# Patient Record
Sex: MALE | Race: WHITE | NOT HISPANIC OR LATINO | Employment: OTHER | URBAN - METROPOLITAN AREA
[De-identification: names, ages, dates, MRNs, and addresses within clinical notes are randomized per-mention and may not be internally consistent; named-entity substitution may affect disease eponyms.]

---

## 2018-06-25 ENCOUNTER — OFFICE VISIT (OUTPATIENT)
Dept: FAMILY MEDICINE CLINIC | Facility: CLINIC | Age: 73
End: 2018-06-25
Payer: MEDICARE

## 2018-06-25 ENCOUNTER — APPOINTMENT (OUTPATIENT)
Dept: LAB | Facility: HOSPITAL | Age: 73
End: 2018-06-25
Payer: MEDICARE

## 2018-06-25 VITALS
WEIGHT: 158 LBS | OXYGEN SATURATION: 95 % | SYSTOLIC BLOOD PRESSURE: 134 MMHG | HEIGHT: 62 IN | BODY MASS INDEX: 29.08 KG/M2 | DIASTOLIC BLOOD PRESSURE: 78 MMHG | TEMPERATURE: 96.8 F | HEART RATE: 76 BPM | RESPIRATION RATE: 14 BRPM

## 2018-06-25 DIAGNOSIS — E11.9 DIABETES MELLITUS WITHOUT COMPLICATION (HCC): ICD-10-CM

## 2018-06-25 DIAGNOSIS — M54.50 MIDLINE LOW BACK PAIN WITHOUT SCIATICA, UNSPECIFIED CHRONICITY: Primary | ICD-10-CM

## 2018-06-25 PROBLEM — E78.49 OTHER HYPERLIPIDEMIA: Status: ACTIVE | Noted: 2018-06-25

## 2018-06-25 PROBLEM — Z79.4 TYPE 2 DIABETES MELLITUS, WITH LONG-TERM CURRENT USE OF INSULIN (HCC): Status: ACTIVE | Noted: 2018-06-25

## 2018-06-25 PROBLEM — I10 ESSENTIAL HYPERTENSION: Status: ACTIVE | Noted: 2018-06-25

## 2018-06-25 PROBLEM — I73.9 PERIPHERAL VASCULAR DISEASE WITH CLAUDICATION (HCC): Status: ACTIVE | Noted: 2018-06-25

## 2018-06-25 LAB
ALBUMIN SERPL BCP-MCNC: 4.2 G/DL (ref 3.5–5)
ALP SERPL-CCNC: 70 U/L (ref 46–116)
ALT SERPL W P-5'-P-CCNC: 45 U/L (ref 12–78)
ANION GAP SERPL CALCULATED.3IONS-SCNC: 13 MMOL/L (ref 4–13)
AST SERPL W P-5'-P-CCNC: 28 U/L (ref 5–45)
BACTERIA UR QL AUTO: ABNORMAL /HPF
BASOPHILS # BLD AUTO: 0.02 THOUSANDS/ΜL (ref 0–0.1)
BASOPHILS NFR BLD AUTO: 0 % (ref 0–1)
BILIRUB DIRECT SERPL-MCNC: 0.18 MG/DL (ref 0–0.2)
BILIRUB SERPL-MCNC: 0.91 MG/DL (ref 0.2–1)
BILIRUB UR QL STRIP: NEGATIVE
BUN SERPL-MCNC: 27 MG/DL (ref 5–25)
CALCIUM SERPL-MCNC: 10.1 MG/DL (ref 8.3–10.1)
CHLORIDE SERPL-SCNC: 100 MMOL/L (ref 100–108)
CHOLEST SERPL-MCNC: 143 MG/DL (ref 50–200)
CLARITY UR: CLEAR
CO2 SERPL-SCNC: 24 MMOL/L (ref 21–32)
COLOR UR: YELLOW
CREAT SERPL-MCNC: 1.13 MG/DL (ref 0.6–1.3)
EOSINOPHIL # BLD AUTO: 0.17 THOUSAND/ΜL (ref 0–0.61)
EOSINOPHIL NFR BLD AUTO: 2 % (ref 0–6)
ERYTHROCYTE [DISTWIDTH] IN BLOOD BY AUTOMATED COUNT: 15.4 % (ref 11.6–15.1)
GFR SERPL CREATININE-BSD FRML MDRD: 63 ML/MIN/1.73SQ M
GLUCOSE SERPL-MCNC: 185 MG/DL (ref 65–140)
GLUCOSE UR STRIP-MCNC: ABNORMAL MG/DL
HCT VFR BLD AUTO: 43.8 % (ref 36.5–49.3)
HDLC SERPL-MCNC: 39 MG/DL (ref 40–60)
HGB BLD-MCNC: 14.8 G/DL (ref 12–17)
HGB UR QL STRIP.AUTO: NEGATIVE
KETONES UR STRIP-MCNC: NEGATIVE MG/DL
LDLC SERPL CALC-MCNC: 63 MG/DL (ref 0–100)
LEUKOCYTE ESTERASE UR QL STRIP: ABNORMAL
LYMPHOCYTES # BLD AUTO: 1.52 THOUSANDS/ΜL (ref 0.6–4.47)
LYMPHOCYTES NFR BLD AUTO: 20 % (ref 14–44)
MCH RBC QN AUTO: 30.9 PG (ref 26.8–34.3)
MCHC RBC AUTO-ENTMCNC: 33.8 G/DL (ref 31.4–37.4)
MCV RBC AUTO: 91 FL (ref 82–98)
MONOCYTES # BLD AUTO: 0.66 THOUSAND/ΜL (ref 0.17–1.22)
MONOCYTES NFR BLD AUTO: 9 % (ref 4–12)
NEUTROPHILS # BLD AUTO: 5.35 THOUSANDS/ΜL (ref 1.85–7.62)
NEUTS SEG NFR BLD AUTO: 69 % (ref 43–75)
NITRITE UR QL STRIP: NEGATIVE
NON-SQ EPI CELLS URNS QL MICRO: ABNORMAL /HPF
NONHDLC SERPL-MCNC: 104 MG/DL
NRBC BLD AUTO-RTO: 0 /100 WBCS
PH UR STRIP.AUTO: 5.5 [PH] (ref 4.5–8)
PLATELET # BLD AUTO: 174 THOUSANDS/UL (ref 149–390)
PMV BLD AUTO: 9.9 FL (ref 8.9–12.7)
POTASSIUM SERPL-SCNC: 4.1 MMOL/L (ref 3.5–5.3)
PROT SERPL-MCNC: 8.2 G/DL (ref 6.4–8.2)
PROT UR STRIP-MCNC: NEGATIVE MG/DL
RBC # BLD AUTO: 4.79 MILLION/UL (ref 3.88–5.62)
RBC #/AREA URNS AUTO: ABNORMAL /HPF
SODIUM SERPL-SCNC: 137 MMOL/L (ref 136–145)
SP GR UR STRIP.AUTO: 1.01 (ref 1–1.03)
TRIGL SERPL-MCNC: 207 MG/DL
UROBILINOGEN UR QL STRIP.AUTO: 0.2 E.U./DL
WBC # BLD AUTO: 7.72 THOUSAND/UL (ref 4.31–10.16)
WBC #/AREA URNS AUTO: ABNORMAL /HPF

## 2018-06-25 PROCEDURE — 85025 COMPLETE CBC W/AUTO DIFF WBC: CPT

## 2018-06-25 PROCEDURE — 36415 COLL VENOUS BLD VENIPUNCTURE: CPT

## 2018-06-25 PROCEDURE — 80061 LIPID PANEL: CPT

## 2018-06-25 PROCEDURE — 81001 URINALYSIS AUTO W/SCOPE: CPT

## 2018-06-25 PROCEDURE — 99204 OFFICE O/P NEW MOD 45 MIN: CPT | Performed by: INTERNAL MEDICINE

## 2018-06-25 PROCEDURE — 80048 BASIC METABOLIC PNL TOTAL CA: CPT

## 2018-06-25 PROCEDURE — 80076 HEPATIC FUNCTION PANEL: CPT

## 2018-06-25 RX ORDER — EMPAGLIFLOZIN 25 MG/1
25 TABLET, FILM COATED ORAL EVERY MORNING
COMMUNITY
End: 2022-04-27

## 2018-06-25 RX ORDER — ATORVASTATIN CALCIUM 40 MG/1
TABLET, FILM COATED ORAL
COMMUNITY
Start: 2018-05-23 | End: 2021-05-17

## 2018-06-25 RX ORDER — CYCLOBENZAPRINE HCL 5 MG
10 TABLET ORAL 3 TIMES DAILY PRN
Qty: 30 TABLET | Refills: 0 | Status: SHIPPED | OUTPATIENT
Start: 2018-06-25 | End: 2021-11-02

## 2018-06-25 RX ORDER — MELOXICAM 7.5 MG/1
7.5 TABLET ORAL DAILY
Qty: 30 TABLET | Refills: 0 | Status: SHIPPED | OUTPATIENT
Start: 2018-06-25 | End: 2018-08-01 | Stop reason: SURG

## 2018-06-25 RX ORDER — LISINOPRIL AND HYDROCHLOROTHIAZIDE 20; 12.5 MG/1; MG/1
TABLET ORAL
COMMUNITY
Start: 2018-05-23 | End: 2020-10-13 | Stop reason: SDUPTHER

## 2018-06-25 RX ORDER — GLIPIZIDE 10 MG/1
TABLET ORAL
COMMUNITY
Start: 2018-05-23 | End: 2020-10-13 | Stop reason: SDUPTHER

## 2018-06-25 NOTE — PROGRESS NOTES
Assessment/Plan:    No problem-specific Assessment & Plan notes found for this encounter  Diagnoses and all orders for this visit:    Midline low back pain without sciatica, unspecified chronicity  -     meloxicam (MOBIC) 7 5 mg tablet; Take 1 tablet (7 5 mg total) by mouth daily  -     cyclobenzaprine (FLEXERIL) 5 mg tablet; Take 2 tablets (10 mg total) by mouth 3 (three) times a day as needed for muscle spasms    Diabetes mellitus without complication (HCC)  -     Basic metabolic panel; Future  -     Urinalysis with reflex to microscopic; Future  -     Lipid panel; Future  -     Hepatic function panel; Future  -     CBC and differential; Future    Other orders  -     atorvastatin (LIPITOR) 40 mg tablet;   -     glipiZIDE (GLUCOTROL) 10 mg tablet;   -     lisinopril-hydrochlorothiazide (PRINZIDE,ZESTORETIC) 20-12 5 MG per tablet;   -     metFORMIN (GLUCOPHAGE) 1000 MG tablet;   -     JARDIANCE 25 MG TABS; Take 25 mg by mouth every morning        Subjective:      Patient ID: Junaid Newsome is a 74 y o  male  HPI    The following portions of the patient's history were reviewed and updated as appropriate: allergies, current medications, past family history, past medical history, past social history, past surgical history and problem list     Review of Systems   Constitutional: Negative for chills, fatigue and fever  HENT: Negative for congestion, facial swelling, sore throat, trouble swallowing and voice change  Eyes: Negative for pain, discharge and visual disturbance  Respiratory: Negative for cough, shortness of breath and wheezing  Cardiovascular: Negative for chest pain, palpitations and leg swelling  Gastrointestinal: Negative for abdominal pain, blood in stool, constipation, diarrhea and nausea  Endocrine: Negative for polydipsia, polyphagia and polyuria  Genitourinary: Negative for difficulty urinating, hematuria and urgency  Musculoskeletal: Positive for back pain   Negative for arthralgias and myalgias  74 Y O Man was doing very well till last week when he started with L B pain Mostly on Rt side    No injury,,No Trauma    No other symptoms    Med  List and Problem List reviewed in Detail    No radiation of pain   Bartholome Pinks No Weakness    Skin: Negative for rash  Neurological: Negative for dizziness, tremors, weakness and headaches  Hematological: Negative for adenopathy  Does not bruise/bleed easily  Psychiatric/Behavioral: Negative for dysphoric mood, sleep disturbance and suicidal ideas  Objective:      /78 (BP Location: Left arm, Patient Position: Sitting, Cuff Size: Adult)   Pulse 76   Temp (!) 96 8 °F (36 °C)   Resp 14   Ht 5' 2" (1 575 m)   Wt 71 7 kg (158 lb)   SpO2 95%   BMI 28 90 kg/m²          Physical Exam   Constitutional: He is oriented to person, place, and time  He appears well-nourished  No distress  HENT:   Head: Normocephalic  Mouth/Throat: Oropharynx is clear and moist  No oropharyngeal exudate  Eyes: Conjunctivae are normal  Pupils are equal, round, and reactive to light  No scleral icterus  Neck: Neck supple  No thyromegaly present  Cardiovascular: Normal rate, regular rhythm and normal heart sounds  No murmur heard  Pulmonary/Chest: Effort normal and breath sounds normal  No respiratory distress  He has no wheezes  He has no rales  Abdominal: Soft  Bowel sounds are normal  He exhibits no distension  There is no tenderness  There is no rebound and no guarding  Musculoskeletal: He exhibits tenderness  He exhibits no edema  Tenderness and Muscle spasm Lower back worse on Rt Side,  No Focal deficit  Bartholome Pinks Lymphadenopathy:     He has no cervical adenopathy  Neurological: He is alert and oriented to person, place, and time  Psychiatric: He has a normal mood and affect

## 2018-06-26 ENCOUNTER — HOSPITAL ENCOUNTER (EMERGENCY)
Facility: HOSPITAL | Age: 73
Discharge: HOME | End: 2018-06-26
Attending: EMERGENCY MEDICINE | Admitting: EMERGENCY MEDICINE
Payer: MEDICARE

## 2018-06-26 ENCOUNTER — APPOINTMENT (EMERGENCY)
Dept: RADIOLOGY | Facility: HOSPITAL | Age: 73
End: 2018-06-26
Payer: MEDICARE

## 2018-06-26 ENCOUNTER — APPOINTMENT (EMERGENCY)
Dept: CT IMAGING | Facility: HOSPITAL | Age: 73
End: 2018-06-26
Payer: MEDICARE

## 2018-06-26 VITALS
OXYGEN SATURATION: 93 % | TEMPERATURE: 97.6 F | SYSTOLIC BLOOD PRESSURE: 136 MMHG | DIASTOLIC BLOOD PRESSURE: 61 MMHG | RESPIRATION RATE: 18 BRPM | WEIGHT: 158.07 LBS | BODY MASS INDEX: 28.91 KG/M2 | HEART RATE: 67 BPM

## 2018-06-26 DIAGNOSIS — M54.30 SCIATICA: Primary | ICD-10-CM

## 2018-06-26 LAB
ALBUMIN SERPL BCP-MCNC: 3.8 G/DL (ref 3.5–5)
ALP SERPL-CCNC: 70 U/L (ref 46–116)
ALT SERPL W P-5'-P-CCNC: 40 U/L (ref 12–78)
ANION GAP SERPL CALCULATED.3IONS-SCNC: 12 MMOL/L (ref 4–13)
AST SERPL W P-5'-P-CCNC: 26 U/L (ref 5–45)
ATRIAL RATE: 71 BPM
BACTERIA UR QL AUTO: ABNORMAL /HPF
BASOPHILS # BLD AUTO: 0.03 THOUSANDS/ΜL (ref 0–0.1)
BASOPHILS NFR BLD AUTO: 1 % (ref 0–1)
BILIRUB SERPL-MCNC: 0.71 MG/DL (ref 0.2–1)
BILIRUB UR QL STRIP: NEGATIVE
BUN SERPL-MCNC: 27 MG/DL (ref 5–25)
CALCIUM SERPL-MCNC: 9.6 MG/DL (ref 8.3–10.1)
CHLORIDE SERPL-SCNC: 98 MMOL/L (ref 100–108)
CLARITY UR: CLEAR
CO2 SERPL-SCNC: 27 MMOL/L (ref 21–32)
COLOR UR: YELLOW
CREAT SERPL-MCNC: 1.21 MG/DL (ref 0.6–1.3)
EOSINOPHIL # BLD AUTO: 0.17 THOUSAND/ΜL (ref 0–0.61)
EOSINOPHIL NFR BLD AUTO: 3 % (ref 0–6)
ERYTHROCYTE [DISTWIDTH] IN BLOOD BY AUTOMATED COUNT: 15.6 % (ref 11.6–15.1)
GFR SERPL CREATININE-BSD FRML MDRD: 58 ML/MIN/1.73SQ M
GLUCOSE SERPL-MCNC: 183 MG/DL (ref 65–140)
GLUCOSE UR STRIP-MCNC: ABNORMAL MG/DL
HCT VFR BLD AUTO: 43.8 % (ref 36.5–49.3)
HGB BLD-MCNC: 14.7 G/DL (ref 12–17)
HGB UR QL STRIP.AUTO: NEGATIVE
KETONES UR STRIP-MCNC: NEGATIVE MG/DL
LEUKOCYTE ESTERASE UR QL STRIP: ABNORMAL
LIPASE SERPL-CCNC: 141 U/L (ref 73–393)
LYMPHOCYTES # BLD AUTO: 1.57 THOUSANDS/ΜL (ref 0.6–4.47)
LYMPHOCYTES NFR BLD AUTO: 26 % (ref 14–44)
MCH RBC QN AUTO: 30.8 PG (ref 26.8–34.3)
MCHC RBC AUTO-ENTMCNC: 33.6 G/DL (ref 31.4–37.4)
MCV RBC AUTO: 92 FL (ref 82–98)
MONOCYTES # BLD AUTO: 0.56 THOUSAND/ΜL (ref 0.17–1.22)
MONOCYTES NFR BLD AUTO: 9 % (ref 4–12)
NEUTROPHILS # BLD AUTO: 3.68 THOUSANDS/ΜL (ref 1.85–7.62)
NEUTS SEG NFR BLD AUTO: 61 % (ref 43–75)
NITRITE UR QL STRIP: NEGATIVE
NON-SQ EPI CELLS URNS QL MICRO: ABNORMAL /HPF
P AXIS: 67 DEGREES
PH UR STRIP.AUTO: 5 [PH] (ref 4.5–8)
PLATELET # BLD AUTO: 202 THOUSANDS/UL (ref 149–390)
PMV BLD AUTO: 9.9 FL (ref 8.9–12.7)
POTASSIUM SERPL-SCNC: 4 MMOL/L (ref 3.5–5.3)
PR INTERVAL: 280 MS
PROT SERPL-MCNC: 7.6 G/DL (ref 6.4–8.2)
PROT UR STRIP-MCNC: NEGATIVE MG/DL
QRS AXIS: 85 DEGREES
QRSD INTERVAL: 86 MS
QT INTERVAL: 384 MS
QTC INTERVAL: 417 MS
RBC # BLD AUTO: 4.78 MILLION/UL (ref 3.88–5.62)
RBC #/AREA URNS AUTO: ABNORMAL /HPF
SODIUM SERPL-SCNC: 137 MMOL/L (ref 136–145)
SP GR UR STRIP.AUTO: 1.01 (ref 1–1.03)
T WAVE AXIS: 27 DEGREES
TROPONIN I SERPL-MCNC: <0.02 NG/ML
UROBILINOGEN UR QL STRIP.AUTO: 0.2 E.U./DL
VENTRICULAR RATE: 71 BPM
WBC # BLD AUTO: 6.01 THOUSAND/UL (ref 4.31–10.16)
WBC #/AREA URNS AUTO: ABNORMAL /HPF

## 2018-06-26 PROCEDURE — 80053 COMPREHEN METABOLIC PANEL: CPT | Performed by: EMERGENCY MEDICINE

## 2018-06-26 PROCEDURE — 96361 HYDRATE IV INFUSION ADD-ON: CPT

## 2018-06-26 PROCEDURE — 84484 ASSAY OF TROPONIN QUANT: CPT | Performed by: EMERGENCY MEDICINE

## 2018-06-26 PROCEDURE — 93005 ELECTROCARDIOGRAM TRACING: CPT

## 2018-06-26 PROCEDURE — 74176 CT ABD & PELVIS W/O CONTRAST: CPT

## 2018-06-26 PROCEDURE — 96374 THER/PROPH/DIAG INJ IV PUSH: CPT

## 2018-06-26 PROCEDURE — 85025 COMPLETE CBC W/AUTO DIFF WBC: CPT | Performed by: EMERGENCY MEDICINE

## 2018-06-26 PROCEDURE — 99285 EMERGENCY DEPT VISIT HI MDM: CPT

## 2018-06-26 PROCEDURE — 96375 TX/PRO/DX INJ NEW DRUG ADDON: CPT

## 2018-06-26 PROCEDURE — 81001 URINALYSIS AUTO W/SCOPE: CPT

## 2018-06-26 PROCEDURE — 71046 X-RAY EXAM CHEST 2 VIEWS: CPT

## 2018-06-26 PROCEDURE — 83690 ASSAY OF LIPASE: CPT | Performed by: EMERGENCY MEDICINE

## 2018-06-26 PROCEDURE — 93010 ELECTROCARDIOGRAM REPORT: CPT | Performed by: INTERNAL MEDICINE

## 2018-06-26 PROCEDURE — 81002 URINALYSIS NONAUTO W/O SCOPE: CPT | Performed by: EMERGENCY MEDICINE

## 2018-06-26 PROCEDURE — 36415 COLL VENOUS BLD VENIPUNCTURE: CPT | Performed by: EMERGENCY MEDICINE

## 2018-06-26 RX ORDER — HYDROCODONE BITARTRATE AND ACETAMINOPHEN 5; 325 MG/1; MG/1
1 TABLET ORAL EVERY 6 HOURS PRN
Qty: 20 TABLET | Refills: 0 | Status: SHIPPED | OUTPATIENT
Start: 2018-06-26 | End: 2018-07-06

## 2018-06-26 RX ORDER — KETOROLAC TROMETHAMINE 30 MG/ML
15 INJECTION, SOLUTION INTRAMUSCULAR; INTRAVENOUS ONCE
Status: COMPLETED | OUTPATIENT
Start: 2018-06-26 | End: 2018-06-26

## 2018-06-26 RX ORDER — MORPHINE SULFATE 4 MG/ML
4 INJECTION, SOLUTION INTRAMUSCULAR; INTRAVENOUS ONCE
Status: COMPLETED | OUTPATIENT
Start: 2018-06-26 | End: 2018-06-26

## 2018-06-26 RX ADMIN — KETOROLAC TROMETHAMINE 15 MG: 30 INJECTION, SOLUTION INTRAMUSCULAR at 19:00

## 2018-06-26 RX ADMIN — SODIUM CHLORIDE 500 ML: 0.9 INJECTION, SOLUTION INTRAVENOUS at 19:00

## 2018-06-26 RX ADMIN — MORPHINE SULFATE 4 MG: 4 INJECTION, SOLUTION INTRAMUSCULAR; INTRAVENOUS at 19:00

## 2018-06-26 NOTE — ED PROVIDER NOTES
History  Chief Complaint   Patient presents with    Back Pain     Patient reports right lower back pain that started "gradually last week but has gotten worse and worse every day " Patient denies injury/trauma  Patient states that the pain is so bad that he cannot walk  Patient reports history of "blood clots in my legs that required multiple stents " Patient reports occasional weakness in his legs   Shortness of Breath     Patient states that when he "talks or move too much I get short of breath " Patient also reports that he gets more short of breath "when the pain comes"     C/o R lower back pain for about a week  No injury  Pt  Tried motrin/tylenol and it didn't help  Pt  Saw his family dr Marissa Mcgowan  And was diagnosed with a muscle spasm  The gave him a script for mobic and flexeril and that's not helping  Pt  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 Taking? JARDIANCE 25 MG TABS  Self Yes Yes   Sig: Take 25 mg by mouth every morning   atorvastatin (LIPITOR) 40 mg tablet   Yes Yes   cyclobenzaprine (FLEXERIL) 5 mg tablet   No Yes   Sig: Take 2 tablets (10 mg total) by mouth 3 (three) times a day as needed for muscle spasms   glipiZIDE (GLUCOTROL) 10 mg tablet   Yes Yes   lisinopril-hydrochlorothiazide (PRINZIDE,ZESTORETIC) 20-12 5 MG per tablet   Yes Yes   meloxicam (MOBIC) 7 5 mg tablet   No Yes   Sig: Take 1 tablet (7 5 mg total) by mouth daily   metFORMIN (GLUCOPHAGE) 1000 MG tablet   Yes No      Facility-Administered Medications: None       Past Medical History:   Diagnosis Date    Diabetes mellitus ([facility] [address] )     [facility] (deep venous thrombosis) ([facility] )        Past Surgical History:   Procedure Laterality Date    FEMORAL ARTERY STENT         History reviewed  No pertinent family history  I have reviewed and agree with the history as documented      Social History Substance Use Topics    Smoking status: Former Smoker     Types: Cigarettes     Quit date: 6/25/2015    Smokeless tobacco: Never Used    Alcohol use 0 6 oz/week     1 Cans of beer per week      Comment: weekly        Review of Systems   Constitutional: Negative for appetite change, fatigue and fever  HENT: Negative for rhinorrhea and sore throat  Respiratory: Negative for cough, shortness of breath and wheezing  Cardiovascular: Negative for chest pain and leg swelling  Gastrointestinal: Negative for abdominal pain, diarrhea and vomiting  Genitourinary: Negative for dysuria and flank pain  Musculoskeletal: Positive for back pain  Negative for neck pain  Skin: Negative for rash  Neurological: Negative for syncope and headaches  Psychiatric/Behavioral:        Mood normal       Physical Exam  Physical Exam   Constitutional: He is oriented to person, place, and time  He appears well-developed and well-nourished  HENT:   Head: Normocephalic and atraumatic  Neck: Normal range of motion  Neck supple  Cardiovascular: Normal rate and regular rhythm  Pulmonary/Chest: Effort normal and breath sounds normal    Abdominal: Soft  There is no tenderness  Musculoskeletal:   R lower lumbar tenderness, +st  Leg raising test    Neurological: He is alert and oriented to person, place, and time  Skin: Skin is warm and dry  Nursing note and vitals reviewed        Vital Signs  ED Triage Vitals [06/26/18 1759]   Temperature Pulse Respirations Blood Pressure SpO2   97 6 °F (36 4 °C) 77 20 148/65 98 %      Temp Source Heart Rate Source Patient Position - Orthostatic VS BP Location FiO2 (%)   Oral Monitor Sitting Right arm --      Pain Score       Worst Possible Pain           Vitals:    06/26/18 1759 06/26/18 1854 06/26/18 2042   BP: 148/65 155/65 136/61   Pulse: 77 67 67   Patient Position - Orthostatic VS: Sitting Sitting Sitting       Visual Acuity      ED Medications  Medications   sodium chloride 0 9 % bolus 500 mL (0 mL Intravenous Stopped 6/26/18 2005)   morphine (PF) 4 mg/mL injection 4 mg (4 mg Intravenous Given 6/26/18 1900)   ketorolac (TORADOL) injection 15 mg (15 mg Intravenous Given 6/26/18 1900)       Diagnostic Studies  Results Reviewed     Procedure Component Value Units Date/Time    Urine Microscopic [[id number]]  (Abnormal) Collected:  06/26/18 2048    Lab Status:  Final result Specimen:  Urine from Urine, Clean Catch Updated:  06/26/18 2129     RBC, UA None Seen /hpf      WBC, UA 0-1 (A) /hpf      Epithelial Cells Occasional /hpf      Bacteria, UA Occasional /hpf     POCT urinalysis dipstick [[id number]]  (Abnormal) Resulted:  06/26/18 2045    Lab Status:  Final result Specimen:  Urine Updated:  06/26/18 2045    ED Urine Macroscopic [[id number]]  (Abnormal) Collected:  06/26/18 2048    Lab Status:  Final result Specimen:  Urine Updated:  06/26/18 2044     Color, UA Yellow     Clarity, UA Clear     pH, UA 5 0     Leukocytes, UA Elevated glucose may cause decreased leukocyte values   See urine microscopic for [facility] result/ (A)     Nitrite, UA Negative     Protein, UA Negative mg/dl      Glucose, UA >=1000 (1%) (A) mg/dl      Ketones, UA Negative mg/dl      Urobilinogen, UA 0 2 E U /dl      Bilirubin, UA Negative     Blood, UA Negative     Specific Gravity, UA 1 010    Narrative:       CLINITEK RESULT    Troponin I [[id number]]  (Normal) Collected:  06/26/18 1904    Lab Status:  Final result Specimen:  Blood from Arm, Right Updated:  06/26/18 1930     Troponin I <0 02 ng/mL     CBC and differential [[id number]]  (Abnormal) Collected:  06/26/18 1858    Lab Status:  Final result Specimen:  Blood from Arm, Right Updated:  06/26/18 1929     WBC 6 01 Thousand/uL      RBC 4 78 Million/uL      Hemoglobin 14 7 g/dL      Hematocrit 43 8 %      MCV 92 fL      MCH 30 8 pg      MCHC 33 6 g/dL      RDW 15 6 (H) %      MPV 9 9 fL      Platelets 685 Thousands/uL      Neutrophils Relative 61 %      Lymphocytes Relative 26 % Monocytes Relative 9 %      Eosinophils Relative 3 %      Basophils Relative 1 %      Neutrophils Absolute 3 68 Thousands/µL      Lymphocytes Absolute 1 57 Thousands/µL      Monocytes Absolute 0 56 Thousand/µL      Eosinophils Absolute 0 17 Thousand/µL      Basophils Absolute 0 03 Thousands/µL     Comprehensive metabolic panel [[id number]]  (Abnormal) Collected:  06/26/18 1858    Lab Status:  Final result Specimen:  Blood from Arm, Right Updated:  06/26/18 1928     Sodium 137 mmol/L      Potassium 4 0 mmol/L      Chloride 98 (L) mmol/L      CO2 27 mmol/L      Anion Gap 12 mmol/L      BUN 27 (H) mg/dL      Creatinine 1 21 mg/dL      Glucose 183 (H) mg/dL      Calcium 9 6 mg/dL      AST 26 U/L      ALT 40 U/L      Alkaline Phosphatase 70 U/L      Total Protein 7 6 g/dL      Albumin 3 8 g/dL      Total Bilirubin 0 71 mg/dL      eGFR 58 ml/min/1 73sq m     Narrative:         National Kidney Disease Education Program recommendations are as follows:  GFR calculation is accurate only with a steady state creatinine  Chronic Kidney disease less than 60 ml/min/1 73 sq  meters  Kidney failure less than 15 ml/min/1 73 sq  meters  Lipase [[id number]]  (Normal) Collected:  06/26/18 1858    Lab Status:  Final result Specimen:  Blood from Arm, Right Updated:  06/26/18 1928     Lipase 141 u/L                  CT renal stone study abdomen pelvis without contrast   Final Result by Jayson Hathaway MD (06/26 1937)      No urinary tract calculi or obstructive uropathy  Hepatosplenomegaly and hepatic cirrhosis  No ascites  Workstation performed: [id number]         XR chest 2 views    (Results Pending)              Procedures  Procedures       Phone Contacts  ED Phone Contact    ED Course                               MDM  Number of Diagnoses or Management Options  Sciatica:   Risk of Complications, Morbidity, and/or Mortality  Presenting problems: moderate  General comments: Pt  Felt slightly better in ER        CritCare Time    Disposition  Final diagnoses:   Sciatica     Time reflects when diagnosis was documented in both MDM as applicable and the Disposition within this note     Time User Action Codes Description Comment    6/26/2018  9:20 PM Kuldeep Laurent Add [M54 30] Sciatica       ED Disposition     ED Disposition Condition Comment    Discharge  [facility] discharge to home/self care  Condition at discharge: Stable        Follow-up Information     Follow up With Specialties Details Why [address] [facility]   [address] [place] [address] Road  [phone]      [address] [place] [address] Specialists [facility]   [facility] [id number] [phone]          Discharge Medication List as of 6/26/2018  9:21 PM      START taking these medications    Details   HYDROcodone-acetaminophen (NORCO) 5-325 mg per tablet Take 1 tablet by mouth every 6 (six) hours as needed for pain for up to 10 days Max Daily Amount: 4 tablets, Starting Tue 6/26/2018, Until Fri 7/6/2018, Print         CONTINUE these medications which have NOT CHANGED    Details   atorvastatin (LIPITOR) 40 mg tablet Starting Wed 5/23/2018, Historical Med      cyclobenzaprine (FLEXERIL) 5 mg tablet Take 2 tablets (10 mg total) by mouth 3 (three) times a day as needed for muscle spasms, Starting Mon 6/25/2018, Normal      glipiZIDE (GLUCOTROL) 10 mg tablet Starting Wed 5/23/2018, Historical Med      JARDIANCE 25 MG TABS Take 25 mg by mouth every morning, Historical Med      lisinopril-hydrochlorothiazide (PRINZIDE,ZESTORETIC) 20-12 5 MG per tablet Starting Wed 5/23/2018, Historical Med      meloxicam (MOBIC) 7 5 mg tablet Take 1 tablet (7 5 mg total) by mouth daily, Starting Mon 6/25/2018, Normal      metFORMIN (GLUCOPHAGE) 1000 MG tablet Starting Wed 5/23/2018, Historical Med           No discharge procedures on file      ED Provider  Electronically Signed by           Brianna Kc Yolie Bacon MD  06/27/18 3975

## 2018-06-27 ENCOUNTER — NURSE TRIAGE (OUTPATIENT)
Dept: PHYSICAL THERAPY | Facility: OTHER | Age: 73
End: 2018-06-27

## 2018-06-27 DIAGNOSIS — M25.551 PAIN OF RIGHT HIP JOINT: ICD-10-CM

## 2018-06-27 DIAGNOSIS — M54.41 ACUTE RIGHT-SIDED LOW BACK PAIN WITH RIGHT-SIDED SCIATICA: Primary | ICD-10-CM

## 2018-06-27 NOTE — TELEPHONE ENCOUNTER
Background - Initial Assessment  Clinical complaint: low back pain radiates to right leg  Date of onset: 1 week ago  Mechanism of injury: unk     Right leg and right hip    Previous Treatment - Previous Treatment  Previous evaluation: ED visit 6/26/18  Current provider: *No Answer*  Diagnostics: *No Answer*  Previous treatment: *No Answer*    Protocols used: [facility] PROTOCOL

## 2018-06-27 NOTE — DISCHARGE INSTRUCTIONS
Sciatica   WHAT YOU NEED TO KNOW:   Sciatica is a condition that causes pain along your sciatic nerve  The sciatic nerve runs from your spine through both sides of your buttocks  It then runs down the back of your thigh, into your lower leg and foot  Your sciatic nerve may be compressed, inflamed, irritated, or stretched  DISCHARGE INSTRUCTIONS:   Medicines:   · NSAIDs:  These medicines decrease swelling and pain  NSAIDs are available without a doctor's order  Ask your healthcare provider which medicine is right for you  Ask how much to take and when to take it  Take as directed  NSAIDs can cause stomach bleeding or kidney problems if not taken correctly  · Acetaminophen: This medicine decreases pain  Acetaminophen is available without a doctor's order  Ask how much to take and when to take it  Follow directions  Acetaminophen can cause liver damage if not taken correctly  · Muscle relaxers  help decrease pain and muscle spasms  · Take your medicine as directed  Contact your healthcare provider if you think your medicine is not helping or if you have side effects  Tell him of her if you are allergic to any medicine  Keep a list of the medicines, vitamins, and herbs you take  Include the amounts, and when and why you take them  Bring the list or the pill bottles to follow-up visits  Carry your medicine list with you in case of an emergency  Follow up with your healthcare provider as directed:  Write down your questions so you remember to ask them during your visits  Manage your symptoms:   · Activity:  Decrease your activity  Do not lift heavy objects or twist your back for at least 6 weeks  Slowly return to your usual activity  · Ice:  Ice helps decrease swelling and pain  Ice may also help prevent tissue damage  Use an ice pack, or put crushed ice in a plastic bag  Cover it with a towel and place it on your low back or leg for 15 to 20 minutes every hour or as directed      · Heat:  Heat helps decrease pain and muscle spasms  Apply heat on the area for 20 to 30 minutes every 2 hours for as many days as directed  · Physical therapy:  You may need to see physical therapist to teach you exercises to help improve movement and strength, and to decrease pain  An occupational therapist teaches you skills to help with your daily activities  · Use assistive devices if directed: You may need to wear back support, such as a back brace  You may need crutches, a cane, or a walker to decrease stress on your lower back and leg muscles  Ask your healthcare provider for more information about assistive devices and how to use them correctly  Self-care:   · Avoid pressure on your back and legs:  Do not  lift heavy objects, or stand or sit for long periods of time  · Lift objects safely:  Keep your back straight and bend your knees when you  an object  Do not bend or twist your back when you lift  · Maintain a healthy weight:  Ask your healthcare provider how much you should weigh  Ask him to help you create a weight loss plan if you are overweight  · Exercise:  Ask your healthcare provider about the best stretching, warmup, and exercise plan for you  Contact your healthcare provider if:   · You have pain in your lower back at night or when resting  · You have pain in your lower back with numbness below the knee  · You have weakness in one leg only  · You have questions or concerns about your condition or care  Return to the emergency department if:   · You have trouble holding back your urine or bowel movements  · You have weakness in both legs  · You have numbness in your groin or buttocks  © 2017 [address] [employer] Information is for End User's use only and may not be sold, redistributed or otherwise used for commercial purposes  All illustrations and images included in CareNotes® are the copyrighted property of [employer]  or Reinaldo Soto    The above information is an  only  It is not intended as medical advice for individual conditions or treatments  Talk to your doctor, nurse or pharmacist before following any medical regimen to see if it is safe and effective for you

## 2018-06-27 NOTE — ED NOTES
Patient ambulatory to restroom at this time   Steady gait noted      Andrews Lofton RN  06/26/18 2039

## 2018-06-28 ENCOUNTER — EVALUATION (OUTPATIENT)
Dept: PHYSICAL THERAPY | Facility: MEDICAL CENTER | Age: 73
End: 2018-06-28
Payer: MEDICARE

## 2018-06-28 VITALS — DIASTOLIC BLOOD PRESSURE: 85 MMHG | SYSTOLIC BLOOD PRESSURE: 128 MMHG | HEART RATE: 82 BPM | TEMPERATURE: 98 F

## 2018-06-28 DIAGNOSIS — M54.41 ACUTE RIGHT-SIDED LOW BACK PAIN WITH RIGHT-SIDED SCIATICA: ICD-10-CM

## 2018-06-28 DIAGNOSIS — M25.551 PAIN OF RIGHT HIP JOINT: ICD-10-CM

## 2018-06-28 PROCEDURE — G8991 OTHER PT/OT GOAL STATUS: HCPCS | Performed by: PHYSICAL THERAPIST

## 2018-06-28 PROCEDURE — G8990 OTHER PT/OT CURRENT STATUS: HCPCS | Performed by: PHYSICAL THERAPIST

## 2018-06-28 PROCEDURE — 97163 PT EVAL HIGH COMPLEX 45 MIN: CPT | Performed by: PHYSICAL THERAPIST

## 2018-06-28 NOTE — LETTER
One of your patients, Chris Wooten, was referred to me by the [facility] Comprehensive Spine program   Please see the evaluation summary in the notes section of the chart review  Please verify that you agree with the plan of care by signing the order sent via EPIC  I sincerely appreciate the opportunity to share in the care of one of your patients and hope to have another opportunity to work with you in the near future  If you have any questions or concerns, please don't hesitate to call      Sincerely,    Royal Gomes, PT

## 2018-06-28 NOTE — PROGRESS NOTES
PT Evaluation     Today's date: 2018  Patient name: Dallas Andrade  : 1945  MRN: [id number]  Referring provider: Edi Yi MD  Dx:   Encounter Diagnosis     ICD-10-CM    1  Acute right-sided low back pain with right-sided sciatica M54 41 Ambulatory referral to PT spine     PT plan of care cert/re-cert   2  Pain of right hip joint M25 551 Ambulatory referral to PT spine     PT plan of care cert/re-cert                  Assessment  Impairments: abnormal coordination, abnormal gait, abnormal muscle firing, abnormal muscle tone, abnormal or restricted ROM, abnormal movement, activity intolerance, difficulty understanding, impaired physical strength, lacks appropriate home exercise program and pain with function    Assessment details: Dallas Andrade is a pleasant 74 y o  male who presents with right sided lower back pain and lateral leg pain secondary to arthritic flare  Patient is having issues with ambulation ADL's and ROM due to severe pain  The patient's greatest concerns are the pain he is experiencing, worry over not knowing what's wrong and fear of not being able to keep active  No further referral appears necessary at this time based upon examination results  Patient did report that he had a similar issue 5 years ago which was resolved after many months of failed treatment with stents in his legs to address his peripheral blood flow    Primary movement impairment diagnosis of lumbar lateral shift left resulting in pathoanatomical symptoms of Acute right-sided low back pain with right-sided sciatica  Pain of right hip joint and limiting his ability to care for self, carry, perform household chores and perform yard work    Impairments include:  1) repeated flexion with rotation/SB - addressing with mobs and repeated side gliding followed by extension mobility exercises  2) poor lifting mechanics - addressing with functional retraining  3) poor lumbopelvic movement coordination - addressing with neuromotor retraining  Etiologic factors include none recalled by the patient  Understanding of Dx/Px/POC: good   Prognosis: good  Prognosis details: Positive prognostic indicators include positive attitude toward recovery  Negative prognostic indicators include chronicity of symptoms, and diabetes    Goals  Patient will be independent with home exercise program    Patient will be able to manage symptoms independently  Patient will be able to sit without limitation due to pain  Patient will be able to walk without limitation due to pain  Patient will be able to squat to  objects from the floor without limitation due to pain  Patient will be able to lift without limitation due to pain  Plan  Patient would benefit from: skilled PT  Referral necessary: No  Planned modality interventions: thermotherapy: hydrocollator packs  Planned therapy interventions: abdominal trunk stabilization, activity modification, joint mobilization, manual therapy, motor coordination training, neuromuscular re-education, patient education, self care, therapeutic activities, therapeutic exercise, home exercise program, behavior modification, postural training and gait training  Treatment plan discussed with: patient  Plan details: Prognosis above is given PT services 2x/week tapering to 1x/week over the next 2 months and home program adherence  Subjective Evaluation    History of Present Illness  Mechanism of injury: Patient does not know when or how his pain started  He has been doing a lot of activity with moving from [place] several weeks ago  Pain started 10 days ago with no apparent onset     Quality of life: good    Pain  Current pain ratin  At best pain ratin  At worst pain rating: 10  Quality: dull ache and sharp  Aggravating factors: standing and walking    Treatments  Previous treatment: medication  Patient Goals  Patient goals for therapy: decreased pain          Objective     Special Questions      Additional Special Questions  No red flags present  Lower quarter screen unremarkable    Static Posture     Head  Forward  Shoulders  Rounded  Lumbar Spine   Lumbar spine (Left): Shifted  Postural Observations  Seated posture: poor  Standing posture: good  Correction of posture: makes symptoms better        Palpation   Left   Hypertonic in the erector spinae and quadratus lumborum  Hypotonic in the transverse abdominus  Right   Hypertonic in the erector spinae and quadratus lumborum  Hypotonic in the transverse abdominus  Additional Palpation Details  Right gluteal pain    Tenderness     Lumbar Spine  Tenderness in the facet joint       Additional Tenderness Details  L4-5 facet right comparable sign with no movement    Neurological Testing     Sensation     Lumbar   Left   Intact: light touch    Right   Intact: light touch    Reflexes   Left   Patellar (L4): normal (2+)  Achilles (S1): normal (2+)  Babinski sign: negative  Clonus sign: negative    Right   Patellar (L4): normal (2+)  Achilles (S1): normal (2+)  Babinski sign: negative  Clonus sign: negative    Active Range of Motion     Lumbar   Flexion: with pain  Left lateral flexion: WFL  Right lateral flexion: WFL  Left rotation: WFL  Right rotation: WFL  Left Knee   Normal active range of motion    Right Knee   Normal active range of motion  Left Ankle/Foot   Normal active range of motion    Right Ankle/Foot   Normal active range of motion    Additional Active Range of Motion Details  Flexion 10%  Extension 50%  Lateral flexion   Left 30  Right 0 severe pain    Passive Range of Motion   Left Hip   Normal passive range of motion    Right Hip   Normal passive range of motion    Strength/Myotome Testing     Lumbar   Left   Heel walk: normal  Toe walk: normal    Right   Heel walk: normal  Toe walk: normal    Left Hip   Planes of Motion   Flexion: WFL  Extension: WFL  Abduction: WFL  Adduction: WFL    Right Hip   Planes of Motion Flexion: WFL  Extension: WFL  Abduction: WFL  Adduction: WFL    Left Knee   Flexion: WFL  Extension: WFL    Right Knee   Flexion: WFL  Extension: WFL    Left Ankle/Foot   Dorsiflexion: WFL  Plantar flexion: WFL  Inversion: WFL  Eversion: WFL  Great toe extension: WFL  Right Ankle/Foot   Dorsiflexion: WFL  Plantar flexion: WFL  Inversion: WFL  Eversion: WFL  Great toe extension: WFL  Muscle Activation   Patient able to activate left transverse abdominals, left multifidus, right transverse abdominals and right multifidus  Additional Muscle Activation Details  Poor activation    Tests       Thoracic   Positive slump  Lumbar   Positive repeated flexion (peripheralize), repeated extension (peripheralize) and slumped  Left   Negative crossed SLR, Zimmerman and passive SLR  Right   Negative crossed SLR, Zimmerman and passive SLR  Left Pelvic Girdle/Sacrum   Negative: sacrum compression, sacral spring and thigh thrust      Right Pelvic Girdle/Sacrum   Negative: sacrum compression, sacral spring and thigh thrust      Left Hip   Negative JULIANA, FADIR, Gaenslen's, scour, SI compression and SI distraction  SLR: Negative  Right Hip   Negative JULIANA, FADIR, Gaenslen's, scour, SI compression and SI distraction  SLR: Negative  Ambulation   Weight-Bearing Status   Weight-Bearing Status (Left): full weight bearing   Weight-Bearing Status (Right): full weight-bearing      Observational Gait   Gait: within functional limits     Functional Assessment   Squat   Pain       Single Leg Stance   Left: 10 seconds  Right: 10 seconds      Flowsheet Rows      Most Recent Value   PT/OT G-Codes   FOTO information reviewed  No   Assessment Type  Evaluation   G code set  Other PT/OT Primary   Other PT Primary Current Status ()  CL   Other PT Primary Goal Status ()  CJ          Precautions: diabetes    Daily Treatment Diary     Manual  6/28            Right long leg distraction gs Right rotational lumbar mob gs                                                       Exercise Diary                                                                                                                                                                                                                                                                                      Modalities              Heat in side lying 10min

## 2018-06-29 ENCOUNTER — OFFICE VISIT (OUTPATIENT)
Dept: PHYSICAL THERAPY | Facility: MEDICAL CENTER | Age: 73
End: 2018-06-29
Payer: MEDICARE

## 2018-06-29 DIAGNOSIS — M25.551 PAIN OF RIGHT HIP JOINT: ICD-10-CM

## 2018-06-29 DIAGNOSIS — M54.41 ACUTE RIGHT-SIDED LOW BACK PAIN WITH RIGHT-SIDED SCIATICA: Primary | ICD-10-CM

## 2018-06-29 PROCEDURE — 97140 MANUAL THERAPY 1/> REGIONS: CPT | Performed by: PHYSICAL THERAPIST

## 2018-06-29 PROCEDURE — 97110 THERAPEUTIC EXERCISES: CPT | Performed by: PHYSICAL THERAPIST

## 2018-06-29 PROCEDURE — 97010 HOT OR COLD PACKS THERAPY: CPT | Performed by: PHYSICAL THERAPIST

## 2018-06-29 NOTE — PROGRESS NOTES
Daily Note     Today's date: 2018  Patient name: Godwin Gomez  : 1945  MRN: [id number]  Referring provider: Lupis Frederick MD  Dx:   Encounter Diagnosis     ICD-10-CM    1  Acute right-sided low back pain with right-sided sciatica M54 41    2  Pain of right hip joint M25 551                   Subjective: patient states that he is feeling a bit better  Notices that he is walking better  Objective: See treatment diary below     Precautions: see history     Daily Treatment Diary     Manual              Left lateral shift correction side lying gs            Right rotational mob gs            Long leg distraction gs                                          Exercise Diary              Hip flexor str             Hamstring str             Piriformis str             LTR             SKTC             SLR             Bridging with ball between knees             Clamshell with band             Standing hip abduction             Standing hip extension             Left lateral shift correction on wall 10x5                         Bike                                                                                                            Modalities                           Moist heat 10min                                     Assessment: Tolerated treatment well  Patient demonstrated fatigue post treatment, exhibited good technique with therapeutic exercises and would benefit from continued PT  Focus on correcting left lateral shift with manual therapy  Plan: Continue per plan of care

## 2018-07-05 ENCOUNTER — OFFICE VISIT (OUTPATIENT)
Dept: PHYSICAL THERAPY | Facility: MEDICAL CENTER | Age: 73
End: 2018-07-05
Payer: MEDICARE

## 2018-07-05 DIAGNOSIS — M54.41 ACUTE RIGHT-SIDED LOW BACK PAIN WITH RIGHT-SIDED SCIATICA: Primary | ICD-10-CM

## 2018-07-05 DIAGNOSIS — M25.551 PAIN OF RIGHT HIP JOINT: ICD-10-CM

## 2018-07-05 PROCEDURE — 97140 MANUAL THERAPY 1/> REGIONS: CPT | Performed by: PHYSICAL THERAPIST

## 2018-07-05 NOTE — PROGRESS NOTES
Daily Note     Today's date: 2018  Patient name: Marilyn Caldwell  : 1945  MRN: [id number]  Referring provider: Merrick Davidson MD  Dx:   Encounter Diagnosis     ICD-10-CM    1  Acute right-sided low back pain with right-sided sciatica M54 41    2  Pain of right hip joint M25 551                   Subjective: patient states that he is feeling much better  Performing his exercises in the pool and notes that walking is much better  Patient is having 1/10 pain today only with certain motions  Objective: See treatment diary below     Precautions: see history     Daily Treatment Diary     Manual              Left lateral shift correction side lying gs            Right rotational mob gs            Long leg distraction gs                                          Exercise Diary              Hip flexor str             Hamstring str             Piriformis str             LTR 10x2            SKTC             SLR             Bridging with ball between knees             Clamshell with band             Standing hip abduction             Standing hip extension             Left lateral shift correction on wall 10x5                         Bike                                                                                                            Modalities                           Moist heat 10min                                     Assessment: Tolerated treatment well  Patient demonstrated fatigue post treatment, exhibited good technique with therapeutic exercises and would benefit from continued PT  Focus on correcting left lateral shift with manual therapy  Patient is making excellent progress  Plan: Continue per plan of care

## 2018-07-09 ENCOUNTER — OFFICE VISIT (OUTPATIENT)
Dept: PHYSICAL THERAPY | Facility: MEDICAL CENTER | Age: 73
End: 2018-07-09
Payer: MEDICARE

## 2018-07-09 DIAGNOSIS — M54.41 ACUTE RIGHT-SIDED LOW BACK PAIN WITH RIGHT-SIDED SCIATICA: Primary | ICD-10-CM

## 2018-07-09 DIAGNOSIS — M25.551 PAIN OF RIGHT HIP JOINT: ICD-10-CM

## 2018-07-09 PROCEDURE — 97010 HOT OR COLD PACKS THERAPY: CPT | Performed by: PHYSICAL THERAPIST

## 2018-07-09 PROCEDURE — 97140 MANUAL THERAPY 1/> REGIONS: CPT | Performed by: PHYSICAL THERAPIST

## 2018-07-09 PROCEDURE — G8991 OTHER PT/OT GOAL STATUS: HCPCS | Performed by: PHYSICAL THERAPIST

## 2018-07-09 PROCEDURE — 97110 THERAPEUTIC EXERCISES: CPT | Performed by: PHYSICAL THERAPIST

## 2018-07-09 PROCEDURE — G8992 OTHER PT/OT  D/C STATUS: HCPCS | Performed by: PHYSICAL THERAPIST

## 2018-07-09 NOTE — PROGRESS NOTES
Daily Note     Today's date: 2018  Patient name: Clarisse Siemens  : 1945  MRN: [id number]  Referring provider: Sara Hilliard MD  Dx:   Encounter Diagnosis     ICD-10-CM    1  Acute right-sided low back pain with right-sided sciatica M54 41    2  Pain of right hip joint M25 551                   Subjective: patient states that he is traveled to [address] [place] [address] this weekend and was having more pain than usual   He notes that he still is feeling better than he was  He also was not able to perform his TE at all in the last 5 days because of travel  Objective: See treatment diary below     Precautions: see history     Daily Treatment Diary     Manual              Left lateral shift correction side lying gs            Right rotational mob gs            Long leg distraction gs                                          Exercise Diary              Hip flexor str             Hamstring str             Piriformis str             LTR 10x2            SKTC             SLR             Bridging with ball between knees             Clamshell with band             Standing hip abduction             Standing hip extension             Left lateral shift correction on wall 10x5                         Bike                                                                                                            Modalities                           Moist heat 10min                                     Assessment: Tolerated treatment well  Patient demonstrated fatigue post treatment, exhibited good technique with therapeutic exercises and would benefit from continued PT  Focus on correcting left lateral shift with manual therapy  Patient is making excellent progress  Plan: Continue per plan of care

## 2018-07-16 ENCOUNTER — APPOINTMENT (OUTPATIENT)
Dept: PHYSICAL THERAPY | Facility: MEDICAL CENTER | Age: 73
End: 2018-07-16
Payer: MEDICARE

## 2018-07-18 ENCOUNTER — OFFICE VISIT (OUTPATIENT)
Dept: PHYSICAL THERAPY | Facility: MEDICAL CENTER | Age: 73
End: 2018-07-18
Payer: MEDICARE

## 2018-07-18 DIAGNOSIS — M25.551 PAIN OF RIGHT HIP JOINT: ICD-10-CM

## 2018-07-18 DIAGNOSIS — M54.41 ACUTE RIGHT-SIDED LOW BACK PAIN WITH RIGHT-SIDED SCIATICA: Primary | ICD-10-CM

## 2018-07-18 NOTE — PROGRESS NOTES
Patient stopped in to talk today about his condition  He is feeling a bit better but his symptoms continue and he is concerned that his issue is the same problem that he had last time this sensation was occurring and he would like to follow up with a vascular physician  He would like to be discharged at this time  Patient will be seeing a new PCP in august (dr Birdie Case) where he would like to have a referral to see vascular physician  If this does not address his issues he would then consider seeing pain and spine or physiatry  Discharge at this time

## 2018-07-19 ENCOUNTER — APPOINTMENT (OUTPATIENT)
Dept: PHYSICAL THERAPY | Facility: MEDICAL CENTER | Age: 73
End: 2018-07-19
Payer: MEDICARE

## 2018-07-23 ENCOUNTER — TELEPHONE (OUTPATIENT)
Dept: FAMILY MEDICINE CLINIC | Facility: CLINIC | Age: 73
End: 2018-07-23

## 2018-07-23 DIAGNOSIS — I70.213 ATHEROSCLEROSIS OF NATIVE ARTERY OF BOTH LOWER EXTREMITIES WITH INTERMITTENT CLAUDICATION (HCC): Primary | ICD-10-CM

## 2018-07-30 ENCOUNTER — HOSPITAL ENCOUNTER (OUTPATIENT)
Dept: NON INVASIVE DIAGNOSTICS | Facility: CLINIC | Age: 73
Discharge: HOME | End: 2018-07-30
Payer: MEDICARE

## 2018-07-30 DIAGNOSIS — I70.213 ATHEROSCLEROSIS OF NATIVE ARTERY OF BOTH LOWER EXTREMITIES WITH INTERMITTENT CLAUDICATION (HCC): ICD-10-CM

## 2018-07-30 PROCEDURE — 93923 UPR/LXTR ART STDY 3+ LVLS: CPT

## 2018-07-30 PROCEDURE — 93925 LOWER EXTREMITY STUDY: CPT

## 2018-07-31 PROCEDURE — 93922 UPR/L XTREMITY ART 2 LEVELS: CPT | Performed by: SURGERY

## 2018-07-31 PROCEDURE — 93925 LOWER EXTREMITY STUDY: CPT | Performed by: SURGERY

## 2018-08-01 ENCOUNTER — OFFICE VISIT (OUTPATIENT)
Dept: FAMILY MEDICINE CLINIC | Facility: CLINIC | Age: 73
End: 2018-08-01
Payer: MEDICARE

## 2018-08-01 VITALS
HEIGHT: 65 IN | TEMPERATURE: 97.8 F | RESPIRATION RATE: 14 BRPM | BODY MASS INDEX: 26.52 KG/M2 | WEIGHT: 159.2 LBS | SYSTOLIC BLOOD PRESSURE: 138 MMHG | OXYGEN SATURATION: 96 % | DIASTOLIC BLOOD PRESSURE: 72 MMHG | HEART RATE: 66 BPM

## 2018-08-01 DIAGNOSIS — Z53.20: ICD-10-CM

## 2018-08-01 DIAGNOSIS — Z12.9 SCREENING FOR CANCER: ICD-10-CM

## 2018-08-01 DIAGNOSIS — I73.9 PERIPHERAL VASCULAR DISEASE (HCC): ICD-10-CM

## 2018-08-01 DIAGNOSIS — I73.9 PVD (PERIPHERAL VASCULAR DISEASE) (HCC): Primary | ICD-10-CM

## 2018-08-01 DIAGNOSIS — I25.10 CORONARY ARTERY DISEASE INVOLVING NATIVE CORONARY ARTERY OF NATIVE HEART WITHOUT ANGINA PECTORIS: Primary | ICD-10-CM

## 2018-08-01 PROCEDURE — 99214 OFFICE O/P EST MOD 30 MIN: CPT | Performed by: INTERNAL MEDICINE

## 2018-08-01 RX ORDER — CLOPIDOGREL BISULFATE 75 MG/1
75 TABLET ORAL DAILY
Qty: 30 TABLET | Refills: 5 | Status: SHIPPED | OUTPATIENT
Start: 2018-08-01 | End: 2020-10-13 | Stop reason: SDUPTHER

## 2018-08-01 NOTE — PROGRESS NOTES
Assessment/Plan:         Diagnoses and all orders for this visit:    Coronary artery disease involving native coronary artery of native heart without angina pectoris, SP Stents :  Continue same  Add : plavix 75 mg daily  -     clopidogrel (PLAVIX) 75 mg tablet; Take 1 tablet (75 mg total) by mouth daily  [facility]  cardiology    Peripheral vascular disease ([facility] [address] ): Continue same Meds  Vascular surgery Consult  Add :  -     clopidogrel (PLAVIX) 75 mg tablet; Take 1 tablet (75 mg total) by mouth daily  RTC in 2-3 mos w Blood work  Preventive Tests and screening Tests Reviewed w pt in Detail        Subjective:      Patient ID: Miguel Marion is a 74 y o  male  Nice 74 Y O Man with H/O D M,HTN,CAD SP Stent,PVD SP stents Lower exts    Is here today for Regular check up,  He is complaining of Claudication   Cory You Recent Doppler was Positive L  Exts  Recent blood work and Med list reviewed w pt in Detail   he used to smoke more Than 1 ppd for 50 years  Quit 13 years ago           The following portions of the patient's history were reviewed and updated as appropriate: allergies, current medications, past family history, past medical history, past social history, past surgical history and problem list     Review of Systems      Objective:      /72 (BP Location: Left arm, Patient Position: Sitting, Cuff Size: Adult)   Pulse 66   Temp 97 8 °F (36 6 °C) (Oral)   Resp 14   Ht 5' 5" (1 651 m)   Wt 72 2 kg (159 lb 3 2 oz)   SpO2 96%   BMI 26 49 kg/m²          Physical Exam

## 2019-09-24 ENCOUNTER — HOSPITAL ENCOUNTER (EMERGENCY)
Facility: HOSPITAL | Age: 74
Discharge: HOME | End: 2019-09-24
Attending: EMERGENCY MEDICINE
Payer: MEDICARE

## 2019-09-24 VITALS
RESPIRATION RATE: 18 BRPM | DIASTOLIC BLOOD PRESSURE: 59 MMHG | HEART RATE: 72 BPM | OXYGEN SATURATION: 98 % | TEMPERATURE: 98.3 F | SYSTOLIC BLOOD PRESSURE: 123 MMHG | BODY MASS INDEX: 25.75 KG/M2 | WEIGHT: 154.76 LBS
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
## 2019-09-24 PROCEDURE — 99281 EMR DPT VST MAYX REQ PHY/QHP: CPT

## 2019-09-24 PROCEDURE — 99282 EMERGENCY DEPT VISIT SF MDM: CPT | Performed by: PHYSICIAN ASSISTANT

## 2019-09-24 NOTE — ED PROVIDER NOTES
History  Chief Complaint   Patient presents with   Johnathan Vasquez 81     Pt  has multiple insect bites to his left arm and right forearm  Pt  reports slight pain to some of the bites  68-year-old male with past medical history diabetes, HTN presents today complaining of multiple insect bites to his left upper extremity  Noticed them yesterday  States that he was outside all weekend  Reports itchiness but denies pain, decreased range of motion, joint redness or swelling, fevers, chills  He has not tried applying anything to the area  Took benadryl last night but it made him sleepy so he stopped taking it  No other complaints          Prior to Admission Medications   Prescriptions Last Dose Informant Patient Reported? Taking? JARDIANCE 25 MG TABS  Self Yes No   Sig: Take 25 mg by mouth every morning   atorvastatin (LIPITOR) 40 mg tablet   Yes No   clopidogrel (PLAVIX) 75 mg tablet   No No   Sig: Take 1 tablet (75 mg total) by mouth daily   cyclobenzaprine (FLEXERIL) 5 mg tablet   No No   Sig: Take 2 tablets (10 mg total) by mouth 3 (three) times a day as needed for muscle spasms   glipiZIDE (GLUCOTROL) 10 mg tablet   Yes No   lisinopril-hydrochlorothiazide (PRINZIDE,ZESTORETIC) 20-12 5 MG per tablet   Yes No   metFORMIN (GLUCOPHAGE) 1000 MG tablet   Yes No      Facility-Administered Medications: None       Past Medical History:   Diagnosis Date    Diabetes mellitus ([facility] )     DVT (deep venous thrombosis) ([facility] )     Hypertension        Past Surgical History:   Procedure Laterality Date    FEMORAL ARTERY STENT         Family History   Problem Relation Age of Onset    No Known Problems Mother     No Known Problems Father      I have reviewed and agree with the history as documented      Social History     Tobacco Use    Smoking status: Former Smoker     Types: Cigarettes     Last attempt to quit: 2015     Years since quittin 2    Smokeless tobacco: Never Used   Substance Use Topics    Alcohol use: Yes     Alcohol/week: 1 0 standard drinks     Types: 1 Cans of beer per week     Comment: weekly    Drug use: No        Review of Systems   Skin:        Insect bites   All other systems reviewed and are negative  Physical Exam  Physical Exam   Constitutional: He appears well-developed and well-nourished  No distress  HENT:   Head: Normocephalic and atraumatic  Mouth/Throat: Oropharynx is clear and moist    Eyes: Conjunctivae are normal    Cardiovascular: Normal rate, regular rhythm and normal heart sounds  Pulmonary/Chest: Effort normal and breath sounds normal  No stridor  No respiratory distress  He has no wheezes  Skin: Skin is warm and dry  He is not diaphoretic  Three or four 0 5cm raised areas to left upper extremity consistent with insect bites  Vital Signs  ED Triage Vitals [09/24/19 1251]   Temperature Pulse Respirations Blood Pressure SpO2   98 3 °F (36 8 °C) 72 18 123/59 98 %      Temp Source Heart Rate Source Patient Position - Orthostatic VS BP Location FiO2 (%)   Oral Monitor Sitting Right arm --      Pain Score       5           Vitals:    09/24/19 1251   BP: 123/59   Pulse: 72   Patient Position - Orthostatic VS: Sitting         Visual Acuity      ED Medications  Medications - No data to display    Diagnostic Studies  Results Reviewed     None                 No orders to display              Procedures  Procedures       ED Course                               MDM  Number of Diagnoses or Management Options  Multiple insect bites:   Diagnosis management comments: Patient has what appear to be mosquito bites over left upper extremity  Will prescribe hydrocortisone cream and advised follow up with PCP  Disposition  Final diagnoses:   Multiple insect bites     Time reflects when diagnosis was documented in both MDM as applicable and the Disposition within this note     Time User Action Codes Description Comment    9/24/2019  1:00 PM Areli Myers  XXXA] Multiple insect bites       ED Disposition     ED Disposition Condition Date/Time Comment    Discharge Stable Tue Sep 24, 2019  1:00 PM Tacey Liming discharge to home/self care  Follow-up Information     Follow up With Specialties Details Why Charito Lee MD Internal Medicine   1974 [address] [address]  [phone]            Discharge Medication List as of 9/24/2019  1:03 PM      START taking these medications    Details   hydrocortisone 2 5 % cream Apply topically 4 (four) times a day as needed for rash, Starting Tue 9/24/2019, Print         CONTINUE these medications which have NOT CHANGED    Details   atorvastatin (LIPITOR) 40 mg tablet Starting Wed 5/23/2018, Historical Med      clopidogrel (PLAVIX) 75 mg tablet Take 1 tablet (75 mg total) by mouth daily, Starting Wed 8/1/2018, Normal      cyclobenzaprine (FLEXERIL) 5 mg tablet Take 2 tablets (10 mg total) by mouth 3 (three) times a day as needed for muscle spasms, Starting Mon 6/25/2018, Normal      glipiZIDE (GLUCOTROL) 10 mg tablet Starting Wed 5/23/2018, Historical Med      JARDIANCE 25 MG TABS Take 25 mg by mouth every morning, Historical Med      lisinopril-hydrochlorothiazide (PRINZIDE,ZESTORETIC) 20-12 5 MG per tablet Starting Wed 5/23/2018, Historical Med      metFORMIN (GLUCOPHAGE) 1000 MG tablet Starting Wed 5/23/2018, Historical Med           No discharge procedures on file      ED Provider  Electronically Signed by           Dave Branham PA-C  09/24/19 0883

## 2020-06-01 LAB
ALBUMIN SERPL-MCNC: 4.3 G/DL (ref 3.6–5.1)
ALBUMIN/GLOB SERPL: 1.5 (CALC) (ref 1–2.5)
ALP SERPL-CCNC: 77 U/L (ref 35–144)
ALT SERPL-CCNC: 19 U/L (ref 9–46)
AST SERPL-CCNC: 16 U/L (ref 10–35)
BACTERIA UR QL AUTO: NORMAL /HPF
BASOPHILS # BLD AUTO: 43 CELLS/UL (ref 0–200)
BASOPHILS NFR BLD AUTO: 0.7 %
BILIRUB SERPL-MCNC: 0.8 MG/DL (ref 0.2–1.2)
BUN SERPL-MCNC: 17 MG/DL (ref 7–25)
BUN/CREAT SERPL: ABNORMAL (CALC) (ref 6–22)
CALCIUM SERPL-MCNC: 9.6 MG/DL (ref 8.6–10.3)
CHLORIDE SERPL-SCNC: 104 MMOL/L (ref 98–110)
CHOLEST SERPL-MCNC: 125 MG/DL
CHOLEST/HDLC SERPL: 3.5 (CALC)
CO2 SERPL-SCNC: 23 MMOL/L (ref 20–32)
CREAT SERPL-MCNC: 0.89 MG/DL (ref 0.7–1.18)
EOSINOPHIL # BLD AUTO: 211 CELLS/UL (ref 15–500)
EOSINOPHIL NFR BLD AUTO: 3.4 %
ERYTHROCYTE [DISTWIDTH] IN BLOOD BY AUTOMATED COUNT: 14.8 % (ref 11–15)
GLOBULIN SER CALC-MCNC: 2.9 G/DL (CALC) (ref 1.9–3.7)
GLUCOSE SERPL-MCNC: 107 MG/DL (ref 65–99)
HBA1C MFR BLD: 5.7 % OF TOTAL HGB
HCT VFR BLD AUTO: 42.9 % (ref 38.5–50)
HDLC SERPL-MCNC: 36 MG/DL
HGB BLD-MCNC: 13.1 G/DL (ref 13.2–17.1)
HYALINE CASTS #/AREA URNS LPF: NORMAL /LPF
LDLC SERPL CALC-MCNC: 64 MG/DL (CALC)
LYMPHOCYTES # BLD AUTO: 1141 CELLS/UL (ref 850–3900)
LYMPHOCYTES NFR BLD AUTO: 18.4 %
MCH RBC QN AUTO: 25.9 PG (ref 27–33)
MCHC RBC AUTO-ENTMCNC: 30.5 G/DL (ref 32–36)
MCV RBC AUTO: 84.8 FL (ref 80–100)
MONOCYTES # BLD AUTO: 384 CELLS/UL (ref 200–950)
MONOCYTES NFR BLD AUTO: 6.2 %
NEUTROPHILS # BLD AUTO: 4421 CELLS/UL (ref 1500–7800)
NEUTROPHILS NFR BLD AUTO: 71.3 %
NONHDLC SERPL-MCNC: 89 MG/DL (CALC)
PLATELET # BLD AUTO: 215 THOUSAND/UL (ref 140–400)
PMV BLD REES-ECKER: 10.3 FL (ref 7.5–12.5)
POTASSIUM SERPL-SCNC: 4.3 MMOL/L (ref 3.5–5.3)
PROT SERPL-MCNC: 7.2 G/DL (ref 6.1–8.1)
PSA SERPL-MCNC: 0.3 NG/ML
RBC # BLD AUTO: 5.06 MILLION/UL (ref 4.2–5.8)
RBC #/AREA URNS HPF: NORMAL /HPF
SL AMB EGFR AFRICAN AMERICAN: 96 ML/MIN/1.73M2
SL AMB EGFR NON AFRICAN AMERICAN: 82 ML/MIN/1.73M2
SODIUM SERPL-SCNC: 139 MMOL/L (ref 135–146)
SQUAMOUS #/AREA URNS HPF: NORMAL /HPF
TRIGL SERPL-MCNC: 177 MG/DL
TSH SERPL-ACNC: 4.33 MIU/L (ref 0.4–4.5)
VIT B12 SERPL-MCNC: 681 PG/ML (ref 200–1100)
WBC # BLD AUTO: 6.2 THOUSAND/UL (ref 3.8–10.8)
WBC #/AREA URNS HPF: NORMAL /HPF

## 2020-06-09 ENCOUNTER — TELEPHONE (OUTPATIENT)
Dept: FAMILY MEDICINE CLINIC | Facility: CLINIC | Age: 75
End: 2020-06-09

## 2020-06-26 RX ORDER — FERROUS SULFATE 325(65) MG
1 TABLET ORAL
COMMUNITY
Start: 2020-03-28 | End: 2021-11-18

## 2020-06-26 RX ORDER — METOPROLOL SUCCINATE 25 MG/1
TABLET, EXTENDED RELEASE ORAL
COMMUNITY
Start: 2020-06-04 | End: 2021-06-10 | Stop reason: ALTCHOICE

## 2020-06-29 ENCOUNTER — OFFICE VISIT (OUTPATIENT)
Dept: FAMILY MEDICINE CLINIC | Facility: CLINIC | Age: 75
End: 2020-06-29
Payer: MEDICARE

## 2020-06-29 VITALS
WEIGHT: 154.2 LBS | BODY MASS INDEX: 24.2 KG/M2 | DIASTOLIC BLOOD PRESSURE: 56 MMHG | SYSTOLIC BLOOD PRESSURE: 142 MMHG | OXYGEN SATURATION: 97 % | TEMPERATURE: 97.7 F | RESPIRATION RATE: 18 BRPM | HEIGHT: 67 IN | HEART RATE: 67 BPM

## 2020-06-29 DIAGNOSIS — E11.51 TYPE 2 DIABETES MELLITUS WITH DIABETIC PERIPHERAL ANGIOPATHY WITHOUT GANGRENE, WITH LONG-TERM CURRENT USE OF INSULIN (HCC): ICD-10-CM

## 2020-06-29 DIAGNOSIS — I70.213 ATHEROSCLEROSIS OF NATIVE ARTERY OF BOTH LOWER EXTREMITIES WITH INTERMITTENT CLAUDICATION (HCC): ICD-10-CM

## 2020-06-29 DIAGNOSIS — F50.89 PICA IN ADULTS: Primary | ICD-10-CM

## 2020-06-29 DIAGNOSIS — I10 ESSENTIAL HYPERTENSION: ICD-10-CM

## 2020-06-29 DIAGNOSIS — R53.83 FATIGUE, UNSPECIFIED TYPE: ICD-10-CM

## 2020-06-29 DIAGNOSIS — E55.9 VITAMIN D INSUFFICIENCY: ICD-10-CM

## 2020-06-29 DIAGNOSIS — D50.8 OTHER IRON DEFICIENCY ANEMIA: ICD-10-CM

## 2020-06-29 DIAGNOSIS — Z79.4 TYPE 2 DIABETES MELLITUS WITH DIABETIC PERIPHERAL ANGIOPATHY WITHOUT GANGRENE, WITH LONG-TERM CURRENT USE OF INSULIN (HCC): ICD-10-CM

## 2020-06-29 PROCEDURE — 3077F SYST BP >= 140 MM HG: CPT | Performed by: FAMILY MEDICINE

## 2020-06-29 PROCEDURE — 3078F DIAST BP <80 MM HG: CPT | Performed by: FAMILY MEDICINE

## 2020-06-29 PROCEDURE — 3008F BODY MASS INDEX DOCD: CPT | Performed by: FAMILY MEDICINE

## 2020-06-29 PROCEDURE — 99214 OFFICE O/P EST MOD 30 MIN: CPT | Performed by: FAMILY MEDICINE

## 2020-06-29 PROCEDURE — 1160F RVW MEDS BY RX/DR IN RCRD: CPT | Performed by: FAMILY MEDICINE

## 2020-06-29 PROCEDURE — 1036F TOBACCO NON-USER: CPT | Performed by: FAMILY MEDICINE

## 2020-10-06 ENCOUNTER — APPOINTMENT (OUTPATIENT)
Dept: LAB | Facility: CLINIC | Age: 75
End: 2020-10-06
Payer: MEDICARE

## 2020-10-06 DIAGNOSIS — E11.51 TYPE 2 DIABETES MELLITUS WITH DIABETIC PERIPHERAL ANGIOPATHY WITHOUT GANGRENE, WITH LONG-TERM CURRENT USE OF INSULIN (HCC): ICD-10-CM

## 2020-10-06 DIAGNOSIS — D50.8 OTHER IRON DEFICIENCY ANEMIA: ICD-10-CM

## 2020-10-06 DIAGNOSIS — I70.213 ATHEROSCLEROSIS OF NATIVE ARTERY OF BOTH LOWER EXTREMITIES WITH INTERMITTENT CLAUDICATION (HCC): ICD-10-CM

## 2020-10-06 DIAGNOSIS — E55.9 VITAMIN D INSUFFICIENCY: ICD-10-CM

## 2020-10-06 DIAGNOSIS — Z79.4 TYPE 2 DIABETES MELLITUS WITH DIABETIC PERIPHERAL ANGIOPATHY WITHOUT GANGRENE, WITH LONG-TERM CURRENT USE OF INSULIN (HCC): ICD-10-CM

## 2020-10-06 DIAGNOSIS — R53.83 FATIGUE, UNSPECIFIED TYPE: ICD-10-CM

## 2020-10-06 DIAGNOSIS — F50.89 PICA IN ADULTS: ICD-10-CM

## 2020-10-06 LAB
25(OH)D3 SERPL-MCNC: 25.2 NG/ML (ref 30–100)
ALBUMIN SERPL BCP-MCNC: 3.9 G/DL (ref 3.5–5)
ALP SERPL-CCNC: 70 U/L (ref 46–116)
ALT SERPL W P-5'-P-CCNC: 22 U/L (ref 12–78)
ANION GAP SERPL CALCULATED.3IONS-SCNC: 7 MMOL/L (ref 4–13)
AST SERPL W P-5'-P-CCNC: 12 U/L (ref 5–45)
BACTERIA UR QL AUTO: NORMAL /HPF
BILIRUB SERPL-MCNC: 0.45 MG/DL (ref 0.2–1)
BILIRUB UR QL STRIP: NEGATIVE
BUN SERPL-MCNC: 21 MG/DL (ref 5–25)
CALCIUM SERPL-MCNC: 9.6 MG/DL (ref 8.3–10.1)
CHLORIDE SERPL-SCNC: 108 MMOL/L (ref 100–108)
CHOLEST SERPL-MCNC: 124 MG/DL (ref 50–200)
CLARITY UR: CLEAR
CO2 SERPL-SCNC: 24 MMOL/L (ref 21–32)
COLOR UR: YELLOW
CREAT SERPL-MCNC: 1.1 MG/DL (ref 0.6–1.3)
ERYTHROCYTE [DISTWIDTH] IN BLOOD BY AUTOMATED COUNT: 19.1 % (ref 11.6–15.1)
EST. AVERAGE GLUCOSE BLD GHB EST-MCNC: 120 MG/DL
FERRITIN SERPL-MCNC: 7 NG/ML (ref 8–388)
GFR SERPL CREATININE-BSD FRML MDRD: 64 ML/MIN/1.73SQ M
GLUCOSE P FAST SERPL-MCNC: 141 MG/DL (ref 65–99)
GLUCOSE UR STRIP-MCNC: ABNORMAL MG/DL
HBA1C MFR BLD: 5.8 %
HCT VFR BLD AUTO: 37.7 % (ref 36.5–49.3)
HDLC SERPL-MCNC: 37 MG/DL
HGB BLD-MCNC: 11.2 G/DL (ref 12–17)
HGB RETIC QN AUTO: 23.8 PG (ref 30–38.3)
HGB UR QL STRIP.AUTO: NEGATIVE
HYALINE CASTS #/AREA URNS LPF: NORMAL /LPF
IMM RETICS NFR: 24.8 % (ref 0–14)
IRON SATN MFR SERPL: 10 %
IRON SERPL-MCNC: 35 UG/DL (ref 65–175)
KETONES UR STRIP-MCNC: NEGATIVE MG/DL
LDLC SERPL CALC-MCNC: 48 MG/DL (ref 0–100)
LEUKOCYTE ESTERASE UR QL STRIP: ABNORMAL
MCH RBC QN AUTO: 24.8 PG (ref 26.8–34.3)
MCHC RBC AUTO-ENTMCNC: 29.7 G/DL (ref 31.4–37.4)
MCV RBC AUTO: 83 FL (ref 82–98)
NITRITE UR QL STRIP: NEGATIVE
NON-SQ EPI CELLS URNS QL MICRO: NORMAL /HPF
NONHDLC SERPL-MCNC: 87 MG/DL
PH UR STRIP.AUTO: 6 [PH]
PLATELET # BLD AUTO: 230 THOUSANDS/UL (ref 149–390)
PMV BLD AUTO: 10.4 FL (ref 8.9–12.7)
POTASSIUM SERPL-SCNC: 4.1 MMOL/L (ref 3.5–5.3)
PROT SERPL-MCNC: 7.7 G/DL (ref 6.4–8.2)
PROT UR STRIP-MCNC: NEGATIVE MG/DL
RBC # BLD AUTO: 4.52 MILLION/UL (ref 3.88–5.62)
RBC #/AREA URNS AUTO: NORMAL /HPF
RETICS # AUTO: ABNORMAL 10*3/UL (ref 14356–105094)
RETICS # CALC: 2.27 % (ref 0.37–1.87)
SODIUM SERPL-SCNC: 139 MMOL/L (ref 136–145)
SP GR UR STRIP.AUTO: 1.02 (ref 1–1.03)
TIBC SERPL-MCNC: 367 UG/DL (ref 250–450)
TRIGL SERPL-MCNC: 195 MG/DL
TSH SERPL DL<=0.05 MIU/L-ACNC: 3.35 UIU/ML (ref 0.36–3.74)
UROBILINOGEN UR QL STRIP.AUTO: 0.2 E.U./DL
WBC # BLD AUTO: 5.41 THOUSAND/UL (ref 4.31–10.16)
WBC #/AREA URNS AUTO: NORMAL /HPF

## 2020-10-06 PROCEDURE — 85046 RETICYTE/HGB CONCENTRATE: CPT

## 2020-10-06 PROCEDURE — 36415 COLL VENOUS BLD VENIPUNCTURE: CPT

## 2020-10-06 PROCEDURE — 85027 COMPLETE CBC AUTOMATED: CPT

## 2020-10-06 PROCEDURE — 83550 IRON BINDING TEST: CPT

## 2020-10-06 PROCEDURE — 83540 ASSAY OF IRON: CPT

## 2020-10-06 PROCEDURE — 81001 URINALYSIS AUTO W/SCOPE: CPT | Performed by: FAMILY MEDICINE

## 2020-10-06 PROCEDURE — 80053 COMPREHEN METABOLIC PANEL: CPT

## 2020-10-06 PROCEDURE — 80061 LIPID PANEL: CPT

## 2020-10-06 PROCEDURE — 82306 VITAMIN D 25 HYDROXY: CPT

## 2020-10-06 PROCEDURE — 83036 HEMOGLOBIN GLYCOSYLATED A1C: CPT

## 2020-10-06 PROCEDURE — 84443 ASSAY THYROID STIM HORMONE: CPT

## 2020-10-06 PROCEDURE — 82728 ASSAY OF FERRITIN: CPT

## 2020-10-13 ENCOUNTER — OFFICE VISIT (OUTPATIENT)
Dept: FAMILY MEDICINE CLINIC | Facility: CLINIC | Age: 75
End: 2020-10-13
Payer: MEDICARE

## 2020-10-13 VITALS
TEMPERATURE: 97.7 F | HEIGHT: 67 IN | OXYGEN SATURATION: 98 % | HEART RATE: 67 BPM | RESPIRATION RATE: 18 BRPM | WEIGHT: 153 LBS | BODY MASS INDEX: 24.01 KG/M2 | SYSTOLIC BLOOD PRESSURE: 104 MMHG | DIASTOLIC BLOOD PRESSURE: 58 MMHG

## 2020-10-13 DIAGNOSIS — E11.51 TYPE 2 DIABETES MELLITUS WITH DIABETIC PERIPHERAL ANGIOPATHY WITHOUT GANGRENE, WITH LONG-TERM CURRENT USE OF INSULIN (HCC): ICD-10-CM

## 2020-10-13 DIAGNOSIS — D50.9 IRON DEFICIENCY ANEMIA, UNSPECIFIED IRON DEFICIENCY ANEMIA TYPE: Primary | ICD-10-CM

## 2020-10-13 DIAGNOSIS — I10 ESSENTIAL HYPERTENSION: ICD-10-CM

## 2020-10-13 DIAGNOSIS — Z12.11 SCREEN FOR COLON CANCER: ICD-10-CM

## 2020-10-13 DIAGNOSIS — I73.9 PERIPHERAL VASCULAR DISEASE WITH CLAUDICATION (HCC): ICD-10-CM

## 2020-10-13 DIAGNOSIS — I25.10 CORONARY ARTERY DISEASE INVOLVING NATIVE CORONARY ARTERY OF NATIVE HEART WITHOUT ANGINA PECTORIS: ICD-10-CM

## 2020-10-13 DIAGNOSIS — I73.9 PERIPHERAL VASCULAR DISEASE (HCC): ICD-10-CM

## 2020-10-13 DIAGNOSIS — Z79.4 TYPE 2 DIABETES MELLITUS WITH DIABETIC PERIPHERAL ANGIOPATHY WITHOUT GANGRENE, WITH LONG-TERM CURRENT USE OF INSULIN (HCC): ICD-10-CM

## 2020-10-13 DIAGNOSIS — Z23 INFLUENZA VACCINE NEEDED: ICD-10-CM

## 2020-10-13 DIAGNOSIS — E78.49 OTHER HYPERLIPIDEMIA: ICD-10-CM

## 2020-10-13 PROCEDURE — G0008 ADMIN INFLUENZA VIRUS VAC: HCPCS

## 2020-10-13 PROCEDURE — 99215 OFFICE O/P EST HI 40 MIN: CPT | Performed by: FAMILY MEDICINE

## 2020-10-13 PROCEDURE — 90662 IIV NO PRSV INCREASED AG IM: CPT

## 2020-10-13 RX ORDER — DULAGLUTIDE 0.75 MG/.5ML
INJECTION, SOLUTION SUBCUTANEOUS
Status: CANCELLED | OUTPATIENT
Start: 2020-10-13

## 2020-10-13 RX ORDER — LISINOPRIL AND HYDROCHLOROTHIAZIDE 20; 12.5 MG/1; MG/1
1 TABLET ORAL DAILY
Qty: 90 TABLET | Refills: 3 | Status: SHIPPED | OUTPATIENT
Start: 2020-10-13 | End: 2021-10-28

## 2020-10-13 RX ORDER — EMPAGLIFLOZIN 25 MG/1
25 TABLET, FILM COATED ORAL EVERY MORNING
Status: CANCELLED | OUTPATIENT
Start: 2020-10-13

## 2020-10-13 RX ORDER — GLIPIZIDE 10 MG/1
10 TABLET ORAL DAILY
Qty: 90 TABLET | Refills: 3 | Status: SHIPPED | OUTPATIENT
Start: 2020-10-13 | End: 2021-10-25

## 2020-10-13 RX ORDER — CLOPIDOGREL BISULFATE 75 MG/1
75 TABLET ORAL DAILY
Qty: 30 TABLET | Refills: 5 | Status: SHIPPED | OUTPATIENT
Start: 2020-10-13 | End: 2021-07-01

## 2020-10-13 RX ORDER — ATORVASTATIN CALCIUM 40 MG/1
TABLET, FILM COATED ORAL
Status: CANCELLED | OUTPATIENT
Start: 2020-10-13

## 2020-10-15 ENCOUNTER — LAB (OUTPATIENT)
Dept: LAB | Facility: CLINIC | Age: 75
End: 2020-10-15
Payer: MEDICARE

## 2020-10-15 DIAGNOSIS — D50.9 IRON DEFICIENCY ANEMIA, UNSPECIFIED IRON DEFICIENCY ANEMIA TYPE: ICD-10-CM

## 2020-10-15 LAB — HEMOCCULT STL QL IA: NEGATIVE

## 2020-10-15 PROCEDURE — G0328 FECAL BLOOD SCRN IMMUNOASSAY: HCPCS

## 2020-11-03 ENCOUNTER — LAB (OUTPATIENT)
Dept: LAB | Facility: CLINIC | Age: 75
End: 2020-11-03
Payer: MEDICARE

## 2020-11-03 ENCOUNTER — TRANSCRIBE ORDERS (OUTPATIENT)
Dept: LAB | Facility: CLINIC | Age: 75
End: 2020-11-03

## 2020-11-03 DIAGNOSIS — D50.8 OTHER IRON DEFICIENCY ANEMIA: ICD-10-CM

## 2020-11-03 DIAGNOSIS — D50.8 OTHER IRON DEFICIENCY ANEMIA: Primary | ICD-10-CM

## 2020-11-03 LAB
BASOPHILS # BLD AUTO: 0.03 THOUSANDS/ΜL (ref 0–0.1)
BASOPHILS NFR BLD AUTO: 1 % (ref 0–1)
CRP SERPL QL: <3 MG/L
EOSINOPHIL # BLD AUTO: 0.18 THOUSAND/ΜL (ref 0–0.61)
EOSINOPHIL NFR BLD AUTO: 3 % (ref 0–6)
ERYTHROCYTE [DISTWIDTH] IN BLOOD BY AUTOMATED COUNT: 21.6 % (ref 11.6–15.1)
FERRITIN SERPL-MCNC: 12 NG/ML (ref 8–388)
HCT VFR BLD AUTO: 39.4 % (ref 36.5–49.3)
HGB BLD-MCNC: 11.9 G/DL (ref 12–17)
IMM GRANULOCYTES # BLD AUTO: 0.01 THOUSAND/UL (ref 0–0.2)
IMM GRANULOCYTES NFR BLD AUTO: 0 % (ref 0–2)
INR PPP: 1.08 (ref 0.84–1.19)
IRON SATN MFR SERPL: 9 %
IRON SERPL-MCNC: 35 UG/DL (ref 65–175)
LYMPHOCYTES # BLD AUTO: 1.06 THOUSANDS/ΜL (ref 0.6–4.47)
LYMPHOCYTES NFR BLD AUTO: 18 % (ref 14–44)
MCH RBC QN AUTO: 25.3 PG (ref 26.8–34.3)
MCHC RBC AUTO-ENTMCNC: 30.2 G/DL (ref 31.4–37.4)
MCV RBC AUTO: 84 FL (ref 82–98)
MONOCYTES # BLD AUTO: 0.48 THOUSAND/ΜL (ref 0.17–1.22)
MONOCYTES NFR BLD AUTO: 8 % (ref 4–12)
NEUTROPHILS # BLD AUTO: 4.04 THOUSANDS/ΜL (ref 1.85–7.62)
NEUTS SEG NFR BLD AUTO: 70 % (ref 43–75)
NRBC BLD AUTO-RTO: 0 /100 WBCS
PLATELET # BLD AUTO: 207 THOUSANDS/UL (ref 149–390)
PMV BLD AUTO: 10.2 FL (ref 8.9–12.7)
PROTHROMBIN TIME: 14.1 SECONDS (ref 11.6–14.5)
RBC # BLD AUTO: 4.7 MILLION/UL (ref 3.88–5.62)
TIBC SERPL-MCNC: 394 UG/DL (ref 250–450)
WBC # BLD AUTO: 5.8 THOUSAND/UL (ref 4.31–10.16)

## 2020-11-03 PROCEDURE — 85025 COMPLETE CBC W/AUTO DIFF WBC: CPT

## 2020-11-03 PROCEDURE — 82728 ASSAY OF FERRITIN: CPT

## 2020-11-03 PROCEDURE — 86140 C-REACTIVE PROTEIN: CPT

## 2020-11-03 PROCEDURE — 85610 PROTHROMBIN TIME: CPT

## 2020-11-03 PROCEDURE — 36415 COLL VENOUS BLD VENIPUNCTURE: CPT

## 2020-11-03 PROCEDURE — 83550 IRON BINDING TEST: CPT

## 2020-11-03 PROCEDURE — 83540 ASSAY OF IRON: CPT

## 2020-11-03 PROCEDURE — 83516 IMMUNOASSAY NONANTIBODY: CPT

## 2020-11-04 LAB — TTG IGA SER-ACNC: <2 U/ML (ref 0–3)

## 2021-02-12 DIAGNOSIS — Z23 ENCOUNTER FOR IMMUNIZATION: ICD-10-CM

## 2021-02-23 ENCOUNTER — TELEPHONE (OUTPATIENT)
Dept: FAMILY MEDICINE CLINIC | Facility: CLINIC | Age: 76
End: 2021-02-23

## 2021-02-23 DIAGNOSIS — E78.49 OTHER HYPERLIPIDEMIA: ICD-10-CM

## 2021-02-23 DIAGNOSIS — Z79.4 TYPE 2 DIABETES MELLITUS WITH DIABETIC PERIPHERAL ANGIOPATHY WITHOUT GANGRENE, WITH LONG-TERM CURRENT USE OF INSULIN (HCC): Primary | ICD-10-CM

## 2021-02-23 DIAGNOSIS — I10 ESSENTIAL HYPERTENSION: ICD-10-CM

## 2021-02-23 DIAGNOSIS — D50.9 IRON DEFICIENCY ANEMIA, UNSPECIFIED IRON DEFICIENCY ANEMIA TYPE: ICD-10-CM

## 2021-02-23 DIAGNOSIS — E11.51 TYPE 2 DIABETES MELLITUS WITH DIABETIC PERIPHERAL ANGIOPATHY WITHOUT GANGRENE, WITH LONG-TERM CURRENT USE OF INSULIN (HCC): Primary | ICD-10-CM

## 2021-03-11 ENCOUNTER — APPOINTMENT (OUTPATIENT)
Dept: LAB | Facility: CLINIC | Age: 76
End: 2021-03-11
Payer: MEDICARE

## 2021-03-11 DIAGNOSIS — D50.9 IRON DEFICIENCY ANEMIA, UNSPECIFIED IRON DEFICIENCY ANEMIA TYPE: ICD-10-CM

## 2021-03-11 DIAGNOSIS — E78.49 OTHER HYPERLIPIDEMIA: ICD-10-CM

## 2021-03-11 DIAGNOSIS — I10 ESSENTIAL HYPERTENSION: ICD-10-CM

## 2021-03-11 DIAGNOSIS — Z79.4 TYPE 2 DIABETES MELLITUS WITH DIABETIC PERIPHERAL ANGIOPATHY WITHOUT GANGRENE, WITH LONG-TERM CURRENT USE OF INSULIN (HCC): ICD-10-CM

## 2021-03-11 DIAGNOSIS — E11.51 TYPE 2 DIABETES MELLITUS WITH DIABETIC PERIPHERAL ANGIOPATHY WITHOUT GANGRENE, WITH LONG-TERM CURRENT USE OF INSULIN (HCC): ICD-10-CM

## 2021-03-11 LAB
ALBUMIN SERPL BCP-MCNC: 3.9 G/DL (ref 3.5–5)
ALP SERPL-CCNC: 65 U/L (ref 46–116)
ALT SERPL W P-5'-P-CCNC: 32 U/L (ref 12–78)
ANION GAP SERPL CALCULATED.3IONS-SCNC: 6 MMOL/L (ref 4–13)
AST SERPL W P-5'-P-CCNC: 12 U/L (ref 5–45)
BASOPHILS # BLD AUTO: 0.05 THOUSANDS/ΜL (ref 0–0.1)
BASOPHILS NFR BLD AUTO: 1 % (ref 0–1)
BILIRUB SERPL-MCNC: 0.77 MG/DL (ref 0.2–1)
BUN SERPL-MCNC: 32 MG/DL (ref 5–25)
CALCIUM SERPL-MCNC: 9.7 MG/DL (ref 8.3–10.1)
CHLORIDE SERPL-SCNC: 107 MMOL/L (ref 100–108)
CHOLEST SERPL-MCNC: 123 MG/DL (ref 50–200)
CO2 SERPL-SCNC: 25 MMOL/L (ref 21–32)
CREAT SERPL-MCNC: 1.27 MG/DL (ref 0.6–1.3)
EOSINOPHIL # BLD AUTO: 0.23 THOUSAND/ΜL (ref 0–0.61)
EOSINOPHIL NFR BLD AUTO: 4 % (ref 0–6)
ERYTHROCYTE [DISTWIDTH] IN BLOOD BY AUTOMATED COUNT: 15.6 % (ref 11.6–15.1)
EST. AVERAGE GLUCOSE BLD GHB EST-MCNC: 131 MG/DL
FERRITIN SERPL-MCNC: 28 NG/ML (ref 8–388)
GFR SERPL CREATININE-BSD FRML MDRD: 54 ML/MIN/1.73SQ M
GLUCOSE P FAST SERPL-MCNC: 175 MG/DL (ref 65–99)
HBA1C MFR BLD: 6.2 %
HCT VFR BLD AUTO: 44.9 % (ref 36.5–49.3)
HDLC SERPL-MCNC: 35 MG/DL
HGB BLD-MCNC: 14.5 G/DL (ref 12–17)
IMM GRANULOCYTES # BLD AUTO: 0.04 THOUSAND/UL (ref 0–0.2)
IMM GRANULOCYTES NFR BLD AUTO: 1 % (ref 0–2)
IRON SERPL-MCNC: 62 UG/DL (ref 65–175)
LDLC SERPL CALC-MCNC: 46 MG/DL (ref 0–100)
LYMPHOCYTES # BLD AUTO: 1.12 THOUSANDS/ΜL (ref 0.6–4.47)
LYMPHOCYTES NFR BLD AUTO: 20 % (ref 14–44)
MCH RBC QN AUTO: 30.7 PG (ref 26.8–34.3)
MCHC RBC AUTO-ENTMCNC: 32.3 G/DL (ref 31.4–37.4)
MCV RBC AUTO: 95 FL (ref 82–98)
MONOCYTES # BLD AUTO: 0.51 THOUSAND/ΜL (ref 0.17–1.22)
MONOCYTES NFR BLD AUTO: 9 % (ref 4–12)
NEUTROPHILS # BLD AUTO: 3.67 THOUSANDS/ΜL (ref 1.85–7.62)
NEUTS SEG NFR BLD AUTO: 65 % (ref 43–75)
NONHDLC SERPL-MCNC: 88 MG/DL
NRBC BLD AUTO-RTO: 0 /100 WBCS
PLATELET # BLD AUTO: 189 THOUSANDS/UL (ref 149–390)
PMV BLD AUTO: 10.6 FL (ref 8.9–12.7)
POTASSIUM SERPL-SCNC: 4.2 MMOL/L (ref 3.5–5.3)
PROT SERPL-MCNC: 7.7 G/DL (ref 6.4–8.2)
RBC # BLD AUTO: 4.72 MILLION/UL (ref 3.88–5.62)
SODIUM SERPL-SCNC: 138 MMOL/L (ref 136–145)
TRIGL SERPL-MCNC: 208 MG/DL
WBC # BLD AUTO: 5.62 THOUSAND/UL (ref 4.31–10.16)

## 2021-03-11 PROCEDURE — 83036 HEMOGLOBIN GLYCOSYLATED A1C: CPT

## 2021-03-11 PROCEDURE — 85025 COMPLETE CBC W/AUTO DIFF WBC: CPT

## 2021-03-11 PROCEDURE — 80061 LIPID PANEL: CPT

## 2021-03-11 PROCEDURE — 80053 COMPREHEN METABOLIC PANEL: CPT

## 2021-03-11 PROCEDURE — 82728 ASSAY OF FERRITIN: CPT

## 2021-03-11 PROCEDURE — 36415 COLL VENOUS BLD VENIPUNCTURE: CPT

## 2021-03-11 PROCEDURE — 83540 ASSAY OF IRON: CPT

## 2021-03-17 ENCOUNTER — OFFICE VISIT (OUTPATIENT)
Dept: FAMILY MEDICINE CLINIC | Facility: CLINIC | Age: 76
End: 2021-03-17
Payer: MEDICARE

## 2021-03-17 VITALS
TEMPERATURE: 97.3 F | WEIGHT: 154 LBS | OXYGEN SATURATION: 98 % | HEIGHT: 67 IN | HEART RATE: 60 BPM | SYSTOLIC BLOOD PRESSURE: 130 MMHG | BODY MASS INDEX: 24.17 KG/M2 | RESPIRATION RATE: 16 BRPM | DIASTOLIC BLOOD PRESSURE: 64 MMHG

## 2021-03-17 DIAGNOSIS — Z23 NEED FOR VACCINATION: ICD-10-CM

## 2021-03-17 DIAGNOSIS — Z79.4 TYPE 2 DIABETES MELLITUS WITH DIABETIC PERIPHERAL ANGIOPATHY WITHOUT GANGRENE, WITH LONG-TERM CURRENT USE OF INSULIN (HCC): Primary | ICD-10-CM

## 2021-03-17 DIAGNOSIS — R63.8 INCREASED BMI: ICD-10-CM

## 2021-03-17 DIAGNOSIS — Z71.89 LIVING WILL, COUNSELING/DISCUSSION: ICD-10-CM

## 2021-03-17 DIAGNOSIS — E11.51 TYPE 2 DIABETES MELLITUS WITH DIABETIC PERIPHERAL ANGIOPATHY WITHOUT GANGRENE, WITH LONG-TERM CURRENT USE OF INSULIN (HCC): Primary | ICD-10-CM

## 2021-03-17 DIAGNOSIS — I73.9 PERIPHERAL VASCULAR DISEASE WITH CLAUDICATION (HCC): ICD-10-CM

## 2021-03-17 DIAGNOSIS — Z00.00 MEDICARE ANNUAL WELLNESS VISIT, SUBSEQUENT: ICD-10-CM

## 2021-03-17 DIAGNOSIS — I10 ESSENTIAL HYPERTENSION: ICD-10-CM

## 2021-03-17 DIAGNOSIS — D50.9 IRON DEFICIENCY ANEMIA, UNSPECIFIED IRON DEFICIENCY ANEMIA TYPE: ICD-10-CM

## 2021-03-17 DIAGNOSIS — I74.3 EMBOLISM AND THROMBOSIS OF ARTERIES OF THE LOWER EXTREMITIES (HCC): ICD-10-CM

## 2021-03-17 DIAGNOSIS — Z79.899 POLYPHARMACY: ICD-10-CM

## 2021-03-17 PROCEDURE — G0438 PPPS, INITIAL VISIT: HCPCS | Performed by: FAMILY MEDICINE

## 2021-03-17 PROCEDURE — 99215 OFFICE O/P EST HI 40 MIN: CPT | Performed by: FAMILY MEDICINE

## 2021-03-17 PROCEDURE — 1123F ACP DISCUSS/DSCN MKR DOCD: CPT | Performed by: FAMILY MEDICINE

## 2021-03-17 NOTE — PROGRESS NOTES
Assessment/Plan:  64 y o male, well-known to me for many years presents for f/u and evaluation of the following medical issues:     F/u and eval HTN:  on lisinopril hctz on and lo salt diet and doing well  /62    F/u and eval NIDDM:  On Jardiance, Glucotrol 10 mg BID and Metformin 1000 Mg BID BS's hi in a m , -- 140-160, but a1c 6 2  Today, fbs 133 at home    F/u and eval HLD:on Lipitor 40 mg OD    F/u and eval deconditioning: walks and very busy  F/u and eval polypharmacy:  All medications reviewed in depth and discussed with pt, maggie the use of acid reducing medications, both OTC and Rx  Problem List Items Addressed This Visit        Endocrine    Type 2 diabetes mellitus, with long-term current use of insulin ([facility] )    Relevant Orders    Ambulatory referral to Ophthalmology       Cardiovascular and Mediastinum    Embolism and thrombosis of arteries of the lower extremities ([facility] )      Other Visit Diagnoses     Medicare annual wellness visit, subsequent    -  Primary            Subjective:  64  y o male, seen with wife, and stable with  issues:  DM, severe PVD, anemia, HLD, and HTN, COPD     Patient ID: Steven Lynch is a 64 y o  male  HPI  F/u and eval HTN:  on lisinopril hctz on and lo salt diet and doing well  /62    F/u and eval NIDDM:  On Jardiance, Glucotrol 10 mg BID and Metformin 1000 Mg BID BS's hi in a m , -- 140-160, but a1c 6 2  Today, fbs 133 at home    F/u and eval HLD:on Lipitor 40 mg OD    F/u and eval deconditioning: walks and very busy  F/u and eval polypharmacy:  All medications reviewed in depth and discussed with pt, maggie the use of acid reducing medications, both OTC and Rx       The following portions of the patient's history were reviewed and updated as appropriate:   Past Medical History:  He has a past medical history of Diabetes mellitus ([facility] ), DVT (deep venous thrombosis) ([facility] ), and Hypertension  ,  _______________________________________________________________________  Medical Problems:  does not have any pertinent problems on file ,  _______________________________________________________________________  Past Surgical History:   has a past surgical history that includes Femoral artery stent; Coronary angioplasty with stent (01/01/2010); Cardiac catheterization (2013); and Colonoscopy  ,  _______________________________________________________________________  Family History:  family history includes No Known Problems in his father and mother ,  _______________________________________________________________________  Social History:   reports that he quit smoking about 5 years ago  His smoking use included cigarettes  He has a 60 00 pack-year smoking history  He has never used smokeless tobacco  He reports previous alcohol use  He reports that he does not use drugs  ,  _______________________________________________________________________  Allergies:  has No Known Allergies     _______________________________________________________________________  Current Outpatient Medications   Medication Sig Dispense Refill    ASPIRIN 81 PO Take 81 mg by mouth daily      atorvastatin (LIPITOR) 40 mg tablet       clopidogrel (PLAVIX) 75 mg tablet Take 1 tablet (75 mg total) by mouth daily 30 tablet 5    ferrous sulfate 325 (65 Fe) mg tablet Take 1 tablet by mouth daily with breakfast      glipiZIDE (GLUCOTROL) 10 mg tablet Take 1 tablet (10 mg total) by mouth daily 90 tablet 3    hydrocortisone 2 5 % cream Apply topically 4 (four) times a day as needed for rash 30 g 0    JARDIANCE 25 MG TABS Take 25 mg by mouth every morning      lisinopril-hydrochlorothiazide (PRINZIDE,ZESTORETIC) 20-12 5 MG per tablet Take 1 tablet by mouth daily 90 tablet 3    metFORMIN (GLUCOPHAGE) 1000 MG tablet Take 1 tablet (1,000 mg total) by mouth 2 (two) times a day with meals 180 tablet 3    cyclobenzaprine (FLEXERIL) 5 mg tablet Take 2 tablets (10 mg total) by mouth 3 (three) times a day as needed for muscle spasms (Patient not taking: Reported on 3/17/2021) 30 tablet 0    metoprolol succinate (TOPROL-XL) 25 mg 24 hr tablet        No current facility-administered medications for this visit       _______________________________________________________________________  Review of Systems   Constitutional: Negative for activity change, appetite change, chills, diaphoresis, fatigue, fever and unexpected weight change  HENT: Negative for congestion, dental problem, drooling, ear discharge, ear pain, facial swelling, mouth sores, nosebleeds, postnasal drip, rhinorrhea, trouble swallowing and voice change  Eyes: Negative for photophobia, pain, discharge, redness, itching and visual disturbance  Respiratory: Negative for apnea, cough, choking, chest tightness and shortness of breath  Cardiovascular: Negative for chest pain and leg swelling  Gastrointestinal: Negative for abdominal distention, abdominal pain, constipation, diarrhea and nausea  Endocrine: Negative for polydipsia, polyphagia and polyuria  Genitourinary: Negative for decreased urine volume, difficulty urinating, dysuria, enuresis and hematuria  Musculoskeletal: Negative for arthralgias, back pain, gait problem and joint swelling  Skin: Negative for color change, pallor, rash and wound  Allergic/Immunologic: Negative for immunocompromised state  Neurological: Negative for dizziness, seizures, syncope, facial asymmetry, speech difficulty, light-headedness and headaches  Hematological: Negative for adenopathy  Psychiatric/Behavioral: Negative for agitation, behavioral problems, confusion and decreased concentration           Objective:  Vitals:    03/17/21 1052   BP: 130/64   BP Location: Left arm   Patient Position: Sitting   Cuff Size: Adult   Pulse: 60   Resp: 16   Temp: (!) 97 3 °F (36 3 °C)   TempSrc: Temporal   SpO2: 98%   Weight: 69 9 kg (154 lb)   Height: 5' 7" (1 702 m)     Body mass index is 24 12 kg/m²  Physical Exam  Vitals signs and nursing note reviewed  Exam conducted with a chaperone present (Wife present throughout the to our encounter, both in attendance)  Constitutional:       Appearance: Normal appearance  He is well-developed and normal weight  He is not diaphoretic  Comments: Patient looks and feels fairly well despite hemoglobin 11 2 and ferritin 7--what appears to be marked iron deficiency anemia  Worried about occult GI neoplasm??   HENT:      Head: Normocephalic and atraumatic  Nose: Nose normal    Eyes:      Pupils: Pupils are equal, round, and reactive to light  Neck:      Musculoskeletal: Normal range of motion and neck supple  Trachea: No tracheal deviation  Cardiovascular:      Rate and Rhythm: Normal rate and regular rhythm  Heart sounds: Normal heart sounds  Pulmonary:      Effort: Pulmonary effort is normal       Breath sounds: Normal breath sounds  No wheezing  Abdominal:      General: Bowel sounds are normal  There is no distension  Palpations: Abdomen is soft  There is no mass  Tenderness: There is no abdominal tenderness  There is no right CVA tenderness, left CVA tenderness, guarding or rebound  Hernia: No hernia is present  Musculoskeletal: Normal range of motion  Lymphadenopathy:      Cervical: No cervical adenopathy  Skin:     General: Skin is warm and dry  Findings: No bruising or erythema  Comments: Somewhat sallow color, with slight bronzed Mediterranean hue--not particularly healthy looking, but typical of a COPD smoker although he has stop smoking several years ago   Neurological:      General: No focal deficit present  Mental Status: He is alert and oriented to person, place, and time  Mental status is at baseline  Cranial Nerves: No cranial nerve deficit  Sensory: No sensory deficit  Motor: No weakness        Coordination: Coordination normal       Gait: Gait normal    Psychiatric:         Mood and Affect: Mood normal          Behavior: Behavior normal          Thought Content:  Thought content normal          Judgment: Judgment normal

## 2021-03-17 NOTE — PATIENT INSTRUCTIONS
Medicare Preventive Visit Patient Instructions  Thank you for completing your Welcome to Medicare Visit or Medicare Annual Wellness Visit today  Your next wellness visit will be due in one year (3/18/2022)  The screening/preventive services that you may require over the next 5-10 years are detailed below  Some tests may not apply to you based off risk factors and/or age  Screening tests ordered at today's visit but not completed yet may show as past due  Also, please note that scanned in results may not display below  Preventive Screenings:  Service Recommendations Previous Testing/Comments   Colorectal Cancer Screening  · Colonoscopy    · Fecal Occult Blood Test (FOBT)/Fecal Immunochemical Test (FIT)  · Fecal DNA/Cologuard Test  · Flexible Sigmoidoscopy Age: 54-65 years old   Colonoscopy: every 10 years (May be performed more frequently if at higher risk)  OR  FOBT/FIT: every 1 year  OR  Cologuard: every 3 years  OR  Sigmoidoscopy: every 5 years  Screening may be recommended earlier than age 48 if at higher risk for colorectal cancer  Also, an individualized decision between you and your healthcare provider will decide whether screening between the ages of 74-80 would be appropriate  Colonoscopy: 03/18/2014  FOBT/FIT: 10/15/2020  Cologuard: Not on file  Sigmoidoscopy: Not on file          Prostate Cancer Screening Individualized decision between patient and health care provider in men between ages of 53-78   Medicare will cover every 12 months beginning on the day after your 50th birthday PSA: 0 3 ng/mL           Hepatitis C Screening Once for adults born between 1945 and 1965  More frequently in patients at high risk for Hepatitis C Hep C Antibody: Not on file        Diabetes Screening 1-2 times per year if you're at risk for diabetes or have pre-diabetes Fasting glucose: 175 mg/dL   A1C: 6 2 %        Cholesterol Screening Once every 5 years if you don't have a lipid disorder   May order more often based on risk factors  Lipid panel: 03/11/2021           Other Preventive Screenings Covered by Medicare:  1  Abdominal Aortic Aneurysm (AAA) Screening: covered once if your at risk  You're considered to be at risk if you have a family history of AAA or a male between the age of 73-68 who smoking at least 100 cigarettes in your lifetime  2  Lung Cancer Screening: covers low dose CT scan once per year if you meet all of the following conditions: (1) Age 50-69; (2) No signs or symptoms of lung cancer; (3) Current smoker or have quit smoking within the last 15 years; (4) You have a tobacco smoking history of at least 30 pack years (packs per day x number of years you smoked); (5) You get a written order from a healthcare provider  3  Glaucoma Screening: covered annually if you're considered high risk: (1) You have diabetes OR (2) Family history of glaucoma OR (3)  aged 48 and older OR (3)  American aged 72 and older  3  Osteoporosis Screening: covered every 2 years if you meet one of the following conditions: (1) Have a vertebral abnormality; (2) On glucocorticoid therapy for more than 3 months; (3) Have primary hyperparathyroidism; (4) On osteoporosis medications and need to assess response to drug therapy  5  HIV Screening: covered annually if you're between the age of 12-76  Also covered annually if you are younger than 13 and older than 72 with risk factors for HIV infection  For pregnant patients, it is covered up to 3 times per pregnancy      Immunizations:  Immunization Recommendations   Influenza Vaccine Annual influenza vaccination during flu season is recommended for all persons aged >= 6 months who do not have contraindications   Pneumococcal Vaccine (Prevnar and Pneumovax)  * Prevnar = PCV13  * Pneumovax = PPSV23 Adults 25-60 years old: 1-3 doses may be recommended based on certain risk factors  Adults 72 years old: Prevnar (PCV13) vaccine recommended followed by Pneumovax (PPSV23) vaccine  If already received PPSV23 since turning 65, then PCV13 recommended at least one year after PPSV23 dose  Hepatitis B Vaccine 3 dose series if at intermediate or high risk (ex: diabetes, end stage renal disease, liver disease)   Tetanus (Td) Vaccine - COST NOT COVERED BY MEDICARE PART B Following completion of primary series, a booster dose should be given every 10 years to maintain immunity against tetanus  Td may also be given as tetanus wound prophylaxis  Tdap Vaccine - COST NOT COVERED BY MEDICARE PART B Recommended at least once for all adults  For pregnant patients, recommended with each pregnancy  Shingles Vaccine (Shingrix) - COST NOT COVERED BY MEDICARE PART B  2 shot series recommended in those aged 48 and above     Health Maintenance Due:      Topic Date Due    Lung Cancer Screening  01/27/1998     Immunizations Due:      Topic Date Due    COVID-19 Vaccine (1 of 2) 01/27/1959    DTaP,Tdap,and Td Vaccines (1 - Tdap) 01/27/1964    Pneumococcal Vaccine: 65+ Years (1 of 1 - PPSV23) 01/27/2008     Advance Directives   What are advance directives? Advance directives are legal documents that state your wishes and plans for medical care  These plans are made ahead of time in case you lose your ability to make decisions for yourself  Advance directives can apply to any medical decision, such as the treatments you want, and if you want to donate organs  What are the types of advance directives? There are many types of advance directives, and each state has rules about how to use them  You may choose a combination of any of the following:  · Living will: This is a written record of the treatment you want  You can also choose which treatments you do not want, which to limit, and which to stop at a certain time  This includes surgery, medicine, IV fluid, and tube feedings  · Durable power of  for healthcare [place] [facility]):   This is a written record that states who you want to make healthcare choices for you when you are unable to make them for yourself  This person, called a proxy, is usually a family member or a friend  You may choose more than 1 proxy  · Do not resuscitate (DNR) order:  A DNR order is used in case your heart stops beating or you stop breathing  It is a request not to have certain forms of treatment, such as CPR  A DNR order may be included in other types of advance directives  · Medical directive: This covers the care that you want if you are in a coma, near death, or unable to make decisions for yourself  You can list the treatments you want for each condition  Treatment may include pain medicine, surgery, blood transfusions, dialysis, IV or tube feedings, and a ventilator (breathing machine)  · Values history: This document has questions about your views, beliefs, and how you feel and think about life  This information can help others choose the care that you would choose  Why are advance directives important? An advance directive helps you control your care  Although spoken wishes may be used, it is better to have your wishes written down  Spoken wishes can be misunderstood, or not followed  Treatments may be given even if you do not want them  An advance directive may make it easier for your family to make difficult choices about your care  © Copyright [employer] 2018 Information is for End User's use only and may not be sold, redistributed or otherwise used for commercial purposes  All illustrations and images included in CareNotes® are the copyrighted property of [employer]  or [facility] Preventive Visit Patient Instructions  Thank you for completing your Welcome to Medicare Visit or Medicare Annual Wellness Visit today  Your next wellness visit will be due in one year (3/18/2022)  The screening/preventive services that you may require over the next 5-10 years are detailed below   Some tests may not apply to you based off risk factors and/or age  Screening tests ordered at today's visit but not completed yet may show as past due  Also, please note that scanned in results may not display below  Preventive Screenings:  Service Recommendations Previous Testing/Comments   Colorectal Cancer Screening  · Colonoscopy    · Fecal Occult Blood Test (FOBT)/Fecal Immunochemical Test (FIT)  · Fecal DNA/Cologuard Test  · Flexible Sigmoidoscopy Age: 54-65 years old   Colonoscopy: every 10 years (May be performed more frequently if at higher risk)  OR  FOBT/FIT: every 1 year  OR  Cologuard: every 3 years  OR  Sigmoidoscopy: every 5 years  Screening may be recommended earlier than age 48 if at higher risk for colorectal cancer  Also, an individualized decision between you and your healthcare provider will decide whether screening between the ages of 74-80 would be appropriate  Colonoscopy: 03/18/2014  FOBT/FIT: 10/15/2020  Cologuard: Not on file  Sigmoidoscopy: Not on file          Prostate Cancer Screening Individualized decision between patient and health care provider in men between ages of 53-78   Medicare will cover every 12 months beginning on the day after your 50th birthday PSA: 0 3 ng/mL     Screening Not Indicated     Hepatitis C Screening Once for adults born between 1945 and 1965  More frequently in patients at high risk for Hepatitis C Hep C Antibody: Not on file        Diabetes Screening 1-2 times per year if you're at risk for diabetes or have pre-diabetes Fasting glucose: 175 mg/dL   A1C: 6 2 %    Screening Not Indicated  History Diabetes   Cholesterol Screening Once every 5 years if you don't have a lipid disorder  May order more often based on risk factors  Lipid panel: 03/11/2021    Screening Not Indicated  History Lipid Disorder      Other Preventive Screenings Covered by Medicare:  6  Abdominal Aortic Aneurysm (AAA) Screening: covered once if your at risk   You're considered to be at risk if you have a family history of AAA or a male between the age of 65-75 who smoking at least 100 cigarettes in your lifetime  7  Lung Cancer Screening: covers low dose CT scan once per year if you meet all of the following conditions: (1) Age 50-69; (2) No signs or symptoms of lung cancer; (3) Current smoker or have quit smoking within the last 15 years; (4) You have a tobacco smoking history of at least 30 pack years (packs per day x number of years you smoked); (5) You get a written order from a healthcare provider  8  Glaucoma Screening: covered annually if you're considered high risk: (1) You have diabetes OR (2) Family history of glaucoma OR (3)  aged 48 and older OR (3)  American aged 72 and older  5  Osteoporosis Screening: covered every 2 years if you meet one of the following conditions: (1) Have a vertebral abnormality; (2) On glucocorticoid therapy for more than 3 months; (3) Have primary hyperparathyroidism; (4) On osteoporosis medications and need to assess response to drug therapy  10  HIV Screening: covered annually if you're between the age of 12-76  Also covered annually if you are younger than 13 and older than 72 with risk factors for HIV infection  For pregnant patients, it is covered up to 3 times per pregnancy  Immunizations:  Immunization Recommendations   Influenza Vaccine Annual influenza vaccination during flu season is recommended for all persons aged >= 6 months who do not have contraindications   Pneumococcal Vaccine (Prevnar and Pneumovax)  * Prevnar = PCV13  * Pneumovax = PPSV23 Adults 25-60 years old: 1-3 doses may be recommended based on certain risk factors  Adults 72 years old: Prevnar (PCV13) vaccine recommended followed by Pneumovax (PPSV23) vaccine  If already received PPSV23 since turning 65, then PCV13 recommended at least one year after PPSV23 dose     Hepatitis B Vaccine 3 dose series if at intermediate or high risk (ex: diabetes, end stage renal disease, liver disease)   Tetanus (Td) Vaccine - COST NOT COVERED BY MEDICARE PART B Following completion of primary series, a booster dose should be given every 10 years to maintain immunity against tetanus  Td may also be given as tetanus wound prophylaxis  Tdap Vaccine - COST NOT COVERED BY MEDICARE PART B Recommended at least once for all adults  For pregnant patients, recommended with each pregnancy  Shingles Vaccine (Shingrix) - COST NOT COVERED BY MEDICARE PART B  2 shot series recommended in those aged 48 and above     Health Maintenance Due:      Topic Date Due    Lung Cancer Screening  Never done     Immunizations Due:      Topic Date Due    COVID-19 Vaccine (1 of 2) Never done    DTaP,Tdap,and Td Vaccines (1 - Tdap) Never done    Pneumococcal Vaccine: 65+ Years (1 of 1 - PPSV23) Never done     Advance Directives   What are advance directives? Advance directives are legal documents that state your wishes and plans for medical care  These plans are made ahead of time in case you lose your ability to make decisions for yourself  Advance directives can apply to any medical decision, such as the treatments you want, and if you want to donate organs  What are the types of advance directives? There are many types of advance directives, and each state has rules about how to use them  You may choose a combination of any of the following:  · Living will: This is a written record of the treatment you want  You can also choose which treatments you do not want, which to limit, and which to stop at a certain time  This includes surgery, medicine, IV fluid, and tube feedings  · Durable power of  for healthcare [place] [facility]): This is a written record that states who you want to make healthcare choices for you when you are unable to make them for yourself  This person, called a proxy, is usually a family member or a friend  You may choose more than 1 proxy    · Do not resuscitate (DNR) order:  A DNR order is used in case your heart stops beating or you stop breathing  It is a request not to have certain forms of treatment, such as CPR  A DNR order may be included in other types of advance directives  · Medical directive: This covers the care that you want if you are in a coma, near death, or unable to make decisions for yourself  You can list the treatments you want for each condition  Treatment may include pain medicine, surgery, blood transfusions, dialysis, IV or tube feedings, and a ventilator (breathing machine)  · Values history: This document has questions about your views, beliefs, and how you feel and think about life  This information can help others choose the care that you would choose  Why are advance directives important? An advance directive helps you control your care  Although spoken wishes may be used, it is better to have your wishes written down  Spoken wishes can be misunderstood, or not followed  Treatments may be given even if you do not want them  An advance directive may make it easier for your family to make difficult choices about your care  © Copyright [facility] 2018 Information is for End User's use only and may not be sold, redistributed or otherwise used for commercial purposes   All illustrations and images included in CareNotes® are the copyrighted property of [employer]  or [address] [place] [address] [facility]

## 2021-03-17 NOTE — PROGRESS NOTES
Diabetic Foot Exam    Patient's shoes and socks removed  Right Foot/Ankle   Right Foot Inspection  Skin Exam: skin normal and skin intact no dry skin, no warmth, no callus, no erythema, no maceration, no abnormal color, no pre-ulcer, no ulcer and no callus                          Toe Exam: ROM and strength within normal limits    Vascular  Capillary refills: < 3 seconds  The right DP pulse is 2+  The right PT pulse is 2+  Left Foot/Ankle  Left Foot Inspection  Skin Exam: skin normal and skin intactno dry skin, no warmth, no erythema, no maceration, normal color, no pre-ulcer, no ulcer and no callus                         Toe Exam: ROM and strength within normal limits                     Vascular  Capillary refills: < 3 seconds  The left DP pulse is 2+  The left PT pulse is 2+  Assign Risk Category:  No deformity present; No loss of protective sensation;        Risk: 0       Assessment and Plan:     Problem List Items Addressed This Visit     None      Visit Diagnoses     Medicare annual wellness visit, subsequent    -  Primary           Preventive health issues were discussed with patient, and age appropriate screening tests were ordered as noted in patient's After Visit Summary  Personalized health advice and appropriate referrals for health education or preventive services given if needed, as noted in patient's After Visit Summary       History of Present Illness:     Patient presents for Medicare Annual Wellness visit    Patient Care Team:  Nate Skelton DO as PCP - General (Family Medicine)     Problem List:     Patient Active Problem List   Diagnosis    Type 2 diabetes mellitus, with long-term current use of insulin ([facility] )    Other hyperlipidemia    Essential hypertension    Peripheral vascular disease with claudication ([facility] )    Iron deficiency anemia      Past Medical and Surgical History:     Past Medical History:   Diagnosis Date    Diabetes mellitus ([address] )     DVT (deep venous thrombosis) ([facility] [facility] )  Hypertension      Past Surgical History:   Procedure Laterality Date    CARDIAC CATHETERIZATION  2013    calif    COLONOSCOPY      CORONARY ANGIOPLASTY WITH STENT PLACEMENT  2010    FEMORAL ARTERY STENT        Family History:     Family History   Problem Relation Age of Onset    No Known Problems Mother     No Known Problems Father       Social History:     E-Cigarette/Vaping    E-Cigarette Use Never User      E-Cigarette/Vaping Substances    Nicotine No     THC No     CBD No     Flavoring No     Other No     Unknown No      Social History     Socioeconomic History    Marital status: /Civil Union     Spouse name: None    Number of children: None    Years of education: None    Highest education level: None   Occupational History    None   Social Needs    Financial resource strain: None    Food insecurity     Worry: None     Inability: None    Transportation needs     Medical: None     Non-medical: None   Tobacco Use    Smoking status: Former Smoker     Packs/day: 1 50     Years: 40 00     Pack years: 60 00     Types: Cigarettes     Quit date: 2015     Years since quittin 7    Smokeless tobacco: Never Used    Tobacco comment: quit --    Substance and Sexual Activity    Alcohol use: Not Currently     Alcohol/week: 0 0 standard drinks     Comment: weekly    Drug use: No    Sexual activity: None   Lifestyle    Physical activity     Days per week: None     Minutes per session: None    Stress: None   Relationships    Social connections     Talks on phone: None     Gets together: None     Attends Mandaen service: None     Active member of club or organization: None     Attends meetings of clubs or organizations: None     Relationship status: None    Intimate partner violence     Fear of current or ex partner: None     Emotionally abused: None     Physically abused: None     Forced sexual activity: None   Other Topics Concern    None   Social History Narrative · Most recent tobacco use screenin2020       Medications and Allergies:     Current Outpatient Medications   Medication Sig Dispense Refill    ASPIRIN 81 PO Take 81 mg by mouth daily      atorvastatin (LIPITOR) 40 mg tablet       clopidogrel (PLAVIX) 75 mg tablet Take 1 tablet (75 mg total) by mouth daily 30 tablet 5    ferrous sulfate 325 (65 Fe) mg tablet Take 1 tablet by mouth daily with breakfast      glipiZIDE (GLUCOTROL) 10 mg tablet Take 1 tablet (10 mg total) by mouth daily 90 tablet 3    hydrocortisone 2 5 % cream Apply topically 4 (four) times a day as needed for rash 30 g 0    JARDIANCE 25 MG TABS Take 25 mg by mouth every morning      lisinopril-hydrochlorothiazide (PRINZIDE,ZESTORETIC) 20-12 5 MG per tablet Take 1 tablet by mouth daily 90 tablet 3    metFORMIN (GLUCOPHAGE) 1000 MG tablet Take 1 tablet (1,000 mg total) by mouth 2 (two) times a day with meals 180 tablet 3    cyclobenzaprine (FLEXERIL) 5 mg tablet Take 2 tablets (10 mg total) by mouth 3 (three) times a day as needed for muscle spasms (Patient not taking: Reported on 3/17/2021) 30 tablet 0    metoprolol succinate (TOPROL-XL) 25 mg 24 hr tablet        No current facility-administered medications for this visit        No Known Allergies   Immunizations:     Immunization History   Administered Date(s) Administered    INFLUENZA 10/02/2018    Influenza, high dose seasonal 0 7 mL 10/13/2020      Health Maintenance:         Topic Date Due    Lung Cancer Screening  1998         Topic Date Due    COVID-19 Vaccine (1 of 2) 1959    DTaP,Tdap,and Td Vaccines (1 - Tdap) 1964    Pneumococcal Vaccine: 65+ Years (1 of 1 - PPSV23) 2008      Medicare Health Risk Assessment:     /64 (BP Location: Left arm, Patient Position: Sitting, Cuff Size: Adult)   Pulse 60   Temp (!) 97 3 °F (36 3 °C) (Temporal)   Resp 16   Ht 5' 7" (1 702 m)   Wt 69 9 kg (154 lb)   SpO2 98%   BMI 24 12 kg/m²      Annual Wellness Visit    Christa Weaver DO

## 2021-03-17 NOTE — PROGRESS NOTES
Assessment and Plan:     Problem List Items Addressed This Visit        Endocrine    Type 2 diabetes mellitus, with long-term current use of insulin ([address] ) - Primary    Relevant Orders    Ambulatory referral to Ophthalmology       Cardiovascular and Mediastinum    Essential hypertension    Peripheral vascular disease with claudication ([address] )    Embolism and thrombosis of arteries of the lower extremities (HCC)       Other    Iron deficiency anemia    Need for vaccination      Other Visit Diagnoses     Medicare annual wellness visit, subsequent        Polypharmacy        Living will, counseling/discussion        Increased BMI               Preventive health issues were discussed with patient, and age appropriate screening tests were ordered as noted in patient's After Visit Summary  Personalized health advice and appropriate referrals for health education or preventive services given if needed, as noted in patient's After Visit Summary       History of Present Illness:     Patient presents for Medicare Annual Wellness visit    Patient Care Team:  Dino Soriano DO as PCP - General (Family Medicine)     Problem List:     Patient Active Problem List   Diagnosis    Type 2 diabetes mellitus, with long-term current use of insulin ([address] )    Other hyperlipidemia    Essential hypertension    Peripheral vascular disease with claudication ([address] )    Iron deficiency anemia    Embolism and thrombosis of arteries of the lower extremities ([address] )    Need for vaccination      Past Medical and Surgical History:     Past Medical History:   Diagnosis Date    Diabetes mellitus ([address] )     DVT (deep venous thrombosis) ([address] )     Hypertension      Past Surgical History:   Procedure Laterality Date    CARDIAC CATHETERIZATION  2013    calif    COLONOSCOPY      CORONARY ANGIOPLASTY WITH STENT PLACEMENT  01/01/2010    FEMORAL ARTERY STENT        Family History:     Family History   Problem Relation Age of Onset    No Known Problems Mother     No Known Problems Father       Social History:     E-Cigarette/Vaping    E-Cigarette Use Never User      E-Cigarette/Vaping Substances    Nicotine No     THC No     CBD No     Flavoring No     Other No     Unknown No      Social History     Socioeconomic History    Marital status: /Civil Union     Spouse name: None    Number of children: None    Years of education: None    Highest education level: None   Occupational History    None   Social Needs    Financial resource strain: None    Food insecurity     Worry: None     Inability: None    Transportation needs     Medical: None     Non-medical: None   Tobacco Use    Smoking status: Former Smoker     Packs/day: 1 50     Years: 40 00     Pack years: 60 00     Types: Cigarettes     Quit date: 2015     Years since quittin 7    Smokeless tobacco: Never Used    Tobacco comment: quit --    Substance and Sexual Activity    Alcohol use: Not Currently     Alcohol/week: 0 0 standard drinks     Comment: weekly    Drug use: No    Sexual activity: None   Lifestyle    Physical activity     Days per week: None     Minutes per session: None    Stress: None   Relationships    Social connections     Talks on phone: None     Gets together: None     Attends Religion service: None     Active member of club or organization: None     Attends meetings of clubs or organizations: None     Relationship status: None    Intimate partner violence     Fear of current or ex partner: None     Emotionally abused: None     Physically abused: None     Forced sexual activity: None   Other Topics Concern    None   Social History Narrative    · Most recent tobacco use screenin2020       Medications and Allergies:     Current Outpatient Medications   Medication Sig Dispense Refill    ASPIRIN 81 PO Take 81 mg by mouth daily      atorvastatin (LIPITOR) 40 mg tablet       clopidogrel (PLAVIX) 75 mg tablet Take 1 tablet (75 mg total) by mouth daily 30 tablet 5    ferrous sulfate 325 (65 Fe) mg tablet Take 1 tablet by mouth daily with breakfast      glipiZIDE (GLUCOTROL) 10 mg tablet Take 1 tablet (10 mg total) by mouth daily 90 tablet 3    hydrocortisone 2 5 % cream Apply topically 4 (four) times a day as needed for rash 30 g 0    JARDIANCE 25 MG TABS Take 25 mg by mouth every morning      lisinopril-hydrochlorothiazide (PRINZIDE,ZESTORETIC) 20-12 5 MG per tablet Take 1 tablet by mouth daily 90 tablet 3    metFORMIN (GLUCOPHAGE) 1000 MG tablet Take 1 tablet (1,000 mg total) by mouth 2 (two) times a day with meals 180 tablet 3    cyclobenzaprine (FLEXERIL) 5 mg tablet Take 2 tablets (10 mg total) by mouth 3 (three) times a day as needed for muscle spasms (Patient not taking: Reported on 3/17/2021) 30 tablet 0    metoprolol succinate (TOPROL-XL) 25 mg 24 hr tablet        No current facility-administered medications for this visit  No Known Allergies   Immunizations:     Immunization History   Administered Date(s) Administered    INFLUENZA 10/02/2018    Influenza, high dose seasonal 0 7 mL 10/13/2020      Health Maintenance:         Topic Date Due    Lung Cancer Screening  Never done         Topic Date Due    COVID-19 Vaccine (1) Never done    DTaP,Tdap,and Td Vaccines (1 - Tdap) Never done    Pneumococcal Vaccine: 65+ Years (1 of 1 - PPSV23) Never done      Medicare Health Risk Assessment:     /64 (BP Location: Left arm, Patient Position: Sitting, Cuff Size: Adult)   Pulse 60   Temp (!) 97 3 °F (36 3 °C) (Temporal)   Resp 16   Ht 5' 7" (1 702 m)   Wt 69 9 kg (154 lb)   SpO2 98%   BMI 24 12 kg/m²      Thuan Ortiz is here for his Subsequent Wellness visit  Health Risk Assessment:   Patient rates overall health as good  Patient feels that their physical health rating is much better  Patient is satisfied with their life  Eyesight was rated as same  Hearing was rated as same   Patient feels that their emotional and mental health rating is same  Patients states they are sometimes angry  Patient states they are sometimes unusually tired/fatigued  Pain experienced in the last 7 days has been none  Patient states that he has experienced no weight loss or gain in last 6 months  Depression Screening:   PHQ-2 Score: 0      Fall Risk Screening: In the past year, patient has experienced: no history of falling in past year      Home Safety:  Patient does not have trouble with stairs inside or outside of their home  Patient has working smoke alarms and has working carbon monoxide detector  Home safety hazards include: none  Nutrition:   Current diet is Regular  Medications:   Patient is currently taking over-the-counter supplements  OTC medications include: see medication list  Patient is able to manage medications  Activities of Daily Living (ADLs)/Instrumental Activities of Daily Living (IADLs):   Walk and transfer into and out of bed and chair?: Yes  Dress and groom yourself?: Yes    Bathe or shower yourself?: Yes    Feed yourself? Yes  Do your laundry/housekeeping?: Yes  Manage your money, pay your bills and track your expenses?: Yes  Make your own meals?: Yes    Do your own shopping?: Yes    Previous Hospitalizations:   Any hospitalizations or ED visits within the last 12 months?: No      Advance Care Planning:   Living will: Yes    Durable POA for healthcare:  Yes    Advanced directive: Yes    Advanced directive counseling given: Yes    Five wishes given: Yes    Patient declined ACP directive: No    End of Life Decisions reviewed with patient: Yes    Provider agrees with end of life decisions: Yes      Cognitive Screening:   Provider or family/friend/caregiver concerned regarding cognition?: No    PREVENTIVE SCREENINGS      Cardiovascular Screening:    General: Screening Not Indicated, History Lipid Disorder and Risks and Benefits Discussed      Diabetes Screening:     General: Screening Not Indicated, History Diabetes and Risks and Benefits Discussed      Colorectal Cancer Screening:     General: Risks and Benefits Discussed      Prostate Cancer Screening:    General: Screening Not Indicated and Risks and Benefits Discussed      Osteoporosis Screening:    General: Risks and Benefits Discussed      Abdominal Aortic Aneurysm (AAA) Screening:    Risk factors include: tobacco use        Lung Cancer Screening:     General: Screening Not Indicated and Risks and Benefits Discussed      Hepatitis C Screening:    General: Risks and Benefits Discussed    Hep C Screening Accepted: No     Screening, Brief Intervention, and Referral to Treatment (SBIRT)    Screening  Typical number of drinks in a day: 0  Typical number of drinks in a week: 0  Interpretation: Low risk drinking behavior  Single Item Drug Screening:  How often have you used an illegal drug (including marijuana) or a prescription medication for non-medical reasons in the past year? never    Single Item Drug Screen Score: 0  Interpretation: Negative screen for possible drug use disorder    Brief Intervention  Alcohol & drug use screenings were reviewed  No concerns regarding substance use disorder identified  Other Counseling Topics:   Car/seat belt/driving safety, skin self-exam, sunscreen and calcium and vitamin D intake and regular weightbearing exercise         Nakul Lino, DO

## 2021-04-02 LAB
LEFT EYE DIABETIC RETINOPATHY: NORMAL
RIGHT EYE DIABETIC RETINOPATHY: NORMAL

## 2021-05-12 ENCOUNTER — TELEPHONE (OUTPATIENT)
Dept: FAMILY MEDICINE CLINIC | Facility: CLINIC | Age: 76
End: 2021-05-12

## 2021-05-12 NOTE — TELEPHONE ENCOUNTER
Pt called asking if you would like him to get lab work done before his appt in June - he did just have some done in March    Please call pt to let him know  OK to leave message on voicemail

## 2021-05-13 DIAGNOSIS — E78.49 OTHER HYPERLIPIDEMIA: ICD-10-CM

## 2021-05-13 DIAGNOSIS — E55.9 VITAMIN D INSUFFICIENCY: ICD-10-CM

## 2021-05-13 DIAGNOSIS — I10 ESSENTIAL HYPERTENSION: ICD-10-CM

## 2021-05-13 DIAGNOSIS — E11.51 TYPE 2 DIABETES MELLITUS WITH DIABETIC PERIPHERAL ANGIOPATHY WITHOUT GANGRENE, WITH LONG-TERM CURRENT USE OF INSULIN (HCC): Primary | ICD-10-CM

## 2021-05-13 DIAGNOSIS — Z79.4 TYPE 2 DIABETES MELLITUS WITH DIABETIC PERIPHERAL ANGIOPATHY WITHOUT GANGRENE, WITH LONG-TERM CURRENT USE OF INSULIN (HCC): Primary | ICD-10-CM

## 2021-05-17 DIAGNOSIS — E78.49 OTHER HYPERLIPIDEMIA: Primary | ICD-10-CM

## 2021-05-17 RX ORDER — ATORVASTATIN CALCIUM 40 MG/1
TABLET, FILM COATED ORAL
Qty: 90 TABLET | Refills: 3 | Status: SHIPPED | OUTPATIENT
Start: 2021-05-17

## 2021-05-26 ENCOUNTER — APPOINTMENT (OUTPATIENT)
Dept: LAB | Facility: CLINIC | Age: 76
End: 2021-05-26
Payer: MEDICARE

## 2021-05-26 DIAGNOSIS — I10 ESSENTIAL HYPERTENSION: ICD-10-CM

## 2021-05-26 DIAGNOSIS — E78.49 OTHER HYPERLIPIDEMIA: ICD-10-CM

## 2021-05-26 DIAGNOSIS — E55.9 VITAMIN D INSUFFICIENCY: ICD-10-CM

## 2021-05-26 DIAGNOSIS — Z79.4 TYPE 2 DIABETES MELLITUS WITH DIABETIC PERIPHERAL ANGIOPATHY WITHOUT GANGRENE, WITH LONG-TERM CURRENT USE OF INSULIN (HCC): ICD-10-CM

## 2021-05-26 DIAGNOSIS — E11.51 TYPE 2 DIABETES MELLITUS WITH DIABETIC PERIPHERAL ANGIOPATHY WITHOUT GANGRENE, WITH LONG-TERM CURRENT USE OF INSULIN (HCC): ICD-10-CM

## 2021-05-26 LAB
25(OH)D3 SERPL-MCNC: 60.9 NG/ML (ref 30–100)
ALBUMIN SERPL BCP-MCNC: 3.9 G/DL (ref 3.5–5)
ALP SERPL-CCNC: 60 U/L (ref 46–116)
ALT SERPL W P-5'-P-CCNC: 25 U/L (ref 12–78)
ANION GAP SERPL CALCULATED.3IONS-SCNC: 9 MMOL/L (ref 4–13)
AST SERPL W P-5'-P-CCNC: 10 U/L (ref 5–45)
BASOPHILS # BLD AUTO: 0.03 THOUSANDS/ΜL (ref 0–0.1)
BASOPHILS NFR BLD AUTO: 1 % (ref 0–1)
BILIRUB SERPL-MCNC: 0.57 MG/DL (ref 0.2–1)
BUN SERPL-MCNC: 19 MG/DL (ref 5–25)
CALCIUM SERPL-MCNC: 9.6 MG/DL (ref 8.3–10.1)
CHLORIDE SERPL-SCNC: 104 MMOL/L (ref 100–108)
CHOLEST SERPL-MCNC: 132 MG/DL (ref 50–200)
CO2 SERPL-SCNC: 25 MMOL/L (ref 21–32)
CREAT SERPL-MCNC: 0.94 MG/DL (ref 0.6–1.3)
EOSINOPHIL # BLD AUTO: 0.21 THOUSAND/ΜL (ref 0–0.61)
EOSINOPHIL NFR BLD AUTO: 3 % (ref 0–6)
ERYTHROCYTE [DISTWIDTH] IN BLOOD BY AUTOMATED COUNT: 13.6 % (ref 11.6–15.1)
EST. AVERAGE GLUCOSE BLD GHB EST-MCNC: 126 MG/DL
GFR SERPL CREATININE-BSD FRML MDRD: 77 ML/MIN/1.73SQ M
GLUCOSE P FAST SERPL-MCNC: 145 MG/DL (ref 65–99)
HBA1C MFR BLD: 6 %
HCT VFR BLD AUTO: 41 % (ref 36.5–49.3)
HDLC SERPL-MCNC: 38 MG/DL
HGB BLD-MCNC: 12.8 G/DL (ref 12–17)
IMM GRANULOCYTES # BLD AUTO: 0.02 THOUSAND/UL (ref 0–0.2)
IMM GRANULOCYTES NFR BLD AUTO: 0 % (ref 0–2)
LDLC SERPL CALC-MCNC: 56 MG/DL (ref 0–100)
LYMPHOCYTES # BLD AUTO: 1.11 THOUSANDS/ΜL (ref 0.6–4.47)
LYMPHOCYTES NFR BLD AUTO: 17 % (ref 14–44)
MCH RBC QN AUTO: 29.1 PG (ref 26.8–34.3)
MCHC RBC AUTO-ENTMCNC: 31.2 G/DL (ref 31.4–37.4)
MCV RBC AUTO: 93 FL (ref 82–98)
MONOCYTES # BLD AUTO: 0.52 THOUSAND/ΜL (ref 0.17–1.22)
MONOCYTES NFR BLD AUTO: 8 % (ref 4–12)
NEUTROPHILS # BLD AUTO: 4.71 THOUSANDS/ΜL (ref 1.85–7.62)
NEUTS SEG NFR BLD AUTO: 71 % (ref 43–75)
NONHDLC SERPL-MCNC: 94 MG/DL
NRBC BLD AUTO-RTO: 0 /100 WBCS
PLATELET # BLD AUTO: 236 THOUSANDS/UL (ref 149–390)
PMV BLD AUTO: 10.4 FL (ref 8.9–12.7)
POTASSIUM SERPL-SCNC: 3.7 MMOL/L (ref 3.5–5.3)
PROT SERPL-MCNC: 7.6 G/DL (ref 6.4–8.2)
RBC # BLD AUTO: 4.4 MILLION/UL (ref 3.88–5.62)
SODIUM SERPL-SCNC: 138 MMOL/L (ref 136–145)
TRIGL SERPL-MCNC: 189 MG/DL
WBC # BLD AUTO: 6.6 THOUSAND/UL (ref 4.31–10.16)

## 2021-05-26 PROCEDURE — 80053 COMPREHEN METABOLIC PANEL: CPT

## 2021-05-26 PROCEDURE — 80061 LIPID PANEL: CPT

## 2021-05-26 PROCEDURE — 83036 HEMOGLOBIN GLYCOSYLATED A1C: CPT

## 2021-05-26 PROCEDURE — 82306 VITAMIN D 25 HYDROXY: CPT

## 2021-05-26 PROCEDURE — 85025 COMPLETE CBC W/AUTO DIFF WBC: CPT

## 2021-05-26 PROCEDURE — 36415 COLL VENOUS BLD VENIPUNCTURE: CPT

## 2021-06-10 ENCOUNTER — OFFICE VISIT (OUTPATIENT)
Dept: FAMILY MEDICINE CLINIC | Facility: CLINIC | Age: 76
End: 2021-06-10
Payer: MEDICARE

## 2021-06-10 VITALS
TEMPERATURE: 97.7 F | HEIGHT: 67 IN | HEART RATE: 81 BPM | OXYGEN SATURATION: 97 % | WEIGHT: 157 LBS | DIASTOLIC BLOOD PRESSURE: 58 MMHG | BODY MASS INDEX: 24.64 KG/M2 | RESPIRATION RATE: 16 BRPM | SYSTOLIC BLOOD PRESSURE: 106 MMHG

## 2021-06-10 DIAGNOSIS — Z23 NEED FOR VACCINATION: ICD-10-CM

## 2021-06-10 DIAGNOSIS — I10 ESSENTIAL HYPERTENSION: ICD-10-CM

## 2021-06-10 DIAGNOSIS — E78.49 OTHER HYPERLIPIDEMIA: ICD-10-CM

## 2021-06-10 DIAGNOSIS — Z12.5 SCREENING FOR PROSTATE CANCER: ICD-10-CM

## 2021-06-10 DIAGNOSIS — R53.83 FATIGUE, UNSPECIFIED TYPE: ICD-10-CM

## 2021-06-10 DIAGNOSIS — Z79.4 TYPE 2 DIABETES MELLITUS WITH DIABETIC PERIPHERAL ANGIOPATHY WITHOUT GANGRENE, WITH LONG-TERM CURRENT USE OF INSULIN (HCC): ICD-10-CM

## 2021-06-10 DIAGNOSIS — E55.9 VITAMIN D INSUFFICIENCY: ICD-10-CM

## 2021-06-10 DIAGNOSIS — E11.51 TYPE 2 DIABETES MELLITUS WITH DIABETIC PERIPHERAL ANGIOPATHY WITHOUT GANGRENE, WITH LONG-TERM CURRENT USE OF INSULIN (HCC): ICD-10-CM

## 2021-06-10 DIAGNOSIS — K21.9 GASTROESOPHAGEAL REFLUX DISEASE, UNSPECIFIED WHETHER ESOPHAGITIS PRESENT: ICD-10-CM

## 2021-06-10 DIAGNOSIS — I73.9 PERIPHERAL VASCULAR DISEASE (HCC): Primary | ICD-10-CM

## 2021-06-10 PROCEDURE — 99215 OFFICE O/P EST HI 40 MIN: CPT | Performed by: FAMILY MEDICINE

## 2021-06-10 RX ORDER — DULAGLUTIDE 1.5 MG/.5ML
1.5 INJECTION, SOLUTION SUBCUTANEOUS WEEKLY
Qty: 3 ML | Refills: 6
Start: 2021-06-10

## 2021-06-10 RX ORDER — EMPAGLIFLOZIN, LINAGLIPTIN, METFORMIN HYDROCHLORIDE 12.5; 2.5; 1 MG/1; MG/1; MG/1
1 TABLET, EXTENDED RELEASE ORAL 2 TIMES DAILY
Qty: 60 TABLET | Refills: 6 | Status: SHIPPED | OUTPATIENT
Start: 2021-06-10 | End: 2021-10-28

## 2021-06-10 RX ORDER — CILOSTAZOL 50 MG/1
50 TABLET ORAL 2 TIMES DAILY
COMMUNITY
Start: 2021-04-29 | End: 2021-10-28

## 2021-06-10 NOTE — PATIENT INSTRUCTIONS
Peripheral Vascular Disease   WHAT YOU NEED TO KNOW:   Peripheral vascular disease (PVD) is a condition that causes decreased blood flow to your limbs because of blocked blood vessels  The blockage is usually caused by material such as cholesterol or a blood clot that sticks to the blood vessels and makes them narrow  DISCHARGE INSTRUCTIONS:   Call your local emergency number (911 in the [address] [facility]) for any of the following:   · You have any of the following signs of a heart attack:      ? Squeezing, pressure, or pain in your chest    ? You may  also have any of the following:     ? Discomfort or pain in your back, neck, jaw, stomach, or arm    ? Shortness of breath    ? Nausea or vomiting    ? Lightheadedness or a sudden cold sweat    · You have any of the following signs of a stroke:      ? Numbness or drooping on one side of your face     ? Weakness in an arm or leg    ? Confusion or difficulty speaking    ? Dizziness, a severe headache, or vision loss    Return to the emergency department if:   · Your arm or leg feels warm, tender, and painful  It may look swollen and red  · You have leg pain that does not go away with rest     · You have dark areas on the skin of your legs  · You cannot see out of one or both eyes  Call your doctor if:   · Your signs and symptoms get worse or do not get better, even after treatment  · You have a sore or ulcer that is not healing or gets worse  · You have questions or concerns about your condition or care  Medicines: You may need any of the following:  · Cholesterol medicine  helps decrease the amount of cholesterol in your blood  · Antiplatelets  help prevent blood clots  This medicine makes it more likely for you to bleed or bruise  · Vasodilators  help blood vessels dilate (open wider) and increase your blood flow  · Take your medicine as directed  Contact your healthcare provider if you think your medicine is not helping or if you have side effects  Tell him or her if you are allergic to any medicine  Keep a list of the medicines, vitamins, and herbs you take  Include the amounts, and when and why you take them  Bring the list or the pill bottles to follow-up visits  Carry your medicine list with you in case of an emergency  Manage PVD:   · Do not smoke  Nicotine and other chemicals in cigarettes and cigars can damage your blood vessels  Ask your healthcare provider for information if you currently smoke and need help to quit  E-cigarettes or smokeless tobacco still contain nicotine  Talk to your healthcare provider before you use these products  · Exercise as directed  Your healthcare provider can help you create a safe exercise plan  He or she may recommend walking  Walking is a low-impact way to exercise and increase your blood flow  Stop and rest if you have pain in your legs  · Care for your feet  Look closely at your feet every day  Check for cracks or sores  Wash your feet daily with mild soap and dry them well  Do not walk barefoot in case you step on a hard or sharp object  · Change your sleep position  You may have pain in your legs or feet when you sleep  Raise the head of your bed 4 inches, or use pillows to prop your upper body higher than your legs  This may help more blood go to your feet, decreasing pain  · Protect and cushion your feet and hands  If you have ulcers on your feet, you may need to wear bandages with heel pads  You may also wear foam rubber booties  Hand or foot warmers may decrease pain in your hands or feet  Prevent PVD:   · Eat a variety of healthy foods  Healthy foods include fruits, vegetables, whole-grain breads, low-fat dairy products, beans, lean meats, and fish  Ask if you need to be on a special diet  · Maintain a healthy weight  Ask your healthcare provider what a healthy weight is for you  Ask him or her to help you create a weight loss plan if you are overweight      · Manage diabetes  Keep your blood sugar level in the range recommended by your healthcare provider  Check your blood sugar level as often as directed  Ask your provider if you should make nutrition, exercise, or medicine changes  Follow up with your doctor as directed:  Write down your questions so you remember to ask them during your visits  © Copyright [address] Information is for End User's use only and may not be sold, redistributed or otherwise used for commercial purposes  All illustrations and images included in CareNotes® are the copyrighted property of [employer]  or [facility] Christopher Garcia   The above information is an  only  It is not intended as medical advice for individual conditions or treatments  Talk to your doctor, nurse or pharmacist before following any medical regimen to see if it is safe and effective for you  Type 2 Diabetes in the Older Adult   WHAT YOU NEED TO KNOW:   The risk for type 2 diabetes increases as a person gets older  Type 2 diabetes means your pancreas does not make enough insulin, or your body does not use insulin well  Insulin helps move sugar out of the blood so it can be used for energy  Diabetes cannot be cured, but it can be managed  DISCHARGE INSTRUCTIONS:   Call or have someone close to you call your local emergency number (911 in the [address] [place] [address],[address]) for any of the following:   · You have any of the following signs of a stroke:      ? Numbness or drooping on one side of your face     ? Weakness in an arm or leg    ? Confusion or difficulty speaking    ? Dizziness, a severe headache, or vision loss    · You have any of the following signs of a heart attack:      ?  Squeezing, pressure, or pain in your chest    ? You may  also have any of the following:     § Discomfort or pain in your back, neck, jaw, stomach, or arm    § Shortness of breath    § Nausea or vomiting    § Lightheadedness or a sudden cold sweat    Return to the emergency department if:   · Your blood sugar level is higher than your goal and does not come down with treatment  · You have signs of a high blood sugar level, such as blurred or double vision  · You have signs of a high ketone level, such as fruity, sweet smelling breath, or shallow breathing  · You have symptoms of a low blood sugar level, such as trouble thinking, sweating, or a pounding heartbeat  · Your blood sugar level is lower than normal and does not improve with treatment  Call your doctor or diabetes care team if:   · You are vomiting or have diarrhea  · You have an upset stomach and cannot eat the foods on your meal plan  · You feel weak or more tired than usual     · You feel dizzy, have headaches, or are easily irritated  · Your skin is red, warm, dry, or swollen  · You have a wound that does not heal     · You have numbness in your arms or legs  · You have trouble coping with diabetes, or you feel anxious or depressed  · You have problems with your memory  · You have changes in your vision  · You have questions or concerns about your condition or care  Manage diabetes and prevent problems:  Sometimes type 2 diabetes can be managed with changes in nutrition and physical activity  · Work with your diabetes care team to create plans to meet your needs  Your diabetes care team may include a physician, nurse practitioner, and physician assistant  It may also include a diabetes nurse educator, dietitian, and an exercise specialist  Family members, or others who are close to you, may also be part of the team  You and your team will make goals and plans to manage diabetes and other health problems  For example, the plan will include how to manage medicines you may take for diabetes and for other health conditions  The plans and goals will be specific to your needs and abilities  Your plan will change as your needs and abilities change  · Manage other health issues as directed    Health issues may include high blood pressure, high cholesterol levels, and heart problems  Health issues may also include depression  Together you and your care team can create a plan to manage any other health issues  · Try to be physically active for 30 to 60 minutes most days of the week  Physical activity, such as exercise, helps keep your blood sugar level steady and lowers your risk for heart disease  Physical activity can help improve your balance and strength and lower your risk for falls  Start slowly  Activity can be done in 10-minute intervals  ? Set a goal for 30 minutes of aerobic activity at least 5 times a week  Aerobic activity helps your heart stay strong  Aerobic activity includes walking, bicycling, dancing, swimming, and raking leaves  ? Set a goal for strength training 2 times a week  Strength training helps you keep the muscles you have and build new muscles  Strength training includes lifting weights, climbing stairs, and doing yoga or lenin chi     ? Stay steady on your on your feet with balancing activities  These include walking backwards, standing on one foot, and walking heel to toe in a straight line  · Maintain a healthy weight  Ask your provider what a healthy weight is for you  A healthy weight can help you control diabetes and prevent heart disease  Ask your provider to help you create a weight loss plan if you are overweight  Weight loss of 10 to 15 pounds can help make a difference in managing diabetes  Together you and your care team can set manageable weight loss goals  · Know the risks if you choose to drink alcohol  Alcohol can cause your blood sugar levels to be low if you use insulin  Alcohol can cause high blood sugar levels and weight gain if you drink too much  Women 21 years or older and men 72 years or older should limit alcohol to 1 drink a day  Men 21 to 64 years should limit alcohol to 2 drinks a day   A drink of alcohol is 12 ounces of beer, 5 ounces of wine, or 1½ ounces of liquor  · Do not smoke  Nicotine and other chemicals in cigarettes can cause lung disease and other health problems  It can also cause blood vessel damage that makes diabetes more difficult to manage  Ask your healthcare provider for information if you currently smoke and need help to quit  Do not use e-cigarettes or smokeless tobacco in place of cigarettes or to help you quit  They still contain nicotine  Diabetes education:  Diabetes education will start right away  Members of your care team teach you, your family, and caregivers the following:  · How to check your blood sugar level: You will learn when to check your blood sugar level and what the level should be  You will learn what to do if your level is too high or too low  Write down the times of your checks and your levels  Take them to all follow-up appointments  · About diabetes medicine: You and your family members will be taught how to draw up and give insulin, if needed  You will learn how much insulin you need and what time to inject insulin  You will be taught when not to give insulin  Your team will also teach you how to dispose of needles and syringes  If you need oral diabetes medicine, you will be taught about side effects  You will also be taught when to take or not take the medicine  · About nutrition:  A dietitian will help you make a meal plan to keep your blood sugar level steady  You will learn how food affects your blood sugar levels  You will also learn to keep track of sugar and starchy foods (carbohydrates)  Do not skip meals  Your blood sugar level may drop too low if you have taken insulin and do not eat  · How to prevent complications:  Diabetes that is not well controlled can lead to health problems  Examples include foot sores, retinopathy (vision loss), and peripheral neuropathy (loss of feeling in your hands and feet)   Your team will help you know when to get regular checkups, such as vision checks  They will teach you how to watch for problems and when to get a problem checked  Other ways to manage diabetes:   · Check your feet every day for sores  Look at your whole foot, including the bottom, and between and under your toes  Check for wounds, corns, and calluses  Use a mirror to see the bottom of your feet  The skin on your feet may be shiny, tight, dry, or darker than normal  Your feet may also be cold and pale  Feel your feet by running your hands along the tops, bottoms, sides, and between your toes  Redness, swelling, and warmth are signs of blood flow problems that can lead to a foot ulcer  Do not try to remove corns or calluses yourself  · Wear medical alert identification  Wear medical alert jewelry or carry a card that says you have type 2 diabetes  Ask your healthcare provider where to get these items  · Ask about vaccines  You have a higher risk for serious illness if you get the flu, pneumonia, or hepatitis  Ask your healthcare provider if you should get a flu, pneumonia, shingles, or hepatitis B vaccine, and when to get the vaccine  · Get help from family and friends  You may need help checking your blood sugar level, giving insulin injections, or preparing your meals  You may also need help to check your feet for sores  Ask your family and friends to help you with these tasks  Talk to your care team if you need someone at home to help you  Follow up with your doctor or diabetes care team as directed: You will need to return to meet with different care team members  You may need tests to monitor for problems  Write down your questions so you remember to ask them during your visits  © Copyright [address] Information is for End User's use only and may not be sold, redistributed or otherwise used for commercial purposes   All illustrations and images included in CareNotes® are the copyrighted property of [employer]  or Armand [employer] above information is an  only  It is not intended as medical advice for individual conditions or treatments  Talk to your doctor, nurse or pharmacist before following any medical regimen to see if it is safe and effective for you

## 2021-06-10 NOTE — PROGRESS NOTES
Assessment/Plan:75 YO male, seen with wife Selene Walker, for lengthy OV with many questions and review of meds, etc for the following medical issues:     F/u and eval HTN:  /58, P 81, wt 157     F/u and eval NIDDM: BS's holding in 120-140 range  No BBG sheets tho  Taking Jardiance 25 mg OD, glipizide 10 mg OD, metformin 1000 mg b i d  and Trulicity 1 5 every 10 days==> to that end, will replace all of these with Trijary 12 5/2 5/1000 BID --what a wonderful medication in that that 1 tablet twice a day could replace all of the other medications for diabetes as listed above  Note last A1c on May 26 was 6 0--very good    F/u and eval HLD:  Doing well on Lipitor 40 mg OD considering vasculopathy presents and severe peripheral vascular disease in lower extremity--see Doppler--only 25%    Follow-up and evaluate severe peripheral vascular disease--seeing and following up with Dr Eugenia Gallardo in [facility]  Patient states something needs to be done as he is suffering from significant intermittent claudication    F/u and eval deconditioning:  Patient limited by his peripheral vascular disease    F/u and eval polypharmacy:  All medications reviewed and that is somewhat complicated particularly in light of the high cost of these medications in the fact that he cannot even afford Trulicity  Samples given best I can he stretches them out  Will try try yeni a and hopefully that can replace all other medication  Follow-up discussion regarding lengthy trip to--[place]  Patient wife plan to travel to [facility] June 24 through Labor Day    Safe travel, hydration, blood sugar monitoring, and limits to what is severe peripheral vascular disease places upon him discussed and reviewed       Problem List Items Addressed This Visit        Endocrine    Type 2 diabetes mellitus, with long-term current use of insulin ([address] )       Cardiovascular and Mediastinum    Essential hypertension       Other    Other hyperlipidemia    Need for vaccination      Other Visit Diagnoses     Peripheral vascular disease ([facility] [address] )    -  Primary            Subjective:  75-year-old [facility] male seen with wife in no acute distress but with significant but controlled non-insulin-dependent diabetes complicated by severe peripheral vascular disease with significant intermittent claudication     Patient ID: Sophie Goldman is a 64 y o  male  HPI--:77 YO male, seen with wife Al Velez, for lengthy OV with many questions and review of meds, etc for the following medical issues:     F/u and eval HTN:  /58, P 81, wt 157     F/u and eval NIDDM: BS's holding in 120-140 range  No BBG sheets tho  Taking Jardiance 25 mg OD, glipizide 10 mg OD, metformin 1000 mg b i d  and Trulicity 1 5 every 10 days==> to that end, will replace all of these with Trijary 12 5/2 5/1000 BID --what a wonderful medication in that that 1 tablet twice a day could replace all of the other medications for diabetes as listed above  Note last A1c on May 26 was 6 0--very good    F/u and eval HLD:  Doing well on Lipitor 40 mg OD considering vasculopathy presents and severe peripheral vascular disease in lower extremity--see Doppler--only 25%    Follow-up and evaluate severe peripheral vascular disease--seeing and following up with Dr Chaves Degree in [facility]  Patient states something needs to be done as he is suffering from significant intermittent claudication    F/u and eval deconditioning:  Patient limited by his peripheral vascular disease    F/u and eval polypharmacy:  All medications reviewed and that is somewhat complicated particularly in light of the high cost of these medications in the fact that he cannot even afford Trulicity  Samples given best I can he stretches them out  Will try try yeni a and hopefully that can replace all other medication  Follow-up discussion regarding lengthy trip to--[place]  Patient wife plan to travel to [facility] June 24 through Labor Day    Safe travel, hydration, blood sugar monitoring, and limits to what is severe peripheral vascular disease places upon him discussed and reviewed      The following portions of the patient's history were reviewed and updated as appropriate:   Past Medical History:  He has a past medical history of Diabetes mellitus ([facility] ), DVT (deep venous thrombosis) ([facility] ), and Hypertension  ,  _______________________________________________________________________  Medical Problems:  does not have any pertinent problems on file ,  _______________________________________________________________________  Past Surgical History:   has a past surgical history that includes Femoral artery stent; Coronary angioplasty with stent (01/01/2010); Cardiac catheterization (2013); and Colonoscopy  ,  _______________________________________________________________________  Family History:  family history includes No Known Problems in his father and mother ,  _______________________________________________________________________  Social History:   reports that he quit smoking about 5 years ago  His smoking use included cigarettes  He has a 60 00 pack-year smoking history  He has never used smokeless tobacco  He reports previous alcohol use  He reports that he does not use drugs  ,  _______________________________________________________________________  Allergies:  has No Known Allergies     _______________________________________________________________________  Current Outpatient Medications   Medication Sig Dispense Refill    ASPIRIN 81 PO Take 81 mg by mouth daily      atorvastatin (LIPITOR) 40 mg tablet TAKE 1 TABLET DAILY 90 tablet 3    clopidogrel (PLAVIX) 75 mg tablet Take 1 tablet (75 mg total) by mouth daily 30 tablet 5    glipiZIDE (GLUCOTROL) 10 mg tablet Take 1 tablet (10 mg total) by mouth daily 90 tablet 3    hydrocortisone 2 5 % cream Apply topically 4 (four) times a day as needed for rash 30 g 0    JARDIANCE 25 MG TABS Take 25 mg by mouth every morning  lisinopril-hydrochlorothiazide (PRINZIDE,ZESTORETIC) 20-12 5 MG per tablet Take 1 tablet by mouth daily 90 tablet 3    metFORMIN (GLUCOPHAGE) 1000 MG tablet Take 1 tablet (1,000 mg total) by mouth 2 (two) times a day with meals 180 tablet 3    cilostazol (PLETAL) 50 mg tablet Take 50 mg by mouth 2 (two) times a day      cyclobenzaprine (FLEXERIL) 5 mg tablet Take 2 tablets (10 mg total) by mouth 3 (three) times a day as needed for muscle spasms (Patient not taking: Reported on 3/17/2021) 30 tablet 0    ferrous sulfate 325 (65 Fe) mg tablet Take 1 tablet by mouth daily with breakfast       No current facility-administered medications for this visit       _______________________________________________________________________  Review of Systems   Constitutional: Negative for activity change, appetite change, chills, diaphoresis, fatigue, fever and unexpected weight change  HENT: Negative for congestion, dental problem, drooling, ear discharge, ear pain, facial swelling, mouth sores, nosebleeds, postnasal drip, rhinorrhea, trouble swallowing and voice change  Eyes: Negative for photophobia, pain, discharge, redness, itching and visual disturbance  Respiratory: Negative for apnea, cough, choking, chest tightness and shortness of breath  Denies any and all respiratory issues - SOB, orthopnea, etc    Cardiovascular: Negative for chest pain and leg swelling  I have carefully question patient and his wife regarding any kind of anginal symptoms chest pain tightness heaviness pressure etc  given his severe degree of peripheral vascular disease a, certainly cardiovascular disease may well exist     He does have significant intermittent claudication both lower extremities stating his circulation is only 25% of what it should  He will follow-up with vascular surgeon  Gastrointestinal: Negative for abdominal distention, abdominal pain, constipation, diarrhea and nausea     Endocrine: Negative for polydipsia, polyphagia and polyuria  Genitourinary: Negative for decreased urine volume, difficulty urinating, dysuria, enuresis and hematuria  Musculoskeletal: Negative for arthralgias, back pain, gait problem and joint swelling  Skin: Negative for color change, pallor, rash and wound  Allergic/Immunologic: Negative for immunocompromised state  Neurological: Negative for dizziness, seizures, syncope, facial asymmetry, speech difficulty, light-headedness and headaches  Hematological: Negative for adenopathy  Psychiatric/Behavioral: Negative for agitation, behavioral problems, confusion and decreased concentration  Objective:  Vitals:    06/10/21 1500   BP: 106/58   BP Location: Right arm   Patient Position: Sitting   Cuff Size: Adult   Pulse: 81   Resp: 16   Temp: 97 7 °F (36 5 °C)   TempSrc: Temporal   SpO2: 97%   Weight: 71 2 kg (157 lb)   Height: 5' 7" (1 702 m)     Body mass index is 24 59 kg/m²  Physical Exam  Vitals signs and nursing note reviewed  Exam conducted with a chaperone present (Wife present throughout the to our encounter, both in attendance)  Constitutional:       Appearance: Normal appearance  He is well-developed and normal weight  He is not diaphoretic  Comments: Patient looks and feels fairly well despite hemoglobin 11 2 and ferritin 7--what appears to be marked iron deficiency anemia  Worried about occult GI neoplasm??   HENT:      Head: Normocephalic and atraumatic  Nose: Nose normal    Eyes:      Pupils: Pupils are equal, round, and reactive to light  Neck:      Musculoskeletal: Normal range of motion and neck supple  Trachea: No tracheal deviation  Cardiovascular:      Rate and Rhythm: Normal rate and regular rhythm  Heart sounds: Murmur present  Pulmonary:      Effort: Pulmonary effort is normal       Breath sounds: Normal breath sounds  No wheezing, rhonchi or rales  Chest:      Chest wall: No tenderness     Abdominal: Palpations: Abdomen is soft  Musculoskeletal: Normal range of motion  Right lower leg: No edema  Left lower leg: No edema  Lymphadenopathy:      Cervical: No cervical adenopathy  Skin:     General: Skin is warm and dry  Findings: No bruising or erythema  Comments: Somewhat sallow color, with slight bronzed Mediterranean hue--not particularly healthy looking, but typical of a COPD smoker although he has stop smoking several years ago   Neurological:      General: No focal deficit present  Mental Status: He is alert and oriented to person, place, and time  Mental status is at baseline  Cranial Nerves: No cranial nerve deficit  Sensory: No sensory deficit  Motor: No weakness  Coordination: Coordination normal       Gait: Gait normal    Psychiatric:         Mood and Affect: Mood normal          Behavior: Behavior normal          Thought Content: Thought content normal          Judgment: Judgment normal          I have spent 40 minutes with Patient and wife, Laurie Dean,  today in which greater than 50% of this time was spent in counseling/coordination of care regarding Diagnostic results, Prognosis, Risks and benefits of tx options, Intructions for management, Patient and family education, Importance of tx compliance and Impressions  NOTE: all of the above issues discussed include chronic illness(es)  with severe exacerbations OR pose threats to life or body function if not managed/monitored effectively  Specifically, the long-term affects of diabetes coupled with COPD from many years of smoking which he stopped several years ago and peripheral vascular disease  Despite the fact the patient feels rather well, and A1c is 6 0, the prognosis here is still quite guarded as he has significant vascular disease  He is hopeful something can be done about his intermittent claudication--will leave that with Dr Matias Cadet    I am as concerned about the long term effects of these disease states, as much as the short term effects  I spent considerable time assuring that my patient  understands  I spent from 430 until 515 with patient going over all the issues listed in the visit diagnosis list as well as all of his medications and working out a plan where he could condensed all his medications that is diabetic medications in to just 1 tablet b i d ==> that is the Trijardy  I reviewed prior internal and external notes,  consults,  hospital discharges,  and any other appropriate documents  Note that serum iron on March 11 was 62 just below the normal range with ferritin markedly improved from 07/08 months ago to 283 months ago with normal range 8-388   I will put in fresh lab studies to be done in early fall when I see him--in 4 months I  have discussed the management and interpretation of them as well  Again, patient expresses understanding

## 2021-06-11 RX ORDER — OMEPRAZOLE 20 MG/1
20 CAPSULE, DELAYED RELEASE ORAL
Qty: 30 CAPSULE | Refills: 5 | Status: SHIPPED | OUTPATIENT
Start: 2021-06-11

## 2021-07-01 DIAGNOSIS — I73.9 PERIPHERAL VASCULAR DISEASE (HCC): ICD-10-CM

## 2021-07-01 DIAGNOSIS — I25.10 CORONARY ARTERY DISEASE INVOLVING NATIVE CORONARY ARTERY OF NATIVE HEART WITHOUT ANGINA PECTORIS: ICD-10-CM

## 2021-07-01 RX ORDER — CLOPIDOGREL BISULFATE 75 MG/1
TABLET ORAL
Qty: 30 TABLET | Refills: 5 | Status: SHIPPED | OUTPATIENT
Start: 2021-07-01 | End: 2021-10-28

## 2021-09-15 DIAGNOSIS — R05.9 COUGH: ICD-10-CM

## 2021-09-15 DIAGNOSIS — R09.81 SINUS CONGESTION: Primary | ICD-10-CM

## 2021-09-15 RX ORDER — AZITHROMYCIN 250 MG/1
500 TABLET, FILM COATED ORAL EVERY 24 HOURS
COMMUNITY
End: 2021-09-15 | Stop reason: SDUPTHER

## 2021-09-15 RX ORDER — GUAIFENESIN 600 MG
1200 TABLET, EXTENDED RELEASE 12 HR ORAL 2 TIMES DAILY
COMMUNITY
End: 2021-09-15 | Stop reason: SDUPTHER

## 2021-09-15 RX ORDER — GUAIFENESIN 600 MG
1200 TABLET, EXTENDED RELEASE 12 HR ORAL 2 TIMES DAILY
Qty: 30 TABLET | Refills: 0 | Status: SHIPPED | OUTPATIENT
Start: 2021-09-15 | End: 2021-10-28

## 2021-09-15 RX ORDER — AZITHROMYCIN 250 MG/1
TABLET, FILM COATED ORAL
Qty: 6 TABLET | Refills: 0 | Status: SHIPPED | OUTPATIENT
Start: 2021-09-15 | End: 2021-09-20

## 2021-10-24 DIAGNOSIS — Z79.4 TYPE 2 DIABETES MELLITUS WITH DIABETIC PERIPHERAL ANGIOPATHY WITHOUT GANGRENE, WITH LONG-TERM CURRENT USE OF INSULIN (HCC): ICD-10-CM

## 2021-10-24 DIAGNOSIS — E11.51 TYPE 2 DIABETES MELLITUS WITH DIABETIC PERIPHERAL ANGIOPATHY WITHOUT GANGRENE, WITH LONG-TERM CURRENT USE OF INSULIN (HCC): ICD-10-CM

## 2021-10-25 RX ORDER — GLIPIZIDE 10 MG/1
TABLET ORAL
Qty: 90 TABLET | Refills: 3 | Status: SHIPPED | OUTPATIENT
Start: 2021-10-25

## 2021-10-28 ENCOUNTER — OFFICE VISIT (OUTPATIENT)
Dept: FAMILY MEDICINE CLINIC | Facility: CLINIC | Age: 76
End: 2021-10-28
Payer: MEDICARE

## 2021-10-28 VITALS
BODY MASS INDEX: 23.86 KG/M2 | SYSTOLIC BLOOD PRESSURE: 130 MMHG | DIASTOLIC BLOOD PRESSURE: 60 MMHG | HEART RATE: 68 BPM | HEIGHT: 67 IN | WEIGHT: 152 LBS | RESPIRATION RATE: 16 BRPM | OXYGEN SATURATION: 97 %

## 2021-10-28 DIAGNOSIS — Z79.899 ENCOUNTER FOR LONG-TERM CURRENT USE OF HIGH RISK MEDICATION: ICD-10-CM

## 2021-10-28 DIAGNOSIS — D50.9 IRON DEFICIENCY ANEMIA, UNSPECIFIED IRON DEFICIENCY ANEMIA TYPE: ICD-10-CM

## 2021-10-28 DIAGNOSIS — E11.69 TYPE 2 DIABETES MELLITUS WITH OTHER SPECIFIED COMPLICATION, WITHOUT LONG-TERM CURRENT USE OF INSULIN (HCC): Primary | ICD-10-CM

## 2021-10-28 DIAGNOSIS — I73.9 PERIPHERAL VASCULAR DISEASE WITH CLAUDICATION (HCC): ICD-10-CM

## 2021-10-28 DIAGNOSIS — Z13.9 ENCOUNTER FOR SCREENING INVOLVING SOCIAL DETERMINANTS OF HEALTH (SDOH): ICD-10-CM

## 2021-10-28 DIAGNOSIS — I10 ESSENTIAL HYPERTENSION: ICD-10-CM

## 2021-10-28 DIAGNOSIS — Z79.899 POLYPHARMACY: ICD-10-CM

## 2021-10-28 DIAGNOSIS — Z79.899 ENCOUNTER FOR LONG-TERM (CURRENT) USE OF MEDICATIONS: ICD-10-CM

## 2021-10-28 DIAGNOSIS — I73.9 PERIPHERAL VASCULAR DISEASE (HCC): ICD-10-CM

## 2021-10-28 DIAGNOSIS — I25.10 CORONARY ARTERY DISEASE INVOLVING NATIVE CORONARY ARTERY OF NATIVE HEART WITHOUT ANGINA PECTORIS: ICD-10-CM

## 2021-10-28 PROCEDURE — 99215 OFFICE O/P EST HI 40 MIN: CPT | Performed by: FAMILY MEDICINE

## 2021-10-28 RX ORDER — OXYCODONE HYDROCHLORIDE 5 MG/1
5 TABLET ORAL EVERY 6 HOURS PRN
COMMUNITY
Start: 2021-10-26 | End: 2021-10-28

## 2021-10-28 RX ORDER — ACETAMINOPHEN 500 MG
1000 TABLET ORAL EVERY 8 HOURS PRN
COMMUNITY
Start: 2021-10-27

## 2021-11-01 ENCOUNTER — TELEPHONE (OUTPATIENT)
Dept: FAMILY MEDICINE CLINIC | Facility: CLINIC | Age: 76
End: 2021-11-01

## 2021-11-02 ENCOUNTER — HOSPITAL ENCOUNTER (OUTPATIENT)
Dept: VASCULAR ULTRASOUND | Facility: HOSPITAL | Age: 76
Discharge: HOME | End: 2021-11-02
Payer: MEDICARE

## 2021-11-02 ENCOUNTER — OFFICE VISIT (OUTPATIENT)
Dept: FAMILY MEDICINE CLINIC | Facility: CLINIC | Age: 76
End: 2021-11-02
Payer: MEDICARE

## 2021-11-02 VITALS
TEMPERATURE: 97.2 F | WEIGHT: 152 LBS | SYSTOLIC BLOOD PRESSURE: 130 MMHG | HEIGHT: 67 IN | BODY MASS INDEX: 23.86 KG/M2 | OXYGEN SATURATION: 98 % | DIASTOLIC BLOOD PRESSURE: 54 MMHG | RESPIRATION RATE: 17 BRPM | HEART RATE: 75 BPM

## 2021-11-02 DIAGNOSIS — H93.A1 OBJECTIVE PULSATILE TINNITUS OF RIGHT EAR: ICD-10-CM

## 2021-11-02 DIAGNOSIS — E11.51 TYPE 2 DIABETES MELLITUS WITH DIABETIC PERIPHERAL ANGIOPATHY WITHOUT GANGRENE, WITH LONG-TERM CURRENT USE OF INSULIN (HCC): ICD-10-CM

## 2021-11-02 DIAGNOSIS — Z79.4 TYPE 2 DIABETES MELLITUS WITH DIABETIC PERIPHERAL ANGIOPATHY WITHOUT GANGRENE, WITH LONG-TERM CURRENT USE OF INSULIN (HCC): ICD-10-CM

## 2021-11-02 DIAGNOSIS — I77.89 OTHER SPECIFIED DISORDERS OF ARTERIES AND ARTERIOLES (HCC): ICD-10-CM

## 2021-11-02 DIAGNOSIS — I73.9 PERIPHERAL VASCULAR DISEASE (HCC): ICD-10-CM

## 2021-11-02 DIAGNOSIS — H93.A1 OBJECTIVE PULSATILE TINNITUS OF RIGHT EAR: Primary | ICD-10-CM

## 2021-11-02 DIAGNOSIS — I70.90 ATHEROSCLEROSIS: ICD-10-CM

## 2021-11-02 DIAGNOSIS — E78.49 OTHER HYPERLIPIDEMIA: ICD-10-CM

## 2021-11-02 PROBLEM — I25.10 CORONARY ARTERY DISEASE WITHOUT ANGINA PECTORIS: Status: ACTIVE | Noted: 2020-02-25

## 2021-11-02 PROBLEM — I65.23 CAROTID STENOSIS, ASYMPTOMATIC, BILATERAL: Status: ACTIVE | Noted: 2018-08-16

## 2021-11-02 PROBLEM — I70.213 ATHEROSCLEROSIS OF NATIVE ARTERY OF BOTH LOWER EXTREMITIES WITH INTERMITTENT CLAUDICATION (HCC): Status: ACTIVE | Noted: 2018-08-16

## 2021-11-02 PROCEDURE — 93880 EXTRACRANIAL BILAT STUDY: CPT

## 2021-11-02 PROCEDURE — 93880 EXTRACRANIAL BILAT STUDY: CPT | Performed by: SURGERY

## 2021-11-02 PROCEDURE — 99214 OFFICE O/P EST MOD 30 MIN: CPT | Performed by: NURSE PRACTITIONER

## 2021-11-02 RX ORDER — EMPAGLIFLOZIN AND METFORMIN HYDROCHLORIDE 12.5; 1 MG/1; MG/1
1 TABLET ORAL DAILY
COMMUNITY
Start: 2021-10-30 | End: 2022-05-02

## 2021-11-07 DIAGNOSIS — Z79.4 TYPE 2 DIABETES MELLITUS WITH DIABETIC PERIPHERAL ANGIOPATHY WITHOUT GANGRENE, WITH LONG-TERM CURRENT USE OF INSULIN (HCC): ICD-10-CM

## 2021-11-07 DIAGNOSIS — E11.51 TYPE 2 DIABETES MELLITUS WITH DIABETIC PERIPHERAL ANGIOPATHY WITHOUT GANGRENE, WITH LONG-TERM CURRENT USE OF INSULIN (HCC): ICD-10-CM

## 2021-11-08 ENCOUNTER — TELEPHONE (OUTPATIENT)
Dept: FAMILY MEDICINE CLINIC | Facility: CLINIC | Age: 76
End: 2021-11-08

## 2021-11-11 ENCOUNTER — OFFICE VISIT (OUTPATIENT)
Dept: FAMILY MEDICINE CLINIC | Facility: CLINIC | Age: 76
End: 2021-11-11
Payer: MEDICARE

## 2021-11-11 VITALS
WEIGHT: 155 LBS | HEART RATE: 68 BPM | SYSTOLIC BLOOD PRESSURE: 132 MMHG | DIASTOLIC BLOOD PRESSURE: 74 MMHG | HEIGHT: 67 IN | TEMPERATURE: 97.3 F | BODY MASS INDEX: 24.33 KG/M2 | OXYGEN SATURATION: 99 % | RESPIRATION RATE: 18 BRPM

## 2021-11-11 DIAGNOSIS — E78.49 OTHER HYPERLIPIDEMIA: ICD-10-CM

## 2021-11-11 DIAGNOSIS — Z79.899 ENCOUNTER FOR LONG-TERM CURRENT USE OF HIGH RISK MEDICATION: ICD-10-CM

## 2021-11-11 DIAGNOSIS — Z79.4 TYPE 2 DIABETES MELLITUS WITH DIABETIC PERIPHERAL ANGIOPATHY WITHOUT GANGRENE, WITH LONG-TERM CURRENT USE OF INSULIN (HCC): Primary | ICD-10-CM

## 2021-11-11 DIAGNOSIS — I73.9 PERIPHERAL VASCULAR DISEASE (HCC): ICD-10-CM

## 2021-11-11 DIAGNOSIS — E11.51 TYPE 2 DIABETES MELLITUS WITH DIABETIC PERIPHERAL ANGIOPATHY WITHOUT GANGRENE, WITH LONG-TERM CURRENT USE OF INSULIN (HCC): Primary | ICD-10-CM

## 2021-11-11 DIAGNOSIS — I10 ESSENTIAL HYPERTENSION: ICD-10-CM

## 2021-11-11 DIAGNOSIS — Z79.899 POLYPHARMACY: ICD-10-CM

## 2021-11-11 DIAGNOSIS — R53.83 FATIGUE, UNSPECIFIED TYPE: ICD-10-CM

## 2021-11-11 DIAGNOSIS — Z79.899 ENCOUNTER FOR LONG-TERM (CURRENT) USE OF MEDICATIONS: ICD-10-CM

## 2021-11-11 DIAGNOSIS — D50.9 IRON DEFICIENCY ANEMIA, UNSPECIFIED IRON DEFICIENCY ANEMIA TYPE: ICD-10-CM

## 2021-11-11 DIAGNOSIS — E55.9 VITAMIN D INSUFFICIENCY: ICD-10-CM

## 2021-11-11 PROCEDURE — 99214 OFFICE O/P EST MOD 30 MIN: CPT | Performed by: FAMILY MEDICINE

## 2021-11-15 ENCOUNTER — APPOINTMENT (OUTPATIENT)
Dept: LAB | Facility: CLINIC | Age: 76
End: 2021-11-15
Payer: MEDICARE

## 2021-11-15 DIAGNOSIS — Z79.4 TYPE 2 DIABETES MELLITUS WITH DIABETIC PERIPHERAL ANGIOPATHY WITHOUT GANGRENE, WITH LONG-TERM CURRENT USE OF INSULIN (HCC): ICD-10-CM

## 2021-11-15 DIAGNOSIS — Z79.899 ENCOUNTER FOR LONG-TERM (CURRENT) USE OF MEDICATIONS: ICD-10-CM

## 2021-11-15 DIAGNOSIS — Z79.899 ENCOUNTER FOR LONG-TERM CURRENT USE OF HIGH RISK MEDICATION: ICD-10-CM

## 2021-11-15 DIAGNOSIS — E11.51 TYPE 2 DIABETES MELLITUS WITH DIABETIC PERIPHERAL ANGIOPATHY WITHOUT GANGRENE, WITH LONG-TERM CURRENT USE OF INSULIN (HCC): ICD-10-CM

## 2021-11-15 DIAGNOSIS — R53.83 FATIGUE, UNSPECIFIED TYPE: ICD-10-CM

## 2021-11-15 DIAGNOSIS — I73.9 PERIPHERAL VASCULAR DISEASE (HCC): ICD-10-CM

## 2021-11-15 DIAGNOSIS — I10 ESSENTIAL HYPERTENSION: ICD-10-CM

## 2021-11-15 DIAGNOSIS — E55.9 VITAMIN D INSUFFICIENCY: ICD-10-CM

## 2021-11-15 DIAGNOSIS — D50.9 IRON DEFICIENCY ANEMIA, UNSPECIFIED IRON DEFICIENCY ANEMIA TYPE: ICD-10-CM

## 2021-11-15 DIAGNOSIS — Z12.5 SCREENING FOR PROSTATE CANCER: ICD-10-CM

## 2021-11-15 LAB
25(OH)D3 SERPL-MCNC: 57.4 NG/ML (ref 30–100)
ALBUMIN SERPL BCP-MCNC: 3.3 G/DL (ref 3.5–5)
ALP SERPL-CCNC: 67 U/L (ref 46–116)
ALT SERPL W P-5'-P-CCNC: 13 U/L (ref 12–78)
ANION GAP SERPL CALCULATED.3IONS-SCNC: 9 MMOL/L (ref 4–13)
AST SERPL W P-5'-P-CCNC: 10 U/L (ref 5–45)
BACTERIA UR QL AUTO: ABNORMAL /HPF
BILIRUB SERPL-MCNC: 0.71 MG/DL (ref 0.2–1)
BILIRUB UR QL STRIP: NEGATIVE
BUN SERPL-MCNC: 20 MG/DL (ref 5–25)
CALCIUM ALBUM COR SERPL-MCNC: 10.2 MG/DL (ref 8.3–10.1)
CALCIUM SERPL-MCNC: 9.6 MG/DL (ref 8.3–10.1)
CHLORIDE SERPL-SCNC: 106 MMOL/L (ref 100–108)
CHOLEST SERPL-MCNC: 93 MG/DL (ref 50–200)
CLARITY UR: CLEAR
CO2 SERPL-SCNC: 22 MMOL/L (ref 21–32)
COLOR UR: YELLOW
CREAT SERPL-MCNC: 0.93 MG/DL (ref 0.6–1.3)
ERYTHROCYTE [DISTWIDTH] IN BLOOD BY AUTOMATED COUNT: 18.3 % (ref 11.6–15.1)
EST. AVERAGE GLUCOSE BLD GHB EST-MCNC: 143 MG/DL
FERRITIN SERPL-MCNC: 10 NG/ML (ref 8–388)
FOLATE SERPL-MCNC: 14.4 NG/ML (ref 3.1–17.5)
GFR SERPL CREATININE-BSD FRML MDRD: 78 ML/MIN/1.73SQ M
GLUCOSE P FAST SERPL-MCNC: 85 MG/DL (ref 65–99)
GLUCOSE UR STRIP-MCNC: ABNORMAL MG/DL
HBA1C MFR BLD: 6.6 %
HCT VFR BLD AUTO: 26.7 % (ref 36.5–49.3)
HDLC SERPL-MCNC: 35 MG/DL
HGB BLD-MCNC: 7.5 G/DL (ref 12–17)
HGB RETIC QN AUTO: 19.2 PG (ref 30–38.3)
HGB UR QL STRIP.AUTO: NEGATIVE
HYALINE CASTS #/AREA URNS LPF: ABNORMAL /LPF
IMM RETICS NFR: 28 % (ref 0–14)
IRON SATN MFR SERPL: 5 % (ref 20–50)
IRON SERPL-MCNC: 18 UG/DL (ref 65–175)
KETONES UR STRIP-MCNC: NEGATIVE MG/DL
LDLC SERPL CALC-MCNC: 36 MG/DL (ref 0–100)
LEUKOCYTE ESTERASE UR QL STRIP: ABNORMAL
MCH RBC QN AUTO: 23.6 PG (ref 26.8–34.3)
MCHC RBC AUTO-ENTMCNC: 28.1 G/DL (ref 31.4–37.4)
MCV RBC AUTO: 84 FL (ref 82–98)
NITRITE UR QL STRIP: NEGATIVE
NON-SQ EPI CELLS URNS QL MICRO: ABNORMAL /HPF
NONHDLC SERPL-MCNC: 58 MG/DL
PH UR STRIP.AUTO: 6 [PH]
PLATELET # BLD AUTO: 234 THOUSANDS/UL (ref 149–390)
PMV BLD AUTO: 10.7 FL (ref 8.9–12.7)
POTASSIUM SERPL-SCNC: 4.2 MMOL/L (ref 3.5–5.3)
PROT SERPL-MCNC: 7 G/DL (ref 6.4–8.2)
PROT UR STRIP-MCNC: NEGATIVE MG/DL
PSA SERPL-MCNC: 0.3 NG/ML (ref 0–4)
RBC # BLD AUTO: 3.18 MILLION/UL (ref 3.88–5.62)
RBC #/AREA URNS AUTO: ABNORMAL /HPF
RETICS # AUTO: ABNORMAL 10*3/UL (ref 14356–105094)
RETICS # CALC: 3.68 % (ref 0.37–1.87)
SODIUM SERPL-SCNC: 137 MMOL/L (ref 136–145)
SP GR UR STRIP.AUTO: 1.01 (ref 1–1.03)
TIBC SERPL-MCNC: 351 UG/DL (ref 250–450)
TRIGL SERPL-MCNC: 112 MG/DL
TSH SERPL DL<=0.05 MIU/L-ACNC: 3.02 UIU/ML (ref 0.36–3.74)
UROBILINOGEN UR QL STRIP.AUTO: 0.2 E.U./DL
VIT B12 SERPL-MCNC: 389 PG/ML (ref 100–900)
WBC # BLD AUTO: 6.38 THOUSAND/UL (ref 4.31–10.16)
WBC #/AREA URNS AUTO: ABNORMAL /HPF

## 2021-11-15 PROCEDURE — 85046 RETICYTE/HGB CONCENTRATE: CPT

## 2021-11-15 PROCEDURE — 83550 IRON BINDING TEST: CPT

## 2021-11-15 PROCEDURE — 85027 COMPLETE CBC AUTOMATED: CPT

## 2021-11-15 PROCEDURE — 84443 ASSAY THYROID STIM HORMONE: CPT

## 2021-11-15 PROCEDURE — 82728 ASSAY OF FERRITIN: CPT

## 2021-11-15 PROCEDURE — 82306 VITAMIN D 25 HYDROXY: CPT

## 2021-11-15 PROCEDURE — 36415 COLL VENOUS BLD VENIPUNCTURE: CPT

## 2021-11-15 PROCEDURE — 81001 URINALYSIS AUTO W/SCOPE: CPT | Performed by: FAMILY MEDICINE

## 2021-11-15 PROCEDURE — 82607 VITAMIN B-12: CPT

## 2021-11-15 PROCEDURE — 82746 ASSAY OF FOLIC ACID SERUM: CPT

## 2021-11-15 PROCEDURE — 83036 HEMOGLOBIN GLYCOSYLATED A1C: CPT

## 2021-11-15 PROCEDURE — 80061 LIPID PANEL: CPT

## 2021-11-15 PROCEDURE — 83540 ASSAY OF IRON: CPT

## 2021-11-15 PROCEDURE — 80053 COMPREHEN METABOLIC PANEL: CPT

## 2021-11-15 PROCEDURE — G0103 PSA SCREENING: HCPCS

## 2021-11-16 ENCOUNTER — TELEPHONE (OUTPATIENT)
Dept: FAMILY MEDICINE CLINIC | Facility: CLINIC | Age: 76
End: 2021-11-16

## 2021-11-18 ENCOUNTER — TELEPHONE (OUTPATIENT)
Dept: FAMILY MEDICINE CLINIC | Facility: CLINIC | Age: 76
End: 2021-11-18

## 2021-11-18 DIAGNOSIS — D50.9 IRON DEFICIENCY ANEMIA, UNSPECIFIED IRON DEFICIENCY ANEMIA TYPE: Primary | ICD-10-CM

## 2021-11-18 RX ORDER — FERROUS SULFATE 325(65) MG
325 TABLET ORAL 2 TIMES DAILY WITH MEALS
Qty: 60 TABLET | Refills: 6 | Status: SHIPPED | OUTPATIENT
Start: 2021-11-18

## 2021-12-06 ENCOUNTER — OFFICE VISIT (OUTPATIENT)
Dept: FAMILY MEDICINE CLINIC | Facility: CLINIC | Age: 76
End: 2021-12-06
Payer: MEDICARE

## 2021-12-06 VITALS
RESPIRATION RATE: 18 BRPM | WEIGHT: 154 LBS | OXYGEN SATURATION: 98 % | DIASTOLIC BLOOD PRESSURE: 66 MMHG | BODY MASS INDEX: 24.17 KG/M2 | TEMPERATURE: 96.8 F | HEART RATE: 71 BPM | HEIGHT: 67 IN | SYSTOLIC BLOOD PRESSURE: 130 MMHG

## 2021-12-06 DIAGNOSIS — Z79.899 ENCOUNTER FOR LONG-TERM (CURRENT) USE OF MEDICATIONS: ICD-10-CM

## 2021-12-06 DIAGNOSIS — E11.51 TYPE 2 DIABETES MELLITUS WITH DIABETIC PERIPHERAL ANGIOPATHY WITHOUT GANGRENE, WITH LONG-TERM CURRENT USE OF INSULIN (HCC): ICD-10-CM

## 2021-12-06 DIAGNOSIS — Z79.4 TYPE 2 DIABETES MELLITUS WITH DIABETIC PERIPHERAL ANGIOPATHY WITHOUT GANGRENE, WITH LONG-TERM CURRENT USE OF INSULIN (HCC): ICD-10-CM

## 2021-12-06 DIAGNOSIS — Z23 NEED FOR INFLUENZA VACCINATION: Primary | ICD-10-CM

## 2021-12-06 DIAGNOSIS — I10 ESSENTIAL HYPERTENSION: ICD-10-CM

## 2021-12-06 DIAGNOSIS — D50.9 IRON DEFICIENCY ANEMIA, UNSPECIFIED IRON DEFICIENCY ANEMIA TYPE: ICD-10-CM

## 2021-12-06 PROCEDURE — 99215 OFFICE O/P EST HI 40 MIN: CPT | Performed by: FAMILY MEDICINE

## 2021-12-06 PROCEDURE — 90662 IIV NO PRSV INCREASED AG IM: CPT

## 2021-12-06 PROCEDURE — G0008 ADMIN INFLUENZA VIRUS VAC: HCPCS

## 2022-01-05 ENCOUNTER — APPOINTMENT (OUTPATIENT)
Dept: LAB | Facility: CLINIC | Age: 77
End: 2022-01-05
Payer: MEDICARE

## 2022-01-05 DIAGNOSIS — E11.51 TYPE 2 DIABETES MELLITUS WITH DIABETIC PERIPHERAL ANGIOPATHY WITHOUT GANGRENE, WITH LONG-TERM CURRENT USE OF INSULIN (HCC): ICD-10-CM

## 2022-01-05 DIAGNOSIS — Z79.899 ENCOUNTER FOR LONG-TERM (CURRENT) USE OF MEDICATIONS: ICD-10-CM

## 2022-01-05 DIAGNOSIS — D50.9 IRON DEFICIENCY ANEMIA, UNSPECIFIED IRON DEFICIENCY ANEMIA TYPE: ICD-10-CM

## 2022-01-05 DIAGNOSIS — I10 ESSENTIAL HYPERTENSION: ICD-10-CM

## 2022-01-05 DIAGNOSIS — Z79.4 TYPE 2 DIABETES MELLITUS WITH DIABETIC PERIPHERAL ANGIOPATHY WITHOUT GANGRENE, WITH LONG-TERM CURRENT USE OF INSULIN (HCC): ICD-10-CM

## 2022-01-05 LAB
ALBUMIN SERPL BCP-MCNC: 3.8 G/DL (ref 3.5–5)
ALP SERPL-CCNC: 83 U/L (ref 46–116)
ALT SERPL W P-5'-P-CCNC: 27 U/L (ref 12–78)
ANION GAP SERPL CALCULATED.3IONS-SCNC: 8 MMOL/L (ref 4–13)
AST SERPL W P-5'-P-CCNC: 17 U/L (ref 5–45)
BASOPHILS # BLD AUTO: 0.03 THOUSANDS/ΜL (ref 0–0.1)
BASOPHILS NFR BLD AUTO: 0 % (ref 0–1)
BILIRUB SERPL-MCNC: 1.14 MG/DL (ref 0.2–1)
BILIRUB UR QL STRIP: NEGATIVE
BUN SERPL-MCNC: 17 MG/DL (ref 5–25)
CALCIUM SERPL-MCNC: 10.2 MG/DL (ref 8.3–10.1)
CHLORIDE SERPL-SCNC: 105 MMOL/L (ref 100–108)
CLARITY UR: CLEAR
CO2 SERPL-SCNC: 25 MMOL/L (ref 21–32)
COLOR UR: YELLOW
CREAT SERPL-MCNC: 0.92 MG/DL (ref 0.6–1.3)
EOSINOPHIL # BLD AUTO: 0.2 THOUSAND/ΜL (ref 0–0.61)
EOSINOPHIL NFR BLD AUTO: 3 % (ref 0–6)
ERYTHROCYTE [DISTWIDTH] IN BLOOD BY AUTOMATED COUNT: 18.6 % (ref 11.6–15.1)
FERRITIN SERPL-MCNC: 13 NG/ML (ref 8–388)
GFR SERPL CREATININE-BSD FRML MDRD: 79 ML/MIN/1.73SQ M
GLUCOSE P FAST SERPL-MCNC: 74 MG/DL (ref 65–99)
GLUCOSE UR STRIP-MCNC: ABNORMAL MG/DL
HCT VFR BLD AUTO: 41.7 % (ref 36.5–49.3)
HGB BLD-MCNC: 12.9 G/DL (ref 12–17)
HGB UR QL STRIP.AUTO: NEGATIVE
IMM GRANULOCYTES # BLD AUTO: 0.02 THOUSAND/UL (ref 0–0.2)
IMM GRANULOCYTES NFR BLD AUTO: 0 % (ref 0–2)
IRON SATN MFR SERPL: 11 % (ref 20–50)
IRON SERPL-MCNC: 37 UG/DL (ref 65–175)
KETONES UR STRIP-MCNC: NEGATIVE MG/DL
LEUKOCYTE ESTERASE UR QL STRIP: NEGATIVE
LYMPHOCYTES # BLD AUTO: 1.16 THOUSANDS/ΜL (ref 0.6–4.47)
LYMPHOCYTES NFR BLD AUTO: 16 % (ref 14–44)
MCH RBC QN AUTO: 28.5 PG (ref 26.8–34.3)
MCHC RBC AUTO-ENTMCNC: 30.9 G/DL (ref 31.4–37.4)
MCV RBC AUTO: 92 FL (ref 82–98)
MONOCYTES # BLD AUTO: 0.53 THOUSAND/ΜL (ref 0.17–1.22)
MONOCYTES NFR BLD AUTO: 8 % (ref 4–12)
NEUTROPHILS # BLD AUTO: 5.12 THOUSANDS/ΜL (ref 1.85–7.62)
NEUTS SEG NFR BLD AUTO: 73 % (ref 43–75)
NITRITE UR QL STRIP: NEGATIVE
NRBC BLD AUTO-RTO: 0 /100 WBCS
PH UR STRIP.AUTO: 6 [PH]
PLATELET # BLD AUTO: 206 THOUSANDS/UL (ref 149–390)
PMV BLD AUTO: 10.2 FL (ref 8.9–12.7)
POTASSIUM SERPL-SCNC: 4 MMOL/L (ref 3.5–5.3)
PROT SERPL-MCNC: 7.4 G/DL (ref 6.4–8.2)
PROT UR STRIP-MCNC: NEGATIVE MG/DL
RBC # BLD AUTO: 4.52 MILLION/UL (ref 3.88–5.62)
SODIUM SERPL-SCNC: 138 MMOL/L (ref 136–145)
SP GR UR STRIP.AUTO: 1.02 (ref 1–1.03)
TIBC SERPL-MCNC: 352 UG/DL (ref 250–450)
UROBILINOGEN UR QL STRIP.AUTO: 0.2 E.U./DL
WBC # BLD AUTO: 7.06 THOUSAND/UL (ref 4.31–10.16)

## 2022-01-05 PROCEDURE — 83550 IRON BINDING TEST: CPT

## 2022-01-05 PROCEDURE — 83036 HEMOGLOBIN GLYCOSYLATED A1C: CPT | Performed by: FAMILY MEDICINE

## 2022-01-05 PROCEDURE — 36415 COLL VENOUS BLD VENIPUNCTURE: CPT | Performed by: FAMILY MEDICINE

## 2022-01-05 PROCEDURE — 85025 COMPLETE CBC W/AUTO DIFF WBC: CPT

## 2022-01-05 PROCEDURE — 82728 ASSAY OF FERRITIN: CPT

## 2022-01-05 PROCEDURE — 83540 ASSAY OF IRON: CPT

## 2022-01-05 PROCEDURE — 81003 URINALYSIS AUTO W/O SCOPE: CPT | Performed by: FAMILY MEDICINE

## 2022-01-05 PROCEDURE — 80053 COMPREHEN METABOLIC PANEL: CPT

## 2022-01-06 LAB
EST. AVERAGE GLUCOSE BLD GHB EST-MCNC: 100 MG/DL
HBA1C MFR BLD: 5.1 %

## 2022-01-13 ENCOUNTER — OFFICE VISIT (OUTPATIENT)
Dept: FAMILY MEDICINE CLINIC | Facility: CLINIC | Age: 77
End: 2022-01-13
Payer: MEDICARE

## 2022-01-13 VITALS
OXYGEN SATURATION: 98 % | TEMPERATURE: 97.5 F | BODY MASS INDEX: 24.52 KG/M2 | RESPIRATION RATE: 17 BRPM | HEIGHT: 67 IN | WEIGHT: 156.2 LBS | HEART RATE: 63 BPM | SYSTOLIC BLOOD PRESSURE: 154 MMHG | DIASTOLIC BLOOD PRESSURE: 48 MMHG

## 2022-01-13 DIAGNOSIS — Z79.899 ENCOUNTER FOR LONG-TERM CURRENT USE OF HIGH RISK MEDICATION: ICD-10-CM

## 2022-01-13 DIAGNOSIS — I70.90 ATHEROSCLEROSIS: ICD-10-CM

## 2022-01-13 DIAGNOSIS — E11.51 TYPE 2 DIABETES MELLITUS WITH DIABETIC PERIPHERAL ANGIOPATHY WITHOUT GANGRENE, WITH LONG-TERM CURRENT USE OF INSULIN (HCC): Primary | ICD-10-CM

## 2022-01-13 DIAGNOSIS — I73.9 PERIPHERAL VASCULAR DISEASE (HCC): ICD-10-CM

## 2022-01-13 DIAGNOSIS — Z79.4 TYPE 2 DIABETES MELLITUS WITH DIABETIC PERIPHERAL ANGIOPATHY WITHOUT GANGRENE, WITH LONG-TERM CURRENT USE OF INSULIN (HCC): Primary | ICD-10-CM

## 2022-01-13 DIAGNOSIS — Z79.899 POLYPHARMACY: ICD-10-CM

## 2022-01-13 DIAGNOSIS — I10 ESSENTIAL HYPERTENSION: ICD-10-CM

## 2022-01-13 DIAGNOSIS — D50.9 IRON DEFICIENCY ANEMIA, UNSPECIFIED IRON DEFICIENCY ANEMIA TYPE: ICD-10-CM

## 2022-01-13 DIAGNOSIS — I74.3 EMBOLISM AND THROMBOSIS OF ARTERIES OF THE LOWER EXTREMITIES (HCC): ICD-10-CM

## 2022-01-13 PROCEDURE — 99215 OFFICE O/P EST HI 40 MIN: CPT | Performed by: FAMILY MEDICINE

## 2022-01-13 RX ORDER — LISINOPRIL AND HYDROCHLOROTHIAZIDE 20; 12.5 MG/1; MG/1
0.5 TABLET ORAL DAILY
Qty: 90 TABLET | Refills: 3
Start: 2022-01-13

## 2022-01-13 NOTE — PROGRESS NOTES
Assessment/Plan:75 yo male, [facility], in NAD, is well-known to me for many years presents for f/u and evaluation of the following medical issues:     F/u and eval HTN: *controlled on no meds** and not taking the lis/hctz 20/12 5, so RESUME that but take just 1/2 tab OD    F/u and eval NIDDM:  Excellent control with A1c 5 1 taking glipizide 10 b i d  And metformin 1000 b i d  And Trulicity about every other week  Relevant labs:  Hemoglobin A1C/EST AVG Glucose   Lab Results   Component Value Date    HGBA1C 5 1 01/05/2022     01/05/2022     Fasting glucose   Lab Results   Component Value Date    GLUF 74 01/05/2022       F/u and eval HLD:  Absolutely fantastic lipid profile as follows on Lipitor 40 once daily  Relevant labs:  Lipid Profile:   Lab Results   Component Value Date    CHOLESTEROL 93 11/15/2021    HDL 35 (L) 11/15/2021    TRIG 112 11/15/2021    LDLCALC 36 11/15/2021    [place] 58 11/15/2021       F/u and eval deconditioning:  Is walking and trying to exercise more     F/u weights: stable   Previous weights: Wt Readings from Last 3 Encounters:   01/13/22 70 9 kg (156 lb 3 2 oz)   12/06/21 69 9 kg (154 lb)   11/11/21 70 3 kg (155 lb)       F/u and eval polypharmacy: all meds reviewed and discussed  1  Type 2 diabetes mellitus with diabetic peripheral angiopathy without gangrene, with long-term current use of insulin ([facility] [address] )  Comments:  A1c on January 5, 2022 equal 5 1 this is best ever in past 2 years  Taking glipizide 10 mg b i d  And metformin 1000 mg b i d  And Trulicity every 2-3 weeks  Orders:  -     lisinopril-hydrochlorothiazide (PRINZIDE,ZESTORETIC) 20-12 5 MG per tablet; Take 0 5 tablets by mouth daily  -     Iron; Future  -     Ferritin; Future  -     TIBC; Future  -     Iron Saturation %; Future  -     CBC and differential; Future  -     Comprehensive metabolic panel; Future; Expected date: 01/20/2022    2   Iron deficiency anemia, unspecified iron deficiency anemia type  - lisinopril-hydrochlorothiazide (PRINZIDE,ZESTORETIC) 20-12 5 MG per tablet; Take 0 5 tablets by mouth daily  -     Iron; Future  -     Ferritin; Future  -     TIBC; Future  -     Iron Saturation %; Future  -     CBC and differential; Future  -     Comprehensive metabolic panel; Future; Expected date: 01/20/2022    3  Essential hypertension  Comments:  Not taking lisinopril/HCTZ 20/12 5 currently but will resume 1/2 tablet once daily now  Orders:  -     lisinopril-hydrochlorothiazide (PRINZIDE,ZESTORETIC) 20-12 5 MG per tablet; Take 0 5 tablets by mouth daily  -     Iron; Future  -     Ferritin; Future  -     TIBC; Future  -     Iron Saturation %; Future  -     CBC and differential; Future  -     Comprehensive metabolic panel; Future; Expected date: 01/20/2022    4  Peripheral vascular disease [facility])  Comments:  Oct 20, 2021 Dr Ricardo Frye operated bypass for better flow to the LLE    5  Encounter for long-term current use of high risk medication  Comments: Many medications reviewed including Lipitor Trulicity Feosol b i d  Glipizide metformin omeprazole p r n  And Xarelto  Orders:  -     lisinopril-hydrochlorothiazide (PRINZIDE,ZESTORETIC) 20-12 5 MG per tablet; Take 0 5 tablets by mouth daily  -     Iron; Future  -     Ferritin; Future  -     TIBC; Future  -     Iron Saturation %; Future  -     CBC and differential; Future  -     Comprehensive metabolic panel; Future; Expected date: 01/20/2022    6  Polypharmacy    7  Atherosclerosis    8  Embolism and thrombosis of arteries of the lower extremities (HCC)          Subjective:      Patient ID: Jj Dorado is a 64 y o  male  HPI    The following portions of the patient's history were reviewed and updated as appropriate: He  has a past medical history of Coronary artery disease without angina pectoris (2/25/2020), Diabetes mellitus ([address] ), DVT (deep venous thrombosis) ([facility] [address] ), and Hypertension    He   Patient Active Problem List    Diagnosis Date Noted    Embolism and thrombosis of arteries of the lower extremities ([place] [address] ) 03/17/2021    Need for vaccination 03/17/2021    Iron deficiency anemia 10/13/2020    Coronary artery disease without angina pectoris 02/25/2020    Atherosclerosis of native artery of both lower extremities with intermittent claudication ([place] [address] ) 08/16/2018    Carotid stenosis, asymptomatic, bilateral 08/16/2018    Type 2 diabetes mellitus, with long-term current use of insulin ([place] [address] ) 06/25/2018    Other hyperlipidemia 06/25/2018    Essential hypertension 06/25/2018    Peripheral vascular disease with claudication ([place] [address] ) 06/25/2018     He  has a past surgical history that includes Femoral artery stent; Coronary angioplasty with stent (01/01/2010); Cardiac catheterization (2013); Colonoscopy; Angioplasty (10/19/2021); and Femoral bypass (10/20/2021)  His family history includes No Known Problems in his father and mother  He  reports that he quit smoking about 6 years ago  His smoking use included cigarettes  He has a 60 00 pack-year smoking history  He has never used smokeless tobacco  He reports previous alcohol use  He reports that he does not use drugs    Current Outpatient Medications   Medication Sig Dispense Refill    acetaminophen (TYLENOL) 500 mg tablet Take 1,000 mg by mouth every 8 (eight) hours as needed      ASPIRIN 81 PO Take 81 mg by mouth daily      atorvastatin (LIPITOR) 40 mg tablet TAKE 1 TABLET DAILY 90 tablet 3    Dulaglutide (Trulicity) 1 5 SM/0 6RD SOPN Inject 0 5 mL (1 5 mg total) under the skin once a week 3 mL 6    Empagliflozin-metFORMIN HCl (Synjardy) 12 5-1000 MG TABS Take 1 tablet by mouth daily      ferrous sulfate 325 (65 Fe) mg tablet Take 1 tablet (325 mg total) by mouth 2 (two) times a day with meals 60 tablet 6    glipiZIDE (GLUCOTROL) 10 mg tablet TAKE 1 TABLET DAILY 90 tablet 3    hydrocortisone 2 5 % cream Apply topically 4 (four) times a day as needed for rash 30 g 0    JARDIANCE 25 MG TABS Take 25 mg by mouth every morning (Patient not taking: Reported on 12/6/2021 )      lisinopril-hydrochlorothiazide (PRINZIDE,ZESTORETIC) 20-12 5 MG per tablet Take 0 5 tablets by mouth daily 90 tablet 3    metFORMIN (GLUCOPHAGE) 1000 MG tablet TAKE 1 TABLET TWICE DAILY  WITH MEALS (Patient taking differently: 1,000 mg  ) 180 tablet 3    omeprazole (PriLOSEC) 20 mg delayed release capsule Take 1 capsule (20 mg total) by mouth daily before breakfast 30 capsule 5    rivaroxaban (XARELTO) 20 mg tablet Take 20 mg by mouth       No current facility-administered medications for this visit  Current Outpatient Medications on File Prior to Visit   Medication Sig    acetaminophen (TYLENOL) 500 mg tablet Take 1,000 mg by mouth every 8 (eight) hours as needed    ASPIRIN 81 PO Take 81 mg by mouth daily    atorvastatin (LIPITOR) 40 mg tablet TAKE 1 TABLET DAILY    Dulaglutide (Trulicity) 1 5 IO/3 0JQ SOPN Inject 0 5 mL (1 5 mg total) under the skin once a week    Empagliflozin-metFORMIN HCl (Synjardy) 12 5-1000 MG TABS Take 1 tablet by mouth daily    ferrous sulfate 325 (65 Fe) mg tablet Take 1 tablet (325 mg total) by mouth 2 (two) times a day with meals    glipiZIDE (GLUCOTROL) 10 mg tablet TAKE 1 TABLET DAILY    hydrocortisone 2 5 % cream Apply topically 4 (four) times a day as needed for rash    JARDIANCE 25 MG TABS Take 25 mg by mouth every morning (Patient not taking: Reported on 12/6/2021 )    metFORMIN (GLUCOPHAGE) 1000 MG tablet TAKE 1 TABLET TWICE DAILY  WITH MEALS (Patient taking differently: 1,000 mg  )    omeprazole (PriLOSEC) 20 mg delayed release capsule Take 1 capsule (20 mg total) by mouth daily before breakfast    rivaroxaban (XARELTO) 20 mg tablet Take 20 mg by mouth     No current facility-administered medications on file prior to visit  He has No Known Allergies       Review of Systems   Constitutional: Negative for activity change, appetite change, chills, diaphoresis, fatigue, fever and unexpected weight change  HENT: Negative for congestion, dental problem, drooling, ear discharge, ear pain, facial swelling, mouth sores, nosebleeds, postnasal drip, rhinorrhea, trouble swallowing and voice change  Eyes: Negative for photophobia, pain, discharge, redness, itching and visual disturbance  Respiratory: Negative for apnea, cough, choking, chest tightness and shortness of breath  Denies any and all respiratory issues - SOB, orthopnea, etc    Cardiovascular: Negative for chest pain and leg swelling  I have carefully question patient and his wife regarding any kind of anginal symptoms chest pain tightness heaviness pressure etc  given his severe degree of peripheral vascular disease, certainly cardiovascular disease may well exist  He denies any angina    He does have significant intermittent claudication both lower extremities stating his circulation was only 25% of what it should  That has changed now, s/p surgery  He will continue to  follow-up with vascular surgeon  Gastrointestinal: Negative for abdominal distention, abdominal pain, constipation, diarrhea and nausea  Endocrine: Negative for polydipsia, polyphagia and polyuria  Genitourinary: Negative for decreased urine volume, difficulty urinating, dysuria, enuresis and hematuria  Musculoskeletal: Positive for arthralgias  Negative for back pain, gait problem and joint swelling  Skin: Negative for color change (color much improved over last OV), pallor, rash and wound  Allergic/Immunologic: Negative for immunocompromised state  Neurological: Negative for dizziness, seizures, syncope, facial asymmetry, speech difficulty, light-headedness and headaches  Hematological: Negative for adenopathy  Psychiatric/Behavioral: Negative for agitation, behavioral problems, confusion and decreased concentration  Dysphoric mood: normal concern/depression of everything he's gone thru           Objective:      BP (!) 154/48 (BP Location: Left arm, Patient Position: Sitting) Comment: 153/54 Right Arm/Sitting  Pulse 63   Temp 97 5 °F (36 4 °C)   Resp 17   Ht 5' 7" (1 702 m)   Wt 70 9 kg (156 lb 3 2 oz)   SpO2 98%   BMI 24 46 kg/m²          Physical Exam  Vitals and nursing note reviewed  Exam conducted with a chaperone present (Wife present throughout the encounter)  Constitutional:       Appearance: Normal appearance  He is well-developed and normal weight  He is not diaphoretic  Comments: Patient looks and feels fairly well despite hemoglobin 11 2 and ferritin 7--what appears to be marked iron deficiency anemia  Worried about occult GI neoplasm??   HENT:      Head: Normocephalic and atraumatic  Right Ear: Ear canal and external ear normal  There is no impacted cerumen  Left Ear: Ear canal and external ear normal  There is no impacted cerumen  Ears:      Comments: Sl wax in the canal on the R ear - the one in question  TM somewhat retracted  Suspect: middle ear syndrome/pressure  Plan: gargling and Flonase- fully discussed  Nose: Nose normal    Eyes:      General:         Right eye: No discharge  Left eye: No discharge  Extraocular Movements: Extraocular movements intact  Conjunctiva/sclera: Conjunctivae normal       Pupils: Pupils are equal, round, and reactive to light  Neck:      Trachea: No tracheal deviation  Cardiovascular:      Rate and Rhythm: Normal rate and regular rhythm  Heart sounds: Murmur heard  Pulmonary:      Effort: Pulmonary effort is normal       Breath sounds: Normal breath sounds  No wheezing, rhonchi or rales (slight bibasilar rales and retained pul secretions)  Chest:      Chest wall: No tenderness  Abdominal:      General: Abdomen is flat  Bowel sounds are normal       Palpations: Abdomen is soft  Tenderness: There is no abdominal tenderness  There is no guarding or rebound  Musculoskeletal:         General: Normal range of motion        Cervical back: Normal range of motion and neck supple  Right lower leg: No edema  Left lower leg: No edema  Lymphadenopathy:      Cervical: No cervical adenopathy  Skin:     General: Skin is warm and dry  Findings: No bruising or erythema  Comments: Somewhat sallow color, with slight bronzed Mediterranean hue--not particularly healthy looking, but typical of a COPD smoker although he has stop smoking several years ago   Neurological:      General: No focal deficit present  Mental Status: He is alert and oriented to person, place, and time  Mental status is at baseline  Cranial Nerves: No cranial nerve deficit  Sensory: No sensory deficit  Motor: No weakness  Coordination: Coordination normal       Gait: Gait normal    Psychiatric:         Mood and Affect: Mood normal          Behavior: Behavior normal          Thought Content: Thought content normal          Judgment: Judgment normal          45 min with pt and his wife today, in uninterruped time  Pt improving nicely, sp vasc surg on oct 20  Gave up smoking 17 yrs ago     Comorbidities addressed herein increase the amount and complexity of the data evaluated by me and pose a risk of complications and /or morbidity re pt management  It is my role to address these issues with the pt, and family if present, such that their understanding of the key problems and issues listed and discussed  are understood  This I do through thorough explaination, both verbally, and with illustrations, if  available  In addition, considerable time was spent by me in reviewing all tests, consults from speciality physicians, ordering medications, and determining the best tests to be ordered for this patient's medical condition

## 2022-01-13 NOTE — PATIENT INSTRUCTIONS
Hypoglycemia in a Person with Diabetes   WHAT YOU NEED TO KNOW:   Hypoglycemia is a serious condition that happens when your blood glucose (sugar) level drops too low  The blood sugar level is usually too high in a person with diabetes, but the level can also drop too low  It is important to follow your diabetes management plan to keep your blood sugar level steady  DISCHARGE INSTRUCTIONS:   You or someone close to you needs to call the local emergency number (911 in the [facility]) if:   · You have a seizure or pass out  · Your blood sugar is less than 50 mg/dL and does not respond to treatment  · You feel you are going to pass out  · You have trouble thinking clearly  Call your diabetes care team if:   · You have had symptoms of low blood sugar several times  · You have questions about the amount of insulin or diabetes medicine you are taking  · You have questions or concerns about your condition or care  Medicines:   · Insulin or diabetes medicine  help to keep your blood sugar under control  · Glucagon  may be needed if you have severe hypoglycemia  · Take your medicine as directed  Contact your healthcare provider if you think your medicine is not helping or if you have side effects  Tell him or her if you are allergic to any medicine  Keep a list of the medicines, vitamins, and herbs you take  Include the amounts, and when and why you take them  Bring the list or the pill bottles to follow-up visits  Carry your medicine list with you in case of an emergency  Manage hypoglycemia:   · Check your blood sugar level right away if you have symptoms of hypoglycemia  Hypoglycemia usually happens when your blood sugar level is 70 mg/dL or below  Ask your diabetes care team what blood sugar level is too low for you  · If your blood sugar level is too low, eat or drink 15 grams of fast-acting carbohydrate    Examples of this amount of fast-acting carbohydrate are 4 ounces (½ cup) of fruit juice or 4 ounces of regular soda  Other examples are 2 tablespoons of raisins or 1 tube of glucose gel  Check your blood sugar level 15 minutes later  Sit still as you wait  If the level is still low (less than 100 mg/dL), have another 15 grams of carbohydrate  When the level returns to 100 mg/dL, eat a meal if it is time  If your meal time is more than 1 hour away, eat a snack  The snack should contain carbohydrates, such as the following:     ? 3/4 cup of cereal    ? 1 cup of skim or low fat milk    ? 6 soda crackers    ? 1/2 of a turkey sandwich    ? 15 fat-free chips  This will help prevent another drop in blood sugar  Always carefully follow your diabetes care team's instructions on how to treat low blood sugar levels  · Always carry a source of fast-acting carbohydrate  If you have symptoms of hypoglycemia and you do not have a blood glucose meter, have a source of fast-acting carbohydrate anyway  Avoid carbohydrate foods that are high in fat  The fat content may make the carbohydrate take longer to increase your blood sugar level  Ask your diabetes care team if you should carry a glucagon kit  Glucagon is a medicine that is injected when you develop severe hypoglycemia and become unconscious  Check the expiration date every month and replace it before it expires  · Teach others how to help you if you have symptoms of hypoglycemia  Tell them about the symptoms of hypoglycemia  Ask them to give you a source of fast-acting carbohydrate if you cannot get it yourself  Ask them to give you a glucagon injection if you have signs of hypoglycemia and you become unconscious or have a seizure  Ask them to call the local emergency number (911 in the [address] [place] [address],[address])   This is an emergency  Tell them never to try to make you swallow anything if you faint or have a seizure  · Wear medical alert jewelry  or carry a card that says you have diabetes  Ask where to get these items         Prevent hypoglycemia:   · Take diabetes medicine as directed  Take your medicine at the right time and in the right amount  Do not  double the amount of medicine you take unless instructed by your diabetes care team      · Eat regular meals and snacks  Talk to your dietitian or diabetes care team about a meal plan that is right for you  Do not skip meals  · Check your blood sugar level as directed  Ask your diabetes care team what your blood sugar levels should be before and after you eat  Ask when and how often to check your blood sugar level  You may need to check at least 3 times each day  Record your blood sugar level results and take the record with you when you see your care team  Changes may need to be made to your medicine, food, or exercise schedules using the record  · Check your blood sugar level before you exercise  Physical activity, such as exercise, can decrease your blood sugar level  If your blood sugar level is less than 100 mg/dL, have a carbohydrate snack  Examples are 4 to 6 crackers, ½ banana, 8 ounces (1 cup) of nonfat or 1% milk, or 4 ounces (½ cup) of juice  If you will be active for more than 1 hour, you may need to check your blood sugar level every 30 minutes  Your diabetes care team may also recommend that you check your blood sugar level after your activity  · Know the risks if you choose to drink alcohol  Alcohol can cause your blood sugar levels to be low if you use insulin  Alcohol can cause high blood sugar levels and weight gain if you drink too much  Women 21 years or older and men 72 years or older should limit alcohol to 1 drink a day  Men aged 24 to 59 years should limit alcohol to 2 drinks a day  A drink of alcohol is 12 ounces of beer, 5 ounces of wine, or 1½ ounces of liquor  Follow up with your diabetes care team or specialist as directed: You may need your insulin or diabetes medicine changed if you continue to have hypoglycemia episodes   Write down your questions so you remember to ask them during your visits  © Copyright [employer] 2021 Information is for End User's use only and may not be sold, redistributed or otherwise used for commercial purposes  All illustrations and images included in CareNotes® are the copyrighted property of [employer]  or [employer]  The above information is an  only  It is not intended as medical advice for individual conditions or treatments  Talk to your doctor, nurse or pharmacist before following any medical regimen to see if it is safe and effective for you

## 2022-02-06 DIAGNOSIS — E11.51 TYPE 2 DIABETES MELLITUS WITH DIABETIC PERIPHERAL ANGIOPATHY WITHOUT GANGRENE, WITH LONG-TERM CURRENT USE OF INSULIN (HCC): Primary | ICD-10-CM

## 2022-02-06 DIAGNOSIS — D50.9 IRON DEFICIENCY ANEMIA, UNSPECIFIED IRON DEFICIENCY ANEMIA TYPE: ICD-10-CM

## 2022-02-06 DIAGNOSIS — Z79.4 TYPE 2 DIABETES MELLITUS WITH DIABETIC PERIPHERAL ANGIOPATHY WITHOUT GANGRENE, WITH LONG-TERM CURRENT USE OF INSULIN (HCC): Primary | ICD-10-CM

## 2022-02-07 ENCOUNTER — APPOINTMENT (OUTPATIENT)
Dept: LAB | Facility: CLINIC | Age: 77
End: 2022-02-07
Payer: MEDICARE

## 2022-02-07 DIAGNOSIS — D50.9 IRON DEFICIENCY ANEMIA, UNSPECIFIED IRON DEFICIENCY ANEMIA TYPE: ICD-10-CM

## 2022-02-07 DIAGNOSIS — Z79.4 TYPE 2 DIABETES MELLITUS WITH DIABETIC PERIPHERAL ANGIOPATHY WITHOUT GANGRENE, WITH LONG-TERM CURRENT USE OF INSULIN (HCC): ICD-10-CM

## 2022-02-07 DIAGNOSIS — Z79.899 ENCOUNTER FOR LONG-TERM (CURRENT) USE OF MEDICATIONS: ICD-10-CM

## 2022-02-07 DIAGNOSIS — I10 ESSENTIAL HYPERTENSION: ICD-10-CM

## 2022-02-07 DIAGNOSIS — Z79.899 ENCOUNTER FOR LONG-TERM CURRENT USE OF HIGH RISK MEDICATION: ICD-10-CM

## 2022-02-07 DIAGNOSIS — E11.51 TYPE 2 DIABETES MELLITUS WITH DIABETIC PERIPHERAL ANGIOPATHY WITHOUT GANGRENE, WITH LONG-TERM CURRENT USE OF INSULIN (HCC): ICD-10-CM

## 2022-02-07 LAB
ALBUMIN SERPL BCP-MCNC: 3.8 G/DL (ref 3.5–5)
ALP SERPL-CCNC: 68 U/L (ref 46–116)
ALT SERPL W P-5'-P-CCNC: 20 U/L (ref 12–78)
ANION GAP SERPL CALCULATED.3IONS-SCNC: 9 MMOL/L (ref 4–13)
AST SERPL W P-5'-P-CCNC: 13 U/L (ref 5–45)
BASOPHILS # BLD AUTO: 0.03 THOUSANDS/ΜL (ref 0–0.1)
BASOPHILS NFR BLD AUTO: 1 % (ref 0–1)
BILIRUB SERPL-MCNC: 0.62 MG/DL (ref 0.2–1)
BUN SERPL-MCNC: 23 MG/DL (ref 5–25)
CALCIUM SERPL-MCNC: 9.8 MG/DL (ref 8.3–10.1)
CHLORIDE SERPL-SCNC: 105 MMOL/L (ref 100–108)
CO2 SERPL-SCNC: 23 MMOL/L (ref 21–32)
CREAT SERPL-MCNC: 1 MG/DL (ref 0.6–1.3)
CREAT UR-MCNC: 71.6 MG/DL
EOSINOPHIL # BLD AUTO: 0.53 THOUSAND/ΜL (ref 0–0.61)
EOSINOPHIL NFR BLD AUTO: 8 % (ref 0–6)
ERYTHROCYTE [DISTWIDTH] IN BLOOD BY AUTOMATED COUNT: 16.1 % (ref 11.6–15.1)
FERRITIN SERPL-MCNC: 17 NG/ML (ref 8–388)
GFR SERPL CREATININE-BSD FRML MDRD: 71 ML/MIN/1.73SQ M
GLUCOSE P FAST SERPL-MCNC: 97 MG/DL (ref 65–99)
HCT VFR BLD AUTO: 39.5 % (ref 36.5–49.3)
HEMOCCULT STL QL IA: POSITIVE
HGB BLD-MCNC: 12.5 G/DL (ref 12–17)
IMM GRANULOCYTES # BLD AUTO: 0.03 THOUSAND/UL (ref 0–0.2)
IMM GRANULOCYTES NFR BLD AUTO: 1 % (ref 0–2)
IRON SATN MFR SERPL: 11 % (ref 20–50)
IRON SERPL-MCNC: 39 UG/DL (ref 65–175)
LYMPHOCYTES # BLD AUTO: 1.26 THOUSANDS/ΜL (ref 0.6–4.47)
LYMPHOCYTES NFR BLD AUTO: 20 % (ref 14–44)
MCH RBC QN AUTO: 28.7 PG (ref 26.8–34.3)
MCHC RBC AUTO-ENTMCNC: 31.6 G/DL (ref 31.4–37.4)
MCV RBC AUTO: 91 FL (ref 82–98)
MICROALBUMIN UR-MCNC: 11.7 MG/L (ref 0–20)
MICROALBUMIN/CREAT 24H UR: 16 MG/G CREATININE (ref 0–30)
MONOCYTES # BLD AUTO: 0.51 THOUSAND/ΜL (ref 0.17–1.22)
MONOCYTES NFR BLD AUTO: 8 % (ref 4–12)
NEUTROPHILS # BLD AUTO: 4.09 THOUSANDS/ΜL (ref 1.85–7.62)
NEUTS SEG NFR BLD AUTO: 62 % (ref 43–75)
NRBC BLD AUTO-RTO: 0 /100 WBCS
PLATELET # BLD AUTO: 209 THOUSANDS/UL (ref 149–390)
PMV BLD AUTO: 10.8 FL (ref 8.9–12.7)
POTASSIUM SERPL-SCNC: 4.2 MMOL/L (ref 3.5–5.3)
PROT SERPL-MCNC: 7.6 G/DL (ref 6.4–8.2)
RBC # BLD AUTO: 4.35 MILLION/UL (ref 3.88–5.62)
SODIUM SERPL-SCNC: 137 MMOL/L (ref 136–145)
TIBC SERPL-MCNC: 347 UG/DL (ref 250–450)
WBC # BLD AUTO: 6.45 THOUSAND/UL (ref 4.31–10.16)

## 2022-02-07 PROCEDURE — 82728 ASSAY OF FERRITIN: CPT

## 2022-02-07 PROCEDURE — G0328 FECAL BLOOD SCRN IMMUNOASSAY: HCPCS

## 2022-02-07 PROCEDURE — 83540 ASSAY OF IRON: CPT

## 2022-02-07 PROCEDURE — 36415 COLL VENOUS BLD VENIPUNCTURE: CPT

## 2022-02-07 PROCEDURE — 80053 COMPREHEN METABOLIC PANEL: CPT

## 2022-02-07 PROCEDURE — 82570 ASSAY OF URINE CREATININE: CPT | Performed by: FAMILY MEDICINE

## 2022-02-07 PROCEDURE — 85025 COMPLETE CBC W/AUTO DIFF WBC: CPT

## 2022-02-07 PROCEDURE — 83550 IRON BINDING TEST: CPT

## 2022-02-07 PROCEDURE — 83036 HEMOGLOBIN GLYCOSYLATED A1C: CPT

## 2022-02-07 PROCEDURE — 82043 UR ALBUMIN QUANTITATIVE: CPT | Performed by: FAMILY MEDICINE

## 2022-02-08 DIAGNOSIS — K92.1 MELENA: Primary | ICD-10-CM

## 2022-02-08 DIAGNOSIS — I73.9 PERIPHERAL VASCULAR DISEASE (HCC): ICD-10-CM

## 2022-02-08 LAB
EST. AVERAGE GLUCOSE BLD GHB EST-MCNC: 114 MG/DL
HBA1C MFR BLD: 5.6 %

## 2022-02-08 RX ORDER — SUCRALFATE 1 G/1
1 TABLET ORAL 4 TIMES DAILY
Qty: 120 TABLET | Refills: 3 | Status: SHIPPED | OUTPATIENT
Start: 2022-02-08 | End: 2022-05-02

## 2022-02-08 RX ORDER — CLOPIDOGREL BISULFATE 75 MG/1
75 TABLET ORAL DAILY
Qty: 30 TABLET | Refills: 1 | Status: SHIPPED | OUTPATIENT
Start: 2022-02-08 | End: 2022-03-02

## 2022-03-02 DIAGNOSIS — I73.9 PERIPHERAL VASCULAR DISEASE (HCC): ICD-10-CM

## 2022-03-02 RX ORDER — CLOPIDOGREL BISULFATE 75 MG/1
TABLET ORAL
Qty: 30 TABLET | Refills: 1 | Status: SHIPPED | OUTPATIENT
Start: 2022-03-02 | End: 2022-03-30

## 2022-03-20 DIAGNOSIS — I73.9 PERIPHERAL VASCULAR DISEASE (HCC): Primary | ICD-10-CM

## 2022-03-21 ENCOUNTER — APPOINTMENT (OUTPATIENT)
Dept: LAB | Facility: CLINIC | Age: 77
End: 2022-03-21
Payer: MEDICARE

## 2022-03-21 DIAGNOSIS — R19.5 ABNORMAL FECES: ICD-10-CM

## 2022-03-21 DIAGNOSIS — I70.202 ATHEROSCLEROSIS OF NATIVE ARTERY OF LEFT LOWER EXTREMITY, WITH UNSPECIFIED PRESENCE OF CLINICAL MANIFESTATION (HCC): ICD-10-CM

## 2022-03-21 LAB
ANION GAP SERPL CALCULATED.3IONS-SCNC: 8 MMOL/L (ref 4–13)
BUN SERPL-MCNC: 20 MG/DL (ref 5–25)
CALCIUM SERPL-MCNC: 9.2 MG/DL (ref 8.3–10.1)
CHLORIDE SERPL-SCNC: 106 MMOL/L (ref 100–108)
CO2 SERPL-SCNC: 23 MMOL/L (ref 21–32)
CREAT SERPL-MCNC: 1.04 MG/DL (ref 0.6–1.3)
ERYTHROCYTE [DISTWIDTH] IN BLOOD BY AUTOMATED COUNT: 16.8 % (ref 11.6–15.1)
GFR SERPL CREATININE-BSD FRML MDRD: 67 ML/MIN/1.73SQ M
GLUCOSE SERPL-MCNC: 163 MG/DL (ref 65–140)
HCT VFR BLD AUTO: 36.5 % (ref 36.5–49.3)
HGB BLD-MCNC: 11.2 G/DL (ref 12–17)
MCH RBC QN AUTO: 28.6 PG (ref 26.8–34.3)
MCHC RBC AUTO-ENTMCNC: 30.7 G/DL (ref 31.4–37.4)
MCV RBC AUTO: 93 FL (ref 82–98)
PLATELET # BLD AUTO: 248 THOUSANDS/UL (ref 149–390)
PMV BLD AUTO: 10.5 FL (ref 8.9–12.7)
POTASSIUM SERPL-SCNC: 4 MMOL/L (ref 3.5–5.3)
RBC # BLD AUTO: 3.92 MILLION/UL (ref 3.88–5.62)
SODIUM SERPL-SCNC: 137 MMOL/L (ref 136–145)
WBC # BLD AUTO: 6.16 THOUSAND/UL (ref 4.31–10.16)

## 2022-03-21 PROCEDURE — 36415 COLL VENOUS BLD VENIPUNCTURE: CPT

## 2022-03-21 PROCEDURE — 85027 COMPLETE CBC AUTOMATED: CPT

## 2022-03-21 PROCEDURE — 80048 BASIC METABOLIC PNL TOTAL CA: CPT

## 2022-03-30 DIAGNOSIS — I73.9 PERIPHERAL VASCULAR DISEASE (HCC): ICD-10-CM

## 2022-03-30 RX ORDER — CLOPIDOGREL BISULFATE 75 MG/1
TABLET ORAL
Qty: 30 TABLET | Refills: 1 | Status: SHIPPED | OUTPATIENT
Start: 2022-03-30 | End: 2022-04-27 | Stop reason: SDUPTHER

## 2022-04-08 RX ORDER — POLYETHYLENE GLYCOL 3350, SODIUM CHLORIDE, SODIUM BICARBONATE, POTASSIUM CHLORIDE 420; 11.2; 5.72; 1.48 G/4L; G/4L; G/4L; G/4L
POWDER, FOR SOLUTION ORAL
Status: ON HOLD | COMMUNITY
Start: 2022-03-10 | End: 2022-05-06

## 2022-04-11 ENCOUNTER — TELEPHONE (OUTPATIENT)
Dept: VASCULAR SURGERY | Facility: CLINIC | Age: 77
End: 2022-04-11

## 2022-04-11 ENCOUNTER — CONSULT (OUTPATIENT)
Dept: VASCULAR SURGERY | Facility: CLINIC | Age: 77
End: 2022-04-11
Payer: MEDICARE

## 2022-04-11 VITALS
DIASTOLIC BLOOD PRESSURE: 82 MMHG | RESPIRATION RATE: 18 BRPM | HEIGHT: 67 IN | HEART RATE: 77 BPM | WEIGHT: 150 LBS | BODY MASS INDEX: 23.54 KG/M2 | SYSTOLIC BLOOD PRESSURE: 140 MMHG

## 2022-04-11 DIAGNOSIS — I65.23 CAROTID STENOSIS, ASYMPTOMATIC, BILATERAL: ICD-10-CM

## 2022-04-11 DIAGNOSIS — I74.09 AORTOILIAC OCCLUSIVE DISEASE (HCC): Chronic | ICD-10-CM

## 2022-04-11 DIAGNOSIS — I70.213 ATHEROSCLEROSIS OF NATIVE ARTERY OF BOTH LOWER EXTREMITIES WITH INTERMITTENT CLAUDICATION (HCC): Primary | ICD-10-CM

## 2022-04-11 DIAGNOSIS — I73.9 PERIPHERAL VASCULAR DISEASE (HCC): ICD-10-CM

## 2022-04-11 PROCEDURE — 99205 OFFICE O/P NEW HI 60 MIN: CPT | Performed by: SURGERY

## 2022-04-11 NOTE — TELEPHONE ENCOUNTER
REMINDER: Under Reason For Call, comments MUST be formatted as:   (Surgeon's Initials) / (Procedure)      Special Instructions / FYI:   Note this procedure must be done in a hybrid OR secondary to possible brachial access       Procedure: Aortogram with bilateral runoff possible endovascular intervention right lower extremity via retrograde right femoral access verses left brachial access    Level: 3 - Route clearance(s) to [facility] Clearance Pool     Allergies: Patient has no known allergies  Instructions Given: Angiogram Instructions    Dialysis: Patient is not on dialysis  Return Visit Required Prior to Procedure: No     Consent: I certify that patient has signed, printed, timed, and dated their surgery consent  I certify that the patient's LEGAL NAME and DATE OF BIRTH are written in the upper left corner on BOTH sides of the consent  I certify that BOTH sides of the completed surgery consent have been scanned into the patient's Epic chart by myself on 4/11/2022  Yes, I have LABELED the consent in Epic as Consent for Vascular Procedure  For Surgical Clearances     Levels   1-3   ROUTE this encounter to [facility] Clearance Pool (AND)   [facility] Surgery Coordinator Pool     Level   4   ROUTE this encounter to [facility] Surgery Coordinator Pool       HYDRATION CLEARANCES   ONLY ROUTE TO  [facility] Clearance Pool     Patient does not require any pre operative clearance  Yes, I have ROUTED this encounter to [facility] Surgery Coordinator and/or [facility] Clearance Pool

## 2022-04-11 NOTE — ASSESSMENT & PLAN NOTE
Aortoiliac and infrainguinal arterial occlusive disease with complex history of multiple endovascular and open revascularization procedures performed at [facility]  We had a long discussion regarding the findings on CT angiogram along with the pathophysiology of aortoiliac and infrainguinal arterial occlusive disease, the indications for treatment and the treatment options available in light of his recent femoral-femoral bypass  We also discussed goals of treatment and the benefits of treating in a stepwise fashion secondary to his arterial anatomy  At this point I would not advise proceeding with a axillofemoral bypass  In this setting his main issue is a focal stenosis of the right external iliac artery which decreases perfusion to bilateral lower extremities because of his femoral-femoral bypass  This lesion may have previously been treated with balloon angioplasty but at the least this should be initially treated with angioplasty and stenting to optimize inflow  We discussed the possibility of access via a retrograde right femoral puncture and the associated risks secondary to his rib recent femoral-femoral bypass verses a left brachial access  My plan would be to proceed in a stepwise fashion with initial ultrasound evaluation and ultrasound-guided access of the right femoral artery  After discussing these options and the associated risks and benefits he has asked that we proceed with scheduling through our office  This will be set up in the near future  I will ask that he continue his current dual antiplatelet therapy

## 2022-04-11 NOTE — PROGRESS NOTES
Assessment/Plan:    Atherosclerosis of native artery of both lower extremities with intermittent claudication (HCC)  Aortoiliac and infrainguinal arterial occlusive disease with complex history of multiple endovascular and open revascularization procedures performed at [facility]  We had a long discussion regarding the findings on CT angiogram along with the pathophysiology of aortoiliac and infrainguinal arterial occlusive disease, the indications for treatment and the treatment options available in light of his recent femoral-femoral bypass  We also discussed goals of treatment and the benefits of treating in a stepwise fashion secondary to his arterial anatomy  At this point I would not advise proceeding with a axillofemoral bypass  In this setting his main issue is a focal stenosis of the right external iliac artery which decreases perfusion to bilateral lower extremities because of his femoral-femoral bypass  This lesion may have previously been treated with balloon angioplasty but at the least this should be initially treated with angioplasty and stenting to optimize inflow  We discussed the possibility of access via a retrograde right femoral puncture and the associated risks secondary to his rib recent femoral-femoral bypass verses a left brachial access  My plan would be to proceed in a stepwise fashion with initial ultrasound evaluation and ultrasound-guided access of the right femoral artery  After discussing these options and the associated risks and benefits he has asked that we proceed with scheduling through our office  This will be set up in the near future  I will ask that he continue his current dual antiplatelet therapy  Carotid stenosis, asymptomatic, bilateral  Asymptomatic mild bilateral internal carotid artery stenosis  He will continue his current medical management with planned carotid duplex follow-up in 1 year      Of note, there is antegrade flow in bilateral vertebral arteries and the left subclavian artery is widely patent without evidence of significant stenosis  Diagnoses and all orders for this visit:    Atherosclerosis of native artery of both lower extremities with intermittent claudication ([address] )  -     IR aortagram with run-off; Future    Peripheral vascular disease ([address] )  -     Ambulatory referral to Vascular Surgery    Aortoiliac occlusive disease ([facility] [address] )  -     IR aortagram with run-off; Future    Carotid stenosis, asymptomatic, bilateral  -     VAS carotid complete study; Future    Other orders  -     polyethylene glycol-electrolytes (NULYTELY) 4000 mL solution; TAKE 4000 MILLILITER AS DIRECTED, FOLLOW INSTRUCTIONS PROVIDED BY OFFICE          Subjective:      Patient ID: Tyesha Murcia is a 76 y o  male  Patient is new to our practice and is here for a second option  Pt was referred by Dr Tray Babcock  Pt had a CTA w/ runoff on 4/7/22 and JAMEL on 3/4/22  Pt c/o BLE occasional pain and numbness  Pt states that his LLE coldness and numbess after the Agram in 2021  Pt had a bypass about 2-3 years ago and stent in his legs about 20 years ago  Pt had an Agram and Rt to Lt Fem-fem bypass in October 2021  Pt is on ASA 81 mg, Plavix, and Atorvastatin  25-year-old with long history of aortoiliac and infrainguinal arterial occlusive disease initially had bilateral iliac stenting when he lived in [place]  He subsequently had follow-up and treatment at [facility] initially treated with Dr Tisha Schwartz but on follow-up had evidence of recurrent disease prompting intervention via a left brachial artery cutdown by Dr Nicki Fox  This was complicated by acute occlusion of the left external iliac artery treated with urgent right to left femoral-femoral bypass  On follow-up he unfortunately continued to have severe claudication symptoms and bilateral foot numbness    On re-evaluation at [facility] he was advised on treatment with a left axillofemoral bypass  He now presents for a 2nd opinion  On evaluation today he complains of severe bilateral calf claudication at less than half a block  He also notes a discoloration of the forefoot bilaterally and numbness which occurs at night and with ambulation  He notes there is improvement in the numbness in a dependent position  He notes no areas of tissue loss  He denies any difficulty with healing any of his wounds  He does complain of some numbness in the medial aspect of bilateral thighs following his femoral-femoral bypass  Imaging studies from [facility] include a arterial duplex from 03/04/2022  This indicates patency of the femoral-femoral bypass with bilateral femoral-popliteal occlusive disease with ankle-brachial index of 0 41 on the right and 0 44 on the left with toe pressure of 34 on the right 27 on the left  CT angiogram 04/06/2022  The report indicates a 60% right external iliac artery stenosis but on my evaluation this degree of stenosis is greater than 75% due to a focal lesion of the proximal external iliac artery  Both common iliac artery stents are patent  The femoral-femoral bypass is widely patent with patulous bilateral common femoral arteries consistent with previous femoral endarterectomy  On the right there is near occlusion of the proximal superficial femoral artery with multiple areas of moderate to severe stenosis throughout the femoral-popliteal segment  The distal popliteal artery is then patent with three-vessel runoff  The anatomy on the left is very similar with a near occlusion of the proximal superficial femoral artery with multiple areas of stenosis throughout the femoral-popliteal segment with 3 vessel runoff        The following portions of the patient's history were reviewed and updated as appropriate: allergies, current medications, past family history, past medical history, past social history, past surgical history and problem list     Past Medical History:  Past Medical History:   Diagnosis Date    Coronary artery disease without angina pectoris 2020    Diabetes mellitus ([facility] [address] )     [facility] thrombosis[facility]     Hypertension        Past Surgical History:  Past Surgical History:   Procedure Laterality Date    ANGIOPLASTY  10/19/2021    bilateral    CARDIAC CATHETERIZATION  2013    calif    COLONOSCOPY      CORONARY ANGIOPLASTY WITH STENT PLACEMENT  2010    FEMORAL ARTERY STENT      FEMORAL BYPASS  10/20/2021       Social History:  Social History     Substance and Sexual Activity   Alcohol Use Not Currently    Alcohol/week: 0 0 standard drinks    Comment: weekly     Social History     Substance and Sexual Activity   Drug Use No     Social History     Tobacco Use   Smoking Status Former Smoker    Packs/day: 1 50    Years: 40 00    Pack years: 60 00    Types: Cigarettes    Quit date: 2015    Years since quittin 8   Smokeless Tobacco Never Used   Tobacco Comment    quit --        Family History:  Family History   Problem Relation Age of Onset    No Known Problems Mother     No Known Problems Father        Allergies:  No Known Allergies    Medications:    Current Outpatient Medications:     acetaminophen (TYLENOL) 500 mg tablet, Take 1,000 mg by mouth every 8 (eight) hours as needed, Disp: , Rfl:     ASPIRIN 81 PO, Take 81 mg by mouth daily, Disp: , Rfl:     atorvastatin (LIPITOR) 40 mg tablet, TAKE 1 TABLET DAILY, Disp: 90 tablet, Rfl: 3    clopidogrel (PLAVIX) 75 mg tablet, TAKE 1 TABLET BY MOUTH EVERY DAY, Disp: 30 tablet, Rfl: 1    Dulaglutide (Trulicity) 1 5 UC/7 3MH SOPN, Inject 0 5 mL (1 5 mg total) under the skin once a week, Disp: 3 mL, Rfl: 6    Empagliflozin-metFORMIN HCl (Synjardy) 12 5-1000 MG TABS, Take 1 tablet by mouth daily, Disp: , Rfl:     ferrous sulfate 325 (65 Fe) mg tablet, Take 1 tablet (325 mg total) by mouth 2 (two) times a day with meals, Disp: 60 tablet, Rfl: 6    glipiZIDE (GLUCOTROL) 10 mg tablet, TAKE 1 TABLET DAILY, Disp: 90 tablet, Rfl: 3    hydrocortisone 2 5 % cream, Apply topically 4 (four) times a day as needed for rash, Disp: 30 g, Rfl: 0    JARDIANCE 25 MG TABS, Take 25 mg by mouth every morning  , Disp: , Rfl:     lisinopril-hydrochlorothiazide (PRINZIDE,ZESTORETIC) 20-12 5 MG per tablet, Take 0 5 tablets by mouth daily, Disp: 90 tablet, Rfl: 3    metFORMIN (GLUCOPHAGE) 1000 MG tablet, TAKE 1 TABLET TWICE DAILY  WITH MEALS (Patient taking differently: 1,000 mg  ), Disp: 180 tablet, Rfl: 3    omeprazole (PriLOSEC) 20 mg delayed release capsule, Take 1 capsule (20 mg total) by mouth daily before breakfast, Disp: 30 capsule, Rfl: 5    polyethylene glycol-electrolytes (NULYTELY) 4000 mL solution, TAKE 4000 MILLILITER AS DIRECTED, FOLLOW INSTRUCTIONS PROVIDED BY OFFICE, Disp: , Rfl:     sucralfate (CARAFATE) 1 g tablet, Take 1 tablet (1 g total) by mouth 4 (four) times a day, Disp: 120 tablet, Rfl: 3    Vitals:  BP      Temp      Pulse     Resp      SpO2        Lab Results and Cultures:   CBC with diff:   Lab Results   Component Value Date    WBC 6 16 03/21/2022    HGB 11 2 (L) 03/21/2022    HCT 36 5 03/21/2022    MCV 93 03/21/2022     03/21/2022    MCH 28 6 03/21/2022    MCHC 30 7 (L) 03/21/2022    RDW 16 8 (H) 03/21/2022    MPV 10 5 03/21/2022    NRBC 0 02/07/2022   ,   BMP/CMP:  Lab Results   Component Value Date    K 4 0 03/21/2022    K 4 3 05/29/2020     03/21/2022     05/29/2020    CO2 23 03/21/2022    CO2 23 05/29/2020    BUN 20 03/21/2022    BUN 17 05/29/2020    CREATININE 1 04 03/21/2022    CALCIUM 9 2 03/21/2022    CALCIUM 9 6 05/29/2020    AST 13 02/07/2022    AST 16 05/29/2020    ALT 20 02/07/2022    ALT 19 05/29/2020    ALKPHOS 68 02/07/2022    ALKPHOS 77 05/29/2020    EGFR 67 03/21/2022   ,   Lipid Panel: No results found for: CHOL,   Coags:   Lab Results   Component Value Date    INR 1 08 11/03/2020   ,      Review of Systems   Constitutional: Negative  HENT: Negative  Eyes: Negative  Respiratory: Negative  Cardiovascular: Negative for palpitations and leg swelling  Painful veins   Gastrointestinal: Negative  Endocrine: Negative  Genitourinary: Negative  Musculoskeletal: Positive for back pain, gait problem and myalgias  Leg pain  Leg cramping when walking   Skin: Positive for color change  Allergic/Immunologic: Negative  Neurological: Negative for dizziness, weakness and numbness  Hematological: Negative  Psychiatric/Behavioral: Negative  Objective:      /82 (BP Location: Left arm, Patient Position: Sitting)   Pulse 77   Resp 18   Ht 5' 7" (1 702 m)   Wt 68 kg (150 lb)   BMI 23 49 kg/m²          Physical Exam  Constitutional:       Appearance: Normal appearance  He is well-developed  HENT:      Head: Normocephalic and atraumatic  Eyes:      Conjunctiva/sclera: Conjunctivae normal    Neck:      Vascular: No carotid bruit or JVD  Cardiovascular:      Rate and Rhythm: Normal rate and regular rhythm  Pulses:           Carotid pulses are 2+ on the right side with bruit and 2+ on the left side  Radial pulses are 2+ on the right side and 2+ on the left side  Femoral pulses are 2+ on the right side and 2+ on the left side  Popliteal pulses are 0 on the right side and 0 on the left side  Dorsalis pedis pulses are 0 on the right side and 0 on the left side  Posterior tibial pulses are 0 on the right side and 0 on the left side  Heart sounds: Normal heart sounds, S1 normal and S2 normal  No murmur heard  Comments: Left mid brachial incision is well healed  There is an easily palpable brachial pulse at the elbow  Bilateral femoral incisions are well healed  There is a median sternotomy incision which is well-healed  Of note it appears the vein harvests was from the left leg    Pulmonary: Effort: Pulmonary effort is normal       Breath sounds: Normal breath sounds  Abdominal:      General: There is no abdominal bruit  Palpations: Abdomen is soft  There is no pulsatile mass  Tenderness: There is no abdominal tenderness  Hernia: No hernia is present  Musculoskeletal:         General: No swelling, tenderness or deformity  Normal range of motion  Cervical back: Normal range of motion and neck supple  Skin:     General: Skin is warm and dry  Coloration: Skin is not pale  Comments: There is rubor of both forefeet on dependency which become pallored with elevation  Neurological:      General: No focal deficit present  Mental Status: He is alert and oriented to person, place, and time  Sensory: No sensory deficit  Motor: No weakness  Gait: Gait normal    Psychiatric:         Mood and Affect: Mood normal          Speech: Speech normal          Behavior: Behavior normal          Operative Scheduling Information:    Hospital:  [facility] or [facility]  Note this procedure must be done in a hybrid OR secondary to possible brachial access    Physician:  Armand [address]    Surgery:   Aortogram with bilateral runoff possible endovascular intervention right lower extremity via retrograde right femoral access verses left brachial access    Urgency:  Standard:  Goal in approximately 3 weeks    Level:  Level 3: Outpatients to be scheduled for elective surgery than can be delayed up to 4 weeks without reasonable expectation of detriment to patient    Case Length:  Normal    Post-op Bed:  Outpatient    OR Table:  Discovery    Equipment Needs:  Rep: BS    Medication Instructions:  Aspirin:   Continue (do not hold)  Plavix:  Continue (do not hold)    Hydration:  No

## 2022-04-11 NOTE — ASSESSMENT & PLAN NOTE
Asymptomatic mild bilateral internal carotid artery stenosis  He will continue his current medical management with planned carotid duplex follow-up in 1 year  Of note, there is antegrade flow in bilateral vertebral arteries and the left subclavian artery is widely patent without evidence of significant stenosis

## 2022-04-11 NOTE — PATIENT INSTRUCTIONS
Atherosclerosis of native artery of both lower extremities with intermittent claudication (HCC)  Aortoiliac and infrainguinal arterial occlusive disease with complex history of multiple endovascular and open revascularization procedures performed at [facility]  We had a long discussion regarding the findings on CT angiogram along with the pathophysiology of aortoiliac and infrainguinal arterial occlusive disease, the indications for treatment and the treatment options available in light of his recent femoral-femoral bypass  We also discussed goals of treatment and the benefits of treating in a stepwise fashion secondary to his arterial anatomy  At this point I would not advise proceeding with a axillofemoral bypass  In this setting his main issue is a focal stenosis of the right external iliac artery which decreases perfusion to bilateral lower extremities because of his femoral-femoral bypass  This lesion may have previously been treated with balloon angioplasty but at the least this should be initially treated with angioplasty and stenting to optimize inflow  We discussed the possibility of access via a retrograde right femoral puncture and the associated risks secondary to his rib recent femoral-femoral bypass verses a left brachial access  My plan would be to proceed in a stepwise fashion with initial ultrasound evaluation and ultrasound-guided access of the right femoral artery  After discussing these options and the associated risks and benefits he has asked that we proceed with scheduling through our office  This will be set up in the near future  I will ask that he continue his current dual antiplatelet therapy  Carotid stenosis, asymptomatic, bilateral  Asymptomatic mild bilateral internal carotid artery stenosis  He will continue his current medical management with planned carotid duplex follow-up in 1 year      Of note, there is antegrade flow in bilateral vertebral arteries and the left subclavian artery is widely patent without evidence of significant stenosis

## 2022-04-11 NOTE — LETTER
April 11, 2022     Sahara Khoury, DO  [address]  [address] [facility]  [address]    Patient: Simeon Pineda   YOB: 1945   Date of Visit: 4/11/2022       Dear Dr Ramírez Flight: Thank you for referring Simeon Pineda to me for evaluation  Below are the relevant portions of my assessment and plan of care  Atherosclerosis of native artery of both lower extremities with intermittent claudication (HCC)  Aortoiliac and infrainguinal arterial occlusive disease with complex history of multiple endovascular and open revascularization procedures performed at [facility]  We had a long discussion regarding the findings on CT angiogram along with the pathophysiology of aortoiliac and infrainguinal arterial occlusive disease, the indications for treatment and the treatment options available in light of his recent femoral-femoral bypass  We also discussed goals of treatment and the benefits of treating in a stepwise fashion secondary to his arterial anatomy  At this point I would not advise proceeding with a axillofemoral bypass  In this setting his main issue is a focal stenosis of the right external iliac artery which decreases perfusion to bilateral lower extremities because of his femoral-femoral bypass  This lesion may have previously been treated with balloon angioplasty but at the least this should be initially treated with angioplasty and stenting to optimize inflow  We discussed the possibility of access via a retrograde right femoral puncture and the associated risks secondary to his rib recent femoral-femoral bypass verses a left brachial access  My plan would be to proceed in a stepwise fashion with initial ultrasound evaluation and ultrasound-guided access of the right femoral artery  After discussing these options and the associated risks and benefits he has asked that we proceed with scheduling through our office  This will be set up in the near future    I will ask that he continue his current dual antiplatelet therapy  Carotid stenosis, asymptomatic, bilateral  Asymptomatic mild bilateral internal carotid artery stenosis  He will continue his current medical management with planned carotid duplex follow-up in 1 year  Of note, there is antegrade flow in bilateral vertebral arteries and the left subclavian artery is widely patent without evidence of significant stenosis  If you have questions, please do not hesitate to call me  I look forward to following Alexander Khan along with you           Sincerely,        Brian Guzman MD        CC: No Recipients

## 2022-04-12 ENCOUNTER — PREP FOR PROCEDURE (OUTPATIENT)
Dept: VASCULAR SURGERY | Facility: CLINIC | Age: 77
End: 2022-04-12

## 2022-04-12 NOTE — TELEPHONE ENCOUNTER
Verified patient's insurance   CONFIRMED - Patient's insurance is Medicare  Is patient requesting a call when authorization has been obtained? Patient did not request a call  Surgery Date: 5/6/22  Primary Surgeon: Priscilla Wells // Nadine Nieves (NPI: [id number])  Assisting Surgeon: Not Applicable (N/A)  Facility: [facility] (Tax: [id number] / NPI: [id number])  Inpatient / Outpatient: Outpatient  Level: 3    Clearance Received: No clearance ordered  Consent Received: Yes, scanned into Epic on 4/11/22  Medication Hold / Last Dose: Not Applicable (N/A)  VQI Spreadsheet: Not Applicable (N/A)  IR Notified: Not Applicable (N/A)  Rep   Notified: BS notified 4/12/22  Equipment Needs: Not Applicable (N/A)  Vas Lab Requested: Not Applicable (N/A)  Patient Contacted: 4/12/22    Diagnosis: I70 [id number]  Procedure/ CPT Code(s): Angiogram // CPT: 23351, 29209 and 16776 // Procedure to take place in OR [Auth/ Cert Based]    For varicose vein related procedures:   Last LEVDR: Not Applicable (N/A)  CEAP Classification: Not Applicable (N/A)  VCSS: Not Applicable (N/A)    Post Operative Date/ Time: 5/26/22 , 9:00am [place] with Nadine Nieves (NPI: [id number])     Pt will have blood work done at [address] [place] [address] on 4/27

## 2022-04-12 NOTE — TELEPHONE ENCOUNTER
Spoke w/Max this afternoon and offered him SLB 4/27 or SLA 5/6  He requested I CB around 3-3:15pm this afternoon when his wife is home  They will need to discuss the dates

## 2022-04-12 NOTE — TELEPHONE ENCOUNTER
Authorization requirements reviewed  Please refer to Estrella Link / Ray Parlor number [id number] for case updates

## 2022-04-13 DIAGNOSIS — D50.0 IRON DEFICIENCY ANEMIA DUE TO CHRONIC BLOOD LOSS: Primary | ICD-10-CM

## 2022-04-20 ENCOUNTER — APPOINTMENT (OUTPATIENT)
Dept: LAB | Facility: CLINIC | Age: 77
End: 2022-04-20
Payer: MEDICARE

## 2022-04-20 DIAGNOSIS — D50.0 IRON DEFICIENCY ANEMIA DUE TO CHRONIC BLOOD LOSS: ICD-10-CM

## 2022-04-20 DIAGNOSIS — I70.213 ATHEROSCLEROSIS OF NATIVE ARTERY OF BOTH LOWER EXTREMITIES WITH INTERMITTENT CLAUDICATION (HCC): ICD-10-CM

## 2022-04-20 LAB
ANION GAP SERPL CALCULATED.3IONS-SCNC: 8 MMOL/L (ref 4–13)
BUN SERPL-MCNC: 21 MG/DL (ref 5–25)
CALCIUM SERPL-MCNC: 9.6 MG/DL (ref 8.3–10.1)
CHLORIDE SERPL-SCNC: 105 MMOL/L (ref 100–108)
CO2 SERPL-SCNC: 23 MMOL/L (ref 21–32)
CREAT SERPL-MCNC: 1.09 MG/DL (ref 0.6–1.3)
ERYTHROCYTE [DISTWIDTH] IN BLOOD BY AUTOMATED COUNT: 17.5 % (ref 11.6–15.1)
FERRITIN SERPL-MCNC: 37 NG/ML (ref 8–388)
GFR SERPL CREATININE-BSD FRML MDRD: 64 ML/MIN/1.73SQ M
GLUCOSE SERPL-MCNC: 207 MG/DL (ref 65–140)
HCT VFR BLD AUTO: 39.5 % (ref 36.5–49.3)
HGB BLD-MCNC: 11.8 G/DL (ref 12–17)
INR PPP: 1.12 (ref 0.84–1.19)
IRON SATN MFR SERPL: 49 % (ref 20–50)
IRON SERPL-MCNC: 178 UG/DL (ref 65–175)
MCH RBC QN AUTO: 28.4 PG (ref 26.8–34.3)
MCHC RBC AUTO-ENTMCNC: 29.9 G/DL (ref 31.4–37.4)
MCV RBC AUTO: 95 FL (ref 82–98)
PLATELET # BLD AUTO: 230 THOUSANDS/UL (ref 149–390)
PMV BLD AUTO: 10.7 FL (ref 8.9–12.7)
POTASSIUM SERPL-SCNC: 4.3 MMOL/L (ref 3.5–5.3)
PROTHROMBIN TIME: 14 SECONDS (ref 11.6–14.5)
RBC # BLD AUTO: 4.15 MILLION/UL (ref 3.88–5.62)
SODIUM SERPL-SCNC: 136 MMOL/L (ref 136–145)
TIBC SERPL-MCNC: 365 UG/DL (ref 250–450)
WBC # BLD AUTO: 6.02 THOUSAND/UL (ref 4.31–10.16)

## 2022-04-20 PROCEDURE — 36415 COLL VENOUS BLD VENIPUNCTURE: CPT

## 2022-04-20 PROCEDURE — 80048 BASIC METABOLIC PNL TOTAL CA: CPT

## 2022-04-20 PROCEDURE — 85610 PROTHROMBIN TIME: CPT

## 2022-04-20 PROCEDURE — 83550 IRON BINDING TEST: CPT

## 2022-04-20 PROCEDURE — 83540 ASSAY OF IRON: CPT

## 2022-04-20 PROCEDURE — 82728 ASSAY OF FERRITIN: CPT

## 2022-04-20 PROCEDURE — 85027 COMPLETE CBC AUTOMATED: CPT

## 2022-04-27 ENCOUNTER — OFFICE VISIT (OUTPATIENT)
Dept: FAMILY MEDICINE CLINIC | Facility: CLINIC | Age: 77
End: 2022-04-27
Payer: MEDICARE

## 2022-04-27 VITALS
HEIGHT: 67 IN | WEIGHT: 153 LBS | HEART RATE: 59 BPM | RESPIRATION RATE: 18 BRPM | TEMPERATURE: 97.6 F | OXYGEN SATURATION: 98 % | SYSTOLIC BLOOD PRESSURE: 130 MMHG | BODY MASS INDEX: 24.01 KG/M2 | DIASTOLIC BLOOD PRESSURE: 60 MMHG

## 2022-04-27 DIAGNOSIS — D50.0 IRON DEFICIENCY ANEMIA DUE TO CHRONIC BLOOD LOSS: Primary | ICD-10-CM

## 2022-04-27 DIAGNOSIS — Z79.899 ENCOUNTER FOR LONG-TERM (CURRENT) USE OF MEDICATIONS: ICD-10-CM

## 2022-04-27 DIAGNOSIS — Z79.899 ENCOUNTER FOR LONG-TERM CURRENT USE OF HIGH RISK MEDICATION: ICD-10-CM

## 2022-04-27 DIAGNOSIS — K92.1 MELENA: ICD-10-CM

## 2022-04-27 DIAGNOSIS — Z01.818 PRE-OP EVALUATION: ICD-10-CM

## 2022-04-27 DIAGNOSIS — I10 ESSENTIAL HYPERTENSION: ICD-10-CM

## 2022-04-27 DIAGNOSIS — I73.9 PERIPHERAL VASCULAR DISEASE (HCC): ICD-10-CM

## 2022-04-27 DIAGNOSIS — Z00.00 ENCOUNTER FOR MEDICARE ANNUAL WELLNESS EXAM: ICD-10-CM

## 2022-04-27 PROCEDURE — 99215 OFFICE O/P EST HI 40 MIN: CPT | Performed by: FAMILY MEDICINE

## 2022-04-27 PROCEDURE — 1123F ACP DISCUSS/DSCN MKR DOCD: CPT | Performed by: FAMILY MEDICINE

## 2022-04-27 PROCEDURE — G0439 PPPS, SUBSEQ VISIT: HCPCS | Performed by: FAMILY MEDICINE

## 2022-04-27 RX ORDER — CLOPIDOGREL BISULFATE 75 MG/1
75 TABLET ORAL DAILY
Qty: 90 TABLET | Refills: 1 | Status: SHIPPED | OUTPATIENT
Start: 2022-04-27

## 2022-04-27 NOTE — PROGRESS NOTES
Assessment and Plan:     Problem List Items Addressed This Visit        Cardiovascular and Mediastinum    Essential hypertension       Other    Iron deficiency anemia - Primary      Other Visit Diagnoses     Peripheral vascular disease ([facility] )        Relevant Medications    clopidogrel (PLAVIX) 75 mg tablet    Melena        Encounter for long-term current use of high risk medication        Encounter for long-term (current) use of medications        Pre-op evaluation        For vascular interventions by Dr Cici Servin on May 6 at [address] and Follow-up Plan: Patient was screened for depression during today's encounter  They screened negative with a PHQ-2 score of 0  Preventive health issues were discussed with patient, and age appropriate screening tests were ordered as noted in patient's After Visit Summary  Personalized health advice and appropriate referrals for health education or preventive services given if needed, as noted in patient's After Visit Summary       History of Present Illness:     Patient presents for Medicare Annual Wellness visit    Patient Care Team:  Tabitha Turk DO as PCP - General (Family Medicine)     Problem List:     Patient Active Problem List   Diagnosis    Type 2 diabetes mellitus, with long-term current use of insulin ([facility] [address] )    Other hyperlipidemia    Essential hypertension    Peripheral vascular disease with claudication (HCC)    Iron deficiency anemia    Embolism and thrombosis of arteries of the lower extremities ([address] )    Need for vaccination    Atherosclerosis of native artery of both lower extremities with intermittent claudication ([address] )    Carotid stenosis, asymptomatic, bilateral    Coronary artery disease without angina pectoris    Aortoiliac occlusive disease ([address] )      Past Medical and Surgical History:     Past Medical History:   Diagnosis Date    Coronary artery disease without angina pectoris 2/25/2020    Diabetes mellitus ([address] )  DVT (deep venous thrombosis) ([facility] [address] )     Hypertension      Past Surgical History:   Procedure Laterality Date    ANGIOPLASTY  10/19/2021    bilateral    CARDIAC CATHETERIZATION  2013    calif    COLONOSCOPY      CORONARY ANGIOPLASTY WITH STENT PLACEMENT  2010    FEMORAL ARTERY STENT      FEMORAL BYPASS  10/20/2021      Family History:     Family History   Problem Relation Age of Onset    No Known Problems Mother     No Known Problems Father       Social History:     Social History     Socioeconomic History    Marital status: /Civil Union     Spouse name: None    Number of children: None    Years of education: None    Highest education level: None   Occupational History    None   Tobacco Use    Smoking status: Former Smoker     Packs/day: 1 50     Years: 40 00     Pack years: 60 00     Types: Cigarettes     Quit date: 2015     Years since quittin 8    Smokeless tobacco: Never Used    Tobacco comment: quit --    Vaping Use    Vaping Use: Never used   Substance and Sexual Activity    Alcohol use: Not Currently     Alcohol/week: 0 0 standard drinks     Comment: weekly    Drug use: No    Sexual activity: None   Other Topics Concern    None   Social History Narrative    · Most recent tobacco use screenin2020      Social Determinants of Health     Financial Resource Strain: Not on file   Food Insecurity: Not on file   Transportation Needs: Not on file   Physical Activity: Not on file   Stress: Not on file   Social Connections: Not on file   Intimate Partner Violence: Not on file   Housing Stability: Not on file      Medications and Allergies:     Current Outpatient Medications   Medication Sig Dispense Refill    acetaminophen (TYLENOL) 500 mg tablet Take 1,000 mg by mouth every 8 (eight) hours as needed      ASPIRIN 81 PO Take 81 mg by mouth daily      atorvastatin (LIPITOR) 40 mg tablet TAKE 1 TABLET DAILY 90 tablet 3    clopidogrel (PLAVIX) 75 mg tablet Take 1 tablet (75 mg total) by mouth daily 90 tablet 1    Dulaglutide (Trulicity) 1 5 GQ/1 7ZG SOPN Inject 0 5 mL (1 5 mg total) under the skin once a week 3 mL 6    Empagliflozin-metFORMIN HCl (Synjardy) 12 5-1000 MG TABS Take 1 tablet by mouth daily      ferrous sulfate 325 (65 Fe) mg tablet Take 1 tablet (325 mg total) by mouth 2 (two) times a day with meals 60 tablet 6    glipiZIDE (GLUCOTROL) 10 mg tablet TAKE 1 TABLET DAILY 90 tablet 3    hydrocortisone 2 5 % cream Apply topically 4 (four) times a day as needed for rash 30 g 0    lisinopril-hydrochlorothiazide (PRINZIDE,ZESTORETIC) 20-12 5 MG per tablet Take 0 5 tablets by mouth daily 90 tablet 3    metFORMIN (GLUCOPHAGE) 1000 MG tablet TAKE 1 TABLET TWICE DAILY  WITH MEALS (Patient taking differently: 1,000 mg  ) 180 tablet 3    omeprazole (PriLOSEC) 20 mg delayed release capsule Take 1 capsule (20 mg total) by mouth daily before breakfast 30 capsule 5    polyethylene glycol-electrolytes (NULYTELY) 4000 mL solution TAKE 4000 MILLILITER AS DIRECTED, FOLLOW INSTRUCTIONS PROVIDED BY OFFICE      sucralfate (CARAFATE) 1 g tablet Take 1 tablet (1 g total) by mouth 4 (four) times a day 120 tablet 3     No current facility-administered medications for this visit       No Known Allergies   Immunizations:     Immunization History   Administered Date(s) Administered    COVID-19 MODERNA VACC 0 5 ML IM 03/27/2021, 04/24/2021    INFLUENZA 10/02/2018    Influenza, high dose seasonal 0 7 mL 10/13/2020, 12/06/2021      Health Maintenance:         Topic Date Due    Hepatitis C Screening  Never done    Lung Cancer Screening  11/15/2020         Topic Date Due    Pneumococcal Vaccine: 65+ Years (1 of 2 - PPSV23) Never done    DTaP,Tdap,and Td Vaccines (1 - Tdap) Never done    COVID-19 Vaccine (3 - Booster for Moderna series) 09/24/2021      Medicare Health Risk Assessment:     /60   Pulse 59   Temp 97 6 °F (36 4 °C)   Resp 18   Ht 5' 7" (1 702 m)   Wt 69 4 kg (153 lb)   SpO2 98%   BMI 23 96 kg/m²          Health Risk Assessment:   Patient rates overall health as good  Patient feels that their physical health rating is same  Patient is satisfied with their life  Eyesight was rated as same  Hearing was rated as same  Patient feels that their emotional and mental health rating is same  Patients states they are never, rarely angry  Patient states they are never, rarely unusually tired/fatigued  Pain experienced in the last 7 days has been none  Patient states that he has experienced no weight loss or gain in last 6 months  Depression Screening:   PHQ-2 Score: 0      Fall Risk Screening: In the past year, patient has experienced: no history of falling in past year      Home Safety:  Patient does not have trouble with stairs inside or outside of their home  Patient has working smoke alarms and has working carbon monoxide detector  Home safety hazards include: none  Nutrition:   Current diet is Regular  Medications:   Patient is currently taking over-the-counter supplements  OTC medications include: see medication list  Patient is able to manage medications  Activities of Daily Living (ADLs)/Instrumental Activities of Daily Living (IADLs):   Walk and transfer into and out of bed and chair?: Yes  Dress and groom yourself?: Yes    Bathe or shower yourself?: Yes    Feed yourself? Yes  Do your laundry/housekeeping?: Yes  Manage your money, pay your bills and track your expenses?: Yes  Make your own meals?: Yes    Do your own shopping?: Yes    Previous Hospitalizations:   Any hospitalizations or ED visits within the last 12 months?: Yes    How many hospitalizations have you had in the last year?: 1-2    Hospitalization Comments: 1 time for a procedure surgery    Advance Care Planning:   Living will: Yes    Durable POA for healthcare:  Yes    Advanced directive: Yes    Advanced directive counseling given: Yes    Five wishes given: Yes    Patient declined ACP directive: No    End of Life Decisions reviewed with patient: Yes    Provider agrees with end of life decisions: Yes      PREVENTIVE SCREENINGS      Cardiovascular Screening:    General: Screening Not Indicated, History Lipid Disorder and Risks and Benefits Discussed      Diabetes Screening:     General: Screening Not Indicated, History Diabetes and Risks and Benefits Discussed      Colorectal Cancer Screening:     General: Risks and Benefits Discussed      Prostate Cancer Screening:    General: Screening Not Indicated and Risks and Benefits Discussed      Osteoporosis Screening:    General: Risks and Benefits Discussed      Abdominal Aortic Aneurysm (AAA) Screening:    Risk factors include: tobacco use        General: Risks and Benefits Discussed      Lung Cancer Screening:     General: Screening Not Indicated and Risks and Benefits Discussed      Hepatitis C Screening:    General: Risks and Benefits Discussed    Hep C Screening Accepted: Yes      Screening, Brief Intervention, and Referral to Treatment (SBIRT)    Screening  Typical number of drinks in a day: 0  Typical number of drinks in a week: 0  Interpretation: Low risk drinking behavior  Single Item Drug Screening:  How often have you used an illegal drug (including marijuana) or a prescription medication for non-medical reasons in the past year? never    Single Item Drug Screen Score: 0  Interpretation: Negative screen for possible drug use disorder    Brief Intervention  Alcohol & drug use screenings were reviewed  No concerns regarding substance use disorder identified  Healthy alcohol use/limits discussed  Does not drink alcohol    Other Counseling Topics:   Car/seat belt/driving safety, skin self-exam, sunscreen and calcium and vitamin D intake and regular weightbearing exercise   For vascular surg with Dr Kolby Wade on May 6      Luis Castro,

## 2022-04-27 NOTE — PATIENT INSTRUCTIONS
Medicare Preventive Visit Patient Instructions  Thank you for completing your Welcome to Medicare Visit or Medicare Annual Wellness Visit today  Your next wellness visit will be due in one year (4/28/2023)  The screening/preventive services that you may require over the next 5-10 years are detailed below  Some tests may not apply to you based off risk factors and/or age  Screening tests ordered at today's visit but not completed yet may show as past due  Also, please note that scanned in results may not display below  Preventive Screenings:  Service Recommendations Previous Testing/Comments   Colorectal Cancer Screening  · Colonoscopy    · Fecal Occult Blood Test (FOBT)/Fecal Immunochemical Test (FIT)  · Fecal DNA/Cologuard Test  · Flexible Sigmoidoscopy Age: 54-65 years old   Colonoscopy: every 10 years (May be performed more frequently if at higher risk)  OR  FOBT/FIT: every 1 year  OR  Cologuard: every 3 years  OR  Sigmoidoscopy: every 5 years  Screening may be recommended earlier than age 48 if at higher risk for colorectal cancer  Also, an individualized decision between you and your healthcare provider will decide whether screening between the ages of 74-80 would be appropriate   Colonoscopy: 11/09/2020  FOBT/FIT: 02/07/2022  Cologuard: Not on file  Sigmoidoscopy: Not on file          Prostate Cancer Screening Individualized decision between patient and health care provider in men between ages of 53-78   Medicare will cover every 12 months beginning on the day after your 50th birthday PSA: 0 3 ng/mL     Screening Not Indicated     Hepatitis C Screening Once for adults born between 1945 and 1965  More frequently in patients at high risk for Hepatitis C Hep C Antibody: Not on file        Diabetes Screening 1-2 times per year if you're at risk for diabetes or have pre-diabetes Fasting glucose: 97 mg/dL   A1C: 5 6 %    Screening Not Indicated  History Diabetes   Cholesterol Screening Once every 5 years if you don't have a lipid disorder  May order more often based on risk factors  Lipid panel: 11/15/2021    Screening Not Indicated  History Lipid Disorder      Other Preventive Screenings Covered by Medicare:  1  Abdominal Aortic Aneurysm (AAA) Screening: covered once if your at risk  You're considered to be at risk if you have a family history of AAA or a male between the age of 73-68 who smoking at least 100 cigarettes in your lifetime  2  Lung Cancer Screening: covers low dose CT scan once per year if you meet all of the following conditions: (1) Age 50-69; (2) No signs or symptoms of lung cancer; (3) Current smoker or have quit smoking within the last 15 years; (4) You have a tobacco smoking history of at least 30 pack years (packs per day x number of years you smoked); (5) You get a written order from a healthcare provider  3  Glaucoma Screening: covered annually if you're considered high risk: (1) You have diabetes OR (2) Family history of glaucoma OR (3)  aged 48 and older OR (3)  American aged 72 and older  3  Osteoporosis Screening: covered every 2 years if you meet one of the following conditions: (1) Have a vertebral abnormality; (2) On glucocorticoid therapy for more than 3 months; (3) Have primary hyperparathyroidism; (4) On osteoporosis medications and need to assess response to drug therapy  5  HIV Screening: covered annually if you're between the age of 12-76  Also covered annually if you are younger than 13 and older than 72 with risk factors for HIV infection  For pregnant patients, it is covered up to 3 times per pregnancy      Immunizations:  Immunization Recommendations   Influenza Vaccine Annual influenza vaccination during flu season is recommended for all persons aged >= 6 months who do not have contraindications   Pneumococcal Vaccine (Prevnar and Pneumovax)  * Prevnar = PCV13  * Pneumovax = PPSV23 Adults 25-60 years old: 1-3 doses may be recommended based on certain risk factors  Adults 72 years old: Prevnar (PCV13) vaccine recommended followed by Pneumovax (PPSV23) vaccine  If already received PPSV23 since turning 65, then PCV13 recommended at least one year after PPSV23 dose  Hepatitis B Vaccine 3 dose series if at intermediate or high risk (ex: diabetes, end stage renal disease, liver disease)   Tetanus (Td) Vaccine - COST NOT COVERED BY MEDICARE PART B Following completion of primary series, a booster dose should be given every 10 years to maintain immunity against tetanus  Td may also be given as tetanus wound prophylaxis  Tdap Vaccine - COST NOT COVERED BY MEDICARE PART B Recommended at least once for all adults  For pregnant patients, recommended with each pregnancy  Shingles Vaccine (Shingrix) - COST NOT COVERED BY MEDICARE PART B  2 shot series recommended in those aged 48 and above     Health Maintenance Due:      Topic Date Due    Hepatitis C Screening  Never done    Lung Cancer Screening  11/15/2020     Immunizations Due:      Topic Date Due    Pneumococcal Vaccine: 65+ Years (1 of 2 - PPSV23) Never done    DTaP,Tdap,and Td Vaccines (1 - Tdap) Never done    COVID-19 Vaccine (3 - Booster for Lasha Grout series) 09/24/2021     Advance Directives   What are advance directives? Advance directives are legal documents that state your wishes and plans for medical care  These plans are made ahead of time in case you lose your ability to make decisions for yourself  Advance directives can apply to any medical decision, such as the treatments you want, and if you want to donate organs  What are the types of advance directives? There are many types of advance directives, and each state has rules about how to use them  You may choose a combination of any of the following:  · Living will: This is a written record of the treatment you want  You can also choose which treatments you do not want, which to limit, and which to stop at a certain time   This includes surgery, medicine, IV fluid, and tube feedings  · Durable power of  for healthcare [facility]): This is a written record that states who you want to make healthcare choices for you when you are unable to make them for yourself  This person, called a proxy, is usually a family member or a friend  You may choose more than 1 proxy  · Do not resuscitate (DNR) order:  A DNR order is used in case your heart stops beating or you stop breathing  It is a request not to have certain forms of treatment, such as CPR  A DNR order may be included in other types of advance directives  · Medical directive: This covers the care that you want if you are in a coma, near death, or unable to make decisions for yourself  You can list the treatments you want for each condition  Treatment may include pain medicine, surgery, blood transfusions, dialysis, IV or tube feedings, and a ventilator (breathing machine)  · Values history: This document has questions about your views, beliefs, and how you feel and think about life  This information can help others choose the care that you would choose  Why are advance directives important? An advance directive helps you control your care  Although spoken wishes may be used, it is better to have your wishes written down  Spoken wishes can be misunderstood, or not followed  Treatments may be given even if you do not want them  An advance directive may make it easier for your family to make difficult choices about your care  © Copyright [employer] 2018 Information is for End User's use only and may not be sold, redistributed or otherwise used for commercial purposes  All illustrations and images included in CareNotes® are the copyrighted property of [employer]  or [employer] HealthIron Deficiency Anemia   WHAT YOU NEED TO KNOW:   Iron deficiency anemia (ANG) is low levels of red blood cells and hemoglobin caused by a lack of iron in the blood  Iron helps make hemoglobin   Hemoglobin is part of your red blood cell and helps carry oxygen to your body  The most common causes are blood loss and not enough iron in the foods you eat  DISCHARGE INSTRUCTIONS:   Call 911 for any of the following:   · You have shortness of breath, even when you rest       Return to the emergency department if:   · You have dark or bloody bowel movements  · You are too dizzy to stand up  · You have trouble swallowing because of the pain in your mouth and throat  Contact your healthcare provider if:   · You have heartburn, constipation, or diarrhea  · You have nausea or are vomiting  · You are dizzy or very tired  · You have questions or concerns about your condition or care  Medicines: You may need any of the following:  · Iron or folic acid supplements  will help increase your red blood cell and hemoglobin levels  · Bowel movement softeners  may be needed if the iron supplements cause constipation  · Take your medicine as directed  Contact your healthcare provider if you think your medicine is not helping or if you have side effects  Tell him of her if you are allergic to any medicine  Keep a list of the medicines, vitamins, and herbs you take  Include the amounts, and when and why you take them  Bring the list or the pill bottles to follow-up visits  Carry your medicine list with you in case of an emergency  Eat foods rich in iron and protein:  Nuts, meat, dark leafy green vegetables, and beans are high in iron and protein  Do not drink coffee, tea, or other liquids with caffeine  Limit milk to 2 cups a day  You may need to meet with a dietitian to create the right food plan for you  Drink liquids as directed:  Liquids help prevent constipation  Ask how much liquid to drink each day and which liquids are best for you  Follow up with your healthcare provider as directed:  Write down your questions so you remember to ask them during your visits     © [address] Information is for End User's use only and may not be sold, redistributed or otherwise used for commercial purposes  All illustrations and images included in CareNotes® are the copyrighted property of [employer]  or [employer]  The above information is an  only  It is not intended as medical advice for individual conditions or treatments  Talk to your doctor, nurse or pharmacist before following any medical regimen to see if it is safe and effective for you

## 2022-04-27 NOTE — PROGRESS NOTES
Assessment/Plan:  76 y o male, in NAD, but with signif claudication of the LE's, and has had multiple vascular interventins over past few yrs, now set for vasc surg with Dr Curt Holly on Fri May 6  Depression Screening and Follow-up Plan: Patient was screened for depression during today's encounter  They screened negative with a PHQ-2 score of 0          1  Iron deficiency anemia due to chronic blood loss  Comments: Fe profile is great, improved greatly  Ready for surg on May 6    2  Peripheral vascular disease (HCC)  -     clopidogrel (PLAVIX) 75 mg tablet; Take 1 tablet (75 mg total) by mouth daily    3  Melena    4  Essential hypertension  Comments:  controlled on lisinopril/hctz 20  12 5    5  Encounter for long-term current use of high risk medication    6  Encounter for long-term (current) use of medications    7  Pre-op evaluation  Comments: For vascular interventions by Dr Curt Holly on May 6 at [facility] ON MAY 6   Obviously, given his  Assessment & Plan:  PT IS ABOUT AS STABLE AS HE WILL EVER BE FOR THE PLANNED PROCEDURE ON MAY 6   Obviously, given his long list of co-morbidities, maggie DM altho under excellent control, he is stable, but guarded  (I think he will do fine )      8  Encounter for Medicare annual wellness exam        Subjective:      Patient ID: Rojas Rivera is a 76 y o  male  HPI    The following portions of the patient's history were reviewed and updated as appropriate: He  has a past medical history of Coronary artery disease without angina pectoris (2/25/2020), Diabetes mellitus ([address] ), DVT (deep venous thrombosis) ([address] ), and Hypertension    He   Patient Active Problem List    Diagnosis Date Noted    Pre-op evaluation 05/01/2022    Aortoiliac occlusive disease ([address] ) 04/11/2022    Embolism and thrombosis of arteries of the lower extremities ([address] ) 03/17/2021    Need for vaccination 03/17/2021    Iron deficiency anemia 10/13/2020    Coronary artery disease without angina pectoris 02/25/2020    Atherosclerosis of native artery of both lower extremities with intermittent claudication ([facility] ) 08/16/2018    Carotid stenosis, asymptomatic, bilateral 08/16/2018    Type 2 diabetes mellitus, with long-term current use of insulin ([place] [address] ) 06/25/2018    Other hyperlipidemia 06/25/2018    Essential hypertension 06/25/2018    Peripheral vascular disease with claudication ([place] [address] ) 06/25/2018     He  has a past surgical history that includes Femoral artery stent; Coronary angioplasty with stent (01/01/2010); Cardiac catheterization (2013); Colonoscopy; Angioplasty (10/19/2021); and Femoral bypass (10/20/2021)  His family history includes No Known Problems in his father and mother  He  reports that he quit smoking about 6 years ago  His smoking use included cigarettes  He has a 60 00 pack-year smoking history  He has never used smokeless tobacco  He reports previous alcohol use  He reports that he does not use drugs    Current Outpatient Medications   Medication Sig Dispense Refill    acetaminophen (TYLENOL) 500 mg tablet Take 1,000 mg by mouth every 8 (eight) hours as needed      ASPIRIN 81 PO Take 81 mg by mouth daily      atorvastatin (LIPITOR) 40 mg tablet TAKE 1 TABLET DAILY 90 tablet 3    clopidogrel (PLAVIX) 75 mg tablet Take 1 tablet (75 mg total) by mouth daily 90 tablet 1    Dulaglutide (Trulicity) 1 5 ZO/6 4RR SOPN Inject 0 5 mL (1 5 mg total) under the skin once a week 3 mL 6    Empagliflozin-metFORMIN HCl (Synjardy) 12 5-1000 MG TABS Take 1 tablet by mouth daily      ferrous sulfate 325 (65 Fe) mg tablet Take 1 tablet (325 mg total) by mouth 2 (two) times a day with meals 60 tablet 6    glipiZIDE (GLUCOTROL) 10 mg tablet TAKE 1 TABLET DAILY 90 tablet 3    hydrocortisone 2 5 % cream Apply topically 4 (four) times a day as needed for rash 30 g 0    lisinopril-hydrochlorothiazide (PRINZIDE,ZESTORETIC) 20-12 5 MG per tablet Take 0 5 tablets by mouth daily 90 tablet 3    metFORMIN (GLUCOPHAGE) 1000 MG tablet TAKE 1 TABLET TWICE DAILY  WITH MEALS (Patient taking differently: 1,000 mg  ) 180 tablet 3    omeprazole (PriLOSEC) 20 mg delayed release capsule Take 1 capsule (20 mg total) by mouth daily before breakfast 30 capsule 5    polyethylene glycol-electrolytes (NULYTELY) 4000 mL solution TAKE 4000 MILLILITER AS DIRECTED, FOLLOW INSTRUCTIONS PROVIDED BY OFFICE      sucralfate (CARAFATE) 1 g tablet Take 1 tablet (1 g total) by mouth 4 (four) times a day 120 tablet 3     No current facility-administered medications for this visit       Current Outpatient Medications on File Prior to Visit   Medication Sig    acetaminophen (TYLENOL) 500 mg tablet Take 1,000 mg by mouth every 8 (eight) hours as needed    ASPIRIN 81 PO Take 81 mg by mouth daily    atorvastatin (LIPITOR) 40 mg tablet TAKE 1 TABLET DAILY    Dulaglutide (Trulicity) 1 5 RS/5 1NL SOPN Inject 0 5 mL (1 5 mg total) under the skin once a week    Empagliflozin-metFORMIN HCl (Synjardy) 12 5-1000 MG TABS Take 1 tablet by mouth daily    ferrous sulfate 325 (65 Fe) mg tablet Take 1 tablet (325 mg total) by mouth 2 (two) times a day with meals    glipiZIDE (GLUCOTROL) 10 mg tablet TAKE 1 TABLET DAILY    hydrocortisone 2 5 % cream Apply topically 4 (four) times a day as needed for rash    lisinopril-hydrochlorothiazide (PRINZIDE,ZESTORETIC) 20-12 5 MG per tablet Take 0 5 tablets by mouth daily    metFORMIN (GLUCOPHAGE) 1000 MG tablet TAKE 1 TABLET TWICE DAILY  WITH MEALS (Patient taking differently: 1,000 mg  )    omeprazole (PriLOSEC) 20 mg delayed release capsule Take 1 capsule (20 mg total) by mouth daily before breakfast    polyethylene glycol-electrolytes (NULYTELY) 4000 mL solution TAKE 4000 MILLILITER AS DIRECTED, FOLLOW INSTRUCTIONS PROVIDED BY OFFICE    sucralfate (CARAFATE) 1 g tablet Take 1 tablet (1 g total) by mouth 4 (four) times a day     No current facility-administered medications on file prior to visit  He has No Known Allergies       Review of Systems   Constitutional: Negative for activity change, appetite change, chills, diaphoresis, fatigue, fever and unexpected weight change  HENT: Negative for congestion, dental problem, drooling, ear discharge, ear pain, facial swelling, mouth sores, nosebleeds, postnasal drip, rhinorrhea, trouble swallowing and voice change  Eyes: Negative for photophobia, pain, discharge, redness, itching and visual disturbance  Respiratory: Negative for apnea, cough, choking, chest tightness and shortness of breath  Denies any and all respiratory issues - SOB, orthopnea, etc    Cardiovascular: Negative for chest pain and leg swelling  I have carefully question patient and his wife regarding any kind of anginal symptoms chest pain tightness heaviness pressure etc  given his severe degree of peripheral vascular disease, certainly cardiovascular disease may well exist  He denies any angina    He does have significant intermittent claudication both lower extremities stating his circulation was only 25% of what it should  That has changed now, s/p surgery  He will continue to  follow-up with vascular surgeon  Gastrointestinal: Negative for abdominal distention, abdominal pain, constipation, diarrhea and nausea  Endocrine: Negative for polydipsia, polyphagia and polyuria  Genitourinary: Negative for decreased urine volume, difficulty urinating, dysuria, enuresis and hematuria  Musculoskeletal: Positive for arthralgias  Negative for back pain, gait problem and joint swelling  Skin: Negative for color change (color much improved over last OV), pallor, rash and wound  Allergic/Immunologic: Negative for immunocompromised state  Neurological: Negative for dizziness, seizures, syncope, facial asymmetry, speech difficulty, light-headedness and headaches  Hematological: Negative for adenopathy  Psychiatric/Behavioral: Negative for agitation, behavioral problems, confusion and decreased concentration  Dysphoric mood: normal concern/depression of everything he's gone thru  Objective:      /60   Pulse 59   Temp 97 6 °F (36 4 °C)   Resp 18   Ht 5' 7" (1 702 m)   Wt 69 4 kg (153 lb)   SpO2 98%   BMI 23 96 kg/m²          Physical Exam  Vitals and nursing note reviewed  Exam conducted with a chaperone present (Wife present throughout the encounter)  Constitutional:       Appearance: Normal appearance  He is well-developed and normal weight  He is not diaphoretic  Comments: Patient looks and feels fairly well despite hemoglobin 11 2 and ferritin 7--what appears to be marked iron deficiency anemia  Worried about occult GI neoplasm??   HENT:      Head: Normocephalic and atraumatic  Right Ear: Ear canal and external ear normal  There is no impacted cerumen  Left Ear: Ear canal and external ear normal  There is no impacted cerumen  Ears:      Comments: Sl wax in the canal on the R ear - the one in question  TM somewhat retracted  Suspect: middle ear syndrome/pressure  Plan: gargling and Flonase- fully discussed  Nose: Nose normal    Eyes:      General:         Right eye: No discharge  Left eye: No discharge  Extraocular Movements: Extraocular movements intact  Conjunctiva/sclera: Conjunctivae normal       Pupils: Pupils are equal, round, and reactive to light  Neck:      Trachea: No tracheal deviation  Cardiovascular:      Rate and Rhythm: Normal rate and regular rhythm  Heart sounds: Murmur heard  Pulmonary:      Effort: Pulmonary effort is normal       Breath sounds: Normal breath sounds  No wheezing, rhonchi or rales (slight bibasilar rales and retained pul secretions)  Chest:      Chest wall: No tenderness  Abdominal:      General: Abdomen is flat  Bowel sounds are normal       Palpations: Abdomen is soft  Tenderness:  There is no abdominal tenderness  There is no guarding or rebound  Musculoskeletal:         General: Normal range of motion  Cervical back: Normal range of motion and neck supple  Right lower leg: No edema  Left lower leg: No edema  Lymphadenopathy:      Cervical: No cervical adenopathy  Skin:     General: Skin is warm and dry  Findings: No bruising or erythema  Comments: Somewhat sallow color, with slight bronzed Mediterranean hue--not particularly healthy looking, but typical of a COPD smoker although he has stop smoking several years ago   Neurological:      General: No focal deficit present  Mental Status: He is alert and oriented to person, place, and time  Mental status is at baseline  Cranial Nerves: No cranial nerve deficit  Sensory: No sensory deficit  Motor: No weakness  Coordination: Coordination normal       Gait: Gait normal    Psychiatric:         Mood and Affect: Mood normal          Behavior: Behavior normal          Thought Content: Thought content normal          Judgment: Judgment normal          Pt seen from noon to 12:45 with wife and all athe aove addressed,etc      NOTE: all of the above issues discussed include chronic illness(es)  with severe exacerbations OR pose threats to life or body function if not managed/monitored effectively  I am as concerned about the long term effects of these disease states, as much as the short term effects  I spent considerable time assuring that my patient  understands  I reviewed prior internal and external notes,  consults,  hospital discharges,  and any other appropriate documents  I  have discussed the management and interpretation of them as well  Again, patient expresses understanding

## 2022-04-28 ENCOUNTER — ANESTHESIA EVENT (OUTPATIENT)
Dept: PERIOP | Facility: HOSPITAL | Age: 77
End: 2022-04-28
Payer: MEDICARE

## 2022-05-01 PROBLEM — Z01.818 PRE-OP EVALUATION: Status: ACTIVE | Noted: 2022-05-01

## 2022-05-01 NOTE — ASSESSMENT & PLAN NOTE
PT IS ABOUT AS STABLE AS HE WILL EVER BE FOR THE PLANNED PROCEDURE ON MAY 6   Obviously, given his long list of co-morbidities, maggie DM altho under excellent control, he is stable, but guarded   (I think he will do fine )

## 2022-05-02 NOTE — PRE-PROCEDURE INSTRUCTIONS
Pre-Surgery Instructions:   Medication Instructions    acetaminophen (TYLENOL) 500 mg tablet Continue to take as prescribed including DOS with a small sip of water PRN    ASPIRIN 81 PO Follow instructions from surgeon, continue as prescribed    atorvastatin (LIPITOR) 40 mg tablet Continue to take as prescribed including DOS with a small sip of water, unless usually taken at night    clopidogrel (PLAVIX) 75 mg tablet Follow instructions from surgeon, continue as prescribed    Dulaglutide (Trulicity) 1 5 NA/1 4GL SOPN Continue weekly dose as prescribed    ferrous sulfate 325 (65 Fe) mg tablet continue as prescribed excluding DOS    glipiZIDE (GLUCOTROL) 10 mg tablet continue as prescribed excluding DOS    hydrocortisone 2 5 % cream continue as prescribed excluding DOS    lisinopril-hydrochlorothiazide (PRINZIDE,ZESTORETIC) 20-12 5 MG per tablet continue as prescribed excluding DOS    metFORMIN (GLUCOPHAGE) 1000 MG tablet continue as prescribed excluding DOS    omeprazole (PriLOSEC) 20 mg delayed release capsule Takes PRN, Continue to take as prescribed including DOS with a small sip of water      Patient instructed to avoid OTC vitamins and NSAIDS prior to surgery  Tylenol okay PRN  Patient instructed to continue scheduled medications excluding DOS  Patient given NPO instructions  Patient given CHG bathing instruction per protocol  Patient instructed to avoid using lotions, powders, oils, etc  DOS  Patient given up to date visitor guidelines  Patient instructed to have a ride home after surgery  Patient instructed to remove jewelry and not to bring valuables DOS  Patient informed that dentures and contact lenses will have to be removed for surgery  Patient understands he or she will receive a call the afternoon before surgery regarding an arrival time  Patient verbalized understanding of all instructions 
normal balance

## 2022-05-06 ENCOUNTER — APPOINTMENT (OUTPATIENT)
Dept: RADIOLOGY | Facility: HOSPITAL | Age: 77
End: 2022-05-06
Payer: MEDICARE

## 2022-05-06 ENCOUNTER — HOSPITAL ENCOUNTER (OUTPATIENT)
Facility: HOSPITAL | Age: 77
Setting detail: OUTPATIENT SURGERY
Discharge: HOME | End: 2022-05-06
Attending: SURGERY | Admitting: SURGERY
Payer: MEDICARE

## 2022-05-06 ENCOUNTER — ANESTHESIA (OUTPATIENT)
Dept: PERIOP | Facility: HOSPITAL | Age: 77
End: 2022-05-06
Payer: MEDICARE

## 2022-05-06 VITALS
SYSTOLIC BLOOD PRESSURE: 138 MMHG | TEMPERATURE: 97.6 F | DIASTOLIC BLOOD PRESSURE: 61 MMHG | HEIGHT: 67 IN | WEIGHT: 153.44 LBS | OXYGEN SATURATION: 95 % | HEART RATE: 57 BPM | RESPIRATION RATE: 16 BRPM | BODY MASS INDEX: 24.08 KG/M2

## 2022-05-06 DIAGNOSIS — I70.213 ATHEROSCLEROSIS OF NATIVE ARTERY OF BOTH LOWER EXTREMITIES WITH INTERMITTENT CLAUDICATION (HCC): ICD-10-CM

## 2022-05-06 DIAGNOSIS — I74.09 AORTOILIAC OCCLUSIVE DISEASE (HCC): Chronic | ICD-10-CM

## 2022-05-06 LAB
GLUCOSE SERPL-MCNC: 101 MG/DL (ref 65–140)
GLUCOSE SERPL-MCNC: 159 MG/DL (ref 65–140)
KCT BLD-ACNC: 188 SEC (ref 89–137)
SPECIMEN SOURCE: ABNORMAL

## 2022-05-06 PROCEDURE — C1893 INTRO/SHEATH, FIXED,NON-PEEL: HCPCS | Performed by: SURGERY

## 2022-05-06 PROCEDURE — 37222 PR REVASCULARIZE ILIAC ARTERY,ANGIOPLASTY, EA ADD VESSEL: CPT | Performed by: SURGERY

## 2022-05-06 PROCEDURE — 76937 US GUIDE VASCULAR ACCESS: CPT | Performed by: SURGERY

## 2022-05-06 PROCEDURE — C1725 CATH, TRANSLUMIN NON-LASER: HCPCS | Performed by: SURGERY

## 2022-05-06 PROCEDURE — C2623 CATH, TRANSLUMIN, DRUG-COAT: HCPCS | Performed by: SURGERY

## 2022-05-06 PROCEDURE — NC001 PR NO CHARGE: Performed by: SURGERY

## 2022-05-06 PROCEDURE — 85347 COAGULATION TIME ACTIVATED: CPT

## 2022-05-06 PROCEDURE — 37221 PR REVASCULARIZE ILIAC ARTERY,ANGIOPLASTY/STENT, INITIAL VESSEL: CPT | Performed by: SURGERY

## 2022-05-06 PROCEDURE — C1874 STENT, COATED/COV W/DEL SYS: HCPCS | Performed by: SURGERY

## 2022-05-06 PROCEDURE — C1887 CATHETER, GUIDING: HCPCS | Performed by: SURGERY

## 2022-05-06 PROCEDURE — C1769 GUIDE WIRE: HCPCS | Performed by: SURGERY

## 2022-05-06 PROCEDURE — C1894 INTRO/SHEATH, NON-LASER: HCPCS | Performed by: SURGERY

## 2022-05-06 PROCEDURE — 82948 REAGENT STRIP/BLOOD GLUCOSE: CPT

## 2022-05-06 PROCEDURE — NC001 PR NO CHARGE: Performed by: PHYSICIAN ASSISTANT

## 2022-05-06 DEVICE — DRUG-ELUTING VASCULAR STENT SYSTEM
Type: IMPLANTABLE DEVICE | Site: ARTERIAL | Status: FUNCTIONAL
Brand: ELUVIA™

## 2022-05-06 RX ORDER — PROPOFOL 10 MG/ML
INJECTION, EMULSION INTRAVENOUS CONTINUOUS PRN
Status: DISCONTINUED | OUTPATIENT
Start: 2022-05-06 | End: 2022-05-06

## 2022-05-06 RX ORDER — HYDROCODONE BITARTRATE AND ACETAMINOPHEN 5; 325 MG/1; MG/1
1 TABLET ORAL EVERY 6 HOURS PRN
Status: DISCONTINUED | OUTPATIENT
Start: 2022-05-06 | End: 2022-05-06 | Stop reason: HOSPADM

## 2022-05-06 RX ORDER — FENTANYL CITRATE/PF 50 MCG/ML
25 SYRINGE (ML) INJECTION
Status: DISCONTINUED | OUTPATIENT
Start: 2022-05-06 | End: 2022-05-06 | Stop reason: HOSPADM

## 2022-05-06 RX ORDER — SODIUM CHLORIDE 9 MG/ML
75 INJECTION, SOLUTION INTRAVENOUS CONTINUOUS
Status: DISPENSED | OUTPATIENT
Start: 2022-05-06 | End: 2022-05-06

## 2022-05-06 RX ORDER — PROTAMINE SULFATE 10 MG/ML
INJECTION, SOLUTION INTRAVENOUS AS NEEDED
Status: DISCONTINUED | OUTPATIENT
Start: 2022-05-06 | End: 2022-05-06

## 2022-05-06 RX ORDER — FENTANYL CITRATE 50 UG/ML
INJECTION, SOLUTION INTRAMUSCULAR; INTRAVENOUS AS NEEDED
Status: DISCONTINUED | OUTPATIENT
Start: 2022-05-06 | End: 2022-05-06

## 2022-05-06 RX ORDER — ONDANSETRON 2 MG/ML
4 INJECTION INTRAMUSCULAR; INTRAVENOUS ONCE AS NEEDED
Status: DISCONTINUED | OUTPATIENT
Start: 2022-05-06 | End: 2022-05-06 | Stop reason: HOSPADM

## 2022-05-06 RX ORDER — HYDROMORPHONE HCL/PF 1 MG/ML
0.5 SYRINGE (ML) INJECTION
Status: DISCONTINUED | OUTPATIENT
Start: 2022-05-06 | End: 2022-05-06 | Stop reason: HOSPADM

## 2022-05-06 RX ORDER — HEPARIN SODIUM 1000 [USP'U]/ML
INJECTION, SOLUTION INTRAVENOUS; SUBCUTANEOUS AS NEEDED
Status: DISCONTINUED | OUTPATIENT
Start: 2022-05-06 | End: 2022-05-06

## 2022-05-06 RX ORDER — HEPARIN SODIUM 200 [USP'U]/100ML
INJECTION, SOLUTION INTRAVENOUS
Status: COMPLETED | OUTPATIENT
Start: 2022-05-06 | End: 2022-05-06

## 2022-05-06 RX ORDER — SODIUM CHLORIDE, SODIUM LACTATE, POTASSIUM CHLORIDE, CALCIUM CHLORIDE 600; 310; 30; 20 MG/100ML; MG/100ML; MG/100ML; MG/100ML
125 INJECTION, SOLUTION INTRAVENOUS CONTINUOUS
Status: DISCONTINUED | OUTPATIENT
Start: 2022-05-06 | End: 2022-05-06 | Stop reason: HOSPADM

## 2022-05-06 RX ADMIN — FENTANYL CITRATE 25 MCG: 50 INJECTION INTRAMUSCULAR; INTRAVENOUS at 11:08

## 2022-05-06 RX ADMIN — FENTANYL CITRATE 25 MCG: 50 INJECTION INTRAMUSCULAR; INTRAVENOUS at 10:13

## 2022-05-06 RX ADMIN — HEPARIN SODIUM 1000 UNITS: 1000 INJECTION INTRAVENOUS; SUBCUTANEOUS at 10:58

## 2022-05-06 RX ADMIN — PROTAMINE SULFATE 10 MG: 10 INJECTION, SOLUTION INTRAVENOUS at 11:57

## 2022-05-06 RX ADMIN — HEPARIN SODIUM 5000 UNITS: 1000 INJECTION INTRAVENOUS; SUBCUTANEOUS at 10:29

## 2022-05-06 RX ADMIN — SODIUM CHLORIDE, SODIUM LACTATE, POTASSIUM CHLORIDE, AND CALCIUM CHLORIDE: .6; .31; .03; .02 INJECTION, SOLUTION INTRAVENOUS at 11:50

## 2022-05-06 RX ADMIN — PROPOFOL 10 MCG/KG/MIN: 10 INJECTION, EMULSION INTRAVENOUS at 09:50

## 2022-05-06 RX ADMIN — FENTANYL CITRATE 25 MCG: 50 INJECTION INTRAMUSCULAR; INTRAVENOUS at 11:49

## 2022-05-06 RX ADMIN — PROTAMINE SULFATE 10 MG: 10 INJECTION, SOLUTION INTRAVENOUS at 11:58

## 2022-05-06 RX ADMIN — FENTANYL CITRATE 25 MCG: 50 INJECTION INTRAMUSCULAR; INTRAVENOUS at 11:35

## 2022-05-06 RX ADMIN — PROTAMINE SULFATE 10 MG: 10 INJECTION, SOLUTION INTRAVENOUS at 11:56

## 2022-05-06 RX ADMIN — SODIUM CHLORIDE 0.4 MCG/KG/HR: 9 INJECTION, SOLUTION INTRAVENOUS at 09:50

## 2022-05-06 NOTE — OP NOTE
OPERATIVE REPORT  PATIENT NAME: Franco Chacon    :  1945  MRN: [id number]  Pt Location: [place] [facility]    SURGERY DATE: 2022    Surgeon(s) and Role:     * Jeannene Gosselin, MD - Primary     * Sabine Gramajo PA-C - Assisting     * Nki Bhatt DPM - Observing    Atherosclerosis of native artery of both lower extremities with intermittent claudication ([address] ) [I70 213]    Post-Op Diagnosis Codes:     * Atherosclerosis of native artery of both lower extremities with intermittent claudication ([address] ) [I[address] 213]      Procedure(s):  ULTRASOUND-GUIDED LEFT BRACHIAL ARTERY PUNCTURE, AORTOGRAM, RIGHT COMMON ILIAC ARTERY ANGIOPLASTY WITH 8 X 40 MM BALLOON, ANGIOPLASTY OF RIGHT EXTERNAL AND COMMON ILIAC ARTERY STENOSIS WITH 6 X 150 MM DRUG-ELUTING BALLOON, STENTING OF RIGHT DISTAL EXTERNAL ILIAC ARTERY STENOSIS WITH 7 X 40 MM SELF EXPANDING STENT POST DILATED WITH A 6 MM BALLOON  Flouro Time:  21 min    Contrast:  48 cc of Visipaque 320    Specimen(s):  * No specimens in log *    Estimated Blood Loss:   Minimal    Drains:  none    Anesthesia Type:   Choice    Operative Indications: Atherosclerosis of native artery of both lower extremities with intermittent claudication ([address] ) [G07430]  76year-old with history of multiple open and endovascular procedures performed at [facility] to include a right to left fem-fem bypass after failed attempts at treating a left external iliac artery stenosis/occlusion  He now presents with recurrent bilateral lower extremity claudication with findings on CT scan of severe right iliac artery stenosis  Patient had been recommended for axillofemoral bypass at [facility]  Upon 2nd opinion it was felt a endovascular option should be pursued initially  Operative Findings:  Ultrasound imaging was used to puncture the brachial artery to assure appropriate puncture in the midportion of the vessel    The vessel was relatively healthy with some mild intimal thickening  Flush aortography showed patency of the infrarenal aortic segment without stenosis  On the right there is a 50% stenosis in the previously placed proximal common iliac artery stent  There is a high-grade stenosis at the bifurcation of the common iliac artery into the proximal external iliac artery and there is a focal high-grade stenosis in the distal external iliac artery both greater than 75%  Bilateral femoral endarterectomy site and femoral-femoral bypass graft are patent with outflow via relatively small deep femoral arteries bilaterally  There is diffuse near occlusive disease of bilateral proximal superficial femoral arteries  Following a combination of angioplasty and stenting of right iliac segment there is no significant residual stenosis  8 x 40 mm angioplasty was performed of the right common iliac artery stent  6 x 150 mm angioplasty was then performed of the proximal external iliac and common iliac artery stenoses to include the iliac stent  A 7 x 40 mm self expanding drug-eluting stent was then placed in the distal external iliac artery post dilated with a 6 mm balloon without significant residual stenosis  Following removal of the 6 [place] sheath in the left brachial artery an image was obtained which showed patency of the brachial artery and bifurcation  After manual compression for 15 minutes there was an easily palpable radial and ulnar pulse  Motor and sensory function was intact  Complications:   None      Procedure and Technique:  A qualified surgical resident was  available to assist in this case  An assistant was present throughout the case to aid with manipulation and treatment using multiple endovascular devices  IV sedation was administered  The left arm was prepped and draped in the normal sterile fashion and [place] drape was placed  Ultrasound guidance was utilized to puncture the left brachial artery at the elbow    A micropuncture system was utilized  The brachial artery was directly imaged under B-mode imaging  An image was obtained  A Bentson wire was then placed and a sheath was inserted  A flush catheter was positioned at the L1 vertebral body and flush aortography was performed  The catheter was then positioned at the aortic bifurcation and oblique images were obtained of the iliac arteries  The right iliac stent and iliac segment was then cannulated with an angled Glidewire and a vertebral catheter  This was exchanged for a standard exchange length wire which was positioned in the common femoral artery  Initial angioplasty of the entire right iliac segment was carried out with a 6 x 150 mm balloon  Completion imaging was then obtained  8 x 40 mm balloon angioplasty was performed of the right common iliac artery stent  A 6 by 120 mm drug-eluting balloon angioplasty was then performed of the entire right iliac segment  Completion images were obtained to include magnified oblique images of the distal external iliac artery  A 7 x 40 mm self expanding stent was then placed and post dilated with a 6 mm balloon  Completion imaging was then obtained  All guidewires and catheters were withdrawn into the ipsilateral brachial artery  Contrast injection was then performed with the 6 [place] sheath which was then exchanged for a 4 [place] sheath  A final image was obtained  Manual compression was held for hemostasis for 15 minutes  At the conclusion of the procedure the patient feet were pink and warm with normal motor and sensory function  The left hand was also pink and warm with good motor and sensory function with easily palpable radial pulse           Vascular Quality Initiative - Peripheral Vascular Intervention     Urgency: Elective    Functional Status:  Restricted in physically strenuous activity but ambulatory and able to carry out work of a light or sedentary nature     Ambulation: Amb = independently ambulatory    Leg Symptoms    Right: Severe Claudication:  ischemic limb muscle pain that limits walking < 1 block (<300 feet or 1 football field); Ischemic Rest Pain: pain in the distal foot at rest felt to be due to limited arterial perfusion  Left: Severe Claudication:  ischemic limb muscle pain that limits walking < 1 block (<300 feet or 1 football field); Ischemic Rest Pain: pain in the distal foot at rest felt to be due to limited arterial perfusion    COVID Information  COVID Symptoms Pre-Procedure:    Treatment Delayed by Pandemic: None    Access   Number of Sites: 1     Access Site 1:     Side 1: Left    Site 1: Brachial    Access Guidance 1:U/S    Largest Sheath Size 1: 6 Fr      Closure Device 1: None   None    Procedure  Fluoro Time:  21 minutes  Contrast Volume: Visipaque 48 ml  DAP:   CO2: no  Anticoagulant: Heparin  Protamine: Yes  If Creatinine is > 1 2 or missing, JOSE ENRIQUE Prophylaxis none     Treatment Details  Indication: Occlusive Disease,    Completion Assessment  Artery 1 treated: Com+Ext Iliac  Right               Outflow: SFA, PROF, POP: 1                  Was this Site previously treated?: Yes, Stent          TASC Grade: B          Total Treated Length: 20 cm          Total Occluded Length: 0 cm          Calcification: Moderate (calcification on both sides of artery < half length of lesion)          Number of Treatment types (Devices):   3           Device 1          Treatment Type: Plain Balloon         Device 2          Treatment Type: Special Balloon,  Drug Coated Balloon                Diameter: 6 mm          Length: 150 mm              Device 3          Treatment Type: Stent,  Drug Eluting Stent                Diameter: 7 mm          Length: 40 mm            Concomitant: None          Technical result: Successful (stenosis <=30%)          Post Procedure  Procedure Complications: No      SIGNATURE: Bushra Wills MD  DATE: May 6, 2022  TIME: 12:41 PM

## 2022-05-06 NOTE — DISCHARGE INSTRUCTIONS
DISCHARGE INSTRUCTIONS  ARTERIOGRAM/ANGIOPLASTY/STENT    ACTIVITY: On the evening following the procedure, you should be mostly resting  Someone should remain with you during the evening and overnight following the procedure  On the day after your procedure, limit your activity to walking  Avoid heavy lifting (no more than 15 lbs) for the first three days  Walking up steps and normal activities may be resumed as you feel ready  You should not drive a car for at least two days following discharge from the hospital  You may ride in a car  If you have any questions regarding a particular activity, please discuss with your doctor or nurse before you are discharged  DIET:  Resume your normal diet  Drink more water than usual for the next 24 hours  PROCEDURE SITE: You may have a procedure site in your groin, arm, or foot  You may have surgical glue at your procedure site  The glue is used to cover the procedure site, assist in closure, and prevent contamination  This adhesive will darken and peel away on its own within one to two weeks  Do not pick at it  You should shower daily  Wash incision daily with soap and water, but do not rub or scrub the incision; rinse thoroughly and pat dry  Do not bathe in a tub or swim for the first 2 week following your procedure or if you have any open wounds  It is normal to have some bruising, swelling or discoloration around the procedure site  IF increasing redness, pain, or a bulge develops, call our office immediately  If present, you may remove the band-aid or steri-strips over your procedure site after two days  If you notice any active bleeding at the site, apply pressure to the site and call our office ([phone]) or [phone]  FOLLOW UP STUDIES:  Your doctor will discuss whether further treatments or follow-up studies are necessary at your first post procedure visit      FOLLOW UP APPOINTMENTS:  Making and keeping follow up appointments and ultrasound tests are important to your recovery  If you have difficulty making it to or keeping your follow up appointments, call the office  If you have increased pain, fever >101 5, increased drainage, redness or a bad smell at your surgery site, new coldness/numbness of your arm or leg, please call us immediately and GO directly to the ER  PLEASE CALL THE OFFICE IF YOU HAVE ANY QUESTIONS  [phone] Azmars Bob [phone]  [address] [facility] , [address], [place], [address]  [address], [address] [facility], 210 [facility]  [address], [facility], 98 [facility]  [address] [facility], One [facility], [facility], [address]    [address], [address]st [address], Emma Macias [address]  [facility], [address] [facility], [address] [facility], 817 [facility], 8[address] [facility]  1307 [facility], 8614 [facility], [address]  [address] [facility], 532 [facility], One [facility],[address], Mina Collazo 6  201 [facility], [place], operated by [facility], [address]  [address] [place] [address] [address]

## 2022-05-06 NOTE — ANESTHESIA PREPROCEDURE EVALUATION
Procedure:  ARTERIOGRAM; Aortogram with bilateral runoff possible endovascular intervention right lower extremity via retrograde right femoral access verses left brachial access (Right Abdomen)    Relevant Problems   CARDIO   (+) Aortoiliac occlusive disease (HCC)   (+) Carotid stenosis, asymptomatic, bilateral   (+) Coronary artery disease without angina pectoris   (+) Embolism and thrombosis of arteries of the lower extremities (HCC)   (+) Essential hypertension   (+) Other hyperlipidemia   (+) Peripheral vascular disease with claudication (HCC)      ENDO   (+) Type 2 diabetes mellitus, with long-term current use of insulin (HCC)      HEMATOLOGY   (+) Iron deficiency anemia      NEURO/PSYCH   (+) Atherosclerosis of native artery of both lower extremities with intermittent claudication [facility])        Physical Exam    Airway    Mallampati score: I  TM Distance: <3 FB  Neck ROM: full     Dental   Comment: Dentures removed and left home ,     Cardiovascular  Rhythm: regular, Rate: normal, Cardiovascular exam normal    Pulmonary  Pulmonary exam normal     Other Findings        Anesthesia Plan  ASA Score- 3     Anesthesia Type- general with ASA Monitors  Additional Monitors:   Airway Plan: ETT  Plan Factors-Exercise tolerance (METS): >4 METS  Chart reviewed  Existing labs reviewed  Patient summary reviewed  Patient is not a current smoker  Patient did not smoke on day of surgery  Obstructive sleep apnea risk education given perioperatively  Induction- intravenous  Postoperative Plan-     Informed Consent- Anesthetic plan and risks discussed with patient

## 2022-05-26 ENCOUNTER — OFFICE VISIT (OUTPATIENT)
Dept: VASCULAR SURGERY | Facility: CLINIC | Age: 77
End: 2022-05-26
Payer: MEDICARE

## 2022-05-26 VITALS
DIASTOLIC BLOOD PRESSURE: 66 MMHG | HEIGHT: 67 IN | HEART RATE: 66 BPM | BODY MASS INDEX: 24.27 KG/M2 | WEIGHT: 154.6 LBS | SYSTOLIC BLOOD PRESSURE: 182 MMHG | OXYGEN SATURATION: 99 %

## 2022-05-26 DIAGNOSIS — I74.09 AORTOILIAC OCCLUSIVE DISEASE (HCC): Chronic | ICD-10-CM

## 2022-05-26 DIAGNOSIS — I70.213 ATHEROSCLEROSIS OF NATIVE ARTERY OF BOTH LOWER EXTREMITIES WITH INTERMITTENT CLAUDICATION (HCC): Primary | ICD-10-CM

## 2022-05-26 PROCEDURE — 99214 OFFICE O/P EST MOD 30 MIN: CPT | Performed by: SURGERY

## 2022-05-26 NOTE — PATIENT INSTRUCTIONS
Atherosclerosis of native artery of both lower extremities with intermittent claudication (HCC)  Aortoiliac and infrainguinal arterial occlusive disease with severe bilateral lower extremity claudication  We discussed the findings on recent arteriogram and the subsequent treatment  Unfortunately he does not appear to have any significant improvement in his symptoms  We also discussed the potential etiologies and possibility of a multifactorial etiology to include diabetic neuropathy and musculoskeletal origins  To better elucidate the contribution of residual underlying femoral-popliteal occlusive disease, we will obtain a follow-up aortoiliac duplex and segmental pressures  Following this he will return to the office for further treatment recommendations  In the interval he will continue his current medical management for which he is already on antiplatelet and statin therapy    We also discussed the importance of an exercise/walking program

## 2022-05-26 NOTE — LETTER
May 26, 2022     Bridger Mancilla DO  [address] [facility]  194 [facility]  Professor Susan Elliott [address]    Patient: Tyesha Murcia   YOB: 1945   Date of Visit: 5/26/2022       Dear Dr Feliz Levels: Thank you for referring Tyesha Murcia to me for evaluation  Below are the relevant portions of my assessment and plan of care  Atherosclerosis of native artery of both lower extremities with intermittent claudication (HCC)  Aortoiliac and infrainguinal arterial occlusive disease with severe bilateral lower extremity claudication  We discussed the findings on recent arteriogram and the subsequent treatment  Unfortunately he does not appear to have any significant improvement in his symptoms  We also discussed the potential etiologies and possibility of a multifactorial etiology to include diabetic neuropathy and musculoskeletal origins  To better elucidate the contribution of residual underlying femoral-popliteal occlusive disease, we will obtain a follow-up aortoiliac duplex and segmental pressures  Following this he will return to the office for further treatment recommendations  In the interval he will continue his current medical management for which he is already on antiplatelet and statin therapy  We also discussed the importance of an exercise/walking program     If you have questions, please do not hesitate to call me  I look forward to following Nigel Lucia along with you           Sincerely,        Thelma Yao MD        CC: No Recipients

## 2022-05-26 NOTE — PROGRESS NOTES
Assessment/Plan:    Atherosclerosis of native artery of both lower extremities with intermittent claudication (HCC)  Aortoiliac and infrainguinal arterial occlusive disease with severe bilateral lower extremity claudication  We discussed the findings on recent arteriogram and the subsequent treatment  Unfortunately he does not appear to have any significant improvement in his symptoms  We also discussed the potential etiologies and possibility of a multifactorial etiology to include diabetic neuropathy and musculoskeletal origins  To better elucidate the contribution of residual underlying femoral-popliteal occlusive disease, we will obtain a follow-up aortoiliac duplex and segmental pressures  Following this he will return to the office for further treatment recommendations  In the interval he will continue his current medical management for which he is already on antiplatelet and statin therapy  We also discussed the importance of an exercise/walking program        Diagnoses and all orders for this visit:    Atherosclerosis of native artery of both lower extremities with intermittent claudication (HCC)  -     VAS abdominal aorta/iliacs; complete study; Future  -     VAS LIONEL & waveform analysis, multiple levels; Future    Aortoiliac occlusive disease ([facility] [address] )  -     VAS abdominal aorta/iliacs; complete study; Future  -     VAS LIONEL & waveform analysis, multiple levels; Future          Subjective:      Patient ID: Reyna Benton is a 76 y o  male  Patient presents to review the results of the aortogram he had on 5/6/2022  Pt reports bilateral calf claudication after walking less than a block, as well as tightness at his ankles  Pt also reports numbness in both legs and feet  He reports no change in symptoms since prior to the aortogram  He is taking ASA81, Plavix and Atorvastatin  He is a former smoker      57-year-old with history of multiple revascularization procedures performed at [facility] to include a right to left femoral-femoral bypass had been advised there to undergo a right axillofemoral bypass  He now presents following treatment of his inflow right iliac occlusive disease on the right  The hope was that improved inflow to the right as well as the femoral-femoral bypass with improved symptoms in both lower extremities  Unfortunately on presentation today he notes no significant improvement  He continues to complain of severe calf claudication at short distances  He also notes some discomfort in his thighs  This evidently limits his ability to walk and perform daily activities  He denies typical symptoms of rest pain  Arteriogram 05/06/2022 performed via left brachial approach showed area of moderate stenosis in the right common iliac artery and a severe stenosis in the external iliac artery both treated with balloon angioplasty and stenting of the external iliac artery stenosis  There is then wide patency of the femoral-femoral bypass with outflow via the deep femoral artery bilaterally  Bilateral superficial femoral arteries are atretic proximally and on review of previous CT angiogram have areas of segmental occlusion more distally        The following portions of the patient's history were reviewed and updated as appropriate: allergies, current medications, past family history, past medical history, past social history, past surgical history and problem list     Past Medical History:  Past Medical History:   Diagnosis Date    Coronary artery disease without angina pectoris 2/25/2020    Diabetes mellitus ([address] )     DVT (deep venous thrombosis) ([facility] [address] )     Hyperlipidemia     Hypertension        Past Surgical History:  Past Surgical History:   Procedure Laterality Date    ANGIOPLASTY  10/19/2021    bilateral    CARDIAC CATHETERIZATION  2013    calif    CABG  x4    COLONOSCOPY      COLONOSCOPY      CORONARY ANGIOPLASTY WITH STENT PLACEMENT  01/01/2010    CORONARY ARTERY BYPASS GRAFT      FEMORAL ARTERY STENT      FEMORAL BYPASS  10/20/2021    IR PELVIC ANGIOGRAM  2022    OK SLCTV CATHJ 3RD+ ORD SLCTV ABDL PEL/LXTR [facility]  2022    Procedure: ULTRASOUND-GUIDED LEFT BRACHIAL ARTERY PUNCTURE, AORTOGRAM, RIGHT COMMON ILIAC ARTERY ANGIOPLASTY WITH 8 X 40 MM BALLOON, ANGIOPLASTY OF RIGHT EXTERNAL AND COMMON ILIAC ARTERY STENOSIS WITH 6 X 150 MM DRUG-ELUTING BALLOON, STENTING OF RIGHT DISTAL EXTERNAL ILIAC ARTERY STENOSIS WITH 7 X 40 MM SELF EXPANDING STENT POST DILATED WITH A 6 MM BALLOON ;  Surgeon: Víctor Rust MD;  [place]       Social History:  Social History     Substance and Sexual Activity   Alcohol Use Not Currently    Alcohol/week: 0 0 standard drinks     Social History     Substance and Sexual Activity   Drug Use No     Social History     Tobacco Use   Smoking Status Former Smoker    Packs/day: 1 50    Years: 40 00    Pack years: 60 00    Types: Cigarettes    Quit date: 2015    Years since quittin 9   Smokeless Tobacco Never Used   Tobacco Comment    quit --        Family History:  Family History   Problem Relation Age of Onset    No Known Problems Mother     No Known Problems Father        Allergies:  No Known Allergies    Medications:    Current Outpatient Medications:     acetaminophen (TYLENOL) 500 mg tablet, Take 1,000 mg by mouth every 8 (eight) hours as needed, Disp: , Rfl:     ASPIRIN 81 PO, Take 81 mg by mouth daily, Disp: , Rfl:     atorvastatin (LIPITOR) 40 mg tablet, TAKE 1 TABLET DAILY, Disp: 90 tablet, Rfl: 3    clopidogrel (PLAVIX) 75 mg tablet, Take 1 tablet (75 mg total) by mouth daily, Disp: 90 tablet, Rfl: 1    Dulaglutide (Trulicity) 1 5 HO/4 8ZT SOPN, Inject 0 5 mL (1 5 mg total) under the skin once a week, Disp: 3 mL, Rfl: 6    ferrous sulfate 325 (65 Fe) mg tablet, Take 1 tablet (325 mg total) by mouth 2 (two) times a day with meals, Disp: 60 tablet, Rfl: 6    glipiZIDE (GLUCOTROL) 10 mg tablet, TAKE 1 TABLET DAILY, Disp: 90 tablet, Rfl: 3    hydrocortisone 2 5 % cream, Apply topically 4 (four) times a day as needed for rash, Disp: 30 g, Rfl: 0    lisinopril-hydrochlorothiazide (PRINZIDE,ZESTORETIC) 20-12 5 MG per tablet, Take 0 5 tablets by mouth daily, Disp: 90 tablet, Rfl: 3    metFORMIN (GLUCOPHAGE) 1000 MG tablet, TAKE 1 TABLET TWICE DAILY  WITH MEALS (Patient taking differently: 1,000 mg), Disp: 180 tablet, Rfl: 3    omeprazole (PriLOSEC) 20 mg delayed release capsule, Take 1 capsule (20 mg total) by mouth daily before breakfast (Patient taking differently: Take 20 mg by mouth if needed), Disp: 30 capsule, Rfl: 5    Vitals:  BP (!) 182/66 (Pt states he did not take his BP medicine this AM) (05/26/22 0851)    Temp      Pulse 66 (05/26/22 0851)   Resp      SpO2 99 % (05/26/22 0851)      Lab Results and Cultures:   CBC with diff:   Lab Results   Component Value Date    WBC 6 02 04/20/2022    HGB 11 8 (L) 04/20/2022    HCT 39 5 04/20/2022    MCV 95 04/20/2022     04/20/2022    MCH 28 4 04/20/2022    MCHC 29 9 (L) 04/20/2022    RDW 17 5 (H) 04/20/2022    MPV 10 7 04/20/2022    NRBC 0 02/07/2022   ,   BMP/CMP:  Lab Results   Component Value Date    K 4 3 04/20/2022    K 4 3 05/29/2020     04/20/2022     05/29/2020    CO2 23 04/20/2022    CO2 23 05/29/2020    BUN 21 04/20/2022    BUN 17 05/29/2020    CREATININE 1 09 04/20/2022    CALCIUM 9 6 04/20/2022    CALCIUM 9 6 05/29/2020    AST 13 02/07/2022    AST 16 05/29/2020    ALT 20 02/07/2022    ALT 19 05/29/2020    ALKPHOS 68 02/07/2022    ALKPHOS 77 05/29/2020    EGFR 64 04/20/2022   ,   Lipid Panel: No results found for: CHOL,   Coags:   Lab Results   Component Value Date    INR 1 12 04/20/2022   ,       Review of Systems   Constitutional: Negative  HENT: Negative  Eyes: Negative  Respiratory: Negative  Cardiovascular: Negative  Gastrointestinal: Negative  Endocrine: Negative  Genitourinary: Negative      Musculoskeletal: Bilateral calf claudication   Skin: Negative  Allergic/Immunologic: Negative  Neurological: Negative  Hematological: Bruises/bleeds easily  Objective:      BP (!) 182/66 (BP Location: Right arm, Patient Position: Sitting, Cuff Size: Standard) Comment: Pt states he did not take his BP medicine this AM  Pulse 66   Ht 5' 7" (1 702 m)   Wt 70 1 kg (154 lb 9 6 oz)   SpO2 99%   BMI 24 21 kg/m²          Physical Exam  Constitutional:       Appearance: Normal appearance  Cardiovascular:      Rate and Rhythm: Normal rate  Pulses:           Femoral pulses are 2+ on the right side and 2+ on the left side  Popliteal pulses are 0 on the right side and 0 on the left side  Musculoskeletal:         General: No swelling, tenderness or deformity  Normal range of motion  Skin:     General: Skin is warm  Comments: Both feet are warm and pink with a ruborous appearance consistent with dependent rubor   Neurological:      General: No focal deficit present  Mental Status: He is alert and oriented to person, place, and time  Sensory: Sensory deficit present  Motor: No weakness        Gait: Gait normal    Psychiatric:         Mood and Affect: Mood normal          Behavior: Behavior normal

## 2022-05-26 NOTE — ASSESSMENT & PLAN NOTE
Aortoiliac and infrainguinal arterial occlusive disease with severe bilateral lower extremity claudication  We discussed the findings on recent arteriogram and the subsequent treatment  Unfortunately he does not appear to have any significant improvement in his symptoms  We also discussed the potential etiologies and possibility of a multifactorial etiology to include diabetic neuropathy and musculoskeletal origins  To better elucidate the contribution of residual underlying femoral-popliteal occlusive disease, we will obtain a follow-up aortoiliac duplex and segmental pressures  Following this he will return to the office for further treatment recommendations  In the interval he will continue his current medical management for which he is already on antiplatelet and statin therapy    We also discussed the importance of an exercise/walking program

## 2022-06-20 DIAGNOSIS — H93.A1 OBJECTIVE PULSATILE TINNITUS OF RIGHT EAR: Primary | ICD-10-CM

## 2022-06-23 ENCOUNTER — HOSPITAL ENCOUNTER (OUTPATIENT)
Dept: NON INVASIVE DIAGNOSTICS | Facility: CLINIC | Age: 77
Discharge: HOME | End: 2022-06-23
Payer: MEDICARE

## 2022-06-23 ENCOUNTER — HOSPITAL ENCOUNTER (OUTPATIENT)
Dept: NON INVASIVE DIAGNOSTICS | Facility: CLINIC | Age: 77
End: 2022-06-23
Payer: MEDICARE

## 2022-06-23 DIAGNOSIS — I74.09 AORTOILIAC OCCLUSIVE DISEASE (HCC): Chronic | ICD-10-CM

## 2022-06-23 DIAGNOSIS — I70.213 ATHEROSCLEROSIS OF NATIVE ARTERY OF BOTH LOWER EXTREMITIES WITH INTERMITTENT CLAUDICATION (HCC): ICD-10-CM

## 2022-06-23 PROCEDURE — 93978 VASCULAR STUDY: CPT

## 2022-06-23 PROCEDURE — 93923 UPR/LXTR ART STDY 3+ LVLS: CPT

## 2022-06-24 PROCEDURE — 93978 VASCULAR STUDY: CPT | Performed by: SURGERY

## 2022-06-24 PROCEDURE — 93922 UPR/L XTREMITY ART 2 LEVELS: CPT | Performed by: SURGERY

## 2022-07-07 ENCOUNTER — OFFICE VISIT (OUTPATIENT)
Dept: VASCULAR SURGERY | Facility: CLINIC | Age: 77
End: 2022-07-07
Payer: MEDICARE

## 2022-07-07 VITALS
HEIGHT: 67 IN | OXYGEN SATURATION: 98 % | BODY MASS INDEX: 23.95 KG/M2 | DIASTOLIC BLOOD PRESSURE: 52 MMHG | WEIGHT: 152.6 LBS | SYSTOLIC BLOOD PRESSURE: 136 MMHG | HEART RATE: 67 BPM

## 2022-07-07 DIAGNOSIS — I70.213 ATHEROSCLEROSIS OF NATIVE ARTERY OF BOTH LOWER EXTREMITIES WITH INTERMITTENT CLAUDICATION (HCC): Primary | ICD-10-CM

## 2022-07-07 DIAGNOSIS — I74.09 AORTOILIAC OCCLUSIVE DISEASE (HCC): Chronic | ICD-10-CM

## 2022-07-07 PROCEDURE — 99214 OFFICE O/P EST MOD 30 MIN: CPT | Performed by: SURGERY

## 2022-07-07 RX ORDER — CILOSTAZOL 50 MG/1
50 TABLET ORAL 2 TIMES DAILY
Qty: 30 TABLET | Refills: 4 | Status: SHIPPED | OUTPATIENT
Start: 2022-07-07 | End: 2022-07-18

## 2022-07-07 NOTE — PATIENT INSTRUCTIONS
Atherosclerosis of native artery of both lower extremities with intermittent claudication (HCC)  Aortoiliac and infrainguinal arterial occlusive disease with history of right to left femoral-femoral bypass  Patient is now status post treatment of inflow right common and external iliac artery stenoses  On recent duplex there is no evidence of focal stenosis but unfortunately he continues to experience bilateral calf claudication which is consistent with his bilateral chronic superficial femoral artery occlusion  We discussed at length the treatment options available along with the associated risks and benefits especially in light of his previous surgery/extra anatomical bypass which does make endovascular intervention more challenging  We discussed the option of conservative management to include a walking program and Pletal therapy verses attempted endovascular intervention which would best be served with a hybrid approach with access of the femoral-femoral bypass and attempted recanalization of the superficial femoral artery  At this point we will initially try a conservative approach with follow-up imaging and office visit in 3-4 months

## 2022-07-07 NOTE — LETTER
July 7, 2022     DO Oliverio [address]  [address] [facility]  Professor Susan Elliott [address]    Patient: Roxanne Duffy   YOB: 1945   Date of Visit: 7/7/2022       Dear Dr Pipe Ramirez: Thank you for referring Roxanne Duffy to me for evaluation  Below are the relevant portions of my assessment and plan of care  Atherosclerosis of native artery of both lower extremities with intermittent claudication (HCC)  Aortoiliac and infrainguinal arterial occlusive disease with history of right to left femoral-femoral bypass  Patient is now status post treatment of inflow right common and external iliac artery stenoses  On recent duplex there is no evidence of focal stenosis but unfortunately he continues to experience bilateral calf claudication which is consistent with his bilateral chronic superficial femoral artery occlusion  We discussed at length the treatment options available along with the associated risks and benefits especially in light of his previous surgery/extra anatomical bypass which does make endovascular intervention more challenging  We discussed the option of conservative management to include a walking program and Pletal therapy verses attempted endovascular intervention which would best be served with a hybrid approach with access of the femoral-femoral bypass and attempted recanalization of the superficial femoral artery  At this point we will initially try a conservative approach with follow-up imaging and office visit in 3-4 months  If you have questions, please do not hesitate to call me  I look forward to following Malick Guerrero along with you           Sincerely,        Giselle Alex MD        CC: No Recipients

## 2022-07-07 NOTE — ASSESSMENT & PLAN NOTE
Aortoiliac and infrainguinal arterial occlusive disease with history of right to left femoral-femoral bypass  Patient is now status post treatment of inflow right common and external iliac artery stenoses  On recent duplex there is no evidence of focal stenosis but unfortunately he continues to experience bilateral calf claudication which is consistent with his bilateral chronic superficial femoral artery occlusion  We discussed at length the treatment options available along with the associated risks and benefits especially in light of his previous surgery/extra anatomical bypass which does make endovascular intervention more challenging  We discussed the option of conservative management to include a walking program and Pletal therapy verses attempted endovascular intervention which would best be served with a hybrid approach with access of the femoral-femoral bypass and attempted recanalization of the superficial femoral artery  At this point we will initially try a conservative approach with follow-up imaging and office visit in 3-4 months

## 2022-07-07 NOTE — PROGRESS NOTES
Assessment/Plan:    Atherosclerosis of native artery of both lower extremities with intermittent claudication (HCC)  Aortoiliac and infrainguinal arterial occlusive disease with history of right to left femoral-femoral bypass  Patient is now status post treatment of inflow right common and external iliac artery stenoses  On recent duplex there is no evidence of focal stenosis but unfortunately he continues to experience bilateral calf claudication which is consistent with his bilateral chronic superficial femoral artery occlusion  We discussed at length the treatment options available along with the associated risks and benefits especially in light of his previous surgery/extra anatomical bypass which does make endovascular intervention more challenging  We discussed the option of conservative management to include a walking program and Pletal therapy verses attempted endovascular intervention which would best be served with a hybrid approach with access of the femoral-femoral bypass and attempted recanalization of the superficial femoral artery  At this point we will initially try a conservative approach with follow-up imaging and office visit in 3-4 months  Diagnoses and all orders for this visit:    Atherosclerosis of native artery of both lower extremities with intermittent claudication (HCC)  -     VAS lower limb arterial duplex, complete bilateral; Future  -     cilostazol (PLETAL) 50 mg tablet; Take 1 tablet (50 mg total) by mouth 2 (two) times a day    Aortoiliac occlusive disease (HCC)  -     VAS lower limb arterial duplex, complete bilateral; Future  -     cilostazol (PLETAL) 50 mg tablet; Take 1 tablet (50 mg total) by mouth 2 (two) times a day          Subjective:      Patient ID: Larry Oneill is a 76 y o  male  Patient is here today to review results of AOIL done 6/23/2022  Pt is s/p a right common Iliac artery angioplasty and stent placement done 5/6/2022   He is also s/p a right to left fem-fem bypass graft done [facility] 10/20/2021  Patient c/o a "tighening" pain in his both legs  He says that it there all of the time but walking makes it worse  Pt denies any open wounds  He is taking Plavix, ASA 81 mg and Atorvastatin  He is a former smoker  77-year-old former smoker with known severe aortoiliac and infrainguinal arterial occlusive disease had a right to left femoral-femoral bypass performed at [facility]  He underwent an arteriogram on 06/23/2022 for bilateral lower extremity claudication at which time a brachial approach was used to treat right common and external iliac artery stenoses with combination of angioplasty and stenting  Unfortunately following that procedure he had no significant improvement and continues to have bilateral calf claudication at approximately 1 block  This is stable  He does feel it does interfere with his daily activities  He denies rest pain  Of note he also has a history of coronary disease status post cardiac bypass with harvesting of the left greater saphenous vein  Aortic duplex 06/23/2022 shows patency of the right iliac segment  Velocities are uniformly elevated throughout the entire iliac segment which may be due to contralateral occlusion and the presence of the femoral-femoral bypass  Ankle-brachial indices are 0 54 on the right and 0 60 on the left  I have spent 30 minutes with the patient, his daughter and granddaughter today in which greater than 50% of this time was spent in counseling/coordination of care regarding Diagnostic results, Prognosis, Risks and benefits of tx options, Intructions for management, Patient and family education, Risk factor reductions and Impressions            The following portions of the patient's history were reviewed and updated as appropriate: allergies, current medications, past family history, past medical history, past social history, past surgical history and problem list     Past Medical History:  Past Medical History:   Diagnosis Date    Coronary artery disease without angina pectoris 2020    Diabetes mellitus ([address] )     [facility]     Hyperlipidemia     Hypertension        Past Surgical History:  Past Surgical History:   Procedure Laterality Date    ANGIOPLASTY  10/19/2021    bilateral    CARDIAC CATHETERIZATION  2013    calif    CABG  x4    COLONOSCOPY      COLONOSCOPY      CORONARY ANGIOPLASTY WITH STENT PLACEMENT  2010    CORONARY ARTERY BYPASS GRAFT      FEMORAL ARTERY STENT      FEMORAL BYPASS  10/20/2021    IR PELVIC ANGIOGRAM  2022    OR SLCTV CATHJ 3RD+ ORD SLCTV ABDL PEL/LXTR [facility]  2022    Procedure: ULTRASOUND-GUIDED LEFT BRACHIAL ARTERY PUNCTURE, AORTOGRAM, RIGHT COMMON ILIAC ARTERY ANGIOPLASTY WITH 8 X 40 MM BALLOON, ANGIOPLASTY OF RIGHT EXTERNAL AND COMMON ILIAC ARTERY STENOSIS WITH 6 X 150 MM DRUG-ELUTING BALLOON, STENTING OF RIGHT DISTAL EXTERNAL ILIAC ARTERY STENOSIS WITH 7 X 40 MM SELF EXPANDING STENT POST DILATED WITH A 6 MM BALLOON ;  Surgeon: Denise Rojas MD;  [place]       Social History:  Social History     Substance and Sexual Activity   Alcohol Use Not Currently    Alcohol/week: 0 0 standard drinks     Social History     Substance and Sexual Activity   Drug Use No     Social History     Tobacco Use   Smoking Status Former Smoker    Packs/day: 1 50    Years: 40 00    Pack years: 60 00    Types: Cigarettes    Quit date: 2015    Years since quittin 0   Smokeless Tobacco Never Used   Tobacco Comment    quit --        Family History:  Family History   Problem Relation Age of Onset    No Known Problems Mother     No Known Problems Father        Allergies:  No Known Allergies    Medications:    Current Outpatient Medications:     acetaminophen (TYLENOL) 500 mg tablet, Take 1,000 mg by mouth every 8 (eight) hours as needed, Disp: , Rfl:     ASPIRIN 81 PO, Take 81 mg by mouth daily, Disp: , Rfl:     atorvastatin (LIPITOR) 40 mg tablet, TAKE 1 TABLET DAILY, Disp: 90 tablet, Rfl: 3    cilostazol (PLETAL) 50 mg tablet, Take 1 tablet (50 mg total) by mouth 2 (two) times a day, Disp: 30 tablet, Rfl: 4    clopidogrel (PLAVIX) 75 mg tablet, Take 1 tablet (75 mg total) by mouth daily, Disp: 90 tablet, Rfl: 1    Dulaglutide (Trulicity) 1 5 VF/1 2JL SOPN, Inject 0 5 mL (1 5 mg total) under the skin once a week, Disp: 3 mL, Rfl: 6    ferrous sulfate 325 (65 Fe) mg tablet, Take 1 tablet (325 mg total) by mouth 2 (two) times a day with meals, Disp: 60 tablet, Rfl: 6    glipiZIDE (GLUCOTROL) 10 mg tablet, TAKE 1 TABLET DAILY, Disp: 90 tablet, Rfl: 3    lisinopril-hydrochlorothiazide (PRINZIDE,ZESTORETIC) 20-12 5 MG per tablet, Take 0 5 tablets by mouth daily, Disp: 90 tablet, Rfl: 3    metFORMIN (GLUCOPHAGE) 1000 MG tablet, TAKE 1 TABLET TWICE DAILY  WITH MEALS (Patient taking differently: 1,000 mg), Disp: 180 tablet, Rfl: 3    omeprazole (PriLOSEC) 20 mg delayed release capsule, Take 1 capsule (20 mg total) by mouth daily before breakfast (Patient taking differently: Take 20 mg by mouth if needed), Disp: 30 capsule, Rfl: 5    hydrocortisone 2 5 % cream, Apply topically 4 (four) times a day as needed for rash (Patient not taking: Reported on 7/7/2022), Disp: 30 g, Rfl: 0    Vitals:  /52 (07/07/22 1011)    Temp      Pulse 67 (07/07/22 1011)   Resp      SpO2 98 % (07/07/22 1011)      Lab Results and Cultures:   CBC with diff:   Lab Results   Component Value Date    WBC 6 02 04/20/2022    HGB 11 8 (L) 04/20/2022    HCT 39 5 04/20/2022    MCV 95 04/20/2022     04/20/2022    MCH 28 4 04/20/2022    MCHC 29 9 (L) 04/20/2022    RDW 17 5 (H) 04/20/2022    MPV 10 7 04/20/2022    NRBC 0 02/07/2022   ,   BMP/CMP:  Lab Results   Component Value Date    K 4 3 04/20/2022    K 4 3 05/29/2020     04/20/2022     05/29/2020    CO2 23 04/20/2022    CO2 23 05/29/2020    BUN 21 04/20/2022    BUN 17 05/29/2020    CREATININE 1 09 04/20/2022    CALCIUM 9 6 04/20/2022    CALCIUM 9 6 05/29/2020    AST 13 02/07/2022    AST 16 05/29/2020    ALT 20 02/07/2022    ALT 19 05/29/2020    ALKPHOS 68 02/07/2022    ALKPHOS 77 05/29/2020    EGFR 64 04/20/2022   ,   Lipid Panel: No results found for: CHOL,   Coags:   Lab Results   Component Value Date    INR 1 12 04/20/2022   ,       Review of Systems   Constitutional: Negative  HENT: Negative  Eyes: Negative  Respiratory: Negative  Cardiovascular: Negative  Gastrointestinal: Negative  Endocrine: Negative  Genitourinary: Negative  Musculoskeletal:        Leg pain   Skin: Negative  Allergic/Immunologic: Negative  Neurological: Negative  Hematological: Bruises/bleeds easily  Psychiatric/Behavioral: Negative            Objective:      /52 (BP Location: Left arm, Patient Position: Sitting, Cuff Size: Standard)   Pulse 67   Ht 5' 7" (1 702 m)   Wt 69 2 kg (152 lb 9 6 oz)   SpO2 98%   BMI 23 90 kg/m²          Physical Exam

## 2022-07-14 DIAGNOSIS — I70.213 ATHEROSCLEROSIS OF NATIVE ARTERY OF BOTH LOWER EXTREMITIES WITH INTERMITTENT CLAUDICATION (HCC): ICD-10-CM

## 2022-07-14 DIAGNOSIS — I74.09 AORTOILIAC OCCLUSIVE DISEASE (HCC): Chronic | ICD-10-CM

## 2022-07-18 RX ORDER — CILOSTAZOL 50 MG/1
TABLET ORAL
Qty: 180 TABLET | Refills: 1 | Status: SHIPPED | OUTPATIENT
Start: 2022-07-18 | End: 2022-10-20 | Stop reason: ALTCHOICE

## 2022-08-22 ENCOUNTER — TELEPHONE (OUTPATIENT)
Dept: FAMILY MEDICINE CLINIC | Facility: CLINIC | Age: 77
End: 2022-08-22

## 2022-08-22 DIAGNOSIS — E11.51 TYPE 2 DIABETES MELLITUS WITH DIABETIC PERIPHERAL ANGIOPATHY WITHOUT GANGRENE, WITH LONG-TERM CURRENT USE OF INSULIN (HCC): ICD-10-CM

## 2022-08-22 DIAGNOSIS — Z79.4 TYPE 2 DIABETES MELLITUS WITH DIABETIC PERIPHERAL ANGIOPATHY WITHOUT GANGRENE, WITH LONG-TERM CURRENT USE OF INSULIN (HCC): ICD-10-CM

## 2022-08-22 RX ORDER — GLIPIZIDE 10 MG/1
10 TABLET ORAL
Qty: 180 TABLET | Refills: 3 | Status: SHIPPED | OUTPATIENT
Start: 2022-08-22 | End: 2022-08-24 | Stop reason: SDUPTHER

## 2022-08-22 NOTE — TELEPHONE ENCOUNTER
Patients last refill was for 30 days, but it was increased from 1 x a day to 2 x a day  He would like a 90 day supply sent to [facility]      Then please order from [facility] (Mail)    He only has 2 days left

## 2022-08-24 DIAGNOSIS — I10 ESSENTIAL HYPERTENSION: ICD-10-CM

## 2022-08-24 DIAGNOSIS — Z79.4 TYPE 2 DIABETES MELLITUS WITH DIABETIC PERIPHERAL ANGIOPATHY WITHOUT GANGRENE, WITH LONG-TERM CURRENT USE OF INSULIN (HCC): ICD-10-CM

## 2022-08-24 DIAGNOSIS — E11.51 TYPE 2 DIABETES MELLITUS WITH DIABETIC PERIPHERAL ANGIOPATHY WITHOUT GANGRENE, WITH LONG-TERM CURRENT USE OF INSULIN (HCC): ICD-10-CM

## 2022-08-24 DIAGNOSIS — Z79.899 ENCOUNTER FOR LONG-TERM CURRENT USE OF HIGH RISK MEDICATION: ICD-10-CM

## 2022-08-24 DIAGNOSIS — D50.9 IRON DEFICIENCY ANEMIA, UNSPECIFIED IRON DEFICIENCY ANEMIA TYPE: ICD-10-CM

## 2022-08-24 RX ORDER — GLIPIZIDE 10 MG/1
10 TABLET ORAL
Qty: 180 TABLET | Refills: 3 | Status: SHIPPED | OUTPATIENT
Start: 2022-08-24

## 2022-08-24 RX ORDER — LISINOPRIL AND HYDROCHLOROTHIAZIDE 20; 12.5 MG/1; MG/1
0.5 TABLET ORAL DAILY
Qty: 90 TABLET | Refills: 3 | Status: SHIPPED | OUTPATIENT
Start: 2022-08-24

## 2022-08-24 NOTE — TELEPHONE ENCOUNTER
Fax received from pharmacy requesting refill on patient Lisinopril HCTZ 20-12 5 mg TAB  Medication sent to the pharmacy at this time

## 2022-08-27 DIAGNOSIS — I10 ESSENTIAL HYPERTENSION: ICD-10-CM

## 2022-08-27 DIAGNOSIS — D50.9 IRON DEFICIENCY ANEMIA, UNSPECIFIED IRON DEFICIENCY ANEMIA TYPE: ICD-10-CM

## 2022-08-27 DIAGNOSIS — Z79.4 TYPE 2 DIABETES MELLITUS WITH DIABETIC PERIPHERAL ANGIOPATHY WITHOUT GANGRENE, WITH LONG-TERM CURRENT USE OF INSULIN (HCC): ICD-10-CM

## 2022-08-27 DIAGNOSIS — E11.51 TYPE 2 DIABETES MELLITUS WITH DIABETIC PERIPHERAL ANGIOPATHY WITHOUT GANGRENE, WITH LONG-TERM CURRENT USE OF INSULIN (HCC): ICD-10-CM

## 2022-08-27 DIAGNOSIS — E55.9 VITAMIN D INSUFFICIENCY: Primary | ICD-10-CM

## 2022-08-27 NOTE — PROGRESS NOTES
Assessment/Plan: 75 yo male, in NAD, alert and known to me about 2 yrs now, is seen with his wife for the following medical issues:        1  Type 2 diabetes mellitus with diabetic peripheral angiopathy without gangrene, with long-term current use of insulin ([address] )    2  Vitamin D insufficiency    3  Elevated blood sugar  Comments: All blood sugars reviewed  August 29 A1c 6 4     4  Iron deficiency anemia, unspecified iron deficiency anemia type  Comments:  Hemoglobin 9 5, ferritin 7, has not had colonoscopy and EGD which will order today  Orders:  -     Ambulatory referral to Hematology / Oncology; Future  -     Occult Blood, Fecal Immunochemical; Future; Expected date: 09/30/2022  -     Ambulatory referral for colonoscopy; Future; Expected date: 09/06/2022    5  Essential hypertension  Comments:  Blood pressure good taking lisinopril/HCT 20/12 5 and is 138/58 at this office visit    6  Encounter for long-term current use of high risk medication  Comments: All medications reviewed    7  Encounter for long-term (current) use of medications    8  Peripheral vascular disease ([facility] [address] )  Comments:  Being followed by Daniel Yeh --still complains of numbness of both lower extremities and coolness    9  Stage 3a chronic kidney disease ([address] )  Comments:  GFR 54--down from 64 and 71 prior        Subjective:      Patient ID: Marcio Guerin is a 76 y o  male  HPI    The following portions of the patient's history were reviewed and updated as appropriate: He  has a past medical history of Coronary artery disease without angina pectoris (2/25/2020), Diabetes mellitus ([address] ), DVT (deep venous thrombosis) ([address] ), Hyperlipidemia, and Hypertension    He   Patient Active Problem List    Diagnosis Date Noted    Stage 3a chronic kidney disease ([address] ) 08/30/2022    Pre-op evaluation 05/01/2022    Aortoiliac occlusive disease ([address] ) 04/11/2022    Embolism and thrombosis of arteries of the lower extremities ([address] ) 03/17/2021    Need for vaccination 03/17/2021    Iron deficiency anemia 10/13/2020    Coronary artery disease without angina pectoris 02/25/2020    Atherosclerosis of native artery of both lower extremities with intermittent claudication ([facility] ) 08/16/2018    Carotid stenosis, asymptomatic, bilateral 08/16/2018    Type 2 diabetes mellitus, with long-term current use of insulin ([place] [address] ) 06/25/2018    Other hyperlipidemia 06/25/2018    Essential hypertension 06/25/2018    Peripheral vascular disease with claudication ([place] [address] ) 06/25/2018     He  has a past surgical history that includes Femoral artery stent; Coronary angioplasty with stent (01/01/2010); Colonoscopy; Angioplasty (10/19/2021); Femoral bypass (10/20/2021); Cardiac catheterization (2013); Coronary artery bypass graft; Colonoscopy; pr slctv cathj 3rd+ ord slctv abdl pel/lxtr brnch (5/6/2022); and IR pelvic angiogram (5/6/2022)  His family history includes No Known Problems in his father and mother  He  reports that he quit smoking about 7 years ago  His smoking use included cigarettes  He has a 60 00 pack-year smoking history  He has never used smokeless tobacco  He reports previous alcohol use  He reports that he does not use drugs    Current Outpatient Medications   Medication Sig Dispense Refill    acetaminophen (TYLENOL) 500 mg tablet Take 1,000 mg by mouth every 8 (eight) hours as needed      ASPIRIN 81 PO Take 81 mg by mouth daily      atorvastatin (LIPITOR) 40 mg tablet TAKE 1 TABLET DAILY 90 tablet 3    cilostazol (PLETAL) 50 mg tablet TAKE 1 TABLET BY MOUTH TWICE A  tablet 1    clopidogrel (PLAVIX) 75 mg tablet Take 1 tablet (75 mg total) by mouth daily 90 tablet 1    Dulaglutide (Trulicity) 1 5 BL/2 4JC SOPN Inject 0 5 mL (1 5 mg total) under the skin once a week 3 mL 6    ferrous sulfate 325 (65 Fe) mg tablet Take 1 tablet (325 mg total) by mouth 2 (two) times a day with meals 60 tablet 6    glipiZIDE (GLUCOTROL) 10 mg tablet Take 1 tablet (10 mg total) by mouth 2 (two) times a day before meals 180 tablet 3    lisinopril-hydrochlorothiazide (PRINZIDE,ZESTORETIC) 20-12 5 MG per tablet Take 0 5 tablets by mouth daily 90 tablet 3    metFORMIN (GLUCOPHAGE) 1000 MG tablet TAKE 1 TABLET TWICE DAILY  WITH MEALS (Patient taking differently: 1,000 mg) 180 tablet 3    omeprazole (PriLOSEC) 20 mg delayed release capsule Take 1 capsule (20 mg total) by mouth daily before breakfast (Patient taking differently: Take 20 mg by mouth if needed) 30 capsule 5    hydrocortisone 2 5 % cream Apply topically 4 (four) times a day as needed for rash (Patient not taking: No sig reported) 30 g 0     No current facility-administered medications for this visit       Current Outpatient Medications on File Prior to Visit   Medication Sig    acetaminophen (TYLENOL) 500 mg tablet Take 1,000 mg by mouth every 8 (eight) hours as needed    ASPIRIN 81 PO Take 81 mg by mouth daily    atorvastatin (LIPITOR) 40 mg tablet TAKE 1 TABLET DAILY    cilostazol (PLETAL) 50 mg tablet TAKE 1 TABLET BY MOUTH TWICE A DAY    clopidogrel (PLAVIX) 75 mg tablet Take 1 tablet (75 mg total) by mouth daily    Dulaglutide (Trulicity) 1 5 MP/4 7BO SOPN Inject 0 5 mL (1 5 mg total) under the skin once a week    ferrous sulfate 325 (65 Fe) mg tablet Take 1 tablet (325 mg total) by mouth 2 (two) times a day with meals    glipiZIDE (GLUCOTROL) 10 mg tablet Take 1 tablet (10 mg total) by mouth 2 (two) times a day before meals    lisinopril-hydrochlorothiazide (PRINZIDE,ZESTORETIC) 20-12 5 MG per tablet Take 0 5 tablets by mouth daily    metFORMIN (GLUCOPHAGE) 1000 MG tablet TAKE 1 TABLET TWICE DAILY  WITH MEALS (Patient taking differently: 1,000 mg)    omeprazole (PriLOSEC) 20 mg delayed release capsule Take 1 capsule (20 mg total) by mouth daily before breakfast (Patient taking differently: Take 20 mg by mouth if needed)    hydrocortisone 2 5 % cream Apply topically 4 (four) times a day as needed for rash (Patient not taking: No sig reported)     No current facility-administered medications on file prior to visit  He has No Known Allergies       Review of Systems   Constitutional: Negative for activity change, appetite change, chills, diaphoresis, fatigue, fever and unexpected weight change  HENT: Negative for congestion, dental problem, drooling, ear discharge, ear pain, facial swelling, mouth sores, nosebleeds, postnasal drip, rhinorrhea, trouble swallowing and voice change  Eyes: Negative for photophobia, pain, discharge, redness, itching and visual disturbance  Respiratory: Negative for apnea, cough, choking, chest tightness and shortness of breath  Denies any and all respiratory issues - SOB, orthopnea, etc    Cardiovascular: Negative for chest pain and leg swelling  I have carefully question patient and his wife regarding any kind of anginal symptoms chest pain tightness heaviness pressure etc  given his severe degree of peripheral vascular disease, certainly cardiovascular disease may well exist  He denies any angina    He does have significant intermittent claudication both lower extremities stating his circulation was only 25% of what it should  That has changed now, s/p surgery  He will continue to  follow-up with vascular surgeon  Gastrointestinal: Negative for abdominal distention, abdominal pain, constipation, diarrhea and nausea  Endocrine: Negative for polydipsia, polyphagia and polyuria  Genitourinary: Negative for decreased urine volume, difficulty urinating, dysuria, enuresis and hematuria  Musculoskeletal: Positive for arthralgias  Negative for back pain, gait problem and joint swelling  Skin: Negative for color change (color much improved over last OV), pallor, rash and wound  Allergic/Immunologic: Negative for immunocompromised state     Neurological: Negative for dizziness, seizures, syncope, facial asymmetry, speech difficulty, light-headedness and headaches  Hematological: Negative for adenopathy  Psychiatric/Behavioral: Negative for agitation, behavioral problems, confusion and decreased concentration  Dysphoric mood: normal concern/depression of everything he's gone thru  Objective:      /58 (BP Location: Right arm, Patient Position: Sitting, Cuff Size: Standard)   Pulse 83   Temp (!) 97 3 °F (36 3 °C) (Temporal)   Resp 18   Ht 5' 7" (1 702 m)   Wt 70 2 kg (154 lb 12 8 oz)   SpO2 96%   BMI 24 25 kg/m²          Physical Exam  Vitals and nursing note reviewed  Exam conducted with a chaperone present (Wife present throughout the encounter)  Constitutional:       Appearance: Normal appearance  He is well-developed and normal weight  He is not diaphoretic  Comments: Patient looks and feels fairly well despite hemoglobin 11 2 and ferritin 7--what appears to be marked iron deficiency anemia  Worried about occult GI neoplasm??   HENT:      Head: Normocephalic and atraumatic  Right Ear: Ear canal and external ear normal  There is no impacted cerumen  Left Ear: Ear canal and external ear normal  There is no impacted cerumen  Ears:      Comments: Sl wax in the canal on the R ear - the one in question  TM somewhat retracted  Suspect: middle ear syndrome/pressure  Plan: gargling and Flonase- fully discussed  Nose: Nose normal    Eyes:      General:         Right eye: No discharge  Left eye: No discharge  Extraocular Movements: Extraocular movements intact  Conjunctiva/sclera: Conjunctivae normal       Pupils: Pupils are equal, round, and reactive to light  Neck:      Trachea: No tracheal deviation  Cardiovascular:      Rate and Rhythm: Normal rate and regular rhythm  Heart sounds: Murmur heard  Pulmonary:      Effort: Pulmonary effort is normal       Breath sounds: Normal breath sounds  No wheezing, rhonchi or rales (slight bibasilar rales and retained pul secretions)     Chest:      Chest wall: No tenderness  Abdominal:      General: Abdomen is flat  Bowel sounds are normal       Palpations: Abdomen is soft  Tenderness: There is no abdominal tenderness  There is no guarding or rebound  Musculoskeletal:         General: Normal range of motion  Cervical back: Normal range of motion and neck supple  Right lower leg: No edema  Left lower leg: No edema  Lymphadenopathy:      Cervical: No cervical adenopathy  Skin:     General: Skin is warm and dry  Findings: No bruising or erythema  Comments: Somewhat sallow color, with slight bronzed Mediterranean hue--not particularly healthy looking, but typical of a COPD smoker although he has stop smoking several years ago   Neurological:      General: No focal deficit present  Mental Status: He is alert and oriented to person, place, and time  Mental status is at baseline  Cranial Nerves: No cranial nerve deficit  Sensory: No sensory deficit  Motor: No weakness  Coordination: Coordination normal       Gait: Gait normal    Psychiatric:         Mood and Affect: Mood normal          Behavior: Behavior normal          Thought Content: Thought content normal          Judgment: Judgment normal        45 min with pt and wife and all the issues reviewed and discussed above  All labs discusses and the many abnormalities  Needs colona nd EGD soon      This time was spent reviewing previous records, reviewing previous laboratory and other tests, taking history from patient, examination of patient, discussion of prognosis and treatment, ordering laboratory tests, ordering medications, and completion of the medical record

## 2022-08-29 ENCOUNTER — APPOINTMENT (OUTPATIENT)
Dept: LAB | Facility: CLINIC | Age: 77
End: 2022-08-29
Payer: MEDICARE

## 2022-08-29 DIAGNOSIS — E11.51 TYPE 2 DIABETES MELLITUS WITH DIABETIC PERIPHERAL ANGIOPATHY WITHOUT GANGRENE, WITH LONG-TERM CURRENT USE OF INSULIN (HCC): ICD-10-CM

## 2022-08-29 DIAGNOSIS — Z79.4 TYPE 2 DIABETES MELLITUS WITH DIABETIC PERIPHERAL ANGIOPATHY WITHOUT GANGRENE, WITH LONG-TERM CURRENT USE OF INSULIN (HCC): ICD-10-CM

## 2022-08-29 DIAGNOSIS — E55.9 VITAMIN D INSUFFICIENCY: ICD-10-CM

## 2022-08-29 DIAGNOSIS — D50.9 IRON DEFICIENCY ANEMIA, UNSPECIFIED IRON DEFICIENCY ANEMIA TYPE: ICD-10-CM

## 2022-08-29 DIAGNOSIS — I10 ESSENTIAL HYPERTENSION: ICD-10-CM

## 2022-08-29 LAB
25(OH)D3 SERPL-MCNC: 39.8 NG/ML (ref 30–100)
ALBUMIN SERPL BCP-MCNC: 3.7 G/DL (ref 3.5–5)
ALP SERPL-CCNC: 57 U/L (ref 46–116)
ALT SERPL W P-5'-P-CCNC: 21 U/L (ref 12–78)
ANION GAP SERPL CALCULATED.3IONS-SCNC: 9 MMOL/L (ref 4–13)
AST SERPL W P-5'-P-CCNC: 8 U/L (ref 5–45)
BILIRUB SERPL-MCNC: 0.46 MG/DL (ref 0.2–1)
BILIRUB UR QL STRIP: NEGATIVE
BUN SERPL-MCNC: 26 MG/DL (ref 5–25)
CALCIUM SERPL-MCNC: 9.4 MG/DL (ref 8.3–10.1)
CHLORIDE SERPL-SCNC: 107 MMOL/L (ref 96–108)
CHOLEST SERPL-MCNC: 92 MG/DL
CLARITY UR: CLEAR
CO2 SERPL-SCNC: 21 MMOL/L (ref 21–32)
COLOR UR: COLORLESS
CREAT SERPL-MCNC: 1.24 MG/DL (ref 0.6–1.3)
ERYTHROCYTE [DISTWIDTH] IN BLOOD BY AUTOMATED COUNT: 16 % (ref 11.6–15.1)
EST. AVERAGE GLUCOSE BLD GHB EST-MCNC: 137 MG/DL
FERRITIN SERPL-MCNC: 7 NG/ML (ref 8–388)
GFR SERPL CREATININE-BSD FRML MDRD: 54 ML/MIN/1.73SQ M
GLUCOSE P FAST SERPL-MCNC: 132 MG/DL (ref 65–99)
GLUCOSE UR STRIP-MCNC: NEGATIVE MG/DL
HBA1C MFR BLD: 6.4 %
HCT VFR BLD AUTO: 31.8 % (ref 36.5–49.3)
HDLC SERPL-MCNC: 34 MG/DL
HGB BLD-MCNC: 9.5 G/DL (ref 12–17)
HGB RETIC QN AUTO: 27 PG (ref 30–38.3)
HGB UR QL STRIP.AUTO: NEGATIVE
IMM RETICS NFR: 32.2 % (ref 0–14)
IRON SATN MFR SERPL: 9 % (ref 20–50)
IRON SERPL-MCNC: 30 UG/DL (ref 65–175)
KETONES UR STRIP-MCNC: NEGATIVE MG/DL
LDLC SERPL CALC-MCNC: 28 MG/DL (ref 0–100)
LEUKOCYTE ESTERASE UR QL STRIP: NEGATIVE
MCH RBC QN AUTO: 27.3 PG (ref 26.8–34.3)
MCHC RBC AUTO-ENTMCNC: 29.9 G/DL (ref 31.4–37.4)
MCV RBC AUTO: 91 FL (ref 82–98)
NITRITE UR QL STRIP: NEGATIVE
NONHDLC SERPL-MCNC: 58 MG/DL
PH UR STRIP.AUTO: 6 [PH]
PLATELET # BLD AUTO: 235 THOUSANDS/UL (ref 149–390)
PMV BLD AUTO: 10.5 FL (ref 8.9–12.7)
POTASSIUM SERPL-SCNC: 4.1 MMOL/L (ref 3.5–5.3)
PROT SERPL-MCNC: 7.4 G/DL (ref 6.4–8.4)
PROT UR STRIP-MCNC: NEGATIVE MG/DL
RBC # BLD AUTO: 3.48 MILLION/UL (ref 3.88–5.62)
RETICS # AUTO: ABNORMAL 10*3/UL (ref 14356–105094)
RETICS # CALC: 3.65 % (ref 0.37–1.87)
SODIUM SERPL-SCNC: 137 MMOL/L (ref 135–147)
SP GR UR STRIP.AUTO: 1.01 (ref 1–1.03)
TIBC SERPL-MCNC: 346 UG/DL (ref 250–450)
TRIGL SERPL-MCNC: 152 MG/DL
TSH SERPL DL<=0.05 MIU/L-ACNC: 3.61 UIU/ML (ref 0.45–4.5)
UROBILINOGEN UR STRIP-ACNC: <2 MG/DL
WBC # BLD AUTO: 6.35 THOUSAND/UL (ref 4.31–10.16)

## 2022-08-29 PROCEDURE — 80053 COMPREHEN METABOLIC PANEL: CPT

## 2022-08-29 PROCEDURE — 85027 COMPLETE CBC AUTOMATED: CPT

## 2022-08-29 PROCEDURE — 36415 COLL VENOUS BLD VENIPUNCTURE: CPT

## 2022-08-29 PROCEDURE — 81003 URINALYSIS AUTO W/O SCOPE: CPT | Performed by: FAMILY MEDICINE

## 2022-08-29 PROCEDURE — 83036 HEMOGLOBIN GLYCOSYLATED A1C: CPT

## 2022-08-29 PROCEDURE — 82306 VITAMIN D 25 HYDROXY: CPT

## 2022-08-29 PROCEDURE — 83540 ASSAY OF IRON: CPT

## 2022-08-29 PROCEDURE — 85046 RETICYTE/HGB CONCENTRATE: CPT

## 2022-08-29 PROCEDURE — 83550 IRON BINDING TEST: CPT

## 2022-08-29 PROCEDURE — 84443 ASSAY THYROID STIM HORMONE: CPT

## 2022-08-29 PROCEDURE — 82728 ASSAY OF FERRITIN: CPT

## 2022-08-29 PROCEDURE — 80061 LIPID PANEL: CPT

## 2022-08-30 ENCOUNTER — OFFICE VISIT (OUTPATIENT)
Dept: FAMILY MEDICINE CLINIC | Facility: CLINIC | Age: 77
End: 2022-08-30
Payer: MEDICARE

## 2022-08-30 VITALS
OXYGEN SATURATION: 96 % | TEMPERATURE: 97.3 F | RESPIRATION RATE: 18 BRPM | HEIGHT: 67 IN | HEART RATE: 83 BPM | DIASTOLIC BLOOD PRESSURE: 58 MMHG | SYSTOLIC BLOOD PRESSURE: 138 MMHG | WEIGHT: 154.8 LBS | BODY MASS INDEX: 24.3 KG/M2

## 2022-08-30 DIAGNOSIS — Z79.899 ENCOUNTER FOR LONG-TERM (CURRENT) USE OF MEDICATIONS: ICD-10-CM

## 2022-08-30 DIAGNOSIS — D50.9 IRON DEFICIENCY ANEMIA, UNSPECIFIED IRON DEFICIENCY ANEMIA TYPE: ICD-10-CM

## 2022-08-30 DIAGNOSIS — E55.9 VITAMIN D INSUFFICIENCY: ICD-10-CM

## 2022-08-30 DIAGNOSIS — I10 ESSENTIAL HYPERTENSION: ICD-10-CM

## 2022-08-30 DIAGNOSIS — I73.9 PERIPHERAL VASCULAR DISEASE (HCC): ICD-10-CM

## 2022-08-30 DIAGNOSIS — Z79.4 TYPE 2 DIABETES MELLITUS WITH DIABETIC PERIPHERAL ANGIOPATHY WITHOUT GANGRENE, WITH LONG-TERM CURRENT USE OF INSULIN (HCC): Primary | ICD-10-CM

## 2022-08-30 DIAGNOSIS — Z79.899 ENCOUNTER FOR LONG-TERM CURRENT USE OF HIGH RISK MEDICATION: ICD-10-CM

## 2022-08-30 DIAGNOSIS — E11.51 TYPE 2 DIABETES MELLITUS WITH DIABETIC PERIPHERAL ANGIOPATHY WITHOUT GANGRENE, WITH LONG-TERM CURRENT USE OF INSULIN (HCC): Primary | ICD-10-CM

## 2022-08-30 DIAGNOSIS — N18.31 STAGE 3A CHRONIC KIDNEY DISEASE (HCC): ICD-10-CM

## 2022-08-30 DIAGNOSIS — R73.9 ELEVATED BLOOD SUGAR: ICD-10-CM

## 2022-08-30 PROCEDURE — 99215 OFFICE O/P EST HI 40 MIN: CPT | Performed by: FAMILY MEDICINE

## 2022-08-31 ENCOUNTER — APPOINTMENT (OUTPATIENT)
Dept: LAB | Facility: CLINIC | Age: 77
End: 2022-08-31
Payer: MEDICARE

## 2022-08-31 ENCOUNTER — PREP FOR PROCEDURE (OUTPATIENT)
Dept: GASTROENTEROLOGY | Facility: CLINIC | Age: 77
End: 2022-08-31

## 2022-08-31 DIAGNOSIS — D50.9 IRON DEFICIENCY ANEMIA, UNSPECIFIED IRON DEFICIENCY ANEMIA TYPE: ICD-10-CM

## 2022-08-31 DIAGNOSIS — D50.9 IRON DEFICIENCY ANEMIA, UNSPECIFIED IRON DEFICIENCY ANEMIA TYPE: Primary | ICD-10-CM

## 2022-08-31 LAB — HEMOCCULT STL QL IA: POSITIVE

## 2022-08-31 PROCEDURE — G0328 FECAL BLOOD SCRN IMMUNOASSAY: HCPCS

## 2022-08-31 RX ORDER — POLYETHYLENE GLYCOL 3350, SODIUM SULFATE ANHYDROUS, SODIUM BICARBONATE, SODIUM CHLORIDE, POTASSIUM CHLORIDE 236; 22.74; 6.74; 5.86; 2.97 G/4L; G/4L; G/4L; G/4L; G/4L
4000 POWDER, FOR SOLUTION ORAL ONCE
Qty: 4000 ML | Refills: 0 | Status: SHIPPED | OUTPATIENT
Start: 2022-08-31 | End: 2022-08-31

## 2022-09-01 ENCOUNTER — TELEPHONE (OUTPATIENT)
Dept: GASTROENTEROLOGY | Facility: CLINIC | Age: 77
End: 2022-09-01

## 2022-09-01 ENCOUNTER — TELEPHONE (OUTPATIENT)
Dept: GASTROENTEROLOGY | Facility: AMBULARY SURGERY CENTER | Age: 77
End: 2022-09-01

## 2022-09-01 NOTE — TELEPHONE ENCOUNTER
Our mutual patient is scheduled for procedure: colon/egd    On: 09 07 22      With: Dr Escamilla________    He/She is taking the following blood thinner:   Plavix       Can this be stopped ___5___ days prior to the procedure?       Physician Approving clearance: ________________________

## 2022-09-01 NOTE — TELEPHONE ENCOUNTER
Spoke to pt at length and explained he was referred to our office by [facility] med to have EGD/colonoscopy  Pt verbalized understanding  I explained kris is the bowel prep for the colonoscopy  Pt had some complaints about the size but ultimately stated he paid for it already and will use it  I explained our office will reach out to schedule colonoscopy/EGD and will review instructions etc  He is agreeable and will await call

## 2022-09-01 NOTE — TELEPHONE ENCOUNTER
Patients GI provider:  Dr Kush Peterson     Number to return call: [phone]    Reason for call: Pt called stated Dr Kush Peterson prescribed Polyethylene  Pt would like to know what this is for and why it was prescribed       Scheduled procedure/appointment date if applicable: Apt/procedure n/a

## 2022-09-01 NOTE — TELEPHONE ENCOUNTER
Dr Shashi Razo, I did some oa [address] and got him in on 09/07/22 with you  He's all set!

## 2022-09-01 NOTE — TELEPHONE ENCOUNTER
Our mutual patient is scheduled for procedure: colon/egd  On: 09 07 22      With:   __Yohannes_______    He/She is taking the following blood thinner:   pletal       Can this be stopped __2____ days prior to the procedure?       Physician Approving clearance: ________________________

## 2022-09-01 NOTE — TELEPHONE ENCOUNTER
Dr Andres Florian,    I can not find any availability with you until 10/31 @  Pt states that is too far as he has blood in stool  Is another Dr ok or does procedure need to be with you? Pt is insisting on you since pcp has reached out to you  I also looked and any available days you would be able to come in early at [facility] but there was nothing I saw

## 2022-09-01 NOTE — TELEPHONE ENCOUNTER
Scheduled date of EGD/colonoscopy (as of today):09 07 22  Physician performing EGD/colonoscopy:DR MARQUIS  Location of EGD[facility]colonoscopy:[facility]  Desired bowel prep reviewed with patient:DANIELA  Instructions reviewed with patient Polo VERBALLY/EMAILED TO [email or web address] com:  Clearances:  PLAVIX, PLETAL

## 2022-09-01 NOTE — TELEPHONE ENCOUNTER
Dr Chandrika Delgado,    Pt is taking 2 different blood thinners  Has never been seen by GI  Does he need an OV or should he just be scheduled for the procedure? Please advise  Audra Acosta

## 2022-09-06 ENCOUNTER — ANESTHESIA (OUTPATIENT)
Dept: ANESTHESIOLOGY | Facility: HOSPITAL | Age: 77
End: 2022-09-06

## 2022-09-06 ENCOUNTER — ANESTHESIA EVENT (OUTPATIENT)
Dept: ANESTHESIOLOGY | Facility: HOSPITAL | Age: 77
End: 2022-09-06

## 2022-09-06 NOTE — ANESTHESIA PREPROCEDURE EVALUATION
Procedure:  PRE-OP ONLY    Relevant Problems   CARDIO  sp CABG   (+) Aortoiliac occlusive disease (HCC)   (+) Coronary artery disease without angina pectoris   (+) Essential hypertension   (+) Other hyperlipidemia      ENDO   (+) Type 2 diabetes mellitus, with long-term current use of insulin (HCC)      /RENAL   (+) Stage 3a chronic kidney disease (HCC)      HEMATOLOGY   (+) Iron deficiency anemia      NEURO/PSYCH   (+) Atherosclerosis of native artery of both lower extremities with intermittent claudication ([facility] [address] [address] )      Recent labs personally reviewed:  Lab Results   Component Value Date    WBC 6 35 08/29/2022    HGB 9 5 (L) 08/29/2022     08/29/2022     Lab Results   Component Value Date    K 4 1 08/29/2022    BUN 26 (H) 08/29/2022    CREATININE 1 24 08/29/2022     No results found for: PTT   Lab Results   Component Value Date    INR 1 12 04/20/2022       Lab Results   Component Value Date    HGBA1C 6 4 (H) 08/29/2022

## 2022-09-07 ENCOUNTER — PREP FOR PROCEDURE (OUTPATIENT)
Dept: GASTROENTEROLOGY | Facility: CLINIC | Age: 77
End: 2022-09-07

## 2022-09-07 ENCOUNTER — ANESTHESIA EVENT (OUTPATIENT)
Dept: GASTROENTEROLOGY | Facility: HOSPITAL | Age: 77
End: 2022-09-07

## 2022-09-07 ENCOUNTER — ANESTHESIA (OUTPATIENT)
Dept: GASTROENTEROLOGY | Facility: HOSPITAL | Age: 77
End: 2022-09-07

## 2022-09-07 ENCOUNTER — HOSPITAL ENCOUNTER (OUTPATIENT)
Dept: GASTROENTEROLOGY | Facility: HOSPITAL | Age: 77
Setting detail: OUTPATIENT SURGERY
Discharge: HOME | End: 2022-09-07
Attending: INTERNAL MEDICINE
Payer: MEDICARE

## 2022-09-07 VITALS
OXYGEN SATURATION: 98 % | DIASTOLIC BLOOD PRESSURE: 60 MMHG | HEART RATE: 67 BPM | TEMPERATURE: 96.2 F | SYSTOLIC BLOOD PRESSURE: 121 MMHG | RESPIRATION RATE: 18 BRPM

## 2022-09-07 DIAGNOSIS — D50.9 IRON DEFICIENCY ANEMIA, UNSPECIFIED IRON DEFICIENCY ANEMIA TYPE: ICD-10-CM

## 2022-09-07 DIAGNOSIS — D50.9 IRON DEFICIENCY ANEMIA, UNSPECIFIED IRON DEFICIENCY ANEMIA TYPE: Primary | ICD-10-CM

## 2022-09-07 PROCEDURE — 45378 DIAGNOSTIC COLONOSCOPY: CPT | Performed by: INTERNAL MEDICINE

## 2022-09-07 PROCEDURE — 88305 TISSUE EXAM BY PATHOLOGIST: CPT | Performed by: PATHOLOGY

## 2022-09-07 PROCEDURE — 43239 EGD BIOPSY SINGLE/MULTIPLE: CPT | Performed by: INTERNAL MEDICINE

## 2022-09-07 RX ORDER — PROPOFOL 10 MG/ML
INJECTION, EMULSION INTRAVENOUS AS NEEDED
Status: DISCONTINUED | OUTPATIENT
Start: 2022-09-07 | End: 2022-09-07

## 2022-09-07 RX ORDER — PROPOFOL 10 MG/ML
INJECTION, EMULSION INTRAVENOUS CONTINUOUS PRN
Status: DISCONTINUED | OUTPATIENT
Start: 2022-09-07 | End: 2022-09-07

## 2022-09-07 RX ORDER — LIDOCAINE HYDROCHLORIDE 10 MG/ML
INJECTION, SOLUTION EPIDURAL; INFILTRATION; INTRACAUDAL; PERINEURAL AS NEEDED
Status: DISCONTINUED | OUTPATIENT
Start: 2022-09-07 | End: 2022-09-07

## 2022-09-07 RX ORDER — SODIUM CHLORIDE 9 MG/ML
INJECTION, SOLUTION INTRAVENOUS CONTINUOUS PRN
Status: DISCONTINUED | OUTPATIENT
Start: 2022-09-07 | End: 2022-09-07

## 2022-09-07 RX ADMIN — PROPOFOL 10 MG: 10 INJECTION, EMULSION INTRAVENOUS at 09:20

## 2022-09-07 RX ADMIN — PROPOFOL 10 MG: 10 INJECTION, EMULSION INTRAVENOUS at 09:23

## 2022-09-07 RX ADMIN — PROPOFOL 50 MCG/KG/MIN: 10 INJECTION, EMULSION INTRAVENOUS at 08:56

## 2022-09-07 RX ADMIN — LIDOCAINE HYDROCHLORIDE 50 MG: 10 INJECTION, SOLUTION EPIDURAL; INFILTRATION; INTRACAUDAL; PERINEURAL at 08:56

## 2022-09-07 RX ADMIN — PROPOFOL 150 MG: 10 INJECTION, EMULSION INTRAVENOUS at 08:56

## 2022-09-07 RX ADMIN — SODIUM CHLORIDE: 0.9 INJECTION, SOLUTION INTRAVENOUS at 08:48

## 2022-09-07 NOTE — ANESTHESIA POSTPROCEDURE EVALUATION
Post-Op Assessment Note    CV Status:  Stable  Pain Score: 0    Pain management: adequate     Mental Status:  Alert and awake   Hydration Status:  Euvolemic   PONV Controlled:  Controlled   Airway Patency:  Patent      Post Op Vitals Reviewed: Yes      Staff: Anesthesiologist, CRNA         No complications documented      /56 (09/07/22 0936)    Temp (!) 96 2 °F (35 7 °C) (09/07/22 0936)    Pulse 65 (09/07/22 0936)   Resp 20 (09/07/22 0936)    SpO2 96 % (09/07/22 0936)

## 2022-09-07 NOTE — H&P
History and Physical -  [facility]  Gena Ventura 76 y o  male MRN: [id number]                  HPI: Gena Ventura is a 76y o  year old male who presents for iron deficiency anemia  REVIEW OF SYSTEMS: Per the HPI, and otherwise unremarkable      Historical Information   Past Medical History:   Diagnosis Date    Coronary artery disease without angina pectoris 2020    Diabetes mellitus ([address] )     [facility]     Hyperlipidemia     Hypertension      Past Surgical History:   Procedure Laterality Date    ANGIOPLASTY  10/19/2021    bilateral    CARDIAC CATHETERIZATION  2013    calif    CABG  x4    COLONOSCOPY      COLONOSCOPY      CORONARY ANGIOPLASTY WITH STENT PLACEMENT  2010    CORONARY ARTERY BYPASS GRAFT      FEMORAL ARTERY STENT      FEMORAL BYPASS  10/20/2021    IR PELVIC ANGIOGRAM  2022    RI SLCTV CATHJ 3RD+ ORD SLCTV ABDL PEL/LXTR [facility]  2022    Procedure: ULTRASOUND-GUIDED LEFT BRACHIAL ARTERY PUNCTURE, AORTOGRAM, RIGHT COMMON ILIAC ARTERY ANGIOPLASTY WITH 8 X 40 MM BALLOON, ANGIOPLASTY OF RIGHT EXTERNAL AND COMMON ILIAC ARTERY STENOSIS WITH 6 X 150 MM DRUG-ELUTING BALLOON, STENTING OF RIGHT DISTAL EXTERNAL ILIAC ARTERY STENOSIS WITH 7 X 40 MM SELF EXPANDING STENT POST DILATED WITH A 6 MM BALLOON ;  Surgeon: Barron Valdivia MD;  [place]     Social History   Social History     Substance and Sexual Activity   Alcohol Use Not Currently    Alcohol/week: 0 0 standard drinks     Social History     Substance and Sexual Activity   Drug Use No     Social History     Tobacco Use   Smoking Status Former Smoker    Packs/day: 1 50    Years: 40 00    Pack years: 60 00    Types: Cigarettes    Quit date: 2015    Years since quittin 2   Smokeless Tobacco Never Used   Tobacco Comment    quit --      Family History   Problem Relation Age of Onset    No Known Problems Mother     No Known Problems Father        Meds/Allergies Current Outpatient Medications:     ASPIRIN 81 PO    atorvastatin (LIPITOR) 40 mg tablet    cilostazol (PLETAL) 50 mg tablet    clopidogrel (PLAVIX) 75 mg tablet    Dulaglutide (Trulicity) 1 5 YZ/0 3JP SOPN    ferrous sulfate 325 (65 Fe) mg tablet    glipiZIDE (GLUCOTROL) 10 mg tablet    lisinopril-hydrochlorothiazide (PRINZIDE,ZESTORETIC) 20-12 5 MG per tablet    metFORMIN (GLUCOPHAGE) 1000 MG tablet    acetaminophen (TYLENOL) 500 mg tablet    hydrocortisone 2 5 % cream    omeprazole (PriLOSEC) 20 mg delayed release capsule    polyethylene glycol (Golytely) 4000 mL solution    No Known Allergies    Objective     /63   Pulse 66   Temp (!) 96 9 °F (36 1 °C) (Tympanic)   Resp 18   SpO2 97%       PHYSICAL EXAM    Gen: NAD  Head: NCAT  CV: RRR  CHEST: Clear  ABD: soft, NT/ND  EXT: no edema      ASSESSMENT/PLAN:  This is a 76y o  year old male here for upper endoscopy and colonoscopy, and he is stable and optimized for his procedure

## 2022-09-07 NOTE — ANESTHESIA PREPROCEDURE EVALUATION
Procedure:  COLONOSCOPY  EGD    Relevant Problems   CARDIO  sp CABG   (+) Aortoiliac occlusive disease (HCC)   (+) Coronary artery disease without angina pectoris   (+) Essential hypertension   (+) Other hyperlipidemia      ENDO   (+) Type 2 diabetes mellitus, with long-term current use of insulin (HCC)      /RENAL   (+) Stage 3a chronic kidney disease (HCC)      HEMATOLOGY   (+) Iron deficiency anemia      NEURO/PSYCH   (+) Atherosclerosis of native artery of both lower extremities with intermittent claudication ([address] )      Recent labs personally reviewed:  Lab Results   Component Value Date    WBC 6 35 08/29/2022    HGB 9 5 (L) 08/29/2022     08/29/2022     Lab Results   Component Value Date    K 4 1 08/29/2022    BUN 26 (H) 08/29/2022    CREATININE 1 24 08/29/2022     No results found for: PTT   Lab Results   Component Value Date    INR 1 12 04/20/2022       Lab Results   Component Value Date    HGBA1C 6 4 (H) 08/29/2022         Physical Exam    Airway    Mallampati score: III  TM Distance: >3 FB  Neck ROM: full     Dental   upper dentures and lower dentures,     Cardiovascular  Cardiovascular exam normal    Pulmonary  Pulmonary exam normal     Other Findings        Anesthesia Plan  ASA Score- 3     Anesthesia Type- IV sedation with anesthesia with ASA Monitors  Additional Monitors:   Airway Plan:     Comment: Supplemental O2, etco2 monitoring    Plan Factors-Exercise tolerance (METS): >4 METS  Chart reviewed  Patient summary reviewed  Patient is not a current smoker  Patient not instructed to abstain from smoking on day of procedure  Patient did not smoke on day of surgery  Obstructive sleep apnea risk education given perioperatively  Induction- intravenous  Postoperative Plan-     Informed Consent- Anesthetic plan and risks discussed with patient  I personally reviewed this patient with the CRNA  Discussed and agreed on the Anesthesia Plan with the CRNA  Joceline Morillo

## 2022-09-12 PROCEDURE — 88305 TISSUE EXAM BY PATHOLOGIST: CPT | Performed by: PATHOLOGY

## 2022-09-16 NOTE — PROGRESS NOTES
Hematology Outpatient Office Note    Date of Service: 9/26/2022    [facility] HEMATOLOGY SPECIALISTS [facility]  [phone]    Reason for Consultation:   Chief Complaint   Patient presents with    Consult       Referral Physician: Erasmo Iraheta DO    Primary Care Physician:  Junior Myers DO       ASSESSMENT & PLAN      Diagnosis ICD-10-CM Associated Orders   1  Iron deficiency anemia due to chronic blood loss  D50 0 Iron Panel (Includes Ferritin, Iron Sat%, Iron, and TIBC)     CBC and differential   2  Iron deficiency anemia, unspecified iron deficiency anemia type  D50 9 Ambulatory referral to Hematology / Oncology    Hemoglobin 9 5, ferritin 7, has not had colonoscopy and EGD which will order today         This is a 76 y o  c PMHx notable for cecal AVM, DM, DVT, HTN, HLP, CAD, being seen in consultation for chronic ANG      Iron Deficiency anemia  Low iron levels can cause anemia (low red blood cells)  Low iron levels can also make people feel poorly, even before anemia starts  Iron is used to make red blood cells  If blood is lost from the body, if iron can't be absorbed from food, or if something removes iron (pregnancy) the iron can be low and then anemia happens  Hx Cecal AVM  GI thought maybe GAVE  8/29/2022: Hgb 9 5, WBC 6 35k, MCV 91, plt 235k, retic 3 65%, FOBT + (8/31), ferritin 7, iron 30, Fe Sat 9%, TIBC 346  9/7 EGD/C-scope: internal hemorrhoids    Capsule endoscopy: no active bleeding      Iron deficiency is very common  Low iron can cause fatigue or tiredness, the desire to eat ice or non-food items like corn starch or dry pasta noodles, restless legs, weak fingernails, cracks at the corner of the mouth       Common causes of iron deficiency include inadequate diet (uncommon, usually vegetarians), gastric bypass surgery (very common), pregnancy (very common), periods (most common cause in younger women), blood loss (usually from the stomach or intestines), and decreased iron absorption from the intestines from gastritis, H pylori, acid blocking medicines or celiac disease  Even a normal menstrual period can cause iron deficiency  In 10% of patients a clear cause of low iron is not found  14% of women will have iron deficiency just from their menstrual period  Iron deficiency is treated with pills as a first step  For some people the iron pills do not work, cause severe side effects, or take too long to work: for these people IV iron can be given  You may only require IV iron very rarely, for example only when pregnant, or from 1-2 times a year based on your rate of need  It takes 4 weeks for IV iron to improve the anemia to maximum potential  If your fatigue is not from the low iron, getting iron treatment would not help the fatigue  -IV iron: side effects can include nausea, joint pains, diarrhea, vomiting, pain of abdomen or chest, low blood pressure, shortness of breath  The chance of serious reactions is <1/1000, but around 5-10% will have some degree of the problems above  If a side effect happens, the nurses stop the infusion, wait 20 minutes, and then restart the infusion at a reduced rate  If a severe reaction happens, we might need to change the type of iron that we use  There are several types of IV iron  The best type for you is determined by your insurance coverage, your time availability, and cost concerns  Every medicine has risks, every medicine has benefits  Benefits: IV iron can replace iron in the body quickly  This enables people to have more energy, decrease fatigue, improve restless legs, reduce non-food cravings, and can avoid the need for blood transfusion in the future  Blood transfusion can be needed for severe iron deficiency, or if other blood loss occurs (as happens with childbirth or surgery)  Risks: People can have reactions to IV iron   There is no good way to predict who will have a reaction or how severe the reaction will be  The vast majority of reactions are mild, but life-threatening or fatal reactions can occur (such as drug hypersensitivity or anaphylaxis)  Just like how any pill medicine, injection medicine, or an insect bite/sting can cause a severe allergy, IV iron can do the same  This is why IV iron is given in a special infusion center with emergency medicines in case they are needed  Comparing the risks of iron types:  Feraheme versus Injectafer: from a randomized trial of 1,997 patients there was no difference between the iron types and moderate reactions, severe reactions, and anaphylaxis (Ayla 2018 Am J Hematology)  -Moderate reactions were seen in 0 3% of Feraheme and 0 6% of Injectafer patients, severe reactions were seen in 0 1% of Feraheme and 0% of Injectafer patients  The most frequent side effects were headache (3 1-3 4%), nausea (1 8-3 4%), dizziness (1 5-1 6%), fatigue (1 2-1 5%)  -Moderate hypotension was seen in 0 2% of Feraheme and 0 1% of Injectafer patients  There were no deaths or cases of anaphylaxis for either medicine   -Low phosphorus was found in 0 4% of Feraheme and 39% of Injectafer patients  Feraheme versus Venofer: from a randomized trial of 605 patients, there was no difference between the iron types in terms of overall reactions from the medicine  (Anais W  Nataliia Schaffer, Am J Hematology 2014)  -Side effects from the medicine were found in 14% of those given Feraheme and 16% of those given Venofer  These included abnormal taste, abnormal smell, pain, flushing, headache, diarrhea, constipation   -Serious side effects were found in 0 5% given Feraheme and 0% given Venofer  These included anaphylaxis, hypertension, hives  There were no deaths from IV iron  Low Molecular Weight Iron Dextran: This was evaluated in pregnancy Francisco Porter Hematology 2016,) after delivery ([facility] 2011) and in general patients Zari Sher, Am J Hematology 2020)     -In pregnancy a 1hr infusion was used, no serious adverse events occurred  Around 2% experienced transient infusion reactions  -After delivery, no adverse events were noted  -For 906 general patients, 1000mg iron dextran was given over 1hr or 1020mg Feraheme over 15 minutes  Test doses for iron dextran were given in 25%  Side effects were seen in 2 3% with iron dextran, and 2 8% with Feraheme  Grade 4 reactions (the most serious) did not occur  Grade 3 reactions occurred in 0 7% for iron dextran, and 0 4% for Feraheme  Specific numbers on risks:  Because serious reactions can occur with IV iron and are so rare, it is difficult to determine their absolute number with IV iron  Based on reports of adverse reactions, Injectafer appears to have the lowest risk of serious hypersensitivity reactions or anaphylaxis  Next lowest would be Venofer, followed by Feraheme and Low Molecular Weight Iron Dextran (Trumbo, Drug Safety 2021)  Anaphylaxis can occur with antibiotics such as penicillin (10-50/100,000), and can be fatal (in 1-2/100,000)  Risk of anaphylaxis with modern iron types ranges from 11/100,000 with iron sucrose to 25-70/100,000 with Feraheme or low molecular weight iron dextran, risk of death with IV iron ranges from 0 81/100,000 with low molecular weight iron dextran, 3 5/100,000 for Feraheme, to 6/100,000 with Venofer; however these values overlap statistically (Marcosery Am J Hematology, 2015)  Another study found life-threatening reactions in 0 6/million with Venofer, and 3 3/million with iron dextran ([facility] Neph Dial trans, 2006)  Blood transfusion also has risks: fever occurs in 1%, serious transfusion reactions occur in 8 1/100,000, life-threatening transfusion reactions occur in 1/140,000 units of blood given, Hepatitis B in 1/282,000, and Hepatitis C  in 1/1,150,000  Nelda Barker Inter Med 2012)        From this IV iron is safer than driving a car or getting a blood transfusion, and we take precautions to make the risk as low as possible, but no medicine has zero risk  IV iron options:  Venofer: 30 minute infusions, given [id number]-[id number]  Usually need 5 doses  Easy insurance coverage  INFeD, low molecular weight iron dextran: a 6hr infusion given once a week  Most people only need 1  Easy insurance coverage  Feraheme: 15 minute infusion, given once a week  Most people need 2 doses  Only some insurances will cover this, higher cost   Injectafer: 30 minute infusion, given once a week  Most people need 2 doses  Needs blood work to check phosphorus 1 and 2 weeks after the first dose  Only some insurances cover this, higher cost         -IV iron: side effects can include nausea, joint pains, diarrhea, vomiting, pain of abdomen or chest, low blood pressure during infusion  Rarely some patients can have shortness of breath  The chance of serious reactions is <1%, but around 2 5% will have some degree of the problems above  Patient instructions and Plan:  1)Your estimated iron deficit stores is 919 mg and you will require 5, 200mg IV iron infusions  2 ) Infusions are given MWF or once a week  · Discussion of decision making    I personally reviewed the following lab results, the image studies, pathology, other specialty/physicians consult notes and recommendations, and [facility]  I had a lengthy discussion with the patient and shared the work-up findings  We discussed the diagnosis and management plan as below  I spent 46 minutes reviewing the records (labs, clinician notes, outside records, medical history, ordering medicine/tests/procedures, interpreting the imaging/labs previously done) and coordination of care as well as direct time with the patient today, of which greater than 50% of the time was spent in counseling and coordination of care with the patient/family      · Plan/Labs  · As per above  · Cont to f/u GI, potentially will do video capsule study        Follow Up: 3 months with preceding CBC, iron labs    All questions were answered to the patient's satisfaction during this encounter  The patient knows the contact information for our office and knows to reach out for any relevant concerns related to this encounter  They are to call for any temperature 100 4 or higher, new symptoms including but not restricted to shaking chills, decreased appetite, nausea, vomiting, diarrhea, increased fatigue, shortness of breath or chest pain, confusion, and not feeling the strength to come to the clinic  For all other listed problems and medical diagnosis in their chart - they are managed by PCP and/or other specialists, which the patient acknowledges  Thank you very much for your consultation and making us a part of this patient's care  We are continuing to follow closely with you  Please do not hesitate to reach out to me with any additional questions or concerns  Mal Kong MD  Hematology & Medical Oncology Staff Physician             Disclaimer: This document was prepared using [employer] technology  If a word or phrase is confusing, or does not make sense, this is likely due to recognition error which was not discovered during this clinician's review  If you believe an error has occurred, please contact me through [address] [place] line for dequan? 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[facility] [employer]  (INTERVAL HISTORY)        I have reviewed the relevant past medical, surgical, social and family history  I have also reviewed allergies and medications for this patient      Review of Systems    Baseline weight: 152-154 lbs    Denies weight loss, F/C, N/V, SOB, CP, LH, HA  He has had fatigue and low energy since 6/2022  He has been chewing on ice all day since 3/2022  A 10-point review of system was performed, pertinent positive and negative were detailed as above  Otherwise, the 10-point review of system was negative        Past Medical History:   Diagnosis Date    Coronary artery disease without angina pectoris 2/25/2020    Diabetes mellitus ([address] )     D[facility] thrombosis) ([facility])     Hyperlipidemia     Hypertension        Past Surgical History:   Procedure Laterality Date    ANGIOPLASTY  10/19/2021    bilateral    CARDIAC CATHETERIZATION  2013    calif    CABG  x4    COLONOSCOPY      COLONOSCOPY      CORONARY ANGIOPLASTY WITH STENT PLACEMENT  01/01/2010    CORONARY ARTERY BYPASS GRAFT      FEMORAL ARTERY STENT      FEMORAL BYPASS  10/20/2021    IR PELVIC ANGIOGRAM  5/6/2022    NE SLCTV CATHJ 3RD+ ORD SLCTV ABDL PEL/LXTR [facility]  5/6/2022    Procedure: ULTRASOUND-GUIDED LEFT BRACHIAL ARTERY PUNCTURE, AORTOGRAM, RIGHT COMMON ILIAC ARTERY ANGIOPLASTY WITH 8 X 40 MM BALLOON, ANGIOPLASTY OF RIGHT EXTERNAL AND COMMON ILIAC ARTERY STENOSIS WITH 6 X 150 MM DRUG-ELUTING BALLOON, STENTING OF RIGHT DISTAL EXTERNAL ILIAC ARTERY STENOSIS WITH 7 X 40 MM SELF EXPANDING STENT POST DILATED WITH A 6 MM BALLOON ;  Surgeon: Rubén Barajas MD;  [place]       Family History   Problem Relation Age of Onset    No Known Problems Mother     No Known Problems Father        Social History     Socioeconomic History    Marital status: /Civil Union     Spouse name: Not on file    Number of children: Not on file    Years of education: Not on file    Highest education level: Not on file   Occupational History    Not on file   Tobacco Use    Smoking status: Former Smoker     Packs/day: 1 50     Years: 40 00     Pack years: 60 00     Types: Cigarettes     Quit date: 6/25/2015     Years since quittin 2    Smokeless tobacco: Never Used    Tobacco comment: quit --    Vaping Use    Vaping Use: Never used   Substance and Sexual Activity    Alcohol use: Not Currently     Alcohol/week: 0 0 standard drinks    Drug use: No    Sexual activity: Not on file   Other Topics Concern    Not on file   Social History Narrative    · Most recent tobacco use screenin2020      Social Determinants of Health     Financial Resource Strain: Not on file   Food Insecurity: Not on file   Transportation Needs: Not on file   Physical Activity: Not on file   Stress: Not on file   Social Connections: Not on file   Intimate Partner Violence: Not on file   Housing Stability: Not on file       No Known Allergies    Current Outpatient Medications   Medication Sig Dispense Refill    acetaminophen (TYLENOL) 500 mg tablet Take 1,000 mg by mouth every 8 (eight) hours as needed      ASPIRIN 81 PO Take 81 mg by mouth daily      atorvastatin (LIPITOR) 40 mg tablet TAKE 1 TABLET DAILY 90 tablet 3    cilostazol (PLETAL) 50 mg tablet TAKE 1 TABLET BY MOUTH TWICE A  tablet 1    clopidogrel (PLAVIX) 75 mg tablet Take 1 tablet (75 mg total) by mouth daily 90 tablet 1    Dulaglutide (Trulicity) 1 5 YB/2 5XP SOPN Inject 0 5 mL (1 5 mg total) under the skin once a week 3 mL 6    ferrous sulfate 325 (65 Fe) mg tablet Take 1 tablet (325 mg total) by mouth 2 (two) times a day with meals 60 tablet 6    glipiZIDE (GLUCOTROL) 10 mg tablet Take 1 tablet (10 mg total) by mouth 2 (two) times a day before meals 180 tablet 3    lisinopril-hydrochlorothiazide (PRINZIDE,ZESTORETIC) 20-12 5 MG per tablet Take 0 5 tablets by mouth daily 90 tablet 3    metFORMIN (GLUCOPHAGE) 1000 MG tablet TAKE 1 TABLET TWICE DAILY  WITH MEALS (Patient taking differently: 1,000 mg) 180 tablet 3    omeprazole (PriLOSEC) 20 mg delayed release capsule Take 1 capsule (20 mg total) by mouth daily before breakfast (Patient taking differently: Take 20 mg by mouth if needed) 30 capsule 5    hydrocortisone 2 5 % cream Apply topically 4 (four) times a day as needed for rash (Patient not taking: No sig reported) 30 g 0    polyethylene glycol (Golytely) 4000 mL solution Take 4,000 mL by mouth once for 1 dose Take 4000 mL by mouth once for 1 dose  Use as directed 4000 mL 0     No current facility-administered medications for this visit  (Not in a hospital admission)        Objective:     24 Hour Vitals Assessment:     Vitals:    09/26/22 1123   BP: 136/68   Pulse: 81   Resp: 18   Temp: (!) 97 4 °F (36 3 °C)   SpO2: 97%       PHYSICIAN EXAM:    General: Appearance: alert, cooperative, no distress  HEENT: Normocephalic, atraumatic  No scleral icterus  conjunctivae clear  EOMI  Chest: No tenderness to palpation  No open wound noted  Lungs: Clear to auscultation bilaterally, Respirations unlabored  Cardiac: Regular rate and rhythm, +S1and S2  Abdomen: Soft, non-tender, non-distended  Bowel sounds are normal   Extremities:  No edema, cyanosis, clubbing  Skin: Skin color, turgor are normal  No rashes  Neurologic: Awake, Alert, and oriented, no gross focal deficits noted b/l  DATA REVIEW:    Pathology Result:    Final Diagnosis   Date Value Ref Range Status   09/07/2022   Final    A  Duodenum,  biopsy:  - Duodenal mucosa with focal acute inflammation and reactive changes  - No intraepithelial lymphocytosis and no villous blunting   - No epithelial dysplasia and no evidence of malignancy  B   Stomach, biopsy:  - Gastric antral and oxyntic mucosa with no significant pathologic alteration   - Negative for intestinal metaplasia, dysplasia or carcinoma  - No Helicobacter pylori is identified on H&E stained slide  Image Results:   Image result are reviewed and documented in Hematology/Oncology history  I personally reviewed these images      Colonoscopy  Narrative: [address] Endoscopy  [address] [facility] [address]  [phone]    DATE OF SERVICE:  9/07/22    PHYSICIAN(S):  Attending:   Bev Hunt MD     Fellow:   No Staff Documented     INDICATION:  Iron deficiency anemia, unspecified iron deficiency anemia type    POST-OP DIAGNOSIS:  See the impression below  HISTORY:  Prior colonoscopy: Less than 3 years ago  It is being repeated at an   interval of less than 3 years because: This colonoscopy is being performed   for a diagnostic indication    BOWEL PREPARATION:  Golytely/Colyte/Trilyte    PREPROCEDURE:  Informed consent was obtained for the procedure, including sedation  Risks   including but not limited to bleeding, infection, perforation, adverse   drug reaction and aspiration were explained in detail  Also explained   about less than 100% sensitivity with the exam and other alternatives  The   patient was placed in the left lateral decubitus position  DETAILS OF PROCEDURE:  Patient was taken to the procedure room where a time out was performed to   confirm correct patient and correct procedure  The patient underwent   monitored anesthesia care, which was administered by an anesthesia   professional  The patient's blood pressure, heart rate, level of   consciousness, oxygen and respirations were monitored throughout the   procedure  A digital rectal exam was performed  The scope was introduced   through the anus and advanced to the cecum  Retroflexion was performed in   the rectum  The quality of bowel preparation was evaluated using the   [facility] Bowel Preparation Scale with scores of: right colon = 2, transverse   colon = 2, left colon = 2  The total BBPS score was 6  Bowel prep was   adequate  The patient experienced no blood loss  The procedure was not   difficult  The patient tolerated the procedure well  There were no   apparent complications       ANESTHESIA INFORMATION:  ASA: III  Anesthesia Type: IV Sedation with Anesthesia    MEDICATIONS:  No administrations occurring from 0856 to 0932 on 09/07/22 FINDINGS:  Mild diverticula in the sigmoid colon  Internal hemorrhoids    EVENTS:  Procedure Events   Event Event Time   ENDO SCOPE OUT TIME 9/7/2022  9:02 AM   ENDO CECUM REACHED 9/7/2022  9:18 AM   ENDO SCOPE OUT TIME 9/7/2022  9:31 AM     SPECIMENS:  ID Type Source Tests Collected by Time Destination   1 : cold bx- r/o celiac Tissue Duodenum TISSUE EXAM Evangelist Pavon MD   9/7/2022  8:58 AM    2 : cold bx- r/o h  pylori, r/o gave Tissue Stomach TISSUE EXAM Guru Powell MD 9/7/2022  8:58 AM      EQUIPMENT:  Colonoscope -[id number]  Impression: Mild sigmoid diverticulosis  Internal hemorrhoids  Given his history of cecal AVM in the past, he may have small bowel AVMs   contributing to his iron deficiency anemia  Alternatively, he may have   mild gastric antral vascular ectasia    RECOMMENDATION:  No further screening colonoscopies necessary due to age (age = 77 or   greater)  Suggest video capsule endoscopy  If gastric biopsies are suggestive of gastric antral vascular ectasia,   will repeat upper endoscopy with APC treatment         Evangelist Pavon MD   EGD  Narrative: [address] [place] [address] Endoscopy  [address] [place] [address] Ave:  9/07/22    PHYSICIAN(S):  Attending:   Evangelist Pavon MD     Fellow:   No Staff Documented     INDICATION:  Iron deficiency anemia, unspecified iron deficiency anemia type    POST-OP DIAGNOSIS:  See the impression below  PREPROCEDURE:  Informed consent was obtained for the procedure, including sedation  Risks of perforation, hemorrhage, adverse drug reaction and aspiration   were discussed  The patient was placed in the left lateral decubitus   position  Patient was explained about the risks and benefits of the procedure  Risks   including but not limited to bleeding, infection, and perforation were   explained in detail  Also explained about less than 100% sensitivity with   the exam and other alternatives      DETAILS OF PROCEDURE:  Patient was taken to the procedure room where a time out was performed to   confirm correct patient and correct procedure  The patient underwent   monitored anesthesia care, which was administered by an anesthesia   professional  The patient's blood pressure, heart rate, level of   consciousness, respirations and oxygen were monitored throughout the   procedure  The scope was advanced to the second part of the duodenum  Retroflexion was performed in the fundus  The patient experienced no blood   loss  The procedure was not difficult  The patient tolerated the procedure   well  There were no apparent complications  ANESTHESIA INFORMATION:  ASA: III  Anesthesia Type: IV Sedation with Anesthesia    MEDICATIONS:  No administrations occurring from 0856 to 0905 on 09/07/22     FINDINGS:  Mild erythematous mucosa in the stomach; performed 6 cold forceps biopsies  The esophagus and duodenum appeared normal  Z-line is 43 cm from the   incisors  Performed multiple forceps biopsies in the duodenum    SPECIMENS:  ID Type Source Tests Collected by Time Destination   1 : cold bx- r/o celiac Tissue Duodenum TISSUE EXAM Milton Gong MD   9/7/2022  8:58 AM    2 : cold bx- r/o h  pylori, r/o gave Tissue Stomach TISSUE EXAM Dar Abernathy MD 9/7/2022  8:58 AM    Impression: Mild gastritis versus mild gastric antral vascular ectasia  Normal esophagus and duodenum    RECOMMENDATION:  Await pathology results  Colonoscopy to follow          Milton Gong MD       LABS:  Lab data are reviewed and documented in HemOn history         Lab Results   Component Value Date    HGB 9 5 (L) 08/29/2022    HCT 31 8 (L) 08/29/2022    MCV 91 08/29/2022     08/29/2022    WBC 6 35 08/29/2022    NRBC 0 02/07/2022     Lab Results   Component Value Date    K 4 1 08/29/2022     08/29/2022    CO2 21 08/29/2022    BUN 26 (H) 08/29/2022    CREATININE 1 24 08/29/2022    GLUF 132 (H) 08/29/2022    CALCIUM 9 4 08/29/2022 CORRECTEDCA 10 2 (H) 11/15/2021    AST 8 08/29/2022    ALT 21 08/29/2022    ALKPHOS 57 08/29/2022    EGFR 54 08/29/2022       Lab Results   Component Value Date    IRON 30 (L) 08/29/2022    TIBC 346 08/29/2022    FERRITIN 7 (L) 08/29/2022    FERRITIN 37 04/20/2022    FERRITIN 17 02/07/2022    FERRITIN 13 01/05/2022    FERRITIN 10 11/15/2021    FERRITIN 28 03/11/2021    FERRITIN 12 11/03/2020    FERRITIN 7 (L) 10/06/2020       Lab Results   Component Value Date    HXPIDFLA12 612 11/15/2021    IJNETGQW15 681 05/29/2020       No results for input(s): WBC, CREAT in the last 72 hours      Invalid input(s):  PLT     By:  Geo Perez MD, 9/26/2022, 11:46 AM

## 2022-09-20 ENCOUNTER — HOSPITAL ENCOUNTER (OUTPATIENT)
Dept: GASTROENTEROLOGY | Facility: HOSPITAL | Age: 77
Discharge: HOME | End: 2022-09-20
Attending: INTERNAL MEDICINE
Payer: MEDICARE

## 2022-09-20 DIAGNOSIS — D50.9 IRON DEFICIENCY ANEMIA, UNSPECIFIED IRON DEFICIENCY ANEMIA TYPE: ICD-10-CM

## 2022-09-20 PROCEDURE — 91110 GI TRC IMG INTRAL ESOPH-ILE: CPT

## 2022-09-26 ENCOUNTER — TELEPHONE (OUTPATIENT)
Dept: GASTROENTEROLOGY | Facility: CLINIC | Age: 77
End: 2022-09-26

## 2022-09-26 ENCOUNTER — PREP FOR PROCEDURE (OUTPATIENT)
Dept: GASTROENTEROLOGY | Facility: CLINIC | Age: 77
End: 2022-09-26

## 2022-09-26 ENCOUNTER — TELEPHONE (OUTPATIENT)
Dept: HEMATOLOGY ONCOLOGY | Facility: CLINIC | Age: 77
End: 2022-09-26

## 2022-09-26 ENCOUNTER — CONSULT (OUTPATIENT)
Dept: HEMATOLOGY ONCOLOGY | Facility: CLINIC | Age: 77
End: 2022-09-26
Payer: MEDICARE

## 2022-09-26 VITALS
HEIGHT: 67 IN | BODY MASS INDEX: 24.48 KG/M2 | TEMPERATURE: 97.4 F | OXYGEN SATURATION: 97 % | RESPIRATION RATE: 18 BRPM | SYSTOLIC BLOOD PRESSURE: 136 MMHG | WEIGHT: 156 LBS | HEART RATE: 81 BPM | DIASTOLIC BLOOD PRESSURE: 68 MMHG

## 2022-09-26 DIAGNOSIS — K31.819 GAVE (GASTRIC ANTRAL VASCULAR ECTASIA): ICD-10-CM

## 2022-09-26 DIAGNOSIS — D50.0 IRON DEFICIENCY ANEMIA DUE TO CHRONIC BLOOD LOSS: Primary | ICD-10-CM

## 2022-09-26 DIAGNOSIS — D50.9 IRON DEFICIENCY ANEMIA, UNSPECIFIED IRON DEFICIENCY ANEMIA TYPE: Primary | ICD-10-CM

## 2022-09-26 DIAGNOSIS — D50.9 IRON DEFICIENCY ANEMIA, UNSPECIFIED IRON DEFICIENCY ANEMIA TYPE: ICD-10-CM

## 2022-09-26 DIAGNOSIS — K55.20 AVM (ARTERIOVENOUS MALFORMATION) OF SMALL BOWEL, ACQUIRED: ICD-10-CM

## 2022-09-26 PROCEDURE — 99204 OFFICE O/P NEW MOD 45 MIN: CPT | Performed by: INTERNAL MEDICINE

## 2022-09-26 RX ORDER — SODIUM CHLORIDE 9 MG/ML
20 INJECTION, SOLUTION INTRAVENOUS ONCE
Status: CANCELLED | OUTPATIENT
Start: 2022-10-03

## 2022-09-26 NOTE — TELEPHONE ENCOUNTER
Called patient to inform him of capsule results  Study was suboptimal due to debris in stomach and duodenum  Possible old blood  He stated is stools are dark but not black, and this is chronic  No new symptoms  Explained we could repeat capsule with prep or do repeat EGD for second look depending on what his primary GI specialist (Dr Ehsan Cox) would prefer  He understood plan  All questions answered

## 2022-09-26 NOTE — TELEPHONE ENCOUNTER
While we try to accommodate patient requests, our priority is to schedule treatment according to Doctor's orders and site availability  1  Does the Provider use the intake sheet or checkout note? NO  2  What would be a preferred day of the week that would work best for your infusion appointment? MONDAY  3  Do you prefer mornings or afternoons for your appointments? AFTERNOONS  4  Are there any days or dates that do not work for your schedule, including any upcoming vacations? LEAVE 12/15-2/2/23  5  We are going to try our best to schedule you at the infusion center closest to your home  In the event that we are unable to what would be your next preferred infusion site or sites? 1  [facility]  2  [facility]   3      6  Do you have transportation to take you to all of your appointments? YES  7   Would you like the infusion center to draw labs from your port? (disregard if patient doesn't have a port or need labs for infusion appointment) N/A

## 2022-09-27 NOTE — TELEPHONE ENCOUNTER
Good morning, I just got off the phone with patient, is it possible to reschedule patient 10/3 @12 to either 1 or 130? Patient has a scan that same day and times are conflicting  Thanks!

## 2022-09-30 ENCOUNTER — TELEPHONE (OUTPATIENT)
Dept: GASTROENTEROLOGY | Facility: CLINIC | Age: 77
End: 2022-09-30

## 2022-09-30 NOTE — TELEPHONE ENCOUNTER
Patient confirmed procedure  Patient is aware to hold Plavix 7 days prior to procedure      Scheduled date of EGD(as of today): 10/17/2022  Physician performing EGD:Dr Sheffield  Location of EGD: [facility]  Instructions reviewed with patient by: Taylor/myesha  Clearances: N/A

## 2022-09-30 NOTE — TELEPHONE ENCOUNTER
----- Message from Kristen Barajas MD sent at 9/28/2022  8:27 PM EDT -----  Regarding: RE: Capsule  Thanks Tee! Dear Chitra Agee, I let Mr Jin Macias know he can hold his Plavix for 7 days before his EGD with push enteroscopy  Please call him to schedule the procedures at one of our hospital GI labs with me  Thanks, Jonny Bustillos  ----- Message -----  From: Sherren Rasp, DO  Sent: 9/27/2022  10:34 AM EDT  To: Kristen Barajas MD  Subject: FW: Capsule                                      Hi Brian:  I have checked with [facility] surgeon, Dr Neva Cuenca, and he agrees OK to hold the Plavix x 7 days prior to your doing the push enteroscopy on C[facility] Sosa (and EGD, of course)  Hope you having a good day  Thx  ----- Message -----  From: Raul Hill MD  Sent: 9/27/2022  10:19 AM EDT  To: Sherren Rasp, DO  Subject: RE: Capsule                                      OK to hold from my standpoint  ThanksReece  ----- Message -----  From: Sherren Rasp, DO  Sent: 9/26/2022   6:54 PM EDT  To: Raul Hill MD  Subject: Justice Baumgarten: Capsule                                      Hi Bren Keenan is having GI blood loss (hgb's going to 7, etc) and GI wants to do EGD with push enteroscopy  Ok to stop Plavix for a wk? 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  Please check with his Cardiologist if ok to hold Plavix  If felt to be high risk, then I can do the procedure on Plavix  ThanksJonny    ----- Message -----  From: Donnie Soto DO  Sent: 9/26/2022   7:35 AM EDT  To: Kristen Barajas MD, Michael Rivas MD  Subject: Justice Baumgarten: Capsule                                      Hi Dr Carlo Gentile was suboptimal for this patient   Lots of debris in the stomach with some potential hematin or blood  Small bowel without definitive source  I called the patient he says he has "dark stools" but nothing new in terms of symptoms  Potentially could repeat capsule with prep or repeat EGD since he may have GAVE  Thanks! Sincerely,  Beverley Burgess   ----- Message -----  From: Anita Govea MD  Sent: 9/24/2022  10:52 PM EDT  To: Yue Power DO  Subject: RE: Capsule                                      Hi,    I agree  Some possible blood and hematin in the stomach  Would repeat capsule and also consider repeat EGD  Can you let the patient and referring physician know? Thank you!  ----- Message -----  From: Yue Power DO  Sent: 9/21/2022   9:25 AM EDT  To: Anita Govea MD  Subject: Arabella Han,    Capsule report is read  Sub optimal study  Questionable Avms  Let me know what you think!     Sincerely,  Beverley Burgess

## 2022-09-30 NOTE — TELEPHONE ENCOUNTER
Patients GI provider:  Dr Jluis White    Number to return call: [phone]    Reason for call: Pt calling to schedule procedure      Scheduled procedure/appointment date if applicable: N/A

## 2022-10-03 ENCOUNTER — HOSPITAL ENCOUNTER (OUTPATIENT)
Dept: NON INVASIVE DIAGNOSTICS | Facility: CLINIC | Age: 77
Discharge: HOME | End: 2022-10-03
Payer: MEDICARE

## 2022-10-03 ENCOUNTER — HOSPITAL ENCOUNTER (OUTPATIENT)
Dept: INFUSION CENTER | Facility: CLINIC | Age: 77
Discharge: HOME | End: 2022-10-03
Payer: MEDICARE

## 2022-10-03 VITALS
DIASTOLIC BLOOD PRESSURE: 63 MMHG | SYSTOLIC BLOOD PRESSURE: 145 MMHG | HEART RATE: 72 BPM | TEMPERATURE: 96.8 F | RESPIRATION RATE: 18 BRPM

## 2022-10-03 DIAGNOSIS — I70.213 ATHEROSCLEROSIS OF NATIVE ARTERY OF BOTH LOWER EXTREMITIES WITH INTERMITTENT CLAUDICATION (HCC): ICD-10-CM

## 2022-10-03 DIAGNOSIS — D50.0 IRON DEFICIENCY ANEMIA DUE TO CHRONIC BLOOD LOSS: Primary | ICD-10-CM

## 2022-10-03 DIAGNOSIS — I74.09 AORTOILIAC OCCLUSIVE DISEASE (HCC): Chronic | ICD-10-CM

## 2022-10-03 PROCEDURE — 93923 UPR/LXTR ART STDY 3+ LVLS: CPT

## 2022-10-03 PROCEDURE — 93925 LOWER EXTREMITY STUDY: CPT

## 2022-10-03 PROCEDURE — 96365 THER/PROPH/DIAG IV INF INIT: CPT

## 2022-10-03 RX ORDER — SODIUM CHLORIDE 9 MG/ML
20 INJECTION, SOLUTION INTRAVENOUS ONCE
Status: CANCELLED | OUTPATIENT
Start: 2022-10-05

## 2022-10-03 RX ORDER — SODIUM CHLORIDE 9 MG/ML
20 INJECTION, SOLUTION INTRAVENOUS ONCE
Status: COMPLETED | OUTPATIENT
Start: 2022-10-03 | End: 2022-10-03

## 2022-10-03 RX ADMIN — SODIUM CHLORIDE 200 MG: 9 INJECTION, SOLUTION INTRAVENOUS at 13:11

## 2022-10-03 RX ADMIN — SODIUM CHLORIDE 20 ML/HR: 0.9 INJECTION, SOLUTION INTRAVENOUS at 13:11

## 2022-10-04 PROCEDURE — 93925 LOWER EXTREMITY STUDY: CPT | Performed by: SURGERY

## 2022-10-04 PROCEDURE — 93922 UPR/L XTREMITY ART 2 LEVELS: CPT | Performed by: SURGERY

## 2022-10-05 ENCOUNTER — TELEPHONE (OUTPATIENT)
Dept: FAMILY MEDICINE CLINIC | Facility: CLINIC | Age: 77
End: 2022-10-05

## 2022-10-05 ENCOUNTER — HOSPITAL ENCOUNTER (OUTPATIENT)
Dept: INFUSION CENTER | Facility: CLINIC | Age: 77
Discharge: HOME | End: 2022-10-05
Payer: MEDICARE

## 2022-10-05 VITALS
TEMPERATURE: 98.2 F | SYSTOLIC BLOOD PRESSURE: 129 MMHG | RESPIRATION RATE: 18 BRPM | DIASTOLIC BLOOD PRESSURE: 60 MMHG | HEART RATE: 82 BPM

## 2022-10-05 DIAGNOSIS — I65.23 CAROTID STENOSIS, ASYMPTOMATIC, BILATERAL: ICD-10-CM

## 2022-10-05 DIAGNOSIS — D50.0 IRON DEFICIENCY ANEMIA DUE TO CHRONIC BLOOD LOSS: Primary | ICD-10-CM

## 2022-10-05 DIAGNOSIS — I10 ESSENTIAL HYPERTENSION: Primary | ICD-10-CM

## 2022-10-05 PROCEDURE — 96365 THER/PROPH/DIAG IV INF INIT: CPT

## 2022-10-05 RX ORDER — SODIUM CHLORIDE 9 MG/ML
20 INJECTION, SOLUTION INTRAVENOUS ONCE
Status: COMPLETED | OUTPATIENT
Start: 2022-10-05 | End: 2022-10-05

## 2022-10-05 RX ORDER — SODIUM CHLORIDE 9 MG/ML
20 INJECTION, SOLUTION INTRAVENOUS ONCE
Status: CANCELLED | OUTPATIENT
Start: 2022-10-07

## 2022-10-05 RX ADMIN — SODIUM CHLORIDE 200 MG: 9 INJECTION, SOLUTION INTRAVENOUS at 13:28

## 2022-10-05 RX ADMIN — SODIUM CHLORIDE 20 ML/HR: 0.9 INJECTION, SOLUTION INTRAVENOUS at 13:17

## 2022-10-05 NOTE — PROGRESS NOTES
Pt arrived to unit without complaint  Pt tolerated Venofer without incident  AVS declined, but aware of future appts  Pt left unit in stable condition

## 2022-10-07 ENCOUNTER — HOSPITAL ENCOUNTER (OUTPATIENT)
Dept: INFUSION CENTER | Facility: CLINIC | Age: 77
End: 2022-10-07
Payer: MEDICARE

## 2022-10-07 VITALS
RESPIRATION RATE: 18 BRPM | SYSTOLIC BLOOD PRESSURE: 150 MMHG | TEMPERATURE: 98 F | DIASTOLIC BLOOD PRESSURE: 56 MMHG | HEART RATE: 73 BPM

## 2022-10-07 DIAGNOSIS — D50.0 IRON DEFICIENCY ANEMIA DUE TO CHRONIC BLOOD LOSS: Primary | ICD-10-CM

## 2022-10-07 PROCEDURE — 96365 THER/PROPH/DIAG IV INF INIT: CPT

## 2022-10-07 RX ORDER — SODIUM CHLORIDE 9 MG/ML
20 INJECTION, SOLUTION INTRAVENOUS ONCE
Status: CANCELLED | OUTPATIENT
Start: 2022-10-09

## 2022-10-07 RX ORDER — SODIUM CHLORIDE 9 MG/ML
20 INJECTION, SOLUTION INTRAVENOUS ONCE
Status: COMPLETED | OUTPATIENT
Start: 2022-10-07 | End: 2022-10-07

## 2022-10-07 RX ADMIN — SODIUM CHLORIDE 20 ML/HR: 0.9 INJECTION, SOLUTION INTRAVENOUS at 13:11

## 2022-10-07 RX ADMIN — IRON SUCROSE 200 MG: 20 INJECTION, SOLUTION INTRAVENOUS at 13:13

## 2022-10-09 ENCOUNTER — TELEPHONE (OUTPATIENT)
Dept: OTHER | Facility: OTHER | Age: 77
End: 2022-10-09

## 2022-10-09 NOTE — TELEPHONE ENCOUNTER
Patient is calling regarding cancelling an appointment  Date/Time:   10/10/22 @ 1pm and 10/12/22 @ 12:30pm  Patient was rescheduled: YES [] NO [x]    Patient requesting call back to reschedule: YES [x] NO []    Patient tested positive for Covid today 10/09/22 and would like to reschedule

## 2022-10-10 ENCOUNTER — HOSPITAL ENCOUNTER (OUTPATIENT)
Dept: INFUSION CENTER | Facility: CLINIC | Age: 77
End: 2022-10-10

## 2022-10-10 PROCEDURE — 91110 GI TRC IMG INTRAL ESOPH-ILE: CPT | Performed by: INTERNAL MEDICINE

## 2022-10-12 ENCOUNTER — HOSPITAL ENCOUNTER (OUTPATIENT)
Dept: INFUSION CENTER | Facility: CLINIC | Age: 77
End: 2022-10-12

## 2022-10-12 ENCOUNTER — TELEPHONE (OUTPATIENT)
Dept: GASTROENTEROLOGY | Facility: CLINIC | Age: 77
End: 2022-10-12

## 2022-10-12 NOTE — TELEPHONE ENCOUNTER
Patients GI provider:  Dr Ananda Ortez    Number to return call: [phone]    Reason for call: Pt calling to r/s procedure due to being positive for COVID      Scheduled procedure/appointment date if applicable: procedure 78/90

## 2022-10-12 NOTE — TELEPHONE ENCOUNTER
Spoke with patient and explained that currently there is no more time left in [facility] with Dr Sav Guaman or any other provider  He does not wish to reschedule at another hospital, but isnt sure if January is too far away   He asked that we see what Dr Sav Guaman would recommend and will do whatever he feels is best

## 2022-10-17 ENCOUNTER — HOSPITAL ENCOUNTER (OUTPATIENT)
Dept: INFUSION CENTER | Facility: CLINIC | Age: 77
Discharge: HOME | End: 2022-10-17
Payer: MEDICARE

## 2022-10-17 VITALS
HEART RATE: 67 BPM | SYSTOLIC BLOOD PRESSURE: 129 MMHG | DIASTOLIC BLOOD PRESSURE: 70 MMHG | TEMPERATURE: 97.2 F | RESPIRATION RATE: 18 BRPM

## 2022-10-17 DIAGNOSIS — D50.0 IRON DEFICIENCY ANEMIA DUE TO CHRONIC BLOOD LOSS: Primary | ICD-10-CM

## 2022-10-17 PROCEDURE — 96365 THER/PROPH/DIAG IV INF INIT: CPT

## 2022-10-17 RX ORDER — SODIUM CHLORIDE 9 MG/ML
20 INJECTION, SOLUTION INTRAVENOUS ONCE
Status: COMPLETED | OUTPATIENT
Start: 2022-10-17 | End: 2022-10-17

## 2022-10-17 RX ORDER — SODIUM CHLORIDE 9 MG/ML
20 INJECTION, SOLUTION INTRAVENOUS ONCE
Status: CANCELLED | OUTPATIENT
Start: 2022-10-19

## 2022-10-17 RX ADMIN — IRON SUCROSE 200 MG: 20 INJECTION, SOLUTION INTRAVENOUS at 13:41

## 2022-10-17 RX ADMIN — SODIUM CHLORIDE 20 ML/HR: 0.9 INJECTION, SOLUTION INTRAVENOUS at 13:37

## 2022-10-19 ENCOUNTER — HOSPITAL ENCOUNTER (OUTPATIENT)
Dept: INFUSION CENTER | Facility: CLINIC | Age: 77
Discharge: HOME | End: 2022-10-19
Payer: MEDICARE

## 2022-10-19 VITALS
DIASTOLIC BLOOD PRESSURE: 48 MMHG | RESPIRATION RATE: 18 BRPM | TEMPERATURE: 97.3 F | SYSTOLIC BLOOD PRESSURE: 143 MMHG | HEART RATE: 65 BPM

## 2022-10-19 DIAGNOSIS — D50.0 IRON DEFICIENCY ANEMIA DUE TO CHRONIC BLOOD LOSS: Primary | ICD-10-CM

## 2022-10-19 PROCEDURE — 96365 THER/PROPH/DIAG IV INF INIT: CPT

## 2022-10-19 RX ORDER — SODIUM CHLORIDE 9 MG/ML
20 INJECTION, SOLUTION INTRAVENOUS ONCE
Status: COMPLETED | OUTPATIENT
Start: 2022-10-19 | End: 2022-10-19

## 2022-10-19 RX ORDER — SODIUM CHLORIDE 9 MG/ML
20 INJECTION, SOLUTION INTRAVENOUS ONCE
Status: CANCELLED | OUTPATIENT
Start: 2022-10-19

## 2022-10-19 RX ADMIN — IRON SUCROSE 200 MG: 20 INJECTION, SOLUTION INTRAVENOUS at 13:34

## 2022-10-19 RX ADMIN — SODIUM CHLORIDE 20 ML/HR: 0.9 INJECTION, SOLUTION INTRAVENOUS at 13:27

## 2022-10-20 ENCOUNTER — OFFICE VISIT (OUTPATIENT)
Dept: VASCULAR SURGERY | Facility: CLINIC | Age: 77
End: 2022-10-20
Payer: MEDICARE

## 2022-10-20 VITALS
HEIGHT: 66 IN | BODY MASS INDEX: 25.1 KG/M2 | OXYGEN SATURATION: 100 % | WEIGHT: 156.2 LBS | HEART RATE: 59 BPM | DIASTOLIC BLOOD PRESSURE: 54 MMHG | SYSTOLIC BLOOD PRESSURE: 162 MMHG

## 2022-10-20 DIAGNOSIS — I74.09 AORTOILIAC OCCLUSIVE DISEASE (HCC): Chronic | ICD-10-CM

## 2022-10-20 DIAGNOSIS — I70.213 ATHEROSCLEROSIS OF NATIVE ARTERY OF BOTH LOWER EXTREMITIES WITH INTERMITTENT CLAUDICATION (HCC): Primary | ICD-10-CM

## 2022-10-20 DIAGNOSIS — I73.9 PERIPHERAL VASCULAR DISEASE (HCC): ICD-10-CM

## 2022-10-20 PROCEDURE — 99214 OFFICE O/P EST MOD 30 MIN: CPT | Performed by: SURGERY

## 2022-10-20 RX ORDER — CLOPIDOGREL BISULFATE 75 MG/1
TABLET ORAL
Qty: 90 TABLET | Refills: 1 | Status: SHIPPED | OUTPATIENT
Start: 2022-10-20

## 2022-10-20 NOTE — ASSESSMENT & PLAN NOTE
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  We discussed at length the anatomy of his occlusive disease along with the potential treatment options available in the associated risks and benefits  He feel something needs to be done since his current symptoms are causing him severe limitation and impacting his quality of life  To better understand his anatomy before Ng making any final recommendations, we will obtain a CT angiogram with runoff  Following this he will return to the office for further recommendations  In the interval he will continue his antiplatelet and statin therapy  Evidently the Pletal therapy was not effective despite use for the last 3 months  I have suggested he stop his Pletal at this point

## 2022-10-20 NOTE — PATIENT INSTRUCTIONS
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  We discussed at length the anatomy of his occlusive disease along with the potential treatment options available in the associated risks and benefits  He feel something needs to be done since his current symptoms are causing him severe limitation and impacting his quality of life  To better understand his anatomy before Ng making any final recommendations, we will obtain a CT angiogram with runoff  Following this he will return to the office for further recommendations  In the interval he will continue his antiplatelet and statin therapy  Evidently the Pletal therapy was not effective despite use for the last 3 months  I have suggested he stop his Pletal at this point

## 2022-10-20 NOTE — PROGRESS NOTES
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  We discussed at length the anatomy of his occlusive disease along with the potential treatment options available in the associated risks and benefits  He feel something needs to be done since his current symptoms are causing him severe limitation and impacting his quality of life  To better understand his anatomy before Ng making any final recommendations, we will obtain a CT angiogram with runoff  Following this he will return to the office for further recommendations  In the interval he will continue his antiplatelet and statin therapy  Evidently the Pletal therapy was not effective despite use for the last 3 months  I have suggested he stop his Pletal at this point  Diagnoses and all orders for this visit:    Atherosclerosis of native artery of both lower extremities with intermittent claudication (HCC)    Aortoiliac occlusive disease ([address] )          Subjective:      Patient ID: Chhaya Pierre is a 76 y o  male  Patient is here to review results of JAMEL done 10/3/2022  He is s/p a right common iliac artery angioplasty and stent done 5/6/2022  Patient c/o tightening, cramping pain in both of his calves after walking about 1 block  When he stops to rest the pain subsides  He denies any open wounds  He admits that the pain is the same in both legs  He is taking ASA 81 mg, Pletal, Plavix, and Atorvastatin  He is a former smoker  76-year-old with long history of aortoiliac and infrainguinal arterial occlusive disease with previous right to left femoral-femoral artery bypass presents in follow-up    He underwent an arteriogram via left brachial approach on 05/06/2022 at which time a right common and external iliac artery stenosis were treated with a combination of drug-eluting balloon angioplasty and stent placement  Unfortunately he had no significant relief of symptoms following this intervention  He continues to complain of bilateral calf claudication at less than 1 block and some numbness and tingling in his calves and feet  The discomfort will resolve with periods of rest   This is interfering with his ability to perform his daily activities  He feels it is without significant change  Arterial duplex 10/03/2022 bilateral superficial femoral artery occlusive disease with ankle-brachial index is 0 48 on the right and 0 50 on the left  Toe pressure of 29 on the right 37 on the left  I have spent 30 minutes with Patient  today in which greater than 50% of this time was spent in counseling/coordination of care regarding Diagnostic results, Prognosis, Risks and benefits of tx options, Patient and family education and Impressions            The following portions of the patient's history were reviewed and updated as appropriate: allergies, current medications, past family history, past medical history, past social history, past surgical history and problem list     Past Medical History:  Past Medical History:   Diagnosis Date   • Coronary artery disease without angina pectoris 2/25/2020   • Diabetes mellitus ([facility] [address] )    • DVT (deep venous thrombosis) ([facility] [address] )    • Hyperlipidemia    • Hypertension        Past Surgical History:  Past Surgical History:   Procedure Laterality Date   • ANGIOPLASTY  10/19/2021    bilateral   • CARDIAC CATHETERIZATION  2013    calif    CABG  x4   • COLONOSCOPY     • COLONOSCOPY     • CORONARY ANGIOPLASTY WITH STENT PLACEMENT  01/01/2010   • CORONARY ARTERY BYPASS GRAFT     • FEMORAL ARTERY STENT     • FEMORAL BYPASS  10/20/2021   • IR PELVIC ANGIOGRAM  5/6/2022   • TX SLCTV CATHJ 3RD+ ORD SLCTV ABDL PEL/LXTR [facility]  5/6/2022    Procedure: ULTRASOUND-GUIDED LEFT BRACHIAL ARTERY PUNCTURE, AORTOGRAM, RIGHT COMMON ILIAC ARTERY ANGIOPLASTY WITH 8 X 40 MM BALLOON, ANGIOPLASTY OF RIGHT EXTERNAL AND COMMON ILIAC ARTERY STENOSIS WITH 6 X 150 MM DRUG-ELUTING BALLOON, STENTING OF RIGHT DISTAL EXTERNAL ILIAC ARTERY STENOSIS WITH 7 X 40 MM SELF EXPANDING STENT POST DILATED WITH A 6 MM BALLOON ;  Surgeon: Branden Le MD;  Loca       Social History:  Social History     Substance and Sexual Activity   Alcohol Use Not Currently   • Alcohol/week: 0 0 standard drinks     Social History     Substance and Sexual Activity   Drug Use No     Social History     Tobacco Use   Smoking Status Former Smoker   • Packs/day: 1 50   • Years: 40 00   • Pack years: 60 00   • Types: Cigarettes   • Quit date: 2015   • Years since quittin 3   Smokeless Tobacco Never Used   Tobacco Comment    quit --        Family History:  Family History   Problem Relation Age of Onset   • No Known Problems Mother    • No Known Problems Father        Allergies:  No Known Allergies    Medications:    Current Outpatient Medications:   •  acetaminophen (TYLENOL) 500 mg tablet, Take 1,000 mg by mouth every 8 (eight) hours as needed, Disp: , Rfl:   •  ASPIRIN 81 PO, Take 81 mg by mouth daily, Disp: , Rfl:   •  atorvastatin (LIPITOR) 40 mg tablet, TAKE 1 TABLET DAILY, Disp: 90 tablet, Rfl: 3  •  clopidogrel (PLAVIX) 75 mg tablet, Take 1 tablet (75 mg total) by mouth daily, Disp: 90 tablet, Rfl: 1  •  Dulaglutide (Trulicity) 1 5 WX/3 6MM SOPN, Inject 0 5 mL (1 5 mg total) under the skin once a week, Disp: 3 mL, Rfl: 6  •  ferrous sulfate 325 (65 Fe) mg tablet, Take 1 tablet (325 mg total) by mouth 2 (two) times a day with meals, Disp: 60 tablet, Rfl: 6  •  glipiZIDE (GLUCOTROL) 10 mg tablet, Take 1 tablet (10 mg total) by mouth 2 (two) times a day before meals, Disp: 180 tablet, Rfl: 3  •  lisinopril-hydrochlorothiazide (PRINZIDE,ZESTORETIC) 20-12 5 MG per tablet, Take 0 5 tablets by mouth daily, Disp: 90 tablet, Rfl: 3  •  metFORMIN (GLUCOPHAGE) 1000 MG tablet, TAKE 1 TABLET TWICE DAILY  WITH MEALS (Patient taking differently: 1,000 mg), Disp: 180 tablet, Rfl: 3  •  omeprazole (PriLOSEC) 20 mg delayed release capsule, Take 1 capsule (20 mg total) by mouth daily before breakfast (Patient taking differently: Take 20 mg by mouth if needed), Disp: 30 capsule, Rfl: 5  •  hydrocortisone 2 5 % cream, Apply topically 4 (four) times a day as needed for rash (Patient not taking: No sig reported), Disp: 30 g, Rfl: 0  •  polyethylene glycol (Golytely) 4000 mL solution, Take 4,000 mL by mouth once for 1 dose Take 4000 mL by mouth once for 1 dose  Use as directed, Disp: 4000 mL, Rfl: 0  No current facility-administered medications for this visit  Vitals:  /54 (10/20/22 0949)    Temp      Pulse 59 (10/20/22 0949)   Resp      SpO2 100 % (10/20/22 0949)      Lab Results and Cultures:   CBC with diff:   Lab Results   Component Value Date    WBC 6 35 08/29/2022    HGB 9 5 (L) 08/29/2022    HCT 31 8 (L) 08/29/2022    MCV 91 08/29/2022     08/29/2022    MCH 27 3 08/29/2022    MCHC 29 9 (L) 08/29/2022    RDW 16 0 (H) 08/29/2022    MPV 10 5 08/29/2022    NRBC 0 02/07/2022   ,   BMP/CMP:  Lab Results   Component Value Date    K 4 1 08/29/2022    K 4 3 05/29/2020     08/29/2022     05/29/2020    CO2 21 08/29/2022    CO2 23 05/29/2020    BUN 26 (H) 08/29/2022    BUN 17 05/29/2020    CREATININE 1 24 08/29/2022    CALCIUM 9 4 08/29/2022    CALCIUM 9 6 05/29/2020    AST 8 08/29/2022    AST 16 05/29/2020    ALT 21 08/29/2022    ALT 19 05/29/2020    ALKPHOS 57 08/29/2022    ALKPHOS 77 05/29/2020    EGFR 54 08/29/2022   ,   Lipid Panel: No results found for: CHOL,   Coags:   Lab Results   Component Value Date    INR 1 12 04/20/2022   ,       Review of Systems   Constitutional: Negative  HENT: Negative  Eyes: Negative  Respiratory: Negative  Cardiovascular: Negative  Gastrointestinal: Positive for abdominal pain  Endocrine: Negative  Genitourinary: Negative      Musculoskeletal:        Pain in legs when walking   Skin: Negative  Allergic/Immunologic: Negative  Neurological: Negative  Hematological: Negative  Psychiatric/Behavioral: Negative            Objective:      /54 (BP Location: Left arm, Patient Position: Sitting, Cuff Size: Standard)   Pulse 59   Ht 5' 6" (1 676 m)   Wt 70 9 kg (156 lb 3 2 oz)   SpO2 100%   BMI 25 21 kg/m²          Physical Exam

## 2022-10-20 NOTE — LETTER
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  We discussed at length the anatomy of his occlusive disease along with the potential treatment options available in the associated risks and benefits  He feel something needs to be done since his current symptoms are causing him severe limitation and impacting his quality of life  To better understand his anatomy before Ng making any final recommendations, we will obtain a CT angiogram with runoff  Following this he will return to the office for further recommendations  In the interval he will continue his antiplatelet and statin therapy  Evidently the Pletal therapy was not effective despite use for the last 3 months  I have suggested he stop his Pletal at this point  If you have questions, please do not hesitate to call me  I look forward to following Cristino Goldstein along with you           Sincerely,        Angélica Burton MD        CC: No Recipients

## 2022-10-26 DIAGNOSIS — Z79.4 TYPE 2 DIABETES MELLITUS WITH DIABETIC PERIPHERAL ANGIOPATHY WITHOUT GANGRENE, WITH LONG-TERM CURRENT USE OF INSULIN (HCC): ICD-10-CM

## 2022-10-26 DIAGNOSIS — E11.51 TYPE 2 DIABETES MELLITUS WITH DIABETIC PERIPHERAL ANGIOPATHY WITHOUT GANGRENE, WITH LONG-TERM CURRENT USE OF INSULIN (HCC): ICD-10-CM

## 2022-11-02 ENCOUNTER — HOSPITAL ENCOUNTER (OUTPATIENT)
Dept: CT IMAGING | Facility: HOSPITAL | Age: 77
Discharge: HOME | End: 2022-11-02
Attending: SURGERY

## 2022-11-02 DIAGNOSIS — I70.213 ATHEROSCLEROSIS OF NATIVE ARTERY OF BOTH LOWER EXTREMITIES WITH INTERMITTENT CLAUDICATION (HCC): ICD-10-CM

## 2022-11-02 DIAGNOSIS — I74.09 AORTOILIAC OCCLUSIVE DISEASE (HCC): Chronic | ICD-10-CM

## 2022-11-02 RX ADMIN — IOHEXOL 120 ML: 350 INJECTION, SOLUTION INTRAVENOUS at 13:43

## 2022-11-07 ENCOUNTER — HOSPITAL ENCOUNTER (OUTPATIENT)
Dept: NON INVASIVE DIAGNOSTICS | Facility: HOSPITAL | Age: 77
Discharge: HOME | End: 2022-11-07
Attending: SURGERY

## 2022-11-07 ENCOUNTER — CONSULT (OUTPATIENT)
Dept: CARDIOLOGY CLINIC | Facility: CLINIC | Age: 77
End: 2022-11-07

## 2022-11-07 VITALS
HEART RATE: 58 BPM | DIASTOLIC BLOOD PRESSURE: 50 MMHG | HEIGHT: 66 IN | SYSTOLIC BLOOD PRESSURE: 160 MMHG | WEIGHT: 153 LBS | BODY MASS INDEX: 24.59 KG/M2

## 2022-11-07 DIAGNOSIS — I10 ESSENTIAL HYPERTENSION: ICD-10-CM

## 2022-11-07 DIAGNOSIS — E78.49 OTHER HYPERLIPIDEMIA: ICD-10-CM

## 2022-11-07 DIAGNOSIS — I65.23 CAROTID STENOSIS, ASYMPTOMATIC, BILATERAL: ICD-10-CM

## 2022-11-07 DIAGNOSIS — E11.51 TYPE 2 DIABETES MELLITUS WITH DIABETIC PERIPHERAL ANGIOPATHY WITHOUT GANGRENE, WITH LONG-TERM CURRENT USE OF INSULIN (HCC): ICD-10-CM

## 2022-11-07 DIAGNOSIS — I73.9 PERIPHERAL VASCULAR DISEASE WITH CLAUDICATION (HCC): ICD-10-CM

## 2022-11-07 DIAGNOSIS — I25.10 CORONARY ARTERY DISEASE INVOLVING NATIVE CORONARY ARTERY OF NATIVE HEART WITHOUT ANGINA PECTORIS: Primary | ICD-10-CM

## 2022-11-07 DIAGNOSIS — Z79.4 TYPE 2 DIABETES MELLITUS WITH DIABETIC PERIPHERAL ANGIOPATHY WITHOUT GANGRENE, WITH LONG-TERM CURRENT USE OF INSULIN (HCC): ICD-10-CM

## 2022-11-07 RX ORDER — AMLODIPINE BESYLATE 5 MG/1
5 TABLET ORAL DAILY
Qty: 90 TABLET | Refills: 1 | Status: SHIPPED | OUTPATIENT
Start: 2022-11-07 | End: 2023-02-05

## 2022-11-07 NOTE — PROGRESS NOTES
Cardiology Office Note  MD Berenice Lockett MD Belia Deter, DO, MD Karen Raza DO, Ashanti Bowens DO, [facility] - [place]  ----------------------------------------------------------------  [address] [facility]  [address], [address]    Reyna Benton 76 y o  male MRN: [id number]  Unit/Bed#:  Encounter: [id number]      History of Present Illness: It was a pleasure to see Reyna Benton in the office today for initial CV evaluation  He has a past medical history CAD s/p CABG in March 2020 at [address], PVD, hypertension, dyslipidemia, type 2 diabetes mellitus, CKD, anemia, left subclavian stenosis and carotid artery stenosis  He established care with us in November 2022  Patient has a known history vascular disease and coronary artery disease  He had stent in 2010 and subsequently CABG in March 2020  CABG was 4 vessel bypass  He had previously been following with [facility] for his cardiovascular management  Since his bypass, he had been feeling well overall  He underwent left lower extremity fem-fem bypass in 2021  Of note, he has been more recently having exertional claudication  He has plan for potential bypassable lower extremity in the coming weeks  He is here today for preoperative CV risk assessment prior to his upcoming procedure  He has been exertional and climbs greater than a flight of stairs without any symptoms of chest discomfort or shortness of breath  He denies any chest pain, pressure, tightness or squeezing  Denies lightheadedness, dizziness or palpitations  Denies lower extremity swelling, orthopnea or paroxysmal nocturnal dyspnea      Review of Systems:  Review of Systems   Constitutional: Negative for decreased appetite, fever, weight gain and weight loss  HENT: Negative for congestion and sore throat  Eyes: Negative for visual disturbance     Cardiovascular: Negative for chest pain, dyspnea on exertion, leg swelling, near-syncope and palpitations  Respiratory: Negative for cough and shortness of breath  Hematologic/Lymphatic: Negative for bleeding problem  Skin: Negative for rash  Musculoskeletal: Negative for myalgias and neck pain  Gastrointestinal: Negative for abdominal pain and nausea  Neurological: Negative for light-headedness and weakness  Psychiatric/Behavioral: Negative for depression         Past Medical History:   Diagnosis Date   • Coronary artery disease without angina pectoris 2020   • Diabetes mellitus ([address] )    • DVT (deep venous thrombosis) ([facility])    • Hyperlipidemia    • Hypertension        Past Surgical History:   Procedure Laterality Date   • ANGIOPLASTY  10/19/2021    bilateral   • CARDIAC CATHETERIZATION  2013    calif    CABG  x4   • COLONOSCOPY     • COLONOSCOPY     • CORONARY ANGIOPLASTY WITH STENT PLACEMENT  2010   • CORONARY ARTERY BYPASS GRAFT     • FEMORAL ARTERY STENT     • FEMORAL BYPASS  10/20/2021   • IR PELVIC ANGIOGRAM  2022   • [place] SLCTV CATHJ 3RD+ ORD SLCTV ABDL PEL/LXTR [facility]  2022    Procedure: ULTRASOUND-GUIDED LEFT BRACHIAL ARTERY PUNCTURE, AORTOGRAM, RIGHT COMMON ILIAC ARTERY ANGIOPLASTY WITH 8 X 40 MM BALLOON, ANGIOPLASTY OF RIGHT EXTERNAL AND COMMON ILIAC ARTERY STENOSIS WITH 6 X 150 MM DRUG-ELUTING BALLOON, STENTING OF RIGHT DISTAL EXTERNAL ILIAC ARTERY STENOSIS WITH 7 X 40 MM SELF EXPANDING STENT POST DILATED WITH A 6 MM BALLOON ;  Surgeon: Paco Newman MD;  [place]       Social History     Socioeconomic History   • Marital status: /Civil Union     Spouse name: Not on file   • Number of children: Not on file   • Years of education: Not on file   • Highest education level: Not on file   Occupational History   • Not on file   Tobacco Use   • Smoking status: Former Smoker     Packs/day: 1 50     Years: 40 00     Pack years: 60 00     Types: Cigarettes     Quit date: 2015     Years since quittin 3   • Smokeless tobacco: Never Used   • Tobacco comment: quit --    Vaping Use   • Vaping Use: Never used   Substance and Sexual Activity   • Alcohol use: Not Currently     Alcohol/week: 0 0 standard drinks   • Drug use: No   • Sexual activity: Not on file   Other Topics Concern   • Not on file   Social History Narrative    · Most recent tobacco use screenin2020      Social Determinants of Health     Financial Resource Strain: Not on file   Food Insecurity: Not on file   Transportation Needs: Not on file   Physical Activity: Not on file   Stress: Not on file   Social Connections: Not on file   Intimate Partner Violence: Not on file   Housing Stability: Not on file       Family History   Problem Relation Age of Onset   • No Known Problems Mother    • No Known Problems Father        No Known Allergies      Current Outpatient Medications:   •  acetaminophen (TYLENOL) 500 mg tablet, Take 1,000 mg by mouth every 8 (eight) hours as needed, Disp: , Rfl:   •  amLODIPine (NORVASC) 5 mg tablet, Take 1 tablet (5 mg total) by mouth daily, Disp: 90 tablet, Rfl: 1  •  ASPIRIN 81 PO, Take 81 mg by mouth daily, Disp: , Rfl:   •  atorvastatin (LIPITOR) 40 mg tablet, TAKE 1 TABLET DAILY, Disp: 90 tablet, Rfl: 3  •  clopidogrel (PLAVIX) 75 mg tablet, TAKE 1 TABLET BY MOUTH EVERY DAY, Disp: 90 tablet, Rfl: 1  •  Dulaglutide (Trulicity) 1 5 AX/1 0VA SOPN, Inject 0 5 mL (1 5 mg total) under the skin once a week, Disp: 3 mL, Rfl: 6  •  ferrous sulfate 325 (65 Fe) mg tablet, Take 1 tablet (325 mg total) by mouth 2 (two) times a day with meals, Disp: 60 tablet, Rfl: 6  •  glipiZIDE (GLUCOTROL) 10 mg tablet, Take 1 tablet (10 mg total) by mouth 2 (two) times a day before meals, Disp: 180 tablet, Rfl: 3  •  lisinopril-hydrochlorothiazide (PRINZIDE,ZESTORETIC) 20-12 5 MG per tablet, Take 0 5 tablets by mouth daily, Disp: 90 tablet, Rfl: 3  •  metFORMIN (GLUCOPHAGE) 1000 MG tablet, Take 1 tablet (1,000 mg total) by mouth 2 (two) times a day with meals, Disp: 180 tablet, Rfl: 3  •  omeprazole (PriLOSEC) 20 mg delayed release capsule, Take 1 capsule (20 mg total) by mouth daily before breakfast (Patient taking differently: Take 20 mg by mouth if needed), Disp: 30 capsule, Rfl: 5  •  hydrocortisone 2 5 % cream, Apply topically 4 (four) times a day as needed for rash (Patient not taking: No sig reported), Disp: 30 g, Rfl: 0  •  polyethylene glycol (Golytely) 4000 mL solution, Take 4,000 mL by mouth once for 1 dose Take 4000 mL by mouth once for 1 dose  Use as directed, Disp: 4000 mL, Rfl: 0    Vitals:    11/07/22 1054   BP: 160/50   BP Location: Left arm   Patient Position: Sitting   Cuff Size: Adult   Pulse: 58   Weight: 69 4 kg (153 lb)   Height: 5' 6" (1 676 m)     Body mass index is 24 69 kg/m²  PHYSICAL EXAMINATION:  Gen: Awake, Alert, NAD   Head/eyes: AT/NC, pupils equal and round, Anicteric  ENT: mmm  Neck: Supple, No elevated JVP, trachea midline  Resp: CTA bilaterally no w/r/r  CV: RRR +S1, S2, No m/r/g  Abd: Soft, NT/ND + BS  Ext: no LE edema bilaterally  Neuro: Follows commands, moves all extermities  Psych: Appropriate affect, normal mood, pleasant attitude, non-combative  Skin: warm; no rash, erythema or venous stasis changes on exposed skin    --------------------------------------------------------------------------------  TREADMILL STRESS  No results found for this or any previous visit      --------------------------------------------------------------------------------  NUCLEAR STRESS TEST: No results found for this or any previous visit  No results found for this or any previous visit       --------------------------------------------------------------------------------  CATH:    · Typical CP s/p cath w/ 99% p-mLAD, 50% LM, 60% OM1, 99% dRCA, 99% rPAV, March 2020  --------------------------------------------------------------------------------  ECHO:   No results found for this or any previous visit      No results found for this or any previous visit     --------------------------------------------------------------------------------  HOLTER  No results found for this or any previous visit  No results found for this or any previous visit     --------------------------------------------------------------------------------  CAROTIDS  Results for orders placed during the hospital encounter of 11/02/21    VAS carotid complete study    Narrative  [facility] REPORT  CLINICAL:  Indications:  Patient c/o hearing in his pulse in his right ear following his  revascularization of his left leg 2 weeks ago  Patient reports a failed left axillary-femoral bypass graft and is s/p a cross  over femoral bypass graft  Operative History:  femoral stent  Risk Factors: HTN, Hyperlipidemia and previous smoking (quit >10yrs ago)  Clinical  Right Pressure:  116/50 mm Hg, Left Pressure:  130/50 mm Hg  FINDINGS:    Right        Impression  PSV  EDV (cm/s)  Direction of Flow  Ratio  Dist  ICA                 61          16                      0 54  Mid  ICA                 100          24                      0 88  Prox  ICA    1 - 49%     157          28                      1 38  Dist CCA                  85           7  Mid CCA                  114          16                      1 49  Prox CCA                  77          11                      1 06  Ext Carotid              104           0                      0 91  Prox Vert                 68          19  Antegrade  Subclavian               242           0  Innominate                72           0    Left         Impression  PSV  EDV (cm/s)  Direction of Flow  Ratio  Dist  ICA                 94          19                      0 87  Mid  ICA                 117          25                      1 07  Prox   ICA    1 - 49%     123          36                      1 12  Dist CCA                  93          21  Mid CCA                  109          23                      1 75  Prox CCA 62          15  Ext Carotid              238           0                      2 18  Prox Vert                 62           0  Antegrade  Subclavian               269           0        CONCLUSION:    Impression  RIGHT:  There is <50% stenosis noted in the internal carotid artery  Plaque is  heterogenous and irregular  Vertebral artery flow is antegrade  There is a minimal stenosis visualized in  the proximal subclavian artery  LEFT:  There is <50% stenosis noted in the internal carotid artery  Plaque is  heterogenous and irregular  Vertebral artery flow is antegrade  There is no significant subclavian artery  disease  Previous study performed at outside facility  Internal carotid artery stenosis determination by consensus criteria from:  Addie Rodas , et al  Carotid Artery Stenosis: Gray-Scale and Doppler US Diagnosis  - Society of Radiologists in [facility] [address], Radiology 2003;  [phone]      SIGNATURE:  Electronically Signed by: Chandrika Kimble on 2021-11-02 01:09:20 PM     --------------------------------------------------------------------------------  ECGs:  Results for orders placed or performed in visit on 11/07/22   POCT ECG    Impression    Sinus bradycardia first-degree AV block 58 bpm, rightward axis, nonspecific ST T-wave abnormalities        Lab Results   Component Value Date    WBC 6 35 08/29/2022    HGB 9 5 (L) 08/29/2022    HCT 31 8 (L) 08/29/2022    MCV 91 08/29/2022     08/29/2022      Lab Results   Component Value Date    SODIUM 137 08/29/2022    K 4 1 08/29/2022     08/29/2022    CO2 21 08/29/2022    BUN 26 (H) 08/29/2022    CREATININE 1 24 08/29/2022    GLUC 207 (H) 04/20/2022    CALCIUM 9 4 08/29/2022      Lab Results   Component Value Date    HGBA1C 6 4 (H) 08/29/2022      No results found for: CHOL  Lab Results   Component Value Date    HDL 34 (L) 08/29/2022    HDL 35 (L) 11/15/2021    HDL 38 (L) 05/26/2021     Lab Results   Component Value Date LDLCALC 28 08/29/2022    LDLCALC 36 11/15/2021    LDLCALC 56 05/26/2021     Lab Results   Component Value Date    TRIG 152 (H) 08/29/2022    TRIG 112 11/15/2021    TRIG 189 (H) 05/26/2021     No results found for: CHOLHDL   Lab Results   Component Value Date    INR 1 12 04/20/2022    INR 1 08 11/03/2020    PROTIME 14 0 04/20/2022    PROTIME 14 1 11/03/2020        1  Coronary artery disease involving native coronary artery of native heart without angina pectoris  -     POCT ECG    2  Essential hypertension  -     Ambulatory Referral to Cardiology  -     POCT ECG  -     amLODIPine (NORVASC) 5 mg tablet; Take 1 tablet (5 mg total) by mouth daily    3  Other hyperlipidemia    4  Type 2 diabetes mellitus with diabetic peripheral angiopathy without gangrene, with long-term current use of insulin ([facility] [address] )    5  Peripheral vascular disease with claudication ([facility] [address] )    6  Carotid stenosis, asymptomatic, bilateral  -     Ambulatory Referral to Cardiology        IMPRESSION:  • CAD  o s/p CABG x4 w/ LIMA-LAD, sequential SVG-PDA and PL and SVG-OM1, March 2020  o remote stent to unknown vessel, 2010  • Hypertension  o LVEF 52%, grade 2 diastolic dysfunction, AV sclerosis, mild MAC, mild MR, trace TR/NC, March 2022  • Dyslipidemia w/ LDL 28 mg/dL, August 2022  • Type 2 diabetes mellitus  • PVD  o s/p left fem-fem bypass, 2021  o High-grade stenosis right SFA by CT with runoff, April 2022  • Carotid stenosis < 50% bilaterally, March 2020  • Left-sided proximal subclavian stenosis, November 2021  • CKD stage 3  • Iron deficiency anemia  • History of tobacco use    PLAN:  It was a pleasure to see Thuan Ortiz in the office today for initial CV evaluation  He is here today for preoperative cardiovascular risk assessment prior to his upcoming lower extremity bypass surgery  He has no chest pain concerning for angina he has no signs or symptoms of heart failure  He examines to be euvolemic in the office today    ECG shows nonspecific changes that are unchanged from his ECG in 2018  He can perform greater than 4 Mets on a daily basis without significant exertional symptoms  He has been tolerating his current medications without any reported adverse effects  Based on his clinical presentation, the following recommendations:    1  Given the patient's lack of symptoms, good overall functional capacity, unchanged ECG, but multiple prior risk factors, he is at a low to intermediate CV risk for his upcoming bypass surgery  No further CV testing prior to the OR as it would not change our management  2  Would encourage 30 minutes a day, 5 days a week of moderate intensity activity to build cardiovascular endurance  3  Recommend a heart healthy diet low in sodium and carbohydrate  We have discussed the Mediterranean diet  4  Continue lisinopril and hydrochlorothiazide for antihypertensive control  Blood pressure is elevated  Would start amlodipine 5 mg daily  Risks and benefits of medication discussed  5  Continue high-intensity statin  Goal LDL is less than 70 mg/dL  May ultimately up titrate to maximum dose statin despite LDL being to goal   6  Continue lifelong aspirin and continue Plavix  7  Blood glucose management as per primary care  8  No additional CV testing at this time  9  We will follow up with him in 6 months to reassess his progress  As always, please do not hesitate to call with any questions  Portions of the record may have been created with voice recognition software  Occasional wrong word or "sound a like" substitutions may have occurred due to the inherent limitations of voice recognition software  Read the chart carefully and recognize, using context, where substitutions have occurred        Signed: Ubaldo Allen DO, [facility] [place], [place], [place]

## 2022-11-11 NOTE — PROGRESS NOTES
Patient arrived to unit without complaint  Patient tolerated Venofer infusion without incident  AVS declined and patient aware of next appointment  Patient left in stable condition  Yes

## 2022-11-14 ENCOUNTER — IMMUNIZATIONS (OUTPATIENT)
Dept: FAMILY MEDICINE CLINIC | Facility: CLINIC | Age: 77
End: 2022-11-14

## 2022-11-14 DIAGNOSIS — Z23 ENCOUNTER FOR IMMUNIZATION: Primary | ICD-10-CM

## 2022-11-22 ENCOUNTER — TELEPHONE (OUTPATIENT)
Dept: VASCULAR SURGERY | Facility: CLINIC | Age: 77
End: 2022-11-22

## 2022-11-22 ENCOUNTER — OFFICE VISIT (OUTPATIENT)
Dept: VASCULAR SURGERY | Facility: CLINIC | Age: 77
End: 2022-11-22

## 2022-11-22 VITALS
SYSTOLIC BLOOD PRESSURE: 136 MMHG | BODY MASS INDEX: 25.04 KG/M2 | HEIGHT: 66 IN | OXYGEN SATURATION: 99 % | DIASTOLIC BLOOD PRESSURE: 80 MMHG | WEIGHT: 155.8 LBS | HEART RATE: 63 BPM

## 2022-11-22 DIAGNOSIS — I70.213 ATHEROSCLEROSIS OF NATIVE ARTERY OF BOTH LOWER EXTREMITIES WITH INTERMITTENT CLAUDICATION (HCC): Primary | ICD-10-CM

## 2022-11-22 DIAGNOSIS — I74.09 AORTOILIAC OCCLUSIVE DISEASE (HCC): Chronic | ICD-10-CM

## 2022-11-22 RX ORDER — CEFAZOLIN SODIUM 1 G/50ML
1000 SOLUTION INTRAVENOUS ONCE
OUTPATIENT
Start: 2022-11-22 | End: 2022-11-22

## 2022-11-22 RX ORDER — CHLORHEXIDINE GLUCONATE 0.12 MG/ML
15 RINSE ORAL ONCE
OUTPATIENT
Start: 2022-11-22 | End: 2022-11-22

## 2022-11-22 NOTE — LETTER
November 22, 2022     Jose Chavez   [address]  [address] [facility]  [address]    Patient: Jane Palmer   YOB: 1945   Date of Visit: 11/22/2022       Dear Dr Emmy Vallejo: Thank you for referring Jane Palmer to me for evaluation  Below are the relevant portions of my assessment and plan of care  Atherosclerosis of native artery of both lower extremities with intermittent claudication (HCC)  Aortoiliac and bilateral infrainguinal arterial occlusive disease with severe claudication  This is evidently causing severe limitations in his ability to perform his daily activities any feel something needs to be done  We discussed the findings on recent CT scan which do show patency of bilateral proximal superficial femoral arteries but critical stenosis with segmental occlusion of the left superficial femoral artery  We discussed the option of attempted endovascular intervention which would have to be performed via a small cutdown over his femoral-femoral graft  He and his family understand the risks and benefits and would like to proceed  If you have questions, please do not hesitate to call me  I look forward to following Ehsan Blanton along with you           Sincerely,        Bushra Wills MD        CC: No Recipients

## 2022-11-22 NOTE — ASSESSMENT & PLAN NOTE
Aortoiliac and bilateral infrainguinal arterial occlusive disease with severe claudication  This is evidently causing severe limitations in his ability to perform his daily activities any feel something needs to be done  We discussed the findings on recent CT scan which do show patency of bilateral proximal superficial femoral arteries but critical stenosis with segmental occlusion of the left superficial femoral artery  We discussed the option of attempted endovascular intervention which would have to be performed via a small cutdown over his femoral-femoral graft  He and his family understand the risks and benefits and would like to proceed

## 2022-11-22 NOTE — PATIENT INSTRUCTIONS
Atherosclerosis of native artery of both lower extremities with intermittent claudication (HCC)  Aortoiliac and bilateral infrainguinal arterial occlusive disease with severe claudication  This is evidently causing severe limitations in his ability to perform his daily activities any feel something needs to be done  We discussed the findings on recent CT scan which do show patency of bilateral proximal superficial femoral arteries but critical stenosis with segmental occlusion of the left superficial femoral artery  We discussed the option of attempted endovascular intervention which would have to be performed via a small cutdown over his femoral-femoral graft  He and his family understand the risks and benefits and would like to proceed

## 2022-11-22 NOTE — PROGRESS NOTES
Assessment/Plan:    Atherosclerosis of native artery of both lower extremities with intermittent claudication (HCC)  Aortoiliac and bilateral infrainguinal arterial occlusive disease with severe claudication  This is evidently causing severe limitations in his ability to perform his daily activities any feel something needs to be done  We discussed the findings on recent CT scan which do show patency of bilateral proximal superficial femoral arteries but critical stenosis with segmental occlusion of the left superficial femoral artery  We discussed the option of attempted endovascular intervention which would have to be performed via a small cutdown over his femoral-femoral graft  He and his family understand the risks and benefits and would like to proceed  Diagnoses and all orders for this visit:    Atherosclerosis of native artery of both lower extremities with intermittent claudication (HCC)    Aortoiliac occlusive disease ([facility] [address] )          Subjective:      Patient ID: Indra Berman is a 76 y o  male  Patient presents today for results review of CTA abd w/ runoff wwo contrast done on 11/02/2022, JAMEL done on 10/03/2022 and CV done on11/07/2022  Patient is s/p right pelvic angiogram w/ ROSA ELENA and EIA stents and right iliac stent done on 05/06/2022  He is also s/p right to left fem fem bypass graft done 10/20/2021  He c/o bilateral calf pain when walking approx  50 ft  He c/o numbness in both legs when he is laying in bed at night  He is a former smoker  He is on ASA 81mg, Atorvastatin and Plavix  26-year-old with long history of arterial occlusive disease has a history of a right to left femoral-femoral bypass performed at outside facility  On his initial evaluation he was complaining of severe calf claudication causing significant limitation  There was a inflow right iliac stenosis which was treated via a brachial approach  Unfortunately symptoms did not improve    On re-evaluation today he notes no change  He is only able to walk short distances before he has to stop secondary to bilateral calf pain  This then will resolve with periods of rest     CT angiogram 11/02/2022  On the right there is a near occlusion of the proximal superficial femoral artery with multiple areas of severe stenosis throughout the femoral-popliteal segment  There is then patency of the below-knee popliteal artery with three-vessel runoff  On the left there is a similar distribution disease with near occlusion the proximal superficial femoral artery but there is a segmental occlusion the distal superficial femoral artery  Carotid duplex 11/07/2022 with less than 50% bilateral carotid artery stenosis  I have spent 30 minutes with the patient and his son today in which greater than 50% of this time was spent in counseling/coordination of care regarding Diagnostic results, Prognosis, Risks and benefits of tx options, Patient and family education and Impressions            The following portions of the patient's history were reviewed and updated as appropriate: allergies, current medications, past family history, past medical history, past social history, past surgical history and problem list     Past Medical History:  Past Medical History:   Diagnosis Date   • Coronary artery disease without angina pectoris 2/25/2020   • Diabetes mellitus ([facility] [address] )    • DVT (deep venous thrombosis) ([facility] [address] )    • Hyperlipidemia    • Hypertension        Past Surgical History:  Past Surgical History:   Procedure Laterality Date   • ANGIOPLASTY  10/19/2021    bilateral   • CARDIAC CATHETERIZATION  2013    calif    CABG  x4   • COLONOSCOPY     • COLONOSCOPY     • CORONARY ANGIOPLASTY WITH STENT PLACEMENT  01/01/2010   • CORONARY ARTERY BYPASS GRAFT     • FEMORAL ARTERY STENT     • FEMORAL BYPASS  10/20/2021   • IR PELVIC ANGIOGRAM  5/6/2022   • [place] SLCTV CATHJ 3RD+ ORD SLCTV ABDL PEL/LXTR [facility]  5/6/2022    Procedure: ULTRASOUND-GUIDED LEFT BRACHIAL ARTERY PUNCTURE, AORTOGRAM, RIGHT COMMON ILIAC ARTERY ANGIOPLASTY WITH 8 X 40 MM BALLOON, ANGIOPLASTY OF RIGHT EXTERNAL AND COMMON ILIAC ARTERY STENOSIS WITH 6 X 150 MM DRUG-ELUTING BALLOON, STENTING OF RIGHT DISTAL EXTERNAL ILIAC ARTERY STENOSIS WITH 7 X 40 MM SELF EXPANDING STENT POST DILATED WITH A 6 MM BALLOON ;  Surgeon: Elma Guaman MD;  [place]       Social History:  Social History     Substance and Sexual Activity   Alcohol Use Not Currently   • Alcohol/week: 0 0 standard drinks     Social History     Substance and Sexual Activity   Drug Use No     Social History     Tobacco Use   Smoking Status Former   • Packs/day: 1 50   • Years: 40 00   • Pack years: 60 00   • Types: Cigarettes   • Quit date: 2015   • Years since quittin 4   Smokeless Tobacco Never   Tobacco Comments    quit --        Family History:  Family History   Problem Relation Age of Onset   • No Known Problems Mother    • No Known Problems Father        Allergies:  No Known Allergies    Medications:    Current Outpatient Medications:   •  acetaminophen (TYLENOL) 500 mg tablet, Take 1,000 mg by mouth every 8 (eight) hours as needed, Disp: , Rfl:   •  amLODIPine (NORVASC) 5 mg tablet, Take 1 tablet (5 mg total) by mouth daily, Disp: 90 tablet, Rfl: 1  •  ASPIRIN 81 PO, Take 81 mg by mouth daily, Disp: , Rfl:   •  atorvastatin (LIPITOR) 40 mg tablet, TAKE 1 TABLET DAILY, Disp: 90 tablet, Rfl: 3  •  clopidogrel (PLAVIX) 75 mg tablet, TAKE 1 TABLET BY MOUTH EVERY DAY, Disp: 90 tablet, Rfl: 1  •  Dulaglutide (Trulicity) 1 5 XQ/2 5TX SOPN, Inject 0 5 mL (1 5 mg total) under the skin once a week, Disp: 3 mL, Rfl: 6  •  ferrous sulfate 325 (65 Fe) mg tablet, Take 1 tablet (325 mg total) by mouth 2 (two) times a day with meals, Disp: 60 tablet, Rfl: 6  •  glipiZIDE (GLUCOTROL) 10 mg tablet, Take 1 tablet (10 mg total) by mouth 2 (two) times a day before meals, Disp: 180 tablet, Rfl: 3  •  lisinopril-hydrochlorothiazide (PRINZIDE,ZESTORETIC) 20-12 5 MG per tablet, Take 0 5 tablets by mouth daily, Disp: 90 tablet, Rfl: 3  •  metFORMIN (GLUCOPHAGE) 1000 MG tablet, Take 1 tablet (1,000 mg total) by mouth 2 (two) times a day with meals, Disp: 180 tablet, Rfl: 3  •  omeprazole (PriLOSEC) 20 mg delayed release capsule, Take 1 capsule (20 mg total) by mouth daily before breakfast (Patient taking differently: Take 20 mg by mouth if needed), Disp: 30 capsule, Rfl: 5  •  hydrocortisone 2 5 % cream, Apply topically 4 (four) times a day as needed for rash (Patient not taking: Reported on 7/7/2022), Disp: 30 g, Rfl: 0  •  polyethylene glycol (Golytely) 4000 mL solution, Take 4,000 mL by mouth once for 1 dose Take 4000 mL by mouth once for 1 dose  Use as directed, Disp: 4000 mL, Rfl: 0    Vitals:  /80 (11/22/22 1347)    Temp      Pulse 63 (11/22/22 1347)   Resp      SpO2 99 % (11/22/22 1347)      Lab Results and Cultures:   CBC with diff:   Lab Results   Component Value Date    WBC 6 35 08/29/2022    HGB 9 5 (L) 08/29/2022    HCT 31 8 (L) 08/29/2022    MCV 91 08/29/2022     08/29/2022    MCH 27 3 08/29/2022    MCHC 29 9 (L) 08/29/2022    RDW 16 0 (H) 08/29/2022    MPV 10 5 08/29/2022    NRBC 0 02/07/2022   ,   BMP/CMP:  Lab Results   Component Value Date    K 4 1 08/29/2022    K 4 3 05/29/2020     08/29/2022     05/29/2020    CO2 21 08/29/2022    CO2 23 05/29/2020    BUN 26 (H) 08/29/2022    BUN 17 05/29/2020    CREATININE 1 24 08/29/2022    CALCIUM 9 4 08/29/2022    CALCIUM 9 6 05/29/2020    AST 8 08/29/2022    AST 16 05/29/2020    ALT 21 08/29/2022    ALT 19 05/29/2020    ALKPHOS 57 08/29/2022    ALKPHOS 77 05/29/2020    EGFR 54 08/29/2022   ,   Lipid Panel: No results found for: CHOL,   Coags:   Lab Results   Component Value Date    INR 1 12 04/20/2022   ,       Review of Systems   Constitutional: Negative  HENT: Negative  Eyes: Negative  Respiratory: Negative  Cardiovascular: Negative  Gastrointestinal: Negative  Endocrine: Negative  Genitourinary: Negative  Musculoskeletal: Positive for arthralgias  Leg pain when walking   Skin: Negative  Allergic/Immunologic: Negative  Neurological: Positive for numbness  Hematological: Bruises/bleeds easily  Psychiatric/Behavioral: Negative            Objective:      /80 (BP Location: Left arm, Patient Position: Sitting, Cuff Size: Adult)   Pulse 63   Ht 5' 6" (1 676 m)   Wt 70 7 kg (155 lb 12 8 oz)   SpO2 99%   BMI 25 15 kg/m²          Physical Exam      Operative Scheduling Information:    Hospital:  [facility]    Physician:  Jorge A Baird    Surgery:  Bilateral lower extremity arteriogram with possible endovascular intervention via cutdown of the femoral-femoral bypass graft    Urgency:  Standard    Level:  Level 3: Outpatients to be scheduled for elective surgery than can be delayed up to 4 weeks without reasonable expectation of detriment to patient    Case Length:  Normal    Post-op Bed:  Stepdown    OR Table:  Discovery    Equipment Needs:  None    Medication Instructions:  Aspirin:   Continue (do not hold)  Plavix:  Continue (do not hold)    Hydration:  No    Contrast Allergy:  no

## 2022-11-22 NOTE — TELEPHONE ENCOUNTER
REMINDER: Under Reason For Call, comments MUST be formatted as:   (Surgeon's Initials) / (Procedure)      Special Instructions / FYI:     Procedure:  Bilateral lower extremity arteriogram with possible endovascular intervention via cutdown of the femoral-femoral bypass graft    Level: 3 - Route clearance(s) to [facility] Clearance Pool     Allergies: Patient has no known allergies  Instructions Given: Angiogram Instructions    Dialysis: Patient is not on dialysis  Return Visit Required Prior to Procedure: No     Consent: I certify that patient has signed, printed, timed, and dated their surgery consent  I certify that the patient's LEGAL NAME and DATE OF BIRTH are written in the upper left corner on BOTH sides of the consent  I certify that BOTH sides of the completed surgery consent have been scanned into the patient's Epic chart by myself on 11/22/2022  Yes, I have LABELED the consent in Epic as Consent for Vascular Procedure  For Surgical Clearances     Levels   1-3   ROUTE this encounter to [facility] Clearance Pool (AND)   [facility] Surgery Coordinator Pool     Level   4   ROUTE this encounter to [facility] Surgery Coordinator Pool       HYDRATION CLEARANCES   ONLY ROUTE TO  [facility] Clearance Pool     Patient does not require any pre operative clearance  Yes, I have ROUTED this encounter to [facility] Surgery Coordinator and/or [facility] Clearance Pool

## 2022-11-23 NOTE — TELEPHONE ENCOUNTER
Spoke to patient to schedule his surgery with Dr Lily Smith on 12-28-22 patient said that he is leaving for [place] on 12-15-22 and will not be back until 2-2-23 I told patient there was nothing earlier and he said then we wait   We will call him back to schedule LLF

## 2022-11-26 NOTE — PROGRESS NOTES
Hematology Outpatient Office Note    Date of Service: 12/5/2022    [facility] HEMATOLOGY SPECIALISTS VALERIE López  [facility]  [phone]    Reason for Consultation:   Chief Complaint   Patient presents with   • Follow-up       Referral Physician: No ref  provider found    Primary Care Physician:  Tiesha Owens DO       ASSESSMENT & PLAN      Diagnosis ICD-10-CM Associated Orders   1  Iron deficiency anemia due to chronic blood loss  D50 0 Iron Panel (Includes Ferritin, Iron Sat%, Iron, and TIBC)     CBC and differential            This is a 76 y o  c PMHx notable for cecal AVM, DM, DVT, HTN, HLP, CAD, being seen in consultation for chronic ANG      Iron Deficiency anemia: improved s/p IV iron  Low iron levels can cause anemia (low red blood cells)  Low iron levels can also make people feel poorly, even before anemia starts  Iron is used to make red blood cells  If blood is lost from the body, if iron can't be absorbed from food, or if something removes iron (pregnancy) the iron can be low and then anemia happens  Hx Cecal AVM  GI thought maybe GAVE  8/29/2022: Hgb 9 5, WBC 6 35k, MCV 91, plt 235k, retic 3 65%, FOBT + (8/31), ferritin 7, iron 30, Fe Sat 9%, TIBC 346  9/7 EGD/C-scope: internal hemorrhoids    Capsule endoscopy: no active bleeding      Iron deficiency is very common  Low iron can cause fatigue or tiredness, the desire to eat ice or non-food items like corn starch or dry pasta noodles, restless legs, weak fingernails, cracks at the corner of the mouth  Common causes of iron deficiency include inadequate diet (uncommon, usually vegetarians), gastric bypass surgery (very common), pregnancy (very common), periods (most common cause in younger women), blood loss (usually from the stomach or intestines), and decreased iron absorption from the intestines from gastritis, H pylori, acid blocking medicines or celiac disease   Even a normal menstrual period can cause iron deficiency  In 10% of patients a clear cause of low iron is not found  14% of women will have iron deficiency just from their menstrual period  Iron deficiency is treated with pills as a first step  For some people the iron pills do not work, cause severe side effects, or take too long to work: for these people IV iron can be given  You may only require IV iron very rarely, for example only when pregnant, or from 1-2 times a year based on your rate of need  It takes 4 weeks for IV iron to improve the anemia to maximum potential  If your fatigue is not from the low iron, getting iron treatment would not help the fatigue  Specific numbers on risks:  Because serious reactions can occur with IV iron and are so rare, it is difficult to determine their absolute number with IV iron  Based on reports of adverse reactions, Injectafer appears to have the lowest risk of serious hypersensitivity reactions or anaphylaxis  Next lowest would be Venofer, followed by Feraheme and Low Molecular Weight Iron Dextran ([place], Drug Safety 2021)  Anaphylaxis can occur with antibiotics such as penicillin (10-50/100,000), and can be fatal (in 1-2/100,000)  Risk of anaphylaxis with modern iron types ranges from 11/100,000 with iron sucrose to 25-70/100,000 with Feraheme or low molecular weight iron dextran, risk of death with IV iron ranges from 0 81/100,000 with low molecular weight iron dextran, 3 5/100,000 for Feraheme, to 6/100,000 with Venofer; however these values overlap statistically ([employer] Am J Hematology, 2015)  Another study found life-threatening reactions in 0 6/million with Venofer, and 3 3/million with iron dextran ([facility], 2006)      Blood transfusion also has risks: fever occurs in 1%, serious transfusion reactions occur in 8 1/100,000, life-threatening transfusion reactions occur in 1/140,000 units of blood given, Hepatitis B in 1/282,000, and Hepatitis C  in 1/1,150,000  [place] [facility] 2012)  From this IV iron is safer than driving a car or getting a blood transfusion, and we take precautions to make the risk as low as possible, but no medicine has zero risk  Patient instructions and Plan:  1)Your estimated iron deficit stores was 919 mg and pt received 5, 200mg IV iron infusions  · Discussion of decision making    I personally reviewed the following lab results, the image studies, pathology, other specialty/physicians consult notes and recommendations, and outside medical records from [facility]  I had a lengthy discussion with the patient and shared the work-up findings  We discussed the diagnosis and management plan as below  I spent 33 minutes reviewing the records (labs, clinician notes, outside records, medical history, ordering medicine/tests/procedures, interpreting the imaging/labs previously done) and coordination of care as well as direct time with the patient today, of which greater than 50% of the time was spent in counseling and coordination of care with the patient/family  · Plan/Labs  · CBC, iron pane in 6 months ordered  · Cont to f/u GI, potentially will do video capsule study        Follow Up: 6 months with preceding CBC, iron labs    All questions were answered to the patient's satisfaction during this encounter  The patient knows the contact information for our office and knows to reach out for any relevant concerns related to this encounter  They are to call for any temperature 100 4 or higher, new symptoms including but not restricted to shaking chills, decreased appetite, nausea, vomiting, diarrhea, increased fatigue, shortness of breath or chest pain, confusion, and not feeling the strength to come to the clinic  For all other listed problems and medical diagnosis in their chart - they are managed by PCP and/or other specialists, which the patient acknowledges   Thank you very much for your consultation and making us a part of this patient's care  We are continuing to follow closely with you  Please do not hesitate to reach out to me with any additional questions or concerns  Mal Dodge MD  Hematology & Medical Oncology Staff Physician             Disclaimer: This document was prepared using [employer] Fluency Direct technology  If a word or phrase is confusing, or does not make sense, this is likely due to recognition error which was not discovered during this clinician's review  If you believe an error has occurred, please contact me through [address] [place] line for dequan? cation  HEMATOLOGICAL HISTORY OF PRESENT ILLNESS      Clotting History None   Bleeding History GAVE related bleeding   Cancer History None   Family Cancer History Father (cancer)   H/O Blood/Plt Transfusion PRBC years ago   Tobacco/etoh/drug abuse 2 PPDx 44 years, quit 2004, no etoh abuse or rec drug use       Cancer Screening history n/a   Occupation [employer]      8/29/2022: Hgb 9 5, WBC 6 35k, MCV 91, plt 235k, retic 3 65%, FOBT + (8/31), ferritin 7, iron 30, Fe Sat 9%, TIBC 346  9/7 EGD/C-scope: internal hemorrhoids  10/3/2022-10/9/2022: 5 iV Venofer 200mg administered   11/28/2022: ferritin 26, iron 67, Fe Sat 21%, TIBC 322, Hgb 12 7 (now normal)      SUBJECTIVE  (INTERVAL HISTORY)      Interval events: feeling much better, higher energy, no more pica  I have reviewed the relevant past medical, surgical, social and family history  I have also reviewed allergies and medications for this patient  Review of Systems    Baseline weight: 152-154 lbs    Denies weight loss, F/C, N/V, SOB, CP, LH, HA  He has had fatigue and low energy since 6/2022  He has been chewing on ice all day since 3/2022  A 10-point review of system was performed, pertinent positive and negative were detailed as above  Otherwise, the 10-point review of system was negative        Past Medical History:   Diagnosis Date • Coronary artery disease without angina pectoris 2020   • Diabetes mellitus ([facility] [address] )    • DVT (deep venous thrombosis) ([facility] [address] )    • Hyperlipidemia    • Hypertension        Past Surgical History:   Procedure Laterality Date   • ANGIOPLASTY  10/19/2021    bilateral   • CARDIAC CATHETERIZATION  2013    calif    CABG  x4   • COLONOSCOPY     • COLONOSCOPY     • CORONARY ANGIOPLASTY WITH STENT PLACEMENT  2010   • CORONARY ARTERY BYPASS GRAFT     • FEMORAL ARTERY STENT     • FEMORAL BYPASS  10/20/2021   • IR PELVIC ANGIOGRAM  2022   • MI SLCTV CATHJ 3RD+ ORD SLCTV ABDL PEL/LXTR [facility]  2022    Procedure: ULTRASOUND-GUIDED LEFT BRACHIAL ARTERY PUNCTURE, AORTOGRAM, RIGHT COMMON ILIAC ARTERY ANGIOPLASTY WITH 8 X 40 MM BALLOON, ANGIOPLASTY OF RIGHT EXTERNAL AND COMMON ILIAC ARTERY STENOSIS WITH 6 X 150 MM DRUG-ELUTING BALLOON, STENTING OF RIGHT DISTAL EXTERNAL ILIAC ARTERY STENOSIS WITH 7 X 40 MM SELF EXPANDING STENT POST DILATED WITH A 6 MM BALLOON ;  Surgeon: Isabel Rehman MD;  Loca       Family History   Problem Relation Age of Onset   • No Known Problems Mother    • No Known Problems Father        Social History     Socioeconomic History   • Marital status: /Civil Union     Spouse name: Not on file   • Number of children: Not on file   • Years of education: Not on file   • Highest education level: Not on file   Occupational History   • Not on file   Tobacco Use   • Smoking status: Former     Packs/day: 1 50     Years: 40 00     Pack years: 60 00     Types: Cigarettes     Quit date: 2015     Years since quittin 4   • Smokeless tobacco: Never   • Tobacco comments:     quit --    Vaping Use   • Vaping Use: Never used   Substance and Sexual Activity   • Alcohol use: Not Currently     Alcohol/week: 0 0 standard drinks   • Drug use: No   • Sexual activity: Not on file   Other Topics Concern   • Not on file   Social History Narrative    · Most recent tobacco use screenin2020 Social Determinants of Health     Financial Resource Strain: Not on file   Food Insecurity: Not on file   Transportation Needs: Not on file   Physical Activity: Not on file   Stress: Not on file   Social Connections: Not on file   Intimate Partner Violence: Not on file   Housing Stability: Not on file       No Known Allergies    Current Outpatient Medications   Medication Sig Dispense Refill   • acetaminophen (TYLENOL) 500 mg tablet Take 1,000 mg by mouth every 8 (eight) hours as needed     • amLODIPine (NORVASC) 5 mg tablet Take 1 tablet (5 mg total) by mouth daily 90 tablet 1   • ASPIRIN 81 PO Take 81 mg by mouth daily     • atorvastatin (LIPITOR) 40 mg tablet TAKE 1 TABLET DAILY 90 tablet 3   • clopidogrel (PLAVIX) 75 mg tablet TAKE 1 TABLET BY MOUTH EVERY DAY 90 tablet 1   • Dulaglutide (Trulicity) 1 5 WN/1 8RW SOPN Inject 0 5 mL (1 5 mg total) under the skin once a week 3 mL 6   • ferrous sulfate 325 (65 Fe) mg tablet Take 1 tablet (325 mg total) by mouth 2 (two) times a day with meals 60 tablet 6   • glipiZIDE (GLUCOTROL) 10 mg tablet Take 1 tablet (10 mg total) by mouth 2 (two) times a day before meals 180 tablet 3   • hydrocortisone 2 5 % cream Apply topically 4 (four) times a day as needed for rash 30 g 0   • lisinopril-hydrochlorothiazide (PRINZIDE,ZESTORETIC) 20-12 5 MG per tablet Take 0 5 tablets by mouth daily 90 tablet 3   • metFORMIN (GLUCOPHAGE) 1000 MG tablet Take 1 tablet (1,000 mg total) by mouth 2 (two) times a day with meals 180 tablet 3   • omeprazole (PriLOSEC) 20 mg delayed release capsule Take 1 capsule (20 mg total) by mouth daily before breakfast (Patient taking differently: Take 20 mg by mouth if needed) 30 capsule 5   • polyethylene glycol (Golytely) 4000 mL solution Take 4,000 mL by mouth once for 1 dose Take 4000 mL by mouth once for 1 dose  Use as directed 4000 mL 0     No current facility-administered medications for this visit         (Not in a hospital admission)        Objective:     24 Hour Vitals Assessment:     Vitals:    12/05/22 1034   BP: 125/60   Pulse: 66   Resp: 18   Temp: 98 1 °F (36 7 °C)   SpO2: 98%       PHYSICIAN EXAM:    General: Appearance: alert, cooperative, no distress  HEENT: Normocephalic, atraumatic  No scleral icterus  conjunctivae clear  EOMI  Chest: No tenderness to palpation  No open wound noted  Lungs: Clear to auscultation bilaterally, Respirations unlabored  Cardiac: Regular rate and rhythm, +S1and S2  Abdomen: Soft, non-tender, non-distended  Bowel sounds are normal   Extremities:  No edema, cyanosis, clubbing  Skin: Skin color, turgor are normal  No rashes  Neurologic: Awake, Alert, and oriented, no gross focal deficits noted b/l  DATA REVIEW:    Pathology Result:    Final Diagnosis   Date Value Ref Range Status   09/07/2022   Final    A  Duodenum,  biopsy:  - Duodenal mucosa with focal acute inflammation and reactive changes  - No intraepithelial lymphocytosis and no villous blunting   - No epithelial dysplasia and no evidence of malignancy  B   Stomach, biopsy:  - Gastric antral and oxyntic mucosa with no significant pathologic alteration   - Negative for intestinal metaplasia, dysplasia or carcinoma  - No Helicobacter pylori is identified on H&E stained slide  Image Results:   Image result are reviewed and documented in Hematology/Oncology history  I personally reviewed these images  VAS carotid complete study     [facility] REPORT  CLINICAL:  Indications:  Carotid disease w/o CVA [I65 29]  The patient has history of  carotid artery disease  He currently has no symptom nor complaint    Operative History:  2022-05-06 Right Ir pelvic angiogram with right ROSA ELENA and EIA stent placement  2022-05-06 Right Transluminal placement of iliac stent, percutaneous  2021-10-20 RT to LT fem fem bypass graft  2021-10-19 Angioplasty  2013-01-01 CABG x4  2010-01-01 Coronary angioplasty with stent placement  CABG  femoral stent  Risk Factors: HTN, Diabetes (IDDM), Hyperlipidemia, CAD and previous smoking  (quit 5-10yrs)  Clinical  Right Pressure:  138/66 mm Hg, Left Pressure:  146/56 mm Hg  FINDINGS:     Right        PSV  EDV (cm/s)  Ratio    Dist  ICA     56          12   0 64    Mid  ICA      95          19   1 09    Prox  ICA    113          20   1 29    Dist CCA      73          12           Mid CCA       87           9   1 64    Prox CCA      53           0           Ext Carotid  142           0   1 62    Prox Vert     70          15           Subclavian   256           0              Left         PSV  EDV (cm/s)  Ratio    Dist  ICA     82          20   1 16    Mid  ICA      86          19   1 21    Prox  ICA     77          19   1 10    Dist CCA      70          11           Mid CCA       71          12   1 09    Prox CCA      65          10           Ext Carotid  112           6   1 59    Prox Vert     37           6           Subclavian   233          36                    CONCLUSION:     Impression  RIGHT:  There is <50% stenosis noted in the internal carotid artery  Plaque is  heterogenous and irregular  Vertebral artery flow is antegrade  There is no significant subclavian artery  disease     LEFT:  There is <50% stenosis noted in the internal carotid artery  Plaque is  heterogenous and irregular  Vertebral artery flow is antegrade  There is no significant subclavian artery  disease  Compared to previous study on 11/2/2021, there is no significant change  SIGNATURE:  Electronically Signed by: Hilary Carranza on 2022-11-07 02:28:52 PM      LABS:  Lab data are reviewed and documented in HemOnc history         Lab Results   Component Value Date    HGB 12 7 11/28/2022    HCT 40 7 11/28/2022    MCV 93 11/28/2022     11/28/2022    WBC 5 69 11/28/2022    NRBC 0 11/28/2022     Lab Results   Component Value Date    K 4 1 08/29/2022     08/29/2022    CO2 21 08/29/2022    BUN 26 (H) 08/29/2022    CREATININE 1 24 08/29/2022    GLUF 132 (H) 08/29/2022    CALCIUM 9 4 08/29/2022    CORRECTEDCA 10 2 (H) 11/15/2021    AST 8 08/29/2022    ALT 21 08/29/2022    ALKPHOS 57 08/29/2022    EGFR 54 08/29/2022       Lab Results   Component Value Date    IRON 67 11/28/2022    TIBC 322 11/28/2022    FERRITIN 26 11/28/2022    FERRITIN 7 (L) 08/29/2022    FERRITIN 37 04/20/2022    FERRITIN 17 02/07/2022    FERRITIN 13 01/05/2022    FERRITIN 10 11/15/2021    FERRITIN 28 03/11/2021    FERRITIN 12 11/03/2020    FERRITIN 7 (L) 10/06/2020       Lab Results   Component Value Date    [id number] 208 11/15/2021    [id number] 681 05/29/2020       No results for input(s): WBC, CREAT in the last 72 hours      Invalid input(s):  PLT     By:  Zulema Dawn, 12/5/2022, 10:46 AM

## 2022-11-28 ENCOUNTER — APPOINTMENT (OUTPATIENT)
Dept: LAB | Facility: CLINIC | Age: 77
End: 2022-11-28

## 2022-11-28 DIAGNOSIS — D50.0 IRON DEFICIENCY ANEMIA DUE TO CHRONIC BLOOD LOSS: ICD-10-CM

## 2022-11-28 LAB
BASOPHILS # BLD AUTO: 0.03 THOUSANDS/ÂΜL (ref 0–0.1)
BASOPHILS NFR BLD AUTO: 1 % (ref 0–1)
EOSINOPHIL # BLD AUTO: 0.24 THOUSAND/ÂΜL (ref 0–0.61)
EOSINOPHIL NFR BLD AUTO: 4 % (ref 0–6)
ERYTHROCYTE [DISTWIDTH] IN BLOOD BY AUTOMATED COUNT: 19.7 % (ref 11.6–15.1)
FERRITIN SERPL-MCNC: 26 NG/ML (ref 8–388)
HCT VFR BLD AUTO: 40.7 % (ref 36.5–49.3)
HGB BLD-MCNC: 12.7 G/DL (ref 12–17)
IMM GRANULOCYTES # BLD AUTO: 0.03 THOUSAND/UL (ref 0–0.2)
IMM GRANULOCYTES NFR BLD AUTO: 1 % (ref 0–2)
IRON SATN MFR SERPL: 21 % (ref 20–50)
IRON SERPL-MCNC: 67 UG/DL (ref 65–175)
LYMPHOCYTES # BLD AUTO: 1.17 THOUSANDS/ÂΜL (ref 0.6–4.47)
LYMPHOCYTES NFR BLD AUTO: 21 % (ref 14–44)
MCH RBC QN AUTO: 28.9 PG (ref 26.8–34.3)
MCHC RBC AUTO-ENTMCNC: 31.2 G/DL (ref 31.4–37.4)
MCV RBC AUTO: 93 FL (ref 82–98)
MONOCYTES # BLD AUTO: 0.43 THOUSAND/ÂΜL (ref 0.17–1.22)
MONOCYTES NFR BLD AUTO: 8 % (ref 4–12)
NEUTROPHILS # BLD AUTO: 3.79 THOUSANDS/ÂΜL (ref 1.85–7.62)
NEUTS SEG NFR BLD AUTO: 65 % (ref 43–75)
NRBC BLD AUTO-RTO: 0 /100 WBCS
PLATELET # BLD AUTO: 185 THOUSANDS/UL (ref 149–390)
PMV BLD AUTO: 10.7 FL (ref 8.9–12.7)
RBC # BLD AUTO: 4.4 MILLION/UL (ref 3.88–5.62)
TIBC SERPL-MCNC: 322 UG/DL (ref 250–450)
WBC # BLD AUTO: 5.69 THOUSAND/UL (ref 4.31–10.16)

## 2022-12-05 ENCOUNTER — OFFICE VISIT (OUTPATIENT)
Dept: HEMATOLOGY ONCOLOGY | Facility: CLINIC | Age: 77
End: 2022-12-05

## 2022-12-05 VITALS
HEIGHT: 66 IN | TEMPERATURE: 98.1 F | BODY MASS INDEX: 23.7 KG/M2 | OXYGEN SATURATION: 98 % | HEART RATE: 66 BPM | WEIGHT: 147.5 LBS | DIASTOLIC BLOOD PRESSURE: 60 MMHG | RESPIRATION RATE: 18 BRPM | SYSTOLIC BLOOD PRESSURE: 125 MMHG

## 2022-12-05 DIAGNOSIS — D50.0 IRON DEFICIENCY ANEMIA DUE TO CHRONIC BLOOD LOSS: Primary | ICD-10-CM

## 2022-12-06 ENCOUNTER — OFFICE VISIT (OUTPATIENT)
Dept: FAMILY MEDICINE CLINIC | Facility: CLINIC | Age: 77
End: 2022-12-06

## 2022-12-06 VITALS
SYSTOLIC BLOOD PRESSURE: 130 MMHG | WEIGHT: 155.2 LBS | OXYGEN SATURATION: 97 % | RESPIRATION RATE: 16 BRPM | TEMPERATURE: 96.6 F | BODY MASS INDEX: 24.94 KG/M2 | DIASTOLIC BLOOD PRESSURE: 50 MMHG | HEIGHT: 66 IN | HEART RATE: 67 BPM

## 2022-12-06 DIAGNOSIS — Z23 PNEUMOCOCCAL VACCINE ADMINISTERED: ICD-10-CM

## 2022-12-06 DIAGNOSIS — I73.9 PERIPHERAL VASCULAR DISEASE (HCC): ICD-10-CM

## 2022-12-06 DIAGNOSIS — Z79.899 ENCOUNTER FOR LONG-TERM (CURRENT) USE OF MEDICATIONS: ICD-10-CM

## 2022-12-06 DIAGNOSIS — Z79.4 TYPE 2 DIABETES MELLITUS WITH DIABETIC PERIPHERAL ANGIOPATHY WITHOUT GANGRENE, WITH LONG-TERM CURRENT USE OF INSULIN (HCC): Primary | ICD-10-CM

## 2022-12-06 DIAGNOSIS — Z79.899 ENCOUNTER FOR LONG-TERM CURRENT USE OF HIGH RISK MEDICATION: ICD-10-CM

## 2022-12-06 DIAGNOSIS — Z23 NEED FOR PNEUMOCOCCAL VACCINE: ICD-10-CM

## 2022-12-06 DIAGNOSIS — E11.51 TYPE 2 DIABETES MELLITUS WITH DIABETIC PERIPHERAL ANGIOPATHY WITHOUT GANGRENE, WITH LONG-TERM CURRENT USE OF INSULIN (HCC): Primary | ICD-10-CM

## 2022-12-06 DIAGNOSIS — D50.9 IRON DEFICIENCY ANEMIA, UNSPECIFIED IRON DEFICIENCY ANEMIA TYPE: ICD-10-CM

## 2022-12-06 DIAGNOSIS — I65.23 CAROTID STENOSIS, ASYMPTOMATIC, BILATERAL: ICD-10-CM

## 2022-12-06 DIAGNOSIS — I10 ESSENTIAL HYPERTENSION: ICD-10-CM

## 2022-12-06 NOTE — PROGRESS NOTES
Assessment/Plan:  76 y o male, in NAD, seen with wife Liam Watkins, and doing well  Sees Dr Ja Crain re PVD of LE's and he will operate on the fem-fem (?) vessels  This in Feb 2023  Pt going to [facility] to visit daughter x 6-7 wks soon  He is relatively new pt to me, and is here to f/u on the following medical issues, some serious:    BMI Counseling: Body mass index is 25 05 kg/m²  The BMI is above normal  Nutrition recommendations include decreasing portion sizes, encouraging healthy choices of fruits and vegetables, decreasing fast food intake, consuming healthier snacks, limiting drinks that contain sugar, moderation in carbohydrate intake, increasing intake of lean protein and reducing intake of saturated and trans fat  Exercise recommendations include moderate physical activity 150 minutes/week and exercising 3-5 times per week  No pharmacotherapy was ordered  Rationale for BMI follow-up plan is due to patient being overweight or obese  1  Type 2 diabetes mellitus with diabetic peripheral angiopathy without gangrene, with long-term current use of insulin ([facility] )  Comments:  Last A1c August 29, 2022 was 6 4 up from low fives to prior times 2 prior times but about the same as 1 year ago    2  Peripheral vascular disease ([facility] )  Comments: Following with Dr Ja Crain and will have revascularization surgery in February 2023    3  Essential hypertension  Comments:  Nicely controlled    4  Carotid stenosis, asymptomatic, bilateral  Comments:  Less than 50% obstruction bilateral carotids November 7, 2022    5  Iron deficiency anemia, unspecified iron deficiency anemia type  Comments:  Marked improvement in hemoglobin  12 7 is up from 9 5 several months ago and iron 67, up from 30    6  Encounter for long-term (current) use of medications  Comments:  all meds reviewed with pt and wife       7  Encounter for long-term current use of high risk medication  Comments:  meds include; asa, ,metformin, ppi and diabetic meds  He keeps his BS's quite low     8  Need for pneumococcal vaccine  -     Pneumococcal Conjugate Vaccine 20-valent (Pcv20)    9  Pneumococcal vaccine administered        Subjective:      Patient ID: Danica Car is a 76 y o  male  HPI    The following portions of the patient's history were reviewed and updated as appropriate: He  has a past medical history of Coronary artery disease without angina pectoris (2/25/2020), Diabetes mellitus ([facility] ), DVT (deep venous thrombosis) ([facility] ), Hyperlipidemia, and Hypertension  He   Patient Active Problem List    Diagnosis Date Noted   • Stage 3a chronic kidney disease ([facility] ) 08/30/2022   • Pre-op evaluation 05/01/2022   • Aortoiliac occlusive disease ([facility] ) 04/11/2022   • Embolism and thrombosis of arteries of the lower extremities [facility] ) 03/17/2021   • Need for vaccination 03/17/2021   • Iron deficiency anemia 10/13/2020   • Coronary artery disease without angina pectoris 02/25/2020   • Atherosclerosis of native artery of both lower extremities with intermittent claudication ([facility] ) 08/16/2018   • Carotid stenosis, asymptomatic, bilateral 08/16/2018   • Type 2 diabetes mellitus, with long-term current use of insulin ([facility] ) 06/25/2018   • Other hyperlipidemia 06/25/2018   • Essential hypertension 06/25/2018   • Peripheral vascular disease with claudication ([facility] ) 06/25/2018     He  has a past surgical history that includes Femoral artery stent; Coronary angioplasty with stent (01/01/2010); Colonoscopy; Angioplasty (10/19/2021); Femoral bypass (10/20/2021); Cardiac catheterization (2013); Coronary artery bypass graft; Colonoscopy; pr slctv cathj 3rd+ ord slctv abdl pel/lxtr brnch (5/6/2022); and IR pelvic angiogram (5/6/2022)  His family history includes No Known Problems in his father and mother  He  reports that he quit smoking about 7 years ago  His smoking use included cigarettes  He has a 60 00 pack-year smoking history   He has never used smokeless tobacco  He reports that he does not currently use alcohol  He reports that he does not use drugs  Current Outpatient Medications   Medication Sig Dispense Refill   • acetaminophen (TYLENOL) 500 mg tablet Take 1,000 mg by mouth every 8 (eight) hours as needed     • amLODIPine (NORVASC) 5 mg tablet Take 1 tablet (5 mg total) by mouth daily 90 tablet 1   • ASPIRIN 81 PO Take 81 mg by mouth daily     • atorvastatin (LIPITOR) 40 mg tablet TAKE 1 TABLET DAILY 90 tablet 3   • clopidogrel (PLAVIX) 75 mg tablet TAKE 1 TABLET BY MOUTH EVERY DAY 90 tablet 1   • Dulaglutide (Trulicity) 1 5 VK/3 1JS SOPN Inject 0 5 mL (1 5 mg total) under the skin once a week 3 mL 6   • ferrous sulfate 325 (65 Fe) mg tablet Take 1 tablet (325 mg total) by mouth 2 (two) times a day with meals 60 tablet 6   • glipiZIDE (GLUCOTROL) 10 mg tablet Take 1 tablet (10 mg total) by mouth 2 (two) times a day before meals 180 tablet 3   • hydrocortisone 2 5 % cream Apply topically 4 (four) times a day as needed for rash 30 g 0   • lisinopril-hydrochlorothiazide (PRINZIDE,ZESTORETIC) 20-12 5 MG per tablet Take 0 5 tablets by mouth daily 90 tablet 3   • metFORMIN (GLUCOPHAGE) 1000 MG tablet Take 1 tablet (1,000 mg total) by mouth 2 (two) times a day with meals 180 tablet 3   • omeprazole (PriLOSEC) 20 mg delayed release capsule Take 1 capsule (20 mg total) by mouth daily before breakfast (Patient taking differently: Take 20 mg by mouth if needed) 30 capsule 5   • polyethylene glycol (Golytely) 4000 mL solution Take 4,000 mL by mouth once for 1 dose Take 4000 mL by mouth once for 1 dose  Use as directed 4000 mL 0     No current facility-administered medications for this visit       Current Outpatient Medications on File Prior to Visit   Medication Sig   • acetaminophen (TYLENOL) 500 mg tablet Take 1,000 mg by mouth every 8 (eight) hours as needed   • amLODIPine (NORVASC) 5 mg tablet Take 1 tablet (5 mg total) by mouth daily   • ASPIRIN 81 PO Take 81 mg by mouth daily   • atorvastatin (LIPITOR) 40 mg tablet TAKE 1 TABLET DAILY   • clopidogrel (PLAVIX) 75 mg tablet TAKE 1 TABLET BY MOUTH EVERY DAY   • Dulaglutide (Trulicity) 1 5 CN/6 4WD SOPN Inject 0 5 mL (1 5 mg total) under the skin once a week   • ferrous sulfate 325 (65 Fe) mg tablet Take 1 tablet (325 mg total) by mouth 2 (two) times a day with meals   • glipiZIDE (GLUCOTROL) 10 mg tablet Take 1 tablet (10 mg total) by mouth 2 (two) times a day before meals   • hydrocortisone 2 5 % cream Apply topically 4 (four) times a day as needed for rash   • lisinopril-hydrochlorothiazide (PRINZIDE,ZESTORETIC) 20-12 5 MG per tablet Take 0 5 tablets by mouth daily   • metFORMIN (GLUCOPHAGE) 1000 MG tablet Take 1 tablet (1,000 mg total) by mouth 2 (two) times a day with meals   • omeprazole (PriLOSEC) 20 mg delayed release capsule Take 1 capsule (20 mg total) by mouth daily before breakfast (Patient taking differently: Take 20 mg by mouth if needed)   • polyethylene glycol (Golytely) 4000 mL solution Take 4,000 mL by mouth once for 1 dose Take 4000 mL by mouth once for 1 dose  Use as directed     No current facility-administered medications on file prior to visit  He has No Known Allergies       Review of Systems   Constitutional: Negative for activity change, appetite change, chills, diaphoresis, fatigue, fever and unexpected weight change  HENT: Negative for congestion, dental problem, drooling, ear discharge, ear pain, facial swelling, mouth sores, nosebleeds, postnasal drip, rhinorrhea, trouble swallowing and voice change  Eyes: Negative for photophobia, pain, discharge, redness, itching and visual disturbance  Respiratory: Negative for apnea, cough, choking, chest tightness and shortness of breath  Denies any and all respiratory issues - SOB, orthopnea, etc    Cardiovascular: Negative for chest pain and leg swelling          I have carefully question patient and his wife regarding any kind of anginal symptoms chest pain tightness heaviness pressure etc  given his severe degree of peripheral vascular disease, certainly cardiovascular disease may well exist  He denies any angina    He does have significant intermittent claudication both lower extremities stating his circulation was only 25% of what it should  That has changed now, s/p surgery  He will continue to  follow-up with vascular surgeon  Gastrointestinal: Negative for abdominal distention, abdominal pain, constipation, diarrhea and nausea  Endocrine: Negative for polydipsia, polyphagia and polyuria  Genitourinary: Negative for decreased urine volume, difficulty urinating, dysuria, enuresis and hematuria  Musculoskeletal: Positive for arthralgias  Negative for back pain, gait problem and joint swelling  Skin: Negative for color change (color much improved over last OV), pallor, rash and wound  Allergic/Immunologic: Negative for immunocompromised state  Neurological: Negative for dizziness, seizures, syncope, facial asymmetry, speech difficulty, light-headedness and headaches  Hematological: Negative for adenopathy  Psychiatric/Behavioral: Negative for agitation, behavioral problems, confusion and decreased concentration  Dysphoric mood: normal concern/depression of everything he's gone thru  Objective:      /50 (BP Location: Left arm, Patient Position: Sitting, Cuff Size: Standard)   Pulse 67   Temp (!) 96 6 °F (35 9 °C) (Temporal)   Resp 16   Ht 5' 6" (1 676 m)   Wt 70 4 kg (155 lb 3 2 oz)   SpO2 97%   BMI 25 05 kg/m²          Physical Exam  Vitals and nursing note reviewed  Exam conducted with a chaperone present (Wife present throughout the encounter)  Constitutional:       Appearance: Normal appearance  He is well-developed and normal weight  He is ill-appearing (sallow, ashen skin tone)  He is not diaphoretic  Comments: Patient looks and feels fairly well despite hemoglobin 11 2 and ferritin 7--what appears to be marked iron deficiency anemia  Worried about occult GI neoplasm??   HENT:      Head: Normocephalic and atraumatic  Right Ear: Ear canal and external ear normal  There is no impacted cerumen  Left Ear: Ear canal and external ear normal  There is no impacted cerumen  Ears:      Comments: Sl wax in the canal on the R ear - the one in question  TM somewhat retracted  Suspect: middle ear syndrome/pressure  Plan: gargling and Flonase- fully discussed  Nose: Nose normal    Eyes:      General:         Right eye: No discharge  Left eye: No discharge  Extraocular Movements: Extraocular movements intact  Conjunctiva/sclera: Conjunctivae normal       Pupils: Pupils are equal, round, and reactive to light  Neck:      Trachea: No tracheal deviation  Cardiovascular:      Rate and Rhythm: Normal rate and regular rhythm  Heart sounds: Murmur heard  Pulmonary:      Effort: Pulmonary effort is normal       Breath sounds: Normal breath sounds  No wheezing, rhonchi or rales (slight bibasilar rales and retained pul secretions)  Chest:      Chest wall: No tenderness  Abdominal:      General: Abdomen is flat  Bowel sounds are normal       Palpations: Abdomen is soft  Tenderness: There is no abdominal tenderness  There is no guarding or rebound  Musculoskeletal:         General: Normal range of motion  Cervical back: Normal range of motion and neck supple  Right lower leg: No edema  Left lower leg: No edema  Lymphadenopathy:      Cervical: No cervical adenopathy  Skin:     General: Skin is warm and dry  Findings: No bruising or erythema  Comments: Somewhat sallow color, with slight bronzed Mediterranean hue--not particularly healthy looking, but typical of a COPD smoker although he has stop smoking several years ago   Neurological:      General: No focal deficit present  Mental Status: He is alert and oriented to person, place, and time  Mental status is at baseline  Cranial Nerves: No cranial nerve deficit  Sensory: No sensory deficit  Motor: No weakness  Coordination: Coordination normal       Gait: Gait normal    Psychiatric:         Mood and Affect: Mood normal          Behavior: Behavior normal          Thought Content: Thought content normal          Judgment: Judgment normal          35 min with pt and wife, and all the above issues discussed and vetted  Another 10 mins with Epic documentation also ==>total time in excess of 45 min  Pt is a vasculopath, old smoker, with COPD, ASHD, severre PVD, and much more-- see above  This time was spent reviewing previous records, reviewing previous laboratory and other tests, taking history from patient, examination of patient, discussion of prognosis and treatment, ordering laboratory tests, ordering medications, and completion of the medical record

## 2023-02-03 ENCOUNTER — PREP FOR PROCEDURE (OUTPATIENT)
Dept: VASCULAR SURGERY | Facility: CLINIC | Age: 78
End: 2023-02-03

## 2023-02-03 ENCOUNTER — ANESTHESIA EVENT (OUTPATIENT)
Dept: PERIOP | Facility: HOSPITAL | Age: 78
End: 2023-02-03

## 2023-02-06 ENCOUNTER — APPOINTMENT (OUTPATIENT)
Dept: LAB | Facility: CLINIC | Age: 78
End: 2023-02-06

## 2023-02-06 DIAGNOSIS — I70.213 ATHEROSCLEROSIS OF NATIVE ARTERY OF BOTH LOWER EXTREMITIES WITH INTERMITTENT CLAUDICATION (HCC): ICD-10-CM

## 2023-02-06 LAB
ANION GAP SERPL CALCULATED.3IONS-SCNC: 10 MMOL/L (ref 4–13)
BUN SERPL-MCNC: 20 MG/DL (ref 5–25)
CALCIUM SERPL-MCNC: 10.1 MG/DL (ref 8.3–10.1)
CHLORIDE SERPL-SCNC: 108 MMOL/L (ref 96–108)
CO2 SERPL-SCNC: 22 MMOL/L (ref 21–32)
CREAT SERPL-MCNC: 1.1 MG/DL (ref 0.6–1.3)
ERYTHROCYTE [DISTWIDTH] IN BLOOD BY AUTOMATED COUNT: 15.1 % (ref 11.6–15.1)
GFR SERPL CREATININE-BSD FRML MDRD: 63 ML/MIN/1.73SQ M
GLUCOSE P FAST SERPL-MCNC: 83 MG/DL (ref 65–99)
HCT VFR BLD AUTO: 38.7 % (ref 36.5–49.3)
HGB BLD-MCNC: 12.3 G/DL (ref 12–17)
INR PPP: 1.02 (ref 0.84–1.19)
MCH RBC QN AUTO: 29.7 PG (ref 26.8–34.3)
MCHC RBC AUTO-ENTMCNC: 31.8 G/DL (ref 31.4–37.4)
MCV RBC AUTO: 94 FL (ref 82–98)
PLATELET # BLD AUTO: 175 THOUSANDS/UL (ref 149–390)
PMV BLD AUTO: 10.3 FL (ref 8.9–12.7)
POTASSIUM SERPL-SCNC: 3.7 MMOL/L (ref 3.5–5.3)
PROTHROMBIN TIME: 13.6 SECONDS (ref 11.6–14.5)
RBC # BLD AUTO: 4.14 MILLION/UL (ref 3.88–5.62)
SODIUM SERPL-SCNC: 140 MMOL/L (ref 135–147)
WBC # BLD AUTO: 5.79 THOUSAND/UL (ref 4.31–10.16)

## 2023-02-08 DIAGNOSIS — E11.51 TYPE 2 DIABETES MELLITUS WITH DIABETIC PERIPHERAL ANGIOPATHY WITHOUT GANGRENE, WITH LONG-TERM CURRENT USE OF INSULIN (HCC): ICD-10-CM

## 2023-02-08 DIAGNOSIS — Z79.4 TYPE 2 DIABETES MELLITUS WITH DIABETIC PERIPHERAL ANGIOPATHY WITHOUT GANGRENE, WITH LONG-TERM CURRENT USE OF INSULIN (HCC): ICD-10-CM

## 2023-02-08 RX ORDER — GLIPIZIDE 10 MG/1
10 TABLET ORAL
Qty: 180 TABLET | Refills: 3 | Status: SHIPPED | OUTPATIENT
Start: 2023-02-08

## 2023-02-08 NOTE — TELEPHONE ENCOUNTER
Received an Rx fax sheet from [facility] stating patient requested medication refill on Glipizide 10 mg  Medication was sent to the pharmacy at this time

## 2023-02-09 NOTE — PRE-PROCEDURE INSTRUCTIONS
Pre-Surgery Instructions:   Medication Instructions   • acetaminophen (TYLENOL) 500 mg tablet Uses PRN- OK to take day of surgery   • ASPIRIN 81 PO Instructions provided by MD  No Hold-take DOS  • atorvastatin (LIPITOR) 40 mg tablet Take night before surgery   • clopidogrel (PLAVIX) 75 mg tablet Instructions provided by MD No Hold-take DOS   • Dulaglutide (Trulicity) 1 5 VB/8 2TA SOPN Takes weekly on Monday-Ok to take  • ferrous sulfate 325 (65 Fe) mg tablet Hold day of surgery  • glipiZIDE (GLUCOTROL) 10 mg tablet Hold day of surgery  • hydrocortisone 2 5 % cream Stop taking 1 day prior to surgery  • lisinopril-hydrochlorothiazide (PRINZIDE,ZESTORETIC) 20-12 5 MG per tablet Hold day of surgery  • metFORMIN (GLUCOPHAGE) 1000 MG tablet Hold day of surgery  • omeprazole (PriLOSEC) 20 mg delayed release capsule Uses PRN- OK to take day of surgery     Covid screening negative as per patient  Reviewed with patient via phone all medication instructions  Advised not to take any NSAID's, Vitamins or Herbal products prior to the DOS  Acetaminophen products are ok to take  Reviewed showering instructions as given by surgical office  Instructed to call office with any questions or concerns  Instructed about NPO after midnight the night before DOS, except sips of water with allowed medications in AM on DOS  Informed about call from [facility] with the time to arrive for the scheduled surgery  Patient verbalized understanding  See Geriatric Assessment below       • Cognitive Assessment:   • CAM:   • TUG <15 sec:  • Falls (last 6 months): 0  • Hand  score:  -Attempt 1:  -Attempt 2:  -Attempt 3:  • Nikolay Total Score: 23  • PHQ- 9 Depression Scale:0  • Nutrition Assessment Score:14  • METS: 7 04  • Health goals:  -What are your overall health goals? (quit smoking, wt  loss, rest, decrease stress)  To not have pain in my legs   -What brings you strength? (family, friends, Anglican, health)  Wife and Son   -What activities are important to you? (exercise, reading, travel, work)              Travel,swimming, visiting family in [place]

## 2023-02-14 ENCOUNTER — APPOINTMENT (OUTPATIENT)
Dept: RADIOLOGY | Facility: HOSPITAL | Age: 78
End: 2023-02-14
Attending: SURGERY

## 2023-02-14 ENCOUNTER — HOSPITAL ENCOUNTER (OUTPATIENT)
Facility: HOSPITAL | Age: 78
Setting detail: OUTPATIENT SURGERY
Discharge: HOME | End: 2023-02-14
Attending: SURGERY | Admitting: SURGERY

## 2023-02-14 ENCOUNTER — TELEPHONE (OUTPATIENT)
Dept: VASCULAR SURGERY | Facility: CLINIC | Age: 78
End: 2023-02-14

## 2023-02-14 ENCOUNTER — ANESTHESIA (OUTPATIENT)
Dept: PERIOP | Facility: HOSPITAL | Age: 78
End: 2023-02-14

## 2023-02-14 VITALS
TEMPERATURE: 97.5 F | DIASTOLIC BLOOD PRESSURE: 65 MMHG | BODY MASS INDEX: 25.55 KG/M2 | SYSTOLIC BLOOD PRESSURE: 151 MMHG | RESPIRATION RATE: 16 BRPM | HEIGHT: 66 IN | OXYGEN SATURATION: 97 % | WEIGHT: 158.95 LBS | HEART RATE: 64 BPM

## 2023-02-14 DIAGNOSIS — I73.9 CLAUDICATION IN PERIPHERAL VASCULAR DISEASE (HCC): ICD-10-CM

## 2023-02-14 DIAGNOSIS — I70.213 ATHEROSCLEROSIS OF NATIVE ARTERY OF BOTH LOWER EXTREMITIES WITH INTERMITTENT CLAUDICATION (HCC): Primary | ICD-10-CM

## 2023-02-14 LAB
GLUCOSE SERPL-MCNC: 124 MG/DL (ref 65–140)
GLUCOSE SERPL-MCNC: 192 MG/DL (ref 65–140)

## 2023-02-14 DEVICE — DRUG-ELUTING VASCULAR STENT SYSTEM
Type: IMPLANTABLE DEVICE | Site: LEG | Status: FUNCTIONAL
Brand: ELUVIA™

## 2023-02-14 RX ORDER — HYDROCODONE BITARTRATE AND ACETAMINOPHEN 5; 325 MG/1; MG/1
1 TABLET ORAL EVERY 6 HOURS PRN
Qty: 10 TABLET | Refills: 0 | Status: SHIPPED | OUTPATIENT
Start: 2023-02-14 | End: 2023-02-24

## 2023-02-14 RX ORDER — ONDANSETRON 2 MG/ML
4 INJECTION INTRAMUSCULAR; INTRAVENOUS ONCE AS NEEDED
Status: DISCONTINUED | OUTPATIENT
Start: 2023-02-14 | End: 2023-02-14 | Stop reason: HOSPADM

## 2023-02-14 RX ORDER — FENTANYL CITRATE/PF 50 MCG/ML
25 SYRINGE (ML) INJECTION
Status: DISCONTINUED | OUTPATIENT
Start: 2023-02-14 | End: 2023-02-14 | Stop reason: HOSPADM

## 2023-02-14 RX ORDER — CHLORHEXIDINE GLUCONATE 0.12 MG/ML
15 RINSE ORAL ONCE
Status: COMPLETED | OUTPATIENT
Start: 2023-02-14 | End: 2023-02-14

## 2023-02-14 RX ORDER — IODIXANOL 320 MG/ML
INJECTION, SOLUTION INTRAVASCULAR AS NEEDED
Status: DISCONTINUED | OUTPATIENT
Start: 2023-02-14 | End: 2023-02-14 | Stop reason: HOSPADM

## 2023-02-14 RX ORDER — CEFAZOLIN SODIUM 1 G/50ML
1000 SOLUTION INTRAVENOUS ONCE
Status: COMPLETED | OUTPATIENT
Start: 2023-02-14 | End: 2023-02-14

## 2023-02-14 RX ORDER — BUPIVACAINE HYDROCHLORIDE 5 MG/ML
INJECTION, SOLUTION EPIDURAL; INTRACAUDAL AS NEEDED
Status: DISCONTINUED | OUTPATIENT
Start: 2023-02-14 | End: 2023-02-14 | Stop reason: HOSPADM

## 2023-02-14 RX ORDER — FENTANYL CITRATE 50 UG/ML
INJECTION, SOLUTION INTRAMUSCULAR; INTRAVENOUS AS NEEDED
Status: DISCONTINUED | OUTPATIENT
Start: 2023-02-14 | End: 2023-02-14

## 2023-02-14 RX ORDER — ONDANSETRON 2 MG/ML
INJECTION INTRAMUSCULAR; INTRAVENOUS AS NEEDED
Status: DISCONTINUED | OUTPATIENT
Start: 2023-02-14 | End: 2023-02-14

## 2023-02-14 RX ORDER — ACETAMINOPHEN 325 MG/1
650 TABLET ORAL ONCE
Status: COMPLETED | OUTPATIENT
Start: 2023-02-14 | End: 2023-02-14

## 2023-02-14 RX ORDER — SODIUM CHLORIDE 9 MG/ML
125 INJECTION, SOLUTION INTRAVENOUS CONTINUOUS
Status: DISCONTINUED | OUTPATIENT
Start: 2023-02-14 | End: 2023-02-14 | Stop reason: HOSPADM

## 2023-02-14 RX ORDER — LIDOCAINE HYDROCHLORIDE 20 MG/ML
INJECTION, SOLUTION EPIDURAL; INFILTRATION; INTRACAUDAL; PERINEURAL AS NEEDED
Status: DISCONTINUED | OUTPATIENT
Start: 2023-02-14 | End: 2023-02-14

## 2023-02-14 RX ORDER — PROPOFOL 10 MG/ML
INJECTION, EMULSION INTRAVENOUS AS NEEDED
Status: DISCONTINUED | OUTPATIENT
Start: 2023-02-14 | End: 2023-02-14

## 2023-02-14 RX ORDER — HEPARIN SODIUM 1000 [USP'U]/ML
INJECTION, SOLUTION INTRAVENOUS; SUBCUTANEOUS AS NEEDED
Status: DISCONTINUED | OUTPATIENT
Start: 2023-02-14 | End: 2023-02-14

## 2023-02-14 RX ORDER — HEPARIN SODIUM 200 [USP'U]/100ML
INJECTION, SOLUTION INTRAVENOUS
Status: COMPLETED | OUTPATIENT
Start: 2023-02-14 | End: 2023-02-14

## 2023-02-14 RX ADMIN — ACETAMINOPHEN 325MG 650 MG: 325 TABLET ORAL at 13:59

## 2023-02-14 RX ADMIN — HEPARIN SODIUM 1000 UNITS: 1000 INJECTION INTRAVENOUS; SUBCUTANEOUS at 11:00

## 2023-02-14 RX ADMIN — SODIUM CHLORIDE 125 ML/HR: 0.9 INJECTION, SOLUTION INTRAVENOUS at 06:57

## 2023-02-14 RX ADMIN — FENTANYL CITRATE 25 MCG: 50 INJECTION INTRAMUSCULAR; INTRAVENOUS at 08:39

## 2023-02-14 RX ADMIN — HEPARIN SODIUM 8000 UNITS: 1000 INJECTION INTRAVENOUS; SUBCUTANEOUS at 08:56

## 2023-02-14 RX ADMIN — CEFAZOLIN SODIUM 1000 MG: 1 SOLUTION INTRAVENOUS at 08:17

## 2023-02-14 RX ADMIN — FENTANYL CITRATE 25 MCG: 50 INJECTION, SOLUTION INTRAMUSCULAR; INTRAVENOUS at 12:31

## 2023-02-14 RX ADMIN — HEPARIN SODIUM 1000 UNITS: 1000 INJECTION INTRAVENOUS; SUBCUTANEOUS at 09:24

## 2023-02-14 RX ADMIN — FENTANYL CITRATE 25 MCG: 50 INJECTION INTRAMUSCULAR; INTRAVENOUS at 10:21

## 2023-02-14 RX ADMIN — CHLORHEXIDINE GLUCONATE 0.12% ORAL RINSE 15 ML: 1.2 LIQUID ORAL at 06:57

## 2023-02-14 RX ADMIN — FENTANYL CITRATE 25 MCG: 50 INJECTION, SOLUTION INTRAMUSCULAR; INTRAVENOUS at 12:09

## 2023-02-14 RX ADMIN — FENTANYL CITRATE 25 MCG: 50 INJECTION INTRAMUSCULAR; INTRAVENOUS at 10:00

## 2023-02-14 RX ADMIN — ONDANSETRON 4 MG: 2 INJECTION INTRAMUSCULAR; INTRAVENOUS at 11:22

## 2023-02-14 RX ADMIN — SODIUM CHLORIDE: 0.9 INJECTION, SOLUTION INTRAVENOUS at 10:42

## 2023-02-14 RX ADMIN — LIDOCAINE HYDROCHLORIDE 80 MG: 20 INJECTION, SOLUTION EPIDURAL; INFILTRATION; INTRACAUDAL; PERINEURAL at 08:33

## 2023-02-14 RX ADMIN — FENTANYL CITRATE 25 MCG: 50 INJECTION INTRAMUSCULAR; INTRAVENOUS at 08:50

## 2023-02-14 RX ADMIN — FENTANYL CITRATE 25 MCG: 50 INJECTION, SOLUTION INTRAMUSCULAR; INTRAVENOUS at 12:19

## 2023-02-14 RX ADMIN — HEPARIN SODIUM 1000 UNITS: 1000 INJECTION INTRAVENOUS; SUBCUTANEOUS at 09:55

## 2023-02-14 RX ADMIN — PROPOFOL 140 MG: 10 INJECTION, EMULSION INTRAVENOUS at 08:33

## 2023-02-14 NOTE — ANESTHESIA POSTPROCEDURE EVALUATION
Post-Op Assessment Note    CV Status:  Stable    Pain management: adequate     Mental Status:  Alert and awake   Hydration Status:  Euvolemic   PONV Controlled:  Controlled   Airway Patency:  Patent      Post Op Vitals Reviewed: Yes      Staff: Anesthesiologist, CRNA         No notable events documented      /63 (02/14/23 1335)    Temp (!) 97 3 °F (36 3 °C) (02/14/23 1335)    Pulse 62 (02/14/23 1335)   Resp 16 (02/14/23 1335)    SpO2 96 % (02/14/23 1335)

## 2023-02-14 NOTE — TELEPHONE ENCOUNTER
Pt called, he was prescribed Xarelto 2 5mg post op today  He cannot afford  Confirmed w/ pharmacy pt has a $500 deductible he must meet and then his copay is approx $300  Also Alvino Yarbrough is on back order  Pt states he will not take coumadin  TT S Lisa BOWEN and notified of same  Per Mateusz Du "OK, just do aspirin and Plavix Then  4:13 PM"    S/w pt and notified of same, pt verbalized understanding  ? If he was aware Anitaken Birch was backordered, he is, pharmacist notified him  Offered to ask provider to send another pain med to pharmacy  Pt declined, he states as of now he is not having any pain and he was told there is Tylenol in the [place]   He will take Tylenol as needed if he has pain  Advised to call us back should he need something stronger, he will do so

## 2023-02-14 NOTE — H&P
Assessment/Plan:     Atherosclerosis of native artery of both lower extremities with intermittent claudication (HCC)  Aortoiliac and bilateral infrainguinal arterial occlusive disease with severe claudication  This is evidently causing severe limitations in his ability to perform his daily activities any feel something needs to be done  We discussed the findings on recent CT scan which do show patency of bilateral proximal superficial femoral arteries but critical stenosis with segmental occlusion of the left superficial femoral artery  We discussed the option of attempted endovascular intervention which would have to be performed via a small cutdown over his femoral-femoral graft  He and his family understand the risks and benefits and would like to proceed            Subjective:       Patient ID: 75-year-old with long history of arterial occlusive disease has a history of a right to left femoral-femoral bypass performed at outside facility  On his initial evaluation he was complaining of severe calf claudication causing significant limitation  There was a inflow right iliac stenosis which was treated via a brachial approach  Unfortunately symptoms did not improve  On re-evaluation today he notes no change  He is only able to walk short distances before he has to stop secondary to bilateral calf pain  This then will resolve with periods of rest      CT angiogram 11/02/2022  On the right there is a near occlusion of the proximal superficial femoral artery with multiple areas of severe stenosis throughout the femoral-popliteal segment  There is then patency of the below-knee popliteal artery with three-vessel runoff  On the left there is a similar distribution disease with near occlusion the proximal superficial femoral artery but there is a segmental occlusion the distal superficial femoral artery        Carotid duplex 11/07/2022 with less than 50% bilateral carotid artery stenosis      Past Medical History:  Past Medical History:   Diagnosis Date   • Coronary artery disease without angina pectoris 2020   • D[facility] (deep venous thrombosis) [facility]    • GERD (gastroesophageal reflux disease)        Past Surgical History:  Past Surgical History:   Procedure Laterality Date   • ANGIOPLASTY  10/19/2021    bilateral   • CARDIAC CATHETERIZATION  2013    calif    CABG  x4   • COLONOSCOPY     • COLONOSCOPY     • CORONARY ANGIOPLASTY WITH STENT PLACEMENT  2010   • CORONARY ARTERY BYPASS GRAFT     • FEMORAL ARTERY STENT     • FEMORAL BYPASS  10/20/2021   • IR PELVIC ANGIOGRAM  2022   • ME SLCTV CATHJ 3RD+ ORD SLCTV ABDL PEL/LXTR [facility]  2022    Procedure: ULTRASOUND-GUIDED LEFT BRACHIAL ARTERY PUNCTURE, AORTOGRAM, RIGHT COMMON ILIAC ARTERY ANGIOPLASTY WITH 8 X 40 MM BALLOON, ANGIOPLASTY OF RIGHT EXTERNAL AND COMMON ILIAC ARTERY STENOSIS WITH 6 X 150 MM DRUG-ELUTING BALLOON, STENTING OF RIGHT DISTAL EXTERNAL ILIAC ARTERY STENOSIS WITH 7 X 40 MM SELF EXPANDING STENT POST DILATED WITH A 6 MM BALLOON ;  Surgeon: Fern Franz MD;  [place]       Social History:  Social History     Substance and Sexual Activity   Alcohol Use Not Currently   • Alcohol/week: 0 0 standard drinks     Social History     Substance and Sexual Activity   Drug Use No     Social History     Tobacco Use   Smoking Status Former   • Packs/day: 1 50   • Years: 40 00   • Pack years: 60 00   • Types: Cigarettes   • Quit date: 2015   • Years since quittin 6   Smokeless Tobacco Never   Tobacco Comments    quit --        Family History:  Family History   Problem Relation Age of Onset   • No Known Problems Mother    • No Known Problems Father        Allergies:  No Known Allergies    Medications:    Current Facility-Administered Medications:   •  ceFAZolin (ANCEF) IVPB (premix in dextrose) 1,000 mg 50 mL, 1,000 mg, Intravenous, Once, Fern Franz MD  •  sodium chloride 0 9 % infusion, 125 mL/hr, Intravenous, Continuous, Heidy Moran MD [place], Last Rate: 125 mL/hr at 02/14/23 0721, Continue from Pre-op at 02/14/23 0721    Vitals:  BP     Temp      Pulse     Resp      SpO2        Lab Results and Cultures:   CBC with diff:   Lab Results   Component Value Date    WBC 5 79 02/06/2023    HGB 12 3 02/06/2023    HCT 38 7 02/06/2023    MCV 94 02/06/2023     02/06/2023    MCH 29 7 02/06/2023    MCHC 31 8 02/06/2023    RDW 15 1 02/06/2023    MPV 10 3 02/06/2023    NRBC 0 11/28/2022   ,   BMP/CMP:  Lab Results   Component Value Date    K 3 7 02/06/2023    K 4 3 05/29/2020     02/06/2023     05/29/2020    CO2 22 02/06/2023    CO2 23 05/29/2020    BUN 20 02/06/2023    BUN 17 05/29/2020    CREATININE 1 10 02/06/2023    CALCIUM 10 1 02/06/2023    CALCIUM 9 6 05/29/2020    AST 8 08/29/2022    AST 16 05/29/2020    ALT 21 08/29/2022    ALT 19 05/29/2020    ALKPHOS 57 08/29/2022    ALKPHOS 77 05/29/2020    EGFR 63 02/06/2023   ,   Lipid Panel: No results found for: CHOL,   Coags:   Lab Results   Component Value Date    INR 1 02 02/06/2023   ,     PHYSICAL EXAMINATION:  Gen: Awake, Alert, NAD   Head/eyes: AT/NC, pupils equal and round, Anicteric  ENT: mmm  Neck: Supple, No elevated JVP, trachea midline  Resp: CTA bilaterally no w/r/r  CV: RRR +S1, S2, No m/r/g  2+ fem no palpable pedals  Abd: Soft, NT/ND + BS  Ext: no LE edema bilaterally  Neuro:  Follows commands, moves all extermities  Psych: Appropriate affect, normal mood, pleasant attitude, non-combative  Skin: warm; no rash, erythema or venous stasis changes on exposed skin

## 2023-02-14 NOTE — ANESTHESIA PREPROCEDURE EVALUATION
Procedure:  LOWER EXTREMITY ARTERIOGRAM W/ PSB  ENDOVASCULAR INTERVENTION VIA OPEN CUTDOWN OF FEMORAL-FEMORAL BYPASS (Bilateral: Abdomen)    Relevant Problems   CARDIO  CABG 03/2020   (+) Aortoiliac occlusive disease (HCC)   (+) Coronary artery disease without angina pectoris   (+) DVT (deep venous thrombosis) (HCC)   (+) Embolism and thrombosis of arteries of the lower extremities (HCC)   (+) Essential hypertension   (+) Other hyperlipidemia   (+) Peripheral vascular disease with claudication (HCC)      ENDO   (+) Type 2 diabetes mellitus, with long-term current use of insulin (HCC)      GI/HEPATIC   (+) GERD (gastroesophageal reflux disease)      /RENAL   (+) Stage 3a chronic kidney disease (HCC)      HEMATOLOGY   (+) Iron deficiency anemia      NEURO/PSYCH   (+) Atherosclerosis of native artery of both lower extremities with intermittent claudication (HCC)        Physical Exam    Airway    Mallampati score: III  TM Distance: >3 FB  Neck ROM: full     Dental   lower dentures,     Cardiovascular  Rhythm: regular, Rate: normal, Cardiovascular exam normal    Pulmonary  Pulmonary exam normal Breath sounds clear to auscultation,     Other Findings        Anesthesia Plan  ASA Score- 3     Anesthesia Type- IV sedation with anesthesia with ASA Monitors  Additional Monitors:   Airway Plan:     Comment: GA prn  Plan Factors-    Chart reviewed  Existing labs reviewed  Patient summary reviewed  Patient is not a current smoker  Patient not instructed to abstain from smoking on day of procedure  Patient did not smoke on day of surgery  There is medical exclusion for perioperative obstructive sleep apnea risk education  Induction- intravenous  Postoperative Plan-     Informed Consent- Anesthetic plan and risks discussed with patient and spouse

## 2023-02-14 NOTE — OP NOTE
OPERATIVE REPORT  PATIENT NAME: Juan Young    :  1945  MRN: [id number]  Pt Location: [facility]    SURGERY DATE: 2023    Surgeon(s) and Role:     * Fern Franz MD - Primary     * Sun Hodge PA-C - Assisting    Pre-op Dx: Atherosclerosis of native artery of both lower extremities with intermittent claudication ([address] ) [I70 213]    Post-Op Diagnosis Codes:     * Atherosclerosis of native artery of both lower extremities with intermittent claudication ([address] ) [I70 213]      Procedure(s):  CUTDOWN FEM-FEM BYPASS WITH PRIMARY CLOSURE ACCESS SITE, BILATERAL RUN-OFF, LEFT FEM-POP 5X220 QUYEN AND 6X150 RODRI, ATTEMPT TO CROSS RIGHT SFA OCCLUSION    Flouro Time:  min    Contrast:     Specimen(s):  * No specimens in log *    Estimated Blood Loss:   10 mL    Drains:  none    Anesthesia Type:   IV Sedation with Anesthesia    Operative Indications: Atherosclerosis of native artery of both lower extremities with intermittent claudication ([address] ) [[id number]]  [de-identified]year-old with long history of aortoiliac and infrainguinal arterial occlusive disease has previous right femoral-femoral bypass  He now presents with worsening claudication both lower extremities  Operative Findings:  Near occlusive stenoses of bilateral proximal superficial femoral arteries with segmental occlusion of the mid segment and reconstitution at the adductor hiatus  The above-knee popliteal artery is then patent with tibial runoff bilaterally  On the left the area of occlusion was crossed with a 0 018 angled Glidewire  Attempts to reenter the true lumen at the level of popliteal artery failed  An Outback catheter was then utilized with a 0 014 wire with a reentry into the popliteal artery  This was confirmed with contrast injection  Initial angioplasty of the femoral-popliteal segment was performed with a 4 mm balloon with marked improvement    There was residual stenosis at the reentry site and the distal superficial femoral artery which was treated with a 6 x 150 mm drug-eluting stent postdilated with a 5 mm balloon  A 5 mm drug-eluting balloon angioplasty was performed of the proximal and mid superficial femoral artery  Completion imaging showed rapid flow through the femoral-popliteal segment but residual stenosis and stent compression due to a focal highly calcified plaque in the distal superficial femoral artery  This was angioplastied with a 5 mm high-pressure balloon to 20-24 jinny with only mild improvement  Completion imaging showed two-vessel runoff to the foot  On the right the femoral anastomosis was widely patent as was the deep femoral artery  The area of stenosis within the proximal and mid superficial femoral artery was cannulated with a 0 018 angled Glidewire  There is then densely calcified plaque in the distal superficial femoral artery just above the adductor hiatus  Under roadmap imaging multiple attempts were made to cross this area but a subintimal plane was entered and catheters would not track beyond the calcified plaque  The true lumen could not be reentered and with inability to pass a low-profile catheter beyond the areas of calcified plaque, further attempts were abandoned  All guidewires and catheters were withdrawn  The area of graft cut down was clamped and the puncture site was closed with 4-0 Prolene suture  After this was backbled and flushed, flow was restored  Small amount of Surgicel was placed  The tissues were then closed with 3-0 Monocryl  Local anesthetic was administered and a 4-0 subcuticular skin closure was performed  A BioGlue dressing was placed  Complications:   None      Conclusion:  1  On the left there is wide patency of the femoral-femoral anastomosis and deep femoral artery    There was severe stenosis of the proximal superficial femoral artery with occlusion in the mid and distal segment treated with a combination of drug-eluting balloon angioplasty and drug-eluting stent following reentry with a Outback catheter  There is then intact two-vessel runoff  There was an area of residual stenosis within the stent that did not yield to high-pressure inflation secondary to highly calcified plaque  This does leave the stent at high risk of failure  Patient will be given a prescription for Xarelto 2 5 mg daily and continue his Plavix therapy  2   On the right there is a similar appearance to the femoral-popliteal segment with critical stenosis throughout the proximal segment, occlusion of the mid and distal segment  Unfortunately the segmental occlusion is due to extensive dense calcific plaque which could not be crossed  Low-profile catheters would not track across this lesion  No intervention was performed  Option for revascularization would be femoral-popliteal bypass  This will be discussed as an outpatient  Vascular Quality Initiative - Peripheral Vascular Intervention     Urgency: Elective    Functional Status:  Restricted in physically strenuous activity but ambulatory and able to carry out work of a light or sedentary nature  Ambulation: Amb = independently ambulatory    Leg Symptoms    Right: Severe Claudication:  ischemic limb muscle pain that limits walking < 1 block (<300 feet or 1 football field); Ischemic Rest Pain: pain in the distal foot at rest felt to be due to limited arterial perfusion  Left: Severe Claudication:  ischemic limb muscle pain that limits walking < 1 block (<300 feet or 1 football field); Ischemic Rest Pain: pain in the distal foot at rest felt to be due to limited arterial perfusion    COVID Information  COVID Symptoms Pre-Procedure:    Treatment Delayed by Pandemic: None    Access   Number of Sites: 1     Access Site 1:   Open cutdown exposure of the fem-femoral graft primarily repaired with Prolene suture      Procedure  Fluoro Time:  minutes  Contrast Volume:   ml  DAP:  Gy cm2  CO2: no  Anticoagulant: Heparin  Protamine: No  If Creatinine is > 1 2 or missing, JOSE ENRIQUE Prophylaxis none     Treatment Details  Indication: Occlusive Disease,    Completion Assessment  Artery 1 treated: SFA+Pop   Left               Outflow: AT,PT,Peroneal: 2                       Segments treated: P1                      Was this Site previously treated?: No          TASC Grade: D          Total Treated Length: 30 cm          Total Occluded Length: 20 cm          Calcification: Severe (calcification on both sides of artery > half length of lesion)          Number of Treatment types (Devices):   3           Device 1          Treatment Type: Plain Balloon         Device 2          Treatment Type: Special Balloon,  Drug Coated Balloon                Diameter: 5 mm          Length: 220 mm              Device 3          Treatment Type: Stent,  Drug Eluting Stent                Diameter: 6 mm          Length: 150 mm            Concomitant: None          Technical result: Stenosis >30% or 10 mm Gradient  Medical    None     Post Procedure  Procedure Complications: No      SIGNATURE: Alem Jacobs MD  DATE: February 14, 2023  TIME: 11:58 AM

## 2023-02-16 ENCOUNTER — HOSPITAL ENCOUNTER (OUTPATIENT)
Dept: NON INVASIVE DIAGNOSTICS | Facility: HOSPITAL | Age: 78
Discharge: HOME | End: 2023-02-16

## 2023-02-16 ENCOUNTER — TELEPHONE (OUTPATIENT)
Dept: VASCULAR SURGERY | Facility: CLINIC | Age: 78
End: 2023-02-16

## 2023-02-16 DIAGNOSIS — I70.213 ATHEROSCLEROSIS OF NATIVE ARTERY OF BOTH LOWER EXTREMITIES WITH INTERMITTENT CLAUDICATION (HCC): ICD-10-CM

## 2023-02-16 DIAGNOSIS — I70.213 ATHEROSCLEROSIS OF NATIVE ARTERY OF BOTH LOWER EXTREMITIES WITH INTERMITTENT CLAUDICATION (HCC): Primary | ICD-10-CM

## 2023-02-16 RX ORDER — OXYCODONE HYDROCHLORIDE AND ACETAMINOPHEN 5; 325 MG/1; MG/1
1 TABLET ORAL EVERY 6 HOURS PRN
Qty: 10 TABLET | Refills: 0 | Status: SHIPPED | OUTPATIENT
Start: 2023-02-16

## 2023-02-16 NOTE — TELEPHONE ENCOUNTER
Spoke with pt  Advised that he elevate his foot  Explained reperfusion syndrome  He is willing to take Percocet for pain  Contacted [facility] in [place]  They do have percocet in stock  Will route back to provider to send script for percocet  Pt is requesting a return call from clinical staff when the script is sent  Transferred to the Call Center to schedule OV for next week

## 2023-02-16 NOTE — TELEPHONE ENCOUNTER
Received a call from Ratna Hartley at the Vascular Lab [phone]  Results of JAMEL show that pt is getting flow  No occlusion  There is increased velocity in the proximal SFA  ABIs unchanged since October  Was 0 5 in October and today 0 52  >75 % stenosis in the proximal SFA  Nothing is flatlined  Per Ratna Hartley, pt's left foot is swollen, red, and warm to touch  Called triage provider, Karl Dalal will discuss with VS and call nursing back  Ratna Hartley is holding pt at the lab until she is contacted by clinical staff

## 2023-02-16 NOTE — TELEPHONE ENCOUNTER
Pt s/p CUTDOWN FEM-FEM BYPASS WITH PRIMARY CLOSURE ACCESS SITE, BILATERAL RUN-OFF, LEFT FEM-POP 5X220 QUYEN AND 6X150 RODRI, ATTEMPT TO CROSS RIGHT SFA OCCLUSION (Bilateral: Abdomen) by Dr Wendie Stephens 2/14/23  Pt called to report that his entire left foot is red and swollen  Asked pt to send a photo through [address], and he stated that he is unable to do that  Has pain from his left knee down to his toes  Rates the pain a "5-7" on a scale of 1-10  Is taking Tylenol, but this is not providing relief  Don Barley was ordered, but pt unable to get it from the pharmacy because it is back ordered (see note from 2/14/23)  States that his incision looks fine  He is concerned about the edema, red color of the foot, and his pain  Routed to triage to advise

## 2023-02-16 NOTE — TELEPHONE ENCOUNTER
Pt stated that he has tingling in his foot that comes and goes  Is able to ambulate and move his foot without difficulty  States that when he elevates the foot, the pain is worse  It feels better when his foot is in a dependent position and "dangling"

## 2023-02-16 NOTE — TELEPHONE ENCOUNTER
Chart and OP note reviewed, Pain could be related to reperfusion pain  However, as there was an area of residual stenosis with highly calcified plaque within the stent placed on 2/14/23, we will obtain LEAD of left lower extremity, today if possible, to evaluate for any possible occlusion or limb threatening ischemia  Further recommendations to follow once results of LEAD are available

## 2023-02-16 NOTE — TELEPHONE ENCOUNTER
Pres[facility] for percocet sent [facility] [place]  Please inform patient that each Percocet tablet contains 325mg of Tylenol, therefore he can also take Tylenol but make sure that his daily dose of Tylenol does not exceed 4,000mg  He should try to utilize percocet only if pain is severe and elevate leg as much as possible to decrease swelling that may be contributing to pain

## 2023-02-16 NOTE — TELEPHONE ENCOUNTER
Spoke with patient and was going to offer him an appointment for Friday but patient stated he refuses to wait 8 days to be seen he wanted an appointment on Monday or Tuesday    Ov scheduled for 2/21 with Elen per patient's request

## 2023-02-16 NOTE — TELEPHONE ENCOUNTER
Please ask patient if he is having any numbness or tingling to foot  Also ask if he is able to ambulate and move his foot, and if he has been elevating his foot since his procedure?

## 2023-02-17 NOTE — TELEPHONE ENCOUNTER
Spoke with pt and made him aware  Pt verbalized understanding  He did not  the Percocet yet, but plans on getting it today

## 2023-02-21 ENCOUNTER — OFFICE VISIT (OUTPATIENT)
Dept: VASCULAR SURGERY | Facility: CLINIC | Age: 78
End: 2023-02-21

## 2023-02-21 VITALS
SYSTOLIC BLOOD PRESSURE: 152 MMHG | BODY MASS INDEX: 24.91 KG/M2 | HEART RATE: 63 BPM | OXYGEN SATURATION: 99 % | DIASTOLIC BLOOD PRESSURE: 60 MMHG | HEIGHT: 66 IN | WEIGHT: 155 LBS

## 2023-02-21 DIAGNOSIS — I70.213 ATHEROSCLEROSIS OF NATIVE ARTERY OF BOTH LOWER EXTREMITIES WITH INTERMITTENT CLAUDICATION (HCC): Primary | ICD-10-CM

## 2023-02-21 NOTE — PATIENT INSTRUCTIONS
The stent that was placed in left leg is open  Recommend elevation and GENTLE compression to help with swelling of the foot and lower leg    Continue Xarelto and Plavix  Return for follow-up as previously scheduled  Keep incision site clean and dry  Cleanse with soap and water, pat dry    Let glue fall off naturally

## 2023-02-21 NOTE — ASSESSMENT & PLAN NOTE
78-year-old male, former smoker with DM type II, HTN, HLD, asymptomatic carotid artery stenosis, CKD, H/O DVT, aortoiliac occlusive disease w/ claudication h/o fem-fem bypass now s/p R groin cutdown (fem-fem bypass), L SFA-pop QUYEN/RODRI 2/14/23 ([facility]) presents for early post op visit w/ c/o LLE edema and pain and to review LEAD     - c/o LLE edema and tightness "since surgery"  LLE warm, perfused, motor sensation intact with Doppler DP/PT signal  1-2+ edema, worse in foot, most likely r/t reperfusion   -Right groin incision CDI with surgical glue  No dehiscence, drainage, bleeding, hematoma, erythema or signs of infection    LEAD 2/16/2023 demonstrates >75% stenosis in the proximal of the femoral artery, patent mid and distal femoral artery stent  LIONEL  L 0 52/63/37  R 0 45/28/33    Plan:  - Patent L SFA/pop stent  Symptoms most likely related to reperfusion   - Recommend elevation and GENTLE compression to help with foot and leg edema  Tubigrip E donned in office  -Continue Xarelto (2 5mg PAD) and Plavix  -Continue statin  -Return for follow-up as previously scheduled  -Keep incision site clean and dry  Cleanse with soap and water, pat dry  Let glue fall off naturally  -Call or return to office sooner with changes to LE, increased pain, change in color or temperature

## 2023-02-21 NOTE — PROGRESS NOTES
Assessment/Plan:    Atherosclerosis of native artery of both lower extremities with intermittent claudication [facility])  68-year-old male, former smoker with DM type II, HTN, HLD, asymptomatic carotid artery stenosis, CKD, H/O DVT, aortoiliac occlusive disease w/ claudication h/o fem-fem bypass now s/p R groin cutdown (fem-fem bypass), L SFA-pop QUYEN/RODRI 2/14/23 ([facility]) presents for early post op visit w/ c/o LLE edema and pain and to review LEAD     - c/o LLE edema and tightness "since surgery"  LLE warm, perfused, motor sensation intact with Doppler DP/PT signal  1-2+ edema, worse in foot, most likely r/t reperfusion   -Right groin incision CDI with surgical glue  No dehiscence, drainage, bleeding, hematoma, erythema or signs of infection    LEAD 2/16/2023 demonstrates >75% stenosis in the proximal of the femoral artery, patent mid and distal femoral artery stent  LIONEL  L 0 52/63/37  R 0 45/28/33    Plan:  - Patent L SFA/pop stent  Symptoms most likely related to reperfusion   - Recommend elevation and GENTLE compression to help with foot and leg edema  Tubigrip E donned in office  -Continue Xarelto (2 5mg PAD) and Plavix  -Continue statin  -Return for follow-up as previously scheduled  -Keep incision site clean and dry  Cleanse with soap and water, pat dry  Let glue fall off naturally  -Call or return to office sooner with changes to LE, increased pain, change in color or temperature  Diagnoses and all orders for this visit:    Atherosclerosis of native artery of both lower extremities with intermittent claudication (HCC)          Subjective:      Patient ID: Kathia Hooks is a [de-identified] y o  male  Patient presents today to review JAMEL done 2/16/23  Pt states his LLE has swelling and tightness  PT states he continues to have claudication in left leg   Pt denies open wounds  Pt is taking Atorvastatin and xarelto and Plavix  Pt is former smoker      HPI  See assessment and plan      The following portions of the patient's history were reviewed and updated as appropriate: allergies, current medications, past family history, past medical history, past social history, past surgical history and problem list     Review of Systems   Constitutional: Negative  HENT: Negative  Eyes: Negative  Respiratory: Negative  Cardiovascular: Negative  Gastrointestinal: Negative  Endocrine: Negative  Genitourinary: Negative  Musculoskeletal: Positive for gait problem  Skin: Negative  Allergic/Immunologic: Negative  Hematological: Negative  Psychiatric/Behavioral: Negative  I have reviewed and made appropriate changes to the review of systems input by the medical assistant  Objective:      /60 (BP Location: Left arm, Patient Position: Sitting, Cuff Size: Standard)   Pulse 63   Ht 5' 6" (1 676 m)   Wt 70 3 kg (155 lb)   SpO2 99%   BMI 25 02 kg/m²          Physical Exam  Vitals and nursing note reviewed  Constitutional:       General: He is not in acute distress  Cardiovascular:      Pulses:           Femoral pulses are 2+ on the right side and 2+ on the left side  Dorsalis pedis pulses are detected w/ Doppler on the right side and detected w/ Doppler on the left side  Posterior tibial pulses are detected w/ Doppler on the right side and detected w/ Doppler on the left side  Comments: +1-2 L foot and below knee edema  Musculoskeletal:         General: Normal range of motion  Left lower le+ Edema present  Comments: LLE warm perfused, M/S intact   Skin:     General: Skin is warm  Capillary Refill: Capillary refill takes less than 2 seconds  Comments: R groin incision CDI surgical glue, no dehiscence, drainage, erythema, hematoma or signs of infection   Neurological:      Mental Status: He is alert and oriented to person, place, and time  Sensory: No sensory deficit  Motor: No weakness     Psychiatric:         Behavior: Behavior normal  I have spent a total time of 20 minutes on 02/21/23 in caring for this patient including Diagnostic results, Instructions for management, Patient and family education, Importance of tx compliance, Risk factor reductions, Impressions, Counseling / Coordination of care, Documenting in the medical record and Obtaining or reviewing history          Vitals:    02/21/23 1136   BP: 152/60   BP Location: Left arm   Patient Position: Sitting   Cuff Size: Standard   Pulse: 63   SpO2: 99%   Weight: 70 3 kg (155 lb)   Height: 5' 6" (1 676 m)       Patient Active Problem List   Diagnosis   • Type 2 diabetes mellitus, with long-term current use of insulin (HCC)   • Other hyperlipidemia   • Essential hypertension   • Claudication in peripheral vascular disease (HCC)   • Iron deficiency anemia   • Embolism and thrombosis of arteries of the lower extremities (HCC)   • Need for vaccination   • Atherosclerosis of native artery of both lower extremities with intermittent claudication (HCC)   • Carotid stenosis, asymptomatic, bilateral   • Coronary artery disease without angina pectoris   • Aortoiliac occlusive disease ([facility])   • Pre-op evaluation   • Stage 3a chronic kidney disease (HCC)   • DVT (deep venous thrombosis) ([facility])   • GERD (gastroesophageal reflux disease)       Past Surgical History:   Procedure Laterality Date   • ANGIOPLASTY  10/19/2021    bilateral   • CARDIAC CATHETERIZATION  2013    calif    CABG  x4   • COLONOSCOPY     • COLONOSCOPY     • CORONARY ANGIOPLASTY WITH STENT PLACEMENT  01/01/2010   • CORONARY ARTERY BYPASS GRAFT     • FEMORAL ARTERY STENT     • FEMORAL BYPASS  10/20/2021   • IR LOWER EXTREMITY ANGIOGRAM  2/14/2023   • IR PELVIC ANGIOGRAM  5/6/2022   • [place] SLCTV CATHJ 3RD+ ORD SLCTV ABDL PEL/LXTR [facility]  5/6/2022    Procedure: ULTRASOUND-GUIDED LEFT BRACHIAL ARTERY PUNCTURE, AORTOGRAM, RIGHT COMMON ILIAC ARTERY ANGIOPLASTY WITH 8 X 40 MM BALLOON, ANGIOPLASTY OF RIGHT EXTERNAL AND COMMON ILIAC ARTERY STENOSIS WITH 6 X 150 MM DRUG-ELUTING BALLOON, STENTING OF RIGHT DISTAL EXTERNAL ILIAC ARTERY STENOSIS WITH 7 X 40 MM SELF EXPANDING STENT POST DILATED WITH A 6 MM BALLOON ;  Surgeon: Evy Orellana MD;  [place]   • [place] SLCTV CATHJ 3RD+ ORD SLCTV ABDL PEL/LXTR [facility] Bilateral 2023    Procedure: CUTDOWN FEM-FEM BYPASS WITH PRIMARY CLOSURE ACCESS SITE, BILATERAL RUN-OFF, LEFT FEM-POP 5X220 QUYEN AND 6X150 RODRI, ATTEMPT TO CROSS RIGHT SFA OCCLUSION;  Surgeon: Evy Orellana MD;  Location: [facility];  Service: Vascular       Family History   Problem Relation Age of Onset   • No Known Problems Mother    • No Known Problems Father        Social History     Socioeconomic History   • Marital status: /Civil Union     Spouse name: Not on file   • Number of children: Not on file   • Years of education: Not on file   • Highest education level: Not on file   Occupational History   • Not on file   Tobacco Use   • Smoking status: Former     Packs/day: 1 50     Years: 40 00     Pack years: 60 00     Types: Cigarettes     Quit date: 2015     Years since quittin 6   • Smokeless tobacco: Never   • Tobacco comments:     quit --    Vaping Use   • Vaping Use: Never used   Substance and Sexual Activity   • Alcohol use: Not Currently     Alcohol/week: 0 0 standard drinks   • Drug use: No   • Sexual activity: Not on file   Other Topics Concern   • Not on file   Social History Narrative    · Most recent tobacco use screenin2020      Social Determinants of Health     Financial Resource Strain: Not on file   Food Insecurity: Not on file   Transportation Needs: Not on file   Physical Activity: Not on file   Stress: Not on file   Social Connections: Not on file   Intimate Partner Violence: Not on file   Housing Stability: Not on file       No Known Allergies      Current Outpatient Medications:   •  acetaminophen (TYLENOL) 500 mg tablet, Take 1,000 mg by mouth every 8 (eight) hours as needed, Disp: , Rfl:   •  atorvastatin (LIPITOR) 40 mg tablet, TAKE 1 TABLET DAILY, Disp: 90 tablet, Rfl: 3  •  clopidogrel (PLAVIX) 75 mg tablet, TAKE 1 TABLET BY MOUTH EVERY DAY, Disp: 90 tablet, Rfl: 1  •  Dulaglutide (Trulicity) 1 5 DZ/5 3DD SOPN, Inject 0 5 mL (1 5 mg total) under the skin once a week, Disp: 3 mL, Rfl: 6  •  ferrous sulfate 325 (65 Fe) mg tablet, Take 1 tablet (325 mg total) by mouth 2 (two) times a day with meals, Disp: 60 tablet, Rfl: 6  •  glipiZIDE (GLUCOTROL) 10 mg tablet, Take 1 tablet (10 mg total) by mouth 2 (two) times a day before meals, Disp: 180 tablet, Rfl: 3  •  hydrocortisone 2 5 % cream, Apply topically 4 (four) times a day as needed for rash, Disp: 30 g, Rfl: 0  •  lisinopril-hydrochlorothiazide (PRINZIDE,ZESTORETIC) 20-12 5 MG per tablet, Take 0 5 tablets by mouth daily, Disp: 90 tablet, Rfl: 3  •  metFORMIN (GLUCOPHAGE) 1000 MG tablet, Take 1 tablet (1,000 mg total) by mouth 2 (two) times a day with meals, Disp: 180 tablet, Rfl: 3  •  omeprazole (PriLOSEC) 20 mg delayed release capsule, Take 1 capsule (20 mg total) by mouth daily before breakfast (Patient taking differently: Take 20 mg by mouth if needed), Disp: 30 capsule, Rfl: 5  •  rivaroxaban (Xarelto) 2 5 mg tablet, Take 1 tablet (2 5 mg total) by mouth 2 (two) times a day, Disp: 180 tablet, Rfl: 0  •  amLODIPine (NORVASC) 5 mg tablet, Take 1 tablet (5 mg total) by mouth daily (Patient not taking: Reported on 2/9/2023), Disp: 90 tablet, Rfl: 1  •  HYDROcodone-acetaminophen (Norco) 5-325 mg per tablet, Take 1 tablet by mouth every 6 (six) hours as needed for pain for up to 10 days Max Daily Amount: 4 tablets, Disp: 10 tablet, Rfl: 0  •  oxyCODONE-acetaminophen (Percocet) 5-325 mg per tablet, Take 1 tablet by mouth every 6 (six) hours as needed for moderate pain Max Daily Amount: 4 tablets (Patient not taking: Reported on 2/21/2023), Disp: 10 tablet, Rfl: 0  •  polyethylene glycol (Golytely) 4000 mL solution, Take 4,000 mL by mouth once for 1 dose Take 4000 mL by mouth once for 1 dose   Use as directed, Disp: 4000 mL, Rfl: 0

## 2023-03-09 ENCOUNTER — OFFICE VISIT (OUTPATIENT)
Dept: VASCULAR SURGERY | Facility: CLINIC | Age: 78
End: 2023-03-09

## 2023-03-09 VITALS
HEART RATE: 67 BPM | OXYGEN SATURATION: 98 % | HEIGHT: 66 IN | WEIGHT: 156.4 LBS | SYSTOLIC BLOOD PRESSURE: 154 MMHG | DIASTOLIC BLOOD PRESSURE: 50 MMHG | BODY MASS INDEX: 25.13 KG/M2

## 2023-03-09 DIAGNOSIS — I70.213 ATHEROSCLEROSIS OF NATIVE ARTERY OF BOTH LOWER EXTREMITIES WITH INTERMITTENT CLAUDICATION (HCC): ICD-10-CM

## 2023-03-09 DIAGNOSIS — I74.09 AORTOILIAC OCCLUSIVE DISEASE (HCC): Chronic | ICD-10-CM

## 2023-03-09 DIAGNOSIS — I65.23 CAROTID STENOSIS, ASYMPTOMATIC, BILATERAL: Primary | ICD-10-CM

## 2023-03-09 NOTE — PROGRESS NOTES
Assessment/Plan:    Atherosclerosis of native artery of both lower extremities with intermittent claudication ([address] )  1  Aortoiliac and infrainguinal arterial occlusive disease left lower extremity with moderate to severe calf claudication  There is mild improvement following arteriogram with angioplasty and stenting of a superficial femoral artery occlusion  We discussed the findings on arteriogram and specifically discussed the issue of the highly calcified plaque which creates compression of the stent  Unfortunately I do feel there is a high risk that the stent will fail in the short-term thus I have asked him to continue his antiplatelet and low-dose Xarelto therapy  We will plan early follow-up with duplex imaging in 2-3 months  2   Aortoiliac and infrainguinal arterial occlusive disease right lower extremity with moderate to severe claudication  We discussed the findings and arteriogram which identified dense highly calcified plaque occluding the entire superficial femoral artery  We discussed the limitations of endovascular intervention and the inability to cross this lesion at the time of his recent procedure  We discussed the alternative options which would require surgical bypass  With his current symptoms limited to claudication I would advise a more conservative approach to include a walking program and continued medical management  He has been given a pamphlet describing a walking program and will be asked to follow-up in approximately 3 months  Of note we did discuss the option of starting Pletal to help with his claudication but in light of his multiple antiplatelet and low-dose Xarelto therapy I would hold off for now  When he returns the office in 3 months we will discuss this option further         Diagnoses and all orders for this visit:    Carotid stenosis, asymptomatic, bilateral    Aortoiliac occlusive disease (HCC)  -     VAS lower limb arterial duplex, complete bilateral; Future    Atherosclerosis of native artery of both lower extremities with intermittent claudication (HCC)  -     VAS lower limb arterial duplex, complete bilateral; Future          Subjective:      Patient ID: Morris Elizabeth is a [de-identified] y o  male  Patient is here today to review results of b/l LE arteriogram with endovascular intervention via cutdown of the fem-fem bypass graft  His right groin entry site is clean and dry  Patient denies any pain in his legs  He c/o occasional pain in his left great toe  He denies any open wounds  Patient is taking Plavix, Xarelto and Atorvastatin  Patient is a former smoker  68-year-old with long history of aortoiliac and infrainguinal arterial occlusive disease presents in follow-up of recent arteriogram   Of note he has a remote history of a right to left femoral-femoral artery bypass performed in outside facility  He has been complaining of bilateral calf claudication which causes severe limitation in his ability to perform his daily activities  He thus underwent an arteriogram on 2/14/2023 via a cutdown over his femoral-femoral bypass  He was found to have occlusion of the left superficial femoral artery secondary to highly calcified plaque  This was subsequently crossed and treated with drug-eluting balloon and stent angioplasty  Unfortunately there was an area of compression of the stent in the mid/distal SFA which did not yield to high-pressure balloon inflation  On the right he also had densely calcified plaque causing a occlusion of the superficial femoral artery which could not be crossed with catheters and would be unlikely to yield to balloon or stent placement  On evaluation today he does feel there has been some improvement on the left but continues to complain of calf claudication  He denies any typical symptoms of rest pain and has no areas of tissue loss  He notes no discomfort at the incision site      A lower extremity arterial duplex was performed 2 days following his procedure, 2/16/2023, which did show an area of residual stenosis within the left SFA stent of greater than 75% as predicted by the arteriogram   There is a minimal improvement in ankle-brachial index  On the left no changes were identified        The following portions of the patient's history were reviewed and updated as appropriate: allergies, current medications, past family history, past medical history, past social history, past surgical history and problem list     Past Medical History:  Past Medical History:   Diagnosis Date   • Coronary artery disease without angina pectoris 02/25/2020   • DVT (deep venous thrombosis) ([facility])    • GERD (gastroesophageal reflux disease)        Past Surgical History:  Past Surgical History:   Procedure Laterality Date   • ANGIOPLASTY  10/19/2021    bilateral   • CARDIAC CATHETERIZATION  2013    calif    CABG  x4   • COLONOSCOPY     • COLONOSCOPY     • CORONARY ANGIOPLASTY WITH STENT PLACEMENT  01/01/2010   • CORONARY ARTERY BYPASS GRAFT     • FEMORAL ARTERY STENT     • FEMORAL BYPASS  10/20/2021   • IR LOWER EXTREMITY ANGIOGRAM  2/14/2023   • IR PELVIC ANGIOGRAM  5/6/2022   • [facility]  5/6/2022    Procedure: ULTRASOUND-GUIDED LEFT BRACHIAL ARTERY PUNCTURE, AORTOGRAM, RIGHT COMMON ILIAC ARTERY ANGIOPLASTY WITH 8 X 40 MM BALLOON, ANGIOPLASTY OF RIGHT EXTERNAL AND COMMON ILIAC ARTERY STENOSIS WITH 6 X 150 MM DRUG-ELUTING BALLOON, STENTING OF RIGHT DISTAL EXTERNAL ILIAC ARTERY STENOSIS WITH 7 X 40 MM SELF EXPANDING STENT POST DILATED WITH A 6 MM BALLOON ;  Surgeon: Abbie Kraft MD;  [place]   • CO SLCTV CATHJ 3RD+ ORD SLCTV [facility] Bilateral 2/14/2023    Procedure: CUTDOWN FEM-FEM BYPASS WITH PRIMARY CLOSURE ACCESS SITE, BILATERAL RUN-OFF, LEFT FEM-POP 5X220 QUYEN AND 6X150 RODRI, ATTEMPT TO CROSS RIGHT SFA OCCLUSION;  Surgeon: Abbie Kraft MD;  Location: [facility];  Service: Vascular       Social History:  Social History     Substance and Sexual Activity   Alcohol Use Not Currently   • Alcohol/week: 0 0 standard drinks     Social History     Substance and Sexual Activity   Drug Use No     Social History     Tobacco Use   Smoking Status Former   • Packs/day: 1 50   • Years: 40 00   • Pack years: 60 00   • Types: Cigarettes   • Quit date: 2015   • Years since quittin 7   Smokeless Tobacco Never   Tobacco Comments    quit --        Family History:  Family History   Problem Relation Age of Onset   • No Known Problems Mother    • No Known Problems Father        Allergies:  No Known Allergies    Medications:    Current Outpatient Medications:   •  acetaminophen (TYLENOL) 500 mg tablet, Take 1,000 mg by mouth every 8 (eight) hours as needed, Disp: , Rfl:   •  atorvastatin (LIPITOR) 40 mg tablet, TAKE 1 TABLET DAILY, Disp: 90 tablet, Rfl: 3  •  clopidogrel (PLAVIX) 75 mg tablet, TAKE 1 TABLET BY MOUTH EVERY DAY, Disp: 90 tablet, Rfl: 1  •  Dulaglutide (Trulicity) 1 5 LK/1 5DM SOPN, Inject 0 5 mL (1 5 mg total) under the skin once a week, Disp: 3 mL, Rfl: 6  •  ferrous sulfate 325 (65 Fe) mg tablet, Take 1 tablet (325 mg total) by mouth 2 (two) times a day with meals, Disp: 60 tablet, Rfl: 6  •  glipiZIDE (GLUCOTROL) 10 mg tablet, Take 1 tablet (10 mg total) by mouth 2 (two) times a day before meals, Disp: 180 tablet, Rfl: 3  •  hydrocortisone 2 5 % cream, Apply topically 4 (four) times a day as needed for rash, Disp: 30 g, Rfl: 0  •  lisinopril-hydrochlorothiazide (PRINZIDE,ZESTORETIC) 20-12 5 MG per tablet, Take 0 5 tablets by mouth daily, Disp: 90 tablet, Rfl: 3  •  metFORMIN (GLUCOPHAGE) 1000 MG tablet, Take 1 tablet (1,000 mg total) by mouth 2 (two) times a day with meals, Disp: 180 tablet, Rfl: 3  •  omeprazole (PriLOSEC) 20 mg delayed release capsule, Take 1 capsule (20 mg total) by mouth daily before breakfast (Patient taking differently: Take 20 mg by mouth if needed), Disp: 30 capsule, Rfl: 5  •  oxyCODONE-acetaminophen (Percocet) 5-325 mg per tablet, Take 1 tablet by mouth every 6 (six) hours as needed for moderate pain Max Daily Amount: 4 tablets, Disp: 10 tablet, Rfl: 0  •  rivaroxaban (Xarelto) 2 5 mg tablet, Take 1 tablet (2 5 mg total) by mouth 2 (two) times a day, Disp: 180 tablet, Rfl: 0  •  amLODIPine (NORVASC) 5 mg tablet, Take 1 tablet (5 mg total) by mouth daily (Patient not taking: Reported on 2/9/2023), Disp: 90 tablet, Rfl: 1  •  polyethylene glycol (Golytely) 4000 mL solution, Take 4,000 mL by mouth once for 1 dose Take 4000 mL by mouth once for 1 dose  Use as directed, Disp: 4000 mL, Rfl: 0    Vitals:  /50 (03/09/23 1524)    Temp      Pulse 67 (03/09/23 1524)   Resp      SpO2 98 % (03/09/23 1524)      Lab Results and Cultures:   CBC with diff:   Lab Results   Component Value Date    WBC 5 79 02/06/2023    HGB 12 3 02/06/2023    HCT 38 7 02/06/2023    MCV 94 02/06/2023     02/06/2023    MCH 29 7 02/06/2023    MCHC 31 8 02/06/2023    RDW 15 1 02/06/2023    MPV 10 3 02/06/2023    NRBC 0 11/28/2022   ,   BMP/CMP:  Lab Results   Component Value Date    K 3 7 02/06/2023    K 4 3 05/29/2020     02/06/2023     05/29/2020    CO2 22 02/06/2023    CO2 23 05/29/2020    BUN 20 02/06/2023    BUN 17 05/29/2020    CREATININE 1 10 02/06/2023    CALCIUM 10 1 02/06/2023    CALCIUM 9 6 05/29/2020    AST 8 08/29/2022    AST 16 05/29/2020    ALT 21 08/29/2022    ALT 19 05/29/2020    ALKPHOS 57 08/29/2022    ALKPHOS 77 05/29/2020    EGFR 63 02/06/2023   ,   Lipid Panel: No results found for: CHOL,   Coags:   Lab Results   Component Value Date    INR 1 02 02/06/2023   ,       Review of Systems   Constitutional: Negative  HENT: Negative  Eyes: Negative  Respiratory: Negative  Cardiovascular: Negative  Gastrointestinal: Negative  Endocrine: Negative  Genitourinary: Negative  Musculoskeletal: Positive for back pain  Skin: Negative      Allergic/Immunologic: Negative  Neurological: Negative  Hematological: Negative  Psychiatric/Behavioral: Negative  Objective:      /50 (BP Location: Right arm, Patient Position: Sitting, Cuff Size: Standard)   Pulse 67   Ht 5' 6" (1 676 m)   Wt 70 9 kg (156 lb 6 4 oz)   SpO2 98%   BMI 25 24 kg/m²          Physical Exam  Constitutional:       Appearance: Normal appearance  Cardiovascular:      Rate and Rhythm: Normal rate  Pulses:           Femoral pulses are 2+ on the right side and 2+ on the left side  Popliteal pulses are 0 on the right side and 1+ on the left side  Dorsalis pedis pulses are 0 on the right side and 0 on the left side  Posterior tibial pulses are 0 on the right side and 0 on the left side  Comments: Incision overlying the femoral-femoral graft is healing well  Musculoskeletal:         General: No swelling or tenderness  Normal range of motion  Skin:     General: Skin is warm and dry  Comments: Both feet are pink, warm and well-perfused  Neurological:      General: No focal deficit present  Mental Status: He is alert and oriented to person, place, and time  Motor: No weakness        Gait: Gait normal    Psychiatric:         Mood and Affect: Mood normal

## 2023-03-09 NOTE — ASSESSMENT & PLAN NOTE
1   Aortoiliac and infrainguinal arterial occlusive disease left lower extremity with moderate to severe calf claudication  There is mild improvement following arteriogram with angioplasty and stenting of a superficial femoral artery occlusion  We discussed the findings on arteriogram and specifically discussed the issue of the highly calcified plaque which creates compression of the stent  Unfortunately I do feel there is a high risk that the stent will fail in the short-term thus I have asked him to continue his antiplatelet and low-dose Xarelto therapy  We will plan early follow-up with duplex imaging in 2-3 months  2   Aortoiliac and infrainguinal arterial occlusive disease right lower extremity with moderate to severe claudication  We discussed the findings and arteriogram which identified dense highly calcified plaque occluding the entire superficial femoral artery  We discussed the limitations of endovascular intervention and the inability to cross this lesion at the time of his recent procedure  We discussed the alternative options which would require surgical bypass  With his current symptoms limited to claudication I would advise a more conservative approach to include a walking program and continued medical management  He has been given a pamphlet describing a walking program and will be asked to follow-up in approximately 3 months  Of note we did discuss the option of starting Pletal to help with his claudication but in light of his multiple antiplatelet and low-dose Xarelto therapy I would hold off for now  When he returns the office in 3 months we will discuss this option further

## 2023-03-09 NOTE — LETTER
March 9, 2023     Merrill Nino, [address] [facility] [address]  Professor [place] [address]    Patient: Simran Scott   YOB: 1945   Date of Visit: 3/9/2023       Dear Dr Lenin Bianchi: Thank you for referring Simran Scott to me for evaluation  Below are the relevant portions of my assessment and plan of care  Diagnoses and all orders for this visit:    Atherosclerosis of native artery of both lower extremities with intermittent claudication ([address] )  1  Aortoiliac and infrainguinal arterial occlusive disease left lower extremity with moderate to severe calf claudication  There is mild improvement following arteriogram with angioplasty and stenting of a superficial femoral artery occlusion  We discussed the findings on arteriogram and specifically discussed the issue of the highly calcified plaque which creates compression of the stent  Unfortunately I do feel there is a high risk that the stent will fail in the short-term thus I have asked him to continue his antiplatelet and low-dose Xarelto therapy  We will plan early follow-up with duplex imaging in 2-3 months  2   Aortoiliac and infrainguinal arterial occlusive disease right lower extremity with moderate to severe claudication  We discussed the findings and arteriogram which identified dense highly calcified plaque occluding the entire superficial femoral artery  We discussed the limitations of endovascular intervention and the inability to cross this lesion at the time of his recent procedure  We discussed the alternative options which would require surgical bypass  With his current symptoms limited to claudication I would advise a more conservative approach to include a walking program and continued medical management  He has been given a pamphlet describing a walking program and will be asked to follow-up in approximately 3 months      Of note we did discuss the option of starting Pletal to help with his claudication but in light of his multiple antiplatelet and low-dose Xarelto therapy I would hold off for now  When he returns the office in 3 months we will discuss this option further  If you have questions, please do not hesitate to call me  I look forward to following Allyn Marx along with you           Sincerely,        Yoselin Mendoza MD        CC: No Recipients

## 2023-03-09 NOTE — PATIENT INSTRUCTIONS
Atherosclerosis of native artery of both lower extremities with intermittent claudication ([facility] [address] )  1  Aortoiliac and infrainguinal arterial occlusive disease left lower extremity with moderate to severe calf claudication  There is mild improvement following arteriogram with angioplasty and stenting of a superficial femoral artery occlusion  We discussed the findings on arteriogram and specifically discussed the issue of the highly calcified plaque which creates compression of the stent  Unfortunately I do feel there is a high risk that the stent will fail in the short-term thus I have asked him to continue his antiplatelet and low-dose Xarelto therapy  We will plan early follow-up with duplex imaging in 2-3 months  2   Aortoiliac and infrainguinal arterial occlusive disease right lower extremity with moderate to severe claudication  We discussed the findings and arteriogram which identified dense highly calcified plaque occluding the entire superficial femoral artery  We discussed the limitations of endovascular intervention and the inability to cross this lesion at the time of his recent procedure  We discussed the alternative options which would require surgical bypass  With his current symptoms limited to claudication I would advise a more conservative approach to include a walking program and continued medical management  He has been given a pamphlet describing a walking program and will be asked to follow-up in approximately 3 months  Of note we did discuss the option of starting Pletal to help with his claudication but in light of his multiple antiplatelet and low-dose Xarelto therapy I would hold off for now  When he returns the office in 3 months we will discuss this option further

## 2023-03-20 ENCOUNTER — TELEPHONE (OUTPATIENT)
Dept: FAMILY MEDICINE CLINIC | Facility: CLINIC | Age: 78
End: 2023-03-20

## 2023-03-22 DIAGNOSIS — Z79.4 TYPE 2 DIABETES MELLITUS WITH DIABETIC PERIPHERAL ANGIOPATHY WITHOUT GANGRENE, WITH LONG-TERM CURRENT USE OF INSULIN (HCC): Primary | ICD-10-CM

## 2023-03-22 DIAGNOSIS — N18.31 STAGE 3A CHRONIC KIDNEY DISEASE (HCC): ICD-10-CM

## 2023-03-22 DIAGNOSIS — E11.51 TYPE 2 DIABETES MELLITUS WITH DIABETIC PERIPHERAL ANGIOPATHY WITHOUT GANGRENE, WITH LONG-TERM CURRENT USE OF INSULIN (HCC): Primary | ICD-10-CM

## 2023-03-22 DIAGNOSIS — I10 ESSENTIAL HYPERTENSION: ICD-10-CM

## 2023-03-22 NOTE — PROGRESS NOTES
Ordered lab tests as per PCP before pt has April 10 OV  Called pt and LVM notifying that labs were to be completed before OV in April -- left callback number [phone] if pt had any questions

## 2023-03-29 ENCOUNTER — TELEPHONE (OUTPATIENT)
Dept: FAMILY MEDICINE CLINIC | Facility: CLINIC | Age: 78
End: 2023-03-29

## 2023-03-29 NOTE — TELEPHONE ENCOUNTER
Pt returned call and labs were discussed in length for both his wife as well as himself  Pt and Pts wife advised to get bloodwork done prior to next OV -- pts acknowledged

## 2023-03-29 NOTE — TELEPHONE ENCOUNTER
Called patient in regards for a question in regards for his BW test and went straight to voicemail, left a detailed message for a call back

## 2023-03-30 ENCOUNTER — APPOINTMENT (OUTPATIENT)
Dept: LAB | Facility: CLINIC | Age: 78
End: 2023-03-30

## 2023-03-30 DIAGNOSIS — Z79.4 TYPE 2 DIABETES MELLITUS WITH DIABETIC PERIPHERAL ANGIOPATHY WITHOUT GANGRENE, WITH LONG-TERM CURRENT USE OF INSULIN (HCC): ICD-10-CM

## 2023-03-30 DIAGNOSIS — I10 ESSENTIAL HYPERTENSION: ICD-10-CM

## 2023-03-30 DIAGNOSIS — N18.31 STAGE 3A CHRONIC KIDNEY DISEASE (HCC): ICD-10-CM

## 2023-03-30 DIAGNOSIS — E11.51 TYPE 2 DIABETES MELLITUS WITH DIABETIC PERIPHERAL ANGIOPATHY WITHOUT GANGRENE, WITH LONG-TERM CURRENT USE OF INSULIN (HCC): ICD-10-CM

## 2023-03-30 LAB
ALBUMIN SERPL BCP-MCNC: 3.7 G/DL (ref 3.5–5)
ALP SERPL-CCNC: 60 U/L (ref 46–116)
ALT SERPL W P-5'-P-CCNC: 22 U/L (ref 12–78)
ANION GAP SERPL CALCULATED.3IONS-SCNC: 5 MMOL/L (ref 4–13)
AST SERPL W P-5'-P-CCNC: 15 U/L (ref 5–45)
BACTERIA UR QL AUTO: ABNORMAL /HPF
BASOPHILS # BLD AUTO: 0.03 THOUSANDS/ÂΜL (ref 0–0.1)
BASOPHILS NFR BLD AUTO: 1 % (ref 0–1)
BILIRUB SERPL-MCNC: 0.91 MG/DL (ref 0.2–1)
BILIRUB UR QL STRIP: NEGATIVE
BUN SERPL-MCNC: 18 MG/DL (ref 5–25)
CALCIUM SERPL-MCNC: 9.9 MG/DL (ref 8.3–10.1)
CHLORIDE SERPL-SCNC: 105 MMOL/L (ref 96–108)
CLARITY UR: CLEAR
CO2 SERPL-SCNC: 26 MMOL/L (ref 21–32)
COLOR UR: ABNORMAL
CREAT SERPL-MCNC: 1.1 MG/DL (ref 0.6–1.3)
EOSINOPHIL # BLD AUTO: 0.24 THOUSAND/ÂΜL (ref 0–0.61)
EOSINOPHIL NFR BLD AUTO: 5 % (ref 0–6)
ERYTHROCYTE [DISTWIDTH] IN BLOOD BY AUTOMATED COUNT: 14.8 % (ref 11.6–15.1)
GFR SERPL CREATININE-BSD FRML MDRD: 63 ML/MIN/1.73SQ M
GLUCOSE P FAST SERPL-MCNC: 111 MG/DL (ref 65–99)
GLUCOSE UR STRIP-MCNC: NEGATIVE MG/DL
HCT VFR BLD AUTO: 35.4 % (ref 36.5–49.3)
HGB BLD-MCNC: 10.4 G/DL (ref 12–17)
HGB UR QL STRIP.AUTO: NEGATIVE
IMM GRANULOCYTES # BLD AUTO: 0.02 THOUSAND/UL (ref 0–0.2)
IMM GRANULOCYTES NFR BLD AUTO: 0 % (ref 0–2)
KETONES UR STRIP-MCNC: NEGATIVE MG/DL
LEUKOCYTE ESTERASE UR QL STRIP: NEGATIVE
LYMPHOCYTES # BLD AUTO: 0.99 THOUSANDS/ÂΜL (ref 0.6–4.47)
LYMPHOCYTES NFR BLD AUTO: 19 % (ref 14–44)
MAGNESIUM SERPL-MCNC: 2 MG/DL (ref 1.6–2.6)
MCH RBC QN AUTO: 26.7 PG (ref 26.8–34.3)
MCHC RBC AUTO-ENTMCNC: 29.4 G/DL (ref 31.4–37.4)
MCV RBC AUTO: 91 FL (ref 82–98)
MONOCYTES # BLD AUTO: 0.47 THOUSAND/ÂΜL (ref 0.17–1.22)
MONOCYTES NFR BLD AUTO: 9 % (ref 4–12)
NEUTROPHILS # BLD AUTO: 3.6 THOUSANDS/ÂΜL (ref 1.85–7.62)
NEUTS SEG NFR BLD AUTO: 66 % (ref 43–75)
NITRITE UR QL STRIP: NEGATIVE
NON-SQ EPI CELLS URNS QL MICRO: ABNORMAL /HPF
NRBC BLD AUTO-RTO: 0 /100 WBCS
PH UR STRIP.AUTO: 6 [PH]
PLATELET # BLD AUTO: 236 THOUSANDS/UL (ref 149–390)
PMV BLD AUTO: 10.4 FL (ref 8.9–12.7)
POTASSIUM SERPL-SCNC: 4 MMOL/L (ref 3.5–5.3)
PROT SERPL-MCNC: 7.2 G/DL (ref 6.4–8.4)
PROT UR STRIP-MCNC: ABNORMAL MG/DL
RBC # BLD AUTO: 3.9 MILLION/UL (ref 3.88–5.62)
RBC #/AREA URNS AUTO: ABNORMAL /HPF
SODIUM SERPL-SCNC: 136 MMOL/L (ref 135–147)
SP GR UR STRIP.AUTO: 1.01 (ref 1–1.03)
UROBILINOGEN UR STRIP-ACNC: <2 MG/DL
WBC # BLD AUTO: 5.35 THOUSAND/UL (ref 4.31–10.16)
WBC #/AREA URNS AUTO: ABNORMAL /HPF

## 2023-04-03 ENCOUNTER — RA CDI HCC (OUTPATIENT)
Dept: OTHER | Facility: HOSPITAL | Age: 78
End: 2023-04-03

## 2023-04-03 NOTE — PROGRESS NOTES
[facility]  coding opportunities          Chart Reviewed number of suggestions sent to Provider: 1   [id number] [phone]    Patients Insurance     Medicare Insurance: Medicare

## 2023-04-24 LAB
LEFT EYE DIABETIC RETINOPATHY: NORMAL
RIGHT EYE DIABETIC RETINOPATHY: NORMAL
SEVERITY (EYE EXAM): NORMAL

## 2023-04-26 ENCOUNTER — OFFICE VISIT (OUTPATIENT)
Dept: PODIATRY | Facility: CLINIC | Age: 78
End: 2023-04-26

## 2023-04-26 VITALS
HEIGHT: 66 IN | OXYGEN SATURATION: 100 % | BODY MASS INDEX: 25.39 KG/M2 | WEIGHT: 158 LBS | HEART RATE: 64 BPM | DIASTOLIC BLOOD PRESSURE: 71 MMHG | SYSTOLIC BLOOD PRESSURE: 171 MMHG

## 2023-04-26 DIAGNOSIS — E11.51 TYPE 2 DIABETES MELLITUS WITH DIABETIC PERIPHERAL ANGIOPATHY WITHOUT GANGRENE, WITH LONG-TERM CURRENT USE OF INSULIN (HCC): ICD-10-CM

## 2023-04-26 DIAGNOSIS — Z79.4 TYPE 2 DIABETES MELLITUS WITH DIABETIC PERIPHERAL ANGIOPATHY WITHOUT GANGRENE, WITH LONG-TERM CURRENT USE OF INSULIN (HCC): ICD-10-CM

## 2023-04-26 DIAGNOSIS — B35.1 ONYCHOMYCOSIS: Primary | ICD-10-CM

## 2023-04-26 DIAGNOSIS — I73.9 PERIPHERAL VASCULAR DISEASE (HCC): ICD-10-CM

## 2023-04-26 DIAGNOSIS — G62.9 NEUROPATHY: ICD-10-CM

## 2023-04-26 NOTE — PROGRESS NOTES
Assessment/Plan:    The patient's clinical examination today significant for diffuse paresthesias to the both feet consistent with peripheral neuropathy  The pedal nail plates are thickened and dystrophic with discoloration and brittleness consistent with onychomycosis x10  There are no open lesions, no calluses, no pretrophic changes to either lower extremity  The interdigital spaces are clear without maceration  There is some mild dry scaling skin consistent with diabetic anhidrosis  The pedal nail plates are sharply debrided with a pair of sterile nail clippers without incident x 10  The nails with a mechanically reduced in thickness and girth utilizing a rotary bur  A comprehensive diabetic foot evaluation was performed and the patient is deemed to be in the moderate risk category  Recommend follow-up every 3 to 4 months for at risk diabetic foot screening and foot care  Continue daily use of a skin cream or lotion for his dry skin  Diagnoses and all orders for this visit:    Neuropathy  Comments:  Apparently new issue problem feels like skin very tight over feet numbness tingling  Will begin gabapentin and have explained pros and cons  They are somewhat  Orders:  -     Ambulatory Referral to Podiatry    Type 2 diabetes mellitus with diabetic peripheral angiopathy without gangrene, with long-term current use of insulin ([facility])  Comments:  Taking only Trulicity, glipizide, [id number] today 6 2 fasting blood sugar 111 hemoglobin 10 4 GFR 63  Orders:  -     Ambulatory Referral to Podiatry    [facility]  Comments: Following with vascular surgeon Dr Srinivasan Quinn  Operated in March with some good effect on left lower extremity but could not get through on the right  Patient advi  Orders:  -     Ambulatory Referral to Podiatry          Subjective:      Patient ID: Andrea Arreola is a [de-identified] y o  male      The patient presents today for his initial consultation with [facility] with a chief complaint of bilateral numbness and tingling sensations to his feet  He is currently taking gabapentin 300 mg 3 times a day  He does note a history of peripheral arterial disease and is following with vascular surgery  He does have a repeat arterial doppler study scheduled in the next couple of months  He does note some dry scaling skin to his feet for which she uses [employer] diabetic foot lotion  He denies any other acute pedal issues        The following portions of the patient's history were reviewed and updated as appropriate: allergies, current medications, past family history, past medical history, past social history, past surgical history and problem list       PAST MEDICAL HISTORY:  Past Medical History:   Diagnosis Date   • Coronary artery disease without angina pectoris 02/25/2020   • DVT (deep venous thrombosis) ([facility])    • GERD (gastroesophageal reflux disease)        PAST SURGICAL HISTORY:  Past Surgical History:   Procedure Laterality Date   • ANGIOPLASTY  10/19/2021    bilateral   • CARDIAC CATHETERIZATION  2013    calif    CABG  x4   • COLONOSCOPY     • COLONOSCOPY     • CORONARY ANGIOPLASTY WITH STENT PLACEMENT  01/01/2010   • CORONARY ARTERY BYPASS GRAFT     • FEMORAL ARTERY STENT     • FEMORAL BYPASS  10/20/2021   • IR LOWER EXTREMITY ANGIOGRAM  2/14/2023   • IR PELVIC ANGIOGRAM  5/6/2022   • [facility]  5[facility]    Procedure: ULTRASOUND-GUIDED LEFT BRACHIAL ARTERY PUNCTURE, AORTOGRAM, RIGHT COMMON ILIAC ARTERY ANGIOPLASTY WITH 8 X 40 MM BALLOON, ANGIOPLASTY OF RIGHT EXTERNAL AND COMMON ILIAC ARTERY STENOSIS WITH 6 X 150 MM DRUG-ELUTING BALLOON, STENTING OF RIGHT DISTAL EXTERNAL ILIAC ARTERY STENOSIS WITH 7 X 40 MM SELF EXPANDING STENT POST DILATED WITH A 6 MM BALLOON ;  Surgeon: Ama Ferro MD;  [place]   • NE [facility] B[facility] 2/14/2023    Procedure: CUTDOWN FEM-FEM BYPASS WITH PRIMARY CLOSURE ACCESS SITE, BILATERAL RUN-OFF, LEFT FEM-POP 5X220 QUYEN AND 6X150 RODRI, ATTEMPT TO CROSS RIGHT SFA OCCLUSION;  Surgeon: Betty Kuhn MD;  Location: [facility];  Service: Vascular        ALLERGIES:  Patient has no known allergies      MEDICATIONS:  Current Outpatient Medications   Medication Sig Dispense Refill   • acetaminophen (TYLENOL) 500 mg tablet Take 1,000 mg by mouth every 8 (eight) hours as needed     • atorvastatin (LIPITOR) 40 mg tablet TAKE 1 TABLET DAILY 90 tablet 3   • clopidogrel (PLAVIX) 75 mg tablet TAKE 1 TABLET BY MOUTH EVERY DAY 90 tablet 1   • Dulaglutide (Trulicity) 1 5 AV/1 7QT SOPN Inject 0 5 mL (1 5 mg total) under the skin once a week 3 mL 6   • ferrous sulfate 325 (65 Fe) mg tablet Take 1 tablet (325 mg total) by mouth 2 (two) times a day with meals 60 tablet 6   • gabapentin (Neurontin) 300 mg capsule Take 1 capsule (300 mg total) by mouth 3 (three) times a day 90 capsule 6   • glipiZIDE (GLUCOTROL) 10 mg tablet Take 1 tablet (10 mg total) by mouth 2 (two) times a day before meals 180 tablet 3   • hydrocortisone 2 5 % cream Apply topically 4 (four) times a day as needed for rash 30 g 0   • lisinopril-hydrochlorothiazide (PRINZIDE,ZESTORETIC) 20-12 5 MG per tablet Take 0 5 tablets by mouth daily 90 tablet 3   • metFORMIN (GLUCOPHAGE) 1000 MG tablet Take 1 tablet (1,000 mg total) by mouth 2 (two) times a day with meals 180 tablet 3   • omeprazole (PriLOSEC) 20 mg delayed release capsule Take 1 capsule (20 mg total) by mouth daily before breakfast (Patient taking differently: Take 20 mg by mouth if needed) 30 capsule 5   • rivaroxaban (Xarelto) 2 5 mg tablet Take 1 tablet (2 5 mg total) by mouth 2 (two) times a day 180 tablet 0   • amLODIPine (NORVASC) 5 mg tablet Take 1 tablet (5 mg total) by mouth daily (Patient not taking: Reported on 2/9/2023) 90 tablet 1   • oxyCODONE-acetaminophen (Percocet) 5-325 mg per tablet Take 1 tablet by mouth every 6 (six) hours as needed "for moderate pain Max Daily Amount: 4 tablets (Patient not taking: Reported on 4/10/2023) 10 tablet 0   • polyethylene glycol (Golytely) 4000 mL solution Take 4,000 mL by mouth once for 1 dose Take 4000 mL by mouth once for 1 dose  Use as directed (Patient not taking: Reported on 4/10/2023) 4000 mL 0     No current facility-administered medications for this visit  SOCIAL HISTORY:  Social History     Socioeconomic History   • Marital status: /Civil Union     Spouse name: None   • Number of children: None   • Years of education: None   • Highest education level: None   Occupational History   • None   Tobacco Use   • Smoking status: Former     Packs/day: 1 50     Years: 40 00     Pack years: 60 00     Types: Cigarettes     Quit date: 2015     Years since quittin 8   • Smokeless tobacco: Never   • Tobacco comments:     quit --    Vaping Use   • Vaping Use: Never used   Substance and Sexual Activity   • Alcohol use: Not Currently     Alcohol/week: 0 0 standard drinks   • Drug use: No   • Sexual activity: None   Other Topics Concern   • None   Social History Narrative    · Most recent tobacco use screenin2020      Social Determinants of Health     Financial Resource Strain: Not on file   Food Insecurity: Not on file   Transportation Needs: Not on file   Physical Activity: Not on file   Stress: Not on file   Social Connections: Not on file   Intimate Partner Violence: Not on file   Housing Stability: Not on file        Review of Systems   Constitutional: Negative  HENT: Negative  Eyes: Negative  Respiratory: Negative  Cardiovascular: Negative  Endocrine: Negative  Musculoskeletal: Negative  Skin: Negative  Neurological: Negative  Hematological: Negative  Psychiatric/Behavioral: Negative            Objective:      BP (!) 171/71 (BP Location: Right arm, Patient Position: Sitting, Cuff Size: Standard)   Pulse 64   Ht 5' 6\" (1 676 m)   Wt 71 7 kg (158 lb)   " SpO2 100%   BMI 25 50 kg/m²          Physical Exam  Vitals reviewed  Constitutional:       Appearance: Normal appearance  HENT:      Head: Normocephalic and atraumatic  Nose: Nose normal    Eyes:      Conjunctiva/sclera: Conjunctivae normal       Pupils: Pupils are equal, round, and reactive to light  Cardiovascular:      Pulses:           Dorsalis pedis pulses are 0 on the right side and 0 on the left side  Posterior tibial pulses are 0 on the right side and 0 on the left side  Pulmonary:      Effort: Pulmonary effort is normal    Feet:      Right foot:      Skin integrity: Dry skin present  No ulcer, skin breakdown, erythema, warmth or callus  Toenail Condition: Right toenails are abnormally thick and long  Fungal disease present  Left foot:      Skin integrity: Dry skin present  No ulcer, skin breakdown, erythema, warmth or callus  Toenail Condition: Left toenails are abnormally thick and long  Fungal disease present  Comments: The patient's clinical examination today significant for diffuse paresthesias to the both feet consistent with peripheral neuropathy  The pedal nail plates are thickened and dystrophic with discoloration and brittleness consistent with onychomycosis x10  There are no open lesions, no calluses, no pretrophic changes to either lower extremity  The interdigital spaces are clear without maceration  There is some mild dry scaling skin consistent with diabetic anhidrosis  Skin:     General: Skin is warm  Capillary Refill: Capillary refill takes 2 to 3 seconds  Neurological:      General: No focal deficit present  Mental Status: He is alert and oriented to person, place, and time  Psychiatric:         Mood and Affect: Mood normal          Behavior: Behavior normal          Thought Content: Thought content normal          Diabetic Foot Exam    Patient's shoes and socks removed      Right Foot/Ankle   Right Foot Inspection  Skin Exam: skin normal, skin intact and dry skin  No warmth, no callus, no erythema, no maceration, no abnormal color, no pre-ulcer, no ulcer and no callus  Toe Exam: ROM and strength within normal limits  Sensory   Vibration: diminished  Proprioception: intact  Monofilament testing: diminished    Vascular  Capillary refills: elevated  The right DP pulse is 0  The right PT pulse is 0  Right Toe  - Comprehensive Exam  Ecchymosis: none  Arch: normal  Hammertoes: second toe, third toe, fourth toe and fifth toe  Claw Toes: absent  Swelling: none   Tenderness: none         Left Foot/Ankle  Left Foot Inspection  Skin Exam: skin normal, skin intact and dry skin  No warmth, no erythema, no maceration, normal color, no pre-ulcer, no ulcer and no callus  Toe Exam: ROM and strength within normal limits  Sensory   Vibration: diminished  Proprioception: intact  Monofilament testing: diminished    Vascular  Capillary refills: elevated  The left DP pulse is 0  The left PT pulse is 0       Left Toe  - Comprehensive Exam  Ecchymosis: none  Arch: normal  Hammertoes: second toe, third toe, fourth toe and fifth toe  Claw toes: absent  Swelling: none   Tenderness: none

## 2023-05-02 DIAGNOSIS — I73.9 PERIPHERAL VASCULAR DISEASE (HCC): ICD-10-CM

## 2023-05-02 RX ORDER — CLOPIDOGREL BISULFATE 75 MG/1
TABLET ORAL
Qty: 90 TABLET | Refills: 3 | Status: SHIPPED | OUTPATIENT
Start: 2023-05-02

## 2023-05-04 ENCOUNTER — TELEPHONE (OUTPATIENT)
Dept: VASCULAR SURGERY | Facility: CLINIC | Age: 78
End: 2023-05-04

## 2023-05-04 NOTE — TELEPHONE ENCOUNTER
CALLED TO R/S APPT ON 5/25  PROVIDER IS NOT AVAILABLE  OFFERED PT MULTIPLE APPTS IN MULTIPLE LOCATIONS  PT REFUSED ALL APT  PT DOES NOT WANT TO TRAVEL OUT OF [place]  I OFFERED PT THE SOONEST APPT AND OFFERED TO PUT HIM ON A CANCELLATION LIST  PT REFUSED AND WILL CALL BACK AT A LATER DATE TO BE R/S  OFFER 6/19 IN [place] WITH SEB AND PT DECLINED   NO APPTS AVAILABLE THE REST OF MAY

## 2023-05-05 ENCOUNTER — TELEPHONE (OUTPATIENT)
Dept: GASTROENTEROLOGY | Facility: CLINIC | Age: 78
End: 2023-05-05

## 2023-05-05 ENCOUNTER — TELEPHONE (OUTPATIENT)
Dept: FAMILY MEDICINE CLINIC | Facility: CLINIC | Age: 78
End: 2023-05-05

## 2023-05-05 NOTE — TELEPHONE ENCOUNTER
Patients GI provider:  Dr Ghassan Damon    Number to return call: [phone]    Reason for call: Patient calling as he would like to reschedule EGD with Push Enteroscopy that was cancelled in 10/2022  Patient can be reached at the number listed above      Scheduled procedure/appointment date if applicable: N/A

## 2023-05-05 NOTE — TELEPHONE ENCOUNTER
Called pt -- no answer -- unable to lvm  Need to ask pt how often pt uses test strips to test [facility] as it is a MEDICARE REQUIREMENT  Also need to know what type of BS testing strips pt uses

## 2023-05-08 ENCOUNTER — TELEPHONE (OUTPATIENT)
Dept: GASTROENTEROLOGY | Facility: CLINIC | Age: 78
End: 2023-05-08

## 2023-05-08 ENCOUNTER — PREP FOR PROCEDURE (OUTPATIENT)
Dept: GASTROENTEROLOGY | Facility: CLINIC | Age: 78
End: 2023-05-08

## 2023-05-08 DIAGNOSIS — K55.20 AVM (ARTERIOVENOUS MALFORMATION) OF SMALL BOWEL, ACQUIRED: ICD-10-CM

## 2023-05-08 DIAGNOSIS — K31.819 GAVE (GASTRIC ANTRAL VASCULAR ECTASIA): ICD-10-CM

## 2023-05-08 DIAGNOSIS — D50.9 IRON DEFICIENCY ANEMIA, UNSPECIFIED IRON DEFICIENCY ANEMIA TYPE: Primary | ICD-10-CM

## 2023-05-08 NOTE — TELEPHONE ENCOUNTER
Our mutual patient is scheduled for procedure: EGD/SINGLE BALLOON    On: 05/25/23      With: Dr Danica Garner is taking the following blood thinner:   PLAVIX     Can this be stopped ___5___ days prior to the procedure?       Physician Approving clearance: ________________________

## 2023-05-08 NOTE — TELEPHONE ENCOUNTER
Our mutual patient is scheduled for procedure: EGD/SINGLE BALLOON    On: 05/25/23     With: Dr Tong    He/She is taking the following blood thinner:   Shahid Kinney     Can this be stopped ___2__ days prior to the procedure?       Physician Approving clearance: ________________________

## 2023-05-08 NOTE — TELEPHONE ENCOUNTER
Scheduled date of EGD/SINGLE BALLOON(as of today):05/25/23  Physician performing EGD/SINGLE BALLOON:DR HOBSON  Location of EGD/SINGLE BALLOON:[facility]  Instructions reviewed with patient by:SENT VIA [employer]  Clearances: Genaro Henriquez

## 2023-05-08 NOTE — TELEPHONE ENCOUNTER
Pt called in stating he would like to speak with the same person who scheduled him for his procedure  He's requesting a call back at [phone]

## 2023-05-09 NOTE — TELEPHONE ENCOUNTER
"Called pt and he stated that he uses BS testing strips OD but would use BID if he had the supplies sent to him  Will order BS testing strips used BID--Pt uses \"[employer] strips\"-- will include this document in Medicare required documents for diabetic supplies     "

## 2023-05-09 NOTE — TELEPHONE ENCOUNTER
Discuss w PCP in person -- he advised hold Xarelto 2 days prior to operation  Will call pt and notify at 3:30 as per previous call's preference

## 2023-05-10 ENCOUNTER — HOSPITAL ENCOUNTER (OUTPATIENT)
Dept: NON INVASIVE DIAGNOSTICS | Facility: CLINIC | Age: 78
Discharge: HOME | End: 2023-05-10

## 2023-05-10 DIAGNOSIS — I70.213 ATHEROSCLEROSIS OF NATIVE ARTERY OF BOTH LOWER EXTREMITIES WITH INTERMITTENT CLAUDICATION (HCC): ICD-10-CM

## 2023-05-10 DIAGNOSIS — I74.09 AORTOILIAC OCCLUSIVE DISEASE (HCC): Chronic | ICD-10-CM

## 2023-05-16 ENCOUNTER — TELEPHONE (OUTPATIENT)
Dept: GASTROENTEROLOGY | Facility: MEDICAL CENTER | Age: 78
End: 2023-05-16

## 2023-05-16 NOTE — TELEPHONE ENCOUNTER
Spoke to patient confirming upcoming procedure  Patient was instructed to call [phone] if they have any questions or concerns about the prep instructions or if they need to change or cancel the procedure  He is aware to hold Plavix 5 days and Xarelto 2 days prior as approved by his physician

## 2023-05-17 ENCOUNTER — TELEPHONE (OUTPATIENT)
Dept: HEMATOLOGY ONCOLOGY | Facility: CLINIC | Age: 78
End: 2023-05-17

## 2023-05-17 DIAGNOSIS — D50.0 IRON DEFICIENCY ANEMIA DUE TO CHRONIC BLOOD LOSS: ICD-10-CM

## 2023-05-17 DIAGNOSIS — N18.31 STAGE 3A CHRONIC KIDNEY DISEASE (HCC): Primary | ICD-10-CM

## 2023-05-17 NOTE — TELEPHONE ENCOUNTER
Appointment Change  Cancel, Reschedule, Change to Virtual      Who are you speaking with? self   If it is not the patient, are they listed on an active communication consent form? self   Which provider is the appointment scheduled with? [facility]   When is the appointment scheduled? Please list date and time 6/23   At which location is the appointment scheduled to take place? [place]   Was the appointment rescheduled or changed from an in person visit to a virtual visit? If so, please list the details of the change  6/5 4pm   What is the reason for the appointment change? BLESSING from Geo Sanchez   Was STAR transport scheduled for this visit? no   Does STAR transport need to be scheduled for the new visit (if applicable) no   Does the patient need an infusion appointment rescheduled? no   Does the patient have an infusion appointment scheduled? If so, when? no   Is the patient undergoing chemotherapy? no   Was the no-show policy reviewed for appointments being changed with less then 24 hours of notice?  yes

## 2023-05-17 NOTE — TELEPHONE ENCOUNTER
Made an attempt to reschedule patients upcoming visit with Dr Hillary Glaser as he will not be in the office    Appt 6/23/23  I left a voicemail detailing this and instructing patient to call back [facility] [phone] number to arrange a new follow up appointment

## 2023-05-18 ENCOUNTER — APPOINTMENT (OUTPATIENT)
Dept: LAB | Facility: CLINIC | Age: 78
End: 2023-05-18

## 2023-05-18 DIAGNOSIS — N18.31 STAGE 3A CHRONIC KIDNEY DISEASE (HCC): ICD-10-CM

## 2023-05-18 DIAGNOSIS — D50.0 IRON DEFICIENCY ANEMIA DUE TO CHRONIC BLOOD LOSS: ICD-10-CM

## 2023-05-18 LAB
ALBUMIN SERPL BCP-MCNC: 3.7 G/DL (ref 3.5–5)
ALP SERPL-CCNC: 61 U/L (ref 46–116)
ALT SERPL W P-5'-P-CCNC: 24 U/L (ref 12–78)
ANION GAP SERPL CALCULATED.3IONS-SCNC: 8 MMOL/L (ref 4–13)
AST SERPL W P-5'-P-CCNC: 13 U/L (ref 5–45)
BASOPHILS # BLD AUTO: 0.04 THOUSANDS/ÂΜL (ref 0–0.1)
BASOPHILS NFR BLD AUTO: 1 % (ref 0–1)
BILIRUB SERPL-MCNC: 0.66 MG/DL (ref 0.2–1)
BUN SERPL-MCNC: 20 MG/DL (ref 5–25)
CALCIUM SERPL-MCNC: 9.1 MG/DL (ref 8.3–10.1)
CHLORIDE SERPL-SCNC: 108 MMOL/L (ref 96–108)
CO2 SERPL-SCNC: 21 MMOL/L (ref 21–32)
CREAT SERPL-MCNC: 1.26 MG/DL (ref 0.6–1.3)
EOSINOPHIL # BLD AUTO: 0.3 THOUSAND/ÂΜL (ref 0–0.61)
EOSINOPHIL NFR BLD AUTO: 4 % (ref 0–6)
ERYTHROCYTE [DISTWIDTH] IN BLOOD BY AUTOMATED COUNT: 17.6 % (ref 11.6–15.1)
FERRITIN SERPL-MCNC: 7 NG/ML (ref 24–336)
GFR SERPL CREATININE-BSD FRML MDRD: 53 ML/MIN/1.73SQ M
GLUCOSE P FAST SERPL-MCNC: 210 MG/DL (ref 65–99)
HCT VFR BLD AUTO: 37.7 % (ref 36.5–49.3)
HGB BLD-MCNC: 11.6 G/DL (ref 12–17)
IMM GRANULOCYTES # BLD AUTO: 0.03 THOUSAND/UL (ref 0–0.2)
IMM GRANULOCYTES NFR BLD AUTO: 0 % (ref 0–2)
IRON SATN MFR SERPL: 10 % (ref 20–50)
IRON SERPL-MCNC: 36 UG/DL (ref 65–175)
LYMPHOCYTES # BLD AUTO: 1.06 THOUSANDS/ÂΜL (ref 0.6–4.47)
LYMPHOCYTES NFR BLD AUTO: 16 % (ref 14–44)
MCH RBC QN AUTO: 27 PG (ref 26.8–34.3)
MCHC RBC AUTO-ENTMCNC: 30.8 G/DL (ref 31.4–37.4)
MCV RBC AUTO: 88 FL (ref 82–98)
MONOCYTES # BLD AUTO: 0.44 THOUSAND/ÂΜL (ref 0.17–1.22)
MONOCYTES NFR BLD AUTO: 7 % (ref 4–12)
NEUTROPHILS # BLD AUTO: 4.88 THOUSANDS/ÂΜL (ref 1.85–7.62)
NEUTS SEG NFR BLD AUTO: 72 % (ref 43–75)
NRBC BLD AUTO-RTO: 0 /100 WBCS
PLATELET # BLD AUTO: 219 THOUSANDS/UL (ref 149–390)
PMV BLD AUTO: 10.6 FL (ref 8.9–12.7)
POTASSIUM SERPL-SCNC: 3.8 MMOL/L (ref 3.5–5.3)
PROT SERPL-MCNC: 7.4 G/DL (ref 6.4–8.4)
RBC # BLD AUTO: 4.3 MILLION/UL (ref 3.88–5.62)
SODIUM SERPL-SCNC: 137 MMOL/L (ref 135–147)
TIBC SERPL-MCNC: 364 UG/DL (ref 250–450)
WBC # BLD AUTO: 6.75 THOUSAND/UL (ref 4.31–10.16)

## 2023-05-19 ENCOUNTER — TELEPHONE (OUTPATIENT)
Dept: HEMATOLOGY ONCOLOGY | Facility: CLINIC | Age: 78
End: 2023-05-19

## 2023-05-19 DIAGNOSIS — D50.0 IRON DEFICIENCY ANEMIA DUE TO CHRONIC BLOOD LOSS: Primary | ICD-10-CM

## 2023-05-19 NOTE — TELEPHONE ENCOUNTER
Lab Inquiry   Who are you speaking with? Patient     If it is not the patient, are they listed on an active communication consent form? N/A   Name of ordering provider RADHA Ruiz   What is being requested? Lab results to be reviewed   Lab draw location [address] [place] [address]   What is the best call back number?  [phone]

## 2023-05-19 NOTE — TELEPHONE ENCOUNTER
Yellow My name is Renae Diaz, born January 27, 1945  I'm a patient at that time, but and I suppose you see him, they cancel and they did redo it  A new appointment for the lady's name is Tiki Arauz or something  Anyway, the question I called for is because I went to take the test for Ferritin and Iron and I see I got that result in my iPad from my chart  And I my ferritin is down to 7 N G-ML and the iron is 36 U G-D L So my opinion I'm very, I'm having some problems So what I call them now to see what why they want to do it for you wanting me to get infusion and things like that, we want to discuss that as soon as possible  So please give me a call at [phone]  Thank you

## 2023-05-20 RX ORDER — SODIUM CHLORIDE 9 MG/ML
20 INJECTION, SOLUTION INTRAVENOUS ONCE
Status: CANCELLED | OUTPATIENT
Start: 2023-05-26

## 2023-05-20 NOTE — TELEPHONE ENCOUNTER
Discussed with the patient the results of his iron studies which show worsening iron deficiency as well as recurrent anemia  I will order 600 mg of IV iron to be given next Friday and the Monday following that  He is in agreement    He has a GI work-up in progress    Andre Fox

## 2023-05-22 NOTE — TELEPHONE ENCOUNTER
Please schedule patient for iron infusions this Friday 5/26 preferably in the morning, provider wanted patient to receive second iron infusion on Monday but due to the holiday please schedule where there is availability  Thank you!

## 2023-05-22 NOTE — TELEPHONE ENCOUNTER
Left message on answering machine with time and date of iron infusion, patient aware schedule can be seen on mychart  Patient provided with my teams number to return my call and confirm appts

## 2023-05-25 ENCOUNTER — TELEPHONE (OUTPATIENT)
Dept: GASTROENTEROLOGY | Facility: CLINIC | Age: 78
End: 2023-05-25

## 2023-05-25 ENCOUNTER — ANESTHESIA EVENT (OUTPATIENT)
Dept: GASTROENTEROLOGY | Facility: HOSPITAL | Age: 78
End: 2023-05-25

## 2023-05-25 ENCOUNTER — ANESTHESIA (OUTPATIENT)
Dept: GASTROENTEROLOGY | Facility: HOSPITAL | Age: 78
End: 2023-05-25

## 2023-05-25 ENCOUNTER — HOSPITAL ENCOUNTER (OUTPATIENT)
Dept: GASTROENTEROLOGY | Facility: HOSPITAL | Age: 78
Setting detail: OUTPATIENT SURGERY
Discharge: HOME | End: 2023-05-25
Attending: INTERNAL MEDICINE

## 2023-05-25 VITALS
SYSTOLIC BLOOD PRESSURE: 154 MMHG | RESPIRATION RATE: 18 BRPM | OXYGEN SATURATION: 96 % | HEIGHT: 66 IN | BODY MASS INDEX: 25.39 KG/M2 | TEMPERATURE: 96.4 F | WEIGHT: 158 LBS | DIASTOLIC BLOOD PRESSURE: 67 MMHG | HEART RATE: 66 BPM

## 2023-05-25 DIAGNOSIS — K31.819 GAVE (GASTRIC ANTRAL VASCULAR ECTASIA): ICD-10-CM

## 2023-05-25 DIAGNOSIS — D50.9 IRON DEFICIENCY ANEMIA, UNSPECIFIED IRON DEFICIENCY ANEMIA TYPE: ICD-10-CM

## 2023-05-25 DIAGNOSIS — K55.20 AVM (ARTERIOVENOUS MALFORMATION) OF SMALL BOWEL, ACQUIRED: ICD-10-CM

## 2023-05-25 LAB — GLUCOSE SERPL-MCNC: 118 MG/DL (ref 65–140)

## 2023-05-25 RX ORDER — LIDOCAINE HYDROCHLORIDE 10 MG/ML
INJECTION, SOLUTION EPIDURAL; INFILTRATION; INTRACAUDAL; PERINEURAL AS NEEDED
Status: DISCONTINUED | OUTPATIENT
Start: 2023-05-25 | End: 2023-05-25

## 2023-05-25 RX ORDER — SODIUM CHLORIDE 9 MG/ML
INJECTION, SOLUTION INTRAVENOUS CONTINUOUS PRN
Status: DISCONTINUED | OUTPATIENT
Start: 2023-05-25 | End: 2023-05-25

## 2023-05-25 RX ORDER — PROPOFOL 10 MG/ML
INJECTION, EMULSION INTRAVENOUS AS NEEDED
Status: DISCONTINUED | OUTPATIENT
Start: 2023-05-25 | End: 2023-05-25

## 2023-05-25 RX ADMIN — PROPOFOL 90 MG: 10 INJECTION, EMULSION INTRAVENOUS at 07:56

## 2023-05-25 RX ADMIN — LIDOCAINE HYDROCHLORIDE 50 MG: 10 INJECTION, SOLUTION EPIDURAL; INFILTRATION; INTRACAUDAL; PERINEURAL at 07:56

## 2023-05-25 RX ADMIN — SODIUM CHLORIDE: 9 INJECTION, SOLUTION INTRAVENOUS at 07:53

## 2023-05-25 NOTE — ANESTHESIA POSTPROCEDURE EVALUATION
Post-Op Assessment Note    CV Status:  Stable  Pain Score: 0    Pain management: adequate     Mental Status:  Arousable and sleepy   Hydration Status:  Euvolemic   PONV Controlled:  Controlled   Airway Patency:  Patent      Post Op Vitals Reviewed: Yes      Staff: Anesthesiologist, CRNA   Comments: HOB elevated, non obstructing, vss        There were no known notable events for this encounter      BP   130/61   Temp   96 8   Pulse   65   Resp   12   SpO2   100% RA

## 2023-05-25 NOTE — H&P
History and Physical - [facility]  Selene Ortega [de-identified] y o  male MRN: [id number]    HPI: Seelne Ortega is a [de-identified]y o  year old male who presents with avms/ anemia         Review of Systems    Historical Information   Past Medical History:   Diagnosis Date   • Coronary artery disease without angina pectoris 02/25/2020   • DVT (deep venous thrombosis) ([facility])    • GERD (gastroesophageal reflux disease)    • Hypertension      Past Surgical History:   Procedure Laterality Date   • ANGIOPLASTY  10/19/2021    bilateral   • CARDIAC CATHETERIZATION  2013    calif    CABG  x4   • COLONOSCOPY     • COLONOSCOPY     • CORONARY ANGIOPLASTY WITH STENT PLACEMENT  01/01/2010   • CORONARY ARTERY BYPASS GRAFT     • FEMORAL ARTERY STENT     • FEMORAL BYPASS  10/20/2021   • IR LOWER EXTREMITY ANGIOGRAM  2/14/2023   • IR PELVIC ANGIOGRAM  5/6/2022   • HI SLCTV CATHJ 3RD+ ORD SLCTV [facility]  5/6/2022    Procedure: ULTRASOUND-GUIDED LEFT BRACHIAL ARTERY PUNCTURE, AORTOGRAM, RIGHT COMMON ILIAC ARTERY ANGIOPLASTY WITH 8 X 40 MM BALLOON, ANGIOPLASTY OF RIGHT EXTERNAL AND COMMON ILIAC ARTERY STENOSIS WITH 6 X 150 MM DRUG-ELUTING BALLOON, STENTING OF RIGHT DISTAL EXTERNAL ILIAC ARTERY STENOSIS WITH 7 X 40 MM SELF EXPANDING STENT POST DILATED WITH A 6 MM BALLOON ;  Surgeon: Bettina Flower MD;  [place]   • [facility]ilateral 2/14/2023    Procedure: CUTDOWN FEM-FEM BYPASS WITH PRIMARY CLOSURE ACCESS SITE, BILATERAL RUN-OFF, LEFT FEM-POP 5X220 QUYEN AND 6X150 RODRI, ATTEMPT TO CROSS RIGHT SFA OCCLUSION;  Surgeon: Bettina Flower MD;  Location: [facility] OR;  Service: Vascular     Social History   Social History     Substance and Sexual Activity   Alcohol Use Not Currently   • Alcohol/week: 0 0 standard drinks of alcohol     Social History     Substance and Sexual Activity   Drug Use No     Social History     Tobacco Use   Smoking Status Former   • Packs/day: 1 50   • Years: 40 00 "  • Total pack years: 60 00   • Types: Cigarettes   • Quit date: 2015   • Years since quittin 9   Smokeless Tobacco Never   Tobacco Comments    quit --      Family History   Problem Relation Age of Onset   • No Known Problems Mother    • No Known Problems Father        Meds/Allergies     (Not in a hospital admission)      No Known Allergies    Objective     /67   Pulse 63   Temp (!) 97 2 °F (36 2 °C) (Tympanic)   Resp 16   Ht 5' 6\" (1 676 m)   Wt 71 7 kg (158 lb)   SpO2 98%   BMI 25 50 kg/m²       PHYSICAL EXAM    Gen: NAD  CV: RRR  CHEST: Clear  ABD: soft, NT/ND  EXT: no edema  Neuro: AAO      ASSESSMENT/PLAN:  This is a [de-identified]y o  year old male here for EGD / single balloon enteroscopy for AVMs  PLAN:   Procedure: EGD/ Single Balloon Enteroscopy         "

## 2023-05-25 NOTE — TELEPHONE ENCOUNTER
Patients GI provider:  Dr Glory Fair     Number to return call: [phone]     Reason for call: Pt called and stated he has to a repeat EGD/single baloon please review and reach out thank you     Scheduled procedure/appointment date if applicable: n/a

## 2023-05-26 ENCOUNTER — HOSPITAL ENCOUNTER (OUTPATIENT)
Dept: INFUSION CENTER | Facility: CLINIC | Age: 78
End: 2023-05-26

## 2023-05-26 VITALS
DIASTOLIC BLOOD PRESSURE: 55 MMHG | RESPIRATION RATE: 18 BRPM | HEART RATE: 67 BPM | TEMPERATURE: 97.9 F | SYSTOLIC BLOOD PRESSURE: 148 MMHG

## 2023-05-26 DIAGNOSIS — D50.0 IRON DEFICIENCY ANEMIA DUE TO CHRONIC BLOOD LOSS: Primary | ICD-10-CM

## 2023-05-26 RX ORDER — SODIUM CHLORIDE 9 MG/ML
20 INJECTION, SOLUTION INTRAVENOUS ONCE
Status: CANCELLED | OUTPATIENT
Start: 2023-06-01

## 2023-05-26 RX ORDER — SODIUM CHLORIDE 9 MG/ML
20 INJECTION, SOLUTION INTRAVENOUS ONCE
Status: COMPLETED | OUTPATIENT
Start: 2023-05-26 | End: 2023-05-26

## 2023-05-26 RX ADMIN — IRON SUCROSE 300 MG: 20 INJECTION, SOLUTION INTRAVENOUS at 09:13

## 2023-05-26 RX ADMIN — SODIUM CHLORIDE 20 ML/HR: 0.9 INJECTION, SOLUTION INTRAVENOUS at 08:53

## 2023-05-30 ENCOUNTER — OFFICE VISIT (OUTPATIENT)
Dept: FAMILY MEDICINE CLINIC | Facility: CLINIC | Age: 78
End: 2023-05-30

## 2023-05-30 VITALS
HEART RATE: 61 BPM | DIASTOLIC BLOOD PRESSURE: 60 MMHG | TEMPERATURE: 98 F | SYSTOLIC BLOOD PRESSURE: 160 MMHG | HEIGHT: 66 IN | BODY MASS INDEX: 25.04 KG/M2 | WEIGHT: 155.8 LBS | OXYGEN SATURATION: 97 %

## 2023-05-30 DIAGNOSIS — J02.9 PHARYNGITIS, UNSPECIFIED ETIOLOGY: Primary | ICD-10-CM

## 2023-05-30 DIAGNOSIS — J43.1 PANLOBULAR EMPHYSEMA (HCC): ICD-10-CM

## 2023-05-30 DIAGNOSIS — G62.9 NEUROPATHY: ICD-10-CM

## 2023-05-30 DIAGNOSIS — D50.9 IRON DEFICIENCY ANEMIA, UNSPECIFIED IRON DEFICIENCY ANEMIA TYPE: ICD-10-CM

## 2023-05-30 RX ORDER — AMOXICILLIN 875 MG/1
875 TABLET, COATED ORAL 2 TIMES DAILY
Qty: 10 TABLET | Refills: 0 | Status: SHIPPED | OUTPATIENT
Start: 2023-05-30 | End: 2023-06-09

## 2023-05-30 NOTE — PROGRESS NOTES
"Name: Kenneth Rodgers      : 1945      MRN: [id number]  Encounter Provider: Nicanor Stratton DO  Encounter Date: 2023   Encounter department: [address] [place] [address]     1  Pharyngitis, unspecified etiology  Comments:  Started after EGD tube passed through throat and has gotten very severe  I wanted to see to rule out Candida--none seen  Erythema noted especially left  Orders:  -     amoxicillin (AMOXIL) 875 mg tablet; Take 1 tablet (875 mg total) by mouth 2 (two) times a day for 10 days    2  Panlobular emphysema (HCC)  -     XR chest pa & lateral; Future; Expected date: 2023    3  Iron deficiency anemia, unspecified iron deficiency anemia type  Comments:  Undergoing GI studies now for what may well be GI leak  Going for infusion of iron on  first was last week on the 25th of May  Feeling better  4  Neuropathy  Comments:  Likely due to longstanding diabetes  Starting gabapentin 100 mg once daily and taper dose upward to lowest effective dose        Subjective      HPI -[de-identified]year-old gentleman concern for bad sore throat and  left side of face \"dryness\" and in the AM mouth is VERY dry  No fever chills etc   Had EGD tube last wk-May 25 -  and this all started after that  Tried Prom-DM and helped    Review of Systems   Constitutional: Negative for activity change, appetite change, chills, diaphoresis, fatigue, fever and unexpected weight change  HENT: Negative for congestion, dental problem, drooling, ear discharge, ear pain, facial swelling, mouth sores, nosebleeds, postnasal drip, rhinorrhea, trouble swallowing and voice change  Eyes: Negative for photophobia, pain, discharge, redness, itching and visual disturbance  Respiratory: Negative for apnea, cough, choking, chest tightness and shortness of breath  Denies any and all respiratory issues - SOB, orthopnea, etc    Cardiovascular: Negative for chest pain and leg swelling          I have " carefully question patient and his wife regarding any kind of anginal symptoms chest pain tightness heaviness pressure etc  given his severe degree of peripheral vascular disease, certainly cardiovascular disease may well exist  He denies any angina    He does have significant intermittent claudication both lower extremities stating his circulation was only 25% of what it should  That has changed now, s/p surgery  He will continue to  follow-up with vascular surgeon  Gastrointestinal: Negative for abdominal distention, abdominal pain, constipation, diarrhea and nausea  Endocrine: Negative for polydipsia, polyphagia and polyuria  Genitourinary: Negative for decreased urine volume, difficulty urinating, dysuria, enuresis and hematuria  Musculoskeletal: Positive for arthralgias  Negative for back pain, gait problem and joint swelling  Skin: Negative for color change (color much improved over last OV), pallor, rash and wound  Allergic/Immunologic: Negative for immunocompromised state  Neurological: Negative for dizziness, seizures, syncope, facial asymmetry, speech difficulty, light-headedness and headaches  Hematological: Negative for adenopathy  Psychiatric/Behavioral: Negative for agitation, behavioral problems, confusion and decreased concentration  Dysphoric mood: normal concern/depression of everything he's gone thru         Current Outpatient Medications on File Prior to Visit   Medication Sig   • acetaminophen (TYLENOL) 500 mg tablet Take 1,000 mg by mouth every 8 (eight) hours as needed   • atorvastatin (LIPITOR) 40 mg tablet TAKE 1 TABLET DAILY   • clopidogrel (PLAVIX) 75 mg tablet TAKE 1 TABLET BY MOUTH EVERY DAY   • Dulaglutide (Trulicity) 1 5 ZX/9 0PX SOPN Inject 0 5 mL (1 5 mg total) under the skin once a week   • ferrous sulfate 325 (65 Fe) mg tablet Take 1 tablet (325 mg total) by mouth 2 (two) times a day with meals   • glipiZIDE (GLUCOTROL) 10 mg tablet Take 1 tablet (10 mg total) by "mouth 2 (two) times a day before meals   • hydrocortisone 2 5 % cream Apply topically 4 (four) times a day as needed for rash   • lisinopril-hydrochlorothiazide (PRINZIDE,ZESTORETIC) 20-12 5 MG per tablet Take 0 5 tablets by mouth daily   • metFORMIN (GLUCOPHAGE) 1000 MG tablet Take 1 tablet (1,000 mg total) by mouth 2 (two) times a day with meals   • omeprazole (PriLOSEC) 20 mg delayed release capsule Take 1 capsule (20 mg total) by mouth daily before breakfast (Patient taking differently: Take 20 mg by mouth if needed)   • rivaroxaban (Xarelto) 2 5 mg tablet Take 1 tablet (2 5 mg total) by mouth 2 (two) times a day (Patient taking differently: Take 2 5 mg by mouth 2 (two) times a day Last dose Tues May23)   • amLODIPine (NORVASC) 5 mg tablet Take 1 tablet (5 mg total) by mouth daily (Patient not taking: Reported on 2/9/2023)   • gabapentin (Neurontin) 300 mg capsule Take 1 capsule (300 mg total) by mouth 3 (three) times a day (Patient not taking: Reported on 5/30/2023)   • [DISCONTINUED] oxyCODONE-acetaminophen (Percocet) 5-325 mg per tablet Take 1 tablet by mouth every 6 (six) hours as needed for moderate pain Max Daily Amount: 4 tablets (Patient not taking: Reported on 4/10/2023)   • [DISCONTINUED] polyethylene glycol (Golytely) 4000 mL solution Take 4,000 mL by mouth once for 1 dose Take 4000 mL by mouth once for 1 dose  Use as directed (Patient not taking: Reported on 4/10/2023)       Objective     /60 (BP Location: Left arm, Patient Position: Sitting, Cuff Size: Standard)   Pulse 61   Temp 98 °F (36 7 °C) (Temporal)   Ht 5' 6\" (1 676 m)   Wt 70 7 kg (155 lb 12 8 oz)   SpO2 97%   BMI 25 15 kg/m²     Physical Exam  Vitals and nursing note reviewed  Exam conducted with a chaperone present (Wife present throughout the encounter)  Constitutional:       Appearance: Normal appearance  He is well-developed and normal weight  He is not ill-appearing (sallow, ashen skin tone) or diaphoretic        " Comments: Patient looks and feels fairly well despite hemoglobin 11 2 and ferritin 7--what appears to be marked iron deficiency anemia  Worried about occult GI neoplasm??   HENT:      Head: Normocephalic and atraumatic  Right Ear: Ear canal and external ear normal  There is no impacted cerumen  Left Ear: Ear canal and external ear normal  There is no impacted cerumen  Ears:      Comments: Sl wax in the canal on the R ear - the one in question  TM somewhat retracted  Suspect: middle ear syndrome/pressure  Plan: gargling and Flonase- fully discussed  Nose: Nose normal    Eyes:      General:         Right eye: No discharge  Left eye: No discharge  Extraocular Movements: Extraocular movements intact  Conjunctiva/sclera: Conjunctivae normal       Pupils: Pupils are equal, round, and reactive to light  Neck:      Trachea: No tracheal deviation  Cardiovascular:      Rate and Rhythm: Normal rate and regular rhythm  Heart sounds: Murmur heard  Pulmonary:      Effort: Pulmonary effort is normal       Breath sounds: Normal breath sounds  No wheezing, rhonchi or rales (slight bibasilar rales and retained pul secretions)  Chest:      Chest wall: No tenderness  Abdominal:      General: Abdomen is flat  Bowel sounds are normal       Palpations: Abdomen is soft  Tenderness: There is no abdominal tenderness  There is no guarding or rebound  Musculoskeletal:         General: Normal range of motion  Cervical back: Normal range of motion and neck supple  Right lower leg: No edema  Left lower leg: No edema  Lymphadenopathy:      Cervical: No cervical adenopathy  Skin:     General: Skin is warm and dry  Findings: No bruising or erythema        Comments: Somewhat sallow color, with slight bronzed Mediterranean hue--not particularly healthy looking, but typical of a COPD smoker although he has stop smoking several years ago   Neurological:      General: No focal deficit present  Mental Status: He is alert and oriented to person, place, and time  Mental status is at baseline  Cranial Nerves: No cranial nerve deficit  Sensory: No sensory deficit  Motor: No weakness  Coordination: Coordination normal       Gait: Gait normal    Psychiatric:         Mood and Affect: Mood normal          Behavior: Behavior normal          Thought Content: Thought content normal          Judgment: Judgment normal          BMI Counseling: Body mass index is 25 15 kg/m²  The BMI is above normal  Nutrition recommendations include decreasing portion sizes, encouraging healthy choices of fruits and vegetables, decreasing fast food intake, consuming healthier snacks, limiting drinks that contain sugar, moderation in carbohydrate intake, increasing intake of lean protein and reducing intake of saturated and trans fat  Exercise recommendations include moderate physical activity 150 minutes/week and exercising 3-5 times per week  No pharmacotherapy was ordered  Rationale for BMI follow-up plan is due to patient being overweight or obese  Depression Screening and Follow-up Plan: Patient was screened for depression during today's encounter  They screened negative with a PHQ-2 score of 0  I personally reviewed the recent (and prior)  lab results, the image studies, pathology, other specialty/physicians consult notes and recommendations, and outside medical records from other institutions, as appropriate  I had a lengthy discussion with the patient and shared the work-up findings  We discussed the diagnosis and management plan    I spent  30  minutes reviewing the records (labs, clinician notes, outside records, medical history, ordering medicine/tests/procedures, interpreting the imaging/labs previously done) and coordination of care as well as direct time with the patient today, of which greater than 50% of the time was spent in counseling and coordination of care with the patient/family        Jason Jain, DO

## 2023-05-31 NOTE — TELEPHONE ENCOUNTER
EGD with Single Balloon Enteroscopy with Dr Sara Pedro on 5/25/23  Procedure recommendation noted below:    RECOMMENDATION:   Planned single balloon enteroscopy for small bowel AVMs was aborted due to large amount of food in the stomach  We will need repeat procedure with Reglan and 1 day of clear liquids  We will need a gastric emptying study  Follow-up outpatient with gastroenterology, Dr Majo Jennings  Resume Plavix and Xarelto

## 2023-06-01 ENCOUNTER — APPOINTMENT (OUTPATIENT)
Dept: RADIOLOGY | Facility: MEDICAL CENTER | Age: 78
End: 2023-06-01
Payer: MEDICARE

## 2023-06-01 ENCOUNTER — HOSPITAL ENCOUNTER (OUTPATIENT)
Dept: INFUSION CENTER | Facility: CLINIC | Age: 78
Discharge: HOME | End: 2023-06-01
Payer: MEDICARE

## 2023-06-01 VITALS
HEART RATE: 67 BPM | RESPIRATION RATE: 18 BRPM | DIASTOLIC BLOOD PRESSURE: 61 MMHG | SYSTOLIC BLOOD PRESSURE: 164 MMHG | TEMPERATURE: 97 F

## 2023-06-01 DIAGNOSIS — D50.0 IRON DEFICIENCY ANEMIA DUE TO CHRONIC BLOOD LOSS: Primary | ICD-10-CM

## 2023-06-01 DIAGNOSIS — J43.1 PANLOBULAR EMPHYSEMA (HCC): ICD-10-CM

## 2023-06-01 PROCEDURE — 71046 X-RAY EXAM CHEST 2 VIEWS: CPT

## 2023-06-01 PROCEDURE — 96365 THER/PROPH/DIAG IV INF INIT: CPT

## 2023-06-01 RX ORDER — SODIUM CHLORIDE 9 MG/ML
20 INJECTION, SOLUTION INTRAVENOUS ONCE
Status: CANCELLED | OUTPATIENT
Start: 2023-06-01

## 2023-06-01 RX ORDER — SODIUM CHLORIDE 9 MG/ML
20 INJECTION, SOLUTION INTRAVENOUS ONCE
Status: COMPLETED | OUTPATIENT
Start: 2023-06-01 | End: 2023-06-01

## 2023-06-01 RX ADMIN — IRON SUCROSE 300 MG: 20 INJECTION, SOLUTION INTRAVENOUS at 09:37

## 2023-06-01 RX ADMIN — SODIUM CHLORIDE 20 ML/HR: 0.9 INJECTION, SOLUTION INTRAVENOUS at 09:36

## 2023-06-01 NOTE — PROGRESS NOTES
Patient arrived to unit without complaint  Patient tolerated Venofer infusion without incident  AVS declined and patient does not have future infusion appointments at this time  Patient left in stable condition

## 2023-06-02 ENCOUNTER — TELEPHONE (OUTPATIENT)
Dept: GASTROENTEROLOGY | Facility: CLINIC | Age: 78
End: 2023-06-02

## 2023-06-02 ENCOUNTER — PREP FOR PROCEDURE (OUTPATIENT)
Dept: GASTROENTEROLOGY | Facility: CLINIC | Age: 78
End: 2023-06-02

## 2023-06-02 DIAGNOSIS — D50.0 IRON DEFICIENCY ANEMIA DUE TO CHRONIC BLOOD LOSS: Primary | ICD-10-CM

## 2023-06-02 DIAGNOSIS — K31.819 GAVE (GASTRIC ANTRAL VASCULAR ECTASIA): Primary | ICD-10-CM

## 2023-06-02 NOTE — TELEPHONE ENCOUNTER
Our mutual patient is scheduled for procedure: Egd with single balloon (last procedure was aborted due to large amount of food in the stomach)    On: 8/2/2023     With: Dr Sourav Davis is taking the following blood thinner: Plavix & Xarelto    Can the Plavix be stopped 5 days prior to the procedure and the Xarelto 2 days prior to the procedure?       Physician Approving clearance: ________________________

## 2023-06-02 NOTE — TELEPHONE ENCOUNTER
Patient is calling stated he was scheduled  Original on 6/27 for Procedure was cancelled and [address] to 8/2  Due to transpotation he would like to get back on the [facility] for 6/27  As he has a Ride now I explained to him that may not be avaiable he stated that the office was holding the spot for him ?  Please call him @  [phone]  ASAP

## 2023-06-08 ENCOUNTER — TELEPHONE (OUTPATIENT)
Dept: GASTROENTEROLOGY | Facility: CLINIC | Age: 78
End: 2023-06-08

## 2023-06-08 DIAGNOSIS — E78.49 OTHER HYPERLIPIDEMIA: ICD-10-CM

## 2023-06-08 RX ORDER — ATORVASTATIN CALCIUM 40 MG/1
40 TABLET, FILM COATED ORAL DAILY
Qty: 90 TABLET | Refills: 3 | Status: SHIPPED | OUTPATIENT
Start: 2023-06-08

## 2023-06-08 NOTE — TELEPHONE ENCOUNTER
Send e-mail for his EGD prep call and follow up to see if he received it ,also went over direction with him

## 2023-06-08 NOTE — TELEPHONE ENCOUNTER
Patients GI provider:  Dr Kala Moser    Number to return call: [phone]    Reason for call: Pt calling questioning procedure direction  He states he was told during his last EGD he would have special instructions for this EGD as his stomach was filled with food  Instructions sent to him were the same as previous  He would like to verify procedure instructions as this is a repeat test     Also, patient is questioning blood thinner hold  Please contact patient back to further assist   Scheduled procedure/appointment date if applicable: 5/14/8866 EGD w/single balloon enteroscopy

## 2023-06-09 ENCOUNTER — TELEPHONE (OUTPATIENT)
Dept: GASTROENTEROLOGY | Facility: MEDICAL CENTER | Age: 78
End: 2023-06-09

## 2023-06-09 DIAGNOSIS — K31.84 GASTROPARESIS: Primary | ICD-10-CM

## 2023-06-09 RX ORDER — METOCLOPRAMIDE 10 MG/1
10 TABLET ORAL 3 TIMES DAILY
Qty: 3 TABLET | Refills: 0 | Status: SHIPPED | OUTPATIENT
Start: 2023-06-09 | End: 2023-06-10

## 2023-06-09 NOTE — TELEPHONE ENCOUNTER
RECOMMENDATION: 05/25/2023 EGD    We will need repeat procedure with Reglan and 1 day of clear liquids

## 2023-06-09 NOTE — TELEPHONE ENCOUNTER
Dr Vikki Wellington,  Could you please sent in his Reglan for his upcoming EGD single balloon to his pharmacy  Thank you

## 2023-06-11 NOTE — PROGRESS NOTES
Cardiology Office Note  MD Michael Blakely MD Janyce Snow, DO, MD Yarely Haji DO, Yuliana Greenberg DO, [facility] [place]  ----------------------------------------------------------------  [address] [facility]  [address]  [place], [address]    Emilia Irvin [de-identified] y o  male MRN: [id number]  Unit/Bed#:  Encounter: [id number]      History of Present Illness: It was a pleasure to see Emilia Irvin in the office today for follow-up CV evaluation  He has a past medical history CAD s/p CABG in March 2020 at [address] [place] [address], PVD, hypertension, dyslipidemia, type 2 diabetes mellitus, CKD, anemia, left subclavian stenosis and carotid artery stenosis  He established care with us in November 2022  Patient has a known history vascular disease and coronary artery disease  He had stent in 2010 and subsequently CABG in March 2020  CABG was 4 vessel bypass  He had previously been following with [facility] for his cardiovascular management  Since his bypass, he had been feeling well overall  He underwent left lower extremity fem-fem bypass in 2021  Of note, he has been more recently having exertional claudication  He has plan for potential bypassable lower extremity in the coming weeks  He is here today for preoperative CV risk assessment prior to his upcoming procedure  He was seen for preoperative CV risk assessment for potential bypass of the lower extremity and ended up going for stenting on the left lower extremity in February 2023  He was seen by primary care and was discontinued on the amlodipine medication  Overall, he has been feeling well  He denies any chest pain, pressure, tightness or squeezing  Denies lightheadedness, dizziness or palpitations    Denies lower extremity swelling, orthopnea or paroxysmal nocturnal dyspnea      Review of Systems:  Review of Systems   Constitutional: Negative for decreased appetite, fever, weight gain and weight loss  HENT: Negative for congestion and sore throat  Eyes: Negative for visual disturbance  Cardiovascular: Negative for chest pain, dyspnea on exertion, leg swelling, near-syncope and palpitations  Respiratory: Negative for cough and shortness of breath  Hematologic/Lymphatic: Negative for bleeding problem  Skin: Negative for rash  Musculoskeletal: Negative for myalgias and neck pain  Gastrointestinal: Negative for abdominal pain and nausea  Neurological: Negative for light-headedness and weakness  Psychiatric/Behavioral: Negative for depression         Past Medical History:   Diagnosis Date   • Coronary artery disease without angina pectoris 02/25/2020   • DVT (deep venous thrombosis) ([facility])    • GERD (gastroesophageal reflux disease)    • Hypertension        Past Surgical History:   Procedure Laterality Date   • ANGIOPLASTY  10/19/2021    bilateral   • CARDIAC CATHETERIZATION  2013    calif    CABG  x4   • COLONOSCOPY     • COLONOSCOPY     • CORONARY ANGIOPLASTY WITH STENT PLACEMENT  01/01/2010   • CORONARY ARTERY BYPASS GRAFT     • FEMORAL ARTERY STENT     • FEMORAL BYPASS  10/20/2021   • IR LOWER EXTREMITY ANGIOGRAM  2/14/2023   • IR PELVIC ANGIOGRAM  5/6/2022   • ID SLCTV CATHJ 3RD+ ORD SLCTV ABDL [facility]  5/6/2022    Procedure: ULTRASOUND-GUIDED LEFT BRACHIAL ARTERY PUNCTURE, AORTOGRAM, RIGHT COMMON ILIAC ARTERY ANGIOPLASTY WITH 8 X 40 MM BALLOON, ANGIOPLASTY OF RIGHT EXTERNAL AND COMMON ILIAC ARTERY STENOSIS WITH 6 X 150 MM DRUG-ELUTING BALLOON, STENTING OF RIGHT DISTAL EXTERNAL ILIAC ARTERY STENOSIS WITH 7 X 40 MM SELF EXPANDING STENT POST DILATED WITH A 6 MM BALLOON ;  Surgeon: Destiney Bhatti MD;  [place]   • ID SLCTV CATHJ 3RD+ ORD [place] [facility] Bilateral 2/14/2023    Procedure: CUTDOWN FEM-FEM BYPASS WITH PRIMARY CLOSURE ACCESS SITE, BILATERAL RUN-OFF, LEFT FEM-POP 5X220 QUYEN AND 6X150 RODRI, ATTEMPT TO CROSS RIGHT SFA OCCLUSION;  Surgeon: Catia Miller Penny Leon MD;  Location: [facility] OR;  Service: Vascular       Social History     Socioeconomic History   • Marital status: /Civil Union     Spouse name: None   • Number of children: None   • Years of education: None   • Highest education level: None   Occupational History   • None   Tobacco Use   • Smoking status: Former     Packs/day: 1 50     Years: 40 00     Total pack years: 60 00     Types: Cigarettes     Quit date: 2015     Years since quittin 9   • Smokeless tobacco: Never   • Tobacco comments:     quit --    Vaping Use   • Vaping Use: Never used   Substance and Sexual Activity   • Alcohol use: Not Currently     Alcohol/week: 0 0 standard drinks of alcohol   • Drug use: No   • Sexual activity: None   Other Topics Concern   • None   Social History Narrative    · Most recent tobacco use screenin2020      Social Determinants of Health     Financial Resource Strain: Not on file   Food Insecurity: Not on file   Transportation Needs: Not on file   Physical Activity: Not on file   Stress: Not on file   Social Connections: Not on file   Intimate Partner Violence: Not on file   Housing Stability: Not on file       Family History   Problem Relation Age of Onset   • No Known Problems Mother    • No Known Problems Father        No Known Allergies      Current Outpatient Medications:   •  acetaminophen (TYLENOL) 500 mg tablet, Take 1,000 mg by mouth every 8 (eight) hours as needed, Disp: , Rfl:   •  atorvastatin (LIPITOR) 40 mg tablet, Take 1 tablet (40 mg total) by mouth daily, Disp: 90 tablet, Rfl: 3  •  clopidogrel (PLAVIX) 75 mg tablet, TAKE 1 TABLET BY MOUTH EVERY DAY, Disp: 90 tablet, Rfl: 3  •  Dulaglutide (Trulicity) 1 5 TJ/1 0ON SOPN, Inject 0 5 mL (1 5 mg total) under the skin once a week, Disp: 3 mL, Rfl: 6  •  ferrous sulfate 325 (65 Fe) mg tablet, Take 1 tablet (325 mg total) by mouth 2 (two) times a day with meals, Disp: 60 tablet, Rfl: 6  •  gabapentin (Neurontin) 300 mg capsule, "Take 1 capsule (300 mg total) by mouth 3 (three) times a day, Disp: 90 capsule, Rfl: 6  •  glipiZIDE (GLUCOTROL) 10 mg tablet, Take 1 tablet (10 mg total) by mouth 2 (two) times a day before meals, Disp: 180 tablet, Rfl: 3  •  hydrocortisone 2 5 % cream, Apply topically 4 (four) times a day as needed for rash, Disp: 30 g, Rfl: 0  •  lisinopril-hydrochlorothiazide (PRINZIDE,ZESTORETIC) 20-12 5 MG per tablet, Take 0 5 tablets by mouth daily, Disp: 90 tablet, Rfl: 3  •  metFORMIN (GLUCOPHAGE) 1000 MG tablet, Take 1 tablet (1,000 mg total) by mouth 2 (two) times a day with meals, Disp: 180 tablet, Rfl: 3  •  omeprazole (PriLOSEC) 20 mg delayed release capsule, Take 1 capsule (20 mg total) by mouth daily before breakfast (Patient taking differently: Take 20 mg by mouth if needed), Disp: 30 capsule, Rfl: 5  •  rivaroxaban (Xarelto) 2 5 mg tablet, Take 1 tablet (2 5 mg total) by mouth 2 (two) times a day (Patient taking differently: Take 2 5 mg by mouth 2 (two) times a day Last dose Tues May23), Disp: 180 tablet, Rfl: 0  •  metoclopramide (REGLAN) 10 mg tablet, , Disp: , Rfl:     Vitals:    06/12/23 1406   BP: 144/50   Pulse: 62   SpO2: 97%   Weight: 70 3 kg (155 lb)   Height: 5' 6\" (1 676 m)     Body mass index is 25 02 kg/m²  PHYSICAL EXAMINATION:  Gen: Awake, Alert, NAD   Head/eyes: AT/NC, pupils equal and round, Anicteric  ENT: mmm  Neck: Supple, No elevated JVP, trachea midline  Resp: CTA bilaterally no w/r/r  CV: RRR +S1, S2, No m/r/g  Abd: Soft, NT/ND + BS  Ext: no LE edema bilaterally  Neuro: Follows commands, moves all extermities  Psych: Appropriate affect, happy mood, pleasant attitude, non-combative  Skin: warm; no rash, erythema or venous stasis changes on exposed skin    --------------------------------------------------------------------------------  TREADMILL STRESS  No results found for this or any previous visit     " --------------------------------------------------------------------------------  NUCLEAR STRESS TEST: No results found for this or any previous visit  No results found for this or any previous visit       --------------------------------------------------------------------------------  CATH:    · Typical CP s/p cath w/ 99% p-mLAD, 50% LM, 60% OM1, 99% dRCA, 99% rPAV, March 2020  --------------------------------------------------------------------------------  ECHO:   No results found for this or any previous visit  No results found for this or any previous visit     --------------------------------------------------------------------------------  HOLTER  No results found for this or any previous visit  No results found for this or any previous visit     --------------------------------------------------------------------------------  CAROTIDS  Results for orders placed during the hospital encounter of 11/02/21    VAS carotid complete study    Narrative  [facility] REPORT  CLINICAL:  Indications:  Patient c/o hearing in his pulse in his right ear following his  revascularization of his left leg 2 weeks ago  Patient reports a failed left axillary-femoral bypass graft and is s/p a cross  over femoral bypass graft  Operative History:  femoral stent  Risk Factors: HTN, Hyperlipidemia and previous smoking (quit >10yrs ago)  Clinical  Right Pressure:  116/50 mm Hg, Left Pressure:  130/50 mm Hg  FINDINGS:    Right        Impression  PSV  EDV (cm/s)  Direction of Flow  Ratio  Dist  ICA                 61          16                      0 54  Mid  ICA                 100          24                      0 88  Prox   ICA    1 - 49%     157          28                      1 38  Dist CCA                  85           7  Mid CCA                  114          16                      1 49  Prox CCA                  77          11                      1 06  Ext Carotid              104           0 0 91  Prox Vert                 68          19  Antegrade  Subclavian               242           0  Innominate                72           0    Left         Impression  PSV  EDV (cm/s)  Direction of Flow  Ratio  Dist  ICA                 94          19                      0 87  Mid  ICA                 117          25                      1 07  Prox  ICA    1 - 49%     123          36                      1 12  Dist CCA                  93          21  Mid CCA                  109          23                      1 75  Prox CCA                  62          15  Ext Carotid              238           0                      2 18  Prox Vert                 62           0  Antegrade  Subclavian               269           0        CONCLUSION:    Impression  RIGHT:  There is <50% stenosis noted in the internal carotid artery  Plaque is  heterogenous and irregular  Vertebral artery flow is antegrade  There is a minimal stenosis visualized in  the proximal subclavian artery  LEFT:  There is <50% stenosis noted in the internal carotid artery  Plaque is  heterogenous and irregular  Vertebral artery flow is antegrade  There is no significant subclavian artery  disease  Previous study performed at outside facility  Internal carotid artery stenosis determination by consensus criteria from:  Syd Campuzano et al  Carotid Artery Stenosis: Gray-Scale and Doppler US Diagnosis  - Society of Radiologists in [address] [place] [address], Radiology 2003;  [phone]  SIGNATURE:  Electronically Signed by: Navneet Camarillo on 2021-11-02 01:09:20 PM     --------------------------------------------------------------------------------  ECGs:  No results found for this visit on 06/12/23       Lab Results   Component Value Date    HCT 37 7 05/18/2023    HGB 11 6 (L) 05/18/2023    MCV 88 05/18/2023     05/18/2023    WBC 6 75 05/18/2023      Lab Results   Component Value Date    BUN 20 05/18/2023    CALCIUM "9 1 05/18/2023     05/18/2023    CO2 21 05/18/2023    CREATININE 1 26 05/18/2023    GLUC 207 (H) 04/20/2022    K 3 8 05/18/2023    SODIUM 137 05/18/2023      Lab Results   Component Value Date    HGBA1C 6 2 04/10/2023      No results found for: \"CHOL\"  Lab Results   Component Value Date    HDL 34 (L) 08/29/2022    HDL 35 (L) 11/15/2021    HDL 38 (L) 05/26/2021     Lab Results   Component Value Date    LDLCALC 28 08/29/2022    LDLCALC 36 11/15/2021    LDLCALC 56 05/26/2021     Lab Results   Component Value Date    TRIG 152 (H) 08/29/2022    TRIG 112 11/15/2021    TRIG 189 (H) 05/26/2021     No results found for: \"CHOLHDL\"   Lab Results   Component Value Date    INR 1 02 02/06/2023    INR 1 12 04/20/2022    INR 1 08 11/03/2020    PROTIME 13 6 02/06/2023    PROTIME 14 0 04/20/2022    PROTIME 14 1 11/03/2020        1  Coronary artery disease involving native coronary artery of native heart without angina pectoris  -     Lipid Panel with Direct LDL reflex; Future; Expected date: 12/12/2023    2  Essential hypertension    3  Other hyperlipidemia  -     Lipid Panel with Direct LDL reflex; Future; Expected date: 12/12/2023    4  Type 2 diabetes mellitus with diabetic peripheral angiopathy without gangrene, with long-term current use of insulin ([facility] [address] )    5   Peripheral vascular disease with claudication ([facility] [address] )        IMPRESSION:  • CAD  o s/p CABG x4 w/ LIMA-LAD, sequential SVG-PDA and PL and SVG-OM1, March 2020  o remote stent to unknown vessel, 2010  • Hypertension - management as per 1' care  o LVEF 37%, grade 2 diastolic dysfunction, AV sclerosis, mild MAC, mild MR, trace TR/ME, March 2022  • Dyslipidemia w/ LDL 28 mg/dL, August 2022  • Type 2 diabetes mellitus  • PVD  o s/p left fem-fem bypass, 2021  o High-grade stenosis right SFA by CT with runoff, April 2022  o s/p LLE RODRI, February 2023  • Carotid stenosis < 50% bilaterally, March 2020  • Left-sided proximal subclavian stenosis, November 2021  • CKD stage " "3  • Iron deficiency anemia  • History of tobacco use    PLAN:  It was a pleasure to see Madelyn Zuñiga in the office today for follow-up CV evaluation  He is here today for routine CV follow-up  He has no chest pain concerning for angina and no signs or symptoms of heart failure  He examines to be euvolemic in the office today  Blood pressure remains elevated  He is currently off of amlodipine and is taking his lisinopril and hydrochlorothiazide regularly  He is tolerating his medications without any reported adverse effects  He can perform greater than 4 METS without significant exertional symptoms  Based on his clinical presentation, I have the following recommendations:    1  Recommend 30 minutes a day, 5 days a week of moderate intensity activity to build cardiovascular endurance as tolerated  2   Would encourage a heart healthy diet low in sodium and carbohydrate  3  Follow-up with primary care for blood pressure management  Continue lisinopril and hydrochlorothiazide  Additional BP agents as per primary care  4   Continue high intensity statin  Goal LDL is less than 70 mg/dL  We will repeat lipid panel in 6 months prior to her follow-up appointment  5   Lifelong aspirin  Continue Plavix as per vascular  6   Follow-up with primary care for blood glucose management  7  No additional CV testing at this time  8  We will follow-up with him in 6 months to review the results of his lipid panel  As always, please do not hesitate to call with any questions  Portions of the record may have been created with voice recognition software  Occasional wrong word or \"sound a like\" substitutions may have occurred due to the inherent limitations of voice recognition software  Read the chart carefully and recognize, using context, where substitutions have occurred        Signed: Kerri Lopez DO, FACC, OSMEL, FACP  "

## 2023-06-12 ENCOUNTER — OFFICE VISIT (OUTPATIENT)
Dept: CARDIOLOGY CLINIC | Facility: CLINIC | Age: 78
End: 2023-06-12
Payer: MEDICARE

## 2023-06-12 ENCOUNTER — TELEPHONE (OUTPATIENT)
Dept: GASTROENTEROLOGY | Facility: CLINIC | Age: 78
End: 2023-06-12

## 2023-06-12 VITALS
WEIGHT: 155 LBS | HEART RATE: 62 BPM | HEIGHT: 66 IN | OXYGEN SATURATION: 97 % | BODY MASS INDEX: 24.91 KG/M2 | DIASTOLIC BLOOD PRESSURE: 50 MMHG | SYSTOLIC BLOOD PRESSURE: 144 MMHG

## 2023-06-12 DIAGNOSIS — E78.49 OTHER HYPERLIPIDEMIA: ICD-10-CM

## 2023-06-12 DIAGNOSIS — I25.10 CORONARY ARTERY DISEASE INVOLVING NATIVE CORONARY ARTERY OF NATIVE HEART WITHOUT ANGINA PECTORIS: Primary | ICD-10-CM

## 2023-06-12 DIAGNOSIS — I73.9 PERIPHERAL VASCULAR DISEASE WITH CLAUDICATION (HCC): ICD-10-CM

## 2023-06-12 DIAGNOSIS — E11.51 TYPE 2 DIABETES MELLITUS WITH DIABETIC PERIPHERAL ANGIOPATHY WITHOUT GANGRENE, WITH LONG-TERM CURRENT USE OF INSULIN (HCC): ICD-10-CM

## 2023-06-12 DIAGNOSIS — K31.84 GASTROPARESIS: ICD-10-CM

## 2023-06-12 DIAGNOSIS — Z79.4 TYPE 2 DIABETES MELLITUS WITH DIABETIC PERIPHERAL ANGIOPATHY WITHOUT GANGRENE, WITH LONG-TERM CURRENT USE OF INSULIN (HCC): ICD-10-CM

## 2023-06-12 DIAGNOSIS — I10 ESSENTIAL HYPERTENSION: ICD-10-CM

## 2023-06-12 DIAGNOSIS — K31.84 GASTROPARESIS: Primary | ICD-10-CM

## 2023-06-12 PROCEDURE — 99214 OFFICE O/P EST MOD 30 MIN: CPT | Performed by: INTERNAL MEDICINE

## 2023-06-12 RX ORDER — METOCLOPRAMIDE 10 MG/1
TABLET ORAL
COMMUNITY
Start: 2023-06-10 | End: 2023-06-12 | Stop reason: SDUPTHER

## 2023-06-12 RX ORDER — METOCLOPRAMIDE 10 MG/1
10 TABLET ORAL 2 TIMES DAILY
Qty: 2 TABLET | Refills: 0 | Status: SHIPPED | OUTPATIENT
Start: 2023-06-12 | End: 2023-06-13 | Stop reason: SDUPTHER

## 2023-06-12 NOTE — TELEPHONE ENCOUNTER
Patients GI provider:  Dr Riddhi Caceres    Number to return call: [phone]     Reason for call: Pt called and has some questions regarding medication reglan   He doesn't know when he should take medication please review and reach out thank you     Scheduled procedure/appointment date if applicable: procedure 41/98/9174

## 2023-06-13 RX ORDER — METOCLOPRAMIDE 10 MG/1
10 TABLET ORAL 2 TIMES DAILY
Qty: 2 TABLET | Refills: 0 | Status: SHIPPED | OUTPATIENT
Start: 2023-06-13 | End: 2023-06-14

## 2023-06-13 NOTE — TELEPHONE ENCOUNTER
Spoke with pt, advised of reglan 10 mg sent to [facility] yesterday by dr Jennifer Bryan  [facility]  Medication resent as per pt request  Pt verbalized understanding

## 2023-06-16 NOTE — TELEPHONE ENCOUNTER
Patients GI provider:  Dr Chris Storey    Number to return call: [phone]    Reason for call: Pt calling questing how to take Reglan  He was previously told to take 2 tablets day prior to his procedure  (He received the script and picked up from pharmacy) He now received another script from pharmacy for Reglan for 3 tablets  He is questioning if something has changed and what he should do  Please return call to patient to further discuss      Scheduled procedure/appointment date if applicable: 7/25/3776

## 2023-06-22 ENCOUNTER — OFFICE VISIT (OUTPATIENT)
Dept: VASCULAR SURGERY | Facility: CLINIC | Age: 78
End: 2023-06-22
Payer: MEDICARE

## 2023-06-22 VITALS
HEART RATE: 67 BPM | OXYGEN SATURATION: 97 % | HEIGHT: 66 IN | SYSTOLIC BLOOD PRESSURE: 150 MMHG | BODY MASS INDEX: 25.39 KG/M2 | WEIGHT: 158 LBS | DIASTOLIC BLOOD PRESSURE: 48 MMHG

## 2023-06-22 DIAGNOSIS — I70.213 ATHEROSCLEROSIS OF NATIVE ARTERY OF BOTH LOWER EXTREMITIES WITH INTERMITTENT CLAUDICATION (HCC): Primary | ICD-10-CM

## 2023-06-22 DIAGNOSIS — I74.09 AORTOILIAC OCCLUSIVE DISEASE (HCC): Chronic | ICD-10-CM

## 2023-06-22 PROCEDURE — 99214 OFFICE O/P EST MOD 30 MIN: CPT | Performed by: SURGERY

## 2023-06-22 NOTE — PROGRESS NOTES
Assessment/Plan:    Atherosclerosis of native artery of both lower extremities with intermittent claudication ([address] )  1  Aortoiliac and infrainguinal arterial occlusive disease with history of right to left femoral-femoral bypass with mild left calf claudication which has improved following angioplasty and stenting of a chronic total occlusion of the left superficial femoral artery  Of note he does have some stent compression secondary to the highly calcified nature of his disease and thus will continue on Plavix and low-dose Xarelto  This is being held for a upcoming GI intervention  2   Aortoiliac and infrainguinal arterial occlusive disease with moderate to severe right calf claudication  We discussed the findings on previous arteriogram with failed attempts to recannulate his highly calcified femoral-popliteal occlusion  At this point options would require surgical bypass  We discussed the risks and benefits of proceeding  Since his symptoms are limited to claudication, at this point we will continue a conservative mode of therapy  We discussed the importance of continued medical management and continued walking program   We will plan follow-up with noninvasive imaging and office visit in approximately 6 months  Diagnoses and all orders for this visit:    Atherosclerosis of native artery of both lower extremities with intermittent claudication (HCC)  -     VAS abdominal aorta/iliacs; with LIONEL's; Future  -     VAS lower limb arterial duplex, complete bilateral; Future    Aortoiliac occlusive disease (HCC)  -     VAS abdominal aorta/iliacs; with LIONEL's; Future  -     VAS lower limb arterial duplex, complete bilateral; Future          Subjective:      Patient ID: Tacho Ogden is a [de-identified] y o  male  Patient presents today to review JAMEL done 5/10/2023  Patient is s/p multiple vascular interventions  Patient c/o numbness and tingling from neuropathy, he has coldness in his legs   Patient c/o claudication in both legs, can walk about a half block to a block before stopping to rest  Patient also c/o intermittent swelling in left leg  Patient denies any wounds  Patient takes Atorvastatin, Plavix, and Xarelto  Patient is a former smoker  57-year-old with long history of aortoiliac and infrainguinal arterial occlusive disease has a remote history of a right to left femoral-femoral artery bypass  He is being followed for lower extremity claudication  He underwent a left femoral-popliteal angioplasty and stent placement with recanalization of flow  At that time he was noted to have severe calcific disease with some compression of the stent  He was thus treated with Plavix and low-dose Xarelto  Attempts were made at that time to recannulate his occluded right superficial femoral artery  Unfortunately this failed with ultimate extravascular wire position and thus was abandoned  The femoral-popliteal segment was noted to be highly calcified  On presentation today he continues to complain of bilateral foot and lower calf numbness consistent with his known neuropathy  He describes severe discomfort in his right calf after walking one half-1 block which is unchanged  He notes a significant improvement on the left  He denies rest pain  He evidently was started on gabapentin by his primary care physician  He feels this caused side effects of dizziness and lightheadedness and thus stopped  He will follow-up with his primary care physician in this regard  Lower extremity arterial duplex 5/10/2023  His right to left femoral-femoral bypass is patent without significant stenosis  On the right the superficial femoral artery is chronically occluded  Ankle-brachial index of 0 50 which is unchanged from previous 0 45  On the left the femoral-popliteal stents are patent without significant residual stenosis  Ankle-brachial index is 0 73 with improvement from 0 5      I have spent a total time of 30 minutes on 06/22/23 in caring for this patient including Diagnostic results, Prognosis, Risks and benefits of tx options, Instructions for management, Patient and family education, Risk factor reductions and Impressions        The following portions of the patient's history were reviewed and updated as appropriate: allergies, current medications, past family history, past medical history, past social history, past surgical history and problem list     Past Medical History:  Past Medical History:   Diagnosis Date   • Coronary artery disease without angina pectoris 02/25/2020   • DVT (deep venous thrombosis) (HCC)    • GERD (gastroesophageal reflux disease)    • Hypertension        Past Surgical History:  Past Surgical History:   Procedure Laterality Date   • ANGIOPLASTY  10/19/2021    bilateral   • CARDIAC CATHETERIZATION  2013    calif    CABG  x4   • COLONOSCOPY     • COLONOSCOPY     • CORONARY ANGIOPLASTY WITH STENT PLACEMENT  01/01/2010   • CORONARY ARTERY BYPASS GRAFT     • FEMORAL ARTERY STENT     • FEMORAL BYPASS  10/20/2021   • IR LOWER EXTREMITY ANGIOGRAM  2/14/2023   • IR PELVIC ANGIOGRAM  5/6/2022   • [facility]  5/6/2022    Procedure: ULTRASOUND-GUIDED LEFT BRACHIAL ARTERY PUNCTURE, AORTOGRAM, RIGHT COMMON ILIAC ARTERY ANGIOPLASTY WITH 8 X 40 MM BALLOON, ANGIOPLASTY OF RIGHT EXTERNAL AND COMMON ILIAC ARTERY STENOSIS WITH 6 X 150 MM DRUG-ELUTING BALLOON, STENTING OF RIGHT DISTAL EXTERNAL ILIAC ARTERY STENOSIS WITH 7 X 40 MM SELF EXPANDING STENT POST DILATED WITH A 6 MM BALLOON ;  Surgeon: Mallory Martinez MD;  Loca   • [facility] CATHJ 3RD+ ORD [facility] Bilateral 2/14/2023    Procedure: CUTDOWN FEM-FEM BYPASS WITH PRIMARY CLOSURE ACCESS SITE, BILATERAL RUN-OFF, LEFT FEM-POP 5X220 QUYEN AND 6X150 RODRI, ATTEMPT TO CROSS RIGHT SFA OCCLUSION;  Surgeon: Mallory Martinez MD;  Location: [facility];  Service: Vascular       Social History:  Social History Substance and Sexual Activity   Alcohol Use Not Currently   • Alcohol/week: 0 0 standard drinks of alcohol     Social History     Substance and Sexual Activity   Drug Use No     Social History     Tobacco Use   Smoking Status Former   • Packs/day: 1 50   • Years: 40 00   • Total pack years: 60 00   • Types: Cigarettes   • Quit date: 2015   • Years since quittin 9   Smokeless Tobacco Never   Tobacco Comments    quit --        Family History:  Family History   Problem Relation Age of Onset   • No Known Problems Mother    • No Known Problems Father        Allergies:  No Known Allergies    Medications:    Current Outpatient Medications:   •  acetaminophen (TYLENOL) 500 mg tablet, Take 1,000 mg by mouth every 8 (eight) hours as needed, Disp: , Rfl:   •  atorvastatin (LIPITOR) 40 mg tablet, Take 1 tablet (40 mg total) by mouth daily, Disp: 90 tablet, Rfl: 3  •  clopidogrel (PLAVIX) 75 mg tablet, TAKE 1 TABLET BY MOUTH EVERY DAY, Disp: 90 tablet, Rfl: 3  •  Dulaglutide (Trulicity) 1 5 MZ/8 5QD SOPN, Inject 0 5 mL (1 5 mg total) under the skin once a week, Disp: 3 mL, Rfl: 6  •  ferrous sulfate 325 (65 Fe) mg tablet, Take 1 tablet (325 mg total) by mouth 2 (two) times a day with meals, Disp: 60 tablet, Rfl: 6  •  glipiZIDE (GLUCOTROL) 10 mg tablet, Take 1 tablet (10 mg total) by mouth 2 (two) times a day before meals, Disp: 180 tablet, Rfl: 3  •  hydrocortisone 2 5 % cream, Apply topically 4 (four) times a day as needed for rash, Disp: 30 g, Rfl: 0  •  lisinopril-hydrochlorothiazide (PRINZIDE,ZESTORETIC) 20-12 5 MG per tablet, Take 0 5 tablets by mouth daily, Disp: 90 tablet, Rfl: 3  •  metFORMIN (GLUCOPHAGE) 1000 MG tablet, Take 1 tablet (1,000 mg total) by mouth 2 (two) times a day with meals, Disp: 180 tablet, Rfl: 3  •  omeprazole (PriLOSEC) 20 mg delayed release capsule, Take 1 capsule (20 mg total) by mouth daily before breakfast (Patient taking differently: Take 20 mg by mouth if needed), Disp: 30 "capsule, Rfl: 5  •  rivaroxaban (Xarelto) 2 5 mg tablet, Take 1 tablet (2 5 mg total) by mouth 2 (two) times a day (Patient taking differently: Take 2 5 mg by mouth 2 (two) times a day Last dose Tues May23), Disp: 180 tablet, Rfl: 0  •  gabapentin (Neurontin) 300 mg capsule, Take 1 capsule (300 mg total) by mouth 3 (three) times a day (Patient not taking: Reported on 6/22/2023), Disp: 90 capsule, Rfl: 6    Vitals:  BP (!) 150/48 (06/22/23 0845)    Temp      Pulse 67 (06/22/23 0845)   Resp      SpO2 97 % (06/22/23 0845)      Lab Results and Cultures:   CBC with diff:   Lab Results   Component Value Date    WBC 6 75 05/18/2023    HGB 11 6 (L) 05/18/2023    HCT 37 7 05/18/2023    MCV 88 05/18/2023     05/18/2023    RBC 4 30 05/18/2023    MCH 27 0 05/18/2023    MCHC 30 8 (L) 05/18/2023    RDW 17 6 (H) 05/18/2023    MPV 10 6 05/18/2023    NRBC 0 05/18/2023   ,   BMP/CMP:  Lab Results   Component Value Date    K 3 8 05/18/2023    K 4 3 05/29/2020     05/18/2023     05/29/2020    CO2 21 05/18/2023    CO2 23 05/29/2020    BUN 20 05/18/2023    BUN 17 05/29/2020    CREATININE 1 26 05/18/2023    CALCIUM 9 1 05/18/2023    CALCIUM 9 6 05/29/2020    AST 13 05/18/2023    AST 16 05/29/2020    ALT 24 05/18/2023    ALT 19 05/29/2020    ALKPHOS 61 05/18/2023    ALKPHOS 77 05/29/2020    EGFR 53 05/18/2023   ,   Lipid Panel: No results found for: \"CHOL\",   Coags:   Lab Results   Component Value Date    INR 1 02 02/06/2023   ,       Review of Systems   Constitutional: Negative  HENT: Negative  Eyes: Negative  Respiratory: Negative  Cardiovascular: Positive for leg swelling  Gastrointestinal: Negative  Endocrine: Negative  Genitourinary: Negative  Musculoskeletal: Negative  Skin: Negative  Allergic/Immunologic: Negative  Neurological: Positive for weakness  Hematological: Bruises/bleeds easily  Psychiatric/Behavioral: Negative            Objective:      BP (!) 150/48 (BP Location: " "Right arm, Patient Position: Sitting, Cuff Size: Adult)   Pulse 67   Ht 5' 6\" (1 676 m)   Wt 71 7 kg (158 lb)   SpO2 97%   BMI 25 50 kg/m²          Physical Exam    "

## 2023-06-22 NOTE — ASSESSMENT & PLAN NOTE
1   Aortoiliac and infrainguinal arterial occlusive disease with history of right to left femoral-femoral bypass with mild left calf claudication which has improved following angioplasty and stenting of a chronic total occlusion of the left superficial femoral artery  Of note he does have some stent compression secondary to the highly calcified nature of his disease and thus will continue on Plavix and low-dose Xarelto  This is being held for a upcoming GI intervention  2   Aortoiliac and infrainguinal arterial occlusive disease with moderate to severe right calf claudication  We discussed the findings on previous arteriogram with failed attempts to recannulate his highly calcified femoral-popliteal occlusion  At this point options would require surgical bypass  We discussed the risks and benefits of proceeding  Since his symptoms are limited to claudication, at this point we will continue a conservative mode of therapy  We discussed the importance of continued medical management and continued walking program   We will plan follow-up with noninvasive imaging and office visit in approximately 6 months

## 2023-06-22 NOTE — LETTER
June 22, 2023     Shahida Choudhury DO  [address] [place] [address]  Professor [address]    Patient: Elita Kussmaul   YOB: 1945   Date of Visit: 6/22/2023       Dear Dr Brandon Zhao: Thank you for referring Elita Kussmaul to me for evaluation  Below are the relevant portions of my assessment and plan of care  Diagnoses and all orders for this visit:    Atherosclerosis of native artery of both lower extremities with intermittent claudication ([address] )  1  Aortoiliac and infrainguinal arterial occlusive disease with history of right to left femoral-femoral bypass with mild left calf claudication which has improved following angioplasty and stenting of a chronic total occlusion of the left superficial femoral artery  Of note he does have some stent compression secondary to the highly calcified nature of his disease and thus will continue on Plavix and low-dose Xarelto  This is being held for a upcoming GI intervention  2   Aortoiliac and infrainguinal arterial occlusive disease with moderate to severe right calf claudication  We discussed the findings on previous arteriogram with failed attempts to recannulate his highly calcified femoral-popliteal occlusion  At this point options would require surgical bypass  We discussed the risks and benefits of proceeding  Since his symptoms are limited to claudication, at this point we will continue a conservative mode of therapy  We discussed the importance of continued medical management and continued walking program   We will plan follow-up with noninvasive imaging and office visit in approximately 6 months  If you have questions, please do not hesitate to call me  I look forward to following Marianna Sanderson along with you           Sincerely,        Tanisha Henry MD        CC: No Recipients

## 2023-06-22 NOTE — PATIENT INSTRUCTIONS
Atherosclerosis of native artery of both lower extremities with intermittent claudication ([facility] [address] )  1  Aortoiliac and infrainguinal arterial occlusive disease with history of right to left femoral-femoral bypass with mild left calf claudication which has improved following angioplasty and stenting of a chronic total occlusion of the left superficial femoral artery  Of note he does have some stent compression secondary to the highly calcified nature of his disease and thus will continue on Plavix and low-dose Xarelto  This is being held for a upcoming GI intervention  2   Aortoiliac and infrainguinal arterial occlusive disease with moderate to severe right calf claudication  We discussed the findings on previous arteriogram with failed attempts to recannulate his highly calcified femoral-popliteal occlusion  At this point options would require surgical bypass  We discussed the risks and benefits of proceeding  Since his symptoms are limited to claudication, at this point we will continue a conservative mode of therapy  We discussed the importance of continued medical management and continued walking program   We will plan follow-up with noninvasive imaging and office visit in approximately 6 months

## 2023-06-26 ENCOUNTER — NURSE TRIAGE (OUTPATIENT)
Age: 78
End: 2023-06-26

## 2023-06-26 NOTE — TELEPHONE ENCOUNTER
Spoke with patient  Clarified instructions on clear liquid diet  Explained to patient what Reglan is used for  Patient verbalized understanding  All questions addressed at this time

## 2023-06-26 NOTE — TELEPHONE ENCOUNTER
----- Message from Erika Walters sent at 6/26/2023 10:08 AM EDT -----  Pt called in requesting a call from a nurse regarding what korin campbell can have  Pt has questions about   Hydrochloride tablet 10mg  pt would like to know how to take medication before procedure

## 2023-06-27 ENCOUNTER — ANESTHESIA (OUTPATIENT)
Dept: GASTROENTEROLOGY | Facility: HOSPITAL | Age: 78
End: 2023-06-27

## 2023-06-27 ENCOUNTER — HOSPITAL ENCOUNTER (OUTPATIENT)
Dept: GASTROENTEROLOGY | Facility: HOSPITAL | Age: 78
Setting detail: OUTPATIENT SURGERY
Discharge: HOME | End: 2023-06-27
Attending: INTERNAL MEDICINE
Payer: MEDICARE

## 2023-06-27 ENCOUNTER — ANESTHESIA EVENT (OUTPATIENT)
Dept: GASTROENTEROLOGY | Facility: HOSPITAL | Age: 78
End: 2023-06-27

## 2023-06-27 VITALS
RESPIRATION RATE: 16 BRPM | HEIGHT: 65 IN | TEMPERATURE: 96.1 F | OXYGEN SATURATION: 97 % | DIASTOLIC BLOOD PRESSURE: 74 MMHG | HEART RATE: 60 BPM | WEIGHT: 157 LBS | BODY MASS INDEX: 26.16 KG/M2 | SYSTOLIC BLOOD PRESSURE: 166 MMHG

## 2023-06-27 DIAGNOSIS — K31.819 GAVE (GASTRIC ANTRAL VASCULAR ECTASIA): ICD-10-CM

## 2023-06-27 PROBLEM — Z95.1 S/P CABG X 4: Status: ACTIVE | Noted: 2020-03-09

## 2023-06-27 LAB
GLUCOSE SERPL-MCNC: 105 MG/DL (ref 65–140)
GLUCOSE SERPL-MCNC: 60 MG/DL (ref 65–140)

## 2023-06-27 PROCEDURE — 82948 REAGENT STRIP/BLOOD GLUCOSE: CPT

## 2023-06-27 PROCEDURE — 88305 TISSUE EXAM BY PATHOLOGIST: CPT | Performed by: PATHOLOGY

## 2023-06-27 RX ORDER — SODIUM CHLORIDE 9 MG/ML
INJECTION, SOLUTION INTRAVENOUS CONTINUOUS PRN
Status: DISCONTINUED | OUTPATIENT
Start: 2023-06-27 | End: 2023-06-27

## 2023-06-27 RX ORDER — DEXTROSE MONOHYDRATE 25 G/50ML
INJECTION, SOLUTION INTRAVENOUS AS NEEDED
Status: DISCONTINUED | OUTPATIENT
Start: 2023-06-27 | End: 2023-06-27

## 2023-06-27 RX ORDER — LIDOCAINE HYDROCHLORIDE 10 MG/ML
INJECTION, SOLUTION EPIDURAL; INFILTRATION; INTRACAUDAL; PERINEURAL AS NEEDED
Status: DISCONTINUED | OUTPATIENT
Start: 2023-06-27 | End: 2023-06-27

## 2023-06-27 RX ORDER — PROPOFOL 10 MG/ML
INJECTION, EMULSION INTRAVENOUS CONTINUOUS PRN
Status: DISCONTINUED | OUTPATIENT
Start: 2023-06-27 | End: 2023-06-27

## 2023-06-27 RX ORDER — PROPOFOL 10 MG/ML
INJECTION, EMULSION INTRAVENOUS AS NEEDED
Status: DISCONTINUED | OUTPATIENT
Start: 2023-06-27 | End: 2023-06-27

## 2023-06-27 RX ADMIN — DEXTROSE MONOHYDRATE 12.5 G: 25 INJECTION, SOLUTION INTRAVENOUS at 13:26

## 2023-06-27 RX ADMIN — SODIUM CHLORIDE: 0.9 INJECTION, SOLUTION INTRAVENOUS at 13:40

## 2023-06-27 RX ADMIN — PROPOFOL 100 MCG/KG/MIN: 10 INJECTION, EMULSION INTRAVENOUS at 13:38

## 2023-06-27 RX ADMIN — LIDOCAINE HYDROCHLORIDE 50 MG: 10 INJECTION, SOLUTION EPIDURAL; INFILTRATION; INTRACAUDAL; PERINEURAL at 13:37

## 2023-06-27 RX ADMIN — SODIUM CHLORIDE: 0.9 INJECTION, SOLUTION INTRAVENOUS at 12:39

## 2023-06-27 RX ADMIN — PROPOFOL 50 MG: 10 INJECTION, EMULSION INTRAVENOUS at 13:38

## 2023-06-27 NOTE — ANESTHESIA PREPROCEDURE EVALUATION
Procedure:  EGD WITH SINGLE BALLOON ENTEROSCOPY    Relevant Problems   CARDIO   (+) Aortoiliac occlusive disease (HCC)   (+) Carotid stenosis, asymptomatic, bilateral   (+) Coronary artery disease without angina pectoris (s/p CABG in 2020)   (+) DVT (deep venous thrombosis) ([facility])   (+) Embolism and thrombosis of arteries of the lower extremities ([facility])   (+) Essential hypertension   (+) Other hyperlipidemia      ENDO   (+) Type 2 diabetes mellitus, with long-term current use of insulin (HCC)      GI/HEPATIC   (+) GERD (gastroesophageal reflux disease)      /RENAL   (+) Stage 3a chronic kidney disease (HCC)      HEMATOLOGY   (+) Iron deficiency anemia      NEURO/PSYCH   (+) Atherosclerosis of native artery of both lower extremities with intermittent claudication ([facility])   (+) Claudication in peripheral vascular disease ([facility])        Physical Exam    Airway    Mallampati score: II  TM Distance: >3 FB  Neck ROM: full     Dental   lower dentures,     Cardiovascular  Cardiovascular exam normal    Pulmonary  Pulmonary exam normal     Other Findings        Anesthesia Plan  ASA Score- 3     Anesthesia Type- IV sedation with anesthesia with ASA Monitors  Additional Monitors:   Airway Plan:           Plan Factors-Exercise tolerance (METS): >4 METS  Chart reviewed  EKG reviewed  Imaging results reviewed  Existing labs reviewed  Patient summary reviewed  Induction- intravenous  Postoperative Plan-     Informed Consent- Anesthetic plan and risks discussed with patient and spouse  I personally reviewed this patient with the CRNA  Discussed and agreed on the Anesthesia Plan with the CRNA  Lina Cadena

## 2023-06-27 NOTE — H&P
History and Physical - [facility]  Little Tatum [de-identified] y o  male MRN: [id number]    HPI: Little Tatum is a [de-identified]y o  year old male who presents with small bowel avms         Review of Systems    Historical Information   Past Medical History:   Diagnosis Date   • Coronary artery disease without angina pectoris 02/25/2020   • DVT (deep venous thrombosis) ([facility])    • GERD (gastroesophageal reflux disease)    • Hypertension      Past Surgical History:   Procedure Laterality Date   • ANGIOPLASTY  10/19/2021    bilateral   • CARDIAC CATHETERIZATION  2013    calif    CABG  x4   • COLONOSCOPY     • COLONOSCOPY     • CORONARY ANGIOPLASTY WITH STENT PLACEMENT  01/01/2010   • CORONARY ARTERY BYPASS GRAFT     • FEMORAL ARTERY STENT     • FEMORAL BYPASS  10/20/2021   • IR LOWER EXTREMITY ANGIOGRAM  2/14/2023   • IR PELVIC ANGIOGRAM  5/6/2022   • [facility] CATH 3RD+ ORD [facility]  5/6/2022    Procedure: ULTRASOUND-GUIDED LEFT BRACHIAL ARTERY PUNCTURE, AORTOGRAM, RIGHT COMMON ILIAC ARTERY ANGIOPLASTY WITH 8 X 40 MM BALLOON, ANGIOPLASTY OF RIGHT EXTERNAL AND COMMON ILIAC ARTERY STENOSIS WITH 6 X 150 MM DRUG-ELUTING BALLOON, STENTING OF RIGHT DISTAL EXTERNAL ILIAC ARTERY STENOSIS WITH 7 X 40 MM SELF EXPANDING STENT POST DILATED WITH A 6 MM BALLOON ;  Surgeon: Yadira Downey MD;  Loca   [facility] Bilateral 2/14/2023    Procedure: CUTDOWN FEM-FEM BYPASS WITH PRIMARY CLOSURE ACCESS SITE, BILATERAL RUN-OFF, LEFT FEM-POP 5X220 QUYEN AND 6X150 RODRI, ATTEMPT TO CROSS RIGHT SFA OCCLUSION;  Surgeon: Yadira Downey MD;  Location: [facility] OR;  Service: Vascular     Social History   Social History     Substance and Sexual Activity   Alcohol Use Not Currently   • Alcohol/week: 0 0 standard drinks of alcohol     Social History     Substance and Sexual Activity   Drug Use No     Social History     Tobacco Use   Smoking Status Former   • Packs/day: 1 50   • Years: "40 00   • Total pack years: 60 00   • Types: Cigarettes   • Quit date: 2015   • Years since quittin 0   Smokeless Tobacco Never   Tobacco Comments    quit --      Family History   Problem Relation Age of Onset   • No Known Problems Mother    • No Known Problems Father        Meds/Allergies     (Not in a hospital admission)      No Known Allergies    Objective     BP (!) 179/72   Pulse 56   Temp (!) 97 3 °F (36 3 °C) (Tympanic)   Resp 20   Ht 5' 5\" (1 651 m)   Wt 71 2 kg (157 lb)   SpO2 97%   BMI 26 13 kg/m²       PHYSICAL EXAM    Gen: NAD  CV: RRR  CHEST: Clear  ABD: soft, NT/ND  EXT: no edema  Neuro: AAO      ASSESSMENT/PLAN:  This is a [de-identified]y o  year old male here for EGD with single balloon enteroscopy for small bowel AVM\"s  PLAN:   Procedure: single balloon enteroscopy         "

## 2023-06-27 NOTE — ANESTHESIA POSTPROCEDURE EVALUATION
Post-Op Assessment Note    CV Status:  Stable  Pain Score: 0    Pain management: adequate     Mental Status:  Alert and awake   Hydration Status:  Euvolemic   PONV Controlled:  Controlled   Airway Patency:  Patent      Post Op Vitals Reviewed: Yes            No notable events documented      BP (!) 171/66 (06/27/23 1428)    Temp (!) 96 1 °F (35 6 °C) (06/27/23 1428)    Pulse 57 (06/27/23 1428)   Resp 16 (06/27/23 1428)    SpO2 99 % (06/27/23 1428)

## 2023-06-29 ENCOUNTER — APPOINTMENT (OUTPATIENT)
Dept: LAB | Facility: CLINIC | Age: 78
End: 2023-06-29
Payer: MEDICARE

## 2023-06-29 DIAGNOSIS — D50.0 IRON DEFICIENCY ANEMIA DUE TO CHRONIC BLOOD LOSS: ICD-10-CM

## 2023-06-29 LAB
ALBUMIN SERPL BCP-MCNC: 3.7 G/DL (ref 3.5–5)
ALP SERPL-CCNC: 69 U/L (ref 46–116)
ALT SERPL W P-5'-P-CCNC: 20 U/L (ref 12–78)
ANION GAP SERPL CALCULATED.3IONS-SCNC: 8 MMOL/L
AST SERPL W P-5'-P-CCNC: 11 U/L (ref 5–45)
BASOPHILS # BLD AUTO: 0.02 THOUSANDS/ÂΜL (ref 0–0.1)
BASOPHILS NFR BLD AUTO: 0 % (ref 0–1)
BILIRUB SERPL-MCNC: 0.76 MG/DL (ref 0.2–1)
BUN SERPL-MCNC: 16 MG/DL (ref 5–25)
CALCIUM SERPL-MCNC: 9.6 MG/DL (ref 8.3–10.1)
CHLORIDE SERPL-SCNC: 110 MMOL/L (ref 96–108)
CO2 SERPL-SCNC: 22 MMOL/L (ref 21–32)
CREAT SERPL-MCNC: 1.23 MG/DL (ref 0.6–1.3)
EOSINOPHIL # BLD AUTO: 0.18 THOUSAND/ÂΜL (ref 0–0.61)
EOSINOPHIL NFR BLD AUTO: 3 % (ref 0–6)
ERYTHROCYTE [DISTWIDTH] IN BLOOD BY AUTOMATED COUNT: 19.6 % (ref 11.6–15.1)
FERRITIN SERPL-MCNC: 37 NG/ML (ref 24–336)
GFR SERPL CREATININE-BSD FRML MDRD: 55 ML/MIN/1.73SQ M
GLUCOSE SERPL-MCNC: 162 MG/DL (ref 65–140)
HCT VFR BLD AUTO: 40.4 % (ref 36.5–49.3)
HGB BLD-MCNC: 12.7 G/DL (ref 12–17)
IMM GRANULOCYTES # BLD AUTO: 0.02 THOUSAND/UL (ref 0–0.2)
IMM GRANULOCYTES NFR BLD AUTO: 0 % (ref 0–2)
IRON SATN MFR SERPL: 17 % (ref 20–50)
IRON SERPL-MCNC: 52 UG/DL (ref 65–175)
LYMPHOCYTES # BLD AUTO: 1.08 THOUSANDS/ÂΜL (ref 0.6–4.47)
LYMPHOCYTES NFR BLD AUTO: 17 % (ref 14–44)
MCH RBC QN AUTO: 29.1 PG (ref 26.8–34.3)
MCHC RBC AUTO-ENTMCNC: 31.4 G/DL (ref 31.4–37.4)
MCV RBC AUTO: 92 FL (ref 82–98)
MONOCYTES # BLD AUTO: 0.5 THOUSAND/ÂΜL (ref 0.17–1.22)
MONOCYTES NFR BLD AUTO: 8 % (ref 4–12)
NEUTROPHILS # BLD AUTO: 4.5 THOUSANDS/ÂΜL (ref 1.85–7.62)
NEUTS SEG NFR BLD AUTO: 72 % (ref 43–75)
NRBC BLD AUTO-RTO: 0 /100 WBCS
PLATELET # BLD AUTO: 196 THOUSANDS/UL (ref 149–390)
PMV BLD AUTO: 9.9 FL (ref 8.9–12.7)
POTASSIUM SERPL-SCNC: 3.9 MMOL/L (ref 3.5–5.3)
PROT SERPL-MCNC: 7.5 G/DL (ref 6.4–8.4)
RBC # BLD AUTO: 4.37 MILLION/UL (ref 3.88–5.62)
SODIUM SERPL-SCNC: 140 MMOL/L (ref 135–147)
TIBC SERPL-MCNC: 307 UG/DL (ref 250–450)
WBC # BLD AUTO: 6.3 THOUSAND/UL (ref 4.31–10.16)

## 2023-06-29 PROCEDURE — 36415 COLL VENOUS BLD VENIPUNCTURE: CPT

## 2023-06-29 PROCEDURE — 85025 COMPLETE CBC W/AUTO DIFF WBC: CPT

## 2023-06-29 PROCEDURE — 83540 ASSAY OF IRON: CPT

## 2023-06-29 PROCEDURE — 80053 COMPREHEN METABOLIC PANEL: CPT

## 2023-06-29 PROCEDURE — 82728 ASSAY OF FERRITIN: CPT

## 2023-06-29 PROCEDURE — 83550 IRON BINDING TEST: CPT

## 2023-07-05 ENCOUNTER — TELEPHONE (OUTPATIENT)
Dept: HEMATOLOGY ONCOLOGY | Facility: CLINIC | Age: 78
End: 2023-07-05

## 2023-07-05 ENCOUNTER — OFFICE VISIT (OUTPATIENT)
Dept: HEMATOLOGY ONCOLOGY | Facility: CLINIC | Age: 78
End: 2023-07-05
Payer: MEDICARE

## 2023-07-05 VITALS
WEIGHT: 155 LBS | SYSTOLIC BLOOD PRESSURE: 138 MMHG | TEMPERATURE: 98.4 F | RESPIRATION RATE: 16 BRPM | OXYGEN SATURATION: 97 % | HEIGHT: 65 IN | BODY MASS INDEX: 25.83 KG/M2 | DIASTOLIC BLOOD PRESSURE: 70 MMHG | HEART RATE: 77 BPM

## 2023-07-05 DIAGNOSIS — D50.0 IRON DEFICIENCY ANEMIA DUE TO CHRONIC BLOOD LOSS: Primary | ICD-10-CM

## 2023-07-05 PROCEDURE — 1124F ACP DISCUSS-NO DSCNMKR DOCD: CPT | Performed by: INTERNAL MEDICINE

## 2023-07-05 PROCEDURE — 99214 OFFICE O/P EST MOD 30 MIN: CPT | Performed by: INTERNAL MEDICINE

## 2023-07-05 RX ORDER — SODIUM CHLORIDE 9 MG/ML
20 INJECTION, SOLUTION INTRAVENOUS ONCE
Status: CANCELLED | OUTPATIENT
Start: 2023-07-12

## 2023-07-05 NOTE — PROGRESS NOTES
Hematology Outpatient Office Note    Date of Service: 7/5/2023    [facility]AT[facility]  [address]  [phone]    Reason for Consultation:   Chief Complaint   Patient presents with   • Follow-up       Referral Physician: No ref. provider found    Primary Care Physician:  Robert Jarrett DO       ASSESSMENT & PLAN      Diagnosis ICD-10-CM Associated Orders   1. Iron deficiency anemia due to chronic blood loss  D50.0 CBC and differential     Iron Panel (Includes Ferritin, Iron Sat%, Iron, and TIBC)            This is a 78 y.o. c PMHx notable for cecal AVM, DM, DVT, HTN, HLP, CAD, being seen in consultation for chronic ANG      Iron Deficiency anemia: improved s/p IV iron  Low iron levels can cause anemia (low red blood cells). Low iron levels can also make people feel poorly, even before anemia starts. Iron is used to make red blood cells. If blood is lost from the body, if iron can't be absorbed from food, or if something removes iron (pregnancy) the iron can be low and then anemia happens. Hx Cecal AVM. GI thought maybe GAVE  8/29/2022: Hgb 9.5, WBC 6.35k, MCV 91, plt 235k, retic 3.65%, FOBT + (8/31), ferritin 7, iron 30, Fe Sat 9%, TIBC 346  9/7 EGD/C-scope: internal hemorrhoids    Capsule endoscopy: no active bleeding  6/27/2023 EGD with single balloon enteroscopy revealed no bleeding      Iron deficiency is very common. Low iron can cause fatigue or tiredness, the desire to eat ice or non-food items like corn starch or dry pasta noodles, restless legs, weak fingernails, cracks at the corner of the mouth.      Common causes of iron deficiency include inadequate diet (uncommon, usually vegetarians), gastric bypass surgery (very common), pregnancy (very common), periods (most common cause in younger women), blood loss (usually from the stomach or intestines), and decreased iron absorption from the intestines from gastritis, H pylori, acid blocking medicines or celiac disease. Even a normal menstrual period can cause iron deficiency. In 10% of patients a clear cause of low iron is not found. 14% of women will have iron deficiency just from their menstrual period. Iron deficiency is treated with pills as a first step. For some people the iron pills do not work, cause severe side effects, or take too long to work: for these people IV iron can be given. You may only require IV iron very rarely, for example only when pregnant, or from 1-2 times a year based on your rate of need. It takes 4 weeks for IV iron to improve the anemia to maximum potential. If your fatigue is not from the low iron, getting iron treatment would not help the fatigue. Specific numbers on risks:  Because serious reactions can occur with IV iron and are so rare, it is difficult to determine their absolute number with IV iron. Based on reports of adverse reactions, Injectafer appears to have the lowest risk of serious hypersensitivity reactions or anaphylaxis. Next lowest would be Venofer, followed by Feraheme and Low Molecular Weight Iron Dextran ([place], Drug Safety 2021). Anaphylaxis can occur with antibiotics such as penicillin (10-50/100,000), and can be fatal (in 1-2/100,000). Risk of anaphylaxis with modern iron types ranges from 11/100,000 with iron sucrose to 25-70/100,000 with Feraheme or low molecular weight iron dextran, risk of death with IV iron ranges from 0.81/100,000 with low molecular weight iron dextran, 3.5/100,000 for Feraheme, to 6/100,000 with Venofer; however these values overlap statistically ([employer]Loughery Am J Hematology, 2015). Another study found life-threatening reactions in 0.6/million with Venofer, and 3.3/million with iron dextran ([facility] Neph Dial trans, 2006).     Blood transfusion also has risks: fever occurs in 1%, serious transfusion reactions occur in 8.1/100,000, life-threatening transfusion reactions occur in 1/140,000 units of blood given, Hepatitis B in 1/282,000, and Hepatitis C  in 1/1,150,000. [facility] 2012). · Discussion of decision making    I personally reviewed the following lab results, the image studies, pathology, other specialty/physicians consult notes and recommendations, and outside medical records from [address] [place] [address]. I had a lengthy discussion with the patient and shared the work-up findings. We discussed the diagnosis and management plan as below. I spent 35 minutes reviewing the records (labs, clinician notes, outside records, medical history, ordering medicine/tests/procedures, interpreting the imaging/labs previously done) and coordination of care as well as direct time with the patient today, of which greater than 50% of the time was spent in counseling and coordination of care with the patient/family. · Plan/Labs  · CBC, iron panel in 3 months ordered  · Cont PO iron MWF (he was taking it daily)  · Cont to f/u GI  · Recommend monthly IV Venofer 200mg x 3 months        Follow Up: 3 months with preceding CBC, iron labs    All questions were answered to the patient's satisfaction during this encounter. The patient knows the contact information for our office and knows to reach out for any relevant concerns related to this encounter. They are to call for any temperature 100.4 or higher, new symptoms including but not restricted to shaking chills, decreased appetite, nausea, vomiting, diarrhea, increased fatigue, shortness of breath or chest pain, confusion, and not feeling the strength to come to the clinic. For all other listed problems and medical diagnosis in their chart - they are managed by PCP and/or other specialists, which the patient acknowledges. Thank you very much for your consultation and making us a part of this patient's care. We are continuing to follow closely with you. Please do not hesitate to reach out to me with any additional questions or concerns.     Ariel Flood Kandy Floyd MD  Hematology & Medical Oncology Staff Physician             Disclaimer: This document was prepared using [employer] Direct technology. If a word or phrase is confusing, or does not make sense, this is likely due to recognition error which was not discovered during this clinician's review. If you believe an error has occurred, please contact me through [phone] [facility] line for dequan? cation. HEMATOLOGICAL HISTORY OF PRESENT ILLNESS      Clotting History None   Bleeding History GAVE related bleeding   Cancer History None   Family Cancer History Father (cancer)   H/O Blood/Plt Transfusion PRBC years ago   Tobacco/etoh/drug abuse 2 PPDx 44 years, quit 2004, no etoh abuse or rec drug use       Cancer Screening history n/a   Occupation Own restaurants, [employer] places      8/29/2022: Hgb 9.5, WBC 6.35k, MCV 91, plt 235k, retic 3.65%, FOBT + (8/31), ferritin 7, iron 30, Fe Sat 9%, TIBC 346  9/7 EGD/C-scope: internal hemorrhoids  10/3/2022-10/9/2022: 5 iV Venofer 200mg administered   11/28/2022: ferritin 26, iron 67, Fe Sat 21%, TIBC 322, Hgb 12.7 (now normal)  5/2/2023: Discussed with the patient the results of his iron studies which show worsening iron deficiency as well as recurrent anemia. I ordered 600 mg of IV Venofer  5/18/2023: ferritin 7, iron 36, Fe Sat 10%, TIBC 364, Hgb 11.6  6/29/2023: ferritin 37, iron 52, Fe Sat 17%, TIBC 307, Hgb 12.7, MCV 92  SUBJECTIVE  (INTERVAL HISTORY)      Interval events: feeling much better, higher energy, no more pica. I have reviewed the relevant past medical, surgical, social and family history. I have also reviewed allergies and medications for this patient. Review of Systems    Baseline weight: 152-154 lbs    Denies weight loss, F/C, N/V, SOB, CP, LH, HA. He has had fatigue and low energy since 6/2022. He has been chewing on ice all day since 3/2022.      A 10-point review of system was performed, pertinent positive and negative were detailed as above. Otherwise, the 10-point review of system was negative.       Past Medical History:   Diagnosis Date   • Coronary artery disease without angina pectoris 02/25/2020   • DVT (deep venous thrombosis) ([facility])    • GERD (gastroesophageal reflux disease)    • Hypertension        Past Surgical History:   Procedure Laterality Date   • ANGIOPLASTY  10/19/2021    bilateral   • CARDIAC CATHETERIZATION  2013    calif    CABG  x4   • COLONOSCOPY     • COLONOSCOPY     • CORONARY ANGIOPLASTY WITH STENT PLACEMENT  01/01/2010   • CORONARY ARTERY BYPASS GRAFT     • FEMORAL ARTERY STENT     • FEMORAL BYPASS  10/20/2021   • IR LOWER EXTREMITY ANGIOGRAM  2/14/2023   • IR PELVIC ANGIOGRAM  5/6/2022   • SD SLCTV CATHJ 3RD+ ORD SLCTV [facility]  5/6/2022    Procedure: ULTRASOUND-GUIDED LEFT BRACHIAL ARTERY PUNCTURE, AORTOGRAM, RIGHT COMMON ILIAC ARTERY ANGIOPLASTY WITH 8 X 40 MM BALLOON, ANGIOPLASTY OF RIGHT EXTERNAL AND COMMON ILIAC ARTERY STENOSIS WITH 6 X 150 MM DRUG-ELUTING BALLOON, STENTING OF RIGHT DISTAL EXTERNAL ILIAC ARTERY STENOSIS WITH 7 X 40 MM SELF EXPANDING STENT POST DILATED WITH A 6 MM BALLOON.;  Surgeon: Hailey Belle MD;  Loca   • SD SLCTV CATHJ 3RD+ ORD SLCTV [facility] Bilateral 2/14/2023    Procedure: CUTDOWN FEM-FEM BYPASS WITH PRIMARY CLOSURE ACCESS SITE, BILATERAL RUN-OFF, LEFT FEM-POP 5X220 QUYEN AND 6X150 RODRI, ATTEMPT TO CROSS RIGHT SFA OCCLUSION;  Surgeon: Hailey Belle MD;  Location: [facility] [place];  Service: Vascular       Family History   Problem Relation Age of Onset   • No Known Problems Mother    • No Known Problems Father        Social History     Socioeconomic History   • Marital status: /Civil Union     Spouse name: Not on file   • Number of children: Not on file   • Years of education: Not on file   • Highest education level: Not on file   Occupational History   • Not on file   Tobacco Use   • Smoking status: Former     Packs/day: 1.50     Years: 40.00     Total pack years: 60.00     Types: Cigarettes     Quit date:      Years since quittin.5     Passive exposure: Past   • Smokeless tobacco: Never   • Tobacco comments:     quit 2004--    Vaping Use   • Vaping Use: Never used   Substance and Sexual Activity   • Alcohol use: Not Currently     Alcohol/week: 0.0 standard drinks of alcohol   • Drug use: No   • Sexual activity: Not on file   Other Topics Concern   • Not on file   Social History Narrative    · Most recent tobacco use screenin2020      Social Determinants of Health     Financial Resource Strain: Not on file   Food Insecurity: Not on file   Transportation Needs: Not on file   Physical Activity: Not on file   Stress: Not on file   Social Connections: Not on file   Intimate Partner Violence: Not on file   Housing Stability: Not on file       No Known Allergies    Current Outpatient Medications   Medication Sig Dispense Refill   • acetaminophen (TYLENOL) 500 mg tablet Take 1,000 mg by mouth every 8 (eight) hours as needed     • atorvastatin (LIPITOR) 40 mg tablet Take 1 tablet (40 mg total) by mouth daily 90 tablet 3   • clopidogrel (PLAVIX) 75 mg tablet TAKE 1 TABLET BY MOUTH EVERY DAY 90 tablet 3   • Dulaglutide (Trulicity) 1.5 JJ/5.9WX SOPN Inject 0.5 mL (1.5 mg total) under the skin once a week 3 mL 6   • ferrous sulfate 325 (65 Fe) mg tablet Take 1 tablet (325 mg total) by mouth 2 (two) times a day with meals 60 tablet 6   • gabapentin (Neurontin) 300 mg capsule Take 1 capsule (300 mg total) by mouth 3 (three) times a day 90 capsule 6   • glipiZIDE (GLUCOTROL) 10 mg tablet Take 1 tablet (10 mg total) by mouth 2 (two) times a day before meals 180 tablet 3   • hydrocortisone 2.5 % cream Apply topically 4 (four) times a day as needed for rash 30 g 0   • lisinopril-hydrochlorothiazide (PRINZIDE,ZESTORETIC) 20-12.5 MG per tablet Take 0.5 tablets by mouth daily 90 tablet 3   • metFORMIN (GLUCOPHAGE) 1000 MG tablet Take 1 tablet (1,000 mg total) by mouth 2 (two) times a day with meals 180 tablet 3   • omeprazole (PriLOSEC) 20 mg delayed release capsule Take 1 capsule (20 mg total) by mouth daily before breakfast (Patient taking differently: Take 20 mg by mouth if needed) 30 capsule 5   • rivaroxaban (Xarelto) 2.5 mg tablet Take 1 tablet (2.5 mg total) by mouth 2 (two) times a day (Patient taking differently: Take 2.5 mg by mouth 2 (two) times a day Last dose Tues May23) 180 tablet 0     No current facility-administered medications for this visit. (Not in a hospital admission)        Objective:     24 Hour Vitals Assessment:     Vitals:    07/05/23 0856   BP: 138/70   Pulse: 77   Resp: 16   Temp: 98.4 °F (36.9 °C)   SpO2: 97%       PHYSICIAN EXAM:    General: Appearance: alert, cooperative, no distress. HEENT: Normocephalic, atraumatic. No scleral icterus. conjunctivae clear. EOMI. Chest: No tenderness to palpation. No open wound noted. Lungs: Clear to auscultation bilaterally, Respirations unlabored. Cardiac: Regular rate and rhythm, +S1and S2  Abdomen: Soft, non-tender, non-distended. Bowel sounds are normal.  Extremities:  No edema, cyanosis, clubbing. Skin: Skin color, turgor are normal. No rashes. Neurologic: Awake, Alert, and oriented, no gross focal deficits noted b/l. DATA REVIEW:    Pathology Result:    Final Diagnosis   Date Value Ref Range Status   06/27/2023   Final    A. Duodenum, :  - Unremarkable duodenal mucosa  - No villous atrophy or increased intraepithelial lymphocytes seen     B. Stomach, :  - Gastric oxyntic and antral type mucosa with minimal or mild chronic inflammation  - No sharad shaped bacteria seen on H&E stained tissue section  - Negative for intestinal metaplasia and dysplasia          09/07/2022   Final    A. Duodenum,  biopsy:  - Duodenal mucosa with focal acute inflammation and reactive changes  - No intraepithelial lymphocytosis and no villous blunting.  - No epithelial dysplasia and no evidence of malignancy.     B. Stomach, biopsy:  - Gastric antral and oxyntic mucosa with no significant pathologic alteration.  - Negative for intestinal metaplasia, dysplasia or carcinoma. - No Helicobacter pylori is identified on H&E stained slide. Image Results:   Image result are reviewed and documented in Hematology/Oncology history. I personally reviewed these images. EGD with Single Balloon Enteroscopy  Narrative: Table formatting from the original result was not included. [facility]  [address] [facility] [address]  [address] [place] [address],[facility] Floor:  6/27/23    PHYSICIAN(S):  Attending:   Annie White MD     Fellow:   No Staff Documented     INDICATION:  GAVE (gastric antral vascular ectasia)    POST-OP DIAGNOSIS:  See the impression below. PREPROCEDURE:  Informed consent was obtained for the procedure, including sedation. Risks of perforation, hemorrhage, adverse drug reaction and aspiration   were discussed. The patient was placed in the left lateral decubitus   position. Patient was explained about the risks and benefits of the procedure. Risks   including but not limited to bleeding, infection, and perforation were   explained in detail. Also explained about less than 100% sensitivity with   the exam and other alternatives. PROCEDURE: EGD    DETAILS OF PROCEDURE:   Patient was taken to the procedure room where a time out was performed to   confirm correct patient and correct procedure. The patient underwent   monitored anesthesia care, which was administered by an anesthesia   professional. The patient's blood pressure, ECG, heart rate, level of   consciousness, oxygen and respirations were monitored throughout the   procedure. The scope was introduced through the mouth and advanced to the   middle part of the jejunum. Tattoo was not placed to mike the distal   extent of the exam. The procedure was performed using a single balloon   overtube. Fluoroscopy was not used.  The patient experienced no blood loss. The procedure was not difficult. The patient tolerated the procedure well. There were no apparent adverse events. ANESTHESIA INFORMATION:  ASA: III  Anesthesia Type: IV Sedation with Anesthesia    MEDICATIONS:  No administrations occurring from 1321 to 1421 on 06/27/23     FINDINGS:  The esophagus appeared normal. Z-line is 43 cm from the incisors. The stomach appeared normal. Performed random biopsy using biopsy forceps. The duodenum appeared normal. Performed random biopsy using biopsy   forceps. The middle jejunum appeared normal.    SPECIMENS:  ID Type Source Tests Collected by Time Destination   1 :  Tissue Duodenum TISSUE EXAM Jerardo Miller MD 6/27/2023  2:11 PM    2 :  Tissue Stomach TISSUE EXAM Jerardo Miller MD 6/27/2023  2:11 PM    Impression: The esophagus appeared normal.  The stomach appeared normal. Performed random biopsy. The duodenum appeared normal. Performed random biopsy. The middle jejunum appeared normal.    RECOMMENDATION:   Await pathology results      Follow up with Dr. John Perez. Jerardo Miller MD       LABS:  Lab data are reviewed and documented in [facility] history.        Lab Results   Component Value Date    HGB 12.7 06/29/2023    HCT 40.4 06/29/2023    MCV 92 06/29/2023     06/29/2023    WBC 6.30 06/29/2023    NRBC 0 06/29/2023     Lab Results   Component Value Date    K 3.9 06/29/2023     (H) 06/29/2023    CO2 22 06/29/2023    BUN 16 06/29/2023    CREATININE 1.23 06/29/2023    GLUF 210 (H) 05/18/2023    CALCIUM 9.6 06/29/2023    CORRECTEDCA 10.2 (H) 11/15/2021    AST 11 06/29/2023    ALT 20 06/29/2023    ALKPHOS 69 06/29/2023    EGFR 55 06/29/2023       Lab Results   Component Value Date    IRON 52 (L) 06/29/2023    TIBC 307 06/29/2023    FERRITIN 37 06/29/2023    FERRITIN 7 (L) 05/18/2023    FERRITIN 26 11/28/2022    FERRITIN 7 (L) 08/29/2022    FERRITIN 37 04/20/2022    FERRITIN 17 02/07/2022    FERRITIN 13 01/05/2022    FERRITIN 10 11/15/2021    FERRITIN 28 03/11/2021    FERRITIN 12 11/03/2020    FERRITIN 7 (L) 10/06/2020       Lab Results   Component Value Date    HOUZKMGW60 929 11/15/2021    VDACWRZJ27 681 05/29/2020       No results for input(s): "WBC", "CREAT", "PLT" in the last 72 hours.      By:  Samanta Meehan, 7/5/2023, 9:04 AM

## 2023-07-12 ENCOUNTER — HOSPITAL ENCOUNTER (OUTPATIENT)
Dept: INFUSION CENTER | Facility: CLINIC | Age: 78
Discharge: HOME | End: 2023-07-12
Payer: MEDICARE

## 2023-07-12 VITALS
DIASTOLIC BLOOD PRESSURE: 63 MMHG | TEMPERATURE: 98.7 F | RESPIRATION RATE: 18 BRPM | SYSTOLIC BLOOD PRESSURE: 139 MMHG | HEART RATE: 64 BPM

## 2023-07-12 DIAGNOSIS — D50.0 IRON DEFICIENCY ANEMIA DUE TO CHRONIC BLOOD LOSS: Primary | ICD-10-CM

## 2023-07-12 LAB — GLUCOSE SERPL-MCNC: 140 MG/DL (ref 65–140)

## 2023-07-12 PROCEDURE — 82948 REAGENT STRIP/BLOOD GLUCOSE: CPT

## 2023-07-12 PROCEDURE — 96365 THER/PROPH/DIAG IV INF INIT: CPT

## 2023-07-12 RX ORDER — SODIUM CHLORIDE 9 MG/ML
20 INJECTION, SOLUTION INTRAVENOUS ONCE
OUTPATIENT
Start: 2023-08-09

## 2023-07-12 RX ORDER — SODIUM CHLORIDE 9 MG/ML
20 INJECTION, SOLUTION INTRAVENOUS ONCE
Status: COMPLETED | OUTPATIENT
Start: 2023-07-12 | End: 2023-07-12

## 2023-07-12 RX ADMIN — SODIUM CHLORIDE 200 MG: 9 INJECTION, SOLUTION INTRAVENOUS at 14:35

## 2023-07-12 RX ADMIN — SODIUM CHLORIDE 20 ML/HR: 0.9 INJECTION, SOLUTION INTRAVENOUS at 14:35

## 2023-07-25 ENCOUNTER — OFFICE VISIT (OUTPATIENT)
Dept: FAMILY MEDICINE CLINIC | Facility: CLINIC | Age: 78
End: 2023-07-25
Payer: MEDICARE

## 2023-07-25 VITALS
SYSTOLIC BLOOD PRESSURE: 160 MMHG | TEMPERATURE: 97.4 F | HEIGHT: 65 IN | HEART RATE: 58 BPM | OXYGEN SATURATION: 98 % | DIASTOLIC BLOOD PRESSURE: 60 MMHG | WEIGHT: 155.2 LBS | BODY MASS INDEX: 25.86 KG/M2

## 2023-07-25 DIAGNOSIS — D50.9 IRON DEFICIENCY ANEMIA, UNSPECIFIED IRON DEFICIENCY ANEMIA TYPE: ICD-10-CM

## 2023-07-25 DIAGNOSIS — Z79.4 TYPE 2 DIABETES MELLITUS WITH DIABETIC PERIPHERAL ANGIOPATHY WITHOUT GANGRENE, WITH LONG-TERM CURRENT USE OF INSULIN (HCC): Primary | ICD-10-CM

## 2023-07-25 DIAGNOSIS — Z79.899 ENCOUNTER FOR LONG-TERM (CURRENT) USE OF MEDICATIONS: ICD-10-CM

## 2023-07-25 DIAGNOSIS — Z71.2 ENCOUNTER TO DISCUSS TEST RESULTS: ICD-10-CM

## 2023-07-25 DIAGNOSIS — E78.49 OTHER HYPERLIPIDEMIA: ICD-10-CM

## 2023-07-25 DIAGNOSIS — E11.51 TYPE 2 DIABETES MELLITUS WITH DIABETIC PERIPHERAL ANGIOPATHY WITHOUT GANGRENE, WITH LONG-TERM CURRENT USE OF INSULIN (HCC): Primary | ICD-10-CM

## 2023-07-25 DIAGNOSIS — Z79.899 ENCOUNTER FOR LONG-TERM CURRENT USE OF HIGH RISK MEDICATION: ICD-10-CM

## 2023-07-25 DIAGNOSIS — I10 ESSENTIAL HYPERTENSION: ICD-10-CM

## 2023-07-25 DIAGNOSIS — Z79.4 TYPE 2 DIABETES MELLITUS WITH DIABETIC PERIPHERAL ANGIOPATHY WITHOUT GANGRENE, WITH LONG-TERM CURRENT USE OF INSULIN (HCC): ICD-10-CM

## 2023-07-25 DIAGNOSIS — E11.51 TYPE 2 DIABETES MELLITUS WITH DIABETIC PERIPHERAL ANGIOPATHY WITHOUT GANGRENE, WITH LONG-TERM CURRENT USE OF INSULIN (HCC): ICD-10-CM

## 2023-07-25 DIAGNOSIS — Z79.899 MEDICATION MANAGEMENT: ICD-10-CM

## 2023-07-25 PROCEDURE — 83036 HEMOGLOBIN GLYCOSYLATED A1C: CPT | Performed by: FAMILY MEDICINE

## 2023-07-25 PROCEDURE — 99215 OFFICE O/P EST HI 40 MIN: CPT | Performed by: FAMILY MEDICINE

## 2023-07-25 RX ORDER — LISINOPRIL AND HYDROCHLOROTHIAZIDE 20; 12.5 MG/1; MG/1
1 TABLET ORAL DAILY
Qty: 90 TABLET | Refills: 3 | Status: SHIPPED | OUTPATIENT
Start: 2023-07-25

## 2023-07-25 NOTE — PROGRESS NOTES
Name: Sherma Cushing      : 1945      MRN: [id number]  Encounter Provider: Robert Jarrett DO  Encounter Date: 2023   Encounter department: [address]     1. Type 2 diabetes mellitus with diabetic peripheral angiopathy without gangrene, with long-term current use of insulin (HCC)  Comments:  Avoiding breads. Wt 155. Pasta rarely. Pia Luis very limited. Portion control  A1c today 6.2 which is exactly what it was April 10. Taking Trulicity every other. Orders:  -     [facility] Diabetic eye exam  -     POCT hemoglobin A1c  -     lisinopril-hydrochlorothiazide (PRINZIDE,ZESTORETIC) 20-12.5 MG per tablet; Take 1 tablet by mouth daily  -     Empagliflozin (Jardiance) 25 MG TABS; Take 0.5 tablets (12.5 mg total) by mouth every morning Also: take with LOTS of water  -     Albumin / creatinine urine ratio; Future; Expected date: 10/25/2023  -     Comprehensive metabolic panel; Future; Expected date: 10/25/2023  -     Hemoglobin A1C; Future; Expected date: 10/25/2023    2. Essential hypertension  Comments:  Taking lisinopril/HCT 20/12.5  taking 1/2 tablet daily will increase to 1 whole tablet daily as systolic blood pressure 010. Must get that back  Orders:  -     lisinopril-hydrochlorothiazide (PRINZIDE,ZESTORETIC) 20-12.5 MG per tablet; Take 1 tablet by mouth daily    3. Other hyperlipidemia  Comments:  definitely cont the Lipitor 40 !    4. Iron deficiency anemia, unspecified iron deficiency anemia type  Comments:  Sees the hematologist q 3 mo and infusion once a mo:  Aug 12 and   Orders:  -     lisinopril-hydrochlorothiazide (PRINZIDE,ZESTORETIC) 20-12.5 MG per tablet; Take 1 tablet by mouth daily  -     CBC and differential; Future; Expected date: 2023    5. Encounter to discuss test results    6. Medication management  Comments: All medications discussed and eval for safety , efficacy, and tolerance. Costs also enter    7.  Encounter for long-term (current) use of medications  Comments:  Each and every med evaluated. Will change Trulicity to Jardiance 12.1 mg OD    8. Iron deficiency anemia, unspecified iron deficiency anemia type  Comments:  No cause found as yet   Seeing hem/onc  Orders:  -     lisinopril-hydrochlorothiazide (PRINZIDE,ZESTORETIC) 20-12.5 MG per tablet; Take 1 tablet by mouth daily  -     CBC and differential; Future; Expected date: 07/26/2023    9. Type 2 diabetes mellitus with diabetic peripheral angiopathy without gangrene, with long-term current use of insulin ([facility])  Comments:  A1c on January 5, 2022 equal 5.1 this is best ever in past 2 years  Taking glipizide 10 mg b.i.d. And metformin 1000 mg b.i.d. And Trulicity every 2-3 weeks  Orders:  -     [facility] Diabetic eye exam  -     POCT hemoglobin A1c  -     lisinopril-hydrochlorothiazide (PRINZIDE,ZESTORETIC) 20-12.5 MG per tablet; Take 1 tablet by mouth daily  -     Empagliflozin (Jardiance) 25 MG TABS; Take 0.5 tablets (12.5 mg total) by mouth every morning Also: take with LOTS of water  -     Albumin / creatinine urine ratio; Future; Expected date: 10/25/2023  -     Comprehensive metabolic panel; Future; Expected date: 10/25/2023  -     Hemoglobin A1C; Future; Expected date: 10/25/2023    10. Encounter for long-term current use of high risk medication  Comments: Many medications reviewed including Lipitor Trulicity Feosol b.i.d. Glipizide metformin omeprazole p.r.n. And Xarelto  Orders:  -     lisinopril-hydrochlorothiazide (PRINZIDE,ZESTORETIC) 20-12.5 MG per tablet; Take 1 tablet by mouth daily        Subjective      78-year-old Ten  from [place] moved here several years ago seen for ongoing chronic medical issues chief of which include well-controlled NIDDM, chronic iron deficiency anemia of unknown etiology,  and hypertension with blood pressure today running 160/60 by my calculation. Patient was seen from 11:45 AM to 12:35 PM with his wife present.   He tells me he checks his blood sugars every morning and they run in the high 90s low 100s. Not checking p.m. blood sugars C6V 6.2 taking Trulicity, glipizide, and metformin. Due to the cost of Trulicity, will replace it with Jardiance 25 mg half tablet once daily all of this has been explained to him including the side effects. He did take this once before but perhaps splitting the 25 mg tablet in half will be effective cost control? Eldonna Schwab is well-known to me for many years presents for f/u and evaluation of the following medical issues:       Review of Systems   Constitutional: Negative for activity change, appetite change, chills, diaphoresis, fatigue, fever and unexpected weight change. HENT: Negative for congestion, dental problem, drooling, ear discharge, ear pain, facial swelling, mouth sores, nosebleeds, postnasal drip, rhinorrhea, trouble swallowing and voice change. Eyes: Negative for photophobia, pain, discharge, redness, itching and visual disturbance. Respiratory: Negative for apnea, cough, choking, chest tightness and shortness of breath. Denies any and all respiratory issues - SOB, orthopnea, etc.   Cardiovascular: Negative for chest pain and leg swelling. I have carefully question patient and his wife regarding any kind of anginal symptoms chest pain tightness heaviness pressure etc. given his severe degree of peripheral vascular disease, certainly cardiovascular disease may well exist. He denies any angina. Given the 38% reduction in C- death rate, consider Jardiance (and dropping the Trulicity)    He does have significant intermittent claudication both lower extremities stating his circulation was only 25% of what it should. That has changed now, s/p surgery. He will continue to  follow-up with vascular surgeon. Gastrointestinal: Negative for abdominal distention, abdominal pain, constipation, diarrhea and nausea. Endocrine: Negative for polydipsia, polyphagia and polyuria. Genitourinary: Negative for decreased urine volume, difficulty urinating, dysuria, enuresis and hematuria. Musculoskeletal: Positive for arthralgias. Negative for back pain, gait problem and joint swelling. Skin: Negative for color change (color much improved over last OV), pallor, rash and wound. Allergic/Immunologic: Negative for immunocompromised state. Neurological: Negative for dizziness, seizures, syncope, facial asymmetry, speech difficulty, light-headedness and headaches. Hematological: Negative for adenopathy. Psychiatric/Behavioral: Negative for agitation, behavioral problems, confusion and decreased concentration. Dysphoric mood: normal concern/depression of everything he's gone thru.        Current Outpatient Medications on File Prior to Visit   Medication Sig   • acetaminophen (TYLENOL) 500 mg tablet Take 1,000 mg by mouth every 8 (eight) hours as needed   • atorvastatin (LIPITOR) 40 mg tablet Take 1 tablet (40 mg total) by mouth daily   • clopidogrel (PLAVIX) 75 mg tablet TAKE 1 TABLET BY MOUTH EVERY DAY   • Dulaglutide (Trulicity) 1.5 HQ/1.8SA SOPN Inject 0.5 mL (1.5 mg total) under the skin once a week   • ferrous sulfate 325 (65 Fe) mg tablet Take 1 tablet (325 mg total) by mouth 2 (two) times a day with meals   • gabapentin (Neurontin) 300 mg capsule Take 1 capsule (300 mg total) by mouth 3 (three) times a day   • glipiZIDE (GLUCOTROL) 10 mg tablet Take 1 tablet (10 mg total) by mouth 2 (two) times a day before meals   • hydrocortisone 2.5 % cream Apply topically 4 (four) times a day as needed for rash   • metFORMIN (GLUCOPHAGE) 1000 MG tablet Take 1 tablet (1,000 mg total) by mouth 2 (two) times a day with meals   • omeprazole (PriLOSEC) 20 mg delayed release capsule Take 1 capsule (20 mg total) by mouth daily before breakfast (Patient taking differently: Take 20 mg by mouth if needed)   • rivaroxaban (Xarelto) 2.5 mg tablet Take 1 tablet (2.5 mg total) by mouth 2 (two) times a day (Patient taking differently: Take 2.5 mg by mouth 2 (two) times a day Last dose Tues May23)       Objective     /60   Pulse 58   Temp (!) 97.4 °F (36.3 °C) (Temporal)   Ht 5' 5" (1.651 m)   Wt 70.4 kg (155 lb 3.2 oz)   SpO2 98%   BMI 25.83 kg/m²     Physical Exam  Vitals and nursing note reviewed. Exam conducted with a chaperone present (Wife present throughout the encounter). Constitutional:       General: He is not in acute distress. Appearance: Normal appearance. He is well-developed and normal weight. He is not ill-appearing (sallow, ashen skin tone), toxic-appearing or diaphoretic. Comments: Patient looks and feels fairly well despite hemoglobin 11.2 and ferritin 7--what appears to be marked iron deficiency anemia. Worried about occult GI neoplasm??   HENT:      Head: Normocephalic and atraumatic. Right Ear: Ear canal and external ear normal. There is no impacted cerumen. Left Ear: Ear canal and external ear normal. There is no impacted cerumen. Ears:      Comments: Sl wax in the canal on the R ear - the one in question. TM somewhat retracted. Suspect: middle ear syndrome/pressure  Plan: gargling and Flonase- fully discussed. Nose: Nose normal.   Eyes:      General:         Right eye: No discharge. Left eye: No discharge. Extraocular Movements: Extraocular movements intact. Conjunctiva/sclera: Conjunctivae normal.      Pupils: Pupils are equal, round, and reactive to light. Neck:      Trachea: No tracheal deviation. Cardiovascular:      Rate and Rhythm: Normal rate and regular rhythm. Heart sounds: Murmur heard. Pulmonary:      Effort: Pulmonary effort is normal.      Breath sounds: Normal breath sounds. No wheezing, rhonchi or rales (slight bibasilar rales and retained pul secretions). Chest:      Chest wall: No tenderness. Abdominal:      General: Abdomen is flat. Bowel sounds are normal.      Palpations: Abdomen is soft. Tenderness: There is no abdominal tenderness. There is no guarding or rebound. Musculoskeletal:         General: Normal range of motion. Cervical back: Normal range of motion and neck supple. Right lower leg: No edema. Left lower leg: No edema. Lymphadenopathy:      Cervical: No cervical adenopathy. Skin:     General: Skin is warm and dry. Findings: No bruising or erythema. Comments: Somewhat sallow color, with slight bronzed Mediterranean hue--not particularly healthy looking, but typical of a COPD smoker although he has stop smoking several years ago   Neurological:      General: No focal deficit present. Mental Status: He is alert and oriented to person, place, and time. Mental status is at baseline. Cranial Nerves: No cranial nerve deficit. Sensory: No sensory deficit. Motor: No weakness. Coordination: Coordination normal.      Gait: Gait normal.   Psychiatric:         Mood and Affect: Mood normal.         Behavior: Behavior normal.         Thought Content: Thought content normal.         Judgment: Judgment normal.              I personally reviewed the recent (and prior)  lab results, the image studies, pathology, other specialty/physicians consult notes and recommendations, and outside medical records from other institutions, as appropriate. I had a lengthy discussion with the patient and shared the work-up findings. We discussed the diagnosis and management plan. I spent  40  minutes reviewing the records (labs, clinician notes, outside records, medical history, ordering medicine/tests/procedures, interpreting the imaging/labs previously done) and coordination of care as well as direct time with the patient today, of which greater than 50% of the time was spent in counseling and coordination of care with the patient/family.   Much time spent discussing diabetes giving up Trulicity which she is taking every other week but has 3 injections to go which will last 6 weeks. We can then start Jardiance 25 mg half tablet once daily in hopes of getting the 38% reduction of cardiovascular reduction.       Eladio Marinelli, DO

## 2023-07-26 ENCOUNTER — TELEPHONE (OUTPATIENT)
Dept: ADMINISTRATIVE | Facility: OTHER | Age: 78
End: 2023-07-26

## 2023-07-26 NOTE — TELEPHONE ENCOUNTER
Upon review of the In Basket request and the patient's chart, initial outreach has been made via fax to facility. Please see Contacts section for details.      Thank you  Kartik Solo MA

## 2023-07-26 NOTE — TELEPHONE ENCOUNTER
----- Message from Tracey Campuzano sent at 7/25/2023 11:14 AM EDT -----  Regarding: care gap request  07/25/23 11:14 AM    Hello, our patient attached above has had Diabetic Eye Exam completed/performed. Please assist in updating the patient chart by making an External outreach to Dr. Patrick Riddle facility located in [facility]. The date of service is February/March 2023.     Thank you,  Tracey Campuzano   [facility] [place]

## 2023-07-26 NOTE — LETTER
Diabetic Eye Exam Form    Date Requested: 23  Patient: Lloyd Sifuentes  Patient : 1945   Referring Provider: Eladio Marinelli,       DIABETIC Eye Exam Date _______________________________      Type of Exam MUST be documented for Diabetic Eye Exams. Please CHECK ONE. Retinal Exam       Dilated Retinal Exam       OCT       Optomap-Iris Exam      Fundus Photography       Left Eye - Please check Retinopathy or No Retinopathy        Exam did show retinopathy    Exam did not show retinopathy       Right Eye - Please check Retinopathy or No Retinopathy       Exam did show retinopathy    Exam did not show retinopathy       Comments __________________________________________________________    Practice Providing Exam ______________________________________________    Exam Performed By (print name) _______________________________________      Provider Signature ___________________________________________________      These reports are needed for  compliance. Please fax this completed form and a copy of the Diabetic Eye Exam report to our office located at [address] [place] [address] as soon as possible via Fax [phone] attention Trisha: Phone [phone]  We thank you for your assistance in treating our mutual patient.

## 2023-07-27 LAB — SL AMB POCT HEMOGLOBIN AIC: 6.2 (ref ?–6.5)

## 2023-07-27 NOTE — TELEPHONE ENCOUNTER
Upon review of the In Basket request we were able to locate, review, and update the patient chart as requested for Diabetic Eye Exam.    Any additional questions or concerns should be emailed to the Practice Liaisons via the appropriate education email address, please do not reply via In Basket.     Thank you  Maddi Mckeon MA

## 2023-07-28 ENCOUNTER — TELEPHONE (OUTPATIENT)
Dept: FAMILY MEDICINE CLINIC | Facility: CLINIC | Age: 78
End: 2023-07-28

## 2023-07-28 DIAGNOSIS — E11.51 TYPE 2 DIABETES MELLITUS WITH DIABETIC PERIPHERAL ANGIOPATHY WITHOUT GANGRENE, WITH LONG-TERM CURRENT USE OF INSULIN (HCC): Primary | ICD-10-CM

## 2023-07-28 DIAGNOSIS — Z79.4 TYPE 2 DIABETES MELLITUS WITH DIABETIC PERIPHERAL ANGIOPATHY WITHOUT GANGRENE, WITH LONG-TERM CURRENT USE OF INSULIN (HCC): Primary | ICD-10-CM

## 2023-07-28 NOTE — TELEPHONE ENCOUNTER
Pt document specified that CHART NOTES include the following: Testing frequency, Diagnosis code, Indicate if pt uses insulin or does not use insulin, and notes must be within 12 mos of signature date on the attached Rx

## 2023-07-28 NOTE — TELEPHONE ENCOUNTER
Received fax from [employer] stating they need pt testing info -- not profiled. Called pt and he states he is using [employer] N test strips and [employer] machine w US Diagnostic testing strips. Pt profile updated at this time and faxed to [facility] at fax number provided.

## 2023-08-07 ENCOUNTER — TELEPHONE (OUTPATIENT)
Dept: HEMATOLOGY ONCOLOGY | Facility: CLINIC | Age: 78
End: 2023-08-07

## 2023-08-07 NOTE — TELEPHONE ENCOUNTER
Appointment Change  Cancel, Reschedule, Change to Virtual      Who are you speaking with? Patient   If it is not the patient, are they listed on an active communication consent form? Yes   Which provider is the appointment scheduled with? Dr. Rosa Elena Martinez   When is the appointment scheduled? Please list date and time 10/6/23   At which location is the appointment scheduled to take place? [facility]   Was the appointment rescheduled or changed from an in person visit to a virtual visit? If so, please list the details of the change. 9/27/23   What is the reason for the appointment change[place]  [place]

## 2023-08-09 ENCOUNTER — HOSPITAL ENCOUNTER (OUTPATIENT)
Dept: INFUSION CENTER | Facility: CLINIC | Age: 78
Discharge: HOME | End: 2023-08-09
Payer: MEDICARE

## 2023-08-09 VITALS
TEMPERATURE: 97.1 F | DIASTOLIC BLOOD PRESSURE: 60 MMHG | RESPIRATION RATE: 18 BRPM | SYSTOLIC BLOOD PRESSURE: 156 MMHG | HEART RATE: 65 BPM

## 2023-08-09 DIAGNOSIS — D50.0 IRON DEFICIENCY ANEMIA DUE TO CHRONIC BLOOD LOSS: Primary | ICD-10-CM

## 2023-08-09 RX ORDER — SODIUM CHLORIDE 9 MG/ML
20 INJECTION, SOLUTION INTRAVENOUS ONCE
Status: COMPLETED | OUTPATIENT
Start: 2023-08-09 | End: 2023-08-09

## 2023-08-09 RX ORDER — SODIUM CHLORIDE 9 MG/ML
20 INJECTION, SOLUTION INTRAVENOUS ONCE
OUTPATIENT
Start: 2023-09-06

## 2023-08-09 RX ADMIN — IRON SUCROSE 200 MG: 20 INJECTION, SOLUTION INTRAVENOUS at 10:19

## 2023-08-09 RX ADMIN — SODIUM CHLORIDE 20 ML/HR: 0.9 INJECTION, SOLUTION INTRAVENOUS at 10:18

## 2023-08-09 NOTE — PLAN OF CARE
Problem: Potential for Falls  Goal: Patient will remain free of falls  Description: INTERVENTIONS:  - Educate patient/family on patient safety including physical limitations  - Instruct patient to call for assistance with activity   - Consult OT/PT to assist with strengthening/mobility   - Keep Call bell within reach  - Keep bed low and locked with side rails adjusted as appropriate  - Keep care items and personal belongings within reach  - Initiate and maintain comfort rounds  - Make Fall Risk Sign visible to staff  - Apply yellow socks and bracelet for high fall risk patients  - Consider moving patient to room near nurses station  8/9/2023 1030 by Bradly Munoz RN  Outcome: Progressing  8/9/2023 1027 by Bradly uMnoz, RN  Outcome: Progressing

## 2023-08-09 NOTE — PROGRESS NOTES
Patient arrived to the unit and denied complications with previous infusions. Tolerated venofer infusion well without adverse affects. Aware of future appointments.

## 2023-08-16 ENCOUNTER — CONSULT (OUTPATIENT)
Dept: PODIATRY | Facility: CLINIC | Age: 78
End: 2023-08-16
Payer: MEDICARE

## 2023-08-16 VITALS
HEART RATE: 58 BPM | HEIGHT: 65 IN | DIASTOLIC BLOOD PRESSURE: 65 MMHG | WEIGHT: 155 LBS | BODY MASS INDEX: 25.83 KG/M2 | SYSTOLIC BLOOD PRESSURE: 156 MMHG

## 2023-08-16 DIAGNOSIS — G62.9 NEUROPATHY: ICD-10-CM

## 2023-08-16 DIAGNOSIS — I73.9 PERIPHERAL VASCULAR DISEASE (HCC): Primary | ICD-10-CM

## 2023-08-16 DIAGNOSIS — E11.51 TYPE 2 DIABETES MELLITUS WITH DIABETIC PERIPHERAL ANGIOPATHY WITHOUT GANGRENE, WITH LONG-TERM CURRENT USE OF INSULIN (HCC): ICD-10-CM

## 2023-08-16 DIAGNOSIS — Z79.4 TYPE 2 DIABETES MELLITUS WITH DIABETIC PERIPHERAL ANGIOPATHY WITHOUT GANGRENE, WITH LONG-TERM CURRENT USE OF INSULIN (HCC): ICD-10-CM

## 2023-08-16 PROCEDURE — 99213 OFFICE O/P EST LOW 20 MIN: CPT | Performed by: PODIATRIST

## 2023-08-16 RX ORDER — DULOXETIN HYDROCHLORIDE 30 MG/1
30 CAPSULE, DELAYED RELEASE ORAL 2 TIMES DAILY
Qty: 60 CAPSULE | Refills: 0 | Status: SHIPPED | OUTPATIENT
Start: 2023-08-16 | End: 2023-09-15

## 2023-08-16 NOTE — PROGRESS NOTES
Assessment/Plan:    Explained to patient that he is dealing with symptoms of diabetic neuropathy. Replaces Cymbalta 30 mg twice daily. He should take this medication on a daily basis for the first 2 weeks to guard against GI upset. Will be reassessed in 4 weeks. Trimmed elongated toenails. Bacitracin to left fourth toe due to minor bleeding. Patient has symptoms of Planter fasciitis of the left foot but it is very recent. We will hold off on treating until next visit as pain seems to be improving. No problem-specific Assessment & Plan notes found for this encounter. Diagnoses and all orders for this visit:    Peripheral vascular disease ([address])    Type 2 diabetes mellitus with diabetic peripheral angiopathy without gangrene, with long-term current use of insulin (HCC)  -     DULoxetine (Cymbalta) 30 mg delayed release capsule; Take 1 capsule (30 mg total) by mouth 2 (two) times a day    Neuropathy  -     DULoxetine (Cymbalta) 30 mg delayed release capsule; Take 1 capsule (30 mg total) by mouth 2 (two) times a day          Subjective:      Patient ID: Rhys Maradiaga is a 78 y.o. male. HPI     Patient, an 78-year-old type II diabetic male with known neuropathy and PVD presents for care. Patient states that his feet feel very tight and bother him. Gabapentin 300 mg 3 times daily has been prescribed for patient states that he gets spacey when he takes this medication. He has not taken this medication for quite some time. The patient states that he has had pain in the center of his left heel for the past 7 to 10 days. No trauma related. Patient also desires toenails trimmed. I personally reviewed an A1c dated 4/10/2023. It was 6.2. I personally reviewed an A1c dated 8/29/2022. It was 6.4.         The following portions of the patient's history were reviewed and updated as appropriate: allergies, current medications, past family history, past medical history, past social history, past surgical history and problem list.    Review of Systems   Cardiovascular:        Coronary artery disease   Gastrointestinal:        GERD   Genitourinary:        Stage III renal disease   Neurological:        Paresthesia             Objective:      /65   Pulse 58   Ht 5' 5" (1.651 m)   Wt 70.3 kg (155 lb)   BMI 25.79 kg/m²          Physical Exam  Constitutional:       Appearance: Normal appearance. Cardiovascular:      Comments: There are no palpable pedal pulses bilateral  Musculoskeletal:         General: Tenderness present. Comments: Pain with palpation central aspect left heel at fascia insertion into calcaneus. Skin:     Comments: All toenails are elongated   Neurological:      General: No focal deficit present. Mental Status: He is oriented to person, place, and time. Comments: Sensorium intact per [employer] monofilament.

## 2023-08-22 ENCOUNTER — OFFICE VISIT (OUTPATIENT)
Dept: FAMILY MEDICINE CLINIC | Facility: CLINIC | Age: 78
End: 2023-08-22
Payer: MEDICARE

## 2023-08-22 VITALS
HEIGHT: 65 IN | WEIGHT: 153 LBS | SYSTOLIC BLOOD PRESSURE: 139 MMHG | OXYGEN SATURATION: 99 % | BODY MASS INDEX: 25.49 KG/M2 | DIASTOLIC BLOOD PRESSURE: 60 MMHG | TEMPERATURE: 97.5 F | HEART RATE: 76 BPM

## 2023-08-22 DIAGNOSIS — E78.49 OTHER HYPERLIPIDEMIA: ICD-10-CM

## 2023-08-22 DIAGNOSIS — Z12.5 SCREENING FOR PROSTATE CANCER: ICD-10-CM

## 2023-08-22 DIAGNOSIS — R39.11 BENIGN PROSTATIC HYPERPLASIA WITH URINARY HESITANCY: ICD-10-CM

## 2023-08-22 DIAGNOSIS — E08.22 DIABETES MELLITUS DUE TO UNDERLYING CONDITION WITH STAGE 3A CHRONIC KIDNEY DISEASE, WITHOUT LONG-TERM CURRENT USE OF INSULIN (HCC): ICD-10-CM

## 2023-08-22 DIAGNOSIS — R53.83 FATIGUE, UNSPECIFIED TYPE: ICD-10-CM

## 2023-08-22 DIAGNOSIS — E55.9 VITAMIN D INSUFFICIENCY: ICD-10-CM

## 2023-08-22 DIAGNOSIS — E11.51 TYPE 2 DIABETES MELLITUS WITH DIABETIC PERIPHERAL ANGIOPATHY WITHOUT GANGRENE, WITH LONG-TERM CURRENT USE OF INSULIN (HCC): Primary | ICD-10-CM

## 2023-08-22 DIAGNOSIS — G62.9 NEUROPATHY: ICD-10-CM

## 2023-08-22 DIAGNOSIS — N18.31 DIABETES MELLITUS DUE TO UNDERLYING CONDITION WITH STAGE 3A CHRONIC KIDNEY DISEASE, WITHOUT LONG-TERM CURRENT USE OF INSULIN (HCC): ICD-10-CM

## 2023-08-22 DIAGNOSIS — I10 ESSENTIAL HYPERTENSION: ICD-10-CM

## 2023-08-22 DIAGNOSIS — I73.9 PERIPHERAL VASCULAR DISEASE (HCC): ICD-10-CM

## 2023-08-22 DIAGNOSIS — N40.1 BENIGN PROSTATIC HYPERPLASIA WITH URINARY HESITANCY: ICD-10-CM

## 2023-08-22 DIAGNOSIS — Z79.4 TYPE 2 DIABETES MELLITUS WITH DIABETIC PERIPHERAL ANGIOPATHY WITHOUT GANGRENE, WITH LONG-TERM CURRENT USE OF INSULIN (HCC): Primary | ICD-10-CM

## 2023-08-22 DIAGNOSIS — Z79.899 ENCOUNTER FOR LONG-TERM (CURRENT) USE OF MEDICATIONS: ICD-10-CM

## 2023-08-22 DIAGNOSIS — D51.1 VIT B12 DEFIC ANEMIA D/T SLCTV VIT B12 MALABSORP W PROTEIN: ICD-10-CM

## 2023-08-22 LAB
SL AMB  POCT GLUCOSE, UA: NEGATIVE
SL AMB LEUKOCYTE ESTERASE,UA: NEGATIVE
SL AMB POCT BILIRUBIN,UA: NORMAL
SL AMB POCT BLOOD,UA: 5
SL AMB POCT CLARITY,UA: CLEAR
SL AMB POCT COLOR,UA: NORMAL
SL AMB POCT KETONES,UA: NORMAL
SL AMB POCT NITRITE,UA: NEGATIVE
SL AMB POCT PH,UA: 5
SL AMB POCT SPECIFIC GRAVITY,UA: 1.01
SL AMB POCT URINE PROTEIN: NEGATIVE
SL AMB POCT UROBILINOGEN: NORMAL

## 2023-08-22 PROCEDURE — 99214 OFFICE O/P EST MOD 30 MIN: CPT | Performed by: FAMILY MEDICINE

## 2023-08-22 PROCEDURE — 81002 URINALYSIS NONAUTO W/O SCOPE: CPT | Performed by: FAMILY MEDICINE

## 2023-08-22 RX ORDER — TAMSULOSIN HYDROCHLORIDE 0.4 MG/1
0.4 CAPSULE ORAL
Qty: 30 CAPSULE | Refills: 5 | Status: SHIPPED | OUTPATIENT
Start: 2023-08-22

## 2023-08-22 NOTE — PROGRESS NOTES
Name: Delia Marin      : 1945      MRN: [id number]  Encounter Provider: Hanna Nieves DO  Encounter Date: 2023   Encounter department: [address]     1. Type 2 diabetes mellitus with diabetic peripheral angiopathy without gangrene, with long-term current use of insulin (HCC)  -     POCT urine dip    2. Essential hypertension    3. Other hyperlipidemia    4. Encounter for long-term (current) use of medications    5. Benign prostatic hyperplasia with urinary hesitancy  -     PSA, Total Screen; Future; Expected date: 2023  -     tamsulosin (FLOMAX) 0.4 mg; Take 1 capsule (0.4 mg total) by mouth daily with dinner  -     POCT urine dip    6. Vitamin D insufficiency  -     Vitamin D 25 hydroxy; Future; Expected date: 2023    7. Neuropathy  -     Vitamin B12; Future; Expected date: 2023    8. Fatigue, unspecified type  -     TSH, 3rd generation; Future; Expected date: 2023    9. Peripheral vascular disease ([address])  -     Lipid panel; Future; Expected date: 2023    10. Screening for prostate cancer  -     PSA, Total Screen; Future; Expected date: 2023    11. 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 Diabetes mellitus due to underlying condition with stage 3a chronic kidney disease, without long-term current use of insulin (HCC)  -     Lipid panel; Future; Expected date: 2023        Subjective      HPI --23-year-old Valentino Rounds who presents with 3-day history of urinary frequency and small voiding amounts, some urgency.       Review of Systems    Current Outpatient Medications on File Prior to Visit   Medication Sig   • acetaminophen (TYLENOL) 500 mg tablet Take 1,000 mg by mouth every 8 (eight) hours as needed   • atorvastatin (LIPITOR) 40 mg tablet Take 1 tablet (40 mg total) by mouth daily   • clopidogrel (PLAVIX) 75 mg tablet TAKE 1 TABLET BY MOUTH EVERY DAY   • Dulaglutide (Trulicity) 1.4 YO/4.1KR SOPN Inject 0.5 mL (1.5 mg total) under the skin once a week   • DULoxetine (Cymbalta) 30 mg delayed release capsule Take 1 capsule (30 mg total) by mouth 2 (two) times a day   • Empagliflozin (Jardiance) 25 MG TABS Take 0.5 tablets (12.5 mg total) by mouth every morning Also: take with LOTS of water   • ferrous sulfate 325 (65 Fe) mg tablet Take 1 tablet (325 mg total) by mouth 2 (two) times a day with meals   • gabapentin (Neurontin) 300 mg capsule Take 1 capsule (300 mg total) by mouth 3 (three) times a day   • glipiZIDE (GLUCOTROL) 10 mg tablet Take 1 tablet (10 mg total) by mouth 2 (two) times a day before meals   • hydrocortisone 2.5 % cream Apply topically 4 (four) times a day as needed for rash   • lisinopril-hydrochlorothiazide (PRINZIDE,ZESTORETIC) 20-12.5 MG per tablet Take 1 tablet by mouth daily   • metFORMIN (GLUCOPHAGE) 1000 MG tablet Take 1 tablet (1,000 mg total) by mouth 2 (two) times a day with meals   • omeprazole (PriLOSEC) 20 mg delayed release capsule Take 1 capsule (20 mg total) by mouth daily before breakfast (Patient taking differently: Take 20 mg by mouth if needed)   • rivaroxaban (Xarelto) 2.5 mg tablet Take 1 tablet (2.5 mg total) by mouth 2 (two) times a day (Patient taking differently: Take 2.5 mg by mouth 2 (two) times a day Last dose Tues May23)       Objective     /60 (BP Location: Left arm, Patient Position: Sitting, Cuff Size: Standard)   Pulse 76   Temp 97.5 °F (36.4 °C) (Temporal)   Ht 5' 5" (1.651 m)   Wt 69.4 kg (153 lb)   SpO2 99%   BMI 25.46 kg/m²     Physical Exam         I personally reviewed the recent (and prior)  lab results, the image studies, pathology, other specialty/physicians consult notes and recommendations, and outside medical records from other institutions, as appropriate. I had a lengthy discussion with the patient and shared the work-up findings. We discussed the diagnosis and management plan. I spent  30  minutes reviewing the records (labs, clinician notes, outside records, medical history, ordering medicine/tests/procedures, interpreting the imaging/labs previously done) and coordination of care as well as direct time with the patient today, of which greater than 50% of the time was spent in counseling and coordination of care with the patient/family.       Ramandeep Sands, DO

## 2023-08-25 ENCOUNTER — APPOINTMENT (OUTPATIENT)
Dept: LAB | Facility: CLINIC | Age: 78
End: 2023-08-25

## 2023-08-25 DIAGNOSIS — Z12.5 SCREENING FOR PROSTATE CANCER: ICD-10-CM

## 2023-08-25 DIAGNOSIS — E08.22 DIABETES MELLITUS DUE TO UNDERLYING CONDITION WITH STAGE 3A CHRONIC KIDNEY DISEASE, WITHOUT LONG-TERM CURRENT USE OF INSULIN (HCC): ICD-10-CM

## 2023-08-25 DIAGNOSIS — I73.9 PERIPHERAL VASCULAR DISEASE (HCC): ICD-10-CM

## 2023-08-25 DIAGNOSIS — R39.11 BENIGN PROSTATIC HYPERPLASIA WITH URINARY HESITANCY: ICD-10-CM

## 2023-08-25 DIAGNOSIS — D51.1 VIT B12 DEFIC ANEMIA D/T SLCTV VIT B12 MALABSORP W PROTEIN: ICD-10-CM

## 2023-08-25 DIAGNOSIS — R53.83 FATIGUE, UNSPECIFIED TYPE: ICD-10-CM

## 2023-08-25 DIAGNOSIS — N40.1 BENIGN PROSTATIC HYPERPLASIA WITH URINARY HESITANCY: ICD-10-CM

## 2023-08-25 DIAGNOSIS — N18.31 DIABETES MELLITUS DUE TO UNDERLYING CONDITION WITH STAGE 3A CHRONIC KIDNEY DISEASE, WITHOUT LONG-TERM CURRENT USE OF INSULIN (HCC): ICD-10-CM

## 2023-08-25 DIAGNOSIS — E55.9 VITAMIN D INSUFFICIENCY: ICD-10-CM

## 2023-08-25 DIAGNOSIS — G62.9 NEUROPATHY: ICD-10-CM

## 2023-08-25 DIAGNOSIS — D50.9 IRON DEFICIENCY ANEMIA, UNSPECIFIED IRON DEFICIENCY ANEMIA TYPE: ICD-10-CM

## 2023-09-06 ENCOUNTER — HOSPITAL ENCOUNTER (OUTPATIENT)
Dept: INFUSION CENTER | Facility: CLINIC | Age: 78
Discharge: HOME | End: 2023-09-06
Payer: MEDICARE

## 2023-09-06 VITALS
SYSTOLIC BLOOD PRESSURE: 136 MMHG | DIASTOLIC BLOOD PRESSURE: 73 MMHG | TEMPERATURE: 96.5 F | RESPIRATION RATE: 18 BRPM | HEART RATE: 66 BPM

## 2023-09-06 DIAGNOSIS — D50.0 IRON DEFICIENCY ANEMIA DUE TO CHRONIC BLOOD LOSS: Primary | ICD-10-CM

## 2023-09-06 PROCEDURE — 96365 THER/PROPH/DIAG IV INF INIT: CPT

## 2023-09-06 RX ORDER — SODIUM CHLORIDE 9 MG/ML
20 INJECTION, SOLUTION INTRAVENOUS ONCE
Status: CANCELLED | OUTPATIENT
Start: 2023-09-06

## 2023-09-06 RX ORDER — SODIUM CHLORIDE 9 MG/ML
20 INJECTION, SOLUTION INTRAVENOUS ONCE
Status: COMPLETED | OUTPATIENT
Start: 2023-09-06 | End: 2023-09-06

## 2023-09-06 RX ADMIN — SODIUM CHLORIDE 20 ML/HR: 0.9 INJECTION, SOLUTION INTRAVENOUS at 09:53

## 2023-09-06 RX ADMIN — IRON SUCROSE 200 MG: 20 INJECTION, SOLUTION INTRAVENOUS at 09:55

## 2023-09-13 ENCOUNTER — OFFICE VISIT (OUTPATIENT)
Dept: PODIATRY | Facility: CLINIC | Age: 78
End: 2023-09-13
Payer: MEDICARE

## 2023-09-13 VITALS
HEART RATE: 69 BPM | DIASTOLIC BLOOD PRESSURE: 56 MMHG | SYSTOLIC BLOOD PRESSURE: 116 MMHG | WEIGHT: 152 LBS | RESPIRATION RATE: 18 BRPM | HEIGHT: 65 IN | BODY MASS INDEX: 25.33 KG/M2

## 2023-09-13 DIAGNOSIS — I73.9 PERIPHERAL VASCULAR DISEASE (HCC): Primary | ICD-10-CM

## 2023-09-13 DIAGNOSIS — G62.9 NEUROPATHY: ICD-10-CM

## 2023-09-13 DIAGNOSIS — R39.11 BENIGN PROSTATIC HYPERPLASIA WITH URINARY HESITANCY: ICD-10-CM

## 2023-09-13 DIAGNOSIS — N40.1 BENIGN PROSTATIC HYPERPLASIA WITH URINARY HESITANCY: ICD-10-CM

## 2023-09-13 PROCEDURE — 99212 OFFICE O/P EST SF 10 MIN: CPT | Performed by: PODIATRIST

## 2023-09-13 RX ORDER — TAMSULOSIN HYDROCHLORIDE 0.4 MG/1
0.4 CAPSULE ORAL
Qty: 90 CAPSULE | Refills: 2 | Status: SHIPPED | OUTPATIENT
Start: 2023-09-13

## 2023-09-13 RX ORDER — DULOXETIN HYDROCHLORIDE 30 MG/1
30 CAPSULE, DELAYED RELEASE ORAL DAILY
Qty: 90 CAPSULE | Refills: 1 | Status: SHIPPED | OUTPATIENT
Start: 2023-09-13 | End: 2024-03-11

## 2023-09-13 NOTE — PROGRESS NOTES
Patient presents for assessment. Patient was prescribed Cymbalta 30 mg twice daily for tightness in his feet with symptoms consistent with peripheral neuropathy. He is taking the medication on a daily basis and notes improvement. He was told to continue in a 90-day supply was provided with 1 refill. Patient had left heel pain at last visit but this has resolved. Patient is rescheduled in 6 months.

## 2023-09-14 ENCOUNTER — TELEPHONE (OUTPATIENT)
Age: 78
End: 2023-09-14

## 2023-09-14 DIAGNOSIS — G62.9 NEUROPATHY: ICD-10-CM

## 2023-09-14 DIAGNOSIS — E11.51 TYPE 2 DIABETES MELLITUS WITH DIABETIC PERIPHERAL ANGIOPATHY WITHOUT GANGRENE, WITH LONG-TERM CURRENT USE OF INSULIN (HCC): ICD-10-CM

## 2023-09-14 DIAGNOSIS — Z79.4 TYPE 2 DIABETES MELLITUS WITH DIABETIC PERIPHERAL ANGIOPATHY WITHOUT GANGRENE, WITH LONG-TERM CURRENT USE OF INSULIN (HCC): ICD-10-CM

## 2023-09-14 RX ORDER — DULOXETIN HYDROCHLORIDE 30 MG/1
30 CAPSULE, DELAYED RELEASE ORAL 2 TIMES DAILY
Qty: 60 CAPSULE | Refills: 0 | Status: SHIPPED | OUTPATIENT
Start: 2023-09-14

## 2023-09-14 NOTE — TELEPHONE ENCOUNTER
Caller: Ever Copping    Doctor/Office: Dr. Santy Vila    #: [phone]    Escalation: Medication/Asking for Dr to send RX to [facility] in [facility] as [facility] price [facility] too high. He [facility] himself but needs Dr to send it to [facility]. Please call pt back when done/sent so he can go and get the RX.  Thanks

## 2023-09-15 DIAGNOSIS — Z79.4 TYPE 2 DIABETES MELLITUS WITH DIABETIC PERIPHERAL ANGIOPATHY WITHOUT GANGRENE, WITH LONG-TERM CURRENT USE OF INSULIN (HCC): Primary | ICD-10-CM

## 2023-09-15 DIAGNOSIS — G62.9 NEUROPATHY: ICD-10-CM

## 2023-09-15 DIAGNOSIS — E11.51 TYPE 2 DIABETES MELLITUS WITH DIABETIC PERIPHERAL ANGIOPATHY WITHOUT GANGRENE, WITH LONG-TERM CURRENT USE OF INSULIN (HCC): Primary | ICD-10-CM

## 2023-09-15 RX ORDER — DULOXETIN HYDROCHLORIDE 30 MG/1
30 CAPSULE, DELAYED RELEASE ORAL DAILY
Qty: 90 CAPSULE | Refills: 1 | Status: SHIPPED | OUTPATIENT
Start: 2023-09-15 | End: 2024-03-13

## 2023-09-17 NOTE — PROGRESS NOTES
Hematology Outpatient Office Note    Date of Service: 9/27/2023    [facility]  [phone]    Reason for Consultation:   Chief Complaint   Patient presents with   • Follow-up       Referral Physician: No ref. provider found    Primary Care Physician:  Petra Alcocer, DO       ASSESSMENT & PLAN      Diagnosis ICD-10-CM Associated Orders   1. Iron deficiency anemia due to chronic blood loss  D50.0 CBC and differential     Iron Panel (Includes Ferritin, Iron Sat%, Iron, and TIBC)      2. Embolism and thrombosis of arteries of the lower extremities (HCC)  I74.3             This is a 78 y.o. c PMHx notable for cecal AVM, DM, DVT, HTN, HLP, CAD, being seen in consultation for chronic ANG      Iron Deficiency anemia: improved s/p IV iron  Low iron levels can cause anemia (low red blood cells). Low iron levels can also make people feel poorly, even before anemia starts. Iron is used to make red blood cells. If blood is lost from the body, if iron can't be absorbed from food, or if something removes iron (pregnancy) the iron can be low and then anemia happens. Hx Cecal AVM. GI thought maybe GAVE  8/29/2022: Hgb 9.5, WBC 6.35k, MCV 91, plt 235k, retic 3.65%, FOBT + (8/31), ferritin 7, iron 30, Fe Sat 9%, TIBC 346  9/7 EGD/C-scope: internal hemorrhoids    Capsule endoscopy: no active bleeding  6/27/2023 EGD with single balloon enteroscopy revealed no bleeding      Iron deficiency is very common. Low iron can cause fatigue or tiredness, the desire to eat ice or non-food items like corn starch or dry pasta noodles, restless legs, weak fingernails, cracks at the corner of the mouth.      Common causes of iron deficiency include inadequate diet (uncommon, usually vegetarians), gastric bypass surgery (very common), pregnancy (very common), periods (most common cause in younger women), blood loss (usually from the stomach or intestines), and decreased iron absorption from the intestines from gastritis, H pylori, acid blocking medicines or celiac disease. Even a normal menstrual period can cause iron deficiency. In 10% of patients a clear cause of low iron is not found. 14% of women will have iron deficiency just from their menstrual period. Iron deficiency is treated with pills as a first step. For some people the iron pills do not work, cause severe side effects, or take too long to work: for these people IV iron can be given. You may only require IV iron very rarely, for example only when pregnant, or from 1-2 times a year based on your rate of need. It takes 4 weeks for IV iron to improve the anemia to maximum potential. If your fatigue is not from the low iron, getting iron treatment would not help the fatigue. Specific numbers on risks:  Because serious reactions can occur with IV iron and are so rare, it is difficult to determine their absolute number with IV iron. Based on reports of adverse reactions, Injectafer appears to have the lowest risk of serious hypersensitivity reactions or anaphylaxis. Next lowest would be Venofer, followed by Feraheme and Low Molecular Weight Iron Dextran ([place], Drug Safety 2021). Anaphylaxis can occur with antibiotics such as penicillin (10-50/100,000), and can be fatal (in 1-2/100,000). Risk of anaphylaxis with modern iron types ranges from 11/100,000 with iron sucrose to 25-70/100,000 with Feraheme or low molecular weight iron dextran, risk of death with IV iron ranges from 0.81/100,000 with low molecular weight iron dextran, 3.5/100,000 for Feraheme, to 6/100,000 with Venofer; however these values overlap statistically (DeLoughery Am J Hematology, 2015). Another study found life-threatening reactions in 0.6/million with Venofer, and 3.3/million with iron dextran ([facility] Neph Dial trans, 2006).     Blood transfusion also has risks: fever occurs in 1%, serious transfusion reactions occur in 8.1/100,000, life-threatening transfusion reactions occur in 1/140,000 units of blood given, Hepatitis B in 1/282,000, and Hepatitis C  in 1/1,150,000. [facility] 2012). Recent labs: ferritin WNL            · Discussion of decision making    I personally reviewed the following lab results, the image studies, pathology, other specialty/physicians consult notes and recommendations, and outside medical records from [facility]. I had a lengthy discussion with the patient and shared the work-up findings. We discussed the diagnosis and management plan as below. I spent 34 minutes reviewing the records (labs, clinician notes, outside records, medical history, ordering medicine/tests/procedures, interpreting the imaging/labs previously done) and coordination of care as well as direct time with the patient today, of which greater than 50% of the time was spent in counseling and coordination of care with the patient/family. · Plan/Labs  · CBC, iron panel in 6 months ordered  · He is holding PO iron MWF as has felt good off of it  · Cont to f/u GI          Follow Up: 6 months with preceding CBC, iron labs    All questions were answered to the patient's satisfaction during this encounter. The patient knows the contact information for our office and knows to reach out for any relevant concerns related to this encounter. They are to call for any temperature 100.4 or higher, new symptoms including but not restricted to shaking chills, decreased appetite, nausea, vomiting, diarrhea, increased fatigue, shortness of breath or chest pain, confusion, and not feeling the strength to come to the clinic. For all other listed problems and medical diagnosis in their chart - they are managed by PCP and/or other specialists, which the patient acknowledges. Thank you very much for your consultation and making us a part of this patient's care. We are continuing to follow closely with you.  Please do not hesitate to reach out to me with any additional questions or concerns. Mal Starr MD  Hematology & Medical Oncology Staff Physician             Disclaimer: This document was prepared using [employer] Fluency Direct technology. If a word or phrase is confusing, or does not make sense, this is likely due to recognition error which was not discovered during this clinician's review. If you believe an error has occurred, please contact me through [address] [facility] line for dequan? cation. HEMATOLOGICAL HISTORY OF PRESENT ILLNESS      Clotting History None   Bleeding History GAVE related bleeding   Cancer History None   Family Cancer History Father (cancer)   H/O Blood/Plt Transfusion PRBC years ago   Tobacco/etoh/drug abuse 2 PPDx 44 years, quit 2004, no etoh abuse or rec drug use       Cancer Screening history n/a   Occupation [employer]      8/29/2022: Hgb 9.5, WBC 6.35k, MCV 91, plt 235k, retic 3.65%, FOBT + (8/31), ferritin 7, iron 30, Fe Sat 9%, TIBC 346  9/7 EGD/C-scope: internal hemorrhoids  10/3/2022-10/9/2022: 5 iV Venofer 200mg administered   11/28/2022: ferritin 26, iron 67, Fe Sat 21%, TIBC 322, Hgb 12.7 (now normal)  5/2/2023: Discussed with the patient the results of his iron studies which show worsening iron deficiency as well as recurrent anemia. I ordered 600 mg of IV Venofer  5/18/2023: ferritin 7, iron 36, Fe Sat 10%, TIBC 364, Hgb 11.6  6/29/2023: ferritin 37, iron 52, Fe Sat 17%, TIBC 307, Hgb 12.7, MCV 92  9/20/2023:  ferritin 58, iron 65, Fe Sat 33%, TIBC 230, Hgb 13.5, MCV 95    SUBJECTIVE  (INTERVAL HISTORY)      Interval events: feeling much better, higher energy, no more pica. I have reviewed the relevant past medical, surgical, social and family history. I have also reviewed allergies and medications for this patient. Review of Systems    Baseline weight: 152-154 lbs    Denies weight loss, F/C, N/V, SOB, CP, LH, HA.     He has had fatigue and low energy since 6/2022. He has been chewing on ice all day since 3/2022. A 10-point review of system was performed, pertinent positive and negative were detailed as above. Otherwise, the 10-point review of system was negative.       Past Medical History:   Diagnosis Date   • Coronary artery disease without angina pectoris 02/25/2020   • Diabetes mellitus ([address])    • DVT (deep venous thrombosis) [facility])    • GERD (gastroesophageal reflux disease)    • Hypertension    • Iron deficiency anemia        Past Surgical History:   Procedure Laterality Date   • ANGIOPLASTY  10/19/2021    bilateral   • CARDIAC CATHETERIZATION  2013    calif    CABG  x4   • COLONOSCOPY     • COLONOSCOPY     • CORONARY ANGIOPLASTY WITH STENT PLACEMENT  01/01/2010   • CORONARY ARTERY BYPASS GRAFT     • FEMORAL ARTERY STENT     • FEMORAL BYPASS  10/20/2021   • IR LOWER EXTREMITY ANGIOGRAM  2/14/2023   • IR PELVIC ANGIOGRAM  5/6/2022   • MI SLCTV CATHJ 3RD+ ORD SLCTV ABDL PEL/LXTR [address]  5/6/2022    Procedure: ULTRASOUND-GUIDED LEFT BRACHIAL ARTERY PUNCTURE, AORTOGRAM, RIGHT COMMON ILIAC ARTERY ANGIOPLASTY WITH 8 X 40 MM BALLOON, ANGIOPLASTY OF RIGHT EXTERNAL AND COMMON ILIAC ARTERY STENOSIS WITH 6 X 150 MM DRUG-ELUTING BALLOON, STENTING OF RIGHT DISTAL EXTERNAL ILIAC ARTERY STENOSIS WITH 7 X 40 MM SELF EXPANDING STENT POST DILATED WITH A 6 MM BALLOON.;  Surgeon: Verna Rose MD;  Loca   • MI SLCTV CATHJ 3RD+ ORD SLCTV ABDL PEL/LXTR [address] Bilateral 2/14/2023    Procedure: CUTDOWN FEM-FEM BYPASS WITH PRIMARY CLOSURE ACCESS SITE, BILATERAL RUN-OFF, LEFT FEM-POP 5X220 QUYEN AND 6X150 RODRI, ATTEMPT TO CROSS RIGHT SFA OCCLUSION;  Surgeon: Verna Rose MD;  Location: [facility];  Service: Vascular       Family History   Problem Relation Age of Onset   • No Known Problems Mother    • No Known Problems Father        Social History     Socioeconomic History   • Marital status: /Civil Union     Spouse name: Not on file   • Number of children: Not on file   • Years of education: Not on file   • Highest education level: Not on file   Occupational History   • Not on file   Tobacco Use   • Smoking status: Former     Packs/day: 1.50     Years: 40.00     Total pack years: 60.00     Types: Cigarettes     Quit date:      Years since quittin.7     Passive exposure: Past   • Smokeless tobacco: Never   • Tobacco comments:     quit --    Vaping Use   • Vaping Use: Never used   Substance and Sexual Activity   • Alcohol use: Not Currently     Alcohol/week: 0.0 standard drinks of alcohol   • Drug use: No   • Sexual activity: Not on file   Other Topics Concern   • Not on file   Social History Narrative    · Most recent tobacco use screenin2020      Social Determinants of Health     Financial Resource Strain: Not on file   Food Insecurity: Not on file   Transportation Needs: Not on file   Physical Activity: Not on file   Stress: Not on file   Social Connections: Not on file   Intimate Partner Violence: Not on file   Housing Stability: Not on file       No Known Allergies    Current Outpatient Medications   Medication Sig Dispense Refill   • acetaminophen (TYLENOL) 500 mg tablet Take 1,000 mg by mouth every 8 (eight) hours as needed     • atorvastatin (LIPITOR) 40 mg tablet Take 1 tablet (40 mg total) by mouth daily 90 tablet 3   • clopidogrel (PLAVIX) 75 mg tablet TAKE 1 TABLET BY MOUTH EVERY DAY 90 tablet 3   • Dulaglutide (Trulicity) 1.5 IN/5.6CQ SOPN Inject 0.5 mL (1.5 mg total) under the skin once a week 3 mL 6   • DULoxetine (Cymbalta) 30 mg delayed release capsule Take 1 capsule (30 mg total) by mouth daily 90 capsule 1   • DULoxetine (CYMBALTA) 30 mg delayed release capsule TAKE ONE CAPSULE BY MOUTH TWICE DAILY 60 capsule 0   • DULoxetine (Cymbalta) 30 mg delayed release capsule Take 1 capsule (30 mg total) by mouth daily 90 capsule 1   • Empagliflozin (Jardiance) 25 MG TABS Take 0.5 tablets (12.5 mg total) by mouth every morning Also: take with LOTS of water 90 tablet 3   • ferrous sulfate 325 (65 Fe) mg tablet Take 1 tablet (325 mg total) by mouth 2 (two) times a day with meals 60 tablet 6   • gabapentin (Neurontin) 300 mg capsule Take 1 capsule (300 mg total) by mouth 3 (three) times a day 90 capsule 6   • glipiZIDE (GLUCOTROL) 10 mg tablet Take 1 tablet (10 mg total) by mouth 2 (two) times a day before meals 180 tablet 3   • hydrocortisone 2.5 % cream Apply topically 4 (four) times a day as needed for rash 30 g 0   • lisinopril-hydrochlorothiazide (PRINZIDE,ZESTORETIC) 20-12.5 MG per tablet Take 1 tablet by mouth daily 90 tablet 3   • metFORMIN (GLUCOPHAGE) 1000 MG tablet Take 1 tablet (1,000 mg total) by mouth 2 (two) times a day with meals 180 tablet 3   • omeprazole (PriLOSEC) 20 mg delayed release capsule Take 1 capsule (20 mg total) by mouth daily before breakfast (Patient taking differently: Take 20 mg by mouth if needed) 30 capsule 5   • tamsulosin (FLOMAX) 0.4 mg TAKE 1 CAPSULE BY MOUTH EVERY DAY WITH DINNER 90 capsule 2   • rivaroxaban (Xarelto) 2.5 mg tablet Take 1 tablet (2.5 mg total) by mouth 2 (two) times a day (Patient taking differently: Take 2.5 mg by mouth 2 (two) times a day Last dose Tues May23) 180 tablet 0     No current facility-administered medications for this visit. (Not in a hospital admission)        Objective:     24 Hour Vitals Assessment:     Vitals:    09/27/23 0906   BP: 132/64   Pulse: 69   Resp: 18   Temp: 98.1 °F (36.7 °C)   SpO2: 97%       PHYSICIAN EXAM:    General: Appearance: alert, cooperative, no distress. HEENT: Normocephalic, atraumatic. No scleral icterus. conjunctivae clear. EOMI. Chest: No tenderness to palpation. No open wound noted. Lungs: Clear to auscultation bilaterally, Respirations unlabored. Cardiac: Regular rate and rhythm, +S1and S2  Abdomen: Soft, non-tender, non-distended. Bowel sounds are normal.  Extremities:  No edema, cyanosis, clubbing.   Skin: Skin color, turgor are normal. No rashes. Neurologic: Awake, Alert, and oriented, no gross focal deficits noted b/l. DATA REVIEW:    Pathology Result:    Final Diagnosis   Date Value Ref Range Status   06/27/2023   Final    A. Duodenum, :  - Unremarkable duodenal mucosa  - No villous atrophy or increased intraepithelial lymphocytes seen     B. Stomach, :  - Gastric oxyntic and antral type mucosa with minimal or mild chronic inflammation  - No sharad shaped bacteria seen on H&E stained tissue section  - Negative for intestinal metaplasia and dysplasia          09/07/2022   Final    A. Duodenum,  biopsy:  - Duodenal mucosa with focal acute inflammation and reactive changes  - No intraepithelial lymphocytosis and no villous blunting.  - No epithelial dysplasia and no evidence of malignancy. B.  Stomach, biopsy:  - Gastric antral and oxyntic mucosa with no significant pathologic alteration.  - Negative for intestinal metaplasia, dysplasia or carcinoma. - No Helicobacter pylori is identified on H&E stained slide. Image Results:   Image result are reviewed and documented in Hematology/Oncology history. I personally reviewed these images. EGD with Single Balloon Enteroscopy  Narrative: Table formatting from the original result was not included. [address] [place] [address] Endoscopy  [address] [address]  [address] [place] [address],[facility] Floor:  6/27/23    PHYSICIAN(S):  Attending:   Drake Hagen MD     Fellow:   No Staff Documented     INDICATION:  GAVE (gastric antral vascular ectasia)    POST-OP DIAGNOSIS:  See the impression below. PREPROCEDURE:  Informed consent was obtained for the procedure, including sedation. Risks of perforation, hemorrhage, adverse drug reaction and aspiration   were discussed. The patient was placed in the left lateral decubitus   position. Patient was explained about the risks and benefits of the procedure.  Risks   including but not limited to bleeding, infection, and perforation were   explained in detail. Also explained about less than 100% sensitivity with   the exam and other alternatives. PROCEDURE: EGD    DETAILS OF PROCEDURE:   Patient was taken to the procedure room where a time out was performed to   confirm correct patient and correct procedure. The patient underwent   monitored anesthesia care, which was administered by an anesthesia   professional. The patient's blood pressure, ECG, heart rate, level of   consciousness, oxygen and respirations were monitored throughout the   procedure. The scope was introduced through the mouth and advanced to the   middle part of the jejunum. Tattoo was not placed to mike the distal   extent of the exam. The procedure was performed using a single balloon   overtube. Fluoroscopy was not used. The patient experienced no blood loss. The procedure was not difficult. The patient tolerated the procedure well. There were no apparent adverse events. ANESTHESIA INFORMATION:  ASA: III  Anesthesia Type: IV Sedation with Anesthesia    MEDICATIONS:  No administrations occurring from 1321 to 1421 on 06/27/23     FINDINGS:  The esophagus appeared normal. Z-line is 43 cm from the incisors. The stomach appeared normal. Performed random biopsy using biopsy forceps. The duodenum appeared normal. Performed random biopsy using biopsy   forceps. The middle jejunum appeared normal.    SPECIMENS:  ID Type Source Tests Collected by Time Destination   1 :  Tissue Duodenum TISSUE EXAM Sree Jaffe MD 6/27/2023  2:11 PM    2 :  Tissue Stomach TISSUE EXAM Sree Jaffe MD 6/27/2023  2:11 PM    Impression: The esophagus appeared normal.  The stomach appeared normal. Performed random biopsy. The duodenum appeared normal. Performed random biopsy. The middle jejunum appeared normal.    RECOMMENDATION:   Await pathology results      Follow up with Dr. Mary Church. Sree Jaffe MD       LABS:  Lab data are reviewed and documented in [facility] history.        Lab Results   Component Value Date    HGB 13.5 09/20/2023    HCT 40.7 09/20/2023    MCV 95 09/20/2023     09/20/2023    WBC 5.32 09/20/2023    NRBC 0 09/20/2023     Lab Results   Component Value Date    K 4.4 09/20/2023     09/20/2023    CO2 24 09/20/2023    BUN 20 09/20/2023    CREATININE 1.03 09/20/2023    GLUF 171 (H) 09/20/2023    CALCIUM 9.4 09/20/2023    CORRECTEDCA 10.2 (H) 11/15/2021    AST 13 09/20/2023    ALT 17 09/20/2023    ALKPHOS 59 09/20/2023    EGFR 68 09/20/2023       Lab Results   Component Value Date    IRON 65 09/20/2023    TIBC 295 09/20/2023    FERRITIN 58 09/20/2023    FERRITIN 37 06/29/2023    FERRITIN 7 (L) 05/18/2023    FERRITIN 26 11/28/2022    FERRITIN 7 (L) 08/29/2022    FERRITIN 37 04/20/2022    FERRITIN 17 02/07/2022    FERRITIN 13 01/05/2022    FERRITIN 10 11/15/2021    FERRITIN 28 03/11/2021    FERRITIN 12 11/03/2020    FERRITIN 7 (L) 10/06/2020       Lab Results   Component Value Date    VUESHPSD05 223 09/20/2023    ZHMUAZUF29 389 11/15/2021    TVWVNXGG17 681 05/29/2020       No results for input(s): "WBC", "CREAT", "PLT" in the last 72 hours.      By:  Victor Hugo Hope, 9/27/2023, 9:35 AM

## 2023-09-20 ENCOUNTER — APPOINTMENT (OUTPATIENT)
Dept: LAB | Facility: CLINIC | Age: 78
End: 2023-09-20
Payer: MEDICARE

## 2023-09-20 DIAGNOSIS — E11.51 TYPE 2 DIABETES MELLITUS WITH DIABETIC PERIPHERAL ANGIOPATHY WITHOUT GANGRENE, WITH LONG-TERM CURRENT USE OF INSULIN (HCC): ICD-10-CM

## 2023-09-20 DIAGNOSIS — Z79.4 TYPE 2 DIABETES MELLITUS WITH DIABETIC PERIPHERAL ANGIOPATHY WITHOUT GANGRENE, WITH LONG-TERM CURRENT USE OF INSULIN (HCC): ICD-10-CM

## 2023-09-20 DIAGNOSIS — D50.0 IRON DEFICIENCY ANEMIA DUE TO CHRONIC BLOOD LOSS: ICD-10-CM

## 2023-09-20 LAB
25(OH)D3 SERPL-MCNC: 29.9 NG/ML (ref 30–100)
ALBUMIN SERPL BCP-MCNC: 4.3 G/DL (ref 3.5–5)
ALP SERPL-CCNC: 59 U/L (ref 34–104)
ALT SERPL W P-5'-P-CCNC: 17 U/L (ref 7–52)
ANION GAP SERPL CALCULATED.3IONS-SCNC: 9 MMOL/L
AST SERPL W P-5'-P-CCNC: 13 U/L (ref 13–39)
BASOPHILS # BLD AUTO: 0.02 THOUSANDS/ÂΜL (ref 0–0.1)
BASOPHILS NFR BLD AUTO: 0 % (ref 0–1)
BILIRUB SERPL-MCNC: 0.7 MG/DL (ref 0.2–1)
BUN SERPL-MCNC: 20 MG/DL (ref 5–25)
CALCIUM SERPL-MCNC: 9.4 MG/DL (ref 8.4–10.2)
CHLORIDE SERPL-SCNC: 103 MMOL/L (ref 96–108)
CHOLEST SERPL-MCNC: 115 MG/DL
CO2 SERPL-SCNC: 24 MMOL/L (ref 21–32)
CREAT SERPL-MCNC: 1.03 MG/DL (ref 0.6–1.3)
CREAT UR-MCNC: 57.6 MG/DL
EOSINOPHIL # BLD AUTO: 0.15 THOUSAND/ÂΜL (ref 0–0.61)
EOSINOPHIL NFR BLD AUTO: 3 % (ref 0–6)
ERYTHROCYTE [DISTWIDTH] IN BLOOD BY AUTOMATED COUNT: 16.3 % (ref 11.6–15.1)
EST. AVERAGE GLUCOSE BLD GHB EST-MCNC: 143 MG/DL
FERRITIN SERPL-MCNC: 58 NG/ML (ref 24–336)
GFR SERPL CREATININE-BSD FRML MDRD: 68 ML/MIN/1.73SQ M
GLUCOSE P FAST SERPL-MCNC: 171 MG/DL (ref 65–99)
HBA1C MFR BLD: 6.6 %
HCT VFR BLD AUTO: 40.7 % (ref 36.5–49.3)
HDLC SERPL-MCNC: 33 MG/DL
HGB BLD-MCNC: 13.5 G/DL (ref 12–17)
IMM GRANULOCYTES # BLD AUTO: 0.02 THOUSAND/UL (ref 0–0.2)
IMM GRANULOCYTES NFR BLD AUTO: 0 % (ref 0–2)
IRON SATN MFR SERPL: 22 % (ref 15–50)
IRON SERPL-MCNC: 65 UG/DL (ref 50–212)
LDLC SERPL CALC-MCNC: 47 MG/DL (ref 0–100)
LYMPHOCYTES # BLD AUTO: 1.12 THOUSANDS/ÂΜL (ref 0.6–4.47)
LYMPHOCYTES NFR BLD AUTO: 21 % (ref 14–44)
MCH RBC QN AUTO: 31.5 PG (ref 26.8–34.3)
MCHC RBC AUTO-ENTMCNC: 33.2 G/DL (ref 31.4–37.4)
MCV RBC AUTO: 95 FL (ref 82–98)
MICROALBUMIN UR-MCNC: 16.9 MG/L
MICROALBUMIN/CREAT 24H UR: 29 MG/G CREATININE (ref 0–30)
MONOCYTES # BLD AUTO: 0.46 THOUSAND/ÂΜL (ref 0.17–1.22)
MONOCYTES NFR BLD AUTO: 9 % (ref 4–12)
NEUTROPHILS # BLD AUTO: 3.55 THOUSANDS/ÂΜL (ref 1.85–7.62)
NEUTS SEG NFR BLD AUTO: 67 % (ref 43–75)
NONHDLC SERPL-MCNC: 82 MG/DL
NRBC BLD AUTO-RTO: 0 /100 WBCS
PLATELET # BLD AUTO: 192 THOUSANDS/UL (ref 149–390)
PMV BLD AUTO: 10.4 FL (ref 8.9–12.7)
POTASSIUM SERPL-SCNC: 4.4 MMOL/L (ref 3.5–5.3)
PROT SERPL-MCNC: 7.1 G/DL (ref 6.4–8.4)
PSA SERPL-MCNC: 0.25 NG/ML (ref 0–4)
RBC # BLD AUTO: 4.29 MILLION/UL (ref 3.88–5.62)
SODIUM SERPL-SCNC: 136 MMOL/L (ref 135–147)
TIBC SERPL-MCNC: 295 UG/DL (ref 250–450)
TRIGL SERPL-MCNC: 173 MG/DL
TSH SERPL DL<=0.05 MIU/L-ACNC: 3.2 UIU/ML (ref 0.45–4.5)
UIBC SERPL-MCNC: 230 UG/DL (ref 155–355)
VIT B12 SERPL-MCNC: 223 PG/ML (ref 180–914)
WBC # BLD AUTO: 5.32 THOUSAND/UL (ref 4.31–10.16)

## 2023-09-20 PROCEDURE — 83036 HEMOGLOBIN GLYCOSYLATED A1C: CPT

## 2023-09-20 PROCEDURE — 82043 UR ALBUMIN QUANTITATIVE: CPT

## 2023-09-20 PROCEDURE — 83550 IRON BINDING TEST: CPT

## 2023-09-20 PROCEDURE — 80053 COMPREHEN METABOLIC PANEL: CPT

## 2023-09-20 PROCEDURE — 82728 ASSAY OF FERRITIN: CPT

## 2023-09-20 PROCEDURE — 83540 ASSAY OF IRON: CPT

## 2023-09-20 PROCEDURE — 82570 ASSAY OF URINE CREATININE: CPT

## 2023-09-27 ENCOUNTER — OFFICE VISIT (OUTPATIENT)
Dept: HEMATOLOGY ONCOLOGY | Facility: CLINIC | Age: 78
End: 2023-09-27
Payer: MEDICARE

## 2023-09-27 VITALS
TEMPERATURE: 98.1 F | SYSTOLIC BLOOD PRESSURE: 132 MMHG | DIASTOLIC BLOOD PRESSURE: 64 MMHG | WEIGHT: 155 LBS | HEART RATE: 69 BPM | HEIGHT: 65 IN | BODY MASS INDEX: 25.83 KG/M2 | OXYGEN SATURATION: 97 % | RESPIRATION RATE: 18 BRPM

## 2023-09-27 DIAGNOSIS — D50.0 IRON DEFICIENCY ANEMIA DUE TO CHRONIC BLOOD LOSS: Primary | ICD-10-CM

## 2023-09-27 DIAGNOSIS — I74.3 EMBOLISM AND THROMBOSIS OF ARTERIES OF THE LOWER EXTREMITIES (HCC): ICD-10-CM

## 2023-09-27 PROCEDURE — 99214 OFFICE O/P EST MOD 30 MIN: CPT | Performed by: INTERNAL MEDICINE

## 2023-11-01 ENCOUNTER — OFFICE VISIT (OUTPATIENT)
Dept: FAMILY MEDICINE CLINIC | Facility: CLINIC | Age: 78
End: 2023-11-01
Payer: MEDICARE

## 2023-11-01 VITALS
TEMPERATURE: 97.5 F | BODY MASS INDEX: 25.86 KG/M2 | DIASTOLIC BLOOD PRESSURE: 60 MMHG | WEIGHT: 155.2 LBS | SYSTOLIC BLOOD PRESSURE: 130 MMHG | OXYGEN SATURATION: 98 % | HEIGHT: 65 IN | HEART RATE: 68 BPM

## 2023-11-01 DIAGNOSIS — M54.2 CERVICAL PAIN (NECK): ICD-10-CM

## 2023-11-01 DIAGNOSIS — D51.1 VIT B12 DEFIC ANEMIA D/T SLCTV VIT B12 MALABSORP W PROTEIN: ICD-10-CM

## 2023-11-01 DIAGNOSIS — D50.9 IRON DEFICIENCY ANEMIA, UNSPECIFIED IRON DEFICIENCY ANEMIA TYPE: ICD-10-CM

## 2023-11-01 DIAGNOSIS — Z23 ENCOUNTER FOR IMMUNIZATION: ICD-10-CM

## 2023-11-01 DIAGNOSIS — Z79.899 MEDICATION MANAGEMENT: ICD-10-CM

## 2023-11-01 DIAGNOSIS — E08.22 DIABETES MELLITUS DUE TO UNDERLYING CONDITION WITH STAGE 3A CHRONIC KIDNEY DISEASE, WITHOUT LONG-TERM CURRENT USE OF INSULIN (HCC): Primary | ICD-10-CM

## 2023-11-01 DIAGNOSIS — N18.31 STAGE 3A CHRONIC KIDNEY DISEASE (HCC): ICD-10-CM

## 2023-11-01 DIAGNOSIS — Z00.00 MEDICARE ANNUAL WELLNESS VISIT, SUBSEQUENT: ICD-10-CM

## 2023-11-01 DIAGNOSIS — N18.31 DIABETES MELLITUS DUE TO UNDERLYING CONDITION WITH STAGE 3A CHRONIC KIDNEY DISEASE, WITHOUT LONG-TERM CURRENT USE OF INSULIN (HCC): Primary | ICD-10-CM

## 2023-11-01 PROCEDURE — G0439 PPPS, SUBSEQ VISIT: HCPCS | Performed by: FAMILY MEDICINE

## 2023-11-01 PROCEDURE — G0008 ADMIN INFLUENZA VIRUS VAC: HCPCS | Performed by: FAMILY MEDICINE

## 2023-11-01 PROCEDURE — 90662 IIV NO PRSV INCREASED AG IM: CPT | Performed by: FAMILY MEDICINE

## 2023-11-01 PROCEDURE — 99215 OFFICE O/P EST HI 40 MIN: CPT | Performed by: FAMILY MEDICINE

## 2023-11-01 NOTE — PROGRESS NOTES
Assessment and Plan:     Problem List Items Addressed This Visit        Genitourinary    Stage 3a chronic kidney disease ([address])       Other    Iron deficiency anemia    Relevant Orders    Iron    Ferritin    TIBC Panel (incl. Iron, TIBC, % Iron Saturation)    CBC and differential   Other Visit Diagnoses     Diabetes mellitus due to underlying condition with stage 3a chronic kidney disease, without long-term current use of insulin (HCC)    -  Primary    Only taking Jardiance 25, glipizide 10 twice daily and metformin 1000 twice daily  fbs :145;  PM no ck. Gave sample of FreeStyle    Relevant Orders    Albumin / creatinine urine ratio    Comprehensive metabolic panel    Hemoglobin A1C    Iron    Ferritin    TIBC Panel (incl. Iron, TIBC, % Iron Saturation)    CBC and differential    Vitamin B12    Medicare annual wellness visit, subsequent        Medication management        Vit B12 defic anemia d/t slctv vit B12 malabsorp w protein        B12 insufficiency--last level 223 want to keep above 400    Relevant Orders    Vitamin B12    Cervical pain (neck)        use symptomatic rx    Encounter for immunization        Relevant Orders    influenza vaccine, high-dose, PF 0.7 mL (FLUZONE HIGH-DOSE) (Completed)          Depression Screening and Follow-up Plan: Patient was screened for depression during today's encounter. They screened negative with a PHQ-2 score of 0. Preventive health issues were discussed with patient, and age appropriate screening tests were ordered as noted in patient's After Visit Summary. Personalized health advice and appropriate referrals for health education or preventive services given if needed, as noted in patient's After Visit Summary.      History of Present Illness:     Patient presents for a Medicare Wellness Visit    Neck Pain        Patient Care Team:  Vipin Quinteros DO as PCP - General (Family Medicine)     Review of Systems:     Review of Systems   Musculoskeletal:  Positive for neck pain.        Problem List:     Patient Active Problem List   Diagnosis   • Type 2 diabetes mellitus, with long-term current use of insulin ([address])   • Other hyperlipidemia   • Essential hypertension   • Claudication in peripheral vascular disease (HCC)   • Iron deficiency anemia   • Embolism and thrombosis of arteries of the lower extremities (HCC)   • Need for vaccination   • Atherosclerosis of native artery of both lower extremities with intermittent claudication ([address])   • Carotid stenosis, asymptomatic, bilateral   • Coronary artery disease without angina pectoris   • Aortoiliac occlusive disease (HCC)   • Pre-op evaluation   • Stage 3a chronic kidney disease (HCC)   • DVT (deep venous thrombosis) (HCC)   • GERD (gastroesophageal reflux disease)   • S/P CABG x 4      Past Medical and Surgical History:     Past Medical History:   Diagnosis Date   • Coronary artery disease without angina pectoris 02/25/2020   • Diabetes mellitus ([address])    • DVT (deep venous thrombosis) (HCC)    • GERD (gastroesophageal reflux disease)    • Hypertension    • Iron deficiency anemia      Past Surgical History:   Procedure Laterality Date   • ANGIOPLASTY  10/19/2021    bilateral   • CARDIAC CATHETERIZATION  2013    [facility]    CABG  x4   • COLONOSCOPY     • COLONOSCOPY     • CORONARY ANGIOPLASTY WITH STENT PLACEMENT  01/01/2010   • CORONARY ARTERY BYPASS GRAFT     • FEMORAL ARTERY STENT     • FEMORAL BYPASS  10/20/2021   • IR LOWER EXTREMITY ANGIOGRAM  2/14/2023   • IR PELVIC ANGIOGRAM  5/6/2022   • [place] SLCTV CATHJ 3RD+ ORD SLCTV ABDL PEL/LXTR [facility]  5/6/2022    Procedure: ULTRASOUND-GUIDED LEFT BRACHIAL ARTERY PUNCTURE, AORTOGRAM, RIGHT COMMON ILIAC ARTERY ANGIOPLASTY WITH 8 X 40 MM BALLOON, ANGIOPLASTY OF RIGHT EXTERNAL AND COMMON ILIAC ARTERY STENOSIS WITH 6 X 150 MM DRUG-ELUTING BALLOON, STENTING OF RIGHT DISTAL EXTERNAL ILIAC ARTERY STENOSIS WITH 7 X 40 MM SELF EXPANDING STENT POST DILATED WITH A 6 MM BALLOON.;  Surgeon: Morris Dockery Fransico Cheung MD;  [place]   • [place] SLCTV CATHJ 3RD+ ORD SLCTV ABDL PEL/LXTR [facility] Bilateral 2023    Procedure: CUTDOWN FEM-FEM BYPASS WITH PRIMARY CLOSURE ACCESS SITE, BILATERAL RUN-OFF, LEFT FEM-POP 5X220 QUYEN AND 6X150 RODRI, ATTEMPT TO CROSS RIGHT SFA OCCLUSION;  Surgeon: Iliana Sullivan MD;  Location: [facility];  Service: Vascular      Family History:     Family History   Problem Relation Age of Onset   • No Known Problems Mother    • No Known Problems Father       Social History:     Social History     Socioeconomic History   • Marital status: /Civil Union     Spouse name: None   • Number of children: None   • Years of education: None   • Highest education level: None   Occupational History   • None   Tobacco Use   • Smoking status: Former     Packs/day: 1.50     Years: 40.00     Total pack years: 60.00     Types: Cigarettes     Quit date:      Years since quittin.8     Passive exposure: Past   • Smokeless tobacco: Never   • Tobacco comments:     quit --    Vaping Use   • Vaping Use: Never used   Substance and Sexual Activity   • Alcohol use: Not Currently     Alcohol/week: 0.0 standard drinks of alcohol   • Drug use: No   • Sexual activity: None   Other Topics Concern   • None   Social History Narrative    · Most recent tobacco use screenin2020      Social Determinants of Health     Financial Resource Strain: Low Risk  (2023)    Overall Financial Resource Strain (CARDIA)    • Difficulty of Paying Living Expenses: Not hard at all   Food Insecurity: Not on file   Transportation Needs: No Transportation Needs (2023)    PRAPARE - Transportation    • Lack of Transportation (Medical): No    • Lack of Transportation (Non-Medical):  No   Physical Activity: Not on file   Stress: Not on file   Social Connections: Not on file   Intimate Partner Violence: Not on file   Housing Stability: Not on file      Medications and Allergies:     Current Outpatient Medications   Medication Sig Dispense Refill   • acetaminophen (TYLENOL) 500 mg tablet Take 1,000 mg by mouth every 8 (eight) hours as needed     • atorvastatin (LIPITOR) 40 mg tablet Take 1 tablet (40 mg total) by mouth daily 90 tablet 3   • clopidogrel (PLAVIX) 75 mg tablet TAKE 1 TABLET BY MOUTH EVERY DAY 90 tablet 3   • DULoxetine (CYMBALTA) 30 mg delayed release capsule TAKE ONE CAPSULE BY MOUTH TWICE DAILY 60 capsule 0   • Empagliflozin (Jardiance) 25 MG TABS Take 0.5 tablets (12.5 mg total) by mouth every morning Also: take with LOTS of water 90 tablet 3   • ferrous sulfate 325 (65 Fe) mg tablet Take 1 tablet (325 mg total) by mouth 2 (two) times a day with meals 60 tablet 6   • gabapentin (Neurontin) 300 mg capsule Take 1 capsule (300 mg total) by mouth 3 (three) times a day 90 capsule 6   • glipiZIDE (GLUCOTROL) 10 mg tablet Take 1 tablet (10 mg total) by mouth 2 (two) times a day before meals 180 tablet 3   • hydrocortisone 2.5 % cream Apply topically 4 (four) times a day as needed for rash 30 g 0   • lisinopril-hydrochlorothiazide (PRINZIDE,ZESTORETIC) 20-12.5 MG per tablet Take 1 tablet by mouth daily 90 tablet 3   • metFORMIN (GLUCOPHAGE) 1000 MG tablet TAKE 1 TABLET TWICE DAILY  WITH MEALS 180 tablet 1   • omeprazole (PriLOSEC) 20 mg delayed release capsule Take 1 capsule (20 mg total) by mouth daily before breakfast (Patient taking differently: Take 20 mg by mouth if needed) 30 capsule 5   • rivaroxaban (Xarelto) 2.5 mg tablet Take 1 tablet (2.5 mg total) by mouth 2 (two) times a day (Patient taking differently: Take 2.5 mg by mouth 2 (two) times a day Last dose Tues May23) 180 tablet 0   • tamsulosin (FLOMAX) 0.4 mg TAKE 1 CAPSULE BY MOUTH EVERY DAY WITH DINNER 90 capsule 2   • Dulaglutide (Trulicity) 1.5 AO/6.7YM SOPN Inject 0.5 mL (1.5 mg total) under the skin once a week (Patient not taking: Reported on 11/1/2023) 3 mL 6   • DULoxetine (Cymbalta) 30 mg delayed release capsule Take 1 capsule (30 mg total) by mouth daily (Patient not taking: Reported on 11/1/2023) 90 capsule 1     No current facility-administered medications for this visit. No Known Allergies   Immunizations:     Immunization History   Administered Date(s) Administered   • COVID-19 MODERNA VACC 0.5 ML IM 03/27/2021, 04/24/2021   • INFLUENZA 10/02/2018, 11/14/2022   • Influenza, high dose seasonal 0.7 mL 10/13/2020, 12/06/2021, 11/14/2022, 11/01/2023   • Pneumococcal Conjugate Vaccine 20-valent (Pcv20), Polysace 12/06/2022      Health Maintenance:         Topic Date Due   • Lung Cancer Screening  Discontinued         Topic Date Due   • COVID-19 Vaccine (3 - Karina Mom series) 06/19/2021      Medicare Screening Tests and Risk Assessments:     Saturnino Mendes is here for his Subsequent Wellness visit. Last Medicare Wellness visit information reviewed, patient interviewed and updates made to the record today. Health Risk Assessment:   Patient rates overall health as good. Patient feels that their physical health rating is same. Patient is very satisfied with their life. Eyesight was rated as same. Hearing was rated as same. Patient feels that their emotional and mental health rating is same. Patients states they are never, rarely angry. Patient states they are sometimes unusually tired/fatigued. Pain experienced in the last 7 days has been some. Patient's pain rating has been 6/10. Patient states that he has experienced no weight loss or gain in last 6 months. Pt has neck pain -- unsure as to cause     Depression Screening:   PHQ-2 Score: 0  PHQ-9 Score: 0      Fall Risk Screening: In the past year, patient has experienced: no history of falling in past year      Home Safety:  Patient does not have trouble with stairs inside or outside of their home. Patient has working smoke alarms and has working carbon monoxide detector. Home safety hazards include: none. Nutrition:   Current diet is Regular, Limited junk food and Other (please comment).  Pt has regular protein intake, as well as fruits and vegetables     Medications:   Patient is currently taking over-the-counter supplements. OTC medications include: see medication list. Patient is able to manage medications. Activities of Daily Living (ADLs)/Instrumental Activities of Daily Living (IADLs):   Walk and transfer into and out of bed and chair?: Yes  Dress and groom yourself?: Yes    Bathe or shower yourself?: Yes    Feed yourself?  Yes  Do your laundry/housekeeping?: Yes  Manage your money, pay your bills and track your expenses?: Yes  Make your own meals?: Yes    Do your own shopping?: Yes    Previous Hospitalizations:   Any hospitalizations or ED visits within the last 12 months?: No      Advance Care Planning:   Living will: No    Durable POA for healthcare: No    Advanced directive: No    Advanced directive counseling given: Yes    ACP document given: Yes    Patient declined ACP directive: No    End of Life Decisions reviewed with patient: Yes    Provider agrees with end of life decisions: Yes      Comments: Pt has at house -- will try to bring in at time of next OV     Cognitive Screening:   Provider or family/friend/caregiver concerned regarding cognition?: No    PREVENTIVE SCREENINGS      Cardiovascular Screening:    General: Screening Not Indicated, History Lipid Disorder and Risks and Benefits Discussed      Diabetes Screening:     General: Screening Not Indicated, History Diabetes and Risks and Benefits Discussed      Colorectal Cancer Screening:     General: Risks and Benefits Discussed      Prostate Cancer Screening:    General: Screening Not Indicated and Risks and Benefits Discussed      Osteoporosis Screening:    General: Risks and Benefits Discussed      Abdominal Aortic Aneurysm (AAA) Screening:    Risk factors include: tobacco use        General: Risks and Benefits Discussed      Lung Cancer Screening:     General: Screening Not Indicated and Risks and Benefits Discussed      Hepatitis C Screening: General: Risks and Benefits Discussed    Screening, Brief Intervention, and Referral to Treatment (SBIRT)    Screening  Typical number of drinks in a day: 0  Typical number of drinks in a week: 0  Interpretation: Low risk drinking behavior. Single Item Drug Screening:  How often have you used an illegal drug (including marijuana) or a prescription medication for non-medical reasons in the past year? never    Single Item Drug Screen Score: 0  Interpretation: Negative screen for possible drug use disorder    Brief Intervention  Alcohol & drug use screenings were reviewed. No concerns regarding substance use disorder identified. Healthy alcohol use/limits discussed. Other Counseling Topics:   Car/seat belt/driving safety, skin self-exam, sunscreen and calcium and vitamin D intake and regular weightbearing exercise. No results found.      Physical Exam:     /60 (BP Location: Left arm, Patient Position: Sitting, Cuff Size: Standard)   Pulse 68   Temp 97.5 °F (36.4 °C) (Temporal)   Ht 5' 5" (1.651 m)   Wt 70.4 kg (155 lb 3.2 oz)   SpO2 98%   BMI 25.83 kg/m²     Physical Exam pls see above    Crescencio Cantu DO

## 2023-11-01 NOTE — PATIENT INSTRUCTIONS
Medicare Preventive Visit Patient Instructions  Thank you for completing your Welcome to Medicare Visit or Medicare Annual Wellness Visit today. Your next wellness visit will be due in one year (11/1/2024). The screening/preventive services that you may require over the next 5-10 years are detailed below. Some tests may not apply to you based off risk factors and/or age. Screening tests ordered at today's visit but not completed yet may show as past due. Also, please note that scanned in results may not display below. Preventive Screenings:  Service Recommendations Previous Testing/Comments   Colorectal Cancer Screening  Colonoscopy    Fecal Occult Blood Test (FOBT)/Fecal Immunochemical Test (FIT)  Fecal DNA/Cologuard Test  Flexible Sigmoidoscopy Age: 43-73 years old   Colonoscopy: every 10 years (May be performed more frequently if at higher risk)  OR  FOBT/FIT: every 1 year  OR  Cologuard: every 3 years  OR  Sigmoidoscopy: every 5 years  Screening may be recommended earlier than age 39 if at higher risk for colorectal cancer. Also, an individualized decision between you and your healthcare provider will decide whether screening between the ages of 77-80 would be appropriate.  Colonoscopy: 09/07/2022  FOBT/FIT: 08/31/2022  Cologuard: Not on file  Sigmoidoscopy: Not on file          Prostate Cancer Screening Individualized decision between patient and health care provider in men between ages of 53-66   Medicare will cover every 12 months beginning on the day after your 50th birthday PSA: 0.25 ng/mL     Screening Not Indicated     Hepatitis C Screening Once for adults born between 1945 and 1965  More frequently in patients at high risk for Hepatitis C Hep C Antibody: Not on file        Diabetes Screening 1-2 times per year if you're at risk for diabetes or have pre-diabetes Fasting glucose: 171 mg/dL (9/20/2023)  A1C: 6.6 % (9/20/2023)  Screening Not Indicated  History Diabetes   Cholesterol Screening Once every 5 years if you don't have a lipid disorder. May order more often based on risk factors. Lipid panel: 09/20/2023  Screening Not Indicated  History Lipid Disorder      Other Preventive Screenings Covered by Medicare:  Abdominal Aortic Aneurysm (AAA) Screening: covered once if your at risk. You're considered to be at risk if you have a family history of AAA or a male between the age of 70-76 who smoking at least 100 cigarettes in your lifetime. Lung Cancer Screening: covers low dose CT scan once per year if you meet all of the following conditions: (1) Age 48-67; (2) No signs or symptoms of lung cancer; (3) Current smoker or have quit smoking within the last 15 years; (4) You have a tobacco smoking history of at least 20 pack years (packs per day x number of years you smoked); (5) You get a written order from a healthcare provider. Glaucoma Screening: covered annually if you're considered high risk: (1) You have diabetes OR (2) Family history of glaucoma OR (3)  aged 48 and older OR (3)  American aged 72 and older  Osteoporosis Screening: covered every 2 years if you meet one of the following conditions: (1) Have a vertebral abnormality; (2) On glucocorticoid therapy for more than 3 months; (3) Have primary hyperparathyroidism; (4) On osteoporosis medications and need to assess response to drug therapy. HIV Screening: covered annually if you're between the age of 14-79. Also covered annually if you are younger than 13 and older than 72 with risk factors for HIV infection. For pregnant patients, it is covered up to 3 times per pregnancy.     Immunizations:  Immunization Recommendations   Influenza Vaccine Annual influenza vaccination during flu season is recommended for all persons aged >= 6 months who do not have contraindications   Pneumococcal Vaccine   * Pneumococcal conjugate vaccine = PCV13 (Prevnar 13), PCV15 (Vaxneuvance), PCV20 (Prevnar 20)  * Pneumococcal polysaccharide vaccine = PPSV23 (Pneumovax) Adults 73-33 yo with certain risk factors or if 69+ yo  If never received any pneumonia vaccine: recommend Prevnar 20 (PCV20)  Give PCV20 if previously received 1 dose of PCV13 or PPSV23   Hepatitis B Vaccine 3 dose series if at intermediate or high risk (ex: diabetes, end stage renal disease, liver disease)   Respiratory syncytial virus (RSV) Vaccine - COVERED BY MEDICARE PART D  * RSVPreF3 (Arexvy) CDC recommends that adults 61years of age and older may receive a single dose of RSV vaccine using shared clinical decision-making (SCDM)   Tetanus (Td) Vaccine - COST NOT COVERED BY MEDICARE PART B Following completion of primary series, a booster dose should be given every 10 years to maintain immunity against tetanus. Td may also be given as tetanus wound prophylaxis. Tdap Vaccine - COST NOT COVERED BY MEDICARE PART B Recommended at least once for all adults. For pregnant patients, recommended with each pregnancy. Shingles Vaccine (Shingrix) - COST NOT COVERED BY MEDICARE PART B  2 shot series recommended in those 19 years and older who have or will have weakened immune systems or those 50 years and older     Health Maintenance Due:      Topic Date Due   • Lung Cancer Screening  Discontinued     Immunizations Due:      Topic Date Due   • COVID-19 Vaccine (3 - Moderna series) 06/19/2021   • Influenza Vaccine (1) 09/01/2023     Advance Directives   What are advance directives? Advance directives are legal documents that state your wishes and plans for medical care. These plans are made ahead of time in case you lose your ability to make decisions for yourself. Advance directives can apply to any medical decision, such as the treatments you want, and if you want to donate organs. What are the types of advance directives? There are many types of advance directives, and each state has rules about how to use them. You may choose a combination of any of the following:  Living will:   This is a written record of the treatment you want. You can also choose which treatments you do not want, which to limit, and which to stop at a certain time. This includes surgery, medicine, IV fluid, and tube feedings. Durable power of  for [facility]): This is a written record that states who you want to make healthcare choices for you when you are unable to make them for yourself. This person, called a proxy, is usually a family member or a friend. You may choose more than 1 proxy. Do not resuscitate (DNR) order:  A DNR order is used in case your heart stops beating or you stop breathing. It is a request not to have certain forms of treatment, such as CPR. A DNR order may be included in other types of advance directives. Medical directive: This covers the care that you want if you are in a coma, near death, or unable to make decisions for yourself. You can list the treatments you want for each condition. Treatment may include pain medicine, surgery, blood transfusions, dialysis, IV or tube feedings, and a ventilator (breathing machine). Values history: This document has questions about your views, beliefs, and how you feel and think about life. This information can help others choose the care that you would choose. Why are advance directives important? An advance directive helps you control your care. Although spoken wishes may be used, it is better to have your wishes written down. Spoken wishes can be misunderstood, or not followed. Treatments may be given even if you do not want them. An advance directive may make it easier for your family to make difficult choices about your care. Weight Management   Why it is important to manage your weight:  Being overweight increases your risk of health conditions such as heart disease, high blood pressure, type 2 diabetes, and certain types of cancer. It can also increase your risk for osteoarthritis, sleep apnea, and other respiratory problems.  Aim for a slow, steady weight loss. Even a small amount of weight loss can lower your risk of health problems. How to lose weight safely:  A safe and healthy way to lose weight is to eat fewer calories and get regular exercise. You can lose up about 1 pound a week by decreasing the number of calories you eat by 500 calories each day. Healthy meal plan for weight management:  A healthy meal plan includes a variety of foods, contains fewer calories, and helps you stay healthy. A healthy meal plan includes the following:  Eat whole-grain foods more often. A healthy meal plan should contain fiber. Fiber is the part of grains, fruits, and vegetables that is not broken down by your body. Whole-grain foods are healthy and provide extra fiber in your diet. Some examples of whole-grain foods are whole-wheat breads and pastas, oatmeal, brown rice, and bulgur. Eat a variety of vegetables every day. Include dark, leafy greens such as spinach, kale, simeon greens, and mustard greens. Eat yellow and orange vegetables such as carrots, sweet potatoes, and winter squash. Eat a variety of fruits every day. Choose fresh or canned fruit (canned in its own juice or light syrup) instead of juice. Fruit juice has very little or no fiber. Eat low-fat dairy foods. Drink fat-free (skim) milk or 1% milk. Eat fat-free yogurt and low-fat cottage cheese. Try low-fat cheeses such as mozzarella and other reduced-fat cheeses. Choose meat and other protein foods that are low in fat. Choose beans or other legumes such as split peas or lentils. Choose fish, skinless poultry (chicken or turkey), or lean cuts of red meat (beef or pork). Before you cook meat or poultry, cut off any visible fat. Use less fat and oil. Try baking foods instead of frying them. Add less fat, such as margarine, sour cream, regular salad dressing and mayonnaise to foods. Eat fewer high-fat foods.  Some examples of high-fat foods include french fries, doughnuts, ice cream, and cakes. Eat fewer sweets. Limit foods and drinks that are high in sugar. This includes candy, cookies, regular soda, and sweetened drinks. Exercise:  Exercise at least 30 minutes per day on most days of the week. Some examples of exercise include walking, biking, dancing, and swimming. You can also fit in more physical activity by taking the stairs instead of the elevator or parking farther away from stores. Ask your healthcare provider about the best exercise plan for you. © Copyright [employer] 2018 Information is for End User's use only and may not be sold, redistributed or otherwise used for commercial purposes.  All illustrations and images included in CareNotes® are the copyrighted property of [employer] or  [employer]

## 2023-11-01 NOTE — PROGRESS NOTES
Assessment/Plan: Patient is 78years old being seen for multiple medical issues especially fluctuating blood sugars. He has been diabetic for 20 years and was never on insulin. He currently takes Jardiance, glipizide, and metformin. Previously also Trulicity however he has, at age 78, a very irregular lifestyle with variations in his home work schedule and activities mealtimes vary activity levels very. He has fluctuating blood sugars for the most part run 140 in the morning. He does not check any other times a day because of the inconvenience of and discomfort of needle sticks. Was experiencing multiple low blood sugars when taking Trulicity with dizziness lightheadedness etc.  Patient not able to do the fingersticks so was at risk for hypoglycemia. Especially since he takes glipizide which is a sulfonylurea that can lower blood sugars too far. The metformin and Jardiance will not do that. He is trying to get better control of his blood sugars Last A1c was 6.6 on September 20, 2023. For fear of hypoglycemia especially nocturnal hypoglycemia it would be wise to try the continuous glucose monitoring and will arrange for the freestyle series 3. I have given him the initial starter and explained in detail how to apply it and reviewed it. This will be a true game changer for [facility] who will have much better blood sugar control now. 1. Diabetes mellitus due to underlying condition with stage 3a chronic kidney disease, without long-term current use of insulin ([address])  Comments: Only taking Jardiance 25, glipizide 10 twice daily and metformin 1000 twice daily  fbs :145;  PM no ck. Gave sample of FreeStyle  Orders:  -     Albumin / creatinine urine ratio; Future; Expected date: 02/01/2024  -     Comprehensive metabolic panel; Future; Expected date: 02/01/2024  -     Hemoglobin A1C; Future; Expected date: 02/01/2024  -     Iron; Future  -     Ferritin; Future  -     TIBC Panel (incl.  Iron, TIBC, % Iron Saturation); Future  -     CBC and differential; Future; Expected date: 11/02/2023  -     Vitamin B12; Future; Expected date: 11/02/2023    2. Stage 3a chronic kidney disease ([address])    3. Medicare annual wellness visit, subsequent    4. Medication management    5. Iron deficiency anemia, unspecified iron deficiency anemia type  -     Iron; Future  -     Ferritin; Future  -     TIBC Panel (incl. Iron, TIBC, % Iron Saturation); Future  -     CBC and differential; Future; Expected date: 11/02/2023    6. Vit B12 defic anemia d/t slctv vit B12 malabsorp w protein  Comments:  B12 insufficiency--last level 223 want to keep above 400  Orders:  -     Vitamin B12; Future; Expected date: 11/02/2023    7. Cervical pain (neck)  Comments:  use symptomatic rx    8. Encounter for immunization  -     influenza vaccine, high-dose, PF 0.7 mL (FLUZONE HIGH-DOSE)        Vitamin B12 insufficiency. I advised the patient to take 1000 mcg of vitamin B12 once a day. Last level 223 pg/mL, want to keep above 400 pg/mL. Vitamin D insufficiency. I advised the patient to start taking vitamin D supplements. Medical management. I advised the patient to take Flomax 0.4 mg once every night with supper or anytime he prefers. Immunization. We will administer his influenza vaccine. I encouraged the patient to receive Tdap vaccine, he can acquire his COVID-19 vaccine from the pharmacy, and influenza vaccine in the hospital.     Cervical spine pain. I advised the patient to take Tylenol for pain or apply hot or ice compress. Apply Tiger balm patch for 15 hours to 16 hours and leave it for 8 hours. I advised the patient to obtain his labs prior to follow-up. The follow-up with the patient is in 3 months. BMI Counseling: Body mass index is 25.83 kg/m².  The BMI is above normal. Nutrition recommendations include decreasing portion sizes, encouraging healthy choices of fruits and vegetables, decreasing fast food intake, consuming healthier snacks, limiting drinks that contain sugar, moderation in carbohydrate intake, increasing intake of lean protein and reducing intake of saturated and trans fat. Exercise recommendations include moderate physical activity 150 minutes/week and exercising 3-5 times per week. No pharmacotherapy was ordered. Rationale for BMI follow-up plan is due to patient being overweight or obese. Depression Screening and Follow-up Plan: Patient was screened for depression during today's encounter. They screened negative with a PHQ-2 score of 0. Subjective:      Patient ID: Amy Francisco is a 78 y.o. male. Amy Francisco is an 78-years-old male is a well-known to me for many years presents for Medicare wellness visit and evaluation of the following medical issues: The patient indicated that the Rizwan Drummer does not work the same as the [place] and that the Trulicity is more effective compared to Rizwan Drummer. He denies taking insulin. He is taking Jardiance 25 mg, glipizide 10 mg twice a day, and metformin 1000 mg twice a day. His fasting blood glucose level is around 146 mg/dL and 145 mg/dL, and he does not check blood glucose level at night. He has been seen for multiple medical issues, especially fluctuating blood sugars and has been diabetic for 20 years and was never on insulin. At the age of 78years-old, he has irregular lifestyle with variations in his home, work schedule, and activities. Mealtimes vary and activity levels vary. He has fluctuating blood glucose levels. He does not check any other time of day because of the inconvenience and discomfort of needle sticks. He denies experiencing recent low blood glucose spells. He was experiencing multiple low blood glucose levels when taking Trulicity with dizziness, lightheadedness, and so on. He is not able to do the finger sticks so was at risk for hypoglycemia.  Especially since he takes glipizide which is a sulfonylurea that can lower blood sugars. Metformin and Jardiance will not do that. He is trying to get better control of his blood glucose levels. He denies taking vitamin B12 and confirms taking iron supplements. He takes tamsulosin to aid his urination. He reported presence of neck pain due to unknown reasons. Immunizations. The patient acquired his pneumonia 20 vaccine in 2022. He received his herpes zoster vaccine on 09/23/2023. The patient's hemoglobin A1c on 09/20/2023 was 6.6 percent. CMP within normal limits. Electrolytes level within normal limits. Fasting blood glucose level was 171 mg/dL from 210 mg/dL in 05/2023. Liver function test within normal limits. GFR level is 68 mL/min from 55 mL/min. Vitamin B12 232 level pg/mL and was 381 pg/mL in 2020. TSH within normal limits. His iron saturation on 09/20/2022 is 22 percent from 10 percent and 9 percent. Ferritin level was 58 ng/mL and was 37 ng/mL. PSA level was 0.25 ng/mL. Hemoglobin level 13.5 g/dL and was 9.5 g/dL in 2022. Vitamin D level 29.9 ng/mL and was 25 ng/mL then it increased to 60 ng/mL in 2020. The following portions of the patient's history were reviewed and updated as appropriate: allergies, current medications, past family history, past medical history, past social history, past surgical history, and problem list.    Review of Systems        Objective:  /60 (BP Location: Left arm, Patient Position: Sitting, Cuff Size: Standard)   Pulse 68   Temp 97.5 °F (36.4 °C) (Temporal)   Ht 5' 5" (1.651 m)   Wt 70.4 kg (155 lb 3.2 oz)   SpO2 98%   BMI 25.83 kg/m²          Physical Exam  Vitals and nursing note reviewed. Exam conducted with a chaperone present (Wife present throughout the encounter). Constitutional:       General: He is not in acute distress. Appearance: Normal appearance. He is well-developed and normal weight. He is not ill-appearing (sallow, ashen skin tone), toxic-appearing or diaphoretic.       Comments: Patient looks and feels fairly well despite hemoglobin 11.2 and ferritin 7--what appears to be marked iron deficiency anemia. Worried about occult GI neoplasm??   HENT:      Head: Normocephalic and atraumatic. Right Ear: Ear canal and external ear normal. There is no impacted cerumen. Left Ear: Ear canal and external ear normal. There is no impacted cerumen. Ears:      Comments: Sl wax in the canal on the R ear - the one in question. TM somewhat retracted. Suspect: middle ear syndrome/pressure  Plan: gargling and Flonase- fully discussed. Nose: Nose normal.   Eyes:      General:         Right eye: No discharge. Left eye: No discharge. Extraocular Movements: Extraocular movements intact. Conjunctiva/sclera: Conjunctivae normal.      Pupils: Pupils are equal, round, and reactive to light. Neck:      Trachea: No tracheal deviation. Cardiovascular:      Rate and Rhythm: Normal rate and regular rhythm. Heart sounds: Murmur heard. Pulmonary:      Effort: Pulmonary effort is normal.      Breath sounds: Normal breath sounds. No wheezing, rhonchi or rales (slight bibasilar rales and retained pul secretions). Chest:      Chest wall: No tenderness. Abdominal:      General: Abdomen is flat. Bowel sounds are normal.      Palpations: Abdomen is soft. Tenderness: There is no abdominal tenderness. There is no guarding or rebound. Musculoskeletal:         General: Normal range of motion. Cervical back: Normal range of motion and neck supple. Right lower leg: No edema. Left lower leg: No edema. Lymphadenopathy:      Cervical: No cervical adenopathy. Skin:     General: Skin is warm and dry. Findings: No bruising or erythema. Comments: Somewhat sallow color, with slight bronzed Mediterranean hue--not particularly healthy looking, but typical of a COPD smoker although he has stop smoking several years ago   Neurological:      General: No focal deficit present.       Mental Status: He is alert and oriented to person, place, and time. Mental status is at baseline. Cranial Nerves: No cranial nerve deficit. Sensory: No sensory deficit. Motor: No weakness. Coordination: Coordination normal.      Gait: Gait normal.   Psychiatric:         Mood and Affect: Mood normal.         Behavior: Behavior normal.         Thought Content: Thought content normal.         Judgment: Judgment normal.             Transcribed for Srinivas Cool DO, by Baldemar Davis on 11/05/23 at 5:39 PM. Powered by [employer].

## 2023-11-01 NOTE — ASSESSMENT & PLAN NOTE
For fear of hypoglycemia, especially nocturnal hypoglycemia, it would be wise to try the continuous glucose monitoring and we will arrange for the Freestyle series 3. I gave him the initial starter and explained in detail how to apply it and read it.

## 2023-11-02 ENCOUNTER — TELEPHONE (OUTPATIENT)
Dept: ADMINISTRATIVE | Facility: OTHER | Age: 78
End: 2023-11-02

## 2023-11-02 NOTE — TELEPHONE ENCOUNTER
----- Message from Bailey Alexander sent at 11/1/2023  7:25 PM EDT -----  Regarding: [facility]  11/01/23 7:25 PM    Hello, our patient attached above has had Immunization(s) completed/performed. Please assist in updating the patient chart by making an External outreach to [facility] facility located in [facility]. The date of service is Approx April and sept of 2023.     Thank you,  Bailey Alexander   [facility]

## 2023-11-02 NOTE — TELEPHONE ENCOUNTER
Upon review of the In Basket request and the patient's chart, initial outreach has been made via fax to facility. Please see Contacts section for details.      Thank you  Pao Bustillo

## 2023-11-02 NOTE — LETTER
Vaccination Request Form: Zoster      Date Requested: 23  Patient: Joanie Antony  Patient : 1945   Referring Provider: Khanh Velazco DO       The above patient has informed us that they have had their   most recent Zoster administered at your facility. Please   complete this form and attach all corresponding documentation. Date of Vaccine(s) Given  ______________________________    Lot Number(s) _______________________________________    Manufacture(s) ______________________________________    Dose Amount (s) _____________________________________    Expiration Date(s) ____________________________________    Comments __________________________________________________________  ____________________________________________________________________  ____________________________________________________________________  ____________________________________________________________________    Administering Facility  ________________________________________________    Vaccine Administered By (print name) ___________________________________      Form Completed By (print name) _______________________________________      Signature ___________________________________________________________      These reports are needed for  compliance. Please fax this completed form and a copy of the Vaccine Document(s) to our office located at [address] [place] [address] as soon as possible to Fax [phone] marai de jesus Gurrola : Phone [phone]    We thank you for your assistance in treating our mutual patient.

## 2023-11-07 NOTE — TELEPHONE ENCOUNTER
Upon review of the In Basket request we were able to locate, review, and update the patient chart as requested for Immunization(s) Shingles vaccine. Any additional questions or concerns should be emailed to the Practice Liaisons via the appropriate education email address, please do not reply via In Basket.     Thank you  Jens Schwarz

## 2023-11-12 DIAGNOSIS — M54.2 PAINFUL CERVICAL RANGE OF MOTION: Primary | ICD-10-CM

## 2023-11-12 NOTE — PROGRESS NOTES
Patient complains of restricted range of motion of cervical spine with associated pain.   Never had x-rays done

## 2023-11-13 ENCOUNTER — APPOINTMENT (OUTPATIENT)
Dept: RADIOLOGY | Facility: MEDICAL CENTER | Age: 78
End: 2023-11-13
Payer: MEDICARE

## 2023-11-13 DIAGNOSIS — M54.2 PAINFUL CERVICAL RANGE OF MOTION: ICD-10-CM

## 2023-11-13 PROCEDURE — 72050 X-RAY EXAM NECK SPINE 4/5VWS: CPT

## 2023-11-17 ENCOUNTER — TELEPHONE (OUTPATIENT)
Age: 78
End: 2023-11-17

## 2023-11-17 NOTE — TELEPHONE ENCOUNTER
Florecita Au from [facility] [place] was calling to see if we received a fax from them in regards to the pt recent chart notes on 11/15. I did not see anything in the pt chart in regards to this, Florecita Au stated that she is going to refax those papers.

## 2023-11-21 NOTE — TELEPHONE ENCOUNTER
Pt called in stating the company called him stating they haven't heard anything from the office and won't be filling his order until they do. He's requesting a call back at [phone] with updates regarding this matter.

## 2023-11-21 NOTE — TELEPHONE ENCOUNTER
Received said form from [employer]-- form requesting two most recent CN -- faxed at this time. Called and spoke w pt -- informed that information was faxed but that I am not sure how long it will take diabetic supply co to process and send out supplies. Pt acknowledged.

## 2024-01-01 DIAGNOSIS — M54.2 CERVICAL PAIN (NECK): Primary | ICD-10-CM

## 2024-01-05 ENCOUNTER — EVALUATION (OUTPATIENT)
Dept: PHYSICAL THERAPY | Facility: REHABILITATION | Age: 79
End: 2024-01-05
Payer: MEDICARE

## 2024-01-05 DIAGNOSIS — M54.2 CERVICAL PAIN (NECK): ICD-10-CM

## 2024-01-05 PROCEDURE — 97161 PT EVAL LOW COMPLEX 20 MIN: CPT | Performed by: PHYSICAL THERAPIST

## 2024-01-05 PROCEDURE — 97110 THERAPEUTIC EXERCISES: CPT | Performed by: PHYSICAL THERAPIST

## 2024-01-05 NOTE — PROGRESS NOTES
PT Evaluation     Today's date: 2024  Patient name: Max Ozuna  : 1945  MRN: [id number]  Referring provider: KADE Martel DO  Dx:   Encounter Diagnosis     ICD-10-CM    1. Cervical pain (neck)  M54.2 Ambulatory referral to Physical Therapy          Start Time: 735  Stop Time: 0815  Total time in clinic (min): 40 minutes    Assessment  Assessment details: Patient is a 78 y.o. male presenting to initial examination with chief complaint of neck pain. Signs and symptoms are consistent with neck pain with mobility deficits. Primary impairments include neck rotation ROM deficits, neck SB ROM deficits, and upper trapezius/levator scapulae flexibility deficits. As a result of impairments patient experiences limitations with functional/daily activities including looking over shoulder while driving, looking up, hanging a picture, and working underneath sink. Educated patient regarding plan of care and answered all patient questions to patient satisfaction. Patient would benefit from skilled PT interventions to address above impairments, achieve goals, and to maximize function. Thank you for the referral.    Impairments: abnormal muscle firing, abnormal or restricted ROM, abnormal movement, activity intolerance, impaired physical strength and pain with function     Prognosis: good    Goals  Impairment Goals: 4-6 weeks  - Patient to decrease pain to 0/10  - Patient to improve UT and levator flexibility to WFL    Functional Goals: by discharge  - Patient to discharge to independent [facility]  - Patient to return to prior level of function  - Patient to improve cervical rotation AROM by 10 degrees bilaterally  - Patient to improve tolerance to looking over shoulder while driving   - Patient to improve tolerance to housework including overhead work and working under the sink     Plan  Patient would benefit from: skilled physical therapy  Planned modality interventions: cryotherapy, TENS and thermotherapy:  hydrocollator packs  Planned therapy interventions: flexibility, home exercise program, joint mobilization, manual therapy, neuromuscular re-education, patient education, strengthening, stretching, therapeutic activities, therapeutic exercise and functional ROM exercises  Frequency: 2x week  Duration in weeks: 6  Treatment plan discussed with: patient      Subjective Evaluation    History of Present Illness  Mechanism of injury: HISTORY OF PRESENT ILLNESS: Patient reports chronic history of L-sided neck pain. He notes history of neck cracking and grinding as well. No radicular pain, sensory changes, or B/B changes. No headaches. Pain is aggravated with general neck movement especially bilateral rotation and sidebending. Patient has not noticed any weakness in upper quarter.  PRIOR TREATMENT: none  AGGRAVATING FACTORS: B/L rotation and SB, end-range extension  EASING FACTORS: change of position  WORK: retired  IMAGING: x-rays notable for degenerative changes  FUNCTIONAL LIMITATIONS: looking over shoulder while driving, looking up, hanging a picture, and working underneath sink  SUBJECTIVE FUNCTIONAL LEVEL: 55%  PATIENT GOAL: to reduce the pain  Pain  Current pain ratin  At best pain ratin  At worst pain ratin  Location: L cervical spine        Objective     Palpation     Additional Palpation Details  TTP L suboccipitals    Neurological Testing     Sensation   Cervical/Thoracic   Left   Intact: light touch    Right   Intact: light touch    Reflexes   Left   Biceps (C5/C6): normal (2+)  Brachioradialis (C6): normal (2+)  Triceps (C7): normal (2+)    Right   Biceps (C5/C6): normal (2+)  Brachioradialis (C6): normal (2+)  Triceps (C7): normal (2+)    Active Range of Motion   Cervical/Thoracic Spine       Cervical    Flexion: 45 degrees  with pain  Extension: 40 degrees     with pain  Left lateral flexion: 25 degrees      Right lateral flexion: 15 degrees     with pain  Left rotation: 38 degrees with  "pain  Right rotation: 45 degrees    with pain    Passive Range of Motion     Additional Passive Range of Motion Details  L UT and levator scapulae flexibility deficits    Joint Play   Joints within functional limits: C2, C3, C4, C5, C6, C7, T4, T5, T6 and T7     Hypomobile: T1, T2 and T3     Pain: C3 and C7     Strength/Myotome Testing     Additional Strength Details  Cervical myotomal strength 4+/5 with no focal deficits    Tests   Cervical     Left   Negative cervical flexion-rotation test.     Right   Negative cervical flexion-rotation test.     Left Shoulder   Negative cervical rotation lateral flexion.     Right Shoulder   Negative cervical rotation lateral flexion.       Flowsheet Rows      Flowsheet Row Most Recent Value   PT/OT G-Codes    Current Score 50   Projected Score 71               Diagnosis: neck pain with mobility deficits   Precautions: HTN, DM, hx CABG   Primary impairments: neck rotation ROM deficits, neck SB ROM deficits, and upper trapezius/levator scapulae flexibility deficits   *asterisks by exercise = given for HEP    1/5       Manuals        Upper thoracic CPA mobilizations        Cervical PROM                                There Ex        UBE        Cervical rot AROM *  3\" x 10 B       Cervical SB AROM        B/L UT stretch        B/L levator stretch        Cervical rot SNAG                                Neuro Re-Ed        Scap retractions *  5\" x 10       Slouch overcorrect        Chin tucks into towel        Band rows        Band ext                                                                        Re-evaluation             Ther Act/Gait                                         Modalities                                                          "

## 2024-01-08 ENCOUNTER — OFFICE VISIT (OUTPATIENT)
Dept: PHYSICAL THERAPY | Facility: REHABILITATION | Age: 79
End: 2024-01-08
Payer: MEDICARE

## 2024-01-08 DIAGNOSIS — M54.2 CERVICAL PAIN (NECK): Primary | ICD-10-CM

## 2024-01-08 PROCEDURE — 97140 MANUAL THERAPY 1/> REGIONS: CPT | Performed by: PHYSICAL THERAPIST

## 2024-01-08 PROCEDURE — 97112 NEUROMUSCULAR REEDUCATION: CPT | Performed by: PHYSICAL THERAPIST

## 2024-01-08 PROCEDURE — 97110 THERAPEUTIC EXERCISES: CPT | Performed by: PHYSICAL THERAPIST

## 2024-01-08 NOTE — PROGRESS NOTES
"Daily Note     Today's date: 2024  Patient name: Max Ozuna  : 1945  MRN: [id number]  Referring provider: KADE Martel DO  Dx:   Encounter Diagnosis     ICD-10-CM    1. Cervical pain (neck)  M54.2           Start Time: 1200  Stop Time: 1245  Total time in clinic (min): 45 minutes    Subjective: Patient reports he is feeling about the same      Objective: See treatment diary below      Assessment: Tolerated treatment well with positive response to manual therapy. L cervical rotation more limited compared to R. Cueing to achieve appropriate technique with chin tucks into towel as patient tends to extend cervical spine to compensate. Patient demonstrated fatigue post treatment, exhibited good technique with therapeutic exercises, and would benefit from continued PT      Plan: Continue per plan of care.      Diagnosis: neck pain with mobility deficits   Precautions: HTN, DM, hx CABG   Primary impairments: neck rotation ROM deficits, neck SB ROM deficits, and upper trapezius/levator scapulae flexibility deficits   *asterisks by exercise = given for HEP          Manuals        Upper thoracic CPA mobilizations   5'      Cervical PROM   5'      Manual cervical traction   5'      UT STM                There Ex        UBE   F/B x 6'      Cervical rot AROM *  3\" x 10 B  3\" x 10      Cervical SB AROM   3\" x 10      B/L UT stretch        B/L levator stretch        Cervical rot SNAG                                Neuro Re-Ed        Scap retractions *  5\" x 10  Reviewed       Slouch overcorrect   5\" x 10      Chin tucks into towel   5\" x 10      Band rows        Band ext                                                                        Re-evaluation             Ther Act/Gait                                         Modalities                                                          "

## 2024-01-10 ENCOUNTER — OFFICE VISIT (OUTPATIENT)
Dept: PHYSICAL THERAPY | Facility: REHABILITATION | Age: 79
End: 2024-01-10
Payer: MEDICARE

## 2024-01-10 DIAGNOSIS — M54.2 CERVICAL PAIN (NECK): Primary | ICD-10-CM

## 2024-01-10 PROCEDURE — 97140 MANUAL THERAPY 1/> REGIONS: CPT | Performed by: PHYSICAL THERAPIST

## 2024-01-10 PROCEDURE — 97112 NEUROMUSCULAR REEDUCATION: CPT | Performed by: PHYSICAL THERAPIST

## 2024-01-10 PROCEDURE — 97110 THERAPEUTIC EXERCISES: CPT | Performed by: PHYSICAL THERAPIST

## 2024-01-10 NOTE — PROGRESS NOTES
"Daily Note     Today's date: 1/10/2024  Patient name: Max Ozuna  : 1945  MRN: [id number]  Referring provider: KADE Martel DO  Dx:   Encounter Diagnosis     ICD-10-CM    1. Cervical pain (neck)  M54.2           Start Time: 1200  Stop Time: 1245  Total time in clinic (min): 45 minutes    Subjective: Patient reports he is feeling the same and he has not noticed any changes with his neck yet      Objective: See treatment diary below      Assessment: Tolerated treatment well with positive response to L upper trapezius soft tissue mobilization. Improved R sidebending mobility immediately following manual therapy. Updated HEP to include L upper trapezius stretch. Patient demonstrated fatigue post treatment, exhibited good technique with therapeutic exercises, and would benefit from continued PT      Plan: Continue per plan of care.      Diagnosis: neck pain with mobility deficits   Precautions: HTN, DM, hx CABG   Primary impairments: neck rotation ROM deficits, neck SB ROM deficits, and upper trapezius/levator scapulae flexibility deficits   *asterisks by exercise = given for HEP    1/5 1/8 1/10     Manuals        Upper thoracic CPA mobilizations   5'      L UT stretching    5'     Manual cervical traction   5'  5'     L UT STM    5'             There Ex        UBE   F/B x 6'  F/B x 6'     Cervical rot AROM *  3\" x 10 B  3\" x 10      Cervical SB AROM   3\" x 10      L UT stretch *    30\" x 3     L levator stretch    30\" x 3     Cervical rot SNAG                                Neuro Re-Ed        Scap retractions *  5\" x 10  Reviewed       Slouch overcorrect   5\" x 10  5\" x 10     Chin tucks into towel   5\" x 10  5\" x 10     Band rows    Green x 20     Band ext    Green x 20                                                                     Re-evaluation             Ther Act/Gait                                         Modalities                                                            "

## 2024-01-15 ENCOUNTER — OFFICE VISIT (OUTPATIENT)
Dept: PHYSICAL THERAPY | Facility: REHABILITATION | Age: 79
End: 2024-01-15
Payer: MEDICARE

## 2024-01-15 DIAGNOSIS — M54.2 CERVICAL PAIN (NECK): Primary | ICD-10-CM

## 2024-01-15 PROCEDURE — 97112 NEUROMUSCULAR REEDUCATION: CPT | Performed by: PHYSICAL THERAPIST

## 2024-01-15 PROCEDURE — 97140 MANUAL THERAPY 1/> REGIONS: CPT | Performed by: PHYSICAL THERAPIST

## 2024-01-15 NOTE — PROGRESS NOTES
"Daily Note     Today's date: 1/15/2024  Patient name: Max Ozuna  : 1945  MRN: [id number]  Referring provider: KADE Martel DO  Dx:   Encounter Diagnosis     ICD-10-CM    1. Cervical pain (neck)  M54.2           Start Time: 0945  Stop Time: 1030  Total time in clinic (min): 45 minutes    Subjective: Patient reports he felt a bit looser after the soft tissue work last visit      Objective: See treatment diary below      Assessment: Tolerated treatment well with positive response to L UT soft tissue mobilization and stretching. Increased repetitions with band postural strengthening with no adverse responses. Updated HEP to include chin tucks into towel. Patient demonstrated fatigue post treatment, exhibited good technique with therapeutic exercises, and would benefit from continued PT      Plan: Continue per plan of care.      Diagnosis: neck pain with mobility deficits   Precautions: HTN, DM, hx CABG   Primary impairments: neck rotation ROM deficits, neck SB ROM deficits, and upper trapezius/levator scapulae flexibility deficits   *asterisks by exercise = given for HEP    1/5 1/8 1/10 1/15    Manuals        Upper thoracic CPA mobilizations   5'      L UT stretching    5'  5'    Manual cervical traction   5'  5'  5'    L UT STM    5'  5'            There Ex        UBE   F/B x 6'  F/B x 6'  F/B x 6'    Cervical rot AROM *  3\" x 10 B  3\" x 10      Cervical SB AROM   3\" x 10      L UT stretch *    30\" x 3     L levator stretch    30\" x 3     Cervical rot SNAG                                Neuro Re-Ed        Scap retractions *  5\" x 10  Reviewed       Slouch overcorrect   5\" x 10  5\" x 10  5\" x 10    Chin tucks into towel *   5\" x 10  5\" x 10  10\" x 10    Band rows    Green x 20  Green x 30    Band ext    Green x 20  Green x 30    Band no moneys     Red x 20                                                            Re-evaluation             Ther Act/Gait                                         Modalities  "

## 2024-01-17 ENCOUNTER — OFFICE VISIT (OUTPATIENT)
Dept: PHYSICAL THERAPY | Facility: REHABILITATION | Age: 79
End: 2024-01-17
Payer: MEDICARE

## 2024-01-17 DIAGNOSIS — M54.2 CERVICAL PAIN (NECK): Primary | ICD-10-CM

## 2024-01-17 PROCEDURE — 97112 NEUROMUSCULAR REEDUCATION: CPT | Performed by: PHYSICAL THERAPIST

## 2024-01-17 PROCEDURE — 97140 MANUAL THERAPY 1/> REGIONS: CPT | Performed by: PHYSICAL THERAPIST

## 2024-01-17 NOTE — PROGRESS NOTES
"Daily Note     Today's date: 2024  Patient name: Max Ozuna  : 1945  MRN: [id number]  Referring provider: KADE Martel DO  Dx:   Encounter Diagnosis     ICD-10-CM    1. Cervical pain (neck)  M54.2           Start Time: 1120  Stop Time: 1210  Total time in clinic (min): 50 minutes    Subjective: Patient reports he has some pain on both sides of his neck now but the L side remains worse      Objective: See treatment diary below      Assessment: Tolerated treatment well. Positive response to manual therapy. Completed moist heat post-treatment for symptom management. Patient able to complete increased repetitions with band postural strengthening.Patient demonstrated fatigue post treatment, exhibited good technique with therapeutic exercises, and would benefit from continued PT      Plan: Continue per plan of care.      Diagnosis: neck pain with mobility deficits   Precautions: HTN, DM, hx CABG   Primary impairments: neck rotation ROM deficits, neck SB ROM deficits, and upper trapezius/levator scapulae flexibility deficits   *asterisks by exercise = given for HEP    1/5 1/8 1/10 1/15 1/17   Manuals        L UT stretching    5'  5'  5'   Manual cervical traction   5'  5'  5'  8'   L UT STM    5'  5'  10'           There Ex        UBE   F/B x 6'  F/B x 6'  F/B x 6'  F/B x 6'   Cervical rot AROM *  3\" x 10 B  3\" x 10      Cervical SB AROM   3\" x 10      L UT stretch *    30\" x 3     L levator stretch    30\" x 3     Cervical rot SNAG                                Neuro Re-Ed        Scap retractions *  5\" x 10  Reviewed       Slouch overcorrect   5\" x 10  5\" x 10  5\" x 10    Chin tucks into towel *   5\" x 10  5\" x 10  10\" x 10  HEP   Band rows    Green x 20  Green x 30  Green 2 x 20   Band ext    Green x 20  Green x 30  Green 3 x 15   Band no moneys     Red x 20  Red x 20                                                           Re-evaluation             Ther Act/Gait                                       "   Modalities             MHP prn.      10'

## 2024-01-22 ENCOUNTER — HOSPITAL ENCOUNTER (OUTPATIENT)
Dept: NON INVASIVE DIAGNOSTICS | Facility: HOSPITAL | Age: 79
Discharge: HOME | End: 2024-01-22
Payer: MEDICARE

## 2024-01-22 DIAGNOSIS — I70.213 ATHEROSCLEROSIS OF NATIVE ARTERY OF BOTH LOWER EXTREMITIES WITH INTERMITTENT CLAUDICATION (HCC): ICD-10-CM

## 2024-01-22 DIAGNOSIS — I74.09 AORTOILIAC OCCLUSIVE DISEASE (HCC): Chronic | ICD-10-CM

## 2024-01-22 PROCEDURE — 93925 LOWER EXTREMITY STUDY: CPT

## 2024-01-22 PROCEDURE — 93923 UPR/LXTR ART STDY 3+ LVLS: CPT

## 2024-01-22 PROCEDURE — 93978 VASCULAR STUDY: CPT | Performed by: SURGERY

## 2024-01-22 PROCEDURE — 93978 VASCULAR STUDY: CPT

## 2024-01-23 ENCOUNTER — OFFICE VISIT (OUTPATIENT)
Dept: VASCULAR SURGERY | Facility: CLINIC | Age: 79
End: 2024-01-23
Payer: MEDICARE

## 2024-01-23 VITALS
DIASTOLIC BLOOD PRESSURE: 60 MMHG | SYSTOLIC BLOOD PRESSURE: 134 MMHG | HEIGHT: 65 IN | WEIGHT: 158 LBS | BODY MASS INDEX: 26.33 KG/M2 | OXYGEN SATURATION: 98 % | HEART RATE: 69 BPM

## 2024-01-23 DIAGNOSIS — I70.213 ATHEROSCLEROSIS OF NATIVE ARTERY OF BOTH LOWER EXTREMITIES WITH INTERMITTENT CLAUDICATION (HCC): ICD-10-CM

## 2024-01-23 DIAGNOSIS — I73.9 PAD (PERIPHERAL ARTERY DISEASE) (HCC): Primary | ICD-10-CM

## 2024-01-23 DIAGNOSIS — I74.09 AORTOILIAC OCCLUSIVE DISEASE (HCC): Chronic | ICD-10-CM

## 2024-01-23 DIAGNOSIS — N18.31 STAGE 3A CHRONIC KIDNEY DISEASE (HCC): ICD-10-CM

## 2024-01-23 PROCEDURE — 99215 OFFICE O/P EST HI 40 MIN: CPT | Performed by: SURGERY

## 2024-01-23 PROCEDURE — 93925 LOWER EXTREMITY STUDY: CPT | Performed by: SURGERY

## 2024-01-23 PROCEDURE — 93922 UPR/L XTREMITY ART 2 LEVELS: CPT | Performed by: SURGERY

## 2024-01-23 NOTE — PROGRESS NOTES
Assessment/Plan:    Atherosclerosis of native artery of both lower extremities with intermittent claudication (HCC)  Hx Bilateral lower extremity claudication (R worse than L)  Hx bilateral iliac stenting; L external iliac occlusion  Hx Right to left fem fem bypass at [facility] Dr Suarez  Hx BLE angiogram via open fem fem bypass open access with recanalization of L SFA  with PTA/stent; unsuccessful R SFA recanalization.  Patient denies any rest pain.  He was previously told by Dr Pena that he would require open revascularization should his symptoms worsen on right.  We discussed options to proceed with a CTA runoff with subsequent plan for R profunda to pop/tibial? Bypass.  Patient at this time is apprehensive with regards to open surgery.  He should he develop worsening symptoms and/or tissue loss to notify the office immediately.  Patient reports that he is going to [place] to visit daughter in the near future.  We have agreed to repeat noninvasive studies in May with return office visit to reassess his symptoms review studies and make decision with regards to proceeding with surgical intervention.       Diagnoses and all orders for this visit:    PAD (peripheral artery disease) (HCC)  -     VAS ARTERIAL DUPLEX- LOWER LIMB BILATERAL; Future  -     VAS abdominal aorta/iliac duplex; Future    Atherosclerosis of native artery of both lower extremities with intermittent claudication (HCC)    Aortoiliac occlusive disease (HCC)    Stage 3a chronic kidney disease (HCC)          Subjective:      Patient ID: Max Ozuna is a 78 y.o. male.    Patient presents for review of JAMEL and AOIL w/ LIONEL's done 1/22/24. He reports tightness and pain with walking in lower extremities, right is worse than left. He denies wounds of bilateral lower extremities. He is taking Atorvastatin, Plavix, and Xarelto.     Max presents to office accompanied by his wife to review noninvasive surveillance studies.  He complains of short distance  "claudication with right leg worse than left.  He has an extensive vascular history including history of bilateral iliac stenting, right to left femorofemoral bypass, bilateral lower extremity angiograms with left SFA  recanalization and unsuccessful recanalization of right SFA .  He denies any rest pain.        The following portions of the patient's history were reviewed and updated as appropriate: allergies, current medications, past family history, past medical history, past social history, past surgical history, and problem list.    Review of Systems   Constitutional: Negative.    HENT: Negative.     Eyes: Negative.    Respiratory: Negative.     Cardiovascular: Negative.    Gastrointestinal: Negative.    Endocrine: Negative.    Genitourinary: Negative.    Musculoskeletal: Negative.    Skin: Negative.    Allergic/Immunologic: Negative.    Neurological: Negative.    Hematological: Negative.    Psychiatric/Behavioral: Negative.           Objective:      /60 (BP Location: Right arm, Patient Position: Sitting, Cuff Size: Standard)   Pulse 69   Ht 5' 5\" (1.651 m)   Wt 71.7 kg (158 lb)   SpO2 98%   BMI 26.29 kg/m²          Physical Exam  Constitutional:       General: He is not in acute distress.     Appearance: He is well-developed.   HENT:      Head: Normocephalic and atraumatic.   Eyes:      General: No scleral icterus.     Conjunctiva/sclera: Conjunctivae normal.   Neck:      Trachea: No tracheal deviation.   Cardiovascular:      Rate and Rhythm: Normal rate and regular rhythm.      Pulses:           Dorsalis pedis pulses are detected w/ Doppler on the left side.        Posterior tibial pulses are detected w/ Doppler on the right side.      Heart sounds: Normal heart sounds.   Pulmonary:      Effort: Pulmonary effort is normal.      Breath sounds: Normal breath sounds.   Abdominal:      General: There is no distension.      Palpations: Abdomen is soft. There is no mass (no appreciable aortic " pulsation/aneurysm).      Tenderness: There is no abdominal tenderness. There is no guarding or rebound.   Musculoskeletal:         General: Normal range of motion.      Cervical back: Normal range of motion and neck supple.   Skin:     General: Skin is warm and dry.   Neurological:      Mental Status: He is alert and oriented to person, place, and time.   Psychiatric:         Mood and Affect: Mood normal.         Behavior: Behavior normal.         Thought Content: Thought content normal.         Judgment: Judgment normal.         Lower extremity arterial duplex:  CONCLUSION:  Impression:  Patent right to left fem-fem bypass graft.     RIGHT LOWER LIMB:  Know occlusion in the prox-mid superficial femoral artery with distal  reconstitution.  Evidence of tibioperoneal disease.  The proximal anterior tibial and the distal peroneal arteries could not be  appreciated can not rule out occlusion.  Ankle/Brachial index:  0.49 within ischemic range  (Prior 0.50)  PVR/ PPG tracings are dampened.  Metatarsal pressure of 60 mmHg  Prior:  88 mmHg  Great toe pressure of 27mmHg, below the diabetic healing range   Prior: 77 mmHg     LEFT LOWER LIMB:  There is diffused atherosclerotic disease in the femoropopliteal segments.  The proximal superficial artery demonstrates tandem >75% and then 50-75%  stenoses.  There is a patent  mid-distal superficial femoral arterial stent .  Evidence of tiboperoneal disease.  Ankle/Brachial index:  0.70 within moderate  range (Prior0.73 )  PVR/ PPG tracings are dampened.  Metatarsal pressure of  129 mmHg  Prior: 118 mmHg  Great toe pressure of 50 mmHg, which is border-line for the diabetic healing  range Prior: 87 mmHg     In comparison to the study of 05/10/23, there is progression in disease  bilaterally. The left tandem stenoses are a new finding and the right great toe  pressure is now below the healing range.      Aortoiliac duplex:  CONCLUSION:  The abdominal aorta demonstrates diffuse  arterial occlusive disease without  focal stenosis.  The aorta appears normal in caliber measuring 2.1 cm in its greatest diameter.  Prior: 2.2 cm  Elevated velocities in the right common iliac stent suggest > 75% stenosis.  There is a patent  right external iliac artery stent.  There is a 50- 75% stenosis in the left common iliac artery and a > 75%  stenosis in the left external iliac artery.  The superior mesenteric artery origin could not be appreciated in this exam.  Elevated velocities are noted in the celiac artery suggesting a > 70% stenosis.  Elevated velocities are noted in the bilateral ostial renal arteris suggesting  > 60% in the right and < 60% in the left.  RIGHT LOWER LIMB:  Ankle/Brachial index:  0.49 within ischemic range  (Prior 0.50)  PVR/ PPG tracings are dampened.  Metatarsal pressure of 60 mmHg  Prior:  88 mmHg  Great toe pressure of 27mmHg, below the diabetic healing range   Prior: 77 mmHg  LEFT LOWER LIMB:.  Ankle/Brachial index:  0.70 within moderate  range (Prior0.73 )  PVR/ PPG tracings are dampened.  Metatarsal pressure of  129 mmHg  Prior: 118 mmHg  Great toe pressure of 50 mmHg, which is border-line for the diabetic healing  range Prior: 87 mmHg     In comparison to the study of 6/23/22, there is progression in disease  bilaterally.

## 2024-01-23 NOTE — ASSESSMENT & PLAN NOTE
Hx Bilateral lower extremity claudication (R worse than L)  Hx bilateral iliac stenting; L external iliac occlusion  Hx Right to left fem fem bypass at [facility] Dr Suarez  Hx BLE angiogram via open fem fem bypass open access with recanalization of L SFA  with PTA/stent; unsuccessful R SFA recanalization.  Patient denies any rest pain.  He was previously told by Dr Pena that he would require open revascularization should his symptoms worsen on right.  We discussed options to proceed with a CTA runoff with subsequent plan for R profunda to pop/tibial? Bypass.  Patient at this time is apprehensive with regards to open surgery.  He should he develop worsening symptoms and/or tissue loss to notify the office immediately.  Patient reports that he is going to [place] to visit daughter in the near future.  We have agreed to repeat noninvasive studies in May with return office visit to reassess his symptoms review studies and make decision with regards to proceeding with surgical intervention.

## 2024-01-24 ENCOUNTER — OFFICE VISIT (OUTPATIENT)
Dept: PHYSICAL THERAPY | Facility: REHABILITATION | Age: 79
End: 2024-01-24
Payer: MEDICARE

## 2024-01-24 DIAGNOSIS — M54.2 CERVICAL PAIN (NECK): Primary | ICD-10-CM

## 2024-01-24 PROCEDURE — 97112 NEUROMUSCULAR REEDUCATION: CPT | Performed by: PHYSICAL THERAPIST

## 2024-01-24 PROCEDURE — 97140 MANUAL THERAPY 1/> REGIONS: CPT | Performed by: PHYSICAL THERAPIST

## 2024-01-24 PROCEDURE — 97110 THERAPEUTIC EXERCISES: CPT | Performed by: PHYSICAL THERAPIST

## 2024-01-24 NOTE — PROGRESS NOTES
"Daily Note     Today's date: 2024  Patient name: Max Ozuna  : 1945  MRN: [id number]  Referring provider: KADE Martel DO  Dx:   Encounter Diagnosis     ICD-10-CM    1. Cervical pain (neck)  M54.2           Start Time: 1030  Stop Time: 1115  Total time in clinic (min): 45 minutes    Subjective: Patient reports his neck is feeling a little better      Objective: See treatment diary below      Assessment: Tolerated treatment well. Increased repetitions with postural strengthening with no adverse responses. Updated HEP to include towel cervical rotation SNAG and provided illustrated handout. Patient continues to demonstrate L cervical rotation ROM deficits compared to R. Patient demonstrated fatigue post treatment, exhibited good technique with therapeutic exercises, and would benefit from continued PT      Plan: Continue per plan of care.      Diagnosis: neck pain with mobility deficits   Precautions: HTN, DM, hx CABG   Primary impairments: neck rotation ROM deficits, neck SB ROM deficits, and upper trapezius/levator scapulae flexibility deficits   *asterisks by exercise = given for HEP     1/8 1/10 1/15 1/17   Manuals        L UT stretching  5'   5'  5'  5'   Manual cervical traction  8'  5'  5'  5'  8'   L UT STM  10'   5'  5'  10'           There Ex        UBE  F/B x 6'  F/B x 6'  F/B x 6'  F/B x 6'  F/B x 6'   Cervical rot AROM *   3\" x 10      Cervical SB AROM   3\" x 10      L UT stretch *    30\" x 3     L levator stretch    30\" x 3     Cervical rot SNAG *  5\" x 10 B                               Neuro Re-Ed        Scap retractions *   Reviewed       Slouch overcorrect   5\" x 10  5\" x 10  5\" x 10    Chin tucks into towel *   5\" x 10  5\" x 10  10\" x 10  HEP   Band rows  Green 3 x 20   Green x 20  Green x 30  Green 2 x 20   Band ext  Green 3 x 15   Green x 20  Green x 30  Green 3 x 15   Band no moneys  Red x 30    Red x 20  Red x 20   Band H. abd  NV                                           "             Re-evaluation            Ther Act/Gait                                      Modalities            P prn.      10'

## 2024-01-26 ENCOUNTER — OFFICE VISIT (OUTPATIENT)
Dept: PHYSICAL THERAPY | Facility: REHABILITATION | Age: 79
End: 2024-01-26
Payer: MEDICARE

## 2024-01-26 DIAGNOSIS — M54.2 CERVICAL PAIN (NECK): Primary | ICD-10-CM

## 2024-01-26 PROCEDURE — 97140 MANUAL THERAPY 1/> REGIONS: CPT | Performed by: PHYSICAL THERAPIST

## 2024-01-26 NOTE — PROGRESS NOTES
"Daily Note     Today's date: 2024  Patient name: Max Ozuna  : 1945  MRN: [id number]  Referring provider: KADE Martel DO  Dx:   Encounter Diagnosis     ICD-10-CM    1. Cervical pain (neck)  M54.2           Start Time: 1125  Stop Time: 1200  Total time in clinic (min): 35 minutes    Subjective: Patient reports he has a lot of stiffness today and would like to hold off on the strength exercises. He felt very good last visit after the heat      Objective: See treatment diary below      Assessment: Tolerated treatment well. Treatment focused on symptom management as patient presented with significant soreness with no known cause. Reduced pain following manual therapy including soft tissue mobilization, upper trapezius stretching, and cervical traction. Patient would benefit from continued PT      Plan: Continue per plan of care.      Diagnosis: neck pain with mobility deficits   Precautions: HTN, DM, hx CABG   Primary impairments: neck rotation ROM deficits, neck SB ROM deficits, and upper trapezius/levator scapulae flexibility deficits   *asterisks by exercise = given for HEP    1/24 1/26 1/10 1/15 1/17   Manuals        L UT stretching  5'  5'  5'  5'  5'   Manual cervical traction  8'  10'  5'  5'  8'   L UT STM  10'  10'  5'  5'  10'           There Ex        UBE  F/B x 6'   F/B x 6'  F/B x 6'  F/B x 6'   Cervical rot AROM *        Cervical SB AROM        L UT stretch *    30\" x 3     L levator stretch    30\" x 3     Cervical rot SNAG *  5\" x 10 B                               Neuro Re-Ed        Scap retractions *        Slouch overcorrect    5\" x 10  5\" x 10    Chin tucks into towel *    5\" x 10  10\" x 10  HEP   Band rows  Green 3 x 20   Green x 20  Green x 30  Green 2 x 20   Band ext  Green 3 x 15   Green x 20  Green x 30  Green 3 x 15   Band no moneys  Red x 30    Red x 20  Red x 20   Band H. abd  NV                                                       Re-evaluation           Ther Act/Gait    "                                Modalities           MHP prn.   10'    10'

## 2024-01-30 ENCOUNTER — APPOINTMENT (OUTPATIENT)
Dept: PHYSICAL THERAPY | Facility: REHABILITATION | Age: 79
End: 2024-01-30
Payer: MEDICARE

## 2024-01-31 ENCOUNTER — APPOINTMENT (OUTPATIENT)
Dept: PHYSICAL THERAPY | Facility: REHABILITATION | Age: 79
End: 2024-01-31
Payer: MEDICARE

## 2024-02-02 ENCOUNTER — EVALUATION (OUTPATIENT)
Dept: PHYSICAL THERAPY | Facility: REHABILITATION | Age: 79
End: 2024-02-02
Payer: MEDICARE

## 2024-02-02 DIAGNOSIS — M54.2 CERVICAL PAIN (NECK): Primary | ICD-10-CM

## 2024-02-02 PROCEDURE — 97110 THERAPEUTIC EXERCISES: CPT | Performed by: PHYSICAL THERAPIST

## 2024-02-02 PROCEDURE — 97112 NEUROMUSCULAR REEDUCATION: CPT | Performed by: PHYSICAL THERAPIST

## 2024-02-02 NOTE — PROGRESS NOTES
PT Re-Evaluation     Today's date: 2024  Patient name: Max Ozuna  : 1945  MRN: [id number]  Referring provider: KADE Martel DO  Dx:   Encounter Diagnosis     ICD-10-CM    1. Cervical pain (neck)  M54.2           Start Time: 1030  Stop Time: 1110  Total time in clinic (min): 40 minutes    Assessment  Assessment details: Patient is a 78 y.o. male presenting to reexamination with chief complaint of neck pain. Patient exhibits poor progress toward objective and functional goals at time of reexamination. Despite slightly improved cervical rotation ROM patient continues to have significant pain at restricted end-ranges of motion in all planes. Discussed presentation with patient and after discussion plan to discharge patient with return appointment to referring physician .       Impairments: abnormal muscle firing, abnormal or restricted ROM, abnormal movement, activity intolerance, impaired physical strength and pain with function    Goals  Impairment Goals: 4-6 weeks  - Patient to decrease pain to 0/10 - MET  - Patient to improve UT and levator flexibility to WFL - NOT MET    Functional Goals: by discharge  - Patient to discharge to independent HEP - NOT MET  - Patient to return to prior level of function - NOT MET  - Patient to improve cervical rotation AROM by 10 degrees bilaterally - MET  - Patient to improve tolerance to looking over shoulder while driving - NOT MET  - Patient to improve tolerance to housework including overhead work and working under the sink - PARTIALLY MET    Plan  Plan details: Discharge with referral back to physician  Treatment plan discussed with: patient        Subjective Evaluation    History of Present Illness  Mechanism of injury: HISTORY OF PRESENT ILLNESS: Patient reports his neck function and symptoms are almost the same since beginning PT. He continues to have pain at end-ranges of neck motion in all planes. No radicular pain, sensory changes, or B/B changes.    PRIOR TREATMENT: none  AGGRAVATING FACTORS: B/L rotation and SB, end-range extension  EASING FACTORS: change of position  WORK: retired  IMAGING: x-rays notable for degenerative changes  FUNCTIONAL LIMITATIONS: looking over shoulder while driving and working underneath sink  IMPROVEMENTS: overhead work  SUBJECTIVE FUNCTIONAL LEVEL: 70%  PATIENT GOAL: to reduce the pain  Pain  Current pain ratin  At best pain ratin  At worst pain ratin  Location: L cervical spine          Objective     Palpation     Additional Palpation Details  No TTP    Neurological Testing     Sensation   Cervical/Thoracic   Left   Intact: light touch    Right   Intact: light touch    Reflexes   Left   Biceps (C5/C6): normal (2+)  Brachioradialis (C6): normal (2+)  Triceps (C7): normal (2+)    Right   Biceps (C5/C6): normal (2+)  Brachioradialis (C6): normal (2+)  Triceps (C7): normal (2+)    Active Range of Motion   Cervical/Thoracic Spine       Cervical    Flexion: 45 degrees  with pain  Extension: 35 degrees     with pain  Left lateral flexion: 20 degrees      Right lateral flexion: 15 degrees     with pain  Left rotation: 48 degrees with pain  Right rotation: 58 degrees    with pain    Passive Range of Motion     Additional Passive Range of Motion Details  L UT and levator scapulae flexibility deficits    Strength/Myotome Testing     Additional Strength Details  Cervical myotomal strength 4+/5 with no focal deficits    Tests   Cervical   Negative cervical distraction.     Left   Negative Spurling's Test A and cervical flexion-rotation test.     Right   Negative Spurling's Test A and cervical flexion-rotation test.     Left Shoulder   Negative cervical rotation lateral flexion.     Right Shoulder   Negative cervical rotation lateral flexion.                Diagnosis: neck pain with mobility deficits   Precautions: HTN, DM, hx CABG   Primary impairments: neck rotation ROM deficits, neck SB ROM deficits, and upper trapezius/levator  "scapulae flexibility deficits   *asterisks by exercise = given for HEP    1/24 1/26 2/2 1/15 1/17   Manuals        L UT stretching  5'  5'   5'  5'   Manual cervical traction  8'  10'   5'  8'   L UT IASTM                There Ex        UBE  F/B x 6'   F/B x 6'  F/B x 6'  F/B x 6'   Cervical rot AROM *        Cervical SB AROM        L UT stretch *        L levator stretch        Cervical rot SNAG *  5\" x 10 B       Cervical ext SNAG                        Neuro Re-Ed        Scap retractions *        Slouch overcorrect     5\" x 10    Chin tucks into towel *     10\" x 10  HEP   Band rows  Green 3 x 20    Green x 30  Green 2 x 20   Band ext  Green 3 x 15    Green x 30  Green 3 x 15   Band no moneys  Red x 30    Red x 20  Red x 20   Band H. abd  NV                                                       Re-evaluation    CM       Ther Act/Gait                                Modalities          MHP prn.   10'    10'                                     "

## 2024-02-05 ENCOUNTER — TELEPHONE (OUTPATIENT)
Dept: HEMATOLOGY ONCOLOGY | Facility: CLINIC | Age: 79
End: 2024-02-05

## 2024-02-07 ENCOUNTER — APPOINTMENT (OUTPATIENT)
Dept: PHYSICAL THERAPY | Facility: REHABILITATION | Age: 79
End: 2024-02-07
Payer: MEDICARE

## 2024-02-14 ENCOUNTER — APPOINTMENT (OUTPATIENT)
Dept: LAB | Facility: CLINIC | Age: 79
End: 2024-02-14
Payer: MEDICARE

## 2024-02-14 ENCOUNTER — APPOINTMENT (OUTPATIENT)
Dept: PHYSICAL THERAPY | Facility: REHABILITATION | Age: 79
End: 2024-02-14
Payer: MEDICARE

## 2024-02-14 DIAGNOSIS — E08.22 DIABETES MELLITUS DUE TO UNDERLYING CONDITION WITH STAGE 3A CHRONIC KIDNEY DISEASE, WITHOUT LONG-TERM CURRENT USE OF INSULIN (HCC): ICD-10-CM

## 2024-02-14 DIAGNOSIS — N18.31 DIABETES MELLITUS DUE TO UNDERLYING CONDITION WITH STAGE 3A CHRONIC KIDNEY DISEASE, WITHOUT LONG-TERM CURRENT USE OF INSULIN (HCC): ICD-10-CM

## 2024-02-14 DIAGNOSIS — I25.10 CORONARY ARTERY DISEASE INVOLVING NATIVE CORONARY ARTERY OF NATIVE HEART WITHOUT ANGINA PECTORIS: ICD-10-CM

## 2024-02-14 DIAGNOSIS — D51.1 VIT B12 DEFIC ANEMIA D/T SLCTV VIT B12 MALABSORP W PROTEIN: ICD-10-CM

## 2024-02-14 DIAGNOSIS — E78.49 OTHER HYPERLIPIDEMIA: ICD-10-CM

## 2024-02-14 DIAGNOSIS — D50.9 IRON DEFICIENCY ANEMIA, UNSPECIFIED IRON DEFICIENCY ANEMIA TYPE: ICD-10-CM

## 2024-02-14 LAB
ALBUMIN SERPL BCP-MCNC: 4.2 G/DL (ref 3.5–5)
ALP SERPL-CCNC: 57 U/L (ref 34–104)
ALT SERPL W P-5'-P-CCNC: 18 U/L (ref 7–52)
ANION GAP SERPL CALCULATED.3IONS-SCNC: 12 MMOL/L
AST SERPL W P-5'-P-CCNC: 15 U/L (ref 13–39)
BASOPHILS # BLD AUTO: 0.02 THOUSANDS/ÂΜL (ref 0–0.1)
BASOPHILS NFR BLD AUTO: 0 % (ref 0–1)
BILIRUB SERPL-MCNC: 0.69 MG/DL (ref 0.2–1)
BUN SERPL-MCNC: 32 MG/DL (ref 5–25)
CALCIUM SERPL-MCNC: 9.2 MG/DL (ref 8.4–10.2)
CHLORIDE SERPL-SCNC: 103 MMOL/L (ref 96–108)
CHOLEST SERPL-MCNC: 110 MG/DL
CO2 SERPL-SCNC: 22 MMOL/L (ref 21–32)
CREAT SERPL-MCNC: 1.15 MG/DL (ref 0.6–1.3)
CREAT UR-MCNC: 52.1 MG/DL
EOSINOPHIL # BLD AUTO: 0.26 THOUSAND/ÂΜL (ref 0–0.61)
EOSINOPHIL NFR BLD AUTO: 4 % (ref 0–6)
ERYTHROCYTE [DISTWIDTH] IN BLOOD BY AUTOMATED COUNT: 14.6 % (ref 11.6–15.1)
FERRITIN SERPL-MCNC: 22 NG/ML (ref 24–336)
GFR SERPL CREATININE-BSD FRML MDRD: 59 ML/MIN/1.73SQ M
GLUCOSE P FAST SERPL-MCNC: 139 MG/DL (ref 65–99)
HCT VFR BLD AUTO: 43.4 % (ref 36.5–49.3)
HDLC SERPL-MCNC: 32 MG/DL
HGB BLD-MCNC: 13.4 G/DL (ref 12–17)
IMM GRANULOCYTES # BLD AUTO: 0.03 THOUSAND/UL (ref 0–0.2)
IMM GRANULOCYTES NFR BLD AUTO: 1 % (ref 0–2)
IRON SATN MFR SERPL: 14 % (ref 15–50)
IRON SERPL-MCNC: 42 UG/DL (ref 50–212)
LDLC SERPL CALC-MCNC: 23 MG/DL (ref 0–100)
LYMPHOCYTES # BLD AUTO: 1.15 THOUSANDS/ÂΜL (ref 0.6–4.47)
LYMPHOCYTES NFR BLD AUTO: 19 % (ref 14–44)
MCH RBC QN AUTO: 30.8 PG (ref 26.8–34.3)
MCHC RBC AUTO-ENTMCNC: 30.9 G/DL (ref 31.4–37.4)
MCV RBC AUTO: 100 FL (ref 82–98)
MICROALBUMIN UR-MCNC: 8.4 MG/L
MICROALBUMIN/CREAT 24H UR: 16 MG/G CREATININE (ref 0–30)
MONOCYTES # BLD AUTO: 0.51 THOUSAND/ÂΜL (ref 0.17–1.22)
MONOCYTES NFR BLD AUTO: 8 % (ref 4–12)
NEUTROPHILS # BLD AUTO: 4.24 THOUSANDS/ÂΜL (ref 1.85–7.62)
NEUTS SEG NFR BLD AUTO: 68 % (ref 43–75)
NRBC BLD AUTO-RTO: 0 /100 WBCS
PLATELET # BLD AUTO: 161 THOUSANDS/UL (ref 149–390)
PMV BLD AUTO: 10.7 FL (ref 8.9–12.7)
POTASSIUM SERPL-SCNC: 4.1 MMOL/L (ref 3.5–5.3)
PROT SERPL-MCNC: 6.9 G/DL (ref 6.4–8.4)
RBC # BLD AUTO: 4.35 MILLION/UL (ref 3.88–5.62)
SODIUM SERPL-SCNC: 137 MMOL/L (ref 135–147)
TIBC SERPL-MCNC: 304 UG/DL (ref 250–450)
TRIGL SERPL-MCNC: 276 MG/DL
UIBC SERPL-MCNC: 262 UG/DL (ref 155–355)
VIT B12 SERPL-MCNC: 632 PG/ML (ref 180–914)
WBC # BLD AUTO: 6.21 THOUSAND/UL (ref 4.31–10.16)

## 2024-02-14 PROCEDURE — 36415 COLL VENOUS BLD VENIPUNCTURE: CPT

## 2024-02-14 PROCEDURE — 83550 IRON BINDING TEST: CPT

## 2024-02-14 PROCEDURE — 83540 ASSAY OF IRON: CPT

## 2024-02-14 PROCEDURE — 83036 HEMOGLOBIN GLYCOSYLATED A1C: CPT

## 2024-02-14 PROCEDURE — 80053 COMPREHEN METABOLIC PANEL: CPT

## 2024-02-14 PROCEDURE — 82607 VITAMIN B-12: CPT

## 2024-02-14 PROCEDURE — 80061 LIPID PANEL: CPT

## 2024-02-14 PROCEDURE — 85025 COMPLETE CBC W/AUTO DIFF WBC: CPT

## 2024-02-14 PROCEDURE — 82043 UR ALBUMIN QUANTITATIVE: CPT

## 2024-02-14 PROCEDURE — 82570 ASSAY OF URINE CREATININE: CPT

## 2024-02-14 PROCEDURE — 82728 ASSAY OF FERRITIN: CPT

## 2024-02-15 LAB
EST. AVERAGE GLUCOSE BLD GHB EST-MCNC: 163 MG/DL
HBA1C MFR BLD: 7.3 %

## 2024-02-19 ENCOUNTER — TELEPHONE (OUTPATIENT)
Dept: ADMINISTRATIVE | Facility: OTHER | Age: 79
End: 2024-02-19

## 2024-02-19 DIAGNOSIS — Z59.9 FINANCIAL DIFFICULTY: Primary | ICD-10-CM

## 2024-02-19 SDOH — ECONOMIC STABILITY - INCOME SECURITY: PROBLEM RELATED TO HOUSING AND ECONOMIC CIRCUMSTANCES, UNSPECIFIED: Z59.9

## 2024-02-19 NOTE — TELEPHONE ENCOUNTER
02/19/24 3:47 PM    Patient contacted (spoke with patient) to bring Advance Directive, POLST, or Living Will document to next scheduled pcp visit.    Thank you.  Trisha Duckworth MA  [facility]

## 2024-02-20 ENCOUNTER — OFFICE VISIT (OUTPATIENT)
Dept: FAMILY MEDICINE CLINIC | Facility: CLINIC | Age: 79
End: 2024-02-20
Payer: MEDICARE

## 2024-02-20 ENCOUNTER — TELEPHONE (OUTPATIENT)
Age: 79
End: 2024-02-20

## 2024-02-20 VITALS
BODY MASS INDEX: 26.23 KG/M2 | RESPIRATION RATE: 17 BRPM | SYSTOLIC BLOOD PRESSURE: 138 MMHG | HEART RATE: 61 BPM | DIASTOLIC BLOOD PRESSURE: 60 MMHG | WEIGHT: 157.4 LBS | HEIGHT: 65 IN | TEMPERATURE: 97.5 F | OXYGEN SATURATION: 97 %

## 2024-02-20 DIAGNOSIS — E08.22 DIABETES MELLITUS DUE TO UNDERLYING CONDITION WITH STAGE 3A CHRONIC KIDNEY DISEASE, WITHOUT LONG-TERM CURRENT USE OF INSULIN (HCC): ICD-10-CM

## 2024-02-20 DIAGNOSIS — J43.1 PANLOBULAR EMPHYSEMA (HCC): ICD-10-CM

## 2024-02-20 DIAGNOSIS — Z79.4 TYPE 2 DIABETES MELLITUS WITH DIABETIC PERIPHERAL ANGIOPATHY WITHOUT GANGRENE, WITH LONG-TERM CURRENT USE OF INSULIN (HCC): Primary | ICD-10-CM

## 2024-02-20 DIAGNOSIS — E11.51 TYPE 2 DIABETES MELLITUS WITH DIABETIC PERIPHERAL ANGIOPATHY WITHOUT GANGRENE, WITH LONG-TERM CURRENT USE OF INSULIN (HCC): Primary | ICD-10-CM

## 2024-02-20 DIAGNOSIS — N18.31 STAGE 3A CHRONIC KIDNEY DISEASE (HCC): ICD-10-CM

## 2024-02-20 DIAGNOSIS — N18.31 DIABETES MELLITUS DUE TO UNDERLYING CONDITION WITH STAGE 3A CHRONIC KIDNEY DISEASE, WITHOUT LONG-TERM CURRENT USE OF INSULIN (HCC): ICD-10-CM

## 2024-02-20 DIAGNOSIS — Z79.899 MEDICATION MANAGEMENT: ICD-10-CM

## 2024-02-20 DIAGNOSIS — K14.0 GLOSSITIS: ICD-10-CM

## 2024-02-20 PROCEDURE — 99214 OFFICE O/P EST MOD 30 MIN: CPT | Performed by: FAMILY MEDICINE

## 2024-02-20 RX ORDER — CHLORHEXIDINE GLUCONATE ORAL RINSE 1.2 MG/ML
15 SOLUTION DENTAL 2 TIMES DAILY
Qty: 120 ML | Refills: 3 | Status: SHIPPED | OUTPATIENT
Start: 2024-02-20 | End: 2024-02-20 | Stop reason: SDUPTHER

## 2024-02-20 RX ORDER — CHLORHEXIDINE GLUCONATE ORAL RINSE 1.2 MG/ML
15 SOLUTION DENTAL 2 TIMES DAILY
Qty: 473 ML | Refills: 3 | Status: SHIPPED | OUTPATIENT
Start: 2024-02-20

## 2024-02-20 NOTE — PROGRESS NOTES
Assessment/Plan:          1. Type 2 diabetes mellitus with diabetic peripheral angiopathy without gangrene, with long-term current use of insulin (Formerly [facility])  Comments:  Avoiding breads. Wt 157.  Pasta rarely. Pizza very limited. Portion control  A1c today 6.2 which is exactly what it was April 10.  Off Trulicity, can't afford  Assessment & Plan:  He has gained 2 pounds from his previous weight of 155 pounds.He was advised to stay hydrated since he is taking Jardiance. I will start him on tirzepatide (Mounjaro) 2.5 mg 1 injection every 7 days. I will send a prior authorization request to [facility].    Orders:  -     Empagliflozin (Jardiance) 25 MG TABS; Take 1 tablet (25 mg total) by mouth every morning Also: take with LOTS of water  -     tirzepatide (Mounjaro) 2.5 MG/0.5ML; Inject 0.5 mL (2.5 mg total) under the skin every 7 days  -     Iron; Future  -     Ferritin; Future  -     TIBC Panel (incl. Iron, TIBC, % Iron Saturation); Future  -     Hemoglobin A1C; Standing  -     CBC and differential; Future; Expected date: 02/21/2024    2. Panlobular emphysema (HCC)    3. Diabetes mellitus due to underlying condition with stage 3a chronic kidney disease, without long-term current use of insulin (HCC)  -     Iron; Future  -     Ferritin; Future  -     TIBC Panel (incl. Iron, TIBC, % Iron Saturation); Future  -     Hemoglobin A1C; Standing  -     CBC and differential; Future; Expected date: 02/21/2024    4. Stage 3a chronic kidney disease (HCC)  -     Iron; Future  -     Ferritin; Future  -     TIBC Panel (incl. Iron, TIBC, % Iron Saturation); Future  -     Hemoglobin A1C; Standing  -     CBC and differential; Future; Expected date: 02/21/2024    5. Medication management  Comments:  all meds reviewed for safety, efficacy, and tolerance  Orders:  -     Iron; Future  -     Ferritin; Future  -     TIBC Panel (incl. Iron, TIBC, % Iron Saturation); Future  -     Hemoglobin A1C; Standing  -     CBC and differential; Future;  Expected date: 02/21/2024    6. Glossitis  Comments:  Taking Vit's  Cont and reassured not pathologic, but congenital        Medication management.  All medications reviewed for safety, efficacy, and tolerance.    Hypertension.  His blood pressure is well controlled. His subsequent blood pressure taken by me was 120/60 mmHg. He will continue lisinopril.    Hyperlipidemia.  His cholesterol is well controlled but his triglycerides are elevated. He was advised to cut down on pies, cakes, donuts, breads, and pasta. He will continue atorvastatin 40 mg. He was advised not to skip any meals and need to eat 3 meals daily.    Glossitis  I prescribe chlorhexidine (PERIDEX) 0.12 % solution; Apply 15 mL to the mouth or throat 2 (two) times a day                   Subjective:       Patient ID: Max Ozuna is a 79 y.o. male who presents for follow-up. He is accompanied by his wife.    The patient last took Trulicity about a month ago. He did not take Jardiance while he was on Trulicity. His blood glucose level this morning was 128 mg/dL, decreased from a previous reading of 178 mg/dL which he attribues in modifying his Jardiance dosage from 12.5mg to 25 mg. He does not monitor his blood glucose levels at night. His wife remembers that he reached a hemoglobin A1c of 9% when he was in [place]. He uses finger prick glucometer for blood glucose level testing. His current medications include Jardiance 25 mg once daily, glipizide 10 mg twice a day, and metformin twice a day.    He has been trying to call [employer] to get the Truc[employer] since he cannot afford it but he did not receive a return call.    He skip a meal last night since he does not like to eat steak.    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 He requests to have a prescription for chlorhexidine solution.    In addition, he takes 1 capsule of duloxetine 30 mg daily. He also takes vitamin C, vitamin D, atorvastatin, iron supplement, Neurontin,  "omeprazole as needed for gastroesophageal reflux disease, lisinopril, and Plavix. He takes 1 Flomax capsule at night. He denies taking Xarelto.    His current medical insurance is [employer].    His recent hemoglobin A1c was 7.3% from 6.2% on 04/10/2023, 6.6% in 09/2023, and 6.2% in 07/2023. His blood glucose level was 139 mg/dL on 02/14/2024 from 171 mg/dL on 09/20/2023 and 210 mg/dL. His cholesterol was 110 mg/dL from 115 mg/dL, triglycerides was elevated, LDL was 23 mg/dL and HDL was 32 mg/dL.    The following portions of the patient's history were reviewed and updated as appropriate: allergies, current medications, past family history, past medical history, past social history, past surgical history, and problem list.        Objective:       /60 (BP Location: Left arm, Patient Position: Sitting, Cuff Size: Standard)   Pulse 61   Temp 97.5 °F (36.4 °C) (Temporal)   Resp 17   Ht 5' 5\" (1.651 m)   Wt 71.4 kg (157 lb 6.4 oz)   SpO2 97%   BMI 26.19 kg/m²          Physical Exam  Vitals and nursing note reviewed.  Constitutional:       General: He is not in acute distress.     Appearance: Normal appearance. He is well-developed.   HENT:      Head: Normocephalic and atraumatic.   Eyes:      General:         Right eye: No discharge.         Left eye: No discharge.   Neck:      Thyroid: No thyromegaly.   Cardiovascular:      Rate and Rhythm: Normal rate and regular rhythm.      Pulses: Normal pulses.      Heart sounds: Normal heart sounds. No murmur heard.  Pulmonary:      Effort: Pulmonary effort is normal.      Breath sounds: Normal breath sounds. No wheezing or rhonchi.   Musculoskeletal:      Cervical back: Neck supple.      Right lower leg: No edema.      Left lower leg: No edema.   Lymphadenopathy:      Cervical: No cervical adenopathy.   Skin:     General: Skin is warm.      Capillary Refill: Capillary refill takes less than 2 seconds.   Neurological:      General: No focal deficit present.      Mental " Status: He is alert and oriented to person, place, and time.   Psychiatric:         Mood and Affect: Mood normal.         Behavior: Behavior normal.         Thought Content: Thought content normal.        I personally reviewed the recent (and prior)  lab results, the image studies, pathology, other specialty/physicians consult notes and recommendations, and outside medical records from other institutions, as appropriate. I had a lengthy discussion with the patient and shared the work-up findings. We discussed the diagnosis and management plan.  I spent  35  minutes reviewing the records (labs, clinician notes, outside records, medical history, ordering medicine/tests/procedures, interpreting the imaging/labs previously done) and coordination of care as well as direct time with the patient today, of which greater than 50% of the time was spent in counseling and coordination of care with the patient/family.    Transcribed for KADE Martel DO, by Tri Lozoya on 02/21/24 at 10:34 PM. Powered by [employer].

## 2024-02-20 NOTE — TELEPHONE ENCOUNTER
Received call from [facility] pharmacy for order of chlorhexidine (PERIDEX) 0.12 % solution with dose quantity of 120 mL sent in today. Medication comes in bottle of 473 mL. Please send in new order for larger amount or verbal order.

## 2024-02-20 NOTE — ASSESSMENT & PLAN NOTE
He has gained 2 pounds from his previous weight of 155 pounds.He was advised to stay hydrated since he is taking Jardiance. I will start him on tirzepatide (Mounjaro) 2.5 mg 1 injection every 7 days. I will send a prior authorization request to [facility].

## 2024-02-21 ENCOUNTER — PATIENT OUTREACH (OUTPATIENT)
Dept: FAMILY MEDICINE CLINIC | Facility: CLINIC | Age: 79
End: 2024-02-21

## 2024-02-21 ENCOUNTER — TELEPHONE (OUTPATIENT)
Age: 79
End: 2024-02-21

## 2024-02-21 NOTE — PROGRESS NOTES
ROMMELCM reviewed referral from PCP office.      Phone call placed to pt.  Pt reports he is not able to afford his Trulicity.  Pt states that after his deductible, pt will have to pay $250 a month for the medication.      Pt reports total household income of $4600 a month.      Pt does not qualify for [employer] due to being over the income guidelines.      SW reviewed [facility] website for patient assistance program.  Notice on website indicates that no new applications are being accepted for Trulicity due to the high demand affecting the level of donations to [facility] for the medication.      Message sent to Dr. Martel to advise of same and to discuss alternate medication with pt if pt is not able to afford the medication.      Pt contacted to also advise of same.  Pt is very concerned as the Trulicity is the only medication that has controlled his blood sugars.  SW advised there are not other assistance programs available for this medication.

## 2024-02-21 NOTE — TELEPHONE ENCOUNTER
Mabel with [employer] called to state they need a new script sent to them for the diabetic supplies with a diagnosis code of E08.22. They stated they faxed over a CMS form that needs to be filled out and faxed back to them and they will call again tomorrow to follow-up on this request.

## 2024-02-22 ENCOUNTER — TELEPHONE (OUTPATIENT)
Age: 79
End: 2024-02-22

## 2024-02-22 NOTE — TELEPHONE ENCOUNTER
Jessica at [facility] pharmacy called to verify ok to fill peridex for 473 ml prescription only came over for 150 ml, states their computer system was hacked. OK to fill 473 ml.

## 2024-02-29 NOTE — TELEPHONE ENCOUNTER
Calling again in regards to diabetic supplies fax.  They received chart notes but they need records to show glucose levels.    They re-faxed forms on 02/27/24.  Please complete and re-fax.    [phone] if any questions

## 2024-03-05 ENCOUNTER — TELEPHONE (OUTPATIENT)
Age: 79
End: 2024-03-05

## 2024-03-05 NOTE — TELEPHONE ENCOUNTER
"This company called and requested the patient's \"hypoglycemic\" level be faxed to them along with chart notes.  I asked if he meant a blood sugar level and he said yes.  Please fax notes and level to [phone].  Thank you  "

## 2024-03-05 NOTE — TELEPHONE ENCOUNTER
2 most recent chart notes faxed as requested to [facility] -- note dating from November discussed hypoglycemia but pt does not have blood sugar logs on file as he was previously not keeping a record. He was advised to start logging BS, date, time.

## 2024-03-06 ENCOUNTER — PATIENT OUTREACH (OUTPATIENT)
Dept: FAMILY MEDICINE CLINIC | Facility: CLINIC | Age: 79
End: 2024-03-06

## 2024-03-06 NOTE — PROGRESS NOTES
SWCM following upon on new prescription for Mounjaro as there is no new applications for PAP for Trulicity.     Pt reports [facility] advised that cost of Mounjaro would be $200.  There is no patient assistance program for Mounjaro and savings coupon can only be used with commercial insurance.  [employer] has the same stipulations.      Message sent to Dr. Martel.

## 2024-03-06 NOTE — PLAN OF CARE
Problem: Potential for Falls  Goal: Patient will remain free of falls  Description: INTERVENTIONS:  - Educate patient/family on patient safety including physical limitations  - Instruct patient to call for assistance with activity   - Consult OT/PT to assist with strengthening/mobility   - Keep Call bell within reach  - Keep bed low and locked with side rails adjusted as appropriate  - Keep care items and personal belongings within reach  - Initiate and maintain comfort rounds  - Make Fall Risk Sign visible to staff  - Apply yellow socks and bracelet for high fall risk patients  - Consider moving patient to room near nurses station  Outcome: Progressing Syncope

## 2024-04-04 DIAGNOSIS — Z79.4 TYPE 2 DIABETES MELLITUS WITH DIABETIC PERIPHERAL ANGIOPATHY WITHOUT GANGRENE, WITH LONG-TERM CURRENT USE OF INSULIN (HCC): ICD-10-CM

## 2024-04-04 DIAGNOSIS — E11.51 TYPE 2 DIABETES MELLITUS WITH DIABETIC PERIPHERAL ANGIOPATHY WITHOUT GANGRENE, WITH LONG-TERM CURRENT USE OF INSULIN (HCC): ICD-10-CM

## 2024-04-04 RX ORDER — GLIPIZIDE 10 MG/1
10 TABLET ORAL
Qty: 180 TABLET | Refills: 1 | Status: SHIPPED | OUTPATIENT
Start: 2024-04-04

## 2024-04-09 ENCOUNTER — TELEPHONE (OUTPATIENT)
Dept: HEMATOLOGY ONCOLOGY | Facility: CLINIC | Age: 79
End: 2024-04-09

## 2024-04-09 NOTE — TELEPHONE ENCOUNTER
Called and LVM for patient mentioning that there is one lab that needs to be collected prior to patient's follow up visit with  on 4/15.    -IRON PANEL.    Mentioned that the lab orders are already in the system and that as long as patient goes to a [facility] there is no paper work needed.    Also mentioned that if patient is unable to have labs collected prior to appointment to please give the office a call back.

## 2024-04-11 ENCOUNTER — PATIENT OUTREACH (OUTPATIENT)
Dept: FAMILY MEDICINE CLINIC | Facility: CLINIC | Age: 79
End: 2024-04-11

## 2024-04-11 ENCOUNTER — TELEPHONE (OUTPATIENT)
Dept: HEMATOLOGY ONCOLOGY | Facility: CLINIC | Age: 79
End: 2024-04-11

## 2024-04-11 NOTE — PROGRESS NOTES
SW placed phone call to pt to follow up on affordability of DM meds.      Pt remains on Jardiance and Mounjaro.  Pt last reports Mounjaro was about $200 and coupon is not offered with Medicare insurance.  Pt was advised to speak with provider about cost and possible change in medication.     Pt was not available at time of phone call.  Message left with contact information.  SW cannot provide further assistance at this time.  Referral closed.  SW remains available if pt should have additional needs.

## 2024-04-11 NOTE — TELEPHONE ENCOUNTER
Appointment Change  Cancel, Reschedule, Change to Virtual      Who are you speaking with? Patient   If it is not the patient, is the caller listed on the communication consent form? N/A   Which provider is the appointment scheduled with? Dr. Angeles   When was the original appointment scheduled?    Please list date and time 4/15/24 140   At which location is the appointment scheduled to take place? [place]   Was the appointment rescheduled?     Was the appointment changed from an in person visit to a virtual visit?    If so, please list the details of the change. 4/22/24 1020   What is the reason for the appointment change? Appt conflict       Was [facility] transport scheduled? N/A   Does [facility] transport need to be scheduled for the new visit (if applicable) N/A   Does the patient need an infusion appointment rescheduled? N/A   Does the patient have an upcoming infusion appointment scheduled? If so, when? No   Is the patient undergoing chemotherapy? N/A   For appointments cancelled with less than 24 hours:  Was the no-show policy reviewed? N/A

## 2024-04-12 NOTE — PROGRESS NOTES
Hematology Outpatient Office Note    Date of Service: 4/22/2024    [facility] HEMATOLOGY SPECIALISTS [address]  [facility] [address]  [phone]    Reason for Consultation:   Chief Complaint   Patient presents with    Follow-up       Referral Physician: No ref. provider found    Primary Care Physician:  KADE Martel,        ASSESSMENT & PLAN      Diagnosis ICD-10-CM Associated Orders   1. Iron deficiency anemia due to chronic blood loss  D50.0 CBC and differential     Iron Panel (Includes Ferritin, Iron Sat%, Iron, and TIBC)      2. Embolism and thrombosis of arteries of the lower extremities (HCC)  I74.3             This is a 79 y.o. c PMHx notable for cecal AVM/GAVE, PE, DM, DVT, HTN, HLP, CAD, being seen in consultation for chronic ANG      Iron Deficiency anemia: improved s/p IV iron  Low iron levels can cause anemia (low red blood cells). Low iron levels can also make people feel poorly, even before anemia starts. Iron is used to make red blood cells. If blood is lost from the body, if iron can't be absorbed from food, or if something removes iron (pregnancy) the iron can be low and then anemia happens.    Hx Cecal AVM. GI thought maybe GAVE  8/29/2022: Hgb 9.5, WBC 6.35k, MCV 91, plt 235k, retic 3.65%, FOBT + (8/31), ferritin 7, iron 30, Fe Sat 9%, TIBC 346  9/7 EGD/C-scope: internal hemorrhoids    Capsule endoscopy: no active bleeding  6/27/2023 EGD with single balloon enteroscopy revealed no bleeding      Iron deficiency is very common. Low iron can cause fatigue or tiredness, the desire to eat ice or non-food items like corn starch or dry pasta noodles, restless legs, weak fingernails, cracks at the corner of the mouth.     Common causes of iron deficiency include inadequate diet (uncommon, usually vegetarians), gastric bypass surgery (very common), pregnancy (very common), periods (most common cause in younger women), blood loss (usually from the stomach or  intestines), and decreased iron absorption from the intestines from gastritis, H pylori, acid blocking medicines or celiac disease. Even a normal menstrual period can cause iron deficiency. In 10% of patients a clear cause of low iron is not found. 14% of women will have iron deficiency just from their menstrual period.       Iron deficiency is treated with pills as a first step. For some people the iron pills do not work, cause severe side effects, or take too long to work: for these people IV iron can be given. You may only require IV iron very rarely, for example only when pregnant, or from 1-2 times a year based on your rate of need. It takes 4 weeks for IV iron to improve the anemia to maximum potential. If your fatigue is not from the low iron, getting iron treatment would not help the fatigue.    Specific numbers on risks:  Because serious reactions can occur with IV iron and are so rare, it is difficult to determine their absolute number with IV iron. Based on reports of adverse reactions, Injectafer appears to have the lowest risk of serious hypersensitivity reactions or anaphylaxis. Next lowest would be Venofer, followed by Feraheme and Low Molecular Weight Iron Dextran ([place], Drug Safety 2021).  Anaphylaxis can occur with antibiotics such as penicillin (10-50/100,000), and can be fatal (in 1-2/100,000).   Risk of anaphylaxis with modern iron types ranges from 11/100,000 with iron sucrose to 25-70/100,000 with Feraheme or low molecular weight iron dextran, risk of death with IV iron ranges from 0.81/100,000 with low molecular weight iron dextran, 3.5/100,000 for Feraheme, to 6/100,000 with Venofer; however these values overlap statistically (DeLoughery Am J Hematology, 2015). Another study found life-threatening reactions in 0.6/million with Venofer, and 3.3/million with iron dextran ([facility] Neph Dial trans, 2006).    Blood transfusion also has risks: fever occurs in 1%, serious transfusion reactions  occur in 8.1/100,000, life-threatening transfusion reactions occur in 1/140,000 units of blood given, Hepatitis B in 1/282,000, and Hepatitis C  in 1/1,150,000. ([facility] 2012).                Discussion of decision making    I personally reviewed the following lab results, the image studies, pathology, other specialty/physicians consult notes and recommendations, and outside medical records from [facility]/other institutions. I had a lengthy discussion with the patient and shared the work-up findings. We discussed the diagnosis and management plan as below. I spent 34 minutes reviewing the records (labs, clinician notes, outside records, medical history, ordering medicine/tests/procedures, interpreting the imaging/labs previously done) and coordination of care as well as direct time with the patient today, of which greater than 50% of the time was spent in counseling and coordination of care with the patient/family.    Plan/Labs  CBC, iron panel in 6 months ordered  He is holding PO iron MWF as was feeling good off of it: recommend resuming it MWF (he had constipation on daily) due to dropping iron levels and will do IV Venofer 200mg x2 in the meantime  Cont to f/u GI          Follow Up: 6 months with preceding CBC, iron labs    All questions were answered to the patient's satisfaction during this encounter. The patient knows the contact information for our office and knows to reach out for any relevant concerns related to this encounter. They are to call for any temperature 100.4 or higher, new symptoms including but not restricted to shaking chills, decreased appetite, nausea, vomiting, diarrhea, increased fatigue, shortness of breath or chest pain, confusion, and not feeling the strength to come to the clinic. For all other listed problems and medical diagnosis in their chart - they are managed by PCP and/or other specialists, which the patient acknowledges. Thank you very much for your consultation and  making us a part of this patient's care. We are continuing to follow closely with you. Please do not hesitate to reach out to me with any additional questions or concerns.    Mal Angeles MD  Hematology & Medical Oncology Staff Physician             Disclaimer: This document was prepared using [employer] Direct technology. If a word or phrase is confusing, or does not make sense, this is likely due to recognition error which was not discovered during this clinician's review. If you believe an error has occurred, please contact me through [facility] service line for dequan?cation.      HEMATOLOGICAL HISTORY OF PRESENT ILLNESS      Clotting History None   Bleeding History GAVE related bleeding   Cancer History None   Family Cancer History Father (cancer)   H/O Blood/Plt Transfusion PRBC years ago   Tobacco/etoh/drug abuse 2 PPDx 44 years, quit 2004, no etoh abuse or rec drug use       Cancer Screening history n/a   Occupation Own [employer]      8/29/2022: Hgb 9.5, WBC 6.35k, MCV 91, plt 235k, retic 3.65%, FOBT + (8/31), ferritin 7, iron 30, Fe Sat 9%, TIBC 346  9/7 EGD/C-scope: internal hemorrhoids  10/3/2022-10/9/2022: 5 iV Venofer 200mg administered   11/28/2022: ferritin 26, iron 67, Fe Sat 21%, TIBC 322, Hgb 12.7 (now normal)  5/2/2023: Discussed with the patient the results of his iron studies which show worsening iron deficiency as well as recurrent anemia.  I ordered 600 mg of IV Venofer  5/18/2023: ferritin 7, iron 36, Fe Sat 10%, TIBC 364, Hgb 11.6  6/29/2023: ferritin 37, iron 52, Fe Sat 17%, TIBC 307, Hgb 12.7, MCV 92  9/20/2023:  ferritin 58, iron 65, Fe Sat 33%, TIBC 230, Hgb 13.5, MCV 95  2/14/2024: iron 42, ferritin 22, Fe Sat 14%, TIBC 304: Vit B12 632, Hgb 13.4, plt 161k, WBC 6.21k  4/18/2024: iron 59, Fe Sat 18%, TIBC 329, ferritin 21, Hgb 13.4 (2/2024)  4/29/2024: planned IV Venofer 200mg x2  SUBJECTIVE  (INTERVAL HISTORY)      Interval events: feeling less energy, gen  weakness, no more pica.  Plans to go to [place] next month    I have reviewed the relevant past medical, surgical, social and family history. I have also reviewed allergies and medications for this patient.    Review of Systems    Baseline weight: 152-154 lbs    Denies weight loss, F/C, N/V, SOB, CP, LH, HA.    He has had fatigue and low energy since 6/2022. He has been chewing on ice all day since 3/2022.     A 10-point review of system was performed, pertinent positive and negative were detailed as above. Otherwise, the 10-point review of system was negative.      Past Medical History:   Diagnosis Date    Coronary artery disease without angina pectoris 02/25/2020    Diabetes mellitus (HCC)     DVT (deep venous thrombosis) (HCC)     GERD (gastroesophageal reflux disease)     Hypertension     Iron deficiency anemia        Past Surgical History:   Procedure Laterality Date    ANGIOPLASTY  10/19/2021    bilateral    CARDIAC CATHETERIZATION  2013    calif    CABG  x4    COLONOSCOPY      COLONOSCOPY      CORONARY ANGIOPLASTY WITH STENT PLACEMENT  01/01/2010    CORONARY ARTERY BYPASS GRAFT      FEMORAL ARTERY STENT      FEMORAL BYPASS  10/20/2021    IR LOWER EXTREMITY ANGIOGRAM  2/14/2023    IR PELVIC ANGIOGRAM  5/6/2022    DE SLCTV CATHJ 3RD+ ORD SLCTV ABDL PEL/LXTR BRNCH  5/6/2022    Procedure: ULTRASOUND-GUIDED LEFT BRACHIAL ARTERY PUNCTURE, AORTOGRAM, RIGHT COMMON ILIAC ARTERY ANGIOPLASTY WITH 8 X 40 MM BALLOON, ANGIOPLASTY OF RIGHT EXTERNAL AND COMMON ILIAC ARTERY STENOSIS WITH 6 X 150 MM DRUG-ELUTING BALLOON, STENTING OF RIGHT DISTAL EXTERNAL ILIAC ARTERY STENOSIS WITH 7 X 40 MM SELF EXPANDING STENT POST DILATED WITH A 6 MM BALLOON.;  Surgeon: Henry Pena MD;  [place]    DE SLCTV CATHJ 3RD+ ORD SLCTV ABDL PEL/LXTR BRNCH Bilateral 2/14/2023    Procedure: CUTDOWN FEM-FEM BYPASS WITH PRIMARY CLOSURE ACCESS SITE, BILATERAL RUN-OFF, LEFT FEM-POP 5X220 QUYEN AND 6X150 RODRI, ATTEMPT TO CROSS RIGHT SFA OCCLUSION;   Surgeon: Henry Pena MD;  Location: [facility] OR;  Service: Vascular       Family History   Problem Relation Age of Onset    No Known Problems Mother     No Known Problems Father        Social History     Socioeconomic History    Marital status: /Civil Union     Spouse name: Not on file    Number of children: Not on file    Years of education: Not on file    Highest education level: Not on file   Occupational History    Not on file   Tobacco Use    Smoking status: Former     Current packs/day: 0.00     Average packs/day: 1.5 packs/day for 40.0 years (60.0 ttl pk-yrs)     Types: Cigarettes     Start date:      Quit date:      Years since quittin.3     Passive exposure: Past    Smokeless tobacco: Never    Tobacco comments:     quit --    Vaping Use    Vaping status: Never Used   Substance and Sexual Activity    Alcohol use: Not Currently     Alcohol/week: 0.0 standard drinks of alcohol    Drug use: No    Sexual activity: Not on file   Other Topics Concern    Not on file   Social History Narrative    · Most recent tobacco use screenin2020      Social Determinants of Health     Financial Resource Strain: Low Risk  (2023)    Overall Financial Resource Strain (CARDIA)     Difficulty of Paying Living Expenses: Not hard at all   Food Insecurity: Not on file   Transportation Needs: No Transportation Needs (2023)    PRAPARE - Transportation     Lack of Transportation (Medical): No     Lack of Transportation (Non-Medical): No   Physical Activity: Not on file   Stress: Not on file   Social Connections: Not on file   Intimate Partner Violence: Not on file   Housing Stability: Not on file       No Known Allergies    Current Outpatient Medications   Medication Sig Dispense Refill    acetaminophen (TYLENOL) 500 mg tablet Take 1,000 mg by mouth every 8 (eight) hours as needed      atorvastatin (LIPITOR) 40 mg tablet Take 1 tablet (40 mg total) by mouth daily 90 tablet 3     chlorhexidine (PERIDEX) 0.12 % solution Apply 15 mL to the mouth or throat 2 (two) times a day 473 mL 3    clopidogrel (PLAVIX) 75 mg tablet TAKE 1 TABLET BY MOUTH EVERY DAY 90 tablet 3    DULoxetine (CYMBALTA) 30 mg delayed release capsule TAKE ONE CAPSULE BY MOUTH TWICE DAILY 60 capsule 0    Empagliflozin (Jardiance) 25 MG TABS Take 1 tablet (25 mg total) by mouth every morning Also: take with LOTS of water 90 tablet 3    ferrous sulfate 325 (65 Fe) mg tablet Take 1 tablet (325 mg total) by mouth 2 (two) times a day with meals 60 tablet 6    gabapentin (Neurontin) 300 mg capsule Take 1 capsule (300 mg total) by mouth 3 (three) times a day 90 capsule 6    glipiZIDE (GLUCOTROL) 10 mg tablet TAKE 1 TABLET TWICE A DAY  BEFORE MEALS 180 tablet 1    hydrocortisone 2.5 % cream Apply topically 4 (four) times a day as needed for rash 30 g 0    lisinopril-hydrochlorothiazide (PRINZIDE,ZESTORETIC) 20-12.5 MG per tablet Take 1 tablet by mouth daily 90 tablet 3    metFORMIN (GLUCOPHAGE) 1000 MG tablet TAKE 1 TABLET TWICE DAILY  WITH MEALS 180 tablet 1    omeprazole (PriLOSEC) 20 mg delayed release capsule Take 1 capsule (20 mg total) by mouth daily before breakfast (Patient taking differently: Take 20 mg by mouth if needed) 30 capsule 5    tamsulosin (FLOMAX) 0.4 mg TAKE 1 CAPSULE BY MOUTH EVERY DAY WITH DINNER 90 capsule 2    tirzepatide (Mounjaro) 2.5 MG/0.5ML Inject 0.5 mL (2.5 mg total) under the skin every 7 days 2 mL 3     No current facility-administered medications for this visit.       (Not in a hospital admission)        Objective:     24 Hour Vitals Assessment:     Vitals:    04/22/24 1020   BP: 118/60   Pulse: 67   Resp: 18   Temp: (!) 96.9 °F (36.1 °C)   SpO2: 97%         PHYSICIAN EXAM:    General: Appearance: alert, cooperative, no distress.  HEENT: Normocephalic, atraumatic. No scleral icterus. conjunctivae clear. EOMI.  Chest: No tenderness to palpation. No open wound noted.  Lungs: Clear to auscultation  bilaterally, Respirations unlabored.  Cardiac: Regular rate and rhythm, +S1and S2  Abdomen: Soft, non-tender, non-distended. Bowel sounds are normal.  Extremities:  No edema, cyanosis, clubbing.  Skin: Skin color, turgor are normal. No rashes.  Neurologic: Awake, Alert, and oriented, no gross focal deficits noted b/l.       DATA REVIEW:    Pathology Result:    Final Diagnosis   Date Value Ref Range Status   06/27/2023   Final    A. Duodenum, :  - Unremarkable duodenal mucosa  - No villous atrophy or increased intraepithelial lymphocytes seen     B. Stomach, :  - Gastric oxyntic and antral type mucosa with minimal or mild chronic inflammation  - No sharad shaped bacteria seen on H&E stained tissue section  - Negative for intestinal metaplasia and dysplasia          09/07/2022   Final    A. Duodenum,  biopsy:  - Duodenal mucosa with focal acute inflammation and reactive changes  - No intraepithelial lymphocytosis and no villous blunting.  - No epithelial dysplasia and no evidence of malignancy.    B.  Stomach, biopsy:  - Gastric antral and oxyntic mucosa with no significant pathologic alteration.  - Negative for intestinal metaplasia, dysplasia or carcinoma.  - No Helicobacter pylori is identified on H&E stained slide.              Image Results:   Image result are reviewed and documented in Hematology/Oncology history. I personally reviewed these images.    VAS abdominal aorta/iliacs; with LIONEL's     [facility] REPORT  CLINICAL:  Operative History:  2023-02-14 Left Transluminal placement of stent, open, sfa  2022-05-06 Right Ir pelvic angiogram with right ROSA ELENA and EIA stent placement  2022-05-06 Right Transluminal placement of iliac stent, percutaneous  2021-10-20 RT to LT fem fem bypass graft  2021-10-19 Angioplasty  2013-01-01 CABG x4  2010-01-01 Coronary angioplasty with stent placement  CABG  Risk Factors  The patient has history of HTN, Diabetes (IDDM) and Hyperlipidemia.     FINDINGS:     Unilateral      Impression      PSV (cm/s)  EDV (cm/s)  AP (cm)  TRV (cm)    Sup-Merle Ao                             74          16      2.0       2.1    Px Inf-Carson Ao                          60          10      2.0       2.0    Ds Inf-Carson Ao                          73                  1.4       1.6    Celiac         >70%                   229          47                       SMA Ostial     Not visualized                                               Prox. SMA                             132                                      Right              Impression  PSV (cm/s)  EDV (cm/s)  AP (cm)   RAR    Prox ROSA ELENA                              156          27      0.9          Dist ROSA ELENA - Stent   >75%               503           0      0.6          Prox. EIA - Stent                     351                  0.7          Dist EIA - Stent                      325           0      0.7          Ostial Renal       60 - 99%           241          43                   Prox Renal         1 - 59%            222          66           3.02       Left               Impression  PSV (cm/s)  EDV (cm/s)  AP (cm)   RAR    Prox ROSA ELENA                               58           0      0.9          Dist ROSA ELENA           50-75%             396           0      0.8          Internal Iliac                         84                               Prox. EIA                              82           0      0.9          Dist EIA           >75%               456          23      0.7          Ostial Renal       1 - 59%            224          23                   Prox Renal                            192          10           2.61             CONCLUSION:     Impression  The abdominal aorta demonstrates diffuse arterial occlusive disease without  focal stenosis.  The aorta appears normal in caliber measuring 2.1 cm in its greatest diameter.  Prior: 2.2 cm  Elevated velocities in the right common iliac stent suggest > 75% stenosis.  There is a patent  right external iliac  artery stent.  There is a 50- 75% stenosis in the left common iliac artery and a > 75%  stenosis in the left external iliac artery.  The superior mesenteric artery origin could not be appreciated in this exam.  Elevated velocities are noted in the celiac artery suggesting a > 70% stenosis.  Elevated velocities are noted in the bilateral ostial renal arteries suggesting  > 60% in the right and < 60% in the left.  RIGHT LOWER LIMB:  Ankle/Brachial index:  0.49 within ischemic range  (Prior 0.50)  PVR/ PPG tracings are dampened.  Metatarsal pressure of 60 mmHg  Prior:  88 mmHg  Great toe pressure of 27mmHg, below the diabetic healing range   Prior: 77 mmHg  LEFT LOWER LIMB:.  Ankle/Brachial index:  0.70 within moderate  range (Prior0.73 )  PVR/ PPG tracings are dampened.  Metatarsal pressure of  129 mmHg  Prior: 118 mmHg  Great toe pressure of 50 mmHg, which is border-line for the diabetic healing  range Prior: 87 mmHg     In comparison to the study of 6/23/22, there is progression in disease  bilaterally.     SIGNATURE:  Electronically Signed by: STONEY ACUNA DO, RPVI on 2024-01-23 04:42:41 PM  VAS lower limb arterial duplex, complete bilateral     [facility] REPORT  CLINICAL:  Indications:  6 month surveillance for progression of disease.  The patient reports  persistent right leg and foot pain.  Operative History:  2023-02-14 Left Transluminal placement of stent, open, sfa  2022-05-06 Right Ir pelvic angiogram with right ROSA ELENA and EIA stent placement  2022-05-06 Right Transluminal placement of iliac stent, percutaneous  2021-10-20 RT to LT fem fem bypass graft  2021-10-19 Angioplasty  2013-01-01 CABG x4  2010-01-01 Coronary angioplasty with stent placement  CABG  Risk Factors  The patient has history of HTN, Diabetes (IDDM) and Hyperlipidemia.  Clinical  Right Pressure:  130/ mm Hg, Left Pressure:  137/ mm Hg.     FINDINGS:     Unilateral                PSV (cm/s)    R Third, Fem-Fem Graft            52     Mid Third, Fem-Fem Graft          46    L Third, Fem-Fem Graft            57       Right                       Impression      PSV (cm/s)    Anastomosis, Fem-Fem Graft                         127    Common Femoral Artery                              132    Prox Profunda                                      161    Prox SFA                    Occluded                57    Mid SFA                     Occluded                 0    Dist SFA                                            28    Proximal Pop                                        27    Distal Pop                                          26    Tibioperoneal                                       38    Dist Post Tibial                                    20    Prox. Ant. Tibial           Not visualized                Dist. Ant. Tibial                                   15    Dist Peroneal               Not visualized                   Left                        Impression     PSV (cm/s)    Anastomosis, Fem-Fem Graft                         53    Common Femoral Artery                              83    Prox Profunda                                      98    Prox SFA                    >75% & 50-75%         343    Mid SFA - Stent                                   110    Dist SFA - Stent                                   67    Proximal Pop                                       48    Distal Pop                                         41    Tibioperoneal                                      31    Dist Post Tibial                                   17    Prox. Ant. Tibial                                  14    Dist. Ant. Tibial                                  22    Dist Peroneal                                      30             CONCLUSION:  Impression:  Patent right to left fem-fem bypass graft.     RIGHT LOWER LIMB:  Know occlusion in the prox-mid superficial femoral artery with distal  reconstitution.  Evidence of tibioperoneal disease.  The proximal anterior  tibial and the distal peroneal arteries could not be  appreciated can not rule out occlusion.  Ankle/Brachial index:  0.49 within ischemic range  (Prior 0.50)  PVR/ PPG tracings are dampened.  Metatarsal pressure of 60 mmHg  Prior:  88 mmHg  Great toe pressure of 27mmHg, below the diabetic healing range   Prior: 77 mmHg     LEFT LOWER LIMB:  There is diffused atherosclerotic disease in the femoropopliteal segments.  The proximal superficial artery demonstrates tandem >75% and then 50-75%  stenoses.  There is a patent  mid-distal superficial femoral arterial stent .  Evidence of tiboperoneal disease.  Ankle/Brachial index:  0.70 within moderate  range (Prior0.73 )  PVR/ PPG tracings are dampened.  Metatarsal pressure of  129 mmHg  Prior: 118 mmHg  Great toe pressure of 50 mmHg, which is border-line for the diabetic healing  range Prior: 87 mmHg     In comparison to the study of 05/10/23, there is progression in disease  bilaterally. The left tandem stenoses are a new finding and the right great toe  pressure is now below the healing range.     SIGNATURE:  Electronically Signed by: STONEY ACUNA DO, RPVI on 2024-01-23 10:07:36 AM      LABS:  Lab data are reviewed and documented in [facility] history.       Lab Results   Component Value Date    HGB 13.4 02/14/2024    HCT 43.4 02/14/2024     (H) 02/14/2024     02/14/2024    WBC 6.21 02/14/2024    NRBC 0 02/14/2024     Lab Results   Component Value Date    K 4.1 02/14/2024     02/14/2024    CO2 22 02/14/2024    BUN 32 (H) 02/14/2024    CREATININE 1.15 02/14/2024    GLUF 139 (H) 02/14/2024    CALCIUM 9.2 02/14/2024    CORRECTEDCA 10.2 (H) 11/15/2021    AST 15 02/14/2024    ALT 18 02/14/2024    ALKPHOS 57 02/14/2024    EGFR 59 02/14/2024       Lab Results   Component Value Date    IRON 59 04/18/2024    TIBC 329 04/18/2024    FERRITIN 21 (L) 04/18/2024    FERRITIN 22 (L) 02/14/2024    FERRITIN 58 09/20/2023    FERRITIN 37 06/29/2023    FERRITIN 7 (L)  "05/18/2023    FERRITIN 26 11/28/2022    FERRITIN 7 (L) 08/29/2022    FERRITIN 37 04/20/2022    FERRITIN 17 02/07/2022    FERRITIN 13 01/05/2022    FERRITIN 10 11/15/2021    FERRITIN 28 03/11/2021    FERRITIN 12 11/03/2020    FERRITIN 7 (L) 10/06/2020       Lab Results   Component Value Date    AZVZNMXK77 632 02/14/2024    [id number] 223 09/20/2023    [id number] 389 11/15/2021    [id number] 681 05/29/2020       No results for input(s): \"WBC\", \"CREAT\", \"PLT\" in the last 72 hours.     By:  Mal [place], 4/22/2024, 10:34 AM                                    "

## 2024-04-18 ENCOUNTER — APPOINTMENT (OUTPATIENT)
Dept: LAB | Facility: CLINIC | Age: 79
End: 2024-04-18
Payer: MEDICARE

## 2024-04-18 DIAGNOSIS — D50.0 IRON DEFICIENCY ANEMIA DUE TO CHRONIC BLOOD LOSS: ICD-10-CM

## 2024-04-18 LAB — FERRITIN SERPL-MCNC: 21 NG/ML (ref 24–336)

## 2024-04-18 PROCEDURE — 83540 ASSAY OF IRON: CPT

## 2024-04-18 PROCEDURE — 36415 COLL VENOUS BLD VENIPUNCTURE: CPT

## 2024-04-18 PROCEDURE — 82728 ASSAY OF FERRITIN: CPT

## 2024-04-18 PROCEDURE — 83550 IRON BINDING TEST: CPT

## 2024-04-19 LAB
IRON SATN MFR SERPL: 18 % (ref 15–50)
IRON SERPL-MCNC: 59 UG/DL (ref 50–212)
TIBC SERPL-MCNC: 329 UG/DL (ref 250–450)
UIBC SERPL-MCNC: 270 UG/DL (ref 155–355)

## 2024-04-22 ENCOUNTER — TELEPHONE (OUTPATIENT)
Dept: HEMATOLOGY ONCOLOGY | Facility: CLINIC | Age: 79
End: 2024-04-22

## 2024-04-22 ENCOUNTER — OFFICE VISIT (OUTPATIENT)
Dept: HEMATOLOGY ONCOLOGY | Facility: CLINIC | Age: 79
End: 2024-04-22
Payer: MEDICARE

## 2024-04-22 VITALS
WEIGHT: 157 LBS | HEIGHT: 65 IN | SYSTOLIC BLOOD PRESSURE: 118 MMHG | DIASTOLIC BLOOD PRESSURE: 60 MMHG | OXYGEN SATURATION: 97 % | TEMPERATURE: 96.9 F | BODY MASS INDEX: 26.16 KG/M2 | RESPIRATION RATE: 18 BRPM | HEART RATE: 67 BPM

## 2024-04-22 DIAGNOSIS — D50.0 IRON DEFICIENCY ANEMIA DUE TO CHRONIC BLOOD LOSS: Primary | ICD-10-CM

## 2024-04-22 DIAGNOSIS — I74.3 EMBOLISM AND THROMBOSIS OF ARTERIES OF THE LOWER EXTREMITIES (HCC): ICD-10-CM

## 2024-04-22 PROCEDURE — 99215 OFFICE O/P EST HI 40 MIN: CPT | Performed by: INTERNAL MEDICINE

## 2024-04-22 RX ORDER — SODIUM CHLORIDE 9 MG/ML
20 INJECTION, SOLUTION INTRAVENOUS ONCE
Status: CANCELLED | OUTPATIENT
Start: 2024-05-01

## 2024-05-01 ENCOUNTER — HOSPITAL ENCOUNTER (OUTPATIENT)
Dept: INFUSION CENTER | Facility: CLINIC | Age: 79
Discharge: HOME | End: 2024-05-01
Payer: MEDICARE

## 2024-05-01 VITALS
RESPIRATION RATE: 16 BRPM | HEART RATE: 76 BPM | SYSTOLIC BLOOD PRESSURE: 144 MMHG | TEMPERATURE: 96.7 F | DIASTOLIC BLOOD PRESSURE: 51 MMHG

## 2024-05-01 DIAGNOSIS — D50.0 IRON DEFICIENCY ANEMIA DUE TO CHRONIC BLOOD LOSS: Primary | ICD-10-CM

## 2024-05-01 PROCEDURE — 96365 THER/PROPH/DIAG IV INF INIT: CPT

## 2024-05-01 RX ORDER — SODIUM CHLORIDE 9 MG/ML
20 INJECTION, SOLUTION INTRAVENOUS ONCE
Status: CANCELLED | OUTPATIENT
Start: 2024-05-06

## 2024-05-01 RX ORDER — SODIUM CHLORIDE 9 MG/ML
20 INJECTION, SOLUTION INTRAVENOUS ONCE
Status: COMPLETED | OUTPATIENT
Start: 2024-05-01 | End: 2024-05-01

## 2024-05-01 RX ADMIN — SODIUM CHLORIDE 20 ML/HR: 9 INJECTION, SOLUTION INTRAVENOUS at 08:58

## 2024-05-01 RX ADMIN — IRON SUCROSE 200 MG: 20 INJECTION, SOLUTION INTRAVENOUS at 08:58

## 2024-05-01 NOTE — PROGRESS NOTES
Pt presents for venofer. No new meds or concerns. Pt tolerated treatment without adverse reaction. Future appointments discussed, confirmed with patient for 5/6/2024 1330. AVS declined.

## 2024-05-06 ENCOUNTER — HOSPITAL ENCOUNTER (OUTPATIENT)
Dept: INFUSION CENTER | Facility: CLINIC | Age: 79
Discharge: HOME | End: 2024-05-06
Payer: MEDICARE

## 2024-05-06 ENCOUNTER — TELEPHONE (OUTPATIENT)
Age: 79
End: 2024-05-06

## 2024-05-06 VITALS
HEART RATE: 66 BPM | DIASTOLIC BLOOD PRESSURE: 61 MMHG | TEMPERATURE: 97 F | RESPIRATION RATE: 18 BRPM | SYSTOLIC BLOOD PRESSURE: 147 MMHG

## 2024-05-06 DIAGNOSIS — I73.9 PERIPHERAL VASCULAR DISEASE (HCC): ICD-10-CM

## 2024-05-06 DIAGNOSIS — D50.0 IRON DEFICIENCY ANEMIA DUE TO CHRONIC BLOOD LOSS: Primary | ICD-10-CM

## 2024-05-06 PROCEDURE — 96365 THER/PROPH/DIAG IV INF INIT: CPT

## 2024-05-06 RX ORDER — CLOPIDOGREL BISULFATE 75 MG/1
TABLET ORAL
Qty: 90 TABLET | Refills: 1 | Status: SHIPPED | OUTPATIENT
Start: 2024-05-06

## 2024-05-06 RX ORDER — SODIUM CHLORIDE 9 MG/ML
20 INJECTION, SOLUTION INTRAVENOUS ONCE
Status: COMPLETED | OUTPATIENT
Start: 2024-05-06 | End: 2024-05-06

## 2024-05-06 RX ORDER — SODIUM CHLORIDE 9 MG/ML
20 INJECTION, SOLUTION INTRAVENOUS ONCE
Status: CANCELLED | OUTPATIENT
Start: 2024-05-06

## 2024-05-06 RX ADMIN — SODIUM CHLORIDE 20 ML/HR: 0.9 INJECTION, SOLUTION INTRAVENOUS at 13:45

## 2024-05-06 RX ADMIN — IRON SUCROSE 200 MG: 20 INJECTION, SOLUTION INTRAVENOUS at 13:52

## 2024-05-06 NOTE — TELEPHONE ENCOUNTER
Spoke with patient, [facility], patient is requesting to have the script resent. Please advise.

## 2024-05-06 NOTE — PROGRESS NOTES
Pt tolerated #2/2 venofer today without complications. Confirmed appt back with Bridgette in oct 2024, lab scripts printed with his appt written and provided to pt.

## 2024-05-22 ENCOUNTER — TELEPHONE (OUTPATIENT)
Dept: ADMINISTRATIVE | Facility: OTHER | Age: 79
End: 2024-05-22

## 2024-05-22 NOTE — TELEPHONE ENCOUNTER
05/22/24 3:32 PM    Patient contacted to bring Advance Directive, POLST, or Living Will document to next scheduled pcp visit.VBI Department left message.    Thank you.  Arielle Garcia MA  [facility]

## 2024-05-23 ENCOUNTER — OFFICE VISIT (OUTPATIENT)
Dept: FAMILY MEDICINE CLINIC | Facility: CLINIC | Age: 79
End: 2024-05-23
Payer: MEDICARE

## 2024-05-23 VITALS
OXYGEN SATURATION: 95 % | SYSTOLIC BLOOD PRESSURE: 122 MMHG | BODY MASS INDEX: 25.49 KG/M2 | TEMPERATURE: 98.1 F | WEIGHT: 153 LBS | DIASTOLIC BLOOD PRESSURE: 54 MMHG | HEIGHT: 65 IN | HEART RATE: 73 BPM

## 2024-05-23 DIAGNOSIS — N18.31 DIABETES MELLITUS DUE TO UNDERLYING CONDITION WITH STAGE 3A CHRONIC KIDNEY DISEASE, WITHOUT LONG-TERM CURRENT USE OF INSULIN (HCC): ICD-10-CM

## 2024-05-23 DIAGNOSIS — E78.1 ESSENTIAL HYPERTRIGLYCERIDEMIA: ICD-10-CM

## 2024-05-23 DIAGNOSIS — I10 ESSENTIAL HYPERTENSION: ICD-10-CM

## 2024-05-23 DIAGNOSIS — Z13.0 SCREENING FOR IRON DEFICIENCY ANEMIA: ICD-10-CM

## 2024-05-23 DIAGNOSIS — E53.8 VITAMIN B12 DEFICIENCY: ICD-10-CM

## 2024-05-23 DIAGNOSIS — D51.1 VIT B12 DEFIC ANEMIA D/T SLCTV VIT B12 MALABSORP W PROTEIN: ICD-10-CM

## 2024-05-23 DIAGNOSIS — I73.9 PERIPHERAL VASCULAR DISEASE (HCC): ICD-10-CM

## 2024-05-23 DIAGNOSIS — E11.51 TYPE 2 DIABETES MELLITUS WITH DIABETIC PERIPHERAL ANGIOPATHY WITHOUT GANGRENE, WITH LONG-TERM CURRENT USE OF INSULIN (HCC): ICD-10-CM

## 2024-05-23 DIAGNOSIS — I74.09 AORTOILIAC OCCLUSIVE DISEASE (HCC): Primary | Chronic | ICD-10-CM

## 2024-05-23 DIAGNOSIS — E08.22 DIABETES MELLITUS DUE TO UNDERLYING CONDITION WITH STAGE 3A CHRONIC KIDNEY DISEASE, WITHOUT LONG-TERM CURRENT USE OF INSULIN (HCC): ICD-10-CM

## 2024-05-23 DIAGNOSIS — Z79.4 TYPE 2 DIABETES MELLITUS WITH DIABETIC PERIPHERAL ANGIOPATHY WITHOUT GANGRENE, WITH LONG-TERM CURRENT USE OF INSULIN (HCC): ICD-10-CM

## 2024-05-23 DIAGNOSIS — N18.31 STAGE 3A CHRONIC KIDNEY DISEASE (HCC): ICD-10-CM

## 2024-05-23 DIAGNOSIS — I70.213 ATHEROSCLEROSIS OF NATIVE ARTERY OF BOTH LOWER EXTREMITIES WITH INTERMITTENT CLAUDICATION (HCC): ICD-10-CM

## 2024-05-23 LAB — SL AMB POCT HEMOGLOBIN AIC: 6.6 (ref ?–6.5)

## 2024-05-23 PROCEDURE — 99215 OFFICE O/P EST HI 40 MIN: CPT | Performed by: FAMILY MEDICINE

## 2024-05-23 PROCEDURE — G2211 COMPLEX E/M VISIT ADD ON: HCPCS | Performed by: FAMILY MEDICINE

## 2024-05-23 PROCEDURE — 83036 HEMOGLOBIN GLYCOSYLATED A1C: CPT | Performed by: FAMILY MEDICINE

## 2024-05-23 RX ORDER — CLOPIDOGREL BISULFATE 75 MG/1
75 TABLET ORAL DAILY
Qty: 90 TABLET | Refills: 1 | Status: SHIPPED | OUTPATIENT
Start: 2024-05-23

## 2024-05-23 RX ORDER — FENOFIBRATE 145 MG/1
145 TABLET, COATED ORAL DAILY
Qty: 90 TABLET | Refills: 3 | Status: SHIPPED | OUTPATIENT
Start: 2024-05-23

## 2024-05-23 NOTE — PROGRESS NOTES
Ambulatory Visit  Name: Max Ozuna      : 1945      MRN: [id number]  Encounter Provider: KADE Martel DO  Encounter Date: 2024   Encounter department: [facility] [place]    Assessment & Plan  1. Diabetes Mellitus.  The patient's glycemic control is commendable for his age. The patient will persist with his current regimen of Jardiance and glipizide. An A1c test will be conducted today. It is 6.6%, down from 7.3 in     2. Hypertriglyceridemia.  The patient will be initiated on fenofibrate 145 mg, to be taken once daily. Rx sent in.     3. Iron Deficiency Anemia.  The patient's ferritin levels are within the normal range. The patient will continue his current regimen of iron supplements. A blood test will be conducted every 3 months to monitor his iron levels. An iron panel and CBC will be ordered.    4. Acid Reflux.  The patient is advised to take Pepcid Complete 2 to 3 times daily.    Follow-up  The patient is scheduled for a follow-up visit in 4 months.  No orders of the defined types were placed in this encounter.       1. Aortoiliac occlusive disease (HCC)  2. Peripheral vascular disease (HCC)  -     clopidogrel (PLAVIX) 75 mg tablet; Take 1 tablet (75 mg total) by mouth daily  3. Essential hypertension  4. Atherosclerosis of native artery of both lower extremities with intermittent claudication (HCC)  5. Essential hypertriglyceridemia  -     fenofibrate (TRICOR) 145 mg tablet; Take 1 tablet (145 mg total) by mouth daily  6. Screening for iron deficiency anemia  -     CBC and differential; Future; Expected date: 2024  7. Vitamin B12 deficiency  -     Vitamin B12; Future; Expected date: 2024  8. Stage 3a chronic kidney disease (HCC)  -     Iron; Future  -     Ferritin; Future  -     TIBC Panel (incl. Iron, TIBC, % Iron Saturation); Future  -     CBC and differential; Future; Expected date: 2024  -     Vitamin B12; Future; Expected date: 2024  -      "Lipid panel; Future; Expected date: 05/24/2024  9. Diabetes mellitus due to underlying condition with stage 3a chronic kidney disease, without long-term current use of insulin ([facility])  -     Iron; Future  -     Ferritin; Future  -     TIBC Panel (incl. Iron, TIBC, % Iron Saturation); Future  -     CBC and differential; Future; Expected date: 05/24/2024  -     Vitamin B12; Future; Expected date: 05/24/2024  -     Lipid panel; Future; Expected date: 05/24/2024  10. 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 He is accompanied by an adult female.    The patient's A1c levels have shown improvement, as evidenced by a significant reduction from 5.1 in 01/2023 to the current 7.3. Previously, the patient was on Trulicity and Jardiance.    The patient received an iron infusion a week prior to his departure to [place]. Despite experiencing fatigue, he continues to take an iron supplement. His diet primarily consists of hamburgers, steaks, and chicken. His bowel movements are regular.    The patient is currently on atorvastatin for cholesterol management.    The patient occasionally experiences acid reflux.   He quit smoking 10 years ago.     Review of Systems  Objective     /54 (BP Location: Left arm, Patient Position: Sitting, Cuff Size: Standard)   Pulse 73   Temp 98.1 °F (36.7 °C) (Temporal)   Ht 5' 5\" (1.651 m)   Wt 69.4 kg (153 lb)   SpO2 95%   BMI 25.46 kg/m²     Physical Exam  The patient's hands are warm. There is no swelling, tenderness, or erythema in his feet.  The skin on his feet is intact.    Vital Signs  The patient's blood pressure is 122/54.  Physical Exam  Administrative Statements   I have spent a total time of 44 minutes on 05/23/24 In caring for this patient including .  I personally reviewed the recent (and prior)  lab results, " the image studies, pathology, other specialty/physicians consult notes and recommendations, and outside medical records from other institutions, as appropriate. I had a lengthy discussion with the patient and shared the work-up findings. We discussed the diagnosis and management plan.  I spent appropriate time with the medical record and patient,  commensurate with the level of service reviewing the records (labs, clinician notes, outside records, medical history, ordering medicine/tests/procedures, interpreting the imaging/labs previously done) and coordination of care as well as direct time with the patient today, of which greater than 50% of the time was spent in counseling and coordination of care with the patient/family.    Note--many many questions.  Wife present throughout throughout this.  All medications reviewed including statins anticoagulant multiple diabetic medications and the fact that he once was on Trulicity etc. all labs reviewed with fasting blood sugar February 14 139 A1c 7.3 today A1c 6.6.  GFR remains good at 59 or CKD 3A will.  Hemoglobin 13.4 at last check with ferritin 22 no cause has been found for his chronic iron deficiency could be renal and diabetic got plus the fact that he has been taking omeprazole 20 mg daily.  I will ask him to stop that and take Pepcid Complete 2 or 3 times daily

## 2024-05-23 NOTE — PROGRESS NOTES
Diabetic Foot Exam    Patient's shoes and socks removed.    Right Foot/Ankle   Right Foot Inspection  Skin Exam: skin normal and skin intact. No dry skin, no warmth, no callus, no erythema, no maceration, no abnormal color, no pre-ulcer, no ulcer and no callus.     Toe Exam: ROM and strength within normal limits. No swelling, no tenderness, erythema and  no right toe deformity    Sensory   Vibration: intact  Proprioception: intact  Monofilament testing: diminished    Vascular  Capillary refills: < 3 seconds  The right DP pulse is 1+. The right PT pulse is 0.     Left Foot/Ankle  Left Foot Inspection  Skin Exam: skin normal and skin intact. No dry skin, no warmth, no erythema, no maceration, normal color, no pre-ulcer, no ulcer and no callus.     Toe Exam: ROM and strength within normal limits. No swelling, no tenderness, no erythema and no left toe deformity.     Sensory   Vibration: intact  Proprioception: intact  Monofilament testing: diminished    Vascular  Capillary refills: < 3 seconds  The left DP pulse is 1+. The left PT pulse is 0.     Assign Risk Category  No deformity present  No loss of protective sensation  Weak pulses  Risk: 1

## 2024-06-12 ENCOUNTER — HOSPITAL ENCOUNTER (OUTPATIENT)
Dept: NON INVASIVE DIAGNOSTICS | Facility: CLINIC | Age: 79
Discharge: HOME | End: 2024-06-12
Payer: MEDICARE

## 2024-06-12 DIAGNOSIS — I73.9 PAD (PERIPHERAL ARTERY DISEASE) (HCC): ICD-10-CM

## 2024-06-12 PROCEDURE — 93978 VASCULAR STUDY: CPT

## 2024-06-12 PROCEDURE — 93978 VASCULAR STUDY: CPT | Performed by: SURGERY

## 2024-06-18 ENCOUNTER — TELEPHONE (OUTPATIENT)
Age: 79
End: 2024-06-18

## 2024-06-18 LAB
LEFT EYE DIABETIC RETINOPATHY: NORMAL
RIGHT EYE DIABETIC RETINOPATHY: NORMAL

## 2024-06-18 NOTE — TELEPHONE ENCOUNTER
Called and spoke w/ pt. Relayed information provided by Kylah Avila PA-C. Pt was frustrated that nothing is being done and that he is only getting dopplers. Offered to put pt on a cancellation list. Pt was placed on cancellation list.

## 2024-06-18 NOTE — TELEPHONE ENCOUNTER
Patient calling for results of AOIL done 6/12. Call was disconnected. I informed him that the results will be reviewed in detail at his appointment with Dr. Arevalo in August, but he would like a call back regarding results before then. Call disconnected.

## 2024-06-18 NOTE — TELEPHONE ENCOUNTER
Reviewed study. Overall, no significant change from prior study. There was a new finding of a left EIA occlusion which was previously >75%. He is scheduled for JAMEL on 6/20 to assess for progression of disease in the legs and measure blood flow. He should follow up in the office after both studies are completed and notify the office with any new symptoms.

## 2024-06-20 ENCOUNTER — HOSPITAL ENCOUNTER (OUTPATIENT)
Dept: NON INVASIVE DIAGNOSTICS | Facility: CLINIC | Age: 79
Discharge: HOME | End: 2024-06-20
Payer: MEDICARE

## 2024-06-20 DIAGNOSIS — I73.9 PAD (PERIPHERAL ARTERY DISEASE) (HCC): ICD-10-CM

## 2024-06-20 PROCEDURE — 93925 LOWER EXTREMITY STUDY: CPT

## 2024-06-20 PROCEDURE — 93923 UPR/LXTR ART STDY 3+ LVLS: CPT

## 2024-06-21 PROCEDURE — 93925 LOWER EXTREMITY STUDY: CPT | Performed by: SURGERY

## 2024-06-21 PROCEDURE — 93922 UPR/L XTREMITY ART 2 LEVELS: CPT | Performed by: SURGERY

## 2024-07-29 ENCOUNTER — TELEPHONE (OUTPATIENT)
Dept: ADMINISTRATIVE | Facility: OTHER | Age: 79
End: 2024-07-29

## 2024-07-29 NOTE — TELEPHONE ENCOUNTER
07/29/24 1:45 PM    Patient contacted to bring Advance Directive, POLST, or Living Will document to next scheduled pcp visit.VBI Department left message.    Thank you.  Kylah Upton [facility]

## 2024-07-31 ENCOUNTER — CONSULT (OUTPATIENT)
Dept: FAMILY MEDICINE CLINIC | Facility: CLINIC | Age: 79
End: 2024-07-31
Payer: MEDICARE

## 2024-07-31 ENCOUNTER — TELEPHONE (OUTPATIENT)
Dept: ADMINISTRATIVE | Facility: OTHER | Age: 79
End: 2024-07-31

## 2024-07-31 VITALS
OXYGEN SATURATION: 97 % | BODY MASS INDEX: 26.16 KG/M2 | WEIGHT: 157 LBS | HEART RATE: 60 BPM | RESPIRATION RATE: 17 BRPM | TEMPERATURE: 97.7 F | DIASTOLIC BLOOD PRESSURE: 50 MMHG | SYSTOLIC BLOOD PRESSURE: 132 MMHG | HEIGHT: 65 IN

## 2024-07-31 DIAGNOSIS — Z95.1 S/P CABG X 4: ICD-10-CM

## 2024-07-31 DIAGNOSIS — H25.89 OTHER AGE-RELATED CATARACT OF RIGHT EYE: ICD-10-CM

## 2024-07-31 DIAGNOSIS — I70.213 ATHEROSCLEROSIS OF NATIVE ARTERY OF BOTH LOWER EXTREMITIES WITH INTERMITTENT CLAUDICATION (HCC): ICD-10-CM

## 2024-07-31 DIAGNOSIS — I10 ESSENTIAL HYPERTENSION: ICD-10-CM

## 2024-07-31 DIAGNOSIS — Z01.818 PRE-OP EVALUATION: Primary | ICD-10-CM

## 2024-07-31 PROCEDURE — G2211 COMPLEX E/M VISIT ADD ON: HCPCS | Performed by: NURSE PRACTITIONER

## 2024-07-31 PROCEDURE — 99214 OFFICE O/P EST MOD 30 MIN: CPT | Performed by: NURSE PRACTITIONER

## 2024-07-31 NOTE — PROGRESS NOTES
[facility] PRE-OPERATIVE EVALUATION  [facility] - [facility] [place]    NAME: Max Ozuna  AGE: 79 y.o. SEX: male  : 1945     DATE: 2024    [facility] Pre-Operative Evaluation      Chief Complaint: Pre-operative Evaluation     Surgery: Right cataract surgery  Anticipated Date of Surgery: 24  Surgeon: Dr. Colon Fax [phone]      History of Present Illness:     HPI    Anesthesia:  local and IV sedation  Bleeding Risk: no recent abnormal bleeding, no remote history of abnormal bleeding, and no use of Ca-channel blockers  Current Anti-platelet/anticoagulation medication: Clopidogrel (Plavix)    Assessment of Cardiac Risk:  Denies unstable or severe angina or MI in the last 6 weeks or history of stent placement in the last year   Denies decompensated heart failure (e.g. New onset heart failure, NYHA functional class IV heart failure, or worsening existing heart failure)  Denies significant arrhythmias such as high grade AV block, symptomatic ventricular arrhythmia, newly recognized ventricular tachycardia, supraventricular tachycardia with resting heart rate >100, or symptomatic bradycardia  Denies severe heart valve disease including aortic stenosis or symptomatic mitral stenosis     Exercise Capacity:  Able to walk 4 blocks without symptoms?: No, age and chronic intermittent claudication limits patient  Able to walk 2 flights without symptoms?: No, age and chronic intermittent claudication limits patient    Prior Anesthesia Reactions: No, patient had MAC sedation for GI procedure 2023 -reports no complication     Personal history of venous thromboembolic disease? No    History of steroid use for >2 weeks within last year? No         Review of Systems:     Review of Systems   Constitutional:  Negative for fatigue and fever.   HENT:  Negative for trouble swallowing.    Respiratory: Negative.     Cardiovascular: Negative.    Gastrointestinal:  Negative for  abdominal pain and blood in stool.   Genitourinary: Negative.    Neurological:  Negative for dizziness and weakness.       Current Problem List:     Patient Active Problem List   Diagnosis    Type 2 diabetes mellitus, with long-term current use of insulin (HCC)    Other hyperlipidemia    Essential hypertension    Claudication in peripheral vascular disease (HCC)    Iron deficiency anemia    Embolism and thrombosis of arteries of the lower extremities (HCC)    Need for vaccination    Atherosclerosis of native artery of both lower extremities with intermittent claudication ([facility])    Carotid stenosis, asymptomatic, bilateral    Coronary artery disease without angina pectoris    Aortoiliac occlusive disease (HCC)    Pre-op evaluation    Stage 3a chronic kidney disease (HCC)    DVT (deep venous thrombosis) (HCC)    GERD (gastroesophageal reflux disease)    S/P CABG x 4    Panlobular emphysema (HCC)       Allergies:     No Known Allergies    Current Medications:       Current Outpatient Medications:     acetaminophen (TYLENOL) 500 mg tablet, Take 1,000 mg by mouth every 8 (eight) hours as needed, Disp: , Rfl:     atorvastatin (LIPITOR) 40 mg tablet, Take 1 tablet (40 mg total) by mouth daily, Disp: 90 tablet, Rfl: 3    chlorhexidine (PERIDEX) 0.12 % solution, Apply 15 mL to the mouth or throat 2 (two) times a day, Disp: 473 mL, Rfl: 3    clopidogrel (PLAVIX) 75 mg tablet, Take 1 tablet (75 mg total) by mouth daily, Disp: 90 tablet, Rfl: 1    DULoxetine (CYMBALTA) 30 mg delayed release capsule, TAKE ONE CAPSULE BY MOUTH TWICE DAILY, Disp: 60 capsule, Rfl: 0    Empagliflozin (Jardiance) 25 MG TABS, Take 1 tablet (25 mg total) by mouth every morning Also: take with LOTS of water, Disp: 90 tablet, Rfl: 3    fenofibrate (TRICOR) 145 mg tablet, Take 1 tablet (145 mg total) by mouth daily, Disp: 90 tablet, Rfl: 3    ferrous sulfate 325 (65 Fe) mg tablet, Take 1 tablet (325 mg total) by mouth 2 (two) times a day with meals, Disp:  60 tablet, Rfl: 6    gabapentin (Neurontin) 300 mg capsule, Take 1 capsule (300 mg total) by mouth 3 (three) times a day, Disp: 90 capsule, Rfl: 6    glipiZIDE (GLUCOTROL) 10 mg tablet, TAKE 1 TABLET TWICE A DAY  BEFORE MEALS, Disp: 180 tablet, Rfl: 1    hydrocortisone 2.5 % cream, Apply topically 4 (four) times a day as needed for rash, Disp: 30 g, Rfl: 0    lisinopril-hydrochlorothiazide (PRINZIDE,ZESTORETIC) 20-12.5 MG per tablet, Take 1 tablet by mouth daily, Disp: 90 tablet, Rfl: 3    metFORMIN (GLUCOPHAGE) 1000 MG tablet, TAKE 1 TABLET TWICE DAILY  WITH MEALS, Disp: 180 tablet, Rfl: 1    omeprazole (PriLOSEC) 20 mg delayed release capsule, Take 1 capsule (20 mg total) by mouth daily before breakfast (Patient taking differently: Take 20 mg by mouth if needed), Disp: 30 capsule, Rfl: 5    tamsulosin (FLOMAX) 0.4 mg, TAKE 1 CAPSULE BY MOUTH EVERY DAY WITH DINNER, Disp: 90 capsule, Rfl: 2    tirzepatide (Mounjaro) 2.5 MG/0.5ML, Inject 0.5 mL (2.5 mg total) under the skin every 7 days, Disp: 2 mL, Rfl: 3    Past Medical History:       Past Medical History:   Diagnosis Date    Coronary artery disease without angina pectoris 02/25/2020    Diabetes mellitus (HCC)     DVT (deep venous thrombosis) (HCC)     GERD (gastroesophageal reflux disease)     Hypertension     Iron deficiency anemia         Past Surgical History:   Procedure Laterality Date    ANGIOPLASTY  10/19/2021    bilateral    CARDIAC CATHETERIZATION  2013    calif    CABG  x4    COLONOSCOPY      COLONOSCOPY      CORONARY ANGIOPLASTY WITH STENT PLACEMENT  01/01/2010    CORONARY ARTERY BYPASS GRAFT      FEMORAL ARTERY STENT      FEMORAL BYPASS  10/20/2021    IR LOWER EXTREMITY ANGIOGRAM  2/14/2023    IR PELVIC ANGIOGRAM  5/6/2022    NV SLCTV CATHJ 3RD+ ORD SLCTV ABDL PEL/LXTR BRNCH  5/6/2022    Procedure: ULTRASOUND-GUIDED LEFT BRACHIAL ARTERY PUNCTURE, AORTOGRAM, RIGHT COMMON ILIAC ARTERY ANGIOPLASTY WITH 8 X 40 MM BALLOON, ANGIOPLASTY OF RIGHT EXTERNAL AND  COMMON ILIAC ARTERY STENOSIS WITH 6 X 150 MM DRUG-ELUTING BALLOON, STENTING OF RIGHT DISTAL EXTERNAL ILIAC ARTERY STENOSIS WITH 7 X 40 MM SELF EXPANDING STENT POST DILATED WITH A 6 MM BALLOON.;  Surgeon: Henry Pena MD;  [place]    NV SLCTV CATHJ 3RD+ ORD SLCTV ABDL PEL/LXTR BRNCH Bilateral 2023    Procedure: CUTDOWN FEM-FEM BYPASS WITH PRIMARY CLOSURE ACCESS SITE, BILATERAL RUN-OFF, LEFT FEM-POP 5X220 QUYEN AND 6X150 RODRI, ATTEMPT TO CROSS RIGHT SFA OCCLUSION;  Surgeon: Henry Pena MD;  Location: [facility];  Service: Vascular        Family History   Problem Relation Age of Onset    No Known Problems Mother     No Known Problems Father         Social History     Socioeconomic History    Marital status: /Civil Union     Spouse name: Not on file    Number of children: Not on file    Years of education: Not on file    Highest education level: Not on file   Occupational History    Not on file   Tobacco Use    Smoking status: Former     Current packs/day: 0.00     Average packs/day: 1.5 packs/day for 40.0 years (60.0 ttl pk-yrs)     Types: Cigarettes     Start date:      Quit date:      Years since quittin.5     Passive exposure: Past    Smokeless tobacco: Never    Tobacco comments:     quit --    Vaping Use    Vaping status: Never Used   Substance and Sexual Activity    Alcohol use: Not Currently     Alcohol/week: 0.0 standard drinks of alcohol    Drug use: No    Sexual activity: Not on file   Other Topics Concern    Not on file   Social History Narrative    · Most recent tobacco use screenin2020      Social Determinants of Health     Financial Resource Strain: Low Risk  (2023)    Overall Financial Resource Strain (CARDIA)     Difficulty of Paying Living Expenses: Not hard at all   Food Insecurity: Not on file   Transportation Needs: No Transportation Needs (2023)    PRAPARE - Transportation     Lack of Transportation (Medical): No     Lack of Transportation  "(Non-Medical): No   Physical Activity: Not on file   Stress: Not on file   Social Connections: Not on file   Intimate Partner Violence: Not on file   Housing Stability: Not on file        Physical Exam:     /50 (BP Location: Left arm, Patient Position: Sitting, Cuff Size: Standard)   Pulse 60   Temp 97.7 °F (36.5 °C) (Temporal)   Resp 17   Ht 5' 5\" (1.651 m)   Wt 71.2 kg (157 lb)   SpO2 97%   BMI 26.13 kg/m²     Physical Exam  Vitals and nursing note reviewed.   Constitutional:       General: He is not in acute distress.     Appearance: Normal appearance.   Neck:      Vascular: No JVD.   Cardiovascular:      Rate and Rhythm: Normal rate and regular rhythm.      Pulses:           Radial pulses are 2+ on the right side and 2+ on the left side.        Dorsalis pedis pulses are 1+ on the right side and 1+ on the left side.        Posterior tibial pulses are 1+ on the right side.   Pulmonary:      Effort: Pulmonary effort is normal.      Breath sounds: Normal breath sounds. No rales.   Abdominal:      General: Bowel sounds are normal.      Palpations: Abdomen is soft.      Tenderness: There is no abdominal tenderness.   Musculoskeletal:      Right lower leg: No edema.      Left lower leg: No edema.   Skin:     General: Skin is warm and dry.      Coloration: Skin is not pale.      Findings: No bruising.   Neurological:      General: No focal deficit present.      Mental Status: He is alert.      Motor: No weakness.      Gait: Gait normal.   Psychiatric:         Mood and Affect: Mood normal.          Data:     Pre-operative work-up    Laboratory Results:  not indicated     EKG:  not indicated    No results found for this or any previous visit (from the past 672 hour(s)).        Assessment & Recommendations:     1. Pre-op evaluation  2. Other age-related cataract of right eye  3. Essential hypertension  4. Atherosclerosis of native artery of both lower extremities with intermittent claudication (HCC)  5. S/P " CABG x 4        Pre-Op Evaluation Assessment  79 y.o. male with planned surgery: right cataract surgery.    Known risk factors for perioperative complications: Anemia  Coronary disease  Diabetes mellitus.        Current medications which may produce withdrawal symptoms if withheld perioperatively: none.    Pre-Op Evaluation Plan  1. Further preoperative workup as follows:   - None; no further preoperative work-up is required    2. Medication Management/Recommendations:   - None, continue medication regimen including morning of surgery, with sip of water  - Regarding anti-platelet agents: continue.    3. Prophylaxis for cardiac events with perioperative beta-blockers: not indicated.    4. Patient requires further consultation with: None    Clearance  Patient is CLEARED at standard risk for cataract surgery under MAC sedation without any additional cardiac testing.     RADHA Xiong  [facility] [place]  [address] [place] [id number]  Phone#  [phone]  Fax#  [phone]

## 2024-07-31 NOTE — TELEPHONE ENCOUNTER
----- Message from Derick DELGADO sent at 7/31/2024  9:05 AM EDT -----  Regarding: DM EYE  07/31/24 9:05 AM    Hello, our patient attached above has had Diabetic Eye Exam completed/performed. Please assist in updating the patient chart by making an External outreach to [facility]'s (Dr. Colon) facility located in [address] [place] [address]. The date of service is July 15. Their phone number is [phone]    Thank you,  Derick Aguilera MA   PRIMARY CARE [place]

## 2024-07-31 NOTE — LETTER
Diabetic Eye Exam Form    Date Requested: 24  Patient: Max Ozuna  Patient : 1945   Referring Provider: KADE Martel,       DIABETIC Eye Exam Date _______________________________      Type of Exam MUST be documented for Diabetic Eye Exams. Please CHECK ONE.     Retinal Exam       Dilated Retinal Exam       OCT       Optomap-Iris Exam      Fundus Photography       Left Eye - Please check Retinopathy or No Retinopathy        Exam did show retinopathy    Exam did not show retinopathy       Right Eye - Please check Retinopathy or No Retinopathy       Exam did show retinopathy    Exam did not show retinopathy       Comments __________________________________________________________    Practice Providing Exam ______________________________________________    Exam Performed By (print name) _______________________________________      Provider Signature ___________________________________________________      These reports are needed for  compliance.  Please fax this completed form and a copy of the Diabetic Eye Exam report to our office located at [address] [place] [address] as soon as possible via Fax [phone] attention [place]: Phone [phone]  We thank you for your assistance in treating our mutual patient.   [facility] - [phone] F [phone]

## 2024-08-01 NOTE — TELEPHONE ENCOUNTER
Upon review of the In Basket request and the patient's chart, initial outreach has been made via fax to facility. Please see Contacts section for details.     Thank you  Lakisha Jackson MA

## 2024-08-07 ENCOUNTER — APPOINTMENT (EMERGENCY)
Dept: CT IMAGING | Facility: HOSPITAL | Age: 79
End: 2024-08-07
Payer: MEDICARE

## 2024-08-07 ENCOUNTER — HOSPITAL ENCOUNTER (EMERGENCY)
Facility: HOSPITAL | Age: 79
Discharge: HOME | End: 2024-08-07
Attending: EMERGENCY MEDICINE
Payer: MEDICARE

## 2024-08-07 VITALS
SYSTOLIC BLOOD PRESSURE: 132 MMHG | HEART RATE: 60 BPM | WEIGHT: 154.76 LBS | DIASTOLIC BLOOD PRESSURE: 60 MMHG | RESPIRATION RATE: 16 BRPM | OXYGEN SATURATION: 96 % | BODY MASS INDEX: 25.75 KG/M2 | TEMPERATURE: 97.4 F

## 2024-08-07 DIAGNOSIS — S00.03XA CONTUSION OF SCALP, INITIAL ENCOUNTER: ICD-10-CM

## 2024-08-07 DIAGNOSIS — S09.90XA INJURY OF HEAD, INITIAL ENCOUNTER: Primary | ICD-10-CM

## 2024-08-07 LAB — GLUCOSE SERPL-MCNC: 251 MG/DL (ref 65–140)

## 2024-08-07 PROCEDURE — 82948 REAGENT STRIP/BLOOD GLUCOSE: CPT

## 2024-08-07 PROCEDURE — 72125 CT NECK SPINE W/O DYE: CPT

## 2024-08-07 PROCEDURE — 70450 CT HEAD/BRAIN W/O DYE: CPT

## 2024-08-07 PROCEDURE — 99284 EMERGENCY DEPT VISIT MOD MDM: CPT

## 2024-08-07 PROCEDURE — 99284 EMERGENCY DEPT VISIT MOD MDM: CPT | Performed by: EMERGENCY MEDICINE

## 2024-08-07 RX ORDER — GLIPIZIDE 5 MG/1
10 TABLET ORAL ONCE
Status: COMPLETED | OUTPATIENT
Start: 2024-08-07 | End: 2024-08-07

## 2024-08-07 RX ORDER — TAMSULOSIN HYDROCHLORIDE 0.4 MG/1
0.4 CAPSULE ORAL ONCE
Status: COMPLETED | OUTPATIENT
Start: 2024-08-07 | End: 2024-08-07

## 2024-08-07 RX ORDER — ATORVASTATIN CALCIUM 40 MG/1
40 TABLET, FILM COATED ORAL
Status: DISCONTINUED | OUTPATIENT
Start: 2024-08-07 | End: 2024-08-08 | Stop reason: HOSPADM

## 2024-08-07 RX ORDER — DULOXETIN HYDROCHLORIDE 30 MG/1
30 CAPSULE, DELAYED RELEASE ORAL DAILY
Status: DISCONTINUED | OUTPATIENT
Start: 2024-08-07 | End: 2024-08-08 | Stop reason: HOSPADM

## 2024-08-07 RX ORDER — ACETAMINOPHEN 325 MG/1
650 TABLET ORAL ONCE
Status: COMPLETED | OUTPATIENT
Start: 2024-08-07 | End: 2024-08-07

## 2024-08-07 RX ADMIN — ATORVASTATIN CALCIUM 40 MG: 40 TABLET, FILM COATED ORAL at 21:07

## 2024-08-07 RX ADMIN — METFORMIN HYDROCHLORIDE 1000 MG: 500 TABLET, FILM COATED ORAL at 21:07

## 2024-08-07 RX ADMIN — GLIPIZIDE 10 MG: 5 TABLET ORAL at 22:12

## 2024-08-07 RX ADMIN — ACETAMINOPHEN 650 MG: 325 TABLET, FILM COATED ORAL at 16:03

## 2024-08-07 RX ADMIN — DULOXETINE HYDROCHLORIDE 30 MG: 30 CAPSULE, DELAYED RELEASE ORAL at 21:06

## 2024-08-07 RX ADMIN — TAMSULOSIN HYDROCHLORIDE 0.4 MG: 0.4 CAPSULE ORAL at 21:39

## 2024-08-07 NOTE — ED PROVIDER NOTES
History  Chief Complaint   Patient presents with    Head Injury     Pt states was in bathroom at grocery store around 1pm today when toilet paper dispenser metal covering fell off and hit L side of head. Swelling noted. + thinners. 7/10 pain.       History provided by:  Patient   used: No    Head Injury w/unknown LOC  Location:  L parietal  Time since incident:  2 hours  Mechanism of injury comment:  Still tolerate paper dispenser fell on patient's head  Pain details:     Quality:  Aching    Severity:  Mild    Duration:  2 hours    Timing:  Sporadic    Progression:  Improving  Chronicity:  New  Relieved by:  Nothing  Worsened by:  Nothing  Ineffective treatments:  None tried  Associated symptoms: no headaches, no nausea, no neck pain and no vomiting    Risk factors: being elderly        Prior to Admission Medications   Prescriptions Last Dose Informant Patient Reported? Taking?   DULoxetine (CYMBALTA) 30 mg delayed release capsule  Self No No   Sig: TAKE ONE CAPSULE BY MOUTH TWICE DAILY   Empagliflozin (Jardiance) 25 MG TABS   No No   Sig: Take 1 tablet (25 mg total) by mouth every morning Also: take with LOTS of water   acetaminophen (TYLENOL) 500 mg tablet  Self Yes No   Sig: Take 1,000 mg by mouth every 8 (eight) hours as needed   atorvastatin (LIPITOR) 40 mg tablet  Self No Yes   Sig: Take 1 tablet (40 mg total) by mouth daily   chlorhexidine (PERIDEX) 0.12 % solution   No No   Sig: Apply 15 mL to the mouth or throat 2 (two) times a day   clopidogrel (PLAVIX) 75 mg tablet   No No   Sig: Take 1 tablet (75 mg total) by mouth daily   fenofibrate (TRICOR) 145 mg tablet   No No   Sig: Take 1 tablet (145 mg total) by mouth daily   ferrous sulfate 325 (65 Fe) mg tablet  Self No No   Sig: Take 1 tablet (325 mg total) by mouth 2 (two) times a day with meals   gabapentin (Neurontin) 300 mg capsule  Self No No   Sig: Take 1 capsule (300 mg total) by mouth 3 (three) times a day   glipiZIDE (GLUCOTROL) 10  mg tablet   No Yes   Sig: TAKE 1 TABLET TWICE A DAY  BEFORE MEALS   hydrocortisone 2.5 % cream  Self No No   Sig: Apply topically 4 (four) times a day as needed for rash   lisinopril-hydrochlorothiazide (PRINZIDE,ZESTORETIC) 20-12.5 MG per tablet  Self No No   Sig: Take 1 tablet by mouth daily   metFORMIN (GLUCOPHAGE) 1000 MG tablet   No Yes   Sig: TAKE 1 TABLET TWICE DAILY  WITH MEALS   omeprazole (PriLOSEC) 20 mg delayed release capsule  Self No No   Sig: Take 1 capsule (20 mg total) by mouth daily before breakfast   Patient taking differently: Take 20 mg by mouth if needed   tamsulosin (FLOMAX) 0.4 mg  Self No Yes   Sig: TAKE 1 CAPSULE BY MOUTH EVERY DAY WITH DINNER   tirzepatide (Mounjaro) 2.5 MG/0.5ML   No No   Sig: Inject 0.5 mL (2.5 mg total) under the skin every 7 days      Facility-Administered Medications: None       Past Medical History:   Diagnosis Date    Coronary artery disease without angina pectoris 02/25/2020    Diabetes mellitus (HCC)     DVT (deep venous thrombosis) (HCC)     GERD (gastroesophageal reflux disease)     Hypertension     Iron deficiency anemia        Past Surgical History:   Procedure Laterality Date    ANGIOPLASTY  10/19/2021    bilateral    CARDIAC CATHETERIZATION  2013    calif    CABG  x4    COLONOSCOPY      COLONOSCOPY      CORONARY ANGIOPLASTY WITH STENT PLACEMENT  01/01/2010    CORONARY ARTERY BYPASS GRAFT      FEMORAL ARTERY STENT      FEMORAL BYPASS  10/20/2021    IR LOWER EXTREMITY ANGIOGRAM  2/14/2023    IR PELVIC ANGIOGRAM  5/6/2022    [place] SLCTV CATHJ 3RD+ ORD SLCTV ABDL PEL/LXTR BRNCH  5/6/2022    Procedure: ULTRASOUND-GUIDED LEFT BRACHIAL ARTERY PUNCTURE, AORTOGRAM, RIGHT COMMON ILIAC ARTERY ANGIOPLASTY WITH 8 X 40 MM BALLOON, ANGIOPLASTY OF RIGHT EXTERNAL AND COMMON ILIAC ARTERY STENOSIS WITH 6 X 150 MM DRUG-ELUTING BALLOON, STENTING OF RIGHT DISTAL EXTERNAL ILIAC ARTERY STENOSIS WITH 7 X 40 MM SELF EXPANDING STENT POST DILATED WITH A 6 MM BALLOON.;  Surgeon: Henry  Jose Rafael Pena MD;  [place]    [place] SLCTV CATHJ 3RD+ ORD SLCTV ABDL PEL/LXTR BRNCH Bilateral 2023    Procedure: CUTDOWN FEM-FEM BYPASS WITH PRIMARY CLOSURE ACCESS SITE, BILATERAL RUN-OFF, LEFT FEM-POP 5X220 QUYEN AND 6X150 RODRI, ATTEMPT TO CROSS RIGHT SFA OCCLUSION;  Surgeon: Henry Pena MD;  Location: [facility];  Service: Vascular       Family History   Problem Relation Age of Onset    No Known Problems Mother     No Known Problems Father      I have reviewed and agree with the history as documented.    E-Cigarette/Vaping    E-Cigarette Use Never User      E-Cigarette/Vaping Substances    Nicotine No     THC No     CBD No     Flavoring No     Other No     Unknown No      Social History     Tobacco Use    Smoking status: Former     Current packs/day: 0.00     Average packs/day: 1.5 packs/day for 40.0 years (60.0 ttl pk-yrs)     Types: Cigarettes     Start date:      Quit date:      Years since quittin.6     Passive exposure: Past    Smokeless tobacco: Never    Tobacco comments:     quit --    Vaping Use    Vaping status: Never Used   Substance Use Topics    Alcohol use: Not Currently     Alcohol/week: 0.0 standard drinks of alcohol    Drug use: No       Review of Systems   Constitutional:  Negative for chills and fever.   HENT:  Negative for facial swelling, sore throat and trouble swallowing.         Scalp contusion   Eyes:  Negative for pain and visual disturbance.   Respiratory:  Negative for cough, chest tightness and shortness of breath.    Cardiovascular:  Negative for chest pain and leg swelling.   Gastrointestinal:  Negative for abdominal pain, diarrhea, nausea and vomiting.   Genitourinary:  Negative for dysuria and flank pain.   Musculoskeletal:  Negative for back pain, neck pain and neck stiffness.   Skin:  Negative for pallor and rash.   Allergic/Immunologic: Negative for environmental allergies and immunocompromised state.   Neurological:  Negative for dizziness and headaches.    Hematological:  Negative for adenopathy. Does not bruise/bleed easily.   Psychiatric/Behavioral:  Negative for agitation and behavioral problems.    All other systems reviewed and are negative.      Physical Exam  Physical Exam  Vitals and nursing note reviewed.   Constitutional:       General: He is not in acute distress.     Appearance: He is well-developed.   HENT:      Head: Normocephalic and atraumatic.      Comments: Small left parietal scalp contusion  Eyes:      Extraocular Movements: Extraocular movements intact.   Cardiovascular:      Rate and Rhythm: Normal rate and regular rhythm.      Heart sounds: Normal heart sounds.   Pulmonary:      Effort: Pulmonary effort is normal.      Breath sounds: Normal breath sounds.   Abdominal:      Palpations: Abdomen is soft.      Tenderness: There is no abdominal tenderness. There is no guarding or rebound.   Musculoskeletal:         General: Normal range of motion.      Cervical back: Normal range of motion and neck supple.   Skin:     General: Skin is warm and dry.   Neurological:      General: No focal deficit present.      Mental Status: He is alert and oriented to person, place, and time.      Cranial Nerves: No cranial nerve deficit.      Motor: No weakness.      Coordination: Coordination normal.   Psychiatric:         Mood and Affect: Mood normal.         Behavior: Behavior normal.         Vital Signs  ED Triage Vitals   Temperature Pulse Respirations Blood Pressure SpO2   08/07/24 1535 08/07/24 1535 08/07/24 1535 08/07/24 1535 08/07/24 1535   (!) 97.4 °F (36.3 °C) 59 18 140/66 98 %      Temp Source Heart Rate Source Patient Position - Orthostatic VS BP Location FiO2 (%)   08/07/24 1535 08/07/24 1535 08/07/24 1535 08/07/24 1535 --   Oral Monitor Sitting Right arm       Pain Score       08/07/24 1603       7           Vitals:    08/07/24 1535 08/07/24 1930   BP: 140/66 137/62   Pulse: 59 59   Patient Position - Orthostatic VS: Sitting Lying         Visual  Acuity  Visual Acuity      Flowsheet Row Most Recent Value   L Pupil Size (mm) 3   R Pupil Size (mm) 3            ED Medications  Medications   atorvastatin (LIPITOR) tablet 40 mg (40 mg Oral Given 8/7/24 2107)   DULoxetine (CYMBALTA) delayed release capsule 30 mg (30 mg Oral Given 8/7/24 2106)   glipiZIDE (GLUCOTROL) tablet 10 mg (has no administration in time range)   acetaminophen (TYLENOL) tablet 650 mg (650 mg Oral Given 8/7/24 1603)   metFORMIN (GLUCOPHAGE) tablet 1,000 mg (1,000 mg Oral Given 8/7/24 2107)   tamsulosin (FLOMAX) capsule 0.4 mg (0.4 mg Oral Given 8/7/24 2139)       Diagnostic Studies  Results Reviewed       None                   CT head without contrast   Final Result by Charles Walden MD (08/07 1713)      Subcentimeter hyperattenuating focus in the right periventricular white matter. Although appearance favors calcification, in the setting of trauma and absence of prior imaging a bleed could have this appearance. Recommend short-term follow-up head CT to    assess for interval change.      No cervical spine fracture or traumatic malalignment.         I personally discussed this study with MODESTO CARPENTER on 8/7/2024 5:10 PM.            Workstation performed: [id number]         CT cervical spine without contrast   Final Result by Charles Walden MD (08/07 1713)      Subcentimeter hyperattenuating focus in the right periventricular white matter. Although appearance favors calcification, in the setting of trauma and absence of prior imaging a bleed could have this appearance. Recommend short-term follow-up head CT to    assess for interval change.      No cervical spine fracture or traumatic malalignment.         I personally discussed this study with MODESTO CARPENTER on 8/7/2024 5:10 PM.            Workstation performed: [id number]         CT head without contrast    (Results Pending)              Procedures  Procedures         ED Course  ED Course as of 08/07/24 2140   Wed Aug 07,  2024 1718 CT head without contrast  IMPRESSION:     Subcentimeter hyperattenuating focus in the right periventricular white matter. Although appearance favors calcification, in the setting of trauma and absence of prior imaging a bleed could have this appearance. Recommend short-term follow-up head CT to   assess for interval change.     No cervical spine fracture or traumatic malalignment.     1732 Case discussed with Dr. Zapata, Trauma, okay to keep patient in [facility] and repeat CT in 6-8 hours as per Radiology recommendations (D/w Dr. Pamela Walden), will repeat at 10 pm.                                 SBIRT 20yo+      Flowsheet Row Most Recent Value   Initial Alcohol Screen: US AUDIT-C     1. How often do you have a drink containing alcohol? 0 Filed at: 08/07/2024 1534   2. How many drinks containing alcohol do you have on a typical day you are drinking?  0 Filed at: 08/07/2024 153   3b. FEMALE Any Age, or MALE 65+: How often do you have 4 or more drinks on one occassion? 0 Filed at: 08/07/2024 1534   Audit-C Score 0 Filed at: 08/07/2024 1538   STEFANO: How many times in the past year have you...    Used an illegal drug or used a prescription medication for non-medical reasons? Never Filed at: 08/07/2024 153                      Medical Decision Making  Patient is an 79-year-old male, history of CABG, on Plavix, comes in after head injury, patient states that he was in the bathroom when the steel toilet paper dispenser fell on his head, no loss of consciousness, sustained a contusion to the left parietal scalp area, mild neck pain, no loss of consciousness.  On exam, patient is conscious, alert, vital signs stable, nonfocal neuroexam, pupils equal round reactive to light, mild left frontal scalp contusion noted, no midline C-spine tenderness.  Differential diagnosis: Head injury, scalp contusion, rule out neck injury, will get CT head, C-spine, give Tylenol.    Problems Addressed:  Contusion of scalp,  initial encounter: acute illness or injury  Injury of head, initial encounter: acute illness or injury    Amount and/or Complexity of Data Reviewed  Radiology: ordered and independent interpretation performed. Decision-making details documented in ED Course.     Details: CT had not been reviewed, possible right periventricular calcification    Risk  OTC drugs.  Prescription drug management.                 Disposition  Final diagnoses:   Injury of head, initial encounter   Contusion of scalp, initial encounter     Time reflects when diagnosis was documented in both MDM as applicable and the Disposition within this note       Time User Action Codes Description Comment    8/7/2024  5:20 PM Edward Navarro Add [S09.90XA] Injury of head, initial encounter     8/7/2024  5:20 PM Edward Navarro Add [S00.03XA] Contusion of scalp, initial encounter           ED Disposition       None          Follow-up Information       Follow up With Specialties Details Why Contact Lucille Martel, DO Family Medicine Schedule an appointment as soon as possible for a visit   [address] [facility]  [address]  [facility] [place] [facility] [address]  [phone]              Patient's Medications   Discharge Prescriptions    No medications on file       No discharge procedures on file.    PDMP Review         Value Time User    PDMP Reviewed  Yes 2/16/2023  2:49 PM RADHA Islas            ED Provider  Electronically Signed by             Edward Navarro MD  08/07/24 0686       Edward Navarro MD  08/07/24 1209

## 2024-08-08 ENCOUNTER — VBI (OUTPATIENT)
Dept: FAMILY MEDICINE CLINIC | Facility: CLINIC | Age: 79
End: 2024-08-08

## 2024-08-08 NOTE — TELEPHONE ENCOUNTER
08/08/24 9:01 AM    Patient contacted post ED visit, [facility] department spoke with patient/caregiver and outreach was successful.    Thank you.  Susan Pierre  [facility]

## 2024-08-08 NOTE — TELEPHONE ENCOUNTER
08/08/24 9:00 AM    Patient contacted post ED visit, first outreach attempt made. Message was left for patient to return a call to the [facility] Department at [facility]: Phone [phone].    Thank you.  Susan Pierre  [facility]

## 2024-08-08 NOTE — TELEPHONE ENCOUNTER
Upon review of the In Basket request we were able to locate, review, and update the patient chart as requested for Diabetic Eye Exam.    Any additional questions or concerns should be emailed to the Practice Liaisons via the appropriate education email address, please do not reply via In Basket.    Thank you  Lakisha Jackson MA   [facility]

## 2024-08-09 ENCOUNTER — TELEPHONE (OUTPATIENT)
Age: 79
End: 2024-08-09

## 2024-08-09 NOTE — TELEPHONE ENCOUNTER
Caller: Max Ozuna    Doctor: Dr. Zamarripa    Call back #: [phone]    Reason for call: Patient called to reschedule appointment with Dr. Zamarripa. Patient was originally scheduled for 8/7/24, but had to cancel because he was in the hospital for an accident. Patient hit his head and was admitted. I scheduled patient with Dr. Zamarripa for 11/27/24, but patient was concerned due to the long amount of time he has to wait. Patient requested that I send a message to Dr. Zamarripa stating his appointment date just to confirm if Dr. Zamarripa is okay with seeing him far out. Patient was due 12/12/23. Patient is on the wait list for the 11/27/24 appointment. If needed to be seen sooner, please advise patient. Thank you!

## 2024-08-12 ENCOUNTER — OFFICE VISIT (OUTPATIENT)
Dept: FAMILY MEDICINE CLINIC | Facility: CLINIC | Age: 79
End: 2024-08-12
Payer: MEDICARE

## 2024-08-12 VITALS
DIASTOLIC BLOOD PRESSURE: 58 MMHG | OXYGEN SATURATION: 97 % | HEART RATE: 60 BPM | SYSTOLIC BLOOD PRESSURE: 134 MMHG | BODY MASS INDEX: 25.83 KG/M2 | RESPIRATION RATE: 17 BRPM | TEMPERATURE: 97.3 F | WEIGHT: 155 LBS | HEIGHT: 65 IN

## 2024-08-12 DIAGNOSIS — Z87.820 SIGNIFICANT CLOSED HEAD TRAUMA WITHIN PAST 3 MONTHS: Primary | ICD-10-CM

## 2024-08-12 PROCEDURE — G2211 COMPLEX E/M VISIT ADD ON: HCPCS | Performed by: FAMILY MEDICINE

## 2024-08-12 PROCEDURE — 99214 OFFICE O/P EST MOD 30 MIN: CPT | Performed by: FAMILY MEDICINE

## 2024-08-12 RX ORDER — PREDNISOLONE ACETATE 10 MG/ML
SUSPENSION/ DROPS OPHTHALMIC
COMMUNITY
Start: 2024-08-07

## 2024-08-12 RX ORDER — MOXIFLOXACIN 5 MG/ML
SOLUTION/ DROPS OPHTHALMIC
COMMUNITY
Start: 2024-08-07

## 2024-08-12 RX ORDER — KETOROLAC TROMETHAMINE 5 MG/ML
SOLUTION OPHTHALMIC
COMMUNITY
Start: 2024-08-07

## 2024-08-12 NOTE — PROGRESS NOTES
"Ambulatory Visit  Name: Max Ozuna      : 1945      MRN: [id number]  Encounter Provider: KADE Martel DO  Encounter Date: 2024   Encounter department: [facility]    Assessment & Plan  Significant closed head trauma within past 3 months              History of Present Illness     History of Present Illness       Review of Systems   Constitutional:  Negative for activity change, appetite change, chills, diaphoresis, fatigue, fever and unexpected weight change.   HENT:  Negative for congestion, dental problem, drooling, ear discharge, ear pain, facial swelling, mouth sores, nosebleeds, postnasal drip, rhinorrhea, trouble swallowing and voice change.    Eyes:  Negative for photophobia, pain, discharge, redness, itching and visual disturbance.   Respiratory:  Negative for apnea, cough, choking, chest tightness and shortness of breath.    Cardiovascular:  Negative for chest pain and leg swelling.   Gastrointestinal:  Negative for abdominal distention, abdominal pain, constipation, diarrhea and nausea.   Endocrine: Negative for polydipsia, polyphagia and polyuria.   Genitourinary:  Negative for decreased urine volume, difficulty urinating, dysuria, enuresis and hematuria.   Musculoskeletal:  Negative for arthralgias, back pain, gait problem and joint swelling.   Skin:  Negative for color change, pallor, rash and wound.   Allergic/Immunologic: Negative for immunocompromised state.   Neurological:  Negative for dizziness, seizures, syncope, facial asymmetry, speech difficulty, light-headedness and headaches.   Hematological:  Negative for adenopathy.   Psychiatric/Behavioral:  Negative for agitation, behavioral problems, confusion and decreased concentration.      Objective     /58 (BP Location: Left arm, Patient Position: Sitting, Cuff Size: Standard)   Pulse 60   Temp (!) 97.3 °F (36.3 °C) (Temporal)   Resp 17   Ht 5' 5\" (1.651 m)   Wt 70.3 kg (155 lb)   SpO2 97%   BMI " 25.79 kg/m²     Physical Exam    Physical Exam  Vitals and nursing note reviewed.   Constitutional:       General: He is not in acute distress.     Appearance: Normal appearance. He is well-developed. He is not ill-appearing, toxic-appearing or diaphoretic.   HENT:      Head: Normocephalic and atraumatic.      Nose: Nose normal.      Mouth/Throat:      Mouth: Mucous membranes are moist.   Eyes:      Extraocular Movements: Extraocular movements intact.      Conjunctiva/sclera: Conjunctivae normal.      Pupils: Pupils are equal, round, and reactive to light.   Cardiovascular:      Rate and Rhythm: Normal rate and regular rhythm.      Pulses: Normal pulses.      Heart sounds: Normal heart sounds. No murmur heard.     No friction rub.   Pulmonary:      Effort: Pulmonary effort is normal. No respiratory distress.      Breath sounds: Normal breath sounds. No stridor. No wheezing or rhonchi.   Abdominal:      Palpations: Abdomen is soft.      Tenderness: There is no abdominal tenderness.   Musculoskeletal:         General: No swelling.      Cervical back: Neck supple.   Skin:     General: Skin is warm and dry.      Coloration: Skin is not jaundiced or pale.      Findings: Lesion (irregular bordered dark brown lesion on scalp) present. No erythema or rash.   Neurological:      General: No focal deficit present.      Mental Status: He is alert and oriented to person, place, and time. Mental status is at baseline.   Psychiatric:         Mood and Affect: Mood normal.         Behavior: Behavior normal.         Thought Content: Thought content normal.         Judgment: Judgment normal.       Administrative Statements   I have spent a total time of 25 minutes in caring for this patient on the day of the visit/encounter including Diagnostic results, Prognosis, Risks and benefits of tx options, Instructions for management, Patient and family education, Importance of tx compliance, Risk factor reductions, Impressions, Counseling /  Coordination of care, Documenting in the medical record, Reviewing / ordering tests, medicine, procedures  , and Obtaining or reviewing history  .

## 2024-08-13 ENCOUNTER — OFFICE VISIT (OUTPATIENT)
Dept: VASCULAR SURGERY | Facility: CLINIC | Age: 79
End: 2024-08-13
Payer: MEDICARE

## 2024-08-13 VITALS
WEIGHT: 156 LBS | BODY MASS INDEX: 25.99 KG/M2 | SYSTOLIC BLOOD PRESSURE: 122 MMHG | DIASTOLIC BLOOD PRESSURE: 62 MMHG | OXYGEN SATURATION: 97 % | HEART RATE: 57 BPM | HEIGHT: 65 IN

## 2024-08-13 DIAGNOSIS — I74.09 AORTOILIAC OCCLUSIVE DISEASE (HCC): ICD-10-CM

## 2024-08-13 DIAGNOSIS — I73.9 PAD (PERIPHERAL ARTERY DISEASE) (HCC): Primary | ICD-10-CM

## 2024-08-13 DIAGNOSIS — I70.213 ATHEROSCLEROSIS OF NATIVE ARTERY OF BOTH LOWER EXTREMITIES WITH INTERMITTENT CLAUDICATION (HCC): ICD-10-CM

## 2024-08-13 PROCEDURE — 99214 OFFICE O/P EST MOD 30 MIN: CPT | Performed by: SURGERY

## 2024-08-13 NOTE — PROGRESS NOTES
Ambulatory Visit  Name: Max Ozuna      : 1945      MRN: [id number]  Encounter Provider: Chilo Arevalo DO  Encounter Date: 2024   Enc[facility] department: [facility]    Assessment & Plan   1. PAD (peripheral artery disease) (HCC)  -     VAS ARTERIAL DUPLEX- LOWER LIMB BILATERAL; Future; Expected date: 2025  2. Aortoiliac occlusive disease (HCC)  Assessment & Plan:  History of bilateral iliac stenting.  History of chronic left external iliac artery occlusion  History of right to left femoral-femoral bypass  History of open femoral artery exposure with left SFA  recanalization  The above-mentioned procedures were performed at [facility].      Orders:  -     VAS abdominal aorta/iliac duplex; Future; Expected date: 2025  3. Atherosclerosis of native artery of both lower extremities with intermittent claudication (HCC)  Assessment & Plan:  Max is a pleasant 79-year-old gentleman who presents to the office accompanied by his wife with complaints of bilateral lower extremity claudication.  He presents today for routine scheduled visit to review surveillance studies.  He has extensive past vascular surgical history including history of bilateral iliac stenting, right to left femoral-femoral bypass, history of left lower extremity angiogram with SFA  recanalization.  The duplex suggest bilateral ABIs of less than 0.5.  We discussed that his overall infrainguinal perfusion to lower legs is quite impaired and would have difficulty healing any wounds should they develop.  He denies any rest pain.  His symptoms are primarily claudication at this time.    He has history of CABG with left greater saphenous vein harvest.  At this time in the absence of rest pain/tissue loss/and/or disabling claudication recommend continued surveillance  Will obtain surveillance AOL and lower extremity arterial duplex in 6 months and sooner if indicated.      History of  Present Illness     Patient presents for review of AOIL 6/12/2024 and JAMEL done 06/20/2024. Patient c/o Numbness, pain and coldness, tightness in both legs. Patient is a former smoker. Patient is taking plavix, fenofibrate, and atorvastatin.         Max Ozuna is a 79 y.o. male who presents to the office to review surveillance lower extremity arterial duplex.    Review of Systems   Constitutional: Negative.    HENT: Negative.     Eyes: Negative.    Respiratory: Negative.     Cardiovascular: Negative.    Gastrointestinal: Negative.    Endocrine: Negative.    Genitourinary: Negative.    Musculoskeletal: Negative.    Skin: Negative.    Allergic/Immunologic: Negative.    Neurological: Negative.    Hematological: Negative.    Psychiatric/Behavioral: Negative.       Past Medical History   Past Medical History:   Diagnosis Date    Coronary artery disease without angina pectoris 02/25/2020    Diabetes mellitus (HCC)     DVT (deep venous thrombosis) (HCC)     GERD (gastroesophageal reflux disease)     Hypertension     Iron deficiency anemia      Past Surgical History:   Procedure Laterality Date    ANGIOPLASTY  10/19/2021    bilateral    CARDIAC CATHETERIZATION  2013    calif    CABG  x4    COLONOSCOPY      COLONOSCOPY      CORONARY ANGIOPLASTY WITH STENT PLACEMENT  01/01/2010    CORONARY ARTERY BYPASS GRAFT      FEMORAL ARTERY STENT      FEMORAL BYPASS  10/20/2021    IR LOWER EXTREMITY ANGIOGRAM  2/14/2023    IR PELVIC ANGIOGRAM  5/6/2022    VT SLCTV CATHJ 3RD+ ORD SLCTV ABDL PEL/LXTR BRNCH  5/6/2022    Procedure: ULTRASOUND-GUIDED LEFT BRACHIAL ARTERY PUNCTURE, AORTOGRAM, RIGHT COMMON ILIAC ARTERY ANGIOPLASTY WITH 8 X 40 MM BALLOON, ANGIOPLASTY OF RIGHT EXTERNAL AND COMMON ILIAC ARTERY STENOSIS WITH 6 X 150 MM DRUG-ELUTING BALLOON, STENTING OF RIGHT DISTAL EXTERNAL ILIAC ARTERY STENOSIS WITH 7 X 40 MM SELF EXPANDING STENT POST DILATED WITH A 6 MM BALLOON.;  Surgeon: Henry Pena MD;  [place]    VT SLCTV CATHJ 3RD+  ORD SLCTV ABDL PEL/LXTR BRNCH Bilateral 2/14/2023    Procedure: CUTDOWN FEM-FEM BYPASS WITH PRIMARY CLOSURE ACCESS SITE, BILATERAL RUN-OFF, LEFT FEM-POP 5X220 QUYEN AND 6X150 RODRI, ATTEMPT TO CROSS RIGHT SFA OCCLUSION;  Surgeon: Henry Pena MD;  Location: [facility] OR;  Service: Vascular     Family History   Problem Relation Age of Onset    No Known Problems Mother     No Known Problems Father      Current Outpatient Medications on File Prior to Visit   Medication Sig Dispense Refill    acetaminophen (TYLENOL) 500 mg tablet Take 1,000 mg by mouth every 8 (eight) hours as needed      atorvastatin (LIPITOR) 40 mg tablet Take 1 tablet (40 mg total) by mouth daily 90 tablet 3    chlorhexidine (PERIDEX) 0.12 % solution Apply 15 mL to the mouth or throat 2 (two) times a day 473 mL 3    clopidogrel (PLAVIX) 75 mg tablet Take 1 tablet (75 mg total) by mouth daily 90 tablet 1    DULoxetine (CYMBALTA) 30 mg delayed release capsule TAKE ONE CAPSULE BY MOUTH TWICE DAILY 60 capsule 0    Empagliflozin (Jardiance) 25 MG TABS Take 1 tablet (25 mg total) by mouth every morning Also: take with LOTS of water 90 tablet 3    fenofibrate (TRICOR) 145 mg tablet Take 1 tablet (145 mg total) by mouth daily 90 tablet 3    ferrous sulfate 325 (65 Fe) mg tablet Take 1 tablet (325 mg total) by mouth 2 (two) times a day with meals 60 tablet 6    gabapentin (Neurontin) 300 mg capsule Take 1 capsule (300 mg total) by mouth 3 (three) times a day 90 capsule 6    glipiZIDE (GLUCOTROL) 10 mg tablet TAKE 1 TABLET TWICE A DAY  BEFORE MEALS 180 tablet 1    hydrocortisone 2.5 % cream Apply topically 4 (four) times a day as needed for rash 30 g 0    ketorolac (ACULAR) 0.5 % ophthalmic solution       lisinopril-hydrochlorothiazide (PRINZIDE,ZESTORETIC) 20-12.5 MG per tablet Take 1 tablet by mouth daily 90 tablet 3    metFORMIN (GLUCOPHAGE) 1000 MG tablet TAKE 1 TABLET TWICE DAILY  WITH MEALS 180 tablet 1    moxifloxacin (VIGAMOX) 0.5 % ophthalmic  solution       omeprazole (PriLOSEC) 20 mg delayed release capsule Take 1 capsule (20 mg total) by mouth daily before breakfast (Patient taking differently: Take 20 mg by mouth if needed) 30 capsule 5    prednisoLONE acetate (PRED FORTE) 1 % ophthalmic suspension       tamsulosin (FLOMAX) 0.4 mg TAKE 1 CAPSULE BY MOUTH EVERY DAY WITH DINNER 90 capsule 2    tirzepatide (Mounjaro) 2.5 MG/0.5ML Inject 0.5 mL (2.5 mg total) under the skin every 7 days 2 mL 3     No current facility-administered medications on file prior to visit.   No Known Allergies   Current Outpatient Medications on File Prior to Visit   Medication Sig Dispense Refill    acetaminophen (TYLENOL) 500 mg tablet Take 1,000 mg by mouth every 8 (eight) hours as needed      atorvastatin (LIPITOR) 40 mg tablet Take 1 tablet (40 mg total) by mouth daily 90 tablet 3    chlorhexidine (PERIDEX) 0.12 % solution Apply 15 mL to the mouth or throat 2 (two) times a day 473 mL 3    clopidogrel (PLAVIX) 75 mg tablet Take 1 tablet (75 mg total) by mouth daily 90 tablet 1    DULoxetine (CYMBALTA) 30 mg delayed release capsule TAKE ONE CAPSULE BY MOUTH TWICE DAILY 60 capsule 0    Empagliflozin (Jardiance) 25 MG TABS Take 1 tablet (25 mg total) by mouth every morning Also: take with LOTS of water 90 tablet 3    fenofibrate (TRICOR) 145 mg tablet Take 1 tablet (145 mg total) by mouth daily 90 tablet 3    ferrous sulfate 325 (65 Fe) mg tablet Take 1 tablet (325 mg total) by mouth 2 (two) times a day with meals 60 tablet 6    gabapentin (Neurontin) 300 mg capsule Take 1 capsule (300 mg total) by mouth 3 (three) times a day 90 capsule 6    glipiZIDE (GLUCOTROL) 10 mg tablet TAKE 1 TABLET TWICE A DAY  BEFORE MEALS 180 tablet 1    hydrocortisone 2.5 % cream Apply topically 4 (four) times a day as needed for rash 30 g 0    ketorolac (ACULAR) 0.5 % ophthalmic solution       lisinopril-hydrochlorothiazide (PRINZIDE,ZESTORETIC) 20-12.5 MG per tablet Take 1 tablet by mouth daily 90  "tablet 3    metFORMIN (GLUCOPHAGE) 1000 MG tablet TAKE 1 TABLET TWICE DAILY  WITH MEALS 180 tablet 1    moxifloxacin (VIGAMOX) 0.5 % ophthalmic solution       omeprazole (PriLOSEC) 20 mg delayed release capsule Take 1 capsule (20 mg total) by mouth daily before breakfast (Patient taking differently: Take 20 mg by mouth if needed) 30 capsule 5    prednisoLONE acetate (PRED FORTE) 1 % ophthalmic suspension       tamsulosin (FLOMAX) 0.4 mg TAKE 1 CAPSULE BY MOUTH EVERY DAY WITH DINNER 90 capsule 2    tirzepatide (Mounjaro) 2.5 MG/0.5ML Inject 0.5 mL (2.5 mg total) under the skin every 7 days 2 mL 3     No current facility-administered medications on file prior to visit.      Social History     Tobacco Use    Smoking status: Former     Current packs/day: 0.00     Average packs/day: 1.5 packs/day for 40.0 years (60.0 ttl pk-yrs)     Types: Cigarettes     Start date:      Quit date:      Years since quittin.6     Passive exposure: Past    Smokeless tobacco: Never    Tobacco comments:     quit --    Vaping Use    Vaping status: Never Used   Substance and Sexual Activity    Alcohol use: Not Currently     Alcohol/week: 0.0 standard drinks of alcohol    Drug use: No    Sexual activity: Not on file     Objective     /62   Pulse 57   Ht 5' 5\" (1.651 m)   Wt 70.8 kg (156 lb)   SpO2 97%   BMI 25.96 kg/m²     Physical Exam  Constitutional:       General: He is not in acute distress.     Appearance: He is well-developed.   HENT:      Head: Normocephalic and atraumatic.   Eyes:      General: No scleral icterus.     Conjunctiva/sclera: Conjunctivae normal.   Neck:      Trachea: No tracheal deviation.   Cardiovascular:      Rate and Rhythm: Normal rate.      Pulses:           Posterior tibial pulses are detected w/ Doppler on the right side and detected w/ Doppler on the left side.      Comments: Monophasic Doppler signals  Pulmonary:      Effort: Pulmonary effort is normal.   Abdominal:      General: There " is no distension.      Palpations: Abdomen is soft. Mass: no appreciable aortic pulsation/aneurysm.   Musculoskeletal:         General: Normal range of motion.      Cervical back: Normal range of motion and neck supple.   Skin:     General: Skin is warm and dry.   Neurological:      Mental Status: He is alert and oriented to person, place, and time.   Psychiatric:         Mood and Affect: Mood normal.         Behavior: Behavior normal.         Thought Content: Thought content normal.         Judgment: Judgment normal.         Lower extremity arterial duplex:  CONCLUSION:  Impression:  Patent right femoral to left femoral artery bypass graft.     RIGHT LOWER LIMB:  An occlusion of the proximal superficial femoral artery with distal  reconstitution and diffuse disease noted throughout the remaining portions of  the femoral-popliteal arteries. Findings suggests tibioperoneal disease.  Ankle/Brachial index: 0.46, ischemic range, previous study 0.49.  PVR/ PPG tracings are dampened.  Metatarsal pressure of 46 mm Hg,  Great toe pressure of 13 mm Hg, below healing threshold for a diabetic,  previous study 27 mm Hg.     LEFT LOWER LIMB:  A >75% stenosis of the proximal superficial femoral artery with short segment  stent compression at the mid thigh level of mid to distal superficial femoral  artery stent. Patent and diffuse disease noted throughout the remaining portions  of the femoral-popliteal arteries.  Findings suggests tibioperoneal disease.  Ankle/Brachial index:  0.41, ischemic range, previous  study 0.70.  PVR/ PPG tracings are dampened.  Metatarsal pressure of 44 mm Hg  Great toe pressure of 12 mm Hg, below healing threshold for a diabetic,  previous study 50 mm Hg.      Administrative Statements   I have spent a total time of 30 minutes in caring for this patient on the day of the visit/encounter including Diagnostic results, Prognosis, Risks and benefits of tx options, Instructions for management, Patient and  family education, Importance of tx compliance, Risk factor reductions, Impressions, Counseling / Coordination of care, Documenting in the medical record, Reviewing / ordering tests, medicine, procedures  , Obtaining or reviewing history  , and Communicating with other healthcare professionals .

## 2024-08-13 NOTE — ASSESSMENT & PLAN NOTE
Max is a pleasant 79-year-old gentleman who presents to the office accompanied by his wife with complaints of bilateral lower extremity claudication.  He presents today for routine scheduled visit to review surveillance studies.  He has extensive past vascular surgical history including history of bilateral iliac stenting, right to left femoral-femoral bypass, history of left lower extremity angiogram with SFA  recanalization.  The duplex suggest bilateral ABIs of less than 0.5.  We discussed that his overall infrainguinal perfusion to lower legs is quite impaired and would have difficulty healing any wounds should they develop.  He denies any rest pain.  His symptoms are primarily claudication at this time.    He has history of CABG with left greater saphenous vein harvest.  At this time in the absence of rest pain/tissue loss/and/or disabling claudication recommend continued surveillance  Will obtain surveillance AOL and lower extremity arterial duplex in 6 months and sooner if indicated.

## 2024-08-13 NOTE — ASSESSMENT & PLAN NOTE
History of bilateral iliac stenting.  History of chronic left external iliac artery occlusion  History of right to left femoral-femoral bypass  History of open femoral artery exposure with left SFA  recanalization  The above-mentioned procedures were performed at [facility].

## 2024-08-22 DIAGNOSIS — I10 ESSENTIAL HYPERTENSION: ICD-10-CM

## 2024-08-22 DIAGNOSIS — E11.51 TYPE 2 DIABETES MELLITUS WITH DIABETIC PERIPHERAL ANGIOPATHY WITHOUT GANGRENE, WITH LONG-TERM CURRENT USE OF INSULIN (HCC): ICD-10-CM

## 2024-08-22 DIAGNOSIS — Z79.899 ENCOUNTER FOR LONG-TERM CURRENT USE OF HIGH RISK MEDICATION: ICD-10-CM

## 2024-08-22 DIAGNOSIS — Z79.4 TYPE 2 DIABETES MELLITUS WITH DIABETIC PERIPHERAL ANGIOPATHY WITHOUT GANGRENE, WITH LONG-TERM CURRENT USE OF INSULIN (HCC): ICD-10-CM

## 2024-08-22 DIAGNOSIS — D50.9 IRON DEFICIENCY ANEMIA, UNSPECIFIED IRON DEFICIENCY ANEMIA TYPE: ICD-10-CM

## 2024-08-23 DIAGNOSIS — I73.9 PERIPHERAL VASCULAR DISEASE (HCC): ICD-10-CM

## 2024-08-23 RX ORDER — LISINOPRIL AND HYDROCHLOROTHIAZIDE 12.5; 2 MG/1; MG/1
1 TABLET ORAL DAILY
Qty: 90 TABLET | Refills: 1 | Status: SHIPPED | OUTPATIENT
Start: 2024-08-23

## 2024-08-23 RX ORDER — CLOPIDOGREL BISULFATE 75 MG/1
75 TABLET ORAL DAILY
Qty: 90 TABLET | Refills: 1 | Status: SHIPPED | OUTPATIENT
Start: 2024-08-23

## 2024-08-23 NOTE — TELEPHONE ENCOUNTER
Reason for call: Patient stated that he just picked up a refill for this medication but is requesting a refill now so that it will be at his pharmacy for the next time he needs the medication  [x] Refill   [] Prior Auth  [] Other:     Office:   [x] PCP/Provider - KADE Martel DO / Ozzy DAMIAN  [] Specialty/Provider -     Medication: clopidogrel (PLAVIX) 75 mg tablet     Dose/Frequency: Take 1 tablet (75 mg total) by mouth daily     Quantity: 90    Pharmacy:  [facility]/pharmacy #[id number]  [address] [address]     Does the patient have enough for 3 days?   [x] Yes   [] No - Send as HP to POD

## 2024-08-30 NOTE — TELEPHONE ENCOUNTER
Left a detailed message with my teams number to return my call  Patient was made aware of first iron infusion scheduled  Patient was also made aware schedule can be seen on mychart  Pt ambulates to restroom  Gait even and steady

## 2024-09-14 DIAGNOSIS — E78.49 OTHER HYPERLIPIDEMIA: ICD-10-CM

## 2024-09-15 RX ORDER — ATORVASTATIN CALCIUM 40 MG/1
40 TABLET, FILM COATED ORAL DAILY
Qty: 90 TABLET | Refills: 1 | Status: SHIPPED | OUTPATIENT
Start: 2024-09-15

## 2024-09-24 ENCOUNTER — APPOINTMENT (OUTPATIENT)
Dept: MRI IMAGING | Facility: HOSPITAL | Age: 79
DRG: 435 | End: 2024-09-24
Payer: MEDICARE

## 2024-09-24 ENCOUNTER — APPOINTMENT (EMERGENCY)
Dept: CT IMAGING | Facility: HOSPITAL | Age: 79
DRG: 435 | End: 2024-09-24
Payer: MEDICARE

## 2024-09-24 ENCOUNTER — HOSPITAL ENCOUNTER (INPATIENT)
Facility: HOSPITAL | Age: 79
LOS: 1 days | Discharge: HOME | DRG: 435 | End: 2024-09-26
Attending: EMERGENCY MEDICINE | Admitting: INTERNAL MEDICINE
Payer: MEDICARE

## 2024-09-24 DIAGNOSIS — K74.60 CIRRHOSIS (HCC): Primary | ICD-10-CM

## 2024-09-24 DIAGNOSIS — E11.51 TYPE 2 DIABETES MELLITUS WITH DIABETIC PERIPHERAL ANGIOPATHY WITHOUT GANGRENE, WITH LONG-TERM CURRENT USE OF INSULIN (HCC): ICD-10-CM

## 2024-09-24 DIAGNOSIS — I51.7 BIATRIAL ENLARGEMENT: ICD-10-CM

## 2024-09-24 DIAGNOSIS — K76.9 LIVER LESION: ICD-10-CM

## 2024-09-24 DIAGNOSIS — I81 PORTAL VEIN THROMBOSIS: ICD-10-CM

## 2024-09-24 DIAGNOSIS — Z79.4 TYPE 2 DIABETES MELLITUS WITH DIABETIC PERIPHERAL ANGIOPATHY WITHOUT GANGRENE, WITH LONG-TERM CURRENT USE OF INSULIN (HCC): ICD-10-CM

## 2024-09-24 DIAGNOSIS — J84.10 PULMONARY INTERSTITIAL FIBROSIS (HCC): ICD-10-CM

## 2024-09-24 PROBLEM — R10.9 ACUTE RIGHT FLANK PAIN: Status: ACTIVE | Noted: 2024-09-24

## 2024-09-24 LAB
ALBUMIN SERPL BCG-MCNC: 4.3 G/DL (ref 3.5–5)
ALP SERPL-CCNC: 66 U/L (ref 34–104)
ALT SERPL W P-5'-P-CCNC: 13 U/L (ref 7–52)
ANION GAP SERPL CALCULATED.3IONS-SCNC: 11 MMOL/L (ref 4–13)
AST SERPL W P-5'-P-CCNC: 20 U/L (ref 13–39)
BACTERIA UR QL AUTO: NORMAL /HPF
BASOPHILS # BLD AUTO: 0.02 THOUSANDS/ΜL (ref 0–0.1)
BASOPHILS NFR BLD AUTO: 0 % (ref 0–1)
BILIRUB DIRECT SERPL-MCNC: 0.12 MG/DL (ref 0–0.2)
BILIRUB SERPL-MCNC: 0.79 MG/DL (ref 0.2–1)
BILIRUB UR QL STRIP: NEGATIVE
BUN SERPL-MCNC: 23 MG/DL (ref 5–25)
CALCIUM SERPL-MCNC: 10 MG/DL (ref 8.4–10.2)
CHLORIDE SERPL-SCNC: 103 MMOL/L (ref 96–108)
CLARITY UR: CLEAR
CO2 SERPL-SCNC: 21 MMOL/L (ref 21–32)
COLOR UR: YELLOW
CREAT SERPL-MCNC: 1.03 MG/DL (ref 0.6–1.3)
EOSINOPHIL # BLD AUTO: 0.19 THOUSAND/ΜL (ref 0–0.61)
EOSINOPHIL NFR BLD AUTO: 3 % (ref 0–6)
ERYTHROCYTE [DISTWIDTH] IN BLOOD BY AUTOMATED COUNT: 14.6 % (ref 11.6–15.1)
GFR SERPL CREATININE-BSD FRML MDRD: 67 ML/MIN/1.73SQ M
GLUCOSE SERPL-MCNC: 133 MG/DL (ref 65–140)
GLUCOSE SERPL-MCNC: 91 MG/DL (ref 65–140)
GLUCOSE UR STRIP-MCNC: ABNORMAL MG/DL
HCT VFR BLD AUTO: 44.1 % (ref 36.5–49.3)
HGB BLD-MCNC: 14.3 G/DL (ref 12–17)
HGB UR QL STRIP.AUTO: NEGATIVE
IMM GRANULOCYTES # BLD AUTO: 0.02 THOUSAND/UL (ref 0–0.2)
IMM GRANULOCYTES NFR BLD AUTO: 0 % (ref 0–2)
KETONES UR STRIP-MCNC: NEGATIVE MG/DL
LEUKOCYTE ESTERASE UR QL STRIP: ABNORMAL
LIPASE SERPL-CCNC: 26 U/L (ref 11–82)
LYMPHOCYTES # BLD AUTO: 0.91 THOUSANDS/ΜL (ref 0.6–4.47)
LYMPHOCYTES NFR BLD AUTO: 15 % (ref 14–44)
MCH RBC QN AUTO: 31.2 PG (ref 26.8–34.3)
MCHC RBC AUTO-ENTMCNC: 32.4 G/DL (ref 31.4–37.4)
MCV RBC AUTO: 96 FL (ref 82–98)
MONOCYTES # BLD AUTO: 0.47 THOUSAND/ΜL (ref 0.17–1.22)
MONOCYTES NFR BLD AUTO: 8 % (ref 4–12)
NEUTROPHILS # BLD AUTO: 4.32 THOUSANDS/ΜL (ref 1.85–7.62)
NEUTS SEG NFR BLD AUTO: 74 % (ref 43–75)
NITRITE UR QL STRIP: NEGATIVE
NON-SQ EPI CELLS URNS QL MICRO: NORMAL /HPF
NRBC BLD AUTO-RTO: 0 /100 WBCS
PH UR STRIP.AUTO: 5.5 [PH] (ref 4.5–8)
PLATELET # BLD AUTO: 164 THOUSANDS/UL (ref 149–390)
PLATELET # BLD AUTO: 188 THOUSANDS/UL (ref 149–390)
PMV BLD AUTO: 10.9 FL (ref 8.9–12.7)
PMV BLD AUTO: 9.9 FL (ref 8.9–12.7)
POTASSIUM SERPL-SCNC: 4.9 MMOL/L (ref 3.5–5.3)
PROT SERPL-MCNC: 7.5 G/DL (ref 6.4–8.4)
PROT UR STRIP-MCNC: NEGATIVE MG/DL
RBC # BLD AUTO: 4.58 MILLION/UL (ref 3.88–5.62)
RBC #/AREA URNS AUTO: NORMAL /HPF
SODIUM SERPL-SCNC: 135 MMOL/L (ref 135–147)
SP GR UR STRIP.AUTO: 1.01 (ref 1–1.03)
UROBILINOGEN UR QL STRIP.AUTO: 0.2 E.U./DL
WBC # BLD AUTO: 5.93 THOUSAND/UL (ref 4.31–10.16)
WBC #/AREA URNS AUTO: NORMAL /HPF

## 2024-09-24 PROCEDURE — 82948 REAGENT STRIP/BLOOD GLUCOSE: CPT

## 2024-09-24 PROCEDURE — 85025 COMPLETE CBC W/AUTO DIFF WBC: CPT

## 2024-09-24 PROCEDURE — 36415 COLL VENOUS BLD VENIPUNCTURE: CPT

## 2024-09-24 PROCEDURE — 83690 ASSAY OF LIPASE: CPT

## 2024-09-24 PROCEDURE — 81001 URINALYSIS AUTO W/SCOPE: CPT

## 2024-09-24 PROCEDURE — 74183 MRI ABD W/O CNTR FLWD CNTR: CPT

## 2024-09-24 PROCEDURE — 99223 1ST HOSP IP/OBS HIGH 75: CPT | Performed by: STUDENT IN AN ORGANIZED HEALTH CARE EDUCATION/TRAINING PROGRAM

## 2024-09-24 PROCEDURE — 83036 HEMOGLOBIN GLYCOSYLATED A1C: CPT | Performed by: STUDENT IN AN ORGANIZED HEALTH CARE EDUCATION/TRAINING PROGRAM

## 2024-09-24 PROCEDURE — 74177 CT ABD & PELVIS W/CONTRAST: CPT

## 2024-09-24 PROCEDURE — 99284 EMERGENCY DEPT VISIT MOD MDM: CPT

## 2024-09-24 PROCEDURE — 99285 EMERGENCY DEPT VISIT HI MDM: CPT

## 2024-09-24 PROCEDURE — 80048 BASIC METABOLIC PNL TOTAL CA: CPT

## 2024-09-24 PROCEDURE — A9585 GADOBUTROL INJECTION: HCPCS | Performed by: STUDENT IN AN ORGANIZED HEALTH CARE EDUCATION/TRAINING PROGRAM

## 2024-09-24 PROCEDURE — 96365 THER/PROPH/DIAG IV INF INIT: CPT

## 2024-09-24 PROCEDURE — 85049 AUTOMATED PLATELET COUNT: CPT | Performed by: STUDENT IN AN ORGANIZED HEALTH CARE EDUCATION/TRAINING PROGRAM

## 2024-09-24 PROCEDURE — 80076 HEPATIC FUNCTION PANEL: CPT

## 2024-09-24 RX ORDER — ATORVASTATIN CALCIUM 40 MG/1
40 TABLET, FILM COATED ORAL DAILY
Status: DISCONTINUED | OUTPATIENT
Start: 2024-09-25 | End: 2024-09-26 | Stop reason: HOSPADM

## 2024-09-24 RX ORDER — HYDROCHLOROTHIAZIDE 12.5 MG/1
12.5 TABLET ORAL DAILY
Status: DISCONTINUED | OUTPATIENT
Start: 2024-09-25 | End: 2024-09-26 | Stop reason: HOSPADM

## 2024-09-24 RX ORDER — ONDANSETRON 2 MG/ML
4 INJECTION INTRAMUSCULAR; INTRAVENOUS EVERY 6 HOURS PRN
Status: DISCONTINUED | OUTPATIENT
Start: 2024-09-24 | End: 2024-09-26 | Stop reason: HOSPADM

## 2024-09-24 RX ORDER — TAMSULOSIN HYDROCHLORIDE 0.4 MG/1
0.4 CAPSULE ORAL
Status: DISCONTINUED | OUTPATIENT
Start: 2024-09-25 | End: 2024-09-26 | Stop reason: HOSPADM

## 2024-09-24 RX ORDER — DULOXETIN HYDROCHLORIDE 30 MG/1
30 CAPSULE, DELAYED RELEASE ORAL 2 TIMES DAILY
Status: DISCONTINUED | OUTPATIENT
Start: 2024-09-24 | End: 2024-09-26 | Stop reason: HOSPADM

## 2024-09-24 RX ORDER — GLIPIZIDE 10 MG/1
10 TABLET ORAL
Qty: 180 TABLET | Refills: 1 | Status: SHIPPED | OUTPATIENT
Start: 2024-09-24

## 2024-09-24 RX ORDER — LISINOPRIL 20 MG/1
20 TABLET ORAL DAILY
Status: DISCONTINUED | OUTPATIENT
Start: 2024-09-25 | End: 2024-09-26 | Stop reason: HOSPADM

## 2024-09-24 RX ORDER — GADOBUTROL 604.72 MG/ML
7 INJECTION INTRAVENOUS
Status: COMPLETED | OUTPATIENT
Start: 2024-09-24 | End: 2024-09-24

## 2024-09-24 RX ORDER — FERROUS SULFATE 325(65) MG
325 TABLET ORAL 2 TIMES DAILY WITH MEALS
Status: DISCONTINUED | OUTPATIENT
Start: 2024-09-25 | End: 2024-09-26 | Stop reason: HOSPADM

## 2024-09-24 RX ORDER — FENOFIBRATE 145 MG/1
145 TABLET, COATED ORAL DAILY
Status: DISCONTINUED | OUTPATIENT
Start: 2024-09-25 | End: 2024-09-26 | Stop reason: HOSPADM

## 2024-09-24 RX ORDER — INSULIN LISPRO 100 [IU]/ML
1-5 INJECTION, SOLUTION INTRAVENOUS; SUBCUTANEOUS EVERY 6 HOURS SCHEDULED
Status: DISCONTINUED | OUTPATIENT
Start: 2024-09-25 | End: 2024-09-26 | Stop reason: HOSPADM

## 2024-09-24 RX ORDER — ACETAMINOPHEN 325 MG/1
650 TABLET ORAL EVERY 6 HOURS PRN
Status: DISCONTINUED | OUTPATIENT
Start: 2024-09-24 | End: 2024-09-26 | Stop reason: HOSPADM

## 2024-09-24 RX ORDER — CLOPIDOGREL BISULFATE 75 MG/1
75 TABLET ORAL DAILY
Status: DISCONTINUED | OUTPATIENT
Start: 2024-09-25 | End: 2024-09-26 | Stop reason: HOSPADM

## 2024-09-24 RX ORDER — HEPARIN SODIUM 5000 [USP'U]/ML
5000 INJECTION, SOLUTION INTRAVENOUS; SUBCUTANEOUS EVERY 12 HOURS SCHEDULED
Status: DISCONTINUED | OUTPATIENT
Start: 2024-09-24 | End: 2024-09-26 | Stop reason: HOSPADM

## 2024-09-24 RX ORDER — GABAPENTIN 300 MG/1
300 CAPSULE ORAL 3 TIMES DAILY
Status: DISCONTINUED | OUTPATIENT
Start: 2024-09-24 | End: 2024-09-26 | Stop reason: HOSPADM

## 2024-09-24 RX ADMIN — DULOXETINE HYDROCHLORIDE 30 MG: 30 CAPSULE, DELAYED RELEASE ORAL at 21:39

## 2024-09-24 RX ADMIN — GABAPENTIN 300 MG: 300 CAPSULE ORAL at 21:39

## 2024-09-24 RX ADMIN — IOHEXOL 100 ML: 350 INJECTION, SOLUTION INTRAVENOUS at 13:53

## 2024-09-24 RX ADMIN — GADOBUTROL 7 ML: 604.72 INJECTION INTRAVENOUS at 23:08

## 2024-09-24 RX ADMIN — HEPARIN SODIUM 5000 UNITS: 5000 INJECTION INTRAVENOUS; SUBCUTANEOUS at 21:39

## 2024-09-24 RX ADMIN — SODIUM CHLORIDE, SODIUM LACTATE, POTASSIUM CHLORIDE, AND CALCIUM CHLORIDE 1000 ML: .6; .31; .03; .02 INJECTION, SOLUTION INTRAVENOUS at 17:07

## 2024-09-24 NOTE — ED NOTES
RN attempted access. Pt had easily palpable veins but moves a lot upon insertion and has rolling veins. US will be attempted by additional RN due to no blood return.      Brandy Wells RN  09/24/24 [id number]

## 2024-09-24 NOTE — H&P
"H&P - Hospitalist   Name: Max Ozuna 79 y.o. male I MRN: [id number]  Unit/Bed#: ED-10 I Date of Admission: 9/24/2024   Date of Service: 9/24/2024 I Hospital Day: 0     Assessment & Plan  Acute right flank pain  Patient presents today for acute right flank pain  Found to have portal vein thrombosis and cirrhosis on CT abdomen  GI requesting to hold off on AC until EGD is performed  MRI liver protocol ordered as CT unable to rule out liver mass  Formal GI recs to follow  Tylenol for pain control (patient states this has helped)  Portal vein thrombosis  Found on CT abdomen, possibly source of right flank pain  GI recommending to hold of on AC until EGD is completed, planning for tomorrow  Cirrhosis of liver without ascites (HCC)  Found on CT abdomen which showed \"Heterogeneous decreased attenuation noted involving the posterior right hepatic lobe parenchyma, which may be due to perfusion abnormality related to the thrombus, but given the patient's history of cirrhosis, an underlying mass or lesion in this region cannot be excluded. Recommend further evaluation with liver protocol MRI without and with contrast.\"  ED discussed with GI who had recommended EGD prior to starting anticoagulation as well as MRI for further evaluation  Formal GI recommendations to follow  MRI ordered, NPO at midnight  Type 2 diabetes mellitus, with long-term current use of insulin (HCC)  Lab Results   Component Value Date    HGBA1C 6.6 (A) 05/23/2024       No results for input(s): \"POCGLU\" in the last 72 hours.    Blood Sugar Average: Last 72 hrs:    Hold PTA regimen: jardiance, glipizide, metformin  ISS + sliding scale for now  Other hyperlipidemia  Continue statin  Essential hypertension  BP elevated on admission  Resume PTA regimen: lisinopril, HCTZ  Iron deficiency anemia  Continue ferrous sulfate supplementation  Carotid stenosis, asymptomatic, bilateral  Continue outpatient follow-up with Vascular surgery  Coronary artery disease " without angina pectoris  Stable, no anginal symptoms  Continue plavix and statin  Aortoiliac occlusive disease (HCC)  History of bilateral iliac stenting.  History of chronic left external iliac artery occlusion  History of right to left femoral-femoral bypass  History of open femoral artery exposure with left SFA  recanalization  The above-mentioned procedures were performed at [facility]  Continue outpatient follow-up  Stage 3a chronic kidney disease (HCC)  Lab Results   Component Value Date    EGFR 67 09/24/2024    EGFR 59 02/14/2024    EGFR 68 09/20/2023    CREATININE 1.03 09/24/2024    CREATININE 1.15 02/14/2024    CREATININE 1.03 09/20/2023     GERD (gastroesophageal reflux disease)  Stable  Continue PPI prn, does not take this daily given lack of symptoms    VTE Pharmacologic Prophylaxis:   Moderate Risk (Score 3-4) - Pharmacological DVT Prophylaxis Ordered: heparin.  Code Status: FULL CODE  Discussion with family: Updated  (wife) at bedside.    Anticipated Length of Stay: Patient will be admitted on an observation basis with an anticipated length of stay of less than 2 midnights secondary to GI work-up.    History of Present Illness   Chief Complaint: right flank pain    Max Ozuna is a 79 y.o. male with a PMH of CAD, aortoiliac occlusive disease, T2DM, HLD, HTN, ANG, CKD who presents with right flank pain x 3 days prior worse on deep breaths, sneezing, moving. Describes pain to be non-radiating with temporary relief offered by Tylenol. Patient denies any other associated symptoms. Denies fevers, chills, rash, abdominal pain, dysuria, hematuria. Denies rash. Endorses normal bowel movements.  No trauma to the area. No bloody stools, no black stools.     Review of Systems   Constitutional:  Negative for chills and fever.   HENT:  Negative for ear pain and sore throat.    Eyes:  Negative for pain and visual disturbance.   Respiratory:  Negative for cough and shortness of breath.    Cardiovascular:  " Negative for chest pain and palpitations.   Gastrointestinal:  Negative for abdominal pain and vomiting.        Right flank pain   Genitourinary:  Negative for dysuria and hematuria.   Musculoskeletal:  Negative for arthralgias and back pain.   Skin:  Negative for color change and rash.   Neurological:  Negative for seizures and syncope.   All other systems reviewed and are negative.    I have reviewed the patient's PMH, PSH, Social History, Family History, Meds, and Allergies  Social History:  Marital Status: /[employer]   Occupation: retired  Patient Pre-hospital Living Situation: Home  Patient Pre-hospital Level of Mobility: walks  Patient Pre-hospital Diet Restrictions: none    Objective     Vitals:   Blood Pressure: (!) 175/74 (09/24/24 1900)  Pulse: 55 (09/24/24 1900)  Temperature: 97.7 °F (36.5 °C) (09/24/24 1111)  Temp Source: Oral (09/24/24 1111)  Respirations: 16 (09/24/24 1900)  Height: 5' 5\" (165.1 cm) (09/24/24 1058)  Weight - Scale: 70.7 kg (155 lb 13.8 oz) (09/24/24 1111)  SpO2: 96 % (09/24/24 1900)    Physical Exam  Constitutional:       General: He is not in acute distress.     Appearance: Normal appearance. He is not ill-appearing.   HENT:      Head: Normocephalic and atraumatic.      Mouth/Throat:      Mouth: Mucous membranes are moist.      Pharynx: Oropharynx is clear.   Eyes:      Extraocular Movements: Extraocular movements intact.      Pupils: Pupils are equal, round, and reactive to light.   Cardiovascular:      Rate and Rhythm: Normal rate and regular rhythm.      Pulses: Normal pulses.      Heart sounds: Normal heart sounds.   Pulmonary:      Effort: Pulmonary effort is normal.      Breath sounds: Normal breath sounds.   Abdominal:      General: Abdomen is flat. Bowel sounds are normal.      Tenderness: There is abdominal tenderness.      Comments: +tenderness over right flank  No rebound, no guarding  All other quadrants non-tender   Musculoskeletal:      Cervical back: Normal " range of motion and neck supple.      Right lower leg: No edema.      Left lower leg: No edema.   Skin:     General: Skin is warm and dry.   Neurological:      General: No focal deficit present.      Mental Status: He is oriented to person, place, and time.   Psychiatric:         Mood and Affect: Mood normal.         Behavior: Behavior normal.       Lines/Drains:  Lines/Drains/Airways       Active Status       None                    Additional Data:   Lab Results: I have reviewed the following results:   Results from last 7 days   Lab Units 09/24/24  1229   WBC Thousand/uL 5.93   HEMOGLOBIN g/dL 14.3   HEMATOCRIT % 44.1   PLATELETS Thousands/uL 188   SEGS PCT % 74   LYMPHO PCT % 15   MONO PCT % 8   EOS PCT % 3     Results from last 7 days   Lab Units 09/24/24  1310   SODIUM mmol/L 135   POTASSIUM mmol/L 4.9   CHLORIDE mmol/L 103   CO2 mmol/L 21   BUN mg/dL 23   CREATININE mg/dL 1.03   ANION GAP mmol/L 11   CALCIUM mg/dL 10.0   ALBUMIN g/dL 4.3   TOTAL BILIRUBIN mg/dL 0.79   ALK PHOS U/L 66   ALT U/L 13   AST U/L 20   GLUCOSE RANDOM mg/dL 133             Lab Results   Component Value Date    HGBA1C 6.6 (A) 05/23/2024    HGBA1C 7.3 (H) 02/14/2024    HGBA1C 6.6 (H) 09/20/2023           Imaging Review: Reviewed radiology reports from this admission including: CT abdomen/pelvis.  Other Studies: No additional pertinent studies reviewed.    Administrative Statements   I have spent a total time of 90 minutes in caring for this patient on the day of the visit/encounter including Diagnostic results, Impressions, Counseling / Coordination of care, Documenting in the medical record, Reviewing / ordering tests, medicine, procedures  , Obtaining or reviewing history  , and Communicating with other healthcare professionals .    ** Please Note: This note has been constructed using a voice recognition system. **

## 2024-09-24 NOTE — ASSESSMENT & PLAN NOTE
History of bilateral iliac stenting.  History of chronic left external iliac artery occlusion  History of right to left femoral-femoral bypass  History of open femoral artery exposure with left SFA  recanalization  The above-mentioned procedures were performed at [facility]  Continue outpatient follow-up

## 2024-09-24 NOTE — ASSESSMENT & PLAN NOTE
Patient presents today for acute right flank pain  Found to have portal vein thrombosis and cirrhosis on CT abdomen  GI requesting to hold off on AC until EGD is performed  MRI liver protocol ordered as CT unable to rule out liver mass  Formal GI recs to follow  Tylenol for pain control (patient states this has helped)

## 2024-09-24 NOTE — ASSESSMENT & PLAN NOTE
"Found on CT abdomen which showed \"Heterogeneous decreased attenuation noted involving the posterior right hepatic lobe parenchyma, which may be due to perfusion abnormality related to the thrombus, but given the patient's history of cirrhosis, an underlying mass or lesion in this region cannot be excluded. Recommend further evaluation with liver protocol MRI without and with contrast.\"  ED discussed with GI who had recommended EGD prior to starting anticoagulation as well as MRI for further evaluation  Formal GI recommendations to follow  MRI ordered, NPO at midnight  "

## 2024-09-24 NOTE — ASSESSMENT & PLAN NOTE
Found on CT abdomen, possibly source of right flank pain  GI recommending to hold of on AC until EGD is completed, planning for tomorrow

## 2024-09-24 NOTE — Clinical Note
Case was discussed with ALCON and the patient's admission status was agreed to be Admission Status: inpatient status to the service of Dr.Pena White .

## 2024-09-24 NOTE — ED PROVIDER NOTES
1. Cirrhosis (HCC)    2. Portal vein thrombosis    3. Biatrial enlargement    4. Pulmonary interstitial fibrosis (HCC)      ED Disposition       ED Disposition   Admit    Condition   Stable    Date/Time   Tue Sep 24, 2024  5:49 PM    Comment   Case was discussed with [facility] and the patient's admission status was agreed to be Admission Status: observation status to the service of Dr.Pena White .               Assessment & Plan       Medical Decision Making  DDx including but not limited to: appendicitis, hepatitis, pancreatitis, cholecystitis, biliary colic, choledocholithiasis    CT showed cirrhosis with portal vein thrombosis. Discussed case with GI who recommends admission for MRI and EGD prior to starting anticoagulation. Ct also showed biatrial enlargement and developing interstitial pulmonary fibrosis    At the time of admission, the patient is in no acute distress. I discussed with the patient and family the diagnosis, treatment plan, and plan for admission; they were given the opportunity to ask questions and verbalized understanding. They agree with plan.          Amount and/or Complexity of Data Reviewed  Labs: ordered. Decision-making details documented in ED Course.  Radiology: ordered. Decision-making details documented in ED Course.    Risk  Prescription drug management.  Decision regarding hospitalization.                ED Course as of 09/24/24 1750   Tue Sep 24, 2024   1340 Creatinine: 1.03   1340 GLUCOSE: 133   1503 Radiologist: Right portal vein thrombus, liver protocol MRI   1510 CT abdomen pelvis w contrast  1. Nodular liver contour, indicated of cirrhosis.  2. 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 Heterogeneous decreased attenuation noted involving the posterior right hepatic lobe parenchyma, which may be due to perfusion abnormality related to the thrombus, but given   the patient's history of cirrhosis, an underlying mass or lesion in this region cannot  be excluded. Recommend further evaluation with liver protocol MRI without and with contrast.  3. No bowel wall thickening or bowel obstruction. No intraperitoneal free air or ascites.  4. Biatrial enlargement.  5. Interlobular septal line thickening/reticular opacities involving the periphery of the visualized lung bases, indicative of a developing interstitial fibrosis.     1514 Message sent to GI    1637 Message sent again to GI   1639 GI is reviewing pt now   1659 GI Dr. King recommends admission for MRI and EGD. GI will formally see him tomorrow. Keep NPO for now       Medications   lactated ringers bolus 1,000 mL (1,000 mL Intravenous New Bag 9/24/24 1707)   iohexol (OMNIPAQUE) 350 MG/ML injection (SINGLE-DOSE) 100 mL (100 mL Intravenous Given 9/24/24 1353)       History of Present Illness       Chief Complaint   Patient presents with    Flank Pain     Pt reports R-flank pain, painful when taking deep breath. Denies other symptoms.          79 YOM presents today with right sided flank pain that radiates to his right flank and right lower ribs. Pt reports it has been ongoing for about 2-3 days. Pain worsens with taking a deep breath and palpation. Reports that he has tried taking Tylenol which helps with the rib pain but the abdominal pain stays. Denies fever, chills, urinary symptoms. No SOB. Denies nausea vomiting and diarrhea.       Past Medical History:   Diagnosis Date    Coronary artery disease without angina pectoris 02/25/2020    Diabetes mellitus (HCC)     DVT (deep venous thrombosis) (HCC)     GERD (gastroesophageal reflux disease)     Hypertension     Iron deficiency anemia          Review of Systems   Constitutional:  Negative for chills and fever.   Respiratory:  Negative for cough and shortness of breath.    Cardiovascular:  Negative for chest pain and palpitations.   Gastrointestinal:  Positive for abdominal pain. Negative for nausea and vomiting.   Genitourinary:  Positive for flank pain.   All  other systems reviewed and are negative.          Objective     ED Triage Vitals   Temperature Pulse Blood Pressure Respirations SpO2 Patient Position - Orthostatic VS   09/24/24 1111 09/24/24 1058 09/24/24 1058 09/24/24 1058 09/24/24 1058 09/24/24 1058   97.7 °F (36.5 °C) 65 (!) 172/73 18 99 % Sitting      Temp Source Heart Rate Source BP Location FiO2 (%) Pain Score    09/24/24 1111 09/24/24 1058 09/24/24 1058 -- 09/24/24 1058    Oral Monitor Right arm  8        Physical Exam  Vitals and nursing note reviewed.   Constitutional:       General: He is not in acute distress.     Appearance: Normal appearance. He is well-developed. He is not ill-appearing.   HENT:      Head: Normocephalic and atraumatic.   Eyes:      Conjunctiva/sclera: Conjunctivae normal.   Cardiovascular:      Rate and Rhythm: Normal rate and regular rhythm.      Heart sounds: No murmur heard.  Pulmonary:      Effort: Pulmonary effort is normal.   Abdominal:      Palpations: Abdomen is soft.      Tenderness: There is abdominal tenderness. There is no right CVA tenderness or left CVA tenderness.       Musculoskeletal:         General: No swelling. Normal range of motion.      Cervical back: Normal range of motion and neck supple.   Skin:     General: Skin is warm and dry.      Capillary Refill: Capillary refill takes less than 2 seconds.   Neurological:      Mental Status: He is alert.   Psychiatric:         Mood and Affect: Mood normal.         Labs Reviewed   URINE MACROSCOPIC, POC - Abnormal       Result Value    Color, UA Yellow      Clarity, UA Clear      pH, UA 5.5      Leukocytes, UA   (*)     Value: Elevated glucose may cause decreased leukocyte values. See urine microscopic for UWBC result    Nitrite, UA Negative      Protein, UA Negative      Glucose, UA >=1000 (1%) (*)     Ketones, UA Negative      Urobilinogen, UA 0.2      Bilirubin, UA Negative      Occult Blood, UA Negative      Specific Gravity, UA 1.010      Narrative:     CLINITEK  RESULT   HEPATIC FUNCTION PANEL - Normal    Total Bilirubin 0.79      Bilirubin, Direct 0.12      Alkaline Phosphatase 66      AST 20      ALT 13      Total Protein 7.5      Albumin 4.3     LIPASE - Normal    Lipase 26     URINE MICROSCOPIC - Normal    RBC, UA 1-2      WBC, UA 1-2      Epithelial Cells Occasional      Bacteria, UA None Seen     CBC AND DIFFERENTIAL    WBC 5.93      RBC 4.58      Hemoglobin 14.3      Hematocrit 44.1      MCV 96      MCH 31.2      MCHC 32.4      RDW 14.6      MPV 10.9      Platelets 188      nRBC 0      Segmented % 74      Immature Grans % 0      Lymphocytes % 15      Monocytes % 8      Eosinophils Relative 3      Basophils Relative 0      Absolute Neutrophils 4.32      Absolute Immature Grans 0.02      Absolute Lymphocytes 0.91      Absolute Monocytes 0.47      Eosinophils Absolute 0.19      Basophils Absolute 0.02     BASIC METABOLIC PANEL    Sodium 135      Potassium 4.9      Chloride 103      CO2 21      ANION GAP 11      BUN 23      Creatinine 1.03      Glucose 133      Calcium 10.0      eGFR 67      Narrative:     [employer] guidelines for Chronic Kidney Disease (CKD):     Stage 1 with normal or high GFR (GFR > 90 mL/min/1.73 square meters)    Stage 2 Mild CKD (GFR = 60-89 mL/min/1.73 square meters)    Stage 3A Moderate CKD (GFR = 45-59 mL/min/1.73 square meters)    Stage 3B Moderate CKD (GFR = 30-44 mL/min/1.73 square meters)    Stage 4 Severe CKD (GFR = 15-29 mL/min/1.73 square meters)    Stage 5 End Stage CKD (GFR <15 mL/min/1.73 square meters)  Note: GFR calculation is accurate only with a steady state creatinine     CT abdomen pelvis w contrast   Final Interpretation by Fredis Palmer MD (09/24 0559)         1. Nodular liver contour, indicated of cirrhosis.   2. 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 Heterogeneous decreased attenuation noted involving the posterior right hepatic lobe parenchyma, which may be due to  perfusion abnormality related to the thrombus, but given    the patient's history of cirrhosis, an underlying mass or lesion in this region cannot be excluded. Recommend further evaluation with liver protocol MRI without and with contrast.   3. No bowel wall thickening or bowel obstruction. No intraperitoneal free air or ascites.   4. Biatrial enlargement.   5. Interlobular septal line thickening/reticular opacities involving the periphery of the visualized lung bases, indicative of a developing interstitial fibrosis.         I personally discussed this study with LORE OLVERA on 9/24/2024 3:01 PM.               Workstation performed: [id number]         MRI inpatient order    (Results Pending)       Procedures    ED Medication and Procedure Management   Prior to Admission Medications   Prescriptions Last Dose Informant Patient Reported? Taking?   DULoxetine (CYMBALTA) 30 mg delayed release capsule  Self No Yes   Sig: TAKE ONE CAPSULE BY MOUTH TWICE DAILY   Empagliflozin (Jardiance) 25 MG TABS   No Yes   Sig: Take 1 tablet (25 mg total) by mouth every morning Also: take with LOTS of water   acetaminophen (TYLENOL) 500 mg tablet  Self Yes Yes   Sig: Take 1,000 mg by mouth every 8 (eight) hours as needed   atorvastatin (LIPITOR) 40 mg tablet   No Yes   Sig: TAKE 1 TABLET DAILY   chlorhexidine (PERIDEX) 0.12 % solution   No Yes   Sig: Apply 15 mL to the mouth or throat 2 (two) times a day   clopidogrel (PLAVIX) 75 mg tablet   No Yes   Sig: Take 1 tablet (75 mg total) by mouth daily   fenofibrate (TRICOR) 145 mg tablet   No Yes   Sig: Take 1 tablet (145 mg total) by mouth daily   ferrous sulfate 325 (65 Fe) mg tablet  Self No Yes   Sig: Take 1 tablet (325 mg total) by mouth 2 (two) times a day with meals   gabapentin (Neurontin) 300 mg capsule  Self No Yes   Sig: Take 1 capsule (300 mg total) by mouth 3 (three) times a day   glipiZIDE (GLUCOTROL) 10 mg tablet   No No   Sig: TAKE 1 TABLET TWICE A DAY  BEFORE MEALS    hydrocortisone 2.5 % cream  Self No Yes   Sig: Apply topically 4 (four) times a day as needed for rash   ketorolac (ACULAR) 0.5 % ophthalmic solution   Yes Yes   lisinopril-hydrochlorothiazide (PRINZIDE,ZESTORETIC) 20-12.5 MG per tablet   No Yes   Sig: TAKE 1 TABLET BY MOUTH DAILY   metFORMIN (GLUCOPHAGE) 1000 MG tablet   No No   Sig: TAKE 1 TABLET TWICE DAILY  WITH MEALS   moxifloxacin (VIGAMOX) 0.5 % ophthalmic solution   Yes Yes   omeprazole (PriLOSEC) 20 mg delayed release capsule  Self No No   Sig: Take 1 capsule (20 mg total) by mouth daily before breakfast   Patient taking differently: Take 20 mg by mouth if needed   prednisoLONE acetate (PRED FORTE) 1 % ophthalmic suspension   Yes Yes   tamsulosin (FLOMAX) 0.4 mg  Self No Yes   Sig: TAKE 1 CAPSULE BY MOUTH EVERY DAY WITH DINNER   tirzepatide (Mounjaro) 2.5 MG/0.5ML   No Yes   Sig: Inject 0.5 mL (2.5 mg total) under the skin every 7 days      Facility-Administered Medications: None     Patient's Medications   Discharge Prescriptions    No medications on file     No discharge procedures on file.     Juan Gilmore PA-C  09/24/24 0832

## 2024-09-24 NOTE — ASSESSMENT & PLAN NOTE
Lab Results   Component Value Date    EGFR 67 09/24/2024    EGFR 59 02/14/2024    EGFR 68 09/20/2023    CREATININE 1.03 09/24/2024    CREATININE 1.15 02/14/2024    CREATININE 1.03 09/20/2023

## 2024-09-24 NOTE — ASSESSMENT & PLAN NOTE
"Lab Results   Component Value Date    HGBA1C 6.6 (A) 05/23/2024       No results for input(s): \"POCGLU\" in the last 72 hours.    Blood Sugar Average: Last 72 hrs:    Hold PTA regimen: jardiance, glipizide, metformin  ISS + sliding scale for now  "

## 2024-09-25 ENCOUNTER — APPOINTMENT (INPATIENT)
Dept: CT IMAGING | Facility: HOSPITAL | Age: 79
DRG: 435 | End: 2024-09-25
Payer: MEDICARE

## 2024-09-25 LAB
AFP-TM SERPL-MCNC: ABNORMAL NG/ML (ref 0–9)
ALBUMIN SERPL BCG-MCNC: 3.9 G/DL (ref 3.5–5)
ALP SERPL-CCNC: 52 U/L (ref 34–104)
ALT SERPL W P-5'-P-CCNC: 13 U/L (ref 7–52)
ANION GAP SERPL CALCULATED.3IONS-SCNC: 8 MMOL/L (ref 4–13)
AST SERPL W P-5'-P-CCNC: 14 U/L (ref 13–39)
BILIRUB SERPL-MCNC: 0.88 MG/DL (ref 0.2–1)
BUN SERPL-MCNC: 22 MG/DL (ref 5–25)
CALCIUM SERPL-MCNC: 9.5 MG/DL (ref 8.4–10.2)
CHLORIDE SERPL-SCNC: 102 MMOL/L (ref 96–108)
CO2 SERPL-SCNC: 26 MMOL/L (ref 21–32)
CREAT SERPL-MCNC: 0.99 MG/DL (ref 0.6–1.3)
ERYTHROCYTE [DISTWIDTH] IN BLOOD BY AUTOMATED COUNT: 14.4 % (ref 11.6–15.1)
EST. AVERAGE GLUCOSE BLD GHB EST-MCNC: 163 MG/DL
GFR SERPL CREATININE-BSD FRML MDRD: 71 ML/MIN/1.73SQ M
GLUCOSE P FAST SERPL-MCNC: 111 MG/DL (ref 65–99)
GLUCOSE SERPL-MCNC: 111 MG/DL (ref 65–140)
GLUCOSE SERPL-MCNC: 125 MG/DL (ref 65–140)
GLUCOSE SERPL-MCNC: 127 MG/DL (ref 65–140)
GLUCOSE SERPL-MCNC: 148 MG/DL (ref 65–140)
GLUCOSE SERPL-MCNC: 256 MG/DL (ref 65–140)
HBA1C MFR BLD: 7.3 %
HBV CORE AB SER QL: REACTIVE
HBV CORE IGM SER QL: ABNORMAL
HBV SURFACE AG SER QL: ABNORMAL
HCT VFR BLD AUTO: 40.9 % (ref 36.5–49.3)
HCV AB SER QL: ABNORMAL
HGB BLD-MCNC: 13.5 G/DL (ref 12–17)
MAGNESIUM SERPL-MCNC: 1.9 MG/DL (ref 1.9–2.7)
MCH RBC QN AUTO: 30.7 PG (ref 26.8–34.3)
MCHC RBC AUTO-ENTMCNC: 33 G/DL (ref 31.4–37.4)
MCV RBC AUTO: 93 FL (ref 82–98)
PLATELET # BLD AUTO: 180 THOUSANDS/UL (ref 149–390)
PMV BLD AUTO: 10.5 FL (ref 8.9–12.7)
POTASSIUM SERPL-SCNC: 4.1 MMOL/L (ref 3.5–5.3)
PROT SERPL-MCNC: 6.9 G/DL (ref 6.4–8.4)
RBC # BLD AUTO: 4.4 MILLION/UL (ref 3.88–5.62)
SODIUM SERPL-SCNC: 136 MMOL/L (ref 135–147)
WBC # BLD AUTO: 5.8 THOUSAND/UL (ref 4.31–10.16)

## 2024-09-25 PROCEDURE — 86705 HEP B CORE ANTIBODY IGM: CPT | Performed by: STUDENT IN AN ORGANIZED HEALTH CARE EDUCATION/TRAINING PROGRAM

## 2024-09-25 PROCEDURE — 86704 HEP B CORE ANTIBODY TOTAL: CPT | Performed by: STUDENT IN AN ORGANIZED HEALTH CARE EDUCATION/TRAINING PROGRAM

## 2024-09-25 PROCEDURE — 99223 1ST HOSP IP/OBS HIGH 75: CPT

## 2024-09-25 PROCEDURE — 83735 ASSAY OF MAGNESIUM: CPT | Performed by: STUDENT IN AN ORGANIZED HEALTH CARE EDUCATION/TRAINING PROGRAM

## 2024-09-25 PROCEDURE — 99223 1ST HOSP IP/OBS HIGH 75: CPT | Performed by: INTERNAL MEDICINE

## 2024-09-25 PROCEDURE — 82948 REAGENT STRIP/BLOOD GLUCOSE: CPT

## 2024-09-25 PROCEDURE — 85027 COMPLETE CBC AUTOMATED: CPT | Performed by: STUDENT IN AN ORGANIZED HEALTH CARE EDUCATION/TRAINING PROGRAM

## 2024-09-25 PROCEDURE — 87340 HEPATITIS B SURFACE AG IA: CPT | Performed by: STUDENT IN AN ORGANIZED HEALTH CARE EDUCATION/TRAINING PROGRAM

## 2024-09-25 PROCEDURE — 82105 ALPHA-FETOPROTEIN SERUM: CPT | Performed by: STUDENT IN AN ORGANIZED HEALTH CARE EDUCATION/TRAINING PROGRAM

## 2024-09-25 PROCEDURE — NC001 PR NO CHARGE: Performed by: INTERNAL MEDICINE

## 2024-09-25 PROCEDURE — 99232 SBSQ HOSP IP/OBS MODERATE 35: CPT | Performed by: PHYSICIAN ASSISTANT

## 2024-09-25 PROCEDURE — 80053 COMPREHEN METABOLIC PANEL: CPT | Performed by: STUDENT IN AN ORGANIZED HEALTH CARE EDUCATION/TRAINING PROGRAM

## 2024-09-25 PROCEDURE — 71260 CT THORAX DX C+: CPT

## 2024-09-25 PROCEDURE — 86803 HEPATITIS C AB TEST: CPT | Performed by: STUDENT IN AN ORGANIZED HEALTH CARE EDUCATION/TRAINING PROGRAM

## 2024-09-25 RX ADMIN — HEPARIN SODIUM 5000 UNITS: 5000 INJECTION INTRAVENOUS; SUBCUTANEOUS at 22:13

## 2024-09-25 RX ADMIN — FERROUS SULFATE TAB 325 MG (65 MG ELEMENTAL FE) 325 MG: 325 (65 FE) TAB at 16:09

## 2024-09-25 RX ADMIN — IOHEXOL 55 ML: 350 INJECTION, SOLUTION INTRAVENOUS at 20:22

## 2024-09-25 RX ADMIN — GABAPENTIN 300 MG: 300 CAPSULE ORAL at 22:13

## 2024-09-25 RX ADMIN — CLOPIDOGREL BISULFATE 75 MG: 75 TABLET ORAL at 09:27

## 2024-09-25 RX ADMIN — FERROUS SULFATE TAB 325 MG (65 MG ELEMENTAL FE) 325 MG: 325 (65 FE) TAB at 09:25

## 2024-09-25 RX ADMIN — GABAPENTIN 300 MG: 300 CAPSULE ORAL at 16:10

## 2024-09-25 RX ADMIN — INSULIN LISPRO 2 UNITS: 100 INJECTION, SOLUTION INTRAVENOUS; SUBCUTANEOUS at 16:58

## 2024-09-25 RX ADMIN — ATORVASTATIN CALCIUM 40 MG: 40 TABLET, FILM COATED ORAL at 09:26

## 2024-09-25 RX ADMIN — HYDROCHLOROTHIAZIDE 12.5 MG: 12.5 TABLET ORAL at 09:27

## 2024-09-25 RX ADMIN — DULOXETINE HYDROCHLORIDE 30 MG: 30 CAPSULE, DELAYED RELEASE ORAL at 09:26

## 2024-09-25 RX ADMIN — DULOXETINE HYDROCHLORIDE 30 MG: 30 CAPSULE, DELAYED RELEASE ORAL at 16:09

## 2024-09-25 RX ADMIN — FENOFIBRATE 145 MG: 145 TABLET ORAL at 09:25

## 2024-09-25 RX ADMIN — LISINOPRIL 20 MG: 20 TABLET ORAL at 09:28

## 2024-09-25 RX ADMIN — HEPARIN SODIUM 5000 UNITS: 5000 INJECTION INTRAVENOUS; SUBCUTANEOUS at 09:28

## 2024-09-25 RX ADMIN — TAMSULOSIN HYDROCHLORIDE 0.4 MG: 0.4 CAPSULE ORAL at 16:09

## 2024-09-25 RX ADMIN — GABAPENTIN 300 MG: 300 CAPSULE ORAL at 09:26

## 2024-09-25 NOTE — PLAN OF CARE
Problem: PAIN - ADULT  Goal: Verbalizes/displays adequate comfort level or baseline comfort level  Description: Interventions:  - Encourage patient to monitor pain and request assistance  - Assess pain using appropriate pain scale  - Administer analgesics based on type and severity of pain and evaluate response  - Implement non-pharmacological measures as appropriate and evaluate response  - Consider cultural and social influences on pain and pain management  - Notify physician/advanced practitioner if interventions unsuccessful or patient reports new pain  Outcome: Progressing     Problem: INFECTION - ADULT  Goal: Absence or prevention of progression during hospitalization  Description: INTERVENTIONS:  - Assess and monitor for signs and symptoms of infection  - Monitor lab/diagnostic results  - Monitor all insertion sites, i.e. indwelling lines, tubes, and drains  - Monitor endotracheal if appropriate and nasal secretions for changes in amount and color  - [place] appropriate cooling/warming therapies per order  - Administer medications as ordered  - Instruct and encourage patient and family to use good hand hygiene technique  - Identify and instruct in appropriate isolation precautions for identified infection/condition  Outcome: Progressing  Goal: Absence of fever/infection during neutropenic period  Description: INTERVENTIONS:  - Monitor WBC    Outcome: Progressing     Problem: SAFETY ADULT  Goal: Patient will remain free of falls  Description: INTERVENTIONS:  - Educate patient/family on patient safety including physical limitations  - Instruct patient to call for assistance with activity   - Consult OT/PT to assist with strengthening/mobility   - Keep Call bell within reach  - Keep bed low and locked with side rails adjusted as appropriate  - Keep care items and personal belongings within reach  - Initiate and maintain comfort rounds  - Make Fall Risk Sign visible to staff  - Offer Toileting every 2 Hours,  in advance of need  - Initiate/Maintain bed alarm  - Obtain necessary fall risk management equipment: In place   - Apply yellow socks and bracelet for high fall risk patients  - Consider moving patient to room near nurses station  Outcome: Progressing  Goal: Maintain or return to baseline ADL function  Description: INTERVENTIONS:  -  Assess patient's ability to carry out ADLs; assess patient's baseline for ADL function and identify physical deficits which impact ability to perform ADLs (bathing, care of mouth/teeth, toileting, grooming, dressing, etc.)  - Assess/evaluate cause of self-care deficits   - Assess range of motion  - Assess patient's mobility; develop plan if impaired  - Assess patient's need for assistive devices and provide as appropriate  - Encourage maximum independence but intervene and supervise when necessary  - Involve family in performance of ADLs  - Assess for home care needs following discharge   - Consider OT consult to assist with ADL evaluation and planning for discharge  - Provide patient education as appropriate  Outcome: Progressing  Goal: Maintains/Returns to pre admission functional level  Description: INTERVENTIONS:  - Perform AM-PAC 6 Click Basic Mobility/ Daily Activity assessment daily.  - Set and communicate daily mobility goal to care team and patient/family/caregiver.   - Collaborate with rehabilitation services on mobility goals if consulted  - Perform Range of Motion 3 times a day.  - Reposition patient every 2 hours.  - Dangle patient 3 times a day  - Stand patient 3 times a day  - Ambulate patient 3 times a day  - Out of bed to chair 3 times a day   - Out of bed for meals 3 times a day  - Out of bed for toileting  - Record patient progress and toleration of activity level   Outcome: Progressing     Problem: DISCHARGE PLANNING  Goal: Discharge to home or other facility with appropriate resources  Description: INTERVENTIONS:  - Identify barriers to discharge w/patient and  caregiver  - Arrange for needed discharge resources and transportation as appropriate  - Identify discharge learning needs (meds, wound care, etc.)  - Arrange for interpretive services to assist at discharge as needed  - Refer to Case Management Department for coordinating discharge planning if the patient needs post-hospital services based on physician/advanced practitioner order or complex needs related to functional status, cognitive ability, or social support system  Outcome: Progressing     Problem: Knowledge Deficit  Goal: Patient/family/caregiver demonstrates understanding of disease process, treatment plan, medications, and discharge instructions  Description: Complete learning assessment and assess knowledge base.  Interventions:  - Provide teaching at level of understanding  - Provide teaching via preferred learning methods  Outcome: Progressing

## 2024-09-25 NOTE — ASSESSMENT & PLAN NOTE
Lab Results   Component Value Date    EGFR 71 09/25/2024    EGFR 67 09/24/2024    EGFR 59 02/14/2024    CREATININE 0.99 09/25/2024    CREATININE 1.03 09/24/2024    CREATININE 1.15 02/14/2024   Appearing within baseline

## 2024-09-25 NOTE — ASSESSMENT & PLAN NOTE
"Found on CT abdomen which showed \"Heterogeneous decreased attenuation noted involving the posterior right hepatic lobe parenchyma, which may be due to perfusion abnormality related to the thrombus, but given the patient's history of cirrhosis, an underlying mass or lesion in this region cannot be excluded. Recommend further evaluation with liver protocol MRI without and with contrast.\"  Formal GI recommendations to follow  MRI for further evaluation of liver  "

## 2024-09-25 NOTE — CONSULTS
Medical Oncology/Hematology Consult Note  Max Ozuna, male, 79 y.o., 1945,  East 4 /E4 -*, [id number]     Reason for consultation: Liver lesion    Assessment and Plan:  1.  Liver lesion  2. Portal Vein Tumor Thrombus  Patient has a history of liver cirrhosis since 2018  Presented to the ED for right flank pain x 3 days, which was worsened with deep breaths  CT abdomen/pelvis revealed:  Nodular liver contour, indicated of cirrhosis.  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 Heterogeneous decreased attenuation noted involving the posterior right hepatic lobe parenchyma, which may be due to perfusion abnormality related to the thrombus, but given the patient's history of cirrhosis, an underlying mass or lesion in this region cannot be excluded. Recommend further evaluation with liver protocol MRI without and with contrast.  No bowel wall thickening or bowel obstruction. No intraperitoneal free air or ascites.  Biatrial enlargement.  Interlobular septal line thickening/reticular opacities involving the periphery of the visualized lung bases, indicative of a developing interstitial fibrosis.  MRI abdomen with and without contrast revealed:  Liver mass described in detail above is highly suspicious for malignancy. It is unclear if the patient has pathologically documented, nonvascular cirrhosis. If the patient does have known cirrhosis, then the mass is compatible with LR-TIV   Approximately 5.4 x 4.8 x 5.5 cm (length X depth X width) solitary segment 6/7 mass   Adjacent peripherally enhancing thrombus with restricted diffusion in the left portal vein likely tumor thrombus.   AFP tumor marker - 21,374.27  Chronic Hepatitis panel - in process  Hgb 13.5, MCV 93, WBC 5.8, Platelets 180,000, liver enzymes WNL     ECO    PLAN:  CT chest w contrast for staging  Will determine if biopsy is needed upon review of CT chest      Outpatient follow up plan: to be set up by  inpatient Cancer Coordinator      Communication with patient/family: Patient's wife and son present during bedside rounding.  We reviewed the MRI and AFP tumor marker.   With the AFP elevated and diagnostic algorithm scored LR-TIV, this is HCC.  Informed patient we will determine the need for a biopsy upon review of CT chest.    Case discussed with my supervising physician, Dr. Aguirre.  We will continue to follow along with you.    Thank you for this consult.  Briana Weathers, SERA, CRNP  Hematology-Oncology       History of present illness:  Max Ozuna is a 79 y.o. male with PMH significant for liver cirrhosis, PAD, aortoiliac occlusive disease, atherosclerosis of native artery bilateral lower extremity, CAD, DM2, HLD, HTN, CKD, ANG who presented to the ED for right flank pain x 3 days, which was worsening with deep breaths.  Patient has been following with hematology, Dr. Angeles, since 2022 for iron deficiency anemia. Patient is a former smoker, does not drink alcohol.      Review of Systems:   Review of Systems   Constitutional:  Negative for activity change, fatigue, fever and unexpected weight change.   HENT: Negative.     Respiratory: Negative.     Cardiovascular:  Negative for leg swelling.   Gastrointestinal:  Positive for abdominal pain. Negative for constipation, diarrhea, nausea and vomiting.   Endocrine: Negative.    Genitourinary: Negative.    Musculoskeletal: Negative.    Skin: Negative.    Neurological: Negative.  Negative for weakness and headaches.       Oncology History:   Cancer Staging   No matching staging information was found for the patient.    Oncology History    No history exists.       Past Medical History:   Past Medical History:   Diagnosis Date    Coronary artery disease without angina pectoris 02/25/2020    Diabetes mellitus (HCC)     DVT (deep venous thrombosis) (HCC)     GERD (gastroesophageal reflux disease)     Hypertension     Iron deficiency anemia        Past Surgical History:    Procedure Laterality Date    ANGIOPLASTY  10/19/2021    bilateral    CARDIAC CATHETERIZATION  2013    calif    CABG  x4    COLONOSCOPY      COLONOSCOPY      CORONARY ANGIOPLASTY WITH STENT PLACEMENT  2010    CORONARY ARTERY BYPASS GRAFT      FEMORAL ARTERY STENT      FEMORAL BYPASS  10/20/2021    IR LOWER EXTREMITY ANGIOGRAM  2023    IR PELVIC ANGIOGRAM  2022    NC SLCTV CATHJ 3RD+ ORD SLCTV ABDL PEL/LXTR BRNCH  2022    Procedure: ULTRASOUND-GUIDED LEFT BRACHIAL ARTERY PUNCTURE, AORTOGRAM, RIGHT COMMON ILIAC ARTERY ANGIOPLASTY WITH 8 X 40 MM BALLOON, ANGIOPLASTY OF RIGHT EXTERNAL AND COMMON ILIAC ARTERY STENOSIS WITH 6 X 150 MM DRUG-ELUTING BALLOON, STENTING OF RIGHT DISTAL EXTERNAL ILIAC ARTERY STENOSIS WITH 7 X 40 MM SELF EXPANDING STENT POST DILATED WITH A 6 MM BALLOON.;  Surgeon: Henry Pena MD;  [place]    NC SLCTV CATHJ 3RD+ ORD SLCTV ABDL PEL/LXTR BRNCH Bilateral 2023    Procedure: CUTDOWN FEM-FEM BYPASS WITH PRIMARY CLOSURE ACCESS SITE, BILATERAL RUN-OFF, LEFT FEM-POP 5X220 QUYEN AND 6X150 RODRI, ATTEMPT TO CROSS RIGHT SFA OCCLUSION;  Surgeon: Henry Pena MD;  Location: [facility];  Service: Vascular       Family History   Problem Relation Age of Onset    No Known Problems Mother     No Known Problems Father        Social History     Socioeconomic History    Marital status: /Civil Union     Spouse name: None    Number of children: None    Years of education: None    Highest education level: None   Occupational History    None   Tobacco Use    Smoking status: Former     Current packs/day: 0.00     Average packs/day: 1.5 packs/day for 40.0 years (60.0 ttl pk-yrs)     Types: Cigarettes     Start date:      Quit date: 2004     Years since quittin.7     Passive exposure: Past    Smokeless tobacco: Never    Tobacco comments:     quit --    Vaping Use    Vaping status: Never Used   Substance and Sexual Activity    Alcohol use: Not Currently     Alcohol/week:  0.0 standard drinks of alcohol    Drug use: No    Sexual activity: None   Other Topics Concern    None   Social History Narrative    · Most recent tobacco use screenin2020      Social Determinants of Health     Financial Resource Strain: Low Risk  (2023)    Overall Financial Resource Strain (CARDIA)     Difficulty of Paying Living Expenses: Not hard at all   Food Insecurity: Not on file   Transportation Needs: No Transportation Needs (2023)    PRAPARE - Transportation     Lack of Transportation (Medical): No     Lack of Transportation (Non-Medical): No   Physical Activity: Not on file   Stress: Not on file   Social Connections: Not on file   Intimate Partner Violence: Not on file   Housing Stability: Not on file         Current Facility-Administered Medications:     acetaminophen (TYLENOL) tablet 650 mg, 650 mg, Oral, Q6H PRN, Madeline Vasquez MD    atorvastatin (LIPITOR) tablet 40 mg, 40 mg, Oral, Daily, Madeline Vasquez MD, 40 mg at 24    clopidogrel (PLAVIX) tablet 75 mg, 75 mg, Oral, Daily, Madeline Vasquez MD, 75 mg at 24    DULoxetine (CYMBALTA) delayed release capsule 30 mg, 30 mg, Oral, BID, Madeline Vasquez MD, 30 mg at 24    fenofibrate (TRICOR) tablet 145 mg, 145 mg, Oral, Daily, Madeline Vasquez MD, 145 mg at 24    ferrous sulfate tablet 325 mg, 325 mg, Oral, BID With Meals, Madeline Vasquez MD, 325 mg at 24    gabapentin (NEURONTIN) capsule 300 mg, 300 mg, Oral, TID, Madeline Vasquez MD, 300 mg at 24    heparin (porcine) subcutaneous injection 5,000 Units, 5,000 Units, Subcutaneous, Q12H JALEN, 5,000 Units at 2428 **AND** [COMPLETED] Platelet count, , , Once, Madeline Vasquez MD    lisinopril (ZESTRIL) tablet 20 mg, 20 mg, Oral, Daily, 20 mg at  "09/25/24 0928 **AND** hydroCHLOROthiazide tablet 12.5 mg, 12.5 mg, Oral, Daily, Madeline Vasquez MD, 12.5 mg at 09/25/24 0927    insulin lispro (HumALOG/ADMELOG) 100 units/mL subcutaneous injection 1-5 Units, 1-5 Units, Subcutaneous, Q6H JALEN **AND** Fingerstick Glucose (POCT), , , Q6H, Madeline Vasquez MD    ondansetron (ZOFRAN) injection 4 mg, 4 mg, Intravenous, Q6H PRN, Madeline Vasquez MD    tamsulosin (FLOMAX) capsule 0.4 mg, 0.4 mg, Oral, Daily With Dinner, Madeline Vasquez MD      Medications Prior to Admission:     acetaminophen (TYLENOL) 500 mg tablet    atorvastatin (LIPITOR) 40 mg tablet    chlorhexidine (PERIDEX) 0.12 % solution    clopidogrel (PLAVIX) 75 mg tablet    DULoxetine (CYMBALTA) 30 mg delayed release capsule    Empagliflozin (Jardiance) 25 MG TABS    fenofibrate (TRICOR) 145 mg tablet    ferrous sulfate 325 (65 Fe) mg tablet    gabapentin (Neurontin) 300 mg capsule    hydrocortisone 2.5 % cream    ketorolac (ACULAR) 0.5 % ophthalmic solution    lisinopril-hydrochlorothiazide (PRINZIDE,ZESTORETIC) 20-12.5 MG per tablet    moxifloxacin (VIGAMOX) 0.5 % ophthalmic solution    prednisoLONE acetate (PRED FORTE) 1 % ophthalmic suspension    tamsulosin (FLOMAX) 0.4 mg    tirzepatide (Mounjaro) 2.5 MG/0.5ML    glipiZIDE (GLUCOTROL) 10 mg tablet    metFORMIN (GLUCOPHAGE) 1000 MG tablet    omeprazole (PriLOSEC) 20 mg delayed release capsule    No Known Allergies      Physical Exam:     /66 (BP Location: Left arm)   Pulse 62   Temp (!) 97.3 °F (36.3 °C) (Temporal)   Resp 18   Ht 5' 5\" (1.651 m)   Wt 70.7 kg (155 lb 13.8 oz)   SpO2 96%   BMI 25.94 kg/m²     Physical Exam  Constitutional:       Appearance: Normal appearance.   HENT:      Head: Normocephalic.   Cardiovascular:      Rate and Rhythm: Normal rate.      Heart sounds: Normal heart sounds.   Pulmonary:      Breath sounds: Normal breath sounds. "   Abdominal:      Palpations: Abdomen is soft.      Tenderness: There is abdominal tenderness.   Musculoskeletal:      Cervical back: Normal range of motion.      Right lower leg: No edema.      Left lower leg: No edema.   Skin:     General: Skin is warm and dry.      Capillary Refill: Capillary refill takes more than 3 seconds.   Neurological:      Mental Status: He is oriented to person, place, and time.   Psychiatric:         Mood and Affect: Mood normal.         Behavior: Behavior normal.         Thought Content: Thought content normal.         Judgment: Judgment normal.         Recent Results (from the past 48 hour(s))   CBC and differential    Collection Time: 09/24/24 12:29 PM   Result Value Ref Range    WBC 5.93 4.31 - 10.16 Thousand/uL    RBC 4.58 3.88 - 5.62 Million/uL    Hemoglobin 14.3 12.0 - 17.0 g/dL    Hematocrit 44.1 36.5 - 49.3 %    MCV 96 82 - 98 fL    MCH 31.2 26.8 - 34.3 pg    MCHC 32.4 31.4 - 37.4 g/dL    RDW 14.6 11.6 - 15.1 %    MPV 10.9 8.9 - 12.7 fL    Platelets 188 149 - 390 Thousands/uL    nRBC 0 /100 WBCs    Segmented % 74 43 - 75 %    Immature Grans % 0 0 - 2 %    Lymphocytes % 15 14 - 44 %    Monocytes % 8 4 - 12 %    Eosinophils Relative 3 0 - 6 %    Basophils Relative 0 0 - 1 %    Absolute Neutrophils 4.32 1.85 - 7.62 Thousands/µL    Absolute Immature Grans 0.02 0.00 - 0.20 Thousand/uL    Absolute Lymphocytes 0.91 0.60 - 4.47 Thousands/µL    Absolute Monocytes 0.47 0.17 - 1.22 Thousand/µL    Eosinophils Absolute 0.19 0.00 - 0.61 Thousand/µL    Basophils Absolute 0.02 0.00 - 0.10 Thousands/µL   Urine Macroscopic, POC    Collection Time: 09/24/24 12:45 PM   Result Value Ref Range    Color, UA Yellow     Clarity, UA Clear     pH, UA 5.5 4.5 - 8.0    Leukocytes, UA (A) Negative     Elevated glucose may cause decreased leukocyte values. See urine microscopic for UWBC result    Nitrite, UA Negative Negative    Protein, UA Negative Negative mg/dl    Glucose, UA >=1000 (1%) (A) Negative mg/dl     Ketones, UA Negative Negative mg/dl    Urobilinogen, UA 0.2 0.2, 1.0 E.U./dl E.U./dl    Bilirubin, UA Negative Negative    Occult Blood, UA Negative Negative    Specific Gravity, UA 1.010 1.003 - 1.030   Urine Microscopic    Collection Time: 09/24/24 12:45 PM   Result Value Ref Range    RBC, UA 1-2 None Seen, 1-2 /hpf    WBC, UA 1-2 None Seen, 1-2 /hpf    Epithelial Cells Occasional None Seen, Occasional /hpf    Bacteria, UA None Seen None Seen, Occasional /hpf   Basic metabolic panel    Collection Time: 09/24/24  1:10 PM   Result Value Ref Range    Sodium 135 135 - 147 mmol/L    Potassium 4.9 3.5 - 5.3 mmol/L    Chloride 103 96 - 108 mmol/L    CO2 21 21 - 32 mmol/L    ANION GAP 11 4 - 13 mmol/L    BUN 23 5 - 25 mg/dL    Creatinine 1.03 0.60 - 1.30 mg/dL    Glucose 133 65 - 140 mg/dL    Calcium 10.0 8.4 - 10.2 mg/dL    eGFR 67 ml/min/1.73sq m   Hepatic function panel    Collection Time: 09/24/24  1:10 PM   Result Value Ref Range    Total Bilirubin 0.79 0.20 - 1.00 mg/dL    Bilirubin, Direct 0.12 0.00 - 0.20 mg/dL    Alkaline Phosphatase 66 34 - 104 U/L    AST 20 13 - 39 U/L    ALT 13 7 - 52 U/L    Total Protein 7.5 6.4 - 8.4 g/dL    Albumin 4.3 3.5 - 5.0 g/dL   Lipase    Collection Time: 09/24/24  1:10 PM   Result Value Ref Range    Lipase 26 11 - 82 u/L   Hemoglobin A1c w/EAG Estimation (Prechecked if no A1C within 90 days)    Collection Time: 09/24/24  9:46 PM   Result Value Ref Range    Hemoglobin A1C 7.3 (H) Normal 4.0-5.6%; PreDiabetic 5.7-6.4%; Diabetic >=6.5%; Glycemic control for adults with diabetes <7.0% %     mg/dl   Platelet count    Collection Time: 09/24/24  9:46 PM   Result Value Ref Range    Platelets 164 149 - 390 Thousands/uL    MPV 9.9 8.9 - 12.7 fL   Fingerstick Glucose (POCT)    Collection Time: 09/24/24 11:48 PM   Result Value Ref Range    POC Glucose 91 65 - 140 mg/dl   Comprehensive metabolic panel    Collection Time: 09/25/24  5:35 AM   Result Value Ref Range    Sodium 136 135 -  147 mmol/L    Potassium 4.1 3.5 - 5.3 mmol/L    Chloride 102 96 - 108 mmol/L    CO2 26 21 - 32 mmol/L    ANION GAP 8 4 - 13 mmol/L    BUN 22 5 - 25 mg/dL    Creatinine 0.99 0.60 - 1.30 mg/dL    Glucose 111 65 - 140 mg/dL    Glucose, Fasting 111 (H) 65 - 99 mg/dL    Calcium 9.5 8.4 - 10.2 mg/dL    AST 14 13 - 39 U/L    ALT 13 7 - 52 U/L    Alkaline Phosphatase 52 34 - 104 U/L    Total Protein 6.9 6.4 - 8.4 g/dL    Albumin 3.9 3.5 - 5.0 g/dL    Total Bilirubin 0.88 0.20 - 1.00 mg/dL    eGFR 71 ml/min/1.73sq m   Magnesium    Collection Time: 09/25/24  5:35 AM   Result Value Ref Range    Magnesium 1.9 1.9 - 2.7 mg/dL   CBC (With Platelets)    Collection Time: 09/25/24  5:35 AM   Result Value Ref Range    WBC 5.80 4.31 - 10.16 Thousand/uL    RBC 4.40 3.88 - 5.62 Million/uL    Hemoglobin 13.5 12.0 - 17.0 g/dL    Hematocrit 40.9 36.5 - 49.3 %    MCV 93 82 - 98 fL    MCH 30.7 26.8 - 34.3 pg    MCHC 33.0 31.4 - 37.4 g/dL    RDW 14.4 11.6 - 15.1 %    Platelets 180 149 - 390 Thousands/uL    MPV 10.5 8.9 - 12.7 fL   Fingerstick Glucose (POCT)    Collection Time: 09/25/24  5:39 AM   Result Value Ref Range    POC Glucose 127 65 - 140 mg/dl   Fingerstick Glucose (POCT)    Collection Time: 09/25/24  7:18 AM   Result Value Ref Range    POC Glucose 125 65 - 140 mg/dl   AFP tumor marker    Collection Time: 09/25/24 10:53 AM   Result Value Ref Range    AFP TUMOR MARKER 21,374.27 (H) 0.00 - 9.00 ng/mL   Fingerstick Glucose (POCT)    Collection Time: 09/25/24 12:00 PM   Result Value Ref Range    POC Glucose 148 (H) 65 - 140 mg/dl       MRI abdomen w wo contrast    Result Date: 9/25/2024  Narrative: MRI - ABDOMEN - WITH AND WITHOUT CONTRAST INDICATION: 79 years / Male. r/o malignancy. History of cirrhosis and portal vein thrombus. Questioned hepatic mass on CT. COMPARISON: 9/24/2024 CT TECHNIQUE: Multiplanar/multisequence MRI of the abdomen was performed before and after administration of contrast. IV Contrast: 7 mL of Gadobutrol  injection (SINGLE-DOSE) FINDINGS: LOWER CHEST: Unchanged cardiomegaly. LIVER: Evidence of mild steatosis. Hepatomegaly. Approximately 20 cm in length. Possible nodular contour without other morphologic changes of cirrhosis. Unclear if patient has pathologically documented cirrhosis. Approximately 5.4 x 4.8 x 5.5 cm (length X depth X width) solitary segment 6/7 mass (15/40): Rim arterial phase hyperenhancement. No washout or capsule. Restricted diffusion. Adjacent peripherally enhancing thrombus with restricted diffusion in the left portal vein likely tumor thrombus. Patent hepatic veins. BILE DUCTS: Normal caliber and morphology. GALLBLADDER: Within normal limits. PANCREAS: Gallstones. No secondary signs of cholecystitis. ADRENAL GLANDS: Within normal limits. SPLEEN: Within normal limits. Top normal length, 13.0 cm. KIDNEYS/PROXIMAL URETERS: Tiny left mid pole cortical cyst. Otherwise within normal limits. BOWEL: Within normal limits. PERITONEUM/RETROPERITONEUM: No ascites. LYMPH NODES: No enlarged lymph nodes or masses. VESSELS: Normal aortic caliber and enhancement. No extrahepatic portal vein thrombus. No collateral vessels identified. ABDOMINAL WALL: Within normal limits. BONES: Degenerative changes and scoliosis. VISUALIZED PELVIC STRUCTURES: Visualized bladder is within normal limits.     Impression: Liver mass described in detail above is highly suspicious for malignancy. It is unclear if the patient has pathologically documented, nonvascular cirrhosis. If the patient does have known cirrhosis, then the mass is compatible with LR-TIV. Workstation performed: [id number]     CT abdomen pelvis w contrast    Result Date: 9/24/2024  Narrative: CT ABDOMEN AND PELVIS WITH IV CONTRAST INDICATION: right sided flank pain, RUQ TTP. . COMPARISON: 11/2/2022 CT runoff. CT abdomen pelvis from 6/26/2018 without contrast TECHNIQUE: CT examination of the abdomen and pelvis was performed. Multiplanar 2D reformatted images were  created from the source data. This examination, like all CT scans performed in the [facility], was performed utilizing techniques to minimize radiation dose exposure, including the use of iterative reconstruction and automated exposure control. Radiation dose length product (DLP) for this visit: 454 mGy-cm IV Contrast: 100 mL of iohexol (OMNIPAQUE) Enteric Contrast: Not administered. FINDINGS: ABDOMEN LOWER CHEST: There are reticular opacities/intralobular septal line thickening involving the lung bases particularly along the right lower lobe, which may represent a developing interstitial fibrosis. No obvious honeycombing is noted in the visualized lung bases. No consolidation or pleural effusion noted. There is biatrial enlargement. There is been prior median sternotomy. There are coronary artery atherosclerotic calcifications. There is no pericardial effusion. LIVER/BILIARY TREE: There is mildly nodular liver contour, indicative of cirrhosis. There is thrombus involving posterior right portal vein branches, best seen on series 2 images 43-60, with heterogeneous, diminished enhancement/attenuation of the affected posterior right hepatic parenchyma. The main portal vein, left portal vein branches, anterior right portal vein branches appear grossly patent. The hepatic veins are patent. GALLBLADDER: No calcified gallstones. No pericholecystic inflammatory change. SPLEEN: Unremarkable. PANCREAS: Unremarkable. ADRENAL GLANDS: Unremarkable. KIDNEYS/URETERS: Unremarkable. No hydronephrosis. STOMACH AND BOWEL: There is no bowel wall thickening or bowel obstruction. APPENDIX: No findings to suggest appendicitis. ABDOMINOPELVIC CAVITY: No ascites. No pneumoperitoneum. No lymphadenopathy. VESSELS: There are atherosclerotic calcifications involving abdominal aorta which is normal in caliber. There are postsurgical changes from distal aortobiiliac artery stenting, with additional stent noted involving the right  external iliac artery and posterior changes from prior femoral-femoral bypass surgery. There is extensive atherosclerotic plaque with stenoses involving the visualized proximal superficial femoral arteries beyond the bypass incomplete evaluated on this examination. The splenic vein, superior mesenteric vein appear grossly patent. PELVIS REPRODUCTIVE ORGANS: Unremarkable for patient's age. URINARY BLADDER: Unremarkable. ABDOMINAL WALL/INGUINAL REGIONS: Unremarkable. BONES: On bone windows, no fracture is identified. There is multilevel lumbar spondylosis.     Impression: 1. Nodular liver contour, indicated of cirrhosis. 2. 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 Heterogeneous decreased attenuation noted involving the posterior right hepatic lobe parenchyma, which may be due to perfusion abnormality related to the thrombus, but given the patient's history of cirrhosis, an underlying mass or lesion in this region cannot be excluded. Recommend further evaluation with liver protocol MRI without and with contrast. 3. No bowel wall thickening or bowel obstruction. No intraperitoneal free air or ascites. 4. Biatrial enlargement. 5. Interlobular septal line thickening/reticular opacities involving the periphery of the visualized lung bases, indicative of a developing interstitial fibrosis. I personally discussed this study with LORE OLVERA on 9/24/2024 3:01 PM. Workstation performed: [id number]       Labs and pertinent reports reviewed.      This note has been generated by voice recognition software system.  Therefore, there may be spelling, grammar, and or syntax errors. Please contact if questions arise.

## 2024-09-25 NOTE — ASSESSMENT & PLAN NOTE
Found on CT abdomen, possibly source of right flank pain  GI recommending to hold of on AC until further inpatient procedures can be performed

## 2024-09-25 NOTE — PROGRESS NOTES
"Progress Note - Hospitalist   Name: Max Ozuna 79 y.o. male I MRN: [id number]  Unit/Bed#: E4 -01 I Date of Admission: 9/24/2024   Date of Service: 9/25/2024 I Hospital Day: 0    Assessment & Plan  Acute right flank pain  Patient presents today for acute right flank pain  Found to have portal vein thrombosis and cirrhosis on CT abdomen  Additional finding of nodular liver contour noting cirrhosis  GI requesting to hold off on AC until further inpatient procedures are performed  MRI liver protocol ordered as CT unable to rule out liver mass  Tylenol for pain control (patient states this has helped)  Await formal GI recommendations  Portal vein thrombosis  Found on CT abdomen, possibly source of right flank pain  GI recommending to hold of on AC until further inpatient procedures can be performed  Cirrhosis of liver without ascites (HCC)  Found on CT abdomen which showed \"Heterogeneous decreased attenuation noted involving the posterior right hepatic lobe parenchyma, which may be due to perfusion abnormality related to the thrombus, but given the patient's history of cirrhosis, an underlying mass or lesion in this region cannot be excluded. Recommend further evaluation with liver protocol MRI without and with contrast.\"  Formal GI recommendations to follow  MRI for further evaluation of liver  Type 2 diabetes mellitus, with long-term current use of insulin (HCC)  Lab Results   Component Value Date    HGBA1C 7.3 (H) 09/24/2024     Recent Labs     09/24/24  2348 09/25/24  0539 09/25/24  0718 09/25/24  1200   POCGLU 91 127 125 148*   Hold PTA regimen: jardiance, glipizide, metformin  ISS + sliding scale for now  Other hyperlipidemia  Continue statin  Essential hypertension  BP elevated on admission  Home regimen lisinopril/HCTZ 20/12.5 mg daily  Iron deficiency anemia  Continue oral ferrous sulfate supplementation  Carotid stenosis, asymptomatic, bilateral  Continue outpatient follow-up with Vascular " surgery  Coronary artery disease without angina pectoris  Stable, no anginal symptoms  Continue plavix and statin  Aortoiliac occlusive disease (HCC)  History of bilateral iliac stenting.  History of chronic left external iliac artery occlusion  History of right to left femoral-femoral bypass  History of open femoral artery exposure with left SFA  recanalization  The above-mentioned procedures were performed at [facility]  Continue outpatient follow-up  Stage 3a chronic kidney disease (HCC)  Lab Results   Component Value Date    EGFR 71 2024    EGFR 67 2024    EGFR 59 2024    CREATININE 0.99 2024    CREATININE 1.03 2024    CREATININE 1.15 2024   Appearing within baseline  GERD (gastroesophageal reflux disease)  Continue PPI prn, does not take this daily given lack of symptoms    VTE Pharmacologic Prophylaxis:   Heparin    Mobility:   Basic Mobility Inpatient Raw Score: 23  -HL Goal: 7: Walk 25 feet or more  JH-HLM Achieved: 7: Walk 25 feet or more    Patient Centered Rounds: I performed bedside rounds with nursing staff today.   Discussions with Specialists or Other Care Team Provider: Gastroenterology    Education and Discussions with Family / Patient:  Patient.     Current Length of Stay: 0 day(s)  Current Patient Status: Observation   Certification Statement: The patient will require additional inpatient hospitalization for GI workup    Code Status: Level 1 - Full Code    Subjective   Resting in bed.  Reports right lower quadrant pain.  Discussed diagnosis of portal vein thrombosis and ongoing imaging with MRI of abdomen.     Objective     Vitals:   Temp (24hrs), Av.8 °F (36.6 °C), Min:97.7 °F (36.5 °C), Max:97.9 °F (36.6 °C)    Temp:  [97.7 °F (36.5 °C)-97.9 °F (36.6 °C)] 97.7 °F (36.5 °C)  HR:  [55-62] 56  Resp:  [16-18] 18  BP: (131-177)/(48-74) 150/56  SpO2:  [94 %-99 %] 94 %  Body mass index is 25.94 kg/m².     Input and Output Summary (last 24 hours):   No intake or  output data in the 24 hours ending 09/25/24 1315    Physical Exam  Vitals and nursing note reviewed.   Constitutional:       General: He is not in acute distress.     Appearance: He is not ill-appearing, toxic-appearing or diaphoretic.   HENT:      Head: Normocephalic and atraumatic.   Eyes:      General: No scleral icterus.  Cardiovascular:      Rate and Rhythm: Normal rate and regular rhythm.   Pulmonary:      Effort: Pulmonary effort is normal. No respiratory distress.      Breath sounds: Normal breath sounds. No stridor. No wheezing or rhonchi.   Abdominal:      General: Bowel sounds are normal. There is no distension.      Palpations: Abdomen is soft. There is no mass.      Tenderness: There is abdominal tenderness.      Hernia: No hernia is present.   Musculoskeletal:         General: No swelling.      Cervical back: Neck supple.   Skin:     General: Skin is warm and dry.   Neurological:      Mental Status: He is alert and oriented to person, place, and time. Mental status is at baseline.   Psychiatric:         Mood and Affect: Mood normal.         Behavior: Behavior normal.         Lines/Drains:  Lines/Drains/Airways       Active Status       None                            Lab Results: I have reviewed the following results:    Results from last 7 days   Lab Units 09/25/24  0535 09/24/24  2146 09/24/24  1229   WBC Thousand/uL 5.80  --  5.93   HEMOGLOBIN g/dL 13.5  --  14.3   HEMATOCRIT % 40.9  --  44.1   PLATELETS Thousands/uL 180   < > 188   SEGS PCT %  --   --  74   LYMPHO PCT %  --   --  15   MONO PCT %  --   --  8   EOS PCT %  --   --  3    < > = values in this interval not displayed.     Results from last 7 days   Lab Units 09/25/24  0535   SODIUM mmol/L 136   POTASSIUM mmol/L 4.1   CHLORIDE mmol/L 102   CO2 mmol/L 26   BUN mg/dL 22   CREATININE mg/dL 0.99   ANION GAP mmol/L 8   CALCIUM mg/dL 9.5   ALBUMIN g/dL 3.9   TOTAL BILIRUBIN mg/dL 0.88   ALK PHOS U/L 52   ALT U/L 13   AST U/L 14   GLUCOSE RANDOM  mg/dL 111         Results from last 7 days   Lab Units 09/25/24  1200 09/25/24  0718 09/25/24  0539 09/24/24  2348   POC GLUCOSE mg/dl 148* 125 127 91     Results from last 7 days   Lab Units 09/24/24  2146   HEMOGLOBIN A1C % 7.3*           Recent Cultures (last 7 days):         Imaging Review: CT abdomen      Last 24 Hours Medication List:     Current Facility-Administered Medications:     acetaminophen (TYLENOL) tablet 650 mg, Q6H PRN    atorvastatin (LIPITOR) tablet 40 mg, Daily    clopidogrel (PLAVIX) tablet 75 mg, Daily    DULoxetine (CYMBALTA) delayed release capsule 30 mg, BID    fenofibrate (TRICOR) tablet 145 mg, Daily    ferrous sulfate tablet 325 mg, BID With Meals    gabapentin (NEURONTIN) capsule 300 mg, TID    heparin (porcine) subcutaneous injection 5,000 Units, Q12H JALEN **AND** [COMPLETED] Platelet count, Once    lisinopril (ZESTRIL) tablet 20 mg, Daily **AND** hydroCHLOROthiazide tablet 12.5 mg, Daily    insulin lispro (HumALOG/ADMELOG) 100 units/mL subcutaneous injection 1-5 Units, Q6H JALEN **AND** Fingerstick Glucose (POCT), Q6H    ondansetron (ZOFRAN) injection 4 mg, Q6H PRN    tamsulosin (FLOMAX) capsule 0.4 mg, Daily With Dinner    Administrative Statements   Today, Patient Was Seen By: Nurys Harmon PA-C      **Please Note: This note may have been constructed using a voice recognition system.**

## 2024-09-25 NOTE — ASSESSMENT & PLAN NOTE
History of bilateral iliac stenting.  History of chronic left external iliac artery occlusion  History of right to left femoral-femoral bypass  History of open femoral artery exposure with left SFA  recanalization  The above-mentioned procedures were performed at [facility]  Continue outpatient follow-up

## 2024-09-25 NOTE — ASSESSMENT & PLAN NOTE
Patient presents today for acute right flank pain  Found to have portal vein thrombosis and cirrhosis on CT abdomen  Additional finding of nodular liver contour noting cirrhosis  GI requesting to hold off on AC until further inpatient procedures are performed  MRI liver protocol ordered as CT unable to rule out liver mass  Tylenol for pain control (patient states this has helped)  Await formal GI recommendations

## 2024-09-25 NOTE — ASSESSMENT & PLAN NOTE
Lab Results   Component Value Date    HGBA1C 7.3 (H) 09/24/2024     Recent Labs     09/24/24  2348 09/25/24  0539 09/25/24  0718 09/25/24  1200   POCGLU 91 127 125 148*   Hold PTA regimen: jardiance, glipizide, metformin  ISS + sliding scale for now

## 2024-09-25 NOTE — PLAN OF CARE
Problem: PAIN - ADULT  Goal: Verbalizes/displays adequate comfort level or baseline comfort level  Description: Interventions:  - Encourage patient to monitor pain and request assistance  - Assess pain using appropriate pain scale  - Administer analgesics based on type and severity of pain and evaluate response  - Implement non-pharmacological measures as appropriate and evaluate response  - Consider cultural and social influences on pain and pain management  - Notify physician/advanced practitioner if interventions unsuccessful or patient reports new pain  Outcome: Progressing     Problem: INFECTION - ADULT  Goal: Absence or prevention of progression during hospitalization  Description: INTERVENTIONS:  - Assess and monitor for signs and symptoms of infection  - Monitor lab/diagnostic results  - Monitor all insertion sites, i.e. indwelling lines, tubes, and drains  - Monitor endotracheal if appropriate and nasal secretions for changes in amount and color  - [place] appropriate cooling/warming therapies per order  - Administer medications as ordered  - Instruct and encourage patient and family to use good hand hygiene technique  - Identify and instruct in appropriate isolation precautions for identified infection/condition  Outcome: Progressing  Goal: Absence of fever/infection during neutropenic period  Description: INTERVENTIONS:  - Monitor WBC    Outcome: Progressing     Problem: SAFETY ADULT  Goal: Patient will remain free of falls  Description: INTERVENTIONS:  - Educate patient/family on patient safety including physical limitations  - Instruct patient to call for assistance with activity   - Consult OT/PT to assist with strengthening/mobility   - Keep Call bell within reach  - Keep bed low and locked with side rails adjusted as appropriate  - Keep care items and personal belongings within reach  - Initiate and maintain comfort rounds  - Make Fall Risk Sign visible to staff  - Apply yellow socks and bracelet  for high fall risk patients  - Consider moving patient to room near nurses station  Outcome: Progressing  Goal: Maintain or return to baseline ADL function  Description: INTERVENTIONS:  -  Assess patient's ability to carry out ADLs; assess patient's baseline for ADL function and identify physical deficits which impact ability to perform ADLs (bathing, care of mouth/teeth, toileting, grooming, dressing, etc.)  - Assess/evaluate cause of self-care deficits   - Assess range of motion  - Assess patient's mobility; develop plan if impaired  - Assess patient's need for assistive devices and provide as appropriate  - Encourage maximum independence but intervene and supervise when necessary  - Involve family in performance of ADLs  - Assess for home care needs following discharge   - Consider OT consult to assist with ADL evaluation and planning for discharge  - Provide patient education as appropriate  Outcome: Progressing  Goal: Maintains/Returns to pre admission functional level  Description: INTERVENTIONS:  - Perform AM-PAC 6 Click Basic Mobility/ Daily Activity assessment daily.  - Set and communicate daily mobility goal to care team and patient/family/caregiver.   - Collaborate with rehabilitation services on mobility goals if consulted  - Record patient progress and toleration of activity level   Outcome: Progressing     Problem: DISCHARGE PLANNING  Goal: Discharge to home or other facility with appropriate resources  Description: INTERVENTIONS:  - Identify barriers to discharge w/patient and caregiver  - Arrange for needed discharge resources and transportation as appropriate  - Identify discharge learning needs (meds, wound care, etc.)  - Arrange for interpretive services to assist at discharge as needed  - Refer to Case Management Department for coordinating discharge planning if the patient needs post-hospital services based on physician/advanced practitioner order or complex needs related to functional status,  cognitive ability, or social support system  Outcome: Progressing     Problem: Knowledge Deficit  Goal: Patient/family/caregiver demonstrates understanding of disease process, treatment plan, medications, and discharge instructions  Description: Complete learning assessment and assess knowledge base.  Interventions:  - Provide teaching at level of understanding  - Provide teaching via preferred learning methods  Outcome: Progressing

## 2024-09-25 NOTE — CONSULTS
Consultation -  [facility]  Max Ozuna 79 y.o. male MRN: [id number]  Unit/Bed#: E4 -01 Encounter: [id number]      Inpatient consult to gastroenterology     Date/Time  9/25/2024 12:23 PM     Performed by  Aaron Mtz MD   Authorized by  Madeline Vasquez MD           Reason for Consult / Principal Problem: Portal vein thrombosis, liver mass    ASSESSMENT AND PLAN:      Liver cirrhosis  Liver mass   Portal vein thrombosis    Patient with history of cirrhosis documented on imaging since atleast 2018; never been symptomatic from it and never had issues requiring evaluation  No alcohol/drug use, no personal/family history of liver disease  Has not had hepatitis panel or any other workup for cirrhosis in the past    Liver mass concerning for HCC in the setting of chronic underlying cirrhosis  Patient is Child [place] Class A; no signs of portal hypertension so far  Won't be a candidate for liver transplant due to tumor size > 5 cm  Might not be a candidate for resection with portal vein invasion    Plan  Check AFP, and hepatitis panel  Will discuss with hepatology and IR for possible biopsy  Continue off anticoagulation   Will need CT chest for staging purposes  ________________________________________________________________    HPI: 79/M with PMH of CAD s/p CABG, PAD s/p multiple interventions, hypertension, diabetes, CKD, DVT who presented to the hospital yesterday evening with complaints of right sided flank pain on and off over the last 3 days. Patient is worsened with deep breaths, position change, and sneezing. No associated chest pain, shortness of breath, abdominal pain, nausea, vomiting, diarrhea, constipation, rash, blood in stool, burning micturition, headache, hematuria, fever, chills.     In the ED, patient was afebrile, HDS, and saturating well on room air. Labs showed normal CBC, CMP, lipase, and UA. CT AP showed nodular liver contour suspicious for liver  cirrhosis, portal vein thrombosis, and possible liver mass with recommendations for better characterization with MRI liver protocol. MRI abdomen showed liver mass appx. 5.4 x 4.8 x 5.5 cm liver mass concerning for malignancy with possible tumor thrombus in portal vein. Gastroenterology was consulted for further evaluation and management.    Patient seen and examined at bedside today morning. Patient mentions feeling well except intermittent right sided flank pain. No other associated complaints. Patient does not mention history of alcohol/drug abuse. He quit smoking 15 years ago. He was never obese. He denies personal/family history of liver disease. No recent weight loss, loss of appetite, fatigue, abdominal distension.     REVIEW OF SYSTEMS: negative except mentioned in HPI    Historical Information   Past Medical History:   Diagnosis Date    Coronary artery disease without angina pectoris 02/25/2020    Diabetes mellitus (HCC)     DVT (deep venous thrombosis) (HCC)     GERD (gastroesophageal reflux disease)     Hypertension     Iron deficiency anemia      Past Surgical History:   Procedure Laterality Date    ANGIOPLASTY  10/19/2021    bilateral    CARDIAC CATHETERIZATION  2013    calif    CABG  x4    COLONOSCOPY      COLONOSCOPY      CORONARY ANGIOPLASTY WITH STENT PLACEMENT  01/01/2010    CORONARY ARTERY BYPASS GRAFT      FEMORAL ARTERY STENT      FEMORAL BYPASS  10/20/2021    IR LOWER EXTREMITY ANGIOGRAM  2/14/2023    IR PELVIC ANGIOGRAM  5/6/2022    CT SLCTV CATHJ 3RD+ ORD SLCTV ABDL PEL/LXTR BRNCH  5/6/2022    Procedure: ULTRASOUND-GUIDED LEFT BRACHIAL ARTERY PUNCTURE, AORTOGRAM, RIGHT COMMON ILIAC ARTERY ANGIOPLASTY WITH 8 X 40 MM BALLOON, ANGIOPLASTY OF RIGHT EXTERNAL AND COMMON ILIAC ARTERY STENOSIS WITH 6 X 150 MM DRUG-ELUTING BALLOON, STENTING OF RIGHT DISTAL EXTERNAL ILIAC ARTERY STENOSIS WITH 7 X 40 MM SELF EXPANDING STENT POST DILATED WITH A 6 MM BALLOON.;  Surgeon: Henry Pena MD;  [facility]    CT  SLCTV CATHJ 3RD+ ORD SLCTV ABDL PEL/LXTR BRNCH Bilateral 2023    Procedure: CUTDOWN FEM-FEM BYPASS WITH PRIMARY CLOSURE ACCESS SITE, BILATERAL RUN-OFF, LEFT FEM-POP 5X220 QUYEN AND 6X150 RODRI, ATTEMPT TO CROSS RIGHT SFA OCCLUSION;  Surgeon: Henry Pena MD;  Location: [facility];  Service: Vascular     Social History   Social History     Substance and Sexual Activity   Alcohol Use Not Currently    Alcohol/week: 0.0 standard drinks of alcohol     Social History     Substance and Sexual Activity   Drug Use No     Social History     Tobacco Use   Smoking Status Former    Current packs/day: 0.00    Average packs/day: 1.5 packs/day for 40.0 years (60.0 ttl pk-yrs)    Types: Cigarettes    Start date:     Quit date:     Years since quittin.7    Passive exposure: Past   Smokeless Tobacco Never   Tobacco Comments    quit --      Family History   Problem Relation Age of Onset    No Known Problems Mother     No Known Problems Father        Meds/Allergies       Medications Prior to Admission:     acetaminophen (TYLENOL) 500 mg tablet    atorvastatin (LIPITOR) 40 mg tablet    chlorhexidine (PERIDEX) 0.12 % solution    clopidogrel (PLAVIX) 75 mg tablet    DULoxetine (CYMBALTA) 30 mg delayed release capsule    Empagliflozin (Jardiance) 25 MG TABS    fenofibrate (TRICOR) 145 mg tablet    ferrous sulfate 325 (65 Fe) mg tablet    gabapentin (Neurontin) 300 mg capsule    hydrocortisone 2.5 % cream    ketorolac (ACULAR) 0.5 % ophthalmic solution    lisinopril-hydrochlorothiazide (PRINZIDE,ZESTORETIC) 20-12.5 MG per tablet    moxifloxacin (VIGAMOX) 0.5 % ophthalmic solution    prednisoLONE acetate (PRED FORTE) 1 % ophthalmic suspension    tamsulosin (FLOMAX) 0.4 mg    tirzepatide (Mounjaro) 2.5 MG/0.5ML    glipiZIDE (GLUCOTROL) 10 mg tablet    metFORMIN (GLUCOPHAGE) 1000 MG tablet    omeprazole (PriLOSEC) 20 mg delayed release capsule    Current Facility-Administered Medications:     acetaminophen (TYLENOL)  "tablet 650 mg, Q6H PRN    atorvastatin (LIPITOR) tablet 40 mg, Daily    clopidogrel (PLAVIX) tablet 75 mg, Daily    DULoxetine (CYMBALTA) delayed release capsule 30 mg, BID    fenofibrate (TRICOR) tablet 145 mg, Daily    ferrous sulfate tablet 325 mg, BID With Meals    gabapentin (NEURONTIN) capsule 300 mg, TID    heparin (porcine) subcutaneous injection 5,000 Units, Q12H JALEN **AND** [COMPLETED] Platelet count, Once    lisinopril (ZESTRIL) tablet 20 mg, Daily **AND** hydroCHLOROthiazide tablet 12.5 mg, Daily    insulin lispro (HumALOG/ADMELOG) 100 units/mL subcutaneous injection 1-5 Units, Q6H JALEN **AND** Fingerstick Glucose (POCT), Q6H    ondansetron (ZOFRAN) injection 4 mg, Q6H PRN    tamsulosin (FLOMAX) capsule 0.4 mg, Daily With Dinner    No Known Allergies        Objective     Blood pressure 150/56, pulse 56, temperature 97.7 °F (36.5 °C), temperature source Temporal, resp. rate 18, height 5' 5\" (1.651 m), weight 70.7 kg (155 lb 13.8 oz), SpO2 94%. Body mass index is 25.94 kg/m².    No intake or output data in the 24 hours ending 09/25/24 1151      PHYSICAL EXAM:      General Appearance:   Alert, cooperative, no distress   HEENT:   Normocephalic, atraumatic, anicteric.     Neck:  Supple, symmetrical, trachea midline   Lungs:   Clear to auscultation bilaterally; no rales, rhonchi or wheezing; respirations unlabored    Heart::   Regular rate and rhythm; no murmur, rub, or gallop.   Abdomen:   Soft, non-tender, non-distended; normal bowel sounds; no masses, no organomegaly    Genitalia:   Deferred    Rectal:   Deferred    Extremities:  No cyanosis, clubbing or edema    Pulses:  2+ and symmetric all extremities    Skin:  No jaundice, rashes, or lesions    Lymph nodes:  No palpable cervical lymphadenopathy        Lab Results:   Admission on 09/24/2024   Component Date Value    WBC 09/24/2024 5.93     RBC 09/24/2024 4.58     Hemoglobin 09/24/2024 14.3     Hematocrit 09/24/2024 44.1     MCV 09/24/2024 96     MCH " 09/24/2024 31.2     MCHC 09/24/2024 32.4     RDW 09/24/2024 14.6     MPV 09/24/2024 10.9     Platelets 09/24/2024 188     nRBC 09/24/2024 0     Segmented % 09/24/2024 74     Immature Grans % 09/24/2024 0     Lymphocytes % 09/24/2024 15     Monocytes % 09/24/2024 8     Eosinophils Relative 09/24/2024 3     Basophils Relative 09/24/2024 0     Absolute Neutrophils 09/24/2024 4.32     Absolute Immature Grans 09/24/2024 0.02     Absolute Lymphocytes 09/24/2024 0.91     Absolute Monocytes 09/24/2024 0.47     Eosinophils Absolute 09/24/2024 0.19     Basophils Absolute 09/24/2024 0.02     Sodium 09/24/2024 135     Potassium 09/24/2024 4.9     Chloride 09/24/2024 103     CO2 09/24/2024 21     ANION GAP 09/24/2024 11     BUN 09/24/2024 23     Creatinine 09/24/2024 1.03     Glucose 09/24/2024 133     Calcium 09/24/2024 10.0     eGFR 09/24/2024 67     Total Bilirubin 09/24/2024 0.79     Bilirubin, Direct 09/24/2024 0.12     Alkaline Phosphatase 09/24/2024 66     AST 09/24/2024 20     ALT 09/24/2024 13     Total Protein 09/24/2024 7.5     Albumin 09/24/2024 4.3     Lipase 09/24/2024 26     Color, UA 09/24/2024 Yellow     Clarity, UA 09/24/2024 Clear     pH, UA 09/24/2024 5.5     Leukocytes, UA 09/24/2024 Elevated glucose may cause decreased leukocyte values. See urine microscopic for UWBC result (A)     Nitrite, UA 09/24/2024 Negative     Protein, UA 09/24/2024 Negative     Glucose, UA 09/24/2024 >=1000 (1%) (A)     Ketones, UA 09/24/2024 Negative     Urobilinogen, UA 09/24/2024 0.2     Bilirubin, UA 09/24/2024 Negative     Occult Blood, UA 09/24/2024 Negative     Specific Gravity, UA 09/24/2024 1.010     RBC, UA 09/24/2024 1-2     WBC, UA 09/24/2024 1-2     Epithelial Cells 09/24/2024 Occasional     Bacteria, UA 09/24/2024 None Seen     Hemoglobin A1C 09/24/2024 7.3 (H)     EAG 09/24/2024 163     Platelets 09/24/2024 164     MPV 09/24/2024 9.9     POC Glucose 09/24/2024 91     Sodium 09/25/2024 136     Potassium 09/25/2024 4.1      Chloride 09/25/2024 102     CO2 09/25/2024 26     ANION GAP 09/25/2024 8     BUN 09/25/2024 22     Creatinine 09/25/2024 0.99     Glucose 09/25/2024 111     Glucose, Fasting 09/25/2024 111 (H)     Calcium 09/25/2024 9.5     AST 09/25/2024 14     ALT 09/25/2024 13     Alkaline Phosphatase 09/25/2024 52     Total Protein 09/25/2024 6.9     Albumin 09/25/2024 3.9     Total Bilirubin 09/25/2024 0.88     eGFR 09/25/2024 71     Magnesium 09/25/2024 1.9     WBC 09/25/2024 5.80     RBC 09/25/2024 4.40     Hemoglobin 09/25/2024 13.5     Hematocrit 09/25/2024 40.9     MCV 09/25/2024 93     MCH 09/25/2024 30.7     MCHC 09/25/2024 33.0     RDW 09/25/2024 14.4     Platelets 09/25/2024 180     MPV 09/25/2024 10.5     POC Glucose 09/25/2024 127     POC Glucose 09/25/2024 125        Imaging Studies: I have personally reviewed pertinent imaging studies.    Aaron Mtz MD  PGY-3, Internal Medicine  [facility]

## 2024-09-26 ENCOUNTER — DOCUMENTATION (OUTPATIENT)
Dept: HEMATOLOGY ONCOLOGY | Facility: CLINIC | Age: 79
End: 2024-09-26

## 2024-09-26 ENCOUNTER — TRANSITIONAL CARE MANAGEMENT (OUTPATIENT)
Dept: FAMILY MEDICINE CLINIC | Facility: CLINIC | Age: 79
End: 2024-09-26

## 2024-09-26 VITALS
OXYGEN SATURATION: 98 % | HEIGHT: 65 IN | RESPIRATION RATE: 18 BRPM | BODY MASS INDEX: 25.97 KG/M2 | DIASTOLIC BLOOD PRESSURE: 59 MMHG | WEIGHT: 155.87 LBS | SYSTOLIC BLOOD PRESSURE: 128 MMHG | HEART RATE: 55 BPM | TEMPERATURE: 97 F

## 2024-09-26 PROBLEM — C22.0 HEPATOCELLULAR CARCINOMA (HCC): Status: ACTIVE | Noted: 2024-09-26

## 2024-09-26 LAB
ANA SER QL IA: NEGATIVE
GLUCOSE SERPL-MCNC: 191 MG/DL (ref 65–140)
GLUCOSE SERPL-MCNC: 197 MG/DL (ref 65–140)
GLUCOSE SERPL-MCNC: 247 MG/DL (ref 65–140)
GLUCOSE SERPL-MCNC: 294 MG/DL (ref 65–140)
IGA SERPL-MCNC: 161 MG/DL (ref 66–433)
IGG SERPL-MCNC: 984 MG/DL (ref 635–1741)
IGM SERPL-MCNC: <20 MG/DL (ref 45–281)
INR PPP: 1.08 (ref 0.85–1.19)
PROTHROMBIN TIME: 14.2 SECONDS (ref 12.3–15)

## 2024-09-26 PROCEDURE — 86015 ACTIN ANTIBODY EACH: CPT | Performed by: STUDENT IN AN ORGANIZED HEALTH CARE EDUCATION/TRAINING PROGRAM

## 2024-09-26 PROCEDURE — 82103 ALPHA-1-ANTITRYPSIN TOTAL: CPT | Performed by: STUDENT IN AN ORGANIZED HEALTH CARE EDUCATION/TRAINING PROGRAM

## 2024-09-26 PROCEDURE — 82948 REAGENT STRIP/BLOOD GLUCOSE: CPT

## 2024-09-26 PROCEDURE — 99239 HOSP IP/OBS DSCHRG MGMT >30: CPT | Performed by: PHYSICIAN ASSISTANT

## 2024-09-26 PROCEDURE — 99232 SBSQ HOSP IP/OBS MODERATE 35: CPT | Performed by: INTERNAL MEDICINE

## 2024-09-26 PROCEDURE — 86381 MITOCHONDRIAL ANTIBODY EACH: CPT | Performed by: STUDENT IN AN ORGANIZED HEALTH CARE EDUCATION/TRAINING PROGRAM

## 2024-09-26 PROCEDURE — 85610 PROTHROMBIN TIME: CPT | Performed by: STUDENT IN AN ORGANIZED HEALTH CARE EDUCATION/TRAINING PROGRAM

## 2024-09-26 PROCEDURE — 82390 ASSAY OF CERULOPLASMIN: CPT | Performed by: STUDENT IN AN ORGANIZED HEALTH CARE EDUCATION/TRAINING PROGRAM

## 2024-09-26 PROCEDURE — 99232 SBSQ HOSP IP/OBS MODERATE 35: CPT

## 2024-09-26 PROCEDURE — 86038 ANTINUCLEAR ANTIBODIES: CPT | Performed by: STUDENT IN AN ORGANIZED HEALTH CARE EDUCATION/TRAINING PROGRAM

## 2024-09-26 PROCEDURE — 82784 ASSAY IGA/IGD/IGG/IGM EACH: CPT | Performed by: STUDENT IN AN ORGANIZED HEALTH CARE EDUCATION/TRAINING PROGRAM

## 2024-09-26 RX ADMIN — GABAPENTIN 300 MG: 300 CAPSULE ORAL at 08:44

## 2024-09-26 RX ADMIN — HYDROCHLOROTHIAZIDE 12.5 MG: 12.5 TABLET ORAL at 08:44

## 2024-09-26 RX ADMIN — ATORVASTATIN CALCIUM 40 MG: 40 TABLET, FILM COATED ORAL at 08:44

## 2024-09-26 RX ADMIN — INSULIN LISPRO 5 UNITS: 100 INJECTION, SOLUTION INTRAVENOUS; SUBCUTANEOUS at 13:02

## 2024-09-26 RX ADMIN — HEPARIN SODIUM 5000 UNITS: 5000 INJECTION INTRAVENOUS; SUBCUTANEOUS at 08:46

## 2024-09-26 RX ADMIN — LISINOPRIL 20 MG: 20 TABLET ORAL at 08:44

## 2024-09-26 RX ADMIN — INSULIN LISPRO 1 UNITS: 100 INJECTION, SOLUTION INTRAVENOUS; SUBCUTANEOUS at 05:57

## 2024-09-26 RX ADMIN — DULOXETINE HYDROCHLORIDE 30 MG: 30 CAPSULE, DELAYED RELEASE ORAL at 08:44

## 2024-09-26 RX ADMIN — INSULIN LISPRO 2 UNITS: 100 INJECTION, SOLUTION INTRAVENOUS; SUBCUTANEOUS at 00:28

## 2024-09-26 RX ADMIN — FERROUS SULFATE TAB 325 MG (65 MG ELEMENTAL FE) 325 MG: 325 (65 FE) TAB at 08:45

## 2024-09-26 RX ADMIN — FENOFIBRATE 145 MG: 145 TABLET ORAL at 08:44

## 2024-09-26 NOTE — PROGRESS NOTES
Hematology Outpatient Office Note    Date of Service: 10/3/2024    [facility] [address]  [facility] [address]  [phone]    Reason for Consultation:   No chief complaint on file.      Referral Physician: No ref. provider found    Primary Care Physician:  DO Maddi Quinonez: Daughter on the phone    Taylor: wife    Sallie: daughter in law    ASSESSMENT & PLAN      Diagnosis ICD-10-CM Associated Orders   1. Hepatocellular carcinoma (HCC)  C22.0 CT chest abdomen pelvis w contrast            This is a 79 y.o. c PMHx notable for cecal AVM/GAVE, PE, DM, DVT, HTN, HLP, CAD, being seen in consultation for chronic AGN and locally advanced HCC     Iron Deficiency anemia: improved s/p IV iron  Low iron levels can cause anemia (low red blood cells). Low iron levels can also make people feel poorly, even before anemia starts. Iron is used to make red blood cells. If blood is lost from the body, if iron can't be absorbed from food, or if something removes iron (pregnancy) the iron can be low and then anemia happens.    Hx Cecal AVM. GI thought maybe GAVE  8/29/2022: Hgb 9.5, WBC 6.35k, MCV 91, plt 235k, retic 3.65%, FOBT + (8/31), ferritin 7, iron 30, Fe Sat 9%, TIBC 346  9/7 EGD/C-scope: internal hemorrhoids    Capsule endoscopy: no active bleeding  6/27/2023 EGD with single balloon enteroscopy revealed no bleeding      Iron deficiency is very common. Low iron can cause fatigue or tiredness, the desire to eat ice or non-food items like corn starch or dry pasta noodles, restless legs, weak fingernails, cracks at the corner of the mouth.     Common causes of iron deficiency include inadequate diet (uncommon, usually vegetarians), gastric bypass surgery (very common), pregnancy (very common), periods (most common cause in younger women), blood loss (usually from the stomach or intestines), and decreased iron absorption from the intestines from gastritis, H  pylori, acid blocking medicines or celiac disease. Even a normal menstrual period can cause iron deficiency. In 10% of patients a clear cause of low iron is not found. 14% of women will have iron deficiency just from their menstrual period.       Iron deficiency is treated with pills as a first step. For some people the iron pills do not work, cause severe side effects, or take too long to work: for these people IV iron can be given. You may only require IV iron very rarely, for example only when pregnant, or from 1-2 times a year based on your rate of need. It takes 4 weeks for IV iron to improve the anemia to maximum potential. If your fatigue is not from the low iron, getting iron treatment would not help the fatigue.    9/24/2024 CT Abd/pelv w/c: Nodular liver contour, indicated of cirrhosis. Portal vein thrombosis involving posterior branch of the right hepatic lobe  MRI Abd w/wo c: Approximately 5.4 x 4.8 x 5.5 cm (length X depth X width) solitary segment 6/7 mass  9/25/2024: AFP 21,374  9/25/2024: CT Chest w/c: Emphysema with areas of fibrosis/scarring which appears stable compared to study of 11/15/2019            Discussion of decision making    I personally reviewed the following lab results, the image studies, pathology, other specialty/physicians consult notes and recommendations, and outside medical records from [facility]/other institutions. I had a lengthy discussion with the patient and shared the work-up findings. We discussed the diagnosis and management plan as below. I spent 42 minutes reviewing the records (labs, clinician notes, outside records, medical history, ordering medicine/tests/procedures, interpreting the imaging/labs previously done) and coordination of care as well as direct time with the patient today, of which greater than 50% of the time was spent in counseling and coordination of care with the patient/family.    Plan/Labs  CBC, iron panel due and will be added to the treatment labs  C1  He is back on PO iron MWF   Cont to f/u GI  Restaging CT CAP w/c ordered in 12 weeks  Infusion chairs ordered for Durvalumab + Tremelimumab for advanced HCC, C1 coming up with preceding labs  Eventual goal is liver directed therapy if we get a [place]          Follow Up: q4 weeks    All questions were answered to the patient's satisfaction during this encounter. The patient knows the contact information for our office and knows to reach out for any relevant concerns related to this encounter. They are to call for any temperature 100.4 or higher, new symptoms including but not restricted to shaking chills, decreased appetite, nausea, vomiting, diarrhea, increased fatigue, shortness of breath or chest pain, confusion, and not feeling the strength to come to the clinic. For all other listed problems and medical diagnosis in their chart - they are managed by PCP and/or other specialists, which the patient acknowledges. Thank you very much for your consultation and making us a part of this patient's care. We are continuing to follow closely with you. Please do not hesitate to reach out to me with any additional questions or concerns.    Mal Angeles MD  Hematology & Medical Oncology Staff Physician             Disclaimer: This document was prepared using [employer] Direct technology. If a word or phrase is confusing, or does not make sense, this is likely due to recognition error which was not discovered during this clinician's review. If you believe an error has occurred, please contact me through HemOn service line for dequan?cation.      HEMATOLOGICAL HISTORY OF PRESENT ILLNESS      Clotting History None   Bleeding History GAVE related bleeding   Cancer History None   Family Cancer History Father (cancer)   H/O Blood/Plt Transfusion PRBC years ago   Tobacco/etoh/drug abuse 2 PPDx 44 years, quit 2004, no etoh abuse or rec drug use       Cancer Screening history n/a   Occupation [employer]       8/29/2022: Hgb 9.5, WBC 6.35k, MCV 91, plt 235k, retic 3.65%, FOBT + (8/31), ferritin 7, iron 30, Fe Sat 9%, TIBC 346  9/7 EGD/C-scope: internal hemorrhoids  10/3/2022-10/9/2022: 5 iV Venofer 200mg administered   11/28/2022: ferritin 26, iron 67, Fe Sat 21%, TIBC 322, Hgb 12.7 (now normal)  5/2/2023: Discussed with the patient the results of his iron studies which show worsening iron deficiency as well as recurrent anemia.  I ordered 600 mg of IV Venofer  5/18/2023: ferritin 7, iron 36, Fe Sat 10%, TIBC 364, Hgb 11.6  6/29/2023: ferritin 37, iron 52, Fe Sat 17%, TIBC 307, Hgb 12.7, MCV 92  9/20/2023:  ferritin 58, iron 65, Fe Sat 33%, TIBC 230, Hgb 13.5, MCV 95  2/14/2024: iron 42, ferritin 22, Fe Sat 14%, TIBC 304: Vit B12 632, Hgb 13.4, plt 161k, WBC 6.21k  4/18/2024: iron 59, Fe Sat 18%, TIBC 329, ferritin 21, Hgb 13.4 (2/2024)  4/29/2024: planned IV Venofer 200mg x2  10/2/2024: Ferritin: 224, Hgb 13.6, Fe Sat 13%, TIBC 208, iron 26, ferritin 224 AOCI  SUBJECTIVE  (INTERVAL HISTORY)      Interval events: He has minimal fatigue. He had a good trip to [place].    I have reviewed the relevant past medical, surgical, social and family history. I have also reviewed allergies and medications for this patient.    Review of Systems    Baseline weight: 152-154 lbs    Denies weight loss, F/C, N/V, SOB, CP, LH, HA.    He has had fatigue and low energy since 6/2022. He was chewing on ice all day since 3/2022.     A 10-point review of system was performed, pertinent positive and negative were detailed as above. Otherwise, the 10-point review of system was negative.      Past Medical History:   Diagnosis Date    Coronary artery disease without angina pectoris 02/25/2020    Diabetes mellitus (HCC)     DVT (deep venous thrombosis) (HCC)     GERD (gastroesophageal reflux disease)     Hypertension     Iron deficiency anemia        Past Surgical History:   Procedure Laterality Date    ANGIOPLASTY  10/19/2021    bilateral     CARDIAC CATHETERIZATION  2013    calif    CABG  x4    COLONOSCOPY      COLONOSCOPY      CORONARY ANGIOPLASTY WITH STENT PLACEMENT  2010    CORONARY ARTERY BYPASS GRAFT      FEMORAL ARTERY STENT      FEMORAL BYPASS  10/20/2021    IR LOWER EXTREMITY ANGIOGRAM  2023    IR PELVIC ANGIOGRAM  2022    DC SLCTV CATHJ 3RD+ ORD SLCTV ABDL PEL/LXTR BRNCH  2022    Procedure: ULTRASOUND-GUIDED LEFT BRACHIAL ARTERY PUNCTURE, AORTOGRAM, RIGHT COMMON ILIAC ARTERY ANGIOPLASTY WITH 8 X 40 MM BALLOON, ANGIOPLASTY OF RIGHT EXTERNAL AND COMMON ILIAC ARTERY STENOSIS WITH 6 X 150 MM DRUG-ELUTING BALLOON, STENTING OF RIGHT DISTAL EXTERNAL ILIAC ARTERY STENOSIS WITH 7 X 40 MM SELF EXPANDING STENT POST DILATED WITH A 6 MM BALLOON.;  Surgeon: Henry Pena MD;  [place]    DC SLCTV CATHJ 3RD+ ORD SLCTV ABDL PEL/LXTR BRNCH Bilateral 2023    Procedure: CUTDOWN FEM-FEM BYPASS WITH PRIMARY CLOSURE ACCESS SITE, BILATERAL RUN-OFF, LEFT FEM-POP 5X220 QUYEN AND 6X150 RODRI, ATTEMPT TO CROSS RIGHT SFA OCCLUSION;  Surgeon: Henry Pena MD;  Location: [facility];  Service: Vascular       Family History   Problem Relation Age of Onset    No Known Problems Mother     No Known Problems Father        Social History     Socioeconomic History    Marital status: /Civil Union     Spouse name: Not on file    Number of children: Not on file    Years of education: Not on file    Highest education level: Not on file   Occupational History    Not on file   Tobacco Use    Smoking status: Former     Current packs/day: 0.00     Average packs/day: 1.5 packs/day for 40.0 years (60.0 ttl pk-yrs)     Types: Cigarettes     Start date:      Quit date:      Years since quittin.7     Passive exposure: Past    Smokeless tobacco: Never    Tobacco comments:     quit --    Vaping Use    Vaping status: Never Used   Substance and Sexual Activity    Alcohol use: Not Currently     Alcohol/week: 0.0 standard drinks of alcohol     Drug use: No    Sexual activity: Not on file   Other Topics Concern    Not on file   Social History Narrative    · Most recent tobacco use screenin2020      Social Determinants of Health     Financial Resource Strain: Low Risk  (2023)    Overall Financial Resource Strain (CARDIA)     Difficulty of Paying Living Expenses: Not hard at all   Food Insecurity: Not on file   Transportation Needs: No Transportation Needs (2023)    PRAPARE - Transportation     Lack of Transportation (Medical): No     Lack of Transportation (Non-Medical): No   Physical Activity: Not on file   Stress: Not on file   Social Connections: Not on file   Intimate Partner Violence: Not on file   Housing Stability: Not on file       No Known Allergies    Current Outpatient Medications   Medication Sig Dispense Refill    acetaminophen (TYLENOL) 500 mg tablet Take 1,000 mg by mouth every 8 (eight) hours as needed      atorvastatin (LIPITOR) 40 mg tablet TAKE 1 TABLET DAILY 90 tablet 1    chlorhexidine (PERIDEX) 0.12 % solution Apply 15 mL to the mouth or throat 2 (two) times a day 473 mL 3    clopidogrel (PLAVIX) 75 mg tablet Take 1 tablet (75 mg total) by mouth daily 90 tablet 1    DULoxetine (CYMBALTA) 30 mg delayed release capsule TAKE ONE CAPSULE BY MOUTH TWICE DAILY 60 capsule 0    Empagliflozin (Jardiance) 25 MG TABS Take 1 tablet (25 mg total) by mouth every morning Also: take with LOTS of water 90 tablet 3    fenofibrate (TRICOR) 145 mg tablet Take 1 tablet (145 mg total) by mouth daily 90 tablet 3    ferrous sulfate 325 (65 Fe) mg tablet Take 1 tablet (325 mg total) by mouth 2 (two) times a day with meals 60 tablet 6    gabapentin (Neurontin) 300 mg capsule Take 1 capsule (300 mg total) by mouth 3 (three) times a day 90 capsule 6    glipiZIDE (GLUCOTROL) 10 mg tablet TAKE 1 TABLET TWICE A DAY  BEFORE MEALS 180 tablet 1    hydrocortisone 2.5 % cream Apply topically 4 (four) times a day as needed for rash 30 g 0    ketorolac  (ACULAR) 0.5 % ophthalmic solution       lisinopril-hydrochlorothiazide (PRINZIDE,ZESTORETIC) 20-12.5 MG per tablet TAKE 1 TABLET BY MOUTH DAILY 90 tablet 1    metFORMIN (GLUCOPHAGE) 1000 MG tablet TAKE 1 TABLET TWICE DAILY  WITH MEALS 180 tablet 1    moxifloxacin (VIGAMOX) 0.5 % ophthalmic solution       omeprazole (PriLOSEC) 20 mg delayed release capsule Take 1 capsule (20 mg total) by mouth daily before breakfast (Patient taking differently: Take 20 mg by mouth if needed) 30 capsule 5    prednisoLONE acetate (PRED FORTE) 1 % ophthalmic suspension       tamsulosin (FLOMAX) 0.4 mg TAKE 1 CAPSULE BY MOUTH EVERY DAY WITH DINNER 90 capsule 2    tirzepatide (Mounjaro) 2.5 MG/0.5ML Inject 0.5 mL (2.5 mg total) under the skin every 7 days 2 mL 3     No current facility-administered medications for this visit.       (Not in a hospital admission)        Objective:     24 Hour Vitals Assessment:     There were no vitals filed for this visit.        PHYSICIAN EXAM:    General: Appearance: alert, cooperative, no distress.  HEENT: Normocephalic, atraumatic. No scleral icterus. conjunctivae clear. EOMI.  Chest: No tenderness to palpation. No open wound noted.  Lungs: Clear to auscultation bilaterally, Respirations unlabored.  Cardiac: Regular rate and rhythm, +S1and S2  Abdomen: Soft, non-tender, non-distended. Bowel sounds are normal.  Extremities:  No edema, cyanosis, clubbing.  Skin: Skin color, turgor are normal. No rashes.  Neurologic: Awake, Alert, and oriented, no gross focal deficits noted b/l.       DATA REVIEW:    Pathology Result:    Final Diagnosis   Date Value Ref Range Status   06/27/2023   Final    A. Duodenum, :  - Unremarkable duodenal mucosa  - No villous atrophy or increased intraepithelial lymphocytes seen     B. Stomach, :  - Gastric oxyntic and antral type mucosa with minimal or mild chronic inflammation  - No sharad shaped bacteria seen on H&E stained tissue section  - Negative for intestinal metaplasia and  dysplasia          09/07/2022   Final    A. Duodenum,  biopsy:  - Duodenal mucosa with focal acute inflammation and reactive changes  - No intraepithelial lymphocytosis and no villous blunting.  - No epithelial dysplasia and no evidence of malignancy.    B.  Stomach, biopsy:  - Gastric antral and oxyntic mucosa with no significant pathologic alteration.  - Negative for intestinal metaplasia, dysplasia or carcinoma.  - No Helicobacter pylori is identified on H&E stained slide.              Image Results:   Image result are reviewed and documented in Hematology/Oncology history. I personally reviewed these images.    CT chest w contrast  Narrative: CT CHEST WITH IV CONTRAST    INDICATION: Liver mass - staging.    COMPARISON: Multiple priors most recently 6/1/2023    TECHNIQUE: CT examination of the chest was performed. Multiplanar 2D reformatted images were created from the source data.    This examination, like all CT scans performed in the [facility], was performed utilizing techniques to minimize radiation dose exposure, including the use of iterative reconstruction and automated exposure control. Radiation dose length   product (DLP) for this visit: 302.41 mGy-cm    IV Contrast: 55 mL of iohexol (OMNIPAQUE)    FINDINGS:    LUNGS: Fibrotic changes at the lung apices with calcifications. Emphysema. Multiple areas of scarring are similar in appearance to CT of 11/15/2019. No suspicious nodules or masses.    PLEURA: Unremarkable.    HEART/GREAT VESSELS: Thoracic aortic and coronary artery atherosclerosis no thoracic aortic aneurysm.    MEDIASTINUM AND RICHARD: Unremarkable.    CHEST WALL AND LOWER NECK: Sternotomy wires.    VISUALIZED STRUCTURES IN THE UPPER ABDOMEN: See recent CT and MRI.    OSSEOUS STRUCTURES: Spinal degenerative changes are noted. No acute fracture or destructive osseous lesion.  Impression: Emphysema with areas of fibrosis/scarring which appears stable compared to study of  11/15/2019. No definite evidence of metastatic disease.    Workstation performed: [id number]      LABS:  Lab data are reviewed and documented in HemOn history.       Lab Results   Component Value Date    HGB 13.6 10/02/2024    HCT 42.3 10/02/2024    MCV 93 10/02/2024     10/02/2024    WBC 5.42 10/02/2024    NRBC 0 10/02/2024     Lab Results   Component Value Date    K 4.1 10/02/2024    CL 99 10/02/2024    CO2 24 10/02/2024    BUN 31 (H) 10/02/2024    CREATININE 1.25 10/02/2024    GLUF 161 (H) 10/02/2024    CALCIUM 9.7 10/02/2024    CORRECTEDCA 10.2 (H) 11/15/2021    AST 18 10/02/2024    ALT 21 10/02/2024    ALKPHOS 98 10/02/2024    EGFR 53 10/02/2024       Lab Results   Component Value Date    IRON 26 (L) 10/02/2024    TIBC 208 (L) 10/02/2024    FERRITIN 224 10/02/2024    FERRITIN 21 (L) 04/18/2024    FERRITIN 22 (L) 02/14/2024    FERRITIN 58 09/20/2023    FERRITIN 37 06/29/2023    FERRITIN 7 (L) 05/18/2023    FERRITIN 26 11/28/2022    FERRITIN 7 (L) 08/29/2022    FERRITIN 37 04/20/2022    FERRITIN 17 02/07/2022    FERRITIN 13 01/05/2022    FERRITIN 10 11/15/2021    FERRITIN 28 03/11/2021    FERRITIN 12 11/03/2020       Lab Results   Component Value Date    EGVROQFE66 632 02/14/2024    GAEUXMGV04 223 09/20/2023    [id number] 389 11/15/2021    [id number] 681 05/29/2020       Recent Labs     10/02/24  0836   WBC 5.42           By:  Mal Angeles, 10/3/2024, 3:46 PM

## 2024-09-26 NOTE — DISCHARGE INSTR - AVS FIRST PAGE
Dear Max Ozuna,     It was our pleasure to care for you here at [facility].  It is our hope that we were always able to exceed the expected standards for your care during your stay.  You were hospitalized due to flank pain/abdominal pain.  You were cared for on the fourth floor by Nurys Harmon PA-C under the service of Nicci Chin MD with the [facility] who covers for your primary care physician (PCP), KADE Martel DO, while you were hospitalized.  If you have any questions or concerns related to this hospitalization, you may contact us at .  For follow up as well as any medication refills, we recommend that you follow up with your primary care physician.  A registered nurse will reach out to you by phone within a few days after your discharge to answer any additional questions that you may have after going home.  However, at this time we provide for you here, the most important instructions / recommendations at discharge:       Notable during your hospital stay-   You were found to have a mass in your liver which is concerning for cancer (called hepatocellular carcinoma).  You will need to see hematology/oncology for further decision on treatment plan going forward.  An appointment for you has already been scheduled for 10/3/2024  Patient has been added to Liver Tumor Board discussion, which he will be updated on during his office visit outpatient with his primary oncologist.   You will also need to see hepatology who are liver doctors and they will further manage/discuss concern for cirrhosis of the liver.    Testing Required after Discharge -   Outpatient EGD which will be set up for you through gastroenterology    Important follow up information -   Please attend appointment with hepatology and hematology/oncology    Other Instructions -   It is important to abstain from all alcohol consumption and smoking  Follow a diabetic  diet low in carbohydrates and sugars    Please review this entire after visit summary as additional general instructions including medication list, appointments, activity, diet, any pertinent wound care, and other additional recommendations from your care team that may be provided for you.      Sincerely,     Nurys Harmon PA-C

## 2024-09-26 NOTE — ASSESSMENT & PLAN NOTE
Patient has a history of liver cirrhosis since 2018  Presented to the ED for right flank pain x 3 days, which was worsened with deep breaths  CT Chest:   Emphysema with areas of fibrosis/scarring which appears stable compared to study of 11/15/2019. No definite evidence of metastatic disease.   CT abdomen/pelvis revealed:  Nodular liver contour, indicated of cirrhosis.  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 Heterogeneous decreased attenuation noted involving the posterior right hepatic lobe parenchyma, which may be due to perfusion abnormality related to the thrombus, but given the patient's history of cirrhosis, an underlying mass or lesion in this region cannot be excluded. Recommend further evaluation with liver protocol MRI without and with contrast.  No bowel wall thickening or bowel obstruction. No intraperitoneal free air or ascites.  Biatrial enlargement.  Interlobular septal line thickening/reticular opacities involving the periphery of the visualized lung bases, indicative of a developing interstitial fibrosis.  MRI abdomen with and without contrast revealed:  Liver mass described in detail above is highly suspicious for malignancy. It is unclear if the patient has pathologically documented, nonvascular cirrhosis. If the patient does have known cirrhosis, then the mass is compatible with LR-TIV   Approximately 5.4 x 4.8 x 5.5 cm (length X depth X width) solitary segment 6/7 mass   Adjacent peripherally enhancing thrombus with restricted diffusion in the left portal vein likely tumor thrombus.   AFP tumor marker - 21,374.27  Chronic Hepatitis panel: Hep B Core Ab reactive  Hgb 13.5, MCV 93, WBC 5.8, Platelets 180,000, liver enzymes WNL

## 2024-09-26 NOTE — ASSESSMENT & PLAN NOTE
History of bilateral iliac stenting.  History of chronic left external iliac artery occlusion  History of right to left femoral-femoral bypass  History of open femoral artery exposure with left SFA  recanalization  The above-mentioned procedures were performed at [facility]  Continue outpatient follow-up  Maintained on Plavix and statin

## 2024-09-26 NOTE — RESTORATIVE TECHNICIAN NOTE
Restorative Technician Note      Patient Name: Max Ozuna     Restorative Tech Visit Date: 09/26/24  Note Type: Mobility  Patient Position Upon Consult: Bedside chair  Activity Performed: Ambulated; Range of motion  Assistive Device: Roller walker  Patient Position at End of Consult: All needs within reach; Bedside chair

## 2024-09-26 NOTE — PROGRESS NOTES
In-basket message received from Dr. Aguirre to add patient to the liver MDCC on 10/11/2024. Chart reviewed and prep completed.

## 2024-09-26 NOTE — CASE MANAGEMENT
Case Management Discharge Planning Note    Patient name Max Ozuna  Location [facility] /[facility] -* MRN [id number]  : 1945 Date 2024       Current Admission Date: 2024  Current Admission Diagnosis:Hepatocellular carcinoma (HCC)   Patient Active Problem List    Diagnosis Date Noted Date Diagnosed    Hepatocellular carcinoma (HCC) 2024     Acute right flank pain 2024     Portal vein thrombosis 2024     Cirrhosis of liver without ascites (HCC) 2024     Panlobular emphysema (HCC) 2024     DVT (deep venous thrombosis) (HCC)      GERD (gastroesophageal reflux disease)      Stage 3a chronic kidney disease (HCC) 2022     Pre-op evaluation 2022     Aortoiliac occlusive disease (HCC) 2022     Embolism and thrombosis of arteries of the lower extremities (HCC) 2021     Need for vaccination 2021     Iron deficiency anemia 10/13/2020     S/P CABG x 4 2020    Coronary artery disease without angina pectoris 2020     Atherosclerosis of native artery of both lower extremities with intermittent claudication (HCC) 2018     Carotid stenosis, asymptomatic, bilateral 2018     Type 2 diabetes mellitus, with long-term current use of insulin (HCC) 2018     Other hyperlipidemia 2018     Essential hypertension 2018     Claudication in peripheral vascular disease (HCC) 2018       LOS (days): 1  Geometric Mean LOS (GMLOS) (days): 4.8  Days to GMLOS:3.7     OBJECTIVE:  Risk of Unplanned Readmission Score: 14.3         Current admission status: Inpatient   Preferred Pharmacy:   [facility] - [place] - [address]  [address]  [place] [address]  Phone: [phone] Fax: [phone]    [id number] - [place] - [address]  [address] [place] [address]  Phone: [phone] Fax: [phone]    [facility] #[id number]  - [place] - [address]  [address]  [place] [address]  Phone: [phone] Fax: [phone]    Primary Care Provider: KADE Martel DO    Primary Insurance: MEDICARE  Secondary Insurance: [facility]    DISCHARGE DETAILS:                                                                                                 Additional Comments: Pt was discharged and CM was not christina to meet and assess this pt.

## 2024-09-26 NOTE — PLAN OF CARE
Problem: PAIN - ADULT  Goal: Verbalizes/displays adequate comfort level or baseline comfort level  Description: Interventions:  - Encourage patient to monitor pain and request assistance  - Assess pain using appropriate pain scale  - Administer analgesics based on type and severity of pain and evaluate response  - Implement non-pharmacological measures as appropriate and evaluate response  - Consider cultural and social influences on pain and pain management  - Notify physician/advanced practitioner if interventions unsuccessful or patient reports new pain  9/26/2024 1520 by Giovanni Denton RN  Outcome: Adequate for Discharge  9/26/2024 1007 by Giovanni Denton RN  Outcome: Progressing     Problem: INFECTION - ADULT  Goal: Absence or prevention of progression during hospitalization  Description: INTERVENTIONS:  - Assess and monitor for signs and symptoms of infection  - Monitor lab/diagnostic results  - Monitor all insertion sites, i.e. indwelling lines, tubes, and drains  - Monitor endotracheal if appropriate and nasal secretions for changes in amount and color  - [place] appropriate cooling/warming therapies per order  - Administer medications as ordered  - Instruct and encourage patient and family to use good hand hygiene technique  - Identify and instruct in appropriate isolation precautions for identified infection/condition  9/26/2024 1520 by Giovanni Denton RN  Outcome: Adequate for Discharge  9/26/2024 1007 by Giovanni Denton RN  Outcome: Progressing  Goal: Absence of fever/infection during neutropenic period  Description: INTERVENTIONS:  - Monitor WBC    9/26/2024 1520 by Giovanni Denton RN  Outcome: Adequate for Discharge  9/26/2024 1007 by Giovanni Denton RN  Outcome: Progressing     Problem: SAFETY ADULT  Goal: Patient will remain free of falls  Description: INTERVENTIONS:  - Educate patient/family on patient safety including physical limitations  - Instruct patient to call for assistance with  activity   - Consult OT/PT to assist with strengthening/mobility   - Keep Call bell within reach  - Keep bed low and locked with side rails adjusted as appropriate  - Keep care items and personal belongings within reach  - Initiate and maintain comfort rounds  - Make Fall Risk Sign visible to staff  - Apply yellow socks and bracelet for high fall risk patients  - Consider moving patient to room near nurses station  9/26/2024 1520 by Giovanni Denton RN  Outcome: Adequate for Discharge  9/26/2024 1007 by Giovanni Denton RN  Outcome: Progressing  Goal: Maintain or return to baseline ADL function  Description: INTERVENTIONS:  -  Assess patient's ability to carry out ADLs; assess patient's baseline for ADL function and identify physical deficits which impact ability to perform ADLs (bathing, care of mouth/teeth, toileting, grooming, dressing, etc.)  - Assess/evaluate cause of self-care deficits   - Assess range of motion  - Assess patient's mobility; develop plan if impaired  - Assess patient's need for assistive devices and provide as appropriate  - Encourage maximum independence but intervene and supervise when necessary  - Involve family in performance of ADLs  - Assess for home care needs following discharge   - Consider OT consult to assist with ADL evaluation and planning for discharge  - Provide patient education as appropriate  9/26/2024 1520 by Giovanni Denton RN  Outcome: Adequate for Discharge  9/26/2024 1007 by Giovanni Denton RN  Outcome: Progressing  Goal: Maintains/Returns to pre admission functional level  Description: INTERVENTIONS:  - Perform AM-PAC 6 Click Basic Mobility/ Daily Activity assessment daily.  - Set and communicate daily mobility goal to care team and patient/family/caregiver.   - Collaborate with rehabilitation services on mobility goals if consulted  - Record patient progress and toleration of activity level   9/26/2024 1520 by Giovanni Denton RN  Outcome: Adequate for  Discharge  9/26/2024 1007 by Giovanni Denton RN  Outcome: Progressing     Problem: DISCHARGE PLANNING  Goal: Discharge to home or other facility with appropriate resources  Description: INTERVENTIONS:  - Identify barriers to discharge w/patient and caregiver  - Arrange for needed discharge resources and transportation as appropriate  - Identify discharge learning needs (meds, wound care, etc.)  - Arrange for interpretive services to assist at discharge as needed  - Refer to Case Management Department for coordinating discharge planning if the patient needs post-hospital services based on physician/advanced practitioner order or complex needs related to functional status, cognitive ability, or social support system  9/26/2024 1520 by Giovanni Denton RN  Outcome: Adequate for Discharge  9/26/2024 1007 by Giovanni Denton RN  Outcome: Progressing     Problem: Knowledge Deficit  Goal: Patient/family/caregiver demonstrates understanding of disease process, treatment plan, medications, and discharge instructions  Description: Complete learning assessment and assess knowledge base.  Interventions:  - Provide teaching at level of understanding  - Provide teaching via preferred learning methods  9/26/2024 1520 by Giovanni Denton RN  Outcome: Adequate for Discharge  9/26/2024 1007 by Giovanni Denton RN  Outcome: Progressing

## 2024-09-26 NOTE — ASSESSMENT & PLAN NOTE
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  No further need for anticoagulation given this is likely a tumor thrombus on imaging  AFP significantly elevated greater than 21,000 suggesting overall hepatocellular carcinoma  Fortunately no distant metastatic disease seen on CT chest performed here  According to GI, patient is not a transplant or surgical candidate  Patient will require ongoing outpatient follow-up with hepatology, oncology and potentially IR to discuss further treatment options for new cancer   Appointment already made for 10/3/2024 with hematology/oncology  Patient has been added to Liver Tumor Board discussion, which he will be updated on during his office visit outpatient with his primary oncologist.

## 2024-09-26 NOTE — PROGRESS NOTES
Progress Note - Oncology-Medical   Name: Max Ozuna 79 y.o. male I MRN: [id number]  Unit/Bed#: E4 -01 I Date of Admission: 9/24/2024    Assessment & Plan  Portal vein thrombosis    Hepatocellular carcinoma (HCC)  Patient has a history of liver cirrhosis since 2018  Presented to the ED for right flank pain x 3 days, which was worsened with deep breaths  CT Chest:   Emphysema with areas of fibrosis/scarring which appears stable compared to study of 11/15/2019. No definite evidence of metastatic disease.   CT abdomen/pelvis revealed:  Nodular liver contour, indicated of cirrhosis.  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 Heterogeneous decreased attenuation noted involving the posterior right hepatic lobe parenchyma, which may be due to perfusion abnormality related to the thrombus, but given the patient's history of cirrhosis, an underlying mass or lesion in this region cannot be excluded. Recommend further evaluation with liver protocol MRI without and with contrast.  No bowel wall thickening or bowel obstruction. No intraperitoneal free air or ascites.  Biatrial enlargement.  Interlobular septal line thickening/reticular opacities involving the periphery of the visualized lung bases, indicative of a developing interstitial fibrosis.  MRI abdomen with and without contrast revealed:  Liver mass described in detail above is highly suspicious for malignancy. It is unclear if the patient has pathologically documented, nonvascular cirrhosis. If the patient does have known cirrhosis, then the mass is compatible with LR-TIV   Approximately 5.4 x 4.8 x 5.5 cm (length X depth X width) solitary segment 6/7 mass   Adjacent peripherally enhancing thrombus with restricted diffusion in the left portal vein likely tumor thrombus.   AFP tumor marker - 21,374.27  Chronic Hepatitis panel: Hep B Core Ab reactive  Hgb 13.5, MCV 93, WBC 5.8, Platelets 180,000, liver enzymes  WNL      PLAN:  AFP tumor marker elevated 21,374.27 and diagnostic algorithm scored LR-TIV, indicating HCC  CT chest negative for metastatic disease  No indication for biopsy due to the elevated AFP, LR-TIV score, no evidence for metastatic disease  Patient has been added to Liver Tumor Board discussion, which he will be updated on during his office visit outpatient with his primary oncologist.    Outpatient follow up plan: scheduled with Dr. Angeles 10/3/2024      Communication with patient/family: Patient's wife and daughter-in-law present in the room during bedside rounding. Informed them the CT chest did reveal metastatic disease.  We reviewed the MRI and AFP tumor marker. With the AFP elevated and diagnostic algorithm scored LR-TIV, indicating HCC.  Patient's treatment plan will be discussed outpatient with his primary oncologist, Dr. Angeles.  All questions answered to the best of my ability.    Case discussed with my supervising physician, Dr. Aguirre.  We will continue to follow along with you.    Thank you for this consult.  RADHA Massey  Hematology-Oncology       History of present illness:  Max Ozuna is a 79 y.o. male with PMH significant for liver cirrhosis, PAD, aortoiliac occlusive disease, atherosclerosis of native artery bilateral lower extremity, CAD, DM2, HLD, HTN, CKD, ANG who presented to the ED for right flank pain x 3 days, which was worsening with deep breaths.  Patient has been following with hematology, Dr. Angeles, since 2022 for iron deficiency anemia. Patient is a former smoker, does not drink alcohol.       Test Results:    Imaging: CT chest w contrast    Result Date: 9/26/2024  Narrative: CT CHEST WITH IV CONTRAST INDICATION: Liver mass - staging. COMPARISON: Multiple priors most recently 6/1/2023 TECHNIQUE: CT examination of the chest was performed. Multiplanar 2D reformatted images were created from the source data. This examination, like all CT scans performed in the [facility] access hospital, was performed utilizing techniques to minimize radiation dose exposure, including the use of iterative reconstruction and automated exposure control. Radiation dose length product (DLP) for this visit: 302.41 mGy-cm IV Contrast: 55 mL of iohexol (OMNIPAQUE) FINDINGS: LUNGS: Fibrotic changes at the lung apices with calcifications. Emphysema. Multiple areas of scarring are similar in appearance to CT of 11/15/2019. No suspicious nodules or masses. PLEURA: Unremarkable. HEART/GREAT VESSELS: Thoracic aortic and coronary artery atherosclerosis no thoracic aortic aneurysm. MEDIASTINUM AND RICHARD: Unremarkable. CHEST WALL AND LOWER NECK: Sternotomy wires. VISUALIZED STRUCTURES IN THE UPPER ABDOMEN: See recent CT and MRI. OSSEOUS STRUCTURES: Spinal degenerative changes are noted. No acute fracture or destructive osseous lesion.     Impression: Emphysema with areas of fibrosis/scarring which appears stable compared to study of 11/15/2019. No definite evidence of metastatic disease. Workstation performed: [email or web address]     MRI abdomen w wo contrast    Result Date: 9/25/2024  Narrative: MRI - ABDOMEN - WITH AND WITHOUT CONTRAST INDICATION: 79 years / Male. r/o malignancy. History of cirrhosis and portal vein thrombus. Questioned hepatic mass on CT. COMPARISON: 9/24/2024 CT TECHNIQUE: Multiplanar/multisequence MRI of the abdomen was performed before and after administration of contrast. IV Contrast: 7 mL of Gadobutrol injection (SINGLE-DOSE) FINDINGS: LOWER CHEST: Unchanged cardiomegaly. LIVER: Evidence of mild steatosis. Hepatomegaly. Approximately 20 cm in length. Possible nodular contour without other morphologic changes of cirrhosis. Unclear if patient has pathologically documented cirrhosis. Approximately 5.4 x 4.8 x 5.5 cm (length X depth X width) solitary segment 6/7 mass (15/40): Rim arterial phase hyperenhancement. No washout or capsule. Restricted diffusion. Adjacent peripherally enhancing thrombus  with restricted diffusion in the left portal vein likely tumor thrombus. Patent hepatic veins. BILE DUCTS: Normal caliber and morphology. GALLBLADDER: Within normal limits. PANCREAS: Gallstones. No secondary signs of cholecystitis. ADRENAL GLANDS: Within normal limits. SPLEEN: Within normal limits. Top normal length, 13.0 cm. KIDNEYS/PROXIMAL URETERS: Tiny left mid pole cortical cyst. Otherwise within normal limits. BOWEL: Within normal limits. PERITONEUM/RETROPERITONEUM: No ascites. LYMPH NODES: No enlarged lymph nodes or masses. VESSELS: Normal aortic caliber and enhancement. No extrahepatic portal vein thrombus. No collateral vessels identified. ABDOMINAL WALL: Within normal limits. BONES: Degenerative changes and scoliosis. VISUALIZED PELVIC STRUCTURES: Visualized bladder is within normal limits.     Impression: Liver mass described in detail above is highly suspicious for malignancy. It is unclear if the patient has pathologically documented, nonvascular cirrhosis. If the patient does have known cirrhosis, then the mass is compatible with LR-TIV. Workstation performed: [id number]     CT abdomen pelvis w contrast    Result Date: 9/24/2024  Narrative: CT ABDOMEN AND PELVIS WITH IV CONTRAST INDICATION: right sided flank pain, RUQ TTP. . COMPARISON: 11/2/2022 CT runoff. CT abdomen pelvis from 6/26/2018 without contrast TECHNIQUE: CT examination of the abdomen and pelvis was performed. Multiplanar 2D reformatted images were created from the source data. This examination, like all CT scans performed in the [facility], was performed utilizing techniques to minimize radiation dose exposure, including the use of iterative reconstruction and automated exposure control. Radiation dose length product (DLP) for this visit: 454 mGy-cm IV Contrast: 100 mL of iohexol (OMNIPAQUE) Enteric Contrast: Not administered. FINDINGS: ABDOMEN LOWER CHEST: There are reticular opacities/intralobular septal line thickening  involving the lung bases particularly along the right lower lobe, which may represent a developing interstitial fibrosis. No obvious honeycombing is noted in the visualized lung bases. No consolidation or pleural effusion noted. There is biatrial enlargement. There is been prior median sternotomy. There are coronary artery atherosclerotic calcifications. There is no pericardial effusion. LIVER/BILIARY TREE: There is mildly nodular liver contour, indicative of cirrhosis. There is thrombus involving posterior right portal vein branches, best seen on series 2 images 43-60, with heterogeneous, diminished enhancement/attenuation of the affected posterior right hepatic parenchyma. The main portal vein, left portal vein branches, anterior right portal vein branches appear grossly patent. The hepatic veins are patent. GALLBLADDER: No calcified gallstones. No pericholecystic inflammatory change. SPLEEN: Unremarkable. PANCREAS: Unremarkable. ADRENAL GLANDS: Unremarkable. KIDNEYS/URETERS: Unremarkable. No hydronephrosis. STOMACH AND BOWEL: There is no bowel wall thickening or bowel obstruction. APPENDIX: No findings to suggest appendicitis. ABDOMINOPELVIC CAVITY: No ascites. No pneumoperitoneum. No lymphadenopathy. VESSELS: There are atherosclerotic calcifications involving abdominal aorta which is normal in caliber. There are postsurgical changes from distal aortobiiliac artery stenting, with additional stent noted involving the right external iliac artery and posterior changes from prior femoral-femoral bypass surgery. There is extensive atherosclerotic plaque with stenoses involving the visualized proximal superficial femoral arteries beyond the bypass incomplete evaluated on this examination. The splenic vein, superior mesenteric vein appear grossly patent. PELVIS REPRODUCTIVE ORGANS: Unremarkable for patient's age. URINARY BLADDER: Unremarkable. ABDOMINAL WALL/INGUINAL REGIONS: Unremarkable. BONES: On bone windows, no  fracture is identified. There is multilevel lumbar spondylosis.     Impression: 1. Nodular liver contour, indicated of cirrhosis. 2. 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 Heterogeneous decreased attenuation noted involving the posterior right hepatic lobe parenchyma, which may be due to perfusion abnormality related to the thrombus, but given the patient's history of cirrhosis, an underlying mass or lesion in this region cannot be excluded. Recommend further evaluation with liver protocol MRI without and with contrast. 3. No bowel wall thickening or bowel obstruction. No intraperitoneal free air or ascites. 4. Biatrial enlargement. 5. Interlobular septal line thickening/reticular opacities involving the periphery of the visualized lung bases, indicative of a developing interstitial fibrosis. I personally discussed this study with LORE OLVERA on 9/24/2024 3:01 PM. Workstation performed: [id number]       Labs:   Lab Results   Component Value Date    WBC 5.80 09/25/2024    HGB 13.5 09/25/2024    HCT 40.9 09/25/2024    MCV 93 09/25/2024     09/25/2024     Lab Results   Component Value Date    K 4.1 09/25/2024     09/25/2024    CO2 26 09/25/2024    BUN 22 09/25/2024    CREATININE 0.99 09/25/2024    GLUF 111 (H) 09/25/2024    CALCIUM 9.5 09/25/2024    CORRECTEDCA 10.2 (H) 11/15/2021    AST 14 09/25/2024    ALT 13 09/25/2024    ALKPHOS 52 09/25/2024    EGFR 71 09/25/2024       Past Medical History:   Past Medical History:   Diagnosis Date    Coronary artery disease without angina pectoris 02/25/2020    Diabetes mellitus (HCC)     DVT (deep venous thrombosis) (HCC)     GERD (gastroesophageal reflux disease)     Hypertension     Iron deficiency anemia        Past Surgical History:   Procedure Laterality Date    ANGIOPLASTY  10/19/2021    bilateral    CARDIAC CATHETERIZATION  2013    calif    CABG  x4    COLONOSCOPY      COLONOSCOPY      CORONARY ANGIOPLASTY WITH  STENT PLACEMENT  2010    CORONARY ARTERY BYPASS GRAFT      FEMORAL ARTERY STENT      FEMORAL BYPASS  10/20/2021    IR LOWER EXTREMITY ANGIOGRAM  2023    IR PELVIC ANGIOGRAM  2022    AR SLCTV CATHJ 3RD+ ORD SLCTV ABDL PEL/LXTR BRNCH  2022    Procedure: ULTRASOUND-GUIDED LEFT BRACHIAL ARTERY PUNCTURE, AORTOGRAM, RIGHT COMMON ILIAC ARTERY ANGIOPLASTY WITH 8 X 40 MM BALLOON, ANGIOPLASTY OF RIGHT EXTERNAL AND COMMON ILIAC ARTERY STENOSIS WITH 6 X 150 MM DRUG-ELUTING BALLOON, STENTING OF RIGHT DISTAL EXTERNAL ILIAC ARTERY STENOSIS WITH 7 X 40 MM SELF EXPANDING STENT POST DILATED WITH A 6 MM BALLOON.;  Surgeon: Henry Pena MD;  [place]    AR SLCTV CATHJ 3RD+ ORD SLCTV ABDL PEL/LXTR BRNCH Bilateral 2023    Procedure: CUTDOWN FEM-FEM BYPASS WITH PRIMARY CLOSURE ACCESS SITE, BILATERAL RUN-OFF, LEFT FEM-POP 5X220 QUYEN AND 6X150 RODRI, ATTEMPT TO CROSS RIGHT SFA OCCLUSION;  Surgeon: Henry Pena MD;  Location: [facility] OR;  Service: Vascular       Family History   Problem Relation Age of Onset    No Known Problems Mother     No Known Problems Father        Social History     Socioeconomic History    Marital status: /Civil Union     Spouse name: None    Number of children: None    Years of education: None    Highest education level: None   Occupational History    None   Tobacco Use    Smoking status: Former     Current packs/day: 0.00     Average packs/day: 1.5 packs/day for 40.0 years (60.0 ttl pk-yrs)     Types: Cigarettes     Start date:      Quit date: 2004     Years since quittin.7     Passive exposure: Past    Smokeless tobacco: Never    Tobacco comments:     quit --    Vaping Use    Vaping status: Never Used   Substance and Sexual Activity    Alcohol use: Not Currently     Alcohol/week: 0.0 standard drinks of alcohol    Drug use: No    Sexual activity: None   Other Topics Concern    None   Social History Narrative    · Most recent tobacco use screenin2020       Social Determinants of Health     Financial Resource Strain: Low Risk  (11/1/2023)    Overall Financial Resource Strain (CARDIA)     Difficulty of Paying Living Expenses: Not hard at all   Food Insecurity: Not on file   Transportation Needs: No Transportation Needs (11/1/2023)    PRAPARE - Transportation     Lack of Transportation (Medical): No     Lack of Transportation (Non-Medical): No   Physical Activity: Not on file   Stress: Not on file   Social Connections: Not on file   Intimate Partner Violence: Not on file   Housing Stability: Not on file         Current Facility-Administered Medications:     acetaminophen (TYLENOL) tablet 650 mg, 650 mg, Oral, Q6H PRN, Madeline Vasquez MD    atorvastatin (LIPITOR) tablet 40 mg, 40 mg, Oral, Daily, Madeline Vasquez MD, 40 mg at 09/26/24 0844    clopidogrel (PLAVIX) tablet 75 mg, 75 mg, Oral, Daily, Madeline Vasquez MD, 75 mg at 09/25/24 0927    DULoxetine (CYMBALTA) delayed release capsule 30 mg, 30 mg, Oral, BID, Madeline Vasquez MD, 30 mg at 09/26/24 0844    fenofibrate (TRICOR) tablet 145 mg, 145 mg, Oral, Daily, Madeline Vasquez MD, 145 mg at 09/26/24 0844    ferrous sulfate tablet 325 mg, 325 mg, Oral, BID With Meals, Madeline Vasquez MD, 325 mg at 09/26/24 0845    gabapentin (NEURONTIN) capsule 300 mg, 300 mg, Oral, TID, Madeline Vasquez MD, 300 mg at 09/26/24 0844    heparin (porcine) subcutaneous injection 5,000 Units, 5,000 Units, Subcutaneous, Q12H JALEN, 5,000 Units at 09/26/24 0846 **AND** [COMPLETED] Platelet count, , , Once, Madeline Vasquez MD    lisinopril (ZESTRIL) tablet 20 mg, 20 mg, Oral, Daily, 20 mg at 09/26/24 0844 **AND** hydroCHLOROthiazide tablet 12.5 mg, 12.5 mg, Oral, Daily, Madeline Vasquez MD, 12.5 mg at 09/26/24 0844    insulin lispro (HumALOG/ADMELOG)  "100 units/mL subcutaneous injection 1-5 Units, 1-5 Units, Subcutaneous, Q6H JALEN, 1 Units at 09/26/24 0557 **AND** Fingerstick Glucose (POCT), , , Q6H, Madeline Vasquez MD    ondansetron (ZOFRAN) injection 4 mg, 4 mg, Intravenous, Q6H PRN, Madeline Vasquez MD    tamsulosin (FLOMAX) capsule 0.4 mg, 0.4 mg, Oral, Daily With Dinner, Madeline Vasquez MD, 0.4 mg at 09/25/24 1609      Medications Prior to Admission:     acetaminophen (TYLENOL) 500 mg tablet    atorvastatin (LIPITOR) 40 mg tablet    chlorhexidine (PERIDEX) 0.12 % solution    clopidogrel (PLAVIX) 75 mg tablet    DULoxetine (CYMBALTA) 30 mg delayed release capsule    Empagliflozin (Jardiance) 25 MG TABS    fenofibrate (TRICOR) 145 mg tablet    ferrous sulfate 325 (65 Fe) mg tablet    gabapentin (Neurontin) 300 mg capsule    hydrocortisone 2.5 % cream    ketorolac (ACULAR) 0.5 % ophthalmic solution    lisinopril-hydrochlorothiazide (PRINZIDE,ZESTORETIC) 20-12.5 MG per tablet    moxifloxacin (VIGAMOX) 0.5 % ophthalmic solution    prednisoLONE acetate (PRED FORTE) 1 % ophthalmic suspension    tamsulosin (FLOMAX) 0.4 mg    tirzepatide (Mounjaro) 2.5 MG/0.5ML    glipiZIDE (GLUCOTROL) 10 mg tablet    metFORMIN (GLUCOPHAGE) 1000 MG tablet    omeprazole (PriLOSEC) 20 mg delayed release capsule    No Known Allergies      Physical Exam:     /59 (BP Location: Left arm)   Pulse 55   Temp (!) 97 °F (36.1 °C) (Temporal)   Resp 18   Ht 5' 5\" (1.651 m)   Wt 70.7 kg (155 lb 13.8 oz)   SpO2 98%   BMI 25.94 kg/m²         Recent Results (from the past 48 hour(s))   CBC and differential    Collection Time: 09/24/24 12:29 PM   Result Value Ref Range    WBC 5.93 4.31 - 10.16 Thousand/uL    RBC 4.58 3.88 - 5.62 Million/uL    Hemoglobin 14.3 12.0 - 17.0 g/dL    Hematocrit 44.1 36.5 - 49.3 %    MCV 96 82 - 98 fL    MCH 31.2 26.8 - 34.3 pg    MCHC 32.4 31.4 - 37.4 g/dL    RDW 14.6 11.6 - 15.1 %    MPV 10.9 8.9 " - 12.7 fL    Platelets 188 149 - 390 Thousands/uL    nRBC 0 /100 WBCs    Segmented % 74 43 - 75 %    Immature Grans % 0 0 - 2 %    Lymphocytes % 15 14 - 44 %    Monocytes % 8 4 - 12 %    Eosinophils Relative 3 0 - 6 %    Basophils Relative 0 0 - 1 %    Absolute Neutrophils 4.32 1.85 - 7.62 Thousands/µL    Absolute Immature Grans 0.02 0.00 - 0.20 Thousand/uL    Absolute Lymphocytes 0.91 0.60 - 4.47 Thousands/µL    Absolute Monocytes 0.47 0.17 - 1.22 Thousand/µL    Eosinophils Absolute 0.19 0.00 - 0.61 Thousand/µL    Basophils Absolute 0.02 0.00 - 0.10 Thousands/µL   Urine Macroscopic, POC    Collection Time: 09/24/24 12:45 PM   Result Value Ref Range    Color, UA Yellow     Clarity, UA Clear     pH, UA 5.5 4.5 - 8.0    Leukocytes, UA (A) Negative     Elevated glucose may cause decreased leukocyte values. See urine microscopic for UWBC result    Nitrite, UA Negative Negative    Protein, UA Negative Negative mg/dl    Glucose, UA >=1000 (1%) (A) Negative mg/dl    Ketones, UA Negative Negative mg/dl    Urobilinogen, UA 0.2 0.2, 1.0 E.U./dl E.U./dl    Bilirubin, UA Negative Negative    Occult Blood, UA Negative Negative    Specific Gravity, UA 1.010 1.003 - 1.030   Urine Microscopic    Collection Time: 09/24/24 12:45 PM   Result Value Ref Range    RBC, UA 1-2 None Seen, 1-2 /hpf    WBC, UA 1-2 None Seen, 1-2 /hpf    Epithelial Cells Occasional None Seen, Occasional /hpf    Bacteria, UA None Seen None Seen, Occasional /hpf   Basic metabolic panel    Collection Time: 09/24/24  1:10 PM   Result Value Ref Range    Sodium 135 135 - 147 mmol/L    Potassium 4.9 3.5 - 5.3 mmol/L    Chloride 103 96 - 108 mmol/L    CO2 21 21 - 32 mmol/L    ANION GAP 11 4 - 13 mmol/L    BUN 23 5 - 25 mg/dL    Creatinine 1.03 0.60 - 1.30 mg/dL    Glucose 133 65 - 140 mg/dL    Calcium 10.0 8.4 - 10.2 mg/dL    eGFR 67 ml/min/1.73sq m   Hepatic function panel    Collection Time: 09/24/24  1:10 PM   Result Value Ref Range    Total Bilirubin 0.79 0.20 -  1.00 mg/dL    Bilirubin, Direct 0.12 0.00 - 0.20 mg/dL    Alkaline Phosphatase 66 34 - 104 U/L    AST 20 13 - 39 U/L    ALT 13 7 - 52 U/L    Total Protein 7.5 6.4 - 8.4 g/dL    Albumin 4.3 3.5 - 5.0 g/dL   Lipase    Collection Time: 09/24/24  1:10 PM   Result Value Ref Range    Lipase 26 11 - 82 u/L   Hemoglobin A1c w/EAG Estimation (Prechecked if no A1C within 90 days)    Collection Time: 09/24/24  9:46 PM   Result Value Ref Range    Hemoglobin A1C 7.3 (H) Normal 4.0-5.6%; PreDiabetic 5.7-6.4%; Diabetic >=6.5%; Glycemic control for adults with diabetes <7.0% %     mg/dl   Platelet count    Collection Time: 09/24/24  9:46 PM   Result Value Ref Range    Platelets 164 149 - 390 Thousands/uL    MPV 9.9 8.9 - 12.7 fL   Fingerstick Glucose (POCT)    Collection Time: 09/24/24 11:48 PM   Result Value Ref Range    POC Glucose 91 65 - 140 mg/dl   Comprehensive metabolic panel    Collection Time: 09/25/24  5:35 AM   Result Value Ref Range    Sodium 136 135 - 147 mmol/L    Potassium 4.1 3.5 - 5.3 mmol/L    Chloride 102 96 - 108 mmol/L    CO2 26 21 - 32 mmol/L    ANION GAP 8 4 - 13 mmol/L    BUN 22 5 - 25 mg/dL    Creatinine 0.99 0.60 - 1.30 mg/dL    Glucose 111 65 - 140 mg/dL    Glucose, Fasting 111 (H) 65 - 99 mg/dL    Calcium 9.5 8.4 - 10.2 mg/dL    AST 14 13 - 39 U/L    ALT 13 7 - 52 U/L    Alkaline Phosphatase 52 34 - 104 U/L    Total Protein 6.9 6.4 - 8.4 g/dL    Albumin 3.9 3.5 - 5.0 g/dL    Total Bilirubin 0.88 0.20 - 1.00 mg/dL    eGFR 71 ml/min/1.73sq m   Magnesium    Collection Time: 09/25/24  5:35 AM   Result Value Ref Range    Magnesium 1.9 1.9 - 2.7 mg/dL   CBC (With Platelets)    Collection Time: 09/25/24  5:35 AM   Result Value Ref Range    WBC 5.80 4.31 - 10.16 Thousand/uL    RBC 4.40 3.88 - 5.62 Million/uL    Hemoglobin 13.5 12.0 - 17.0 g/dL    Hematocrit 40.9 36.5 - 49.3 %    MCV 93 82 - 98 fL    MCH 30.7 26.8 - 34.3 pg    MCHC 33.0 31.4 - 37.4 g/dL    RDW 14.4 11.6 - 15.1 %    Platelets 180 149 - 390  Thousands/uL    MPV 10.5 8.9 - 12.7 fL   Fingerstick Glucose (POCT)    Collection Time: 09/25/24  5:39 AM   Result Value Ref Range    POC Glucose 127 65 - 140 mg/dl   Fingerstick Glucose (POCT)    Collection Time: 09/25/24  7:18 AM   Result Value Ref Range    POC Glucose 125 65 - 140 mg/dl   AFP tumor marker    Collection Time: 09/25/24 10:53 AM   Result Value Ref Range    AFP TUMOR MARKER 21,374.27 (H) 0.00 - 9.00 ng/mL   Chronic Hepatitis Panel    Collection Time: 09/25/24 10:53 AM   Result Value Ref Range    Hepatitis B Surface Ag Non-reactive Non-Reactive    Hepatitis C Ab Non-reactive Non-Reactive    Hep B C IgM Non-reactive Non-Reactive    Hep B Core Total Ab Reactive (A) Non-Reactive   Fingerstick Glucose (POCT)    Collection Time: 09/25/24 12:00 PM   Result Value Ref Range    POC Glucose 148 (H) 65 - 140 mg/dl   Fingerstick Glucose (POCT)    Collection Time: 09/25/24  4:49 PM   Result Value Ref Range    POC Glucose 256 (H) 65 - 140 mg/dl   Fingerstick Glucose (POCT)    Collection Time: 09/26/24 12:24 AM   Result Value Ref Range    POC Glucose 247 (H) 65 - 140 mg/dl   Fingerstick Glucose (POCT)    Collection Time: 09/26/24  5:49 AM   Result Value Ref Range    POC Glucose 191 (H) 65 - 140 mg/dl   Fingerstick Glucose (POCT)    Collection Time: 09/26/24  7:28 AM   Result Value Ref Range    POC Glucose 197 (H) 65 - 140 mg/dl   Fingerstick Glucose (POCT)    Collection Time: 09/26/24 11:43 AM   Result Value Ref Range    POC Glucose 294 (H) 65 - 140 mg/dl       CT chest w contrast    Result Date: 9/26/2024  Narrative: CT CHEST WITH IV CONTRAST INDICATION: Liver mass - staging. COMPARISON: Multiple priors most recently 6/1/2023 TECHNIQUE: CT examination of the chest was performed. Multiplanar 2D reformatted images were created from the source data. This examination, like all CT scans performed in the [facility], was performed utilizing techniques to minimize radiation dose exposure, including the use  of iterative reconstruction and automated exposure control. Radiation dose length product (DLP) for this visit: 302.41 mGy-cm IV Contrast: 55 mL of iohexol (OMNIPAQUE) FINDINGS: LUNGS: Fibrotic changes at the lung apices with calcifications. Emphysema. Multiple areas of scarring are similar in appearance to CT of 11/15/2019. No suspicious nodules or masses. PLEURA: Unremarkable. HEART/GREAT VESSELS: Thoracic aortic and coronary artery atherosclerosis no thoracic aortic aneurysm. MEDIASTINUM AND RICHARD: Unremarkable. CHEST WALL AND LOWER NECK: Sternotomy wires. VISUALIZED STRUCTURES IN THE UPPER ABDOMEN: See recent CT and MRI. OSSEOUS STRUCTURES: Spinal degenerative changes are noted. No acute fracture or destructive osseous lesion.     Impression: Emphysema with areas of fibrosis/scarring which appears stable compared to study of 11/15/2019. No definite evidence of metastatic disease. Workstation performed: [id number]     MRI abdomen w wo contrast    Result Date: 9/25/2024  Narrative: MRI - ABDOMEN - WITH AND WITHOUT CONTRAST INDICATION: 79 years / Male. r/o malignancy. History of cirrhosis and portal vein thrombus. Questioned hepatic mass on CT. COMPARISON: 9/24/2024 CT TECHNIQUE: Multiplanar/multisequence MRI of the abdomen was performed before and after administration of contrast. IV Contrast: 7 mL of Gadobutrol injection (SINGLE-DOSE) FINDINGS: LOWER CHEST: Unchanged cardiomegaly. LIVER: Evidence of mild steatosis. Hepatomegaly. Approximately 20 cm in length. Possible nodular contour without other morphologic changes of cirrhosis. Unclear if patient has pathologically documented cirrhosis. Approximately 5.4 x 4.8 x 5.5 cm (length X depth X width) solitary segment 6/7 mass (15/40): Rim arterial phase hyperenhancement. No washout or capsule. Restricted diffusion. Adjacent peripherally enhancing thrombus with restricted diffusion in the left portal vein likely tumor thrombus. Patent hepatic veins. BILE DUCTS: Normal  caliber and morphology. GALLBLADDER: Within normal limits. PANCREAS: Gallstones. No secondary signs of cholecystitis. ADRENAL GLANDS: Within normal limits. SPLEEN: Within normal limits. Top normal length, 13.0 cm. KIDNEYS/PROXIMAL URETERS: Tiny left mid pole cortical cyst. Otherwise within normal limits. BOWEL: Within normal limits. PERITONEUM/RETROPERITONEUM: No ascites. LYMPH NODES: No enlarged lymph nodes or masses. VESSELS: Normal aortic caliber and enhancement. No extrahepatic portal vein thrombus. No collateral vessels identified. ABDOMINAL WALL: Within normal limits. BONES: Degenerative changes and scoliosis. VISUALIZED PELVIC STRUCTURES: Visualized bladder is within normal limits.     Impression: Liver mass described in detail above is highly suspicious for malignancy. It is unclear if the patient has pathologically documented, nonvascular cirrhosis. If the patient does have known cirrhosis, then the mass is compatible with LR-TIV. Workstation performed: [id number]     CT abdomen pelvis w contrast    Result Date: 9/24/2024  Narrative: CT ABDOMEN AND PELVIS WITH IV CONTRAST INDICATION: right sided flank pain, RUQ TTP. . COMPARISON: 11/2/2022 CT runoff. CT abdomen pelvis from 6/26/2018 without contrast TECHNIQUE: CT examination of the abdomen and pelvis was performed. Multiplanar 2D reformatted images were created from the source data. This examination, like all CT scans performed in the [facility], was performed utilizing techniques to minimize radiation dose exposure, including the use of iterative reconstruction and automated exposure control. Radiation dose length product (DLP) for this visit: 454 mGy-cm IV Contrast: 100 mL of iohexol (OMNIPAQUE) Enteric Contrast: Not administered. FINDINGS: ABDOMEN LOWER CHEST: There are reticular opacities/intralobular septal line thickening involving the lung bases particularly along the right lower lobe, which may represent a developing interstitial  fibrosis. No obvious honeycombing is noted in the visualized lung bases. No consolidation or pleural effusion noted. There is biatrial enlargement. There is been prior median sternotomy. There are coronary artery atherosclerotic calcifications. There is no pericardial effusion. LIVER/BILIARY TREE: There is mildly nodular liver contour, indicative of cirrhosis. There is thrombus involving posterior right portal vein branches, best seen on series 2 images 43-60, with heterogeneous, diminished enhancement/attenuation of the affected posterior right hepatic parenchyma. The main portal vein, left portal vein branches, anterior right portal vein branches appear grossly patent. The hepatic veins are patent. GALLBLADDER: No calcified gallstones. No pericholecystic inflammatory change. SPLEEN: Unremarkable. PANCREAS: Unremarkable. ADRENAL GLANDS: Unremarkable. KIDNEYS/URETERS: Unremarkable. No hydronephrosis. STOMACH AND BOWEL: There is no bowel wall thickening or bowel obstruction. APPENDIX: No findings to suggest appendicitis. ABDOMINOPELVIC CAVITY: No ascites. No pneumoperitoneum. No lymphadenopathy. VESSELS: There are atherosclerotic calcifications involving abdominal aorta which is normal in caliber. There are postsurgical changes from distal aortobiiliac artery stenting, with additional stent noted involving the right external iliac artery and posterior changes from prior femoral-femoral bypass surgery. There is extensive atherosclerotic plaque with stenoses involving the visualized proximal superficial femoral arteries beyond the bypass incomplete evaluated on this examination. The splenic vein, superior mesenteric vein appear grossly patent. PELVIS REPRODUCTIVE ORGANS: Unremarkable for patient's age. URINARY BLADDER: Unremarkable. ABDOMINAL WALL/INGUINAL REGIONS: Unremarkable. BONES: On bone windows, no fracture is identified. There is multilevel lumbar spondylosis.     Impression: 1. Nodular liver contour, indicated  of cirrhosis. 2. 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 Heterogeneous decreased attenuation noted involving the posterior right hepatic lobe parenchyma, which may be due to perfusion abnormality related to the thrombus, but given the patient's history of cirrhosis, an underlying mass or lesion in this region cannot be excluded. Recommend further evaluation with liver protocol MRI without and with contrast. 3. No bowel wall thickening or bowel obstruction. No intraperitoneal free air or ascites. 4. Biatrial enlargement. 5. Interlobular septal line thickening/reticular opacities involving the periphery of the visualized lung bases, indicative of a developing interstitial fibrosis. I personally discussed this study with LORE OLVERA on 9/24/2024 3:01 PM. Workstation performed: [id number]       Labs and pertinent reports reviewed.      This note has been generated by voice recognition software system.  Therefore, there may be spelling, grammar, and or syntax errors. Please contact if questions arise.

## 2024-09-26 NOTE — ASSESSMENT & PLAN NOTE
"Found on CT abdomen which showed \"Heterogeneous decreased attenuation noted involving the posterior right hepatic lobe parenchyma, which may be due to perfusion abnormality related to the thrombus, but given the patients liver appearing with cirrhosis, an underlying mass or lesion in this region cannot be excluded. Recommend further evaluation with liver protocol MRI without and with contrast.\"  MRI obtained which confirms liver mass  Unclear etiology of cirrhosis but does appear compensated  Denies history of prior alcohol use  No evidence of ascites and no need for diuretics  No evidence of hepatic encephalopathy  Liver function intact and without thrombocytopenia/coagulopathy  Serological workup to rule out causes of liver disease underway- Labs pending on discharge  Will require outpatient hepatology follow-up.  GI recommending outpatient EGD for evaluation of varices  "

## 2024-09-26 NOTE — PLAN OF CARE
Problem: PAIN - ADULT  Goal: Verbalizes/displays adequate comfort level or baseline comfort level  Description: Interventions:  - Encourage patient to monitor pain and request assistance  - Assess pain using appropriate pain scale  - Administer analgesics based on type and severity of pain and evaluate response  - Implement non-pharmacological measures as appropriate and evaluate response  - Consider cultural and social influences on pain and pain management  - Notify physician/advanced practitioner if interventions unsuccessful or patient reports new pain  Outcome: Progressing     Problem: INFECTION - ADULT  Goal: Absence or prevention of progression during hospitalization  Description: INTERVENTIONS:  - Assess and monitor for signs and symptoms of infection  - Monitor lab/diagnostic results  - Monitor all insertion sites, i.e. indwelling lines, tubes, and drains  - Monitor endotracheal if appropriate and nasal secretions for changes in amount and color  - Lexa appropriate cooling/warming therapies per order  - Administer medications as ordered  - Instruct and encourage patient and family to use good hand hygiene technique  - Identify and instruct in appropriate isolation precautions for identified infection/condition  Outcome: Progressing  Goal: Absence of fever/infection during neutropenic period  Description: INTERVENTIONS:  - Monitor WBC    Outcome: Progressing     Problem: SAFETY ADULT  Goal: Patient will remain free of falls  Description: INTERVENTIONS:  - Educate patient/family on patient safety including physical limitations  - Instruct patient to call for assistance with activity   - Consult OT/PT to assist with strengthening/mobility   - Keep Call bell within reach  - Keep bed low and locked with side rails adjusted as appropriate  - Keep care items and personal belongings within reach  - Initiate and maintain comfort rounds  - Make Fall Risk Sign visible to staff  - Offer Toileting every 2 Hours,  in advance of need  - Initiate/Maintain bed alarm  - Obtain necessary fall risk management equipment: In place   - Apply yellow socks and bracelet for high fall risk patients  - Consider moving patient to room near nurses station  Outcome: Progressing  Goal: Maintain or return to baseline ADL function  Description: INTERVENTIONS:  -  Assess patient's ability to carry out ADLs; assess patient's baseline for ADL function and identify physical deficits which impact ability to perform ADLs (bathing, care of mouth/teeth, toileting, grooming, dressing, etc.)  - Assess/evaluate cause of self-care deficits   - Assess range of motion  - Assess patient's mobility; develop plan if impaired  - Assess patient's need for assistive devices and provide as appropriate  - Encourage maximum independence but intervene and supervise when necessary  - Involve family in performance of ADLs  - Assess for home care needs following discharge   - Consider OT consult to assist with ADL evaluation and planning for discharge  - Provide patient education as appropriate  Outcome: Progressing  Goal: Maintains/Returns to pre admission functional level  Description: INTERVENTIONS:  - Perform AM-PAC 6 Click Basic Mobility/ Daily Activity assessment daily.  - Set and communicate daily mobility goal to care team and patient/family/caregiver.   - Collaborate with rehabilitation services on mobility goals if consulted  - Perform Range of Motion 3 times a day.  - Reposition patient every 2 hours.  - Dangle patient 3 times a day  - Stand patient 3 times a day  - Ambulate patient 3 times a day  - Out of bed to chair 3 times a day   - Out of bed for meals 3 times a day  - Out of bed for toileting  - Record patient progress and toleration of activity level   Outcome: Progressing     Problem: DISCHARGE PLANNING  Goal: Discharge to home or other facility with appropriate resources  Description: INTERVENTIONS:  - Identify barriers to discharge w/patient and  caregiver  - Arrange for needed discharge resources and transportation as appropriate  - Identify discharge learning needs (meds, wound care, etc.)  - Arrange for interpretive services to assist at discharge as needed  - Refer to Case Management Department for coordinating discharge planning if the patient needs post-hospital services based on physician/advanced practitioner order or complex needs related to functional status, cognitive ability, or social support system  Outcome: Progressing     Problem: Knowledge Deficit  Goal: Patient/family/caregiver demonstrates understanding of disease process, treatment plan, medications, and discharge instructions  Description: Complete learning assessment and assess knowledge base.  Interventions:  - Provide teaching at level of understanding  - Provide teaching via preferred learning methods  Outcome: Progressing

## 2024-09-26 NOTE — DISCHARGE SUMMARY
"Discharge Summary - Hospitalist   Name: Max Ozuna 79 y.o. male I MRN: [id number]  Unit/Bed#: E4 -01 I Date of Admission: 9/24/2024   Date of Service: 9/26/2024 I Hospital Day: 1     Assessment & Plan  Hepatocellular carcinoma (HCC)  Presented with acute right-sided flank pain/abdominal pain  CT abdomen reveals mass and portal vein thrombus  MRI confirmed 5.4 x 4.8 x 5.5 cm mass.  No further need for anticoagulation given this is likely a tumor thrombus on imaging  AFP significantly elevated greater than 21,000 suggesting overall hepatocellular carcinoma  Fortunately no distant metastatic disease seen on CT chest performed here  According to GI, patient is not a transplant or surgical candidate  Patient will require ongoing outpatient follow-up with hepatology, oncology and potentially IR to discuss further treatment options for new cancer   Appointment already made for 10/3/2024 with hematology/oncology  Patient has been added to Liver Tumor Board discussion, which he will be updated on during his office visit outpatient with his primary oncologist.   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 Recommend further evaluation with liver protocol MRI without and with contrast.\"  MRI obtained which confirms liver mass  Unclear etiology of cirrhosis but does appear compensated  Denies history of prior alcohol use  No evidence of ascites and no need for diuretics  No evidence of hepatic encephalopathy  Liver function intact and without thrombocytopenia/coagulopathy  Serological workup to rule out causes of liver disease underway- Labs pending on discharge  Will require outpatient hepatology " follow-up.  GI recommending outpatient EGD for evaluation of varices  Acute right flank pain  Likely secondary to above  Portal vein thrombosis  Found on CT abdomen, possibly source of right flank pain  No further anticoagulation warranted given this is likely tumor thrombus  Type 2 diabetes mellitus, with long-term current use of insulin (HCC)  Lab Results   Component Value Date    HGBA1C 7.3 (H) 09/24/2024     Recent Labs     09/26/24  0024 09/26/24  0549 09/26/24  0728 09/26/24  1143   POCGLU 247* 191* 197* 294*   Hold PTA regimen: jardiance, glipizide, metformin  Sliding scale while inpatient  Other hyperlipidemia  Continue statin  Essential hypertension  BP elevated on admission  Home regimen lisinopril/HCTZ 20/12.5 mg daily  Iron deficiency anemia  Continue oral ferrous sulfate supplementation  Carotid stenosis, asymptomatic, bilateral  Continue outpatient follow-up with Vascular surgery  Coronary artery disease without angina pectoris  Stable, no anginal symptoms  Continue plavix and statin  Aortoiliac occlusive disease (HCC)  History of bilateral iliac stenting.  History of chronic left external iliac artery occlusion  History of right to left femoral-femoral bypass  History of open femoral artery exposure with left SFA  recanalization  The above-mentioned procedures were [facility] at [facility]  Continue outpatient follow-up  Maintained on Plavix and statin  Stage 3a chronic kidney disease (HCC)  Lab Results   Component Value Date    EGFR 71 09/25/2024    EGFR 67 09/24/2024    EGFR 59 02/14/2024    CREATININE 0.99 09/25/2024    CREATININE 1.03 09/24/2024    CREATININE 1.15 02/14/2024   Appearing within baseline  GERD (gastroesophageal reflux disease)  Continue PPI prn, does not take this daily given lack of symptoms       Consultations During Hospital Stay:  Hematology/oncology  Gastroenterology    Procedures Performed:   CT chest w contrast  Result Date: 9/26/2024  Impression: Emphysema with areas of  fibrosis/scarring which appears stable compared to study of 11/15/2019. No definite evidence of metastatic disease. Workstation performed: [id number]     MRI abdomen w wo contrast  Result Date: 9/25/2024  Impression: Liver mass described in detail above is highly suspicious for malignancy. It is unclear if the patient has pathologically documented, nonvascular cirrhosis. If the patient does have known cirrhosis, then the mass is compatible with LR-TIV. Workstation performed: [id number]     CT abdomen pelvis w contrast  Result Date: 9/24/2024  Impression: 1. Nodular liver contour, indicated of cirrhosis. 2. 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 Heterogeneous decreased attenuation noted involving the posterior right hepatic lobe parenchyma, which may be due to perfusion abnormality related to the thrombus, but given the patient's history of cirrhosis, an underlying mass or lesion in this region cannot be excluded. Recommend further evaluation with liver protocol MRI without and with contrast. 3. No bowel wall thickening or bowel obstruction. No intraperitoneal free air or ascites. 4. Biatrial enlargement. 5. Interlobular septal line thickening/reticular opacities involving the periphery of the visualized lung bases, indicative of a developing interstitial fibrosis. I personally discussed this study with LORE OLVERA on 9/24/2024 3:01 PM. Workstation performed: [id number]      Significant Findings / Test Results:   Hepatocellular carcinoma  Compensated liver cirrhosis    Incidental Findings:   None     Test Results Pending at Discharge (will require follow up):   Anti-smooth muscle antibody IgG  Antimitochondrial antibody  Alpha 1 antitrypsin  Serino plasma  IgG, IgA, IgM  HILLARY screen with reflex to titer     Outpatient followup Requested:  Oncology - appointment 10/3/24    Complications:  none    Hospital Course:     Max Ozuna is a 79 y.o. male patient who originally  "presented to the hospital on 9/24/2024 due to right pain.  In the ED, patient was found to have elevated blood pressures in the 170s systolically.  His lab work was within normal limits however CT abdomen and pelvis imaging revealed nodular liver contour indicative of cirrhosis.  Evidence of portal vein thrombus present.  Further imaging was obtained with MRI to evaluate liver.  Seen and evaluated by gastroenterology.  Unfortunately, patient is found to have hepatocellular carcinoma with mass approximately 5.4 x 4.8 x 5.5 cm along significantly elevated AFP.  No further anticoagulation would be warranted as this is most likely a tumor thrombus.  Seen and evaluated by hematology/oncology.  Additional imaging of CT chest was performed for staging and fortunately no evidence of metastatic disease there. Oncology and gastroenterology not recommending biopsy at this time.  Patient will require outpatient follow-up with hematology/oncology which is already been scheduled for 10/3/2024 for discussion of further treatment plan going forward.  Ongoing patient follow-up with gastroenterology/hepatology in regards to compensated liver cirrhosis.  Also recommended to have outpatient EGD for evaluation of varices.  In conclusion, patient is able for discharge at this time.  Case and plan discussed with wife at bedside.    Condition at Discharge: stable     Discharge Day Visit / Exam:     Subjective: Patient resting in bed.  Discussed new diagnosis of hepatocellular carcinoma and importance of outpatient follow-up with hematology/oncology.  Patient understands.  Wife updated at bedside.  Eager to go home today.    Vitals: Blood Pressure: 128/59 (09/26/24 0730)  Pulse: 55 (09/26/24 0730)  Temperature: (!) 97 °F (36.1 °C) (09/26/24 0730)  Temp Source: Temporal (09/26/24 0730)  Respirations: 18 (09/26/24 0730)  Height: 5' 5\" (165.1 cm) (09/24/24 1058)  Weight - Scale: 70.7 kg (155 lb 13.8 oz) (09/24/24 1111)  SpO2: 98 % (09/26/24 " 0730)    Exam:   Physical Exam  Vitals and nursing note reviewed.   Constitutional:       General: He is not in acute distress.     Appearance: He is normal weight. He is not ill-appearing, toxic-appearing or diaphoretic.   HENT:      Head: Normocephalic and atraumatic.   Eyes:      General: No scleral icterus.  Cardiovascular:      Rate and Rhythm: Normal rate and regular rhythm.   Pulmonary:      Effort: Pulmonary effort is normal. No respiratory distress.      Breath sounds: Normal breath sounds. No stridor. No wheezing or rhonchi.   Abdominal:      General: Bowel sounds are normal. There is distension.      Palpations: Abdomen is soft. There is no mass.      Tenderness: There is no abdominal tenderness.      Hernia: No hernia is present.   Musculoskeletal:         General: No swelling.      Cervical back: Neck supple.   Skin:     General: Skin is warm and dry.   Neurological:      Mental Status: He is alert and oriented to person, place, and time. Mental status is at baseline.   Psychiatric:         Mood and Affect: Mood normal.         Behavior: Behavior normal.     Discharge instructions/Information to patient and family:   See after visit summary for information provided to patient and family.      Provisions for Follow-Up Care:  See after visit summary for information related to follow-up care and any pertinent home health orders.      Planned Readmission: no     Discharge Statement:  This time was spent on the day of discharge. I had direct contact with the patient on the day of discharge. Greater than 50% of the total time was spent examining patient, answering all patient questions, arranging and discussing plan of care with patient as well as directly providing post-discharge instructions.  Additional time then spent on discharge activities.    Discharge Medications:  See after visit summary for reconciled discharge medications provided to patient and family.      ** Please Note: This note has been  constructed using a voice recognition system **

## 2024-09-26 NOTE — PROGRESS NOTES
Progress Note- Max Ozuna 79 y.o. male MRN: [id number]    Unit/Bed#: E4 -01 Encounter: [id number]      Assessment and Plan:    Mr. Max Ozuna is an 79-year-old male with a past medical history of CAD status post CABG, PAD status post multiple interventions, hypertension, diabetes mellitus, CKD, and DVT who initially presented with complaints of acute onset right-sided abdominal pain.  CT and MRI imaging now showing concern for malignancy in setting of suspected cirrhosis for which GI is now consulted.    Liver Lesion - LR-TIV  Compensated Cirrhosis     # Liver Lesion: Patient presented initially on 9/24 with complaints of acute right-sided abdominal pain.  Initial CT imaging revealed a PVT with perfusion defect versus suspicion for underlying malignancy.  Follow-up MRI liver protocol was pursued and patient ultimately found to have a 5.4 x 4.8 x 5.5 cm solitary segment 6/7 mass.  A peripherally enhancing thrombus with restricted diffusion in the left portal vein was also seen indicative of likely tumor thrombus.  CT/MRI findings occurring in the setting of a nodular appearing liver, likely cirrhosis.  Mass therefore compatible with LR-TIV lesion.  Follow-up AFP found to be >21,000.  Findings therefore overall suggestive of HCC.  Patient with advanced local regional disease but fortunately no evidence of distant metastatic spread seen on follow-up CT chest.  Patient unfortunately would not be a transplant or surgical candidate.  Oncology consulted and will ultimately require outpatient follow-up with hepatology, oncology and potentially IR to discuss potential treatment options.    Plan:   No indication for liver biopsy at this time  Oncology consulted and appreciate recommendations  Will schedule outpatient follow-up with hepatology  Okay for discharge from GI standpoint    # Compensated Cirrhosis: Patient with no prior history of cirrhosis.  However on review of prior imaging, imaging consistently  demonstrates nodular appearing liver.  Interestingly, patient's blood work not overall indicative of cirrhosis given patient's intact synthetic liver function and lack thrombocytopenia/coagulopathy.  Unclear etiology of cirrhosis as patient without prior history of alcohol use.  Hepatitis panel negative for hepatitis C or chronic hepatitis B.  Potentially secondary to cardiac versus MASH cirrhosis but will complete serologic workup to rule out competing causes of liver disease.  Cirrhosis does appear to be well compensated at this time but patient unfortunately has evidence of HCC given significantly elevated AFP levels and imaging findings as above.  Will ultimately require outpatient hepatology follow-up.    Plan:  EV: Last EGD 2022 no evidence of varices, discussed need for repeat EGD outpatient  Ascites: No prior history; diuretics not indicated  HE: No prior history; Lactulose/Rifaximin not indicated  HCC: See management as above  General health maintenance:   Encouraged to continue high protein and 2g Na restricted diet; avoid eating raw shellfish  Limit use of Tylenol to <2 grams in 24 hour period and avoid use of all NSAIDs and narcotics  Encouraged to participate in daily exercise to avoid muscle wasting  Avoid use of alcohol and strongly encourage tobacco cessation    ______________________________________________________________________    Subjective:     Patient seen and evaluated bedside.  Sitting in bed comfortably in no acute distress or visible discomfort.  Admits to improvement abdominal pain.  Tolerating diet.  Currently without symptoms.  Patient's wife and daughter-in-law present at bedside.  Questions answered and plans of care addressed.    Medication Administration - last 24 hours from 09/25/2024 1333 to 09/26/2024 1333         Date/Time Order Dose Route Action Action by     09/26/2024 0844 EDT atorvastatin (LIPITOR) tablet 40 mg 40 mg Oral Given Giovanni Denton RN     09/26/2024 0843 EDT  clopidogrel (PLAVIX) tablet 75 mg 75 mg Oral Not Given Giovanni Denton RN     09/26/2024 0844 EDT DULoxetine (CYMBALTA) delayed release capsule 30 mg 30 mg Oral Given Giovanni Denton RN     09/25/2024 1609 EDT DULoxetine (CYMBALTA) delayed release capsule 30 mg 30 mg Oral Given Giovanni Denton, ABHISHEK     09/26/2024 1302 EDT insulin lispro (HumALOG/ADMELOG) 100 units/mL subcutaneous injection 1-5 Units 5 Units Subcutaneous Given Giovanni Denton RN     09/26/2024 0557 EDT insulin lispro (HumALOG/ADMELOG) 100 units/mL subcutaneous injection 1-5 Units 1 Units Subcutaneous Given Abraham Colorado RN     09/26/2024 0028 EDT insulin lispro (HumALOG/ADMELOG) 100 units/mL subcutaneous injection 1-5 Units 2 Units Subcutaneous Given Abraham Colorado RN     09/25/2024 1658 EDT insulin lispro (HumALOG/ADMELOG) 100 units/mL subcutaneous injection 1-5 Units 2 Units Subcutaneous Given Giovanni Denton RN     09/26/2024 0844 EDT fenofibrate (TRICOR) tablet 145 mg 145 mg Oral Given Giovanni Denton RN     09/26/2024 0845 EDT ferrous sulfate tablet 325 mg 325 mg Oral Given Giovanni Denton, ABHISHEK     09/25/2024 1609 EDT ferrous sulfate tablet 325 mg 325 mg Oral Given Giovanni Denton RN     09/26/2024 0844 EDT gabapentin (NEURONTIN) capsule 300 mg 300 mg Oral Given Giovanni Denton RN     09/25/2024 2213 EDT gabapentin (NEURONTIN) capsule 300 mg 300 mg Oral Given Abraham Colorado RN     09/25/2024 1610 EDT gabapentin (NEURONTIN) capsule 300 mg 300 mg Oral Given Giovanni Denton RN     09/26/2024 0844 EDT lisinopril (ZESTRIL) tablet 20 mg 20 mg Oral Given Giovanni Denton RN     09/26/2024 0844 EDT hydroCHLOROthiazide tablet 12.5 mg 12.5 mg Oral Given Giovanni Denton RN     09/25/2024 1609 EDT tamsulosin (FLOMAX) capsule 0.4 mg 0.4 mg Oral Given Giovanni Denton RN     09/26/2024 0846 EDT heparin (porcine) subcutaneous injection 5,000 Units 5,000 Units Subcutaneous Given Giovanni Denton RN     09/25/2024 2215 EDT heparin (porcine) subcutaneous injection  "5,000 Units 5,000 Units Subcutaneous Given Abraham Colorado, ABHISHEK     09/25/2024 2022 EDT iohexol (OMNIPAQUE) 350 MG/ML injection (MULTI-DOSE) 85 mL 55 mL Intravenous Given Melanie Dorota            Objective:     Vitals: Blood pressure 128/59, pulse 55, temperature (!) 97 °F (36.1 °C), temperature source Temporal, resp. rate 18, height 5' 5\" (1.651 m), weight 70.7 kg (155 lb 13.8 oz), SpO2 98%.,Body mass index is 25.94 kg/m².      Intake/Output Summary (Last 24 hours) at 9/26/2024 1333  Last data filed at 9/26/2024 0840  Gross per 24 hour   Intake 120 ml   Output --   Net 120 ml       Physical Exam  Constitutional:       General: He is not in acute distress.     Appearance: He is normal weight.   HENT:      Head: Normocephalic and atraumatic.      Nose: Nose normal.   Eyes:      General: No scleral icterus.  Cardiovascular:      Rate and Rhythm: Normal rate.      Pulses: Normal pulses.   Pulmonary:      Effort: Pulmonary effort is normal.   Abdominal:      General: Abdomen is flat. Bowel sounds are normal. There is no distension.      Tenderness: There is no abdominal tenderness.      Hernia: No hernia is present.   Musculoskeletal:         General: Normal range of motion.      Cervical back: Normal range of motion.   Skin:     General: Skin is warm.      Coloration: Skin is not jaundiced or pale.   Neurological:      Mental Status: He is alert and oriented to person, place, and time.   Psychiatric:         Mood and Affect: Mood normal.         Behavior: Behavior normal.           Invasive Devices       Peripheral Intravenous Line  Duration             Peripheral IV 09/24/24 Right;Ventral (anterior) Forearm 2 days                    Lab Results:  Admission on 09/24/2024   Component Date Value    WBC 09/24/2024 5.93     RBC 09/24/2024 4.58     Hemoglobin 09/24/2024 14.3     Hematocrit 09/24/2024 44.1     MCV 09/24/2024 96     MCH 09/24/2024 31.2     MCHC 09/24/2024 32.4     RDW 09/24/2024 14.6     MPV 09/24/2024 10.9     " Platelets 09/24/2024 188     nRBC 09/24/2024 0     Segmented % 09/24/2024 74     Immature Grans % 09/24/2024 0     Lymphocytes % 09/24/2024 15     Monocytes % 09/24/2024 8     Eosinophils Relative 09/24/2024 3     Basophils Relative 09/24/2024 0     Absolute Neutrophils 09/24/2024 4.32     Absolute Immature Grans 09/24/2024 0.02     Absolute Lymphocytes 09/24/2024 0.91     Absolute Monocytes 09/24/2024 0.47     Eosinophils Absolute 09/24/2024 0.19     Basophils Absolute 09/24/2024 0.02     Sodium 09/24/2024 135     Potassium 09/24/2024 4.9     Chloride 09/24/2024 103     CO2 09/24/2024 21     ANION GAP 09/24/2024 11     BUN 09/24/2024 23     Creatinine 09/24/2024 1.03     Glucose 09/24/2024 133     Calcium 09/24/2024 10.0     eGFR 09/24/2024 67     Total Bilirubin 09/24/2024 0.79     Bilirubin, Direct 09/24/2024 0.12     Alkaline Phosphatase 09/24/2024 66     AST 09/24/2024 20     ALT 09/24/2024 13     Total Protein 09/24/2024 7.5     Albumin 09/24/2024 4.3     Lipase 09/24/2024 26     Color, UA 09/24/2024 Yellow     Clarity, UA 09/24/2024 Clear     pH, UA 09/24/2024 5.5     Leukocytes, UA 09/24/2024 Elevated glucose may cause decreased leukocyte values. See urine microscopic for UWBC result (A)     Nitrite, UA 09/24/2024 Negative     Protein, UA 09/24/2024 Negative     Glucose, UA 09/24/2024 >=1000 (1%) (A)     Ketones, UA 09/24/2024 Negative     Urobilinogen, UA 09/24/2024 0.2     Bilirubin, UA 09/24/2024 Negative     Occult Blood, UA 09/24/2024 Negative     Specific Gravity, UA 09/24/2024 1.010     RBC, UA 09/24/2024 1-2     WBC, UA 09/24/2024 1-2     Epithelial Cells 09/24/2024 Occasional     Bacteria, UA 09/24/2024 None Seen     Hemoglobin A1C 09/24/2024 7.3 (H)     EAG 09/24/2024 163     Platelets 09/24/2024 164     MPV 09/24/2024 9.9     POC Glucose 09/24/2024 91     Sodium 09/25/2024 136     Potassium 09/25/2024 4.1     Chloride 09/25/2024 102     CO2 09/25/2024 26     ANION GAP 09/25/2024 8     BUN  09/25/2024 22     Creatinine 09/25/2024 0.99     Glucose 09/25/2024 111     Glucose, Fasting 09/25/2024 111 (H)     Calcium 09/25/2024 9.5     AST 09/25/2024 14     ALT 09/25/2024 13     Alkaline Phosphatase 09/25/2024 52     Total Protein 09/25/2024 6.9     Albumin 09/25/2024 3.9     Total Bilirubin 09/25/2024 0.88     eGFR 09/25/2024 71     Magnesium 09/25/2024 1.9     WBC 09/25/2024 5.80     RBC 09/25/2024 4.40     Hemoglobin 09/25/2024 13.5     Hematocrit 09/25/2024 40.9     MCV 09/25/2024 93     MCH 09/25/2024 30.7     MCHC 09/25/2024 33.0     RDW 09/25/2024 14.4     Platelets 09/25/2024 180     MPV 09/25/2024 10.5     POC Glucose 09/25/2024 127     POC Glucose 09/25/2024 125     AFP TUMOR MARKER 09/25/2024 21,374.27 (H)     Hepatitis B Surface Ag 09/25/2024 Non-reactive     Hepatitis C Ab 09/25/2024 Non-reactive     Hep B C IgM 09/25/2024 Non-reactive     Hep B Core Total Ab 09/25/2024 Reactive (A)     POC Glucose 09/25/2024 148 (H)     POC Glucose 09/25/2024 256 (H)     POC Glucose 09/26/2024 247 (H)     POC Glucose 09/26/2024 191 (H)     POC Glucose 09/26/2024 197 (H)     POC Glucose 09/26/2024 294 (H)        Imaging Studies: I have personally reviewed pertinent imaging studies.

## 2024-09-26 NOTE — PLAN OF CARE
Problem: PAIN - ADULT  Goal: Verbalizes/displays adequate comfort level or baseline comfort level  Description: Interventions:  - Encourage patient to monitor pain and request assistance  - Assess pain using appropriate pain scale  - Administer analgesics based on type and severity of pain and evaluate response  - Implement non-pharmacological measures as appropriate and evaluate response  - Consider cultural and social influences on pain and pain management  - Notify physician/advanced practitioner if interventions unsuccessful or patient reports new pain  Outcome: Progressing     Problem: INFECTION - ADULT  Goal: Absence or prevention of progression during hospitalization  Description: INTERVENTIONS:  - Assess and monitor for signs and symptoms of infection  - Monitor lab/diagnostic results  - Monitor all insertion sites, i.e. indwelling lines, tubes, and drains  - Monitor endotracheal if appropriate and nasal secretions for changes in amount and color  - Warren appropriate cooling/warming therapies per order  - Administer medications as ordered  - Instruct and encourage patient and family to use good hand hygiene technique  - Identify and instruct in appropriate isolation precautions for identified infection/condition  Outcome: Progressing  Goal: Absence of fever/infection during neutropenic period  Description: INTERVENTIONS:  - Monitor WBC    Outcome: Progressing     Problem: SAFETY ADULT  Goal: Patient will remain free of falls  Description: INTERVENTIONS:  - Educate patient/family on patient safety including physical limitations  - Instruct patient to call for assistance with activity   - Consult OT/PT to assist with strengthening/mobility   - Keep Call bell within reach  - Keep bed low and locked with side rails adjusted as appropriate  - Keep care items and personal belongings within reach  - Initiate and maintain comfort rounds  - Make Fall Risk Sign visible to staff  - Apply yellow socks and bracelet  for high fall risk patients  - Consider moving patient to room near nurses station  Outcome: Progressing  Goal: Maintain or return to baseline ADL function  Description: INTERVENTIONS:  -  Assess patient's ability to carry out ADLs; assess patient's baseline for ADL function and identify physical deficits which impact ability to perform ADLs (bathing, care of mouth/teeth, toileting, grooming, dressing, etc.)  - Assess/evaluate cause of self-care deficits   - Assess range of motion  - Assess patient's mobility; develop plan if impaired  - Assess patient's need for assistive devices and provide as appropriate  - Encourage maximum independence but intervene and supervise when necessary  - Involve family in performance of ADLs  - Assess for home care needs following discharge   - Consider OT consult to assist with ADL evaluation and planning for discharge  - Provide patient education as appropriate  Outcome: Progressing  Goal: Maintains/Returns to pre admission functional level  Description: INTERVENTIONS:  - Perform AM-PAC 6 Click Basic Mobility/ Daily Activity assessment daily.  - Set and communicate daily mobility goal to care team and patient/family/caregiver.   - Collaborate with rehabilitation services on mobility goals if consulted  - Record patient progress and toleration of activity level   Outcome: Progressing     Problem: DISCHARGE PLANNING  Goal: Discharge to home or other facility with appropriate resources  Description: INTERVENTIONS:  - Identify barriers to discharge w/patient and caregiver  - Arrange for needed discharge resources and transportation as appropriate  - Identify discharge learning needs (meds, wound care, etc.)  - Arrange for interpretive services to assist at discharge as needed  - Refer to Case Management Department for coordinating discharge planning if the patient needs post-hospital services based on physician/advanced practitioner order or complex needs related to functional status,  cognitive ability, or social support system  Outcome: Progressing     Problem: Knowledge Deficit  Goal: Patient/family/caregiver demonstrates understanding of disease process, treatment plan, medications, and discharge instructions  Description: Complete learning assessment and assess knowledge base.  Interventions:  - Provide teaching at level of understanding  - Provide teaching via preferred learning methods  Outcome: Progressing

## 2024-09-26 NOTE — ASSESSMENT & PLAN NOTE
Found on CT abdomen, possibly source of right flank pain  No further anticoagulation warranted given this is likely tumor thrombus

## 2024-09-26 NOTE — ASSESSMENT & PLAN NOTE
Lab Results   Component Value Date    HGBA1C 7.3 (H) 09/24/2024     Recent Labs     09/26/24  0024 09/26/24  0549 09/26/24  0728 09/26/24  1143   POCGLU 247* 191* 197* 294*   Hold PTA regimen: jardiance, glipizide, metformin  Sliding scale while inpatient

## 2024-09-27 LAB
A1AT SERPL-MCNC: 204 MG/DL (ref 101–187)
ACTIN IGG SERPL-ACNC: 5 UNITS (ref 0–19)
CERULOPLASMIN SERPL-MCNC: 27.1 MG/DL (ref 16–31)
MITOCHONDRIA M2 IGG SER-ACNC: <20 UNITS (ref 0–20)

## 2024-09-30 ENCOUNTER — TELEPHONE (OUTPATIENT)
Age: 79
End: 2024-09-30

## 2024-09-30 NOTE — TELEPHONE ENCOUNTER
"Spoke directly with Pt today via phone call @  [phone].  Pt informed that he has an appointment on 11/4/24 with ANDRÉS Brown at the [place] office location.  Appointment letter mailed to address in chart per Pt's request.  MYC message sent as well.  Further informed Pt that Pt has been placed on the \"waiting list\" should a sooner appointment become available.  Pt given office phone number should Pt have questions and/or needs to reschedule appointment.   "

## 2024-10-02 ENCOUNTER — APPOINTMENT (OUTPATIENT)
Dept: LAB | Facility: CLINIC | Age: 79
End: 2024-10-02
Payer: MEDICARE

## 2024-10-02 DIAGNOSIS — N18.31 STAGE 3A CHRONIC KIDNEY DISEASE (HCC): ICD-10-CM

## 2024-10-02 DIAGNOSIS — Z79.4 TYPE 2 DIABETES MELLITUS WITH DIABETIC PERIPHERAL ANGIOPATHY WITHOUT GANGRENE, WITH LONG-TERM CURRENT USE OF INSULIN (HCC): ICD-10-CM

## 2024-10-02 DIAGNOSIS — Z79.899 MEDICATION MANAGEMENT: ICD-10-CM

## 2024-10-02 DIAGNOSIS — Z13.0 SCREENING FOR IRON DEFICIENCY ANEMIA: ICD-10-CM

## 2024-10-02 DIAGNOSIS — E11.51 TYPE 2 DIABETES MELLITUS WITH DIABETIC PERIPHERAL ANGIOPATHY WITHOUT GANGRENE, WITH LONG-TERM CURRENT USE OF INSULIN (HCC): ICD-10-CM

## 2024-10-02 DIAGNOSIS — D50.0 IRON DEFICIENCY ANEMIA DUE TO CHRONIC BLOOD LOSS: ICD-10-CM

## 2024-10-02 DIAGNOSIS — E08.22 DIABETES MELLITUS DUE TO UNDERLYING CONDITION WITH STAGE 3A CHRONIC KIDNEY DISEASE, WITHOUT LONG-TERM CURRENT USE OF INSULIN (HCC): ICD-10-CM

## 2024-10-02 DIAGNOSIS — N18.31 DIABETES MELLITUS DUE TO UNDERLYING CONDITION WITH STAGE 3A CHRONIC KIDNEY DISEASE, WITHOUT LONG-TERM CURRENT USE OF INSULIN (HCC): ICD-10-CM

## 2024-10-02 LAB
ALBUMIN SERPL BCG-MCNC: 4.1 G/DL (ref 3.5–5)
ALP SERPL-CCNC: 98 U/L (ref 34–104)
ALT SERPL W P-5'-P-CCNC: 21 U/L (ref 7–52)
ANION GAP SERPL CALCULATED.3IONS-SCNC: 11 MMOL/L (ref 4–13)
AST SERPL W P-5'-P-CCNC: 18 U/L (ref 13–39)
BILIRUB SERPL-MCNC: 0.54 MG/DL (ref 0.2–1)
BUN SERPL-MCNC: 31 MG/DL (ref 5–25)
CALCIUM SERPL-MCNC: 9.7 MG/DL (ref 8.4–10.2)
CHLORIDE SERPL-SCNC: 99 MMOL/L (ref 96–108)
CHOLEST SERPL-MCNC: 157 MG/DL
CO2 SERPL-SCNC: 24 MMOL/L (ref 21–32)
CREAT SERPL-MCNC: 1.25 MG/DL (ref 0.6–1.3)
CREAT UR-MCNC: 53.4 MG/DL
EST. AVERAGE GLUCOSE BLD GHB EST-MCNC: 177 MG/DL
FERRITIN SERPL-MCNC: 224 NG/ML (ref 24–336)
GFR SERPL CREATININE-BSD FRML MDRD: 53 ML/MIN/1.73SQ M
GLUCOSE P FAST SERPL-MCNC: 161 MG/DL (ref 65–99)
HBA1C MFR BLD: 7.8 %
HDLC SERPL-MCNC: 30 MG/DL
IRON SATN MFR SERPL: 13 % (ref 15–50)
IRON SERPL-MCNC: 26 UG/DL (ref 50–212)
LDLC SERPL CALC-MCNC: 100 MG/DL (ref 0–100)
MICROALBUMIN UR-MCNC: 25.7 MG/L
MICROALBUMIN/CREAT 24H UR: 48 MG/G CREATININE (ref 0–30)
NONHDLC SERPL-MCNC: 127 MG/DL
POTASSIUM SERPL-SCNC: 4.1 MMOL/L (ref 3.5–5.3)
PROT SERPL-MCNC: 7.7 G/DL (ref 6.4–8.4)
SODIUM SERPL-SCNC: 134 MMOL/L (ref 135–147)
TIBC SERPL-MCNC: 208 UG/DL (ref 250–450)
TRIGL SERPL-MCNC: 137 MG/DL
UIBC SERPL-MCNC: 182 UG/DL (ref 155–355)

## 2024-10-02 PROCEDURE — 83550 IRON BINDING TEST: CPT

## 2024-10-02 PROCEDURE — 36415 COLL VENOUS BLD VENIPUNCTURE: CPT

## 2024-10-02 PROCEDURE — 83036 HEMOGLOBIN GLYCOSYLATED A1C: CPT

## 2024-10-02 PROCEDURE — 83540 ASSAY OF IRON: CPT

## 2024-10-02 PROCEDURE — 82728 ASSAY OF FERRITIN: CPT

## 2024-10-02 PROCEDURE — 80053 COMPREHEN METABOLIC PANEL: CPT

## 2024-10-02 PROCEDURE — 82570 ASSAY OF URINE CREATININE: CPT

## 2024-10-02 PROCEDURE — 80061 LIPID PANEL: CPT

## 2024-10-02 PROCEDURE — 82043 UR ALBUMIN QUANTITATIVE: CPT

## 2024-10-03 ENCOUNTER — TELEPHONE (OUTPATIENT)
Dept: HEMATOLOGY ONCOLOGY | Facility: CLINIC | Age: 79
End: 2024-10-03

## 2024-10-03 ENCOUNTER — OFFICE VISIT (OUTPATIENT)
Dept: HEMATOLOGY ONCOLOGY | Facility: CLINIC | Age: 79
End: 2024-10-03
Payer: MEDICARE

## 2024-10-03 VITALS
OXYGEN SATURATION: 98 % | SYSTOLIC BLOOD PRESSURE: 160 MMHG | DIASTOLIC BLOOD PRESSURE: 76 MMHG | WEIGHT: 150 LBS | BODY MASS INDEX: 24.99 KG/M2 | HEIGHT: 65 IN | HEART RATE: 92 BPM | RESPIRATION RATE: 16 BRPM | TEMPERATURE: 97.6 F

## 2024-10-03 DIAGNOSIS — C22.0 HEPATOCELLULAR CARCINOMA (HCC): Primary | ICD-10-CM

## 2024-10-03 PROCEDURE — G2211 COMPLEX E/M VISIT ADD ON: HCPCS | Performed by: INTERNAL MEDICINE

## 2024-10-03 PROCEDURE — 99215 OFFICE O/P EST HI 40 MIN: CPT | Performed by: INTERNAL MEDICINE

## 2024-10-03 RX ORDER — SODIUM CHLORIDE 9 MG/ML
20 INJECTION, SOLUTION INTRAVENOUS ONCE
OUTPATIENT
Start: 2024-10-15

## 2024-10-03 NOTE — TELEPHONE ENCOUNTER
Please schedule infusion for patient.    Psychological condition is newly identified.  Medication changes per orders.  Psychological condition  will be reassessed in 4 weeks     Will treat first her mood and insomnia..

## 2024-10-04 ENCOUNTER — TELEPHONE (OUTPATIENT)
Age: 79
End: 2024-10-04

## 2024-10-04 NOTE — PROGRESS NOTES
Reviewed [place] educational printouts for Imjudo and Durvalumab. Reviewed possible side effects with lab and infusion recommendations.    Reviewed office handout.    Consent obtained. Copy given to patient and uploaded into patient chart.

## 2024-10-04 NOTE — TELEPHONE ENCOUNTER
Pt called he wanted to know if he needs to keep 10/22/24 with  because he just saw him 10/3/24..

## 2024-10-09 ENCOUNTER — TELEPHONE (OUTPATIENT)
Age: 79
End: 2024-10-09

## 2024-10-09 DIAGNOSIS — C22.0 HEPATOCELLULAR CARCINOMA (HCC): Primary | ICD-10-CM

## 2024-10-09 DIAGNOSIS — I74.3 EMBOLISM AND THROMBOSIS OF ARTERIES OF THE LOWER EXTREMITIES (HCC): ICD-10-CM

## 2024-10-09 NOTE — TELEPHONE ENCOUNTER
"Per Dr. Angeles,   \"He is still able to have it done. Would recommend CBC 2 days before cataract surgery to ensure platelets are not low\"   Call out to patient and reviewed recommendations above.     Patient stated that surgery is on 10/28 and labs to be done on 10/26.   "

## 2024-10-09 NOTE — TELEPHONE ENCOUNTER
Call received from patient and his daughter. Patient has Cataract surgery scheduled for 10/28, and is starting infusion treatment on 10/15. Questioning if he is able to still have this done. Requesting call back on cell phone number provided.    Maddi- [phone]

## 2024-10-11 ENCOUNTER — DOCUMENTATION (OUTPATIENT)
Dept: HEMATOLOGY ONCOLOGY | Facility: CLINIC | Age: 79
End: 2024-10-11

## 2024-10-11 ENCOUNTER — APPOINTMENT (OUTPATIENT)
Dept: LAB | Facility: CLINIC | Age: 79
End: 2024-10-11
Payer: MEDICARE

## 2024-10-11 DIAGNOSIS — C22.0 HEPATOCELLULAR CARCINOMA (HCC): ICD-10-CM

## 2024-10-11 LAB
ALBUMIN SERPL BCG-MCNC: 4.3 G/DL (ref 3.5–5)
ALP SERPL-CCNC: 89 U/L (ref 34–104)
ALT SERPL W P-5'-P-CCNC: 24 U/L (ref 7–52)
AMYLASE SERPL-CCNC: 33 IU/L (ref 29–103)
ANION GAP SERPL CALCULATED.3IONS-SCNC: 11 MMOL/L (ref 4–13)
AST SERPL W P-5'-P-CCNC: 25 U/L (ref 13–39)
BASOPHILS # BLD AUTO: 0.05 THOUSANDS/ΜL (ref 0–0.1)
BASOPHILS NFR BLD AUTO: 1 % (ref 0–1)
BILIRUB SERPL-MCNC: 0.59 MG/DL (ref 0.2–1)
BUN SERPL-MCNC: 31 MG/DL (ref 5–25)
CALCIUM SERPL-MCNC: 9.7 MG/DL (ref 8.4–10.2)
CHLORIDE SERPL-SCNC: 99 MMOL/L (ref 96–108)
CO2 SERPL-SCNC: 24 MMOL/L (ref 21–32)
CREAT SERPL-MCNC: 1.12 MG/DL (ref 0.6–1.3)
EOSINOPHIL # BLD AUTO: 0.24 THOUSAND/ΜL (ref 0–0.61)
EOSINOPHIL NFR BLD AUTO: 4 % (ref 0–6)
ERYTHROCYTE [DISTWIDTH] IN BLOOD BY AUTOMATED COUNT: 14.8 % (ref 11.6–15.1)
GFR SERPL CREATININE-BSD FRML MDRD: 61 ML/MIN/1.73SQ M
GLUCOSE SERPL-MCNC: 239 MG/DL (ref 65–140)
HCT VFR BLD AUTO: 46.6 % (ref 36.5–49.3)
HGB BLD-MCNC: 14.7 G/DL (ref 12–17)
IMM GRANULOCYTES # BLD AUTO: 0.03 THOUSAND/UL (ref 0–0.2)
IMM GRANULOCYTES NFR BLD AUTO: 1 % (ref 0–2)
LIPASE SERPL-CCNC: 27 U/L (ref 11–82)
LYMPHOCYTES # BLD AUTO: 1.07 THOUSANDS/ΜL (ref 0.6–4.47)
LYMPHOCYTES NFR BLD AUTO: 17 % (ref 14–44)
MCH RBC QN AUTO: 30.2 PG (ref 26.8–34.3)
MCHC RBC AUTO-ENTMCNC: 31.5 G/DL (ref 31.4–37.4)
MCV RBC AUTO: 96 FL (ref 82–98)
MONOCYTES # BLD AUTO: 0.42 THOUSAND/ΜL (ref 0.17–1.22)
MONOCYTES NFR BLD AUTO: 7 % (ref 4–12)
NEUTROPHILS # BLD AUTO: 4.42 THOUSANDS/ΜL (ref 1.85–7.62)
NEUTS SEG NFR BLD AUTO: 70 % (ref 43–75)
NRBC BLD AUTO-RTO: 0 /100 WBCS
PLATELET # BLD AUTO: 282 THOUSANDS/UL (ref 149–390)
PMV BLD AUTO: 10.4 FL (ref 8.9–12.7)
POTASSIUM SERPL-SCNC: 4.1 MMOL/L (ref 3.5–5.3)
PROT SERPL-MCNC: 7.7 G/DL (ref 6.4–8.4)
RBC # BLD AUTO: 4.86 MILLION/UL (ref 3.88–5.62)
SODIUM SERPL-SCNC: 134 MMOL/L (ref 135–147)
T4 FREE SERPL-MCNC: 0.78 NG/DL (ref 0.61–1.12)
TSH SERPL DL<=0.05 MIU/L-ACNC: 4.7 UIU/ML (ref 0.45–4.5)
WBC # BLD AUTO: 6.23 THOUSAND/UL (ref 4.31–10.16)

## 2024-10-11 PROCEDURE — 36415 COLL VENOUS BLD VENIPUNCTURE: CPT

## 2024-10-11 PROCEDURE — 83690 ASSAY OF LIPASE: CPT

## 2024-10-11 PROCEDURE — 82150 ASSAY OF AMYLASE: CPT

## 2024-10-11 PROCEDURE — 80053 COMPREHEN METABOLIC PANEL: CPT

## 2024-10-11 PROCEDURE — 84439 ASSAY OF FREE THYROXINE: CPT

## 2024-10-11 PROCEDURE — 85025 COMPLETE CBC W/AUTO DIFF WBC: CPT

## 2024-10-11 PROCEDURE — 84443 ASSAY THYROID STIM HORMONE: CPT

## 2024-10-11 NOTE — PROGRESS NOTES
LIVER MULTIDISCIPLINARY CANCER CONFERENCE    DATE: 10/11/2024      PRESENTING DOCTOR: Dr Aguirre      DIAGNOSIS: Hepatocellular carcinoma       Max Ozuna  1945 was presented at the Liver Multidisciplinary Cancer Conference today.      PHYSICIAN RECOMMENDED PLAN:  Initiated immunotherapy recommending referral to Interventional Radiology (IR) to discuss Y90 therapy    Team agreed to plan.    The final treatment plan will be left to the discretion of the patient and the treating physician.     DISCLAIMERS:  TO THE TREATING PHYSICIAN:  This conference is a meeting of clinicians from various specialty areas who evaluate and discuss patients for whom a multidisciplinary treatment approach is being considered. Please note that the above opinion was a consensus of the conference attendees and is intended only to assist in quality care of the discussed patient.  The responsibility for follow up on the input given during the conference, along with any final decisions regarding plan of care, is that of the patient and the patient's provider. Accordingly, appointments have only been recommended based on this information and have NOT been scheduled unless otherwise noted.      TO THE PATIENT:  This summary is a brief record of major aspects of your cancer treatment. You may choose to share a copy with any of your doctors or nurses. However, this is not a detailed or comprehensive record of your care.      NCCN guidelines were readily available for review at this discussion

## 2024-10-14 ENCOUNTER — OFFICE VISIT (OUTPATIENT)
Dept: FAMILY MEDICINE CLINIC | Facility: CLINIC | Age: 79
End: 2024-10-14
Payer: MEDICARE

## 2024-10-14 VITALS
WEIGHT: 152.4 LBS | BODY MASS INDEX: 25.39 KG/M2 | DIASTOLIC BLOOD PRESSURE: 54 MMHG | OXYGEN SATURATION: 98 % | HEART RATE: 74 BPM | SYSTOLIC BLOOD PRESSURE: 108 MMHG | TEMPERATURE: 98.3 F | HEIGHT: 65 IN

## 2024-10-14 DIAGNOSIS — Z79.4 TYPE 2 DIABETES MELLITUS WITH DIABETIC PERIPHERAL ANGIOPATHY WITHOUT GANGRENE, WITH LONG-TERM CURRENT USE OF INSULIN (HCC): ICD-10-CM

## 2024-10-14 DIAGNOSIS — I74.09 AORTOILIAC OCCLUSIVE DISEASE (HCC): Chronic | ICD-10-CM

## 2024-10-14 DIAGNOSIS — I70.213 ATHEROSCLEROSIS OF NATIVE ARTERY OF BOTH LOWER EXTREMITIES WITH INTERMITTENT CLAUDICATION (HCC): ICD-10-CM

## 2024-10-14 DIAGNOSIS — E11.51 TYPE 2 DIABETES MELLITUS WITH DIABETIC PERIPHERAL ANGIOPATHY WITHOUT GANGRENE, WITH LONG-TERM CURRENT USE OF INSULIN (HCC): ICD-10-CM

## 2024-10-14 DIAGNOSIS — I81 PORTAL VEIN THROMBOSIS: ICD-10-CM

## 2024-10-14 DIAGNOSIS — K74.60 CIRRHOSIS OF LIVER WITHOUT ASCITES, UNSPECIFIED HEPATIC CIRRHOSIS TYPE (HCC): ICD-10-CM

## 2024-10-14 DIAGNOSIS — I10 ESSENTIAL HYPERTENSION: ICD-10-CM

## 2024-10-14 DIAGNOSIS — Z01.818 PRE-OP EVALUATION: ICD-10-CM

## 2024-10-14 DIAGNOSIS — C22.0 HEPATOCELLULAR CARCINOMA (HCC): Primary | ICD-10-CM

## 2024-10-14 PROCEDURE — 99214 OFFICE O/P EST MOD 30 MIN: CPT | Performed by: FAMILY MEDICINE

## 2024-10-14 PROCEDURE — G2211 COMPLEX E/M VISIT ADD ON: HCPCS | Performed by: FAMILY MEDICINE

## 2024-10-14 NOTE — PROGRESS NOTES
Ambulatory Visit  Name: Max Ozuna      : 1945      MRN: [id number]  Encounter Provider: KADE Martel DO  Encounter Date: 10/14/2024   Encounter department: [facility]    Recommendations to Proceed withSurgery     Patient is considered to be Low risk for low risk procedure.      After evaluation and discussion with patient with emphasis that all surgery has some degree of inherent risk it is determined this procedure is of acceptable risk  medically.     Patient may proceed with planned procedure    Assessment & Plan  Hepatocellular carcinoma (HCC)  New development never showed up on CT scan since  only recent done several weeks ago note alpha-fetoprotein in excess of 20,000       Pre-op evaluation  Will have left eye cataract surgery on .  Did have right eye cataract surgery 5 weeks ago and did fine I anticipate he will do fine now also       Essential hypertension  Very controlled at 108/543       Aortoiliac occlusive disease (HCC)  Stable and no new developments       Atherosclerosis of native artery of both lower extremities with intermittent claudication (HCC)  Under vascular surgery observation no new developments       Cirrhosis of liver without ascites, unspecified hepatic cirrhosis type (HCC)  Cirrhosis is mild and MRI recently performed reviewed only even suggesting cirrhosis.  The tumor mass appears to be less than 2 mm in diameter and localized hopefully will respond to IR therapy       Type 2 diabetes mellitus with diabetic peripheral angiopathy without gangrene, with long-term current use of insulin (HCC)    Lab Results   Component Value Date    HGBA1C 7.8 (H) 10/02/2024   Only taking Jardiance, glipizide, and metformin         Portal vein thrombosis  Secondary to hepatocellular carcinoma            History of Present Illness     History of Present Illness       Review of Systems   Constitutional:  Negative for activity change, appetite change, chills,  "diaphoresis, fatigue, fever and unexpected weight change.   HENT:  Negative for congestion, dental problem, drooling, ear discharge, ear pain, facial swelling, mouth sores, nosebleeds, postnasal drip, rhinorrhea, trouble swallowing and voice change.    Eyes:  Negative for photophobia, pain, discharge, redness, itching and visual disturbance.   Respiratory:  Negative for apnea, cough, choking, chest tightness and shortness of breath.    Cardiovascular:  Negative for chest pain and leg swelling.   Gastrointestinal:  Negative for abdominal distention, abdominal pain, constipation, diarrhea and nausea.   Endocrine: Negative for polydipsia, polyphagia and polyuria.   Genitourinary:  Negative for decreased urine volume, difficulty urinating, dysuria, enuresis and hematuria.   Musculoskeletal:  Negative for arthralgias, back pain, gait problem and joint swelling.   Skin:  Negative for color change, pallor, rash and wound.   Allergic/Immunologic: Negative for immunocompromised state.   Neurological:  Negative for dizziness, seizures, syncope, facial asymmetry, speech difficulty, light-headedness and headaches.   Hematological:  Negative for adenopathy.   Psychiatric/Behavioral:  Negative for agitation, behavioral problems, confusion and decreased concentration.      Objective     /54 (BP Location: Left arm, Patient Position: Sitting, Cuff Size: Standard)   Pulse 74   Temp 98.3 °F (36.8 °C) (Temporal)   Ht 5' 5\" (1.651 m)   Wt 69.1 kg (152 lb 6.4 oz)   SpO2 98%   BMI 25.36 kg/m²     Physical Exam    Physical Exam  Vitals and nursing note reviewed.   Constitutional:       General: He is not in acute distress.     Appearance: Normal appearance. He is well-developed. He is not ill-appearing, toxic-appearing or diaphoretic.   HENT:      Head: Normocephalic and atraumatic.      Nose: Nose normal.      Mouth/Throat:      Mouth: Mucous membranes are moist.   Eyes:      Extraocular Movements: Extraocular movements " intact.      Conjunctiva/sclera: Conjunctivae normal.      Pupils: Pupils are equal, round, and reactive to light.   Cardiovascular:      Rate and Rhythm: Normal rate and regular rhythm.      Pulses: Normal pulses.      Heart sounds: Normal heart sounds. No murmur heard.     No friction rub.   Pulmonary:      Effort: Pulmonary effort is normal. No respiratory distress.      Breath sounds: Normal breath sounds. No stridor. No wheezing or rhonchi.   Abdominal:      Palpations: Abdomen is soft.      Tenderness: There is no abdominal tenderness.   Musculoskeletal:         General: No swelling.      Cervical back: Neck supple.   Skin:     General: Skin is warm and dry.      Coloration: Skin is not jaundiced or pale.      Findings: No erythema or rash.   Neurological:      General: No focal deficit present.      Mental Status: He is alert and oriented to person, place, and time. Mental status is at baseline.   Psychiatric:         Mood and Affect: Mood normal.         Behavior: Behavior normal.         Thought Content: Thought content normal.         Judgment: Judgment normal.       Administrative Statements   I have spent a total time of 35 minutes in caring for this patient on the day of the visit/encounter including Diagnostic results, Prognosis, Risks and benefits of tx options, Instructions for management, Patient and family education, Importance of tx compliance, Risk factor reductions, Impressions, Counseling / Coordination of care, Documenting in the medical record, Reviewing / ordering tests, medicine, procedures  , and Obtaining or reviewing history  .

## 2024-10-14 NOTE — ASSESSMENT & PLAN NOTE
Cirrhosis is mild and MRI recently performed reviewed only even suggesting cirrhosis.  The tumor mass appears to be less than 2 mm in diameter and localized hopefully will respond to IR therapy

## 2024-10-14 NOTE — ASSESSMENT & PLAN NOTE
New development never showed up on CT scan since 2022 only recent done several weeks ago note alpha-fetoprotein in excess of 20,000

## 2024-10-14 NOTE — ASSESSMENT & PLAN NOTE
Will have left eye cataract surgery on October 28.  Did have right eye cataract surgery 5 weeks ago and did fine I anticipate he will do fine now also

## 2024-10-14 NOTE — ASSESSMENT & PLAN NOTE
Lab Results   Component Value Date    HGBA1C 7.8 (H) 10/02/2024   Only taking Jardiance, glipizide, and metformin

## 2024-10-15 ENCOUNTER — HOSPITAL ENCOUNTER (OUTPATIENT)
Dept: INFUSION CENTER | Facility: CLINIC | Age: 79
Discharge: HOME | End: 2024-10-15
Payer: MEDICARE

## 2024-10-15 VITALS
WEIGHT: 149.91 LBS | HEART RATE: 68 BPM | TEMPERATURE: 97.3 F | DIASTOLIC BLOOD PRESSURE: 64 MMHG | RESPIRATION RATE: 18 BRPM | BODY MASS INDEX: 24.95 KG/M2 | SYSTOLIC BLOOD PRESSURE: 108 MMHG
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
## 2024-10-15 DIAGNOSIS — C22.0 HEPATOCELLULAR CARCINOMA (HCC): Primary | ICD-10-CM

## 2024-10-15 PROCEDURE — 96417 CHEMO IV INFUS EACH ADDL SEQ: CPT

## 2024-10-15 PROCEDURE — 96413 CHEMO IV INFUSION 1 HR: CPT

## 2024-10-15 RX ORDER — HYDROCORTISONE 25 MG/G
CREAM TOPICAL 4 TIMES DAILY PRN
Qty: 30 G | Refills: 0 | Status: SHIPPED | OUTPATIENT
Start: 2024-10-15

## 2024-10-15 RX ORDER — SODIUM CHLORIDE 9 MG/ML
20 INJECTION, SOLUTION INTRAVENOUS ONCE
Status: COMPLETED | OUTPATIENT
Start: 2024-10-15 | End: 2024-10-15

## 2024-10-15 RX ADMIN — DURVALUMAB 1500 MG: 500 INJECTION, SOLUTION INTRAVENOUS at 14:51

## 2024-10-15 RX ADMIN — SODIUM CHLORIDE 20 ML/HR: 0.9 INJECTION, SOLUTION INTRAVENOUS at 11:54

## 2024-10-15 RX ADMIN — TREMELIMUMAB 300 MG: 25 INJECTION, SOLUTION INTRAVENOUS at 12:00

## 2024-10-15 NOTE — PROGRESS NOTES
Received photo from infusion RN Tristan of rash that occurred post treatment today, especially after imfinzi. Reviewed with Dr. Angeles, recommended benadryl HS and to apply hydrocortisone cream. Reviewed patient is able to have FLU vaccination during off weeks of treatment.

## 2024-10-15 NOTE — PROGRESS NOTES
Max Ozuna  tolerated D1C1 of Imjudo & Imfinzi well. After Imfinzi was completed, patient reported small, palm-sized hive cluster on R thigh. No other symptoms reported. This was relayed to Stephania POZO in the office, reported this to Dr. FISCHER, and I relayed the instruction for patient to take benadryl when he gets home and hydrocortisone cream would be sent to his pharmacy to take as directed. Pt instructed to call the office if symptoms worsen, and report to the ER if any respiratory symptoms develop. Pt, wife, and daughter report understanding.    Max Ozuna is aware of future appt onTuesday Nov 12, 2024 10:30 AM.     AVS printed and given to Max Ozuna.

## 2024-10-28 NOTE — PROGRESS NOTES
Hematology Outpatient Office Note    Date of Service: 11/7/2024    [facility] [place]  [address]  [facility] [address]  [phone]    Reason for Consultation:   Chief Complaint   Patient presents with    Follow-up       Referral Physician: No ref. provider found    Primary Care Physician:  DO Maddi Quinonez: Daughter     Taylor: wife    Sallie: daughter in law    ASSESSMENT & PLAN      Diagnosis ICD-10-CM Associated Orders   1. Hepatocellular carcinoma (HCC)  C22.0       2. Iron deficiency anemia, unspecified iron deficiency anemia type  D50.9             This is a 79 y.o. c PMHx notable for cecal AVM/GAVE, PE, DM, DVT, HTN, HLP, CAD, being seen in consultation for chronic ANG and locally advanced HCC     Iron Deficiency anemia: improved s/p IV iron  Low iron levels can cause anemia (low red blood cells). Low iron levels can also make people feel poorly, even before anemia starts. Iron is used to make red blood cells. If blood is lost from the body, if iron can't be absorbed from food, or if something removes iron (pregnancy) the iron can be low and then anemia happens.    Hx Cecal AVM. GI thought maybe GAVE  8/29/2022: Hgb 9.5, WBC 6.35k, MCV 91, plt 235k, retic 3.65%, FOBT + (8/31), ferritin 7, iron 30, Fe Sat 9%, TIBC 346  9/7 EGD/C-scope: internal hemorrhoids    Capsule endoscopy: no active bleeding  6/27/2023 EGD with single balloon enteroscopy revealed no bleeding    IrAE drug induced rash: on hydrocortisone 2.5% to good effect    Iron deficiency is very common. Low iron can cause fatigue or tiredness, the desire to eat ice or non-food items like corn starch or dry pasta noodles, restless legs, weak fingernails, cracks at the corner of the mouth.     Common causes of iron deficiency include inadequate diet (uncommon, usually vegetarians), gastric bypass surgery (very common), pregnancy (very common), periods (most common cause in younger women),  blood loss (usually from the stomach or intestines), and decreased iron absorption from the intestines from gastritis, H pylori, acid blocking medicines or celiac disease. Even a normal menstrual period can cause iron deficiency. In 10% of patients a clear cause of low iron is not found. 14% of women will have iron deficiency just from their menstrual period.       Iron deficiency is treated with pills as a first step. For some people the iron pills do not work, cause severe side effects, or take too long to work: for these people IV iron can be given. You may only require IV iron very rarely, for example only when pregnant, or from 1-2 times a year based on your rate of need. It takes 4 weeks for IV iron to improve the anemia to maximum potential. If your fatigue is not from the low iron, getting iron treatment would not help the fatigue.    9/24/2024 CT Abd/pelv w/c: Nodular liver contour, indicated of cirrhosis. Portal vein thrombosis involving posterior branch of the right hepatic lobe  MRI Abd w/wo c: Approximately 5.4 x 4.8 x 5.5 cm (length X depth X width) solitary segment 6/7 mass  9/25/2024: AFP 21,374  9/25/2024: CT Chest w/c: Emphysema with areas of fibrosis/scarring which appears stable compared to study of 11/15/2019  10/11/2024 Liver tumor board interventional and radiology do think he would be a good candidate for Y90 treatment concurrently with immunotherapy          Discussion of decision making    I personally reviewed the following lab results, the image studies, pathology, other specialty/physicians consult notes and recommendations, and outside medical records from [facility]/other institutions. I had a lengthy discussion with the patient and shared the work-up findings. We discussed the diagnosis and management plan as below. I spent 41 minutes reviewing the records (labs, clinician notes, outside records, medical history, ordering medicine/tests/procedures, interpreting the imaging/labs previously  done) and coordination of care as well as direct time with the patient today, of which greater than 50% of the time was spent in counseling and coordination of care with the patient/family.    Plan/Labs  He is back on PO iron MWF   Cont to f/u GI  Refilling Hydrocortisone 2.5% cream for his drug rash  Restaging CT CAP w/c scheduled 12/30/2024  Infusion chairs ordered for Durvalumab + Tremelimumab for advanced HCC, C2 coming up with preceding labs  Eventual goal is liver directed therapy if we get a WI          Follow Up: q4 weeks scheduled thru 1/2/2025, more*    All questions were answered to the patient's satisfaction during this encounter. The patient knows the contact information for our office and knows to reach out for any relevant concerns related to this encounter. They are to call for any temperature 100.4 or higher, new symptoms including but not restricted to shaking chills, decreased appetite, nausea, vomiting, diarrhea, increased fatigue, shortness of breath or chest pain, confusion, and not feeling the strength to come to the clinic. For all other listed problems and medical diagnosis in their chart - they are managed by PCP and/or other specialists, which the patient acknowledges. Thank you very much for your consultation and making us a part of this patient's care. We are continuing to follow closely with you. Please do not hesitate to reach out to me with any additional questions or concerns.    Mal Angeles MD  Hematology & Medical Oncology Staff Physician             Disclaimer: This document was prepared using [employer] technology. If a word or phrase is confusing, or does not make sense, this is likely due to recognition error which was not discovered during this clinician's review. If you believe an error has occurred, please contact me through [employer] service line for dequan?cation.      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  I ordered 600 mg of IV Venofer  5/18/2023: ferritin 7, iron 36, Fe Sat 10%, TIBC 364, Hgb 11.6  6/29/2023: ferritin 37, iron 52, Fe Sat 17%, TIBC 307, Hgb 12.7, MCV 92  9/20/2023:  ferritin 58, iron 65, Fe Sat 33%, TIBC 230, Hgb 13.5, MCV 95  2/14/2024: iron 42, ferritin 22, Fe Sat 14%, TIBC 304: Vit B12 632, Hgb 13.4, plt 161k, WBC 6.21k  4/18/2024: iron 59, Fe Sat 18%, TIBC 329, ferritin 21, Hgb 13.4 (2/2024)  4/29/2024: planned IV Venofer 200mg x2  10/2/2024: Ferritin: 224, Hgb 13.6, Fe Sat 13%, TIBC 208, iron 26, ferritin 224 AOCI  SUBJECTIVE  (INTERVAL HISTORY)      Interval events: He has minimal fatigue from the treatment, has mild rash over R anterior leg and posterior lower back. Is using a moisturizer.    I have reviewed the relevant past medical, surgical, social and family history. I have also reviewed allergies and medications for this patient.    Review of Systems    Baseline weight: 152-154 lbs    Denies weight loss, F/C, N/V, SOB, CP, LH, HA.    He has had fatigue and low energy since 6/2022. He was chewing on ice all day since 3/2022.     A 10-point review of system was performed, pertinent positive and negative were detailed as above. Otherwise, the 10-point review of system was  negative.      Past Medical History:   Diagnosis Date    Coronary artery disease without angina pectoris 02/25/2020    Diabetes mellitus (HCC)     DVT (deep venous thrombosis) (HCC)     GERD (gastroesophageal reflux disease)     Hypertension     Iron deficiency anemia        Past Surgical History:   Procedure Laterality Date    ANGIOPLASTY  10/19/2021    bilateral    CARDIAC CATHETERIZATION  2013    calif    CABG  x4    COLONOSCOPY      COLONOSCOPY      CORONARY ANGIOPLASTY WITH STENT PLACEMENT  01/01/2010    CORONARY ARTERY BYPASS GRAFT      FEMORAL ARTERY STENT      FEMORAL BYPASS  10/20/2021    IR LOWER EXTREMITY ANGIOGRAM  2/14/2023    IR PELVIC ANGIOGRAM  5/6/2022    AK SLCTV CATHJ 3RD+ ORD SLCTV ABDL PEL/LXTR BRNCH  5/6/2022    Procedure: ULTRASOUND-GUIDED LEFT BRACHIAL ARTERY PUNCTURE, AORTOGRAM, RIGHT COMMON ILIAC ARTERY ANGIOPLASTY WITH 8 X 40 MM BALLOON, ANGIOPLASTY OF RIGHT EXTERNAL AND COMMON ILIAC ARTERY STENOSIS WITH 6 X 150 MM DRUG-ELUTING BALLOON, STENTING OF RIGHT DISTAL EXTERNAL ILIAC ARTERY STENOSIS WITH 7 X 40 MM SELF EXPANDING STENT POST DILATED WITH A 6 MM BALLOON.;  Surgeon: Henry Pena MD;  [place]    AK SLCTV CATHJ 3RD+ ORD SLCTV ABDL PEL/LXTR BRNCH Bilateral 2/14/2023    Procedure: CUTDOWN FEM-FEM BYPASS WITH PRIMARY CLOSURE ACCESS SITE, BILATERAL RUN-OFF, LEFT FEM-POP 5X220 QUYEN AND 6X150 RODRI, ATTEMPT TO CROSS RIGHT SFA OCCLUSION;  Surgeon: Henry Pena MD;  Location: [facility] OR;  Service: Vascular       Family History   Problem Relation Age of Onset    No Known Problems Mother     No Known Problems Father        Social History     Socioeconomic History    Marital status: /Civil Union     Spouse name: Not on file    Number of children: Not on file    Years of education: Not on file    Highest education level: Not on file   Occupational History    Not on file   Tobacco Use    Smoking status: Former     Current packs/day: 0.00     Average packs/day: 1.5 packs/day for 40.0  years (60.0 ttl pk-yrs)     Types: Cigarettes     Start date:      Quit date:      Years since quittin.8     Passive exposure: Past    Smokeless tobacco: Never    Tobacco comments:     quit 2004--    Vaping Use    Vaping status: Never Used   Substance and Sexual Activity    Alcohol use: Not Currently     Alcohol/week: 0.0 standard drinks of alcohol    Drug use: No    Sexual activity: Not on file   Other Topics Concern    Not on file   Social History Narrative    · Most recent tobacco use screenin2020      Social Determinants of Health     Financial Resource Strain: Low Risk  (2023)    Overall Financial Resource Strain (CARDIA)     Difficulty of Paying Living Expenses: Not hard at all   Food Insecurity: Not on file   Transportation Needs: No Transportation Needs (2023)    PRAPARE - Transportation     Lack of Transportation (Medical): No     Lack of Transportation (Non-Medical): No   Physical Activity: Not on file   Stress: Not on file   Social Connections: Not on file   Intimate Partner Violence: Not on file   Housing Stability: Not on file       No Known Allergies    Current Outpatient Medications   Medication Sig Dispense Refill    acetaminophen (TYLENOL) 500 mg tablet Take 1,000 mg by mouth every 8 (eight) hours as needed      atorvastatin (LIPITOR) 40 mg tablet TAKE 1 TABLET DAILY 90 tablet 1    chlorhexidine (PERIDEX) 0.12 % solution Apply 15 mL to the mouth or throat 2 (two) times a day 473 mL 3    clopidogrel (PLAVIX) 75 mg tablet Take 1 tablet (75 mg total) by mouth daily 90 tablet 1    DULoxetine (CYMBALTA) 30 mg delayed release capsule TAKE ONE CAPSULE BY MOUTH TWICE DAILY 60 capsule 0    Empagliflozin (Jardiance) 25 MG TABS Take 1 tablet (25 mg total) by mouth every morning Also: take with LOTS of water 90 tablet 3    fenofibrate (TRICOR) 145 mg tablet Take 1 tablet (145 mg total) by mouth daily 90 tablet 3    ferrous sulfate 325 (65 Fe) mg tablet Take 1 tablet (325 mg  total) by mouth 2 (two) times a day with meals 60 tablet 6    gabapentin (Neurontin) 300 mg capsule Take 1 capsule (300 mg total) by mouth 3 (three) times a day 90 capsule 6    glipiZIDE (GLUCOTROL) 10 mg tablet TAKE 1 TABLET TWICE A DAY  BEFORE MEALS 180 tablet 1    ketorolac (ACULAR) 0.5 % ophthalmic solution       lisinopril-hydrochlorothiazide (PRINZIDE,ZESTORETIC) 20-12.5 MG per tablet TAKE 1 TABLET BY MOUTH DAILY 90 tablet 1    metFORMIN (GLUCOPHAGE) 1000 MG tablet TAKE 1 TABLET TWICE DAILY  WITH MEALS 180 tablet 1    omeprazole (PriLOSEC) 20 mg delayed release capsule Take 1 capsule (20 mg total) by mouth daily before breakfast (Patient taking differently: Take 20 mg by mouth if needed) 30 capsule 5    prednisoLONE acetate (PRED FORTE) 1 % ophthalmic suspension       tamsulosin (FLOMAX) 0.4 mg TAKE 1 CAPSULE BY MOUTH EVERY DAY WITH DINNER 90 capsule 2    tirzepatide (Mounjaro) 2.5 MG/0.5ML Inject 0.5 mL (2.5 mg total) under the skin every 7 days 2 mL 3    hydrocortisone 2.5 % cream Apply topically 4 (four) times a day as needed for rash 30 g 0    moxifloxacin (VIGAMOX) 0.5 % ophthalmic solution  (Patient not taking: Reported on 10/14/2024)       No current facility-administered medications for this visit.       (Not in a hospital admission)        Objective:     24 Hour Vitals Assessment:     Vitals:    11/07/24 1046   BP: 124/60   Pulse: 70   Resp: 16   Temp: (!) 97.2 °F (36.2 °C)   SpO2: 97%     Weight today: 150 lbs      PHYSICIAN EXAM:    General: Appearance: alert, cooperative, no distress.  HEENT: Normocephalic, atraumatic. No scleral icterus. conjunctivae clear. EOMI.  Chest: No tenderness to palpation. No open wound noted.  Lungs: Clear to auscultation bilaterally, Respirations unlabored.  Cardiac: Regular rate and rhythm, +S1and S2  Abdomen: Soft, non-tender, non-distended. Bowel sounds are normal.  Extremities:  No edema, cyanosis, clubbing.  Skin: Skin color, turgor are normal. Small R anterior leg  rashes  Neurologic: Awake, Alert, and oriented, no gross focal deficits noted b/l.       DATA REVIEW:    Pathology Result:    Final Diagnosis   Date Value Ref Range Status   06/27/2023   Final    A. Duodenum, :  - Unremarkable duodenal mucosa  - No villous atrophy or increased intraepithelial lymphocytes seen     B. Stomach, :  - Gastric oxyntic and antral type mucosa with minimal or mild chronic inflammation  - No sharad shaped bacteria seen on H&E stained tissue section  - Negative for intestinal metaplasia and dysplasia          09/07/2022   Final    A. Duodenum,  biopsy:  - Duodenal mucosa with focal acute inflammation and reactive changes  - No intraepithelial lymphocytosis and no villous blunting.  - No epithelial dysplasia and no evidence of malignancy.    B.  Stomach, biopsy:  - Gastric antral and oxyntic mucosa with no significant pathologic alteration.  - Negative for intestinal metaplasia, dysplasia or carcinoma.  - No Helicobacter pylori is identified on H&E stained slide.              Image Results:   Image result are reviewed and documented in Hematology/Oncology history. I personally reviewed these images.    CT chest w contrast  Narrative: CT CHEST WITH IV CONTRAST    INDICATION: Liver mass - staging.    COMPARISON: Multiple priors most recently 6/1/2023    TECHNIQUE: CT examination of the chest was performed. Multiplanar 2D reformatted images were created from the source data.    This examination, like all CT scans performed in the [facility], was performed utilizing techniques to minimize radiation dose exposure, including the use of iterative reconstruction and automated exposure control. Radiation dose length   product (DLP) for this visit: 302.41 mGy-cm    IV Contrast: 55 mL of iohexol (OMNIPAQUE)    FINDINGS:    LUNGS: Fibrotic changes at the lung apices with calcifications. Emphysema. Multiple areas of scarring are similar in appearance to CT of 11/15/2019. No suspicious nodules  "or masses.    PLEURA: Unremarkable.    HEART/GREAT VESSELS: Thoracic aortic and coronary artery atherosclerosis no thoracic aortic aneurysm.    MEDIASTINUM AND RICHARD: Unremarkable.    CHEST WALL AND LOWER NECK: Sternotomy wires.    VISUALIZED STRUCTURES IN THE UPPER ABDOMEN: See recent CT and MRI.    OSSEOUS STRUCTURES: Spinal degenerative changes are noted. No acute fracture or destructive osseous lesion.  Impression: Emphysema with areas of fibrosis/scarring which appears stable compared to study of 11/15/2019. No definite evidence of metastatic disease.    Workstation performed: [id number]      LABS:  [facility].       Lab Results   Component Value Date    HGB 14.7 10/11/2024    HCT 46.6 10/11/2024    MCV 96 10/11/2024     10/11/2024    WBC 6.23 10/11/2024    NRBC 0 10/11/2024     Lab Results   Component Value Date    K 4.1 10/11/2024    CL 99 10/11/2024    CO2 24 10/11/2024    BUN 31 (H) 10/11/2024    CREATININE 1.12 10/11/2024    GLUF 161 (H) 10/02/2024    CALCIUM 9.7 10/11/2024    CORRECTEDCA 10.2 (H) 11/15/2021    AST 25 10/11/2024    ALT 24 10/11/2024    ALKPHOS 89 10/11/2024    EGFR 61 10/11/2024       Lab Results   Component Value Date    IRON 26 (L) 10/02/2024    TIBC 208 (L) 10/02/2024    FERRITIN 224 10/02/2024    FERRITIN 21 (L) 04/18/2024    FERRITIN 22 (L) 02/14/2024    FERRITIN 58 09/20/2023    FERRITIN 37 06/29/2023    FERRITIN 7 (L) 05/18/2023    FERRITIN 26 11/28/2022    FERRITIN 7 (L) 08/29/2022    FERRITIN 37 04/20/2022    FERRITIN 17 02/07/2022    FERRITIN 13 01/05/2022    FERRITIN 10 11/15/2021    FERRITIN 28 03/11/2021    FERRITIN 12 11/03/2020       Lab Results   Component Value Date    AJWAHCLA20 632 02/14/2024    CVYHBGVU87 223 09/20/2023    VUPWRDYN35 389 11/15/2021    CDXZGBVJ00 681 05/29/2020       No results for input(s): \"WBC\", \"CREAT\", \"PLT\" in the last 72 hours.       By:  Mal Angeles, 11/7/2024, 10:49 AM                        "

## 2024-10-29 ENCOUNTER — NURSE TRIAGE (OUTPATIENT)
Age: 79
End: 2024-10-29

## 2024-10-29 NOTE — TELEPHONE ENCOUNTER
Call received from patient. Informed him of the following:    Dr. Angeles is recommending on continuing the hydrocortisone cream and Benadryl as needed for the itchiness and rash     Patient verbalized understanding.

## 2024-10-29 NOTE — TELEPHONE ENCOUNTER
"Patient requests call back with recommendations regarding treating this rash and itching. He is wearing a loose cotton shirt and tries to avoid scratching when possible but he says it is really difficult. He is using the hydrocortisone cream but it provides only a small amount of relief for a short time. He has an eye doctor appointment s/p cataract surgery at 2:30 so is requesting a call before then if possible.          Reason for Disposition   Patient wants to be seen    Answer Assessment - Initial Assessment Questions  1. APPEARANCE of RASH: \"Describe the rash.\"       Small red pimple like bumps that was initially on his leg after his infusion. He took benadryl x1 and used hydrocortisone cream which resolved this. However 4-5 days ago the rash returned all over his body. He reports this is very itchy and annoying and he is scratching. Denies bleeding or scabs. He is the only one in his house with the rash. He tried to rule out whether it was the soap he was using or change in weather but even with using his old soap it still is ongoing.     2. LOCATION: \"Where is the rash located?\"       Arms, legs, back, chest, abdomen    3. NUMBER: \"How many spots are there?\"       Not able to count    4. SIZE: \"How big are the spots?\" (Inches, centimeters or compare to size of a coin)    Small, pimple sized per patient    5. ONSET: \"When did the rash start?\"       Initially after infusion but then was better, returned worse and over more of the body the last 4-5 days    6. ITCHING: \"Does the rash itch?\" If Yes, ask: \"How bad is the itch?\"  (Scale 0-10; or none, mild, moderate, severe)      Severe    7. PAIN: \"Does the rash hurt?\" If Yes, ask: \"How bad is the pain?\"  (Scale 0-10; or none, mild, moderate, severe)      Denies    8. OTHER SYMPTOMS: \"Do you have any other symptoms?\" (e.g., fever)      Denies    Protocols used: Rash or Redness - Localized-Adult-[place]    "

## 2024-10-29 NOTE — TELEPHONE ENCOUNTER
Call out to patient in regard to recommended appointment at 2pm. Patient stated he would not be able to attend due to appointment with eye doctor this afternoon.

## 2024-11-04 ENCOUNTER — OFFICE VISIT (OUTPATIENT)
Dept: GASTROENTEROLOGY | Facility: AMBULARY SURGERY CENTER | Age: 79
End: 2024-11-04
Payer: MEDICARE

## 2024-11-04 VITALS
WEIGHT: 150 LBS | HEIGHT: 65 IN | BODY MASS INDEX: 24.99 KG/M2 | OXYGEN SATURATION: 99 % | DIASTOLIC BLOOD PRESSURE: 64 MMHG | SYSTOLIC BLOOD PRESSURE: 110 MMHG | HEART RATE: 70 BPM

## 2024-11-04 DIAGNOSIS — R76.8 HEPATITIS B CORE ANTIBODY POSITIVE: ICD-10-CM

## 2024-11-04 DIAGNOSIS — C22.0 HEPATOCELLULAR CARCINOMA (HCC): ICD-10-CM

## 2024-11-04 DIAGNOSIS — K74.60 CIRRHOSIS OF LIVER WITHOUT ASCITES, UNSPECIFIED HEPATIC CIRRHOSIS TYPE (HCC): Primary | ICD-10-CM

## 2024-11-04 DIAGNOSIS — Z11.59 ENCOUNTER FOR SCREENING FOR OTHER VIRAL DISEASES: ICD-10-CM

## 2024-11-04 PROCEDURE — 99214 OFFICE O/P EST MOD 30 MIN: CPT | Performed by: FAMILY MEDICINE

## 2024-11-04 NOTE — PROGRESS NOTES
[facility] - Outpatient Follow-up  Max Ozuna 79 y.o. male MRN: [id number]  Encounter: [id number]          ASSESSMENT AND PLAN:      1. Cirrhosis of liver without ascites, unspecified hepatic cirrhosis type (HCC)  2. Hepatocellular carcinoma (HCC)    Summary: Patient is a 79 y.o. 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 Current MELD- 3.0 (8).    Patient was hospitalized for acute onset right flank/RUQ abdominal pain and incidentally found to have a 5.4 x 4.8 x 5.5 cm solitary segment 6/7 mass and likely tumor thrombus in the left portal vein on cross-sectional imaging (LR-TIV). He was also seen to have cirrhotic morphology which has been demonstrated on imaging since 2018. Interestingly, he has normal liver synthetic function and preserved platelet count. His AFP was >21,000 c/f locally invasive HCC. CT chest without evidence of metastatic disease.     He is following with medical oncology and started on immunotherapy. His case was reviewed at [facility] liver tumor board and also recommended he be referred to IR to discuss Y90. Referral placed today. He is also scheduled for surveillance imaging to include a CT C/A/P on 12/30/2024.    We discussed the basic pathophysiology of cirrhosis, natural progression of disease, potential for hepatic decompensation and recommended healthcare maintenance. He is aware that maintaining a healthy weight and aggressive optimization of his metabolic risk factors are andrew in helping to slow the progression of disease and prevent decompensating events.     Fortunately, his liver disease appears to be well compensated. He will have an EGD now for variceal screening. He will be due for updated MELD labs and an AFP level in December. Additional management as below.     Ascites   - No radiographic or clinical evidence of ascites on exam today.     Esophageal Varices   - EGD (6/2023)  without gastric or esophageal varices.   - Repeat EGD now for variceal screening particularly given tumor in vein.     Hepatic Encephalopathy   - No hx of clinical evidence of HE on exam today.   - Patient aware of signs/sxs's of HE and advised to promptly inform provider if experiencing such.    HCC Screening   - 5.4 x 4.8 x 5.5 cm segment 6/7 mass and tumor thrombus (LR-TIV).   - Following with medical oncology and receiving immunotherapy.   - Referred to IR to discuss LDT pending the results of his repeat imaging.   - Scheduled for surveillance CT C/A/P on 12/30/2024.   - Continue imaging and AFP levels q3 moths.     Nutritional Status  - No sarcopenia noted on exam today.   - Encouraged high-protein diet in addition to a high-protein snack qHS to prevent prolonged fasting.     Vaccines   - Serologies to assess immunity to hep A and B.     Transplant Candidacy   - Defer given advanced age.     CRC Screening  - Colonoscopy (9/2022) with mild sigmoid diverticulosis and internal hemorrhoids.   - No further screening colonoscopies necessary due to advanced age.     - CBC and differential; Future  - Comprehensive metabolic panel; Future  - Protime-INR; Future  - AFP tumor marker; Future  - Hepatitis A antibody, total; Future  - Hepatitis B surface antibody; Future  - EGD; Future  - Ambulatory Referral to Interventional Radiology; Future    4. Hepatitis B core antibody positive  Patient with a positive HBV core total antibody but negative HBV surface antigen likely reflective of a past self resolved HBV infection. Plan for an HBV surface antibody to evaluate for protective immunity.    Follow-up in 3 months or sooner if necessary.     ______________________________________________________________________    HPI: Patient is a 79 y.o. male with PMH significant for DM2, HTN, HLD, CAD s/p CABG x4, PAD w/ stenting on clopidogrel, CKD3, history of DVT and ANG w/ GAVE who presents today for a follow-up regarding newly  diagnosed cirrhosis and HCC.     Max was hospitalized from 9/24- 9/26/2024 for acute onset right flank and abdominal pain. His serologies and UA were unremarkable but he had a CT A/P which showed nodular hepatic contour and thrombus involving the posterior right portal vein branches. He had a subsequent MRI which demonstrated a 5.4 x 4.8 x 5.5 cm solitary segment 6/7 mass and likely tumor thrombus of the left portal vein (LR-TIV). His AFP was 21,374.27 also suggestive of HCC. CT chest without evidence of metastatic disease.    He denies prior knowledge of cirrhosis but, per chrat review, he was sseen to have hepatic ndularity on a CT chest from 2018. He reports a longstanding history of fatty liver disease. He has had a full serologic evaluation which has been unremarkable for competing causes of liver disease aside from a positive HBV core total antibody suggestive of a past, self cleared HBV infection. Denies EtOH use.     He has been seen by medical oncology and started on durvalumab + tremelimumab for locally advanced HCC. His case was also reviewed at [facility]'s liver tumor board and was recommended he also be referred to IR to discuss Y90. He is scheduled for a surveillance CT chest abdomen and pelvis 12 weeks after the initiation of his immunotherapy to assess response.    MELD 3.0: 8 at 9/26/2024  2:51 PM  MELD-Na: 7 at 9/26/2024  2:51 PM  Calculated from:  Serum Creatinine: 0.99 mg/dL (Using min of 1 mg/dL) at 9/25/2024  5:35 AM  Serum Sodium: 136 mmol/L at 9/25/2024  5:35 AM  Total Bilirubin: 0.88 mg/dL (Using min of 1 mg/dL) at 9/25/2024  5:35 AM  Serum Albumin: 3.9 g/dL (Using max of 3.5 g/dL) at 9/25/2024  5:35 AM  INR(ratio): 1.08 at 9/26/2024  2:51 PM  Age at listing (hypothetical): 79 years  Sex: Male at 9/26/2024  2:51 PM      REVIEW OF SYSTEMS:    CONSTITUTIONAL: Denies any fever, chills, rigors, and weight loss.  HEENT: No earache or tinnitus. Denies hearing loss or visual  disturbances.  CARDIOVASCULAR: No chest pain or palpitations.   RESPIRATORY: Denies any cough, hemoptysis, shortness of breath or dyspnea on exertion.  GASTROINTESTINAL: As noted in the History of Present Illness.   GENITOURINARY: No problems with urination. Denies any hematuria or dysuria.  NEUROLOGIC: No dizziness or vertigo, denies headaches.   MUSCULOSKELETAL: Denies any muscle or joint pain.   SKIN: Denies skin rashes or itching.   ENDOCRINE: Denies excessive thirst. Denies intolerance to heat or cold.  PSYCHOSOCIAL: Denies depression or anxiety. Denies any recent memory loss.       Historical Information   Past Medical History:   Diagnosis Date    Coronary artery disease without angina pectoris 02/25/2020    Diabetes mellitus (HCC)     DVT (deep venous thrombosis) (HCC)     GERD (gastroesophageal reflux disease)     Hypertension     Iron deficiency anemia      Past Surgical History:   Procedure Laterality Date    ANGIOPLASTY  10/19/2021    bilateral    CARDIAC CATHETERIZATION  2013    calif    CABG  x4    COLONOSCOPY      COLONOSCOPY      CORONARY ANGIOPLASTY WITH STENT PLACEMENT  01/01/2010    CORONARY ARTERY BYPASS GRAFT      FEMORAL ARTERY STENT      FEMORAL BYPASS  10/20/2021    IR LOWER EXTREMITY ANGIOGRAM  2/14/2023    IR PELVIC ANGIOGRAM  5/6/2022    AK SLCTV CATHJ 3RD+ ORD SLCTV ABDL PEL/LXTR BRNCH  5/6/2022    Procedure: ULTRASOUND-GUIDED LEFT BRACHIAL ARTERY PUNCTURE, AORTOGRAM, RIGHT COMMON ILIAC ARTERY ANGIOPLASTY WITH 8 X 40 MM BALLOON, ANGIOPLASTY OF RIGHT EXTERNAL AND COMMON ILIAC ARTERY STENOSIS WITH 6 X 150 MM DRUG-ELUTING BALLOON, STENTING OF RIGHT DISTAL EXTERNAL ILIAC ARTERY STENOSIS WITH 7 X 40 MM SELF EXPANDING STENT POST DILATED WITH A 6 MM BALLOON.;  Surgeon: Henry Pena MD;  [place]    AK SLCTV CATHJ 3RD+ ORD SLCTV ABDL PEL/LXTR BRNCH Bilateral 2/14/2023    Procedure: CUTDOWN FEM-FEM BYPASS WITH PRIMARY CLOSURE ACCESS SITE, BILATERAL RUN-OFF, LEFT FEM-POP 5X220 QUYEN AND 6X150 RODRI,  "ATTEMPT TO CROSS RIGHT SFA OCCLUSION;  Surgeon: Henry Pena MD;  Location: [facility];  Service: Vascular     Social History   Social History     Substance and Sexual Activity   Alcohol Use Not Currently    Alcohol/week: 0.0 standard drinks of alcohol     Social History     Substance and Sexual Activity   Drug Use No     Social History     Tobacco Use   Smoking Status Former    Current packs/day: 0.00    Average packs/day: 1.5 packs/day for 40.0 years (60.0 ttl pk-yrs)    Types: Cigarettes    Start date:     Quit date:     Years since quittin.8    Passive exposure: Past   Smokeless Tobacco Never   Tobacco Comments    quit --      Family History   Problem Relation Age of Onset    No Known Problems Mother     No Known Problems Father        Meds/Allergies       Current Outpatient Medications:     acetaminophen (TYLENOL) 500 mg tablet    atorvastatin (LIPITOR) 40 mg tablet    chlorhexidine (PERIDEX) 0.12 % solution    clopidogrel (PLAVIX) 75 mg tablet    DULoxetine (CYMBALTA) 30 mg delayed release capsule    Empagliflozin (Jardiance) 25 MG TABS    fenofibrate (TRICOR) 145 mg tablet    ferrous sulfate 325 (65 Fe) mg tablet    gabapentin (Neurontin) 300 mg capsule    glipiZIDE (GLUCOTROL) 10 mg tablet    hydrocortisone 2.5 % cream    ketorolac (ACULAR) 0.5 % ophthalmic solution    lisinopril-hydrochlorothiazide (PRINZIDE,ZESTORETIC) 20-12.5 MG per tablet    metFORMIN (GLUCOPHAGE) 1000 MG tablet    omeprazole (PriLOSEC) 20 mg delayed release capsule    moxifloxacin (VIGAMOX) 0.5 % ophthalmic solution    prednisoLONE acetate (PRED FORTE) 1 % ophthalmic suspension    tamsulosin (FLOMAX) 0.4 mg    tirzepatide (Mounjaro) 2.5 MG/0.5ML    No Known Allergies        Objective     Blood pressure 110/64, pulse 70, height 5' 5\" (1.651 m), weight 68 kg (150 lb), SpO2 99%. Body mass index is 24.96 kg/m².        PHYSICAL EXAM:      General Appearance:   Alert, cooperative, no distress; AAOx3, no asterixis "   HEENT:   Normocephalic, atraumatic, anicteric; No scleral icterus     Neck:  Supple, symmetrical, trachea midline   Lungs:   Clear to auscultation bilaterally; no rales, rhonchi or wheezing; respirations unlabored    Heart::   Regular rate and rhythm; no murmur, rub, or gallop.   Abdomen:   Soft, non-tender, non-distended; normal bowel sounds; no masses, no organomegaly    Genitalia:   Deferred    Rectal:   Deferred    Extremities:  No palmar erythema, cyanosis, clubbing or edema    Pulses:  2+ and symmetric    Skin:  No spider angiomas, jaundice, rashes, or lesions    Lymph nodes:  No palpable cervical lymphadenopathy        Lab Results:   No visits with results within 1 Day(s) from this visit.   Latest known visit with results is:   Appointment on 10/11/2024   Component Date Value    WBC 10/11/2024 6.23     RBC 10/11/2024 4.86     Hemoglobin 10/11/2024 14.7     Hematocrit 10/11/2024 46.6     MCV 10/11/2024 96     MCH 10/11/2024 30.2     MCHC 10/11/2024 31.5     RDW 10/11/2024 14.8     MPV 10/11/2024 10.4     Platelets 10/11/2024 282     nRBC 10/11/2024 0     Segmented % 10/11/2024 70     Immature Grans % 10/11/2024 1     Lymphocytes % 10/11/2024 17     Monocytes % 10/11/2024 7     Eosinophils Relative 10/11/2024 4     Basophils Relative 10/11/2024 1     Absolute Neutrophils 10/11/2024 4.42     Absolute Immature Grans 10/11/2024 0.03     Absolute Lymphocytes 10/11/2024 1.07     Absolute Monocytes 10/11/2024 0.42     Eosinophils Absolute 10/11/2024 0.24     Basophils Absolute 10/11/2024 0.05     Sodium 10/11/2024 134 (L)     Potassium 10/11/2024 4.1     Chloride 10/11/2024 99     CO2 10/11/2024 24     ANION GAP 10/11/2024 11     BUN 10/11/2024 31 (H)     Creatinine 10/11/2024 1.12     Glucose 10/11/2024 239 (H)     Calcium 10/11/2024 9.7     AST 10/11/2024 25     ALT 10/11/2024 24     Alkaline Phosphatase 10/11/2024 89     Total Protein 10/11/2024 7.7     Albumin 10/11/2024 4.3     Total Bilirubin 10/11/2024 0.59      eGFR 10/11/2024 61     TSH 3RD GENERATON 10/11/2024 4.703 (H)     Amylase 10/11/2024 33     Lipase 10/11/2024 27     Free T4 10/11/2024 0.78          Radiology Results:   No results found.    Amada Brown PA-C     **Please note: Dictation voice to text software may have been used in the creation of this record. Occasional wrong word or “sound alike” substitutions may have occurred due to the inherent limitations of voice recognition software. Read the chart carefully and recognize, using context, where substitutions have occurred.**

## 2024-11-05 RX ORDER — SODIUM CHLORIDE 9 MG/ML
20 INJECTION, SOLUTION INTRAVENOUS ONCE
Status: CANCELLED | OUTPATIENT
Start: 2024-11-12

## 2024-11-07 ENCOUNTER — OFFICE VISIT (OUTPATIENT)
Dept: HEMATOLOGY ONCOLOGY | Facility: CLINIC | Age: 79
End: 2024-11-07
Payer: MEDICARE

## 2024-11-07 VITALS
TEMPERATURE: 97.2 F | HEIGHT: 65 IN | BODY MASS INDEX: 24.99 KG/M2 | WEIGHT: 150 LBS | HEART RATE: 70 BPM | OXYGEN SATURATION: 97 % | SYSTOLIC BLOOD PRESSURE: 124 MMHG | RESPIRATION RATE: 16 BRPM | DIASTOLIC BLOOD PRESSURE: 60 MMHG

## 2024-11-07 DIAGNOSIS — C22.0 HEPATOCELLULAR CARCINOMA (HCC): Primary | ICD-10-CM
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
## 2024-11-07 DIAGNOSIS — D50.9 IRON DEFICIENCY ANEMIA, UNSPECIFIED IRON DEFICIENCY ANEMIA TYPE: ICD-10-CM

## 2024-11-07 DIAGNOSIS — L27.0 DRUG RASH: ICD-10-CM

## 2024-11-07 PROCEDURE — 99215 OFFICE O/P EST HI 40 MIN: CPT | Performed by: INTERNAL MEDICINE

## 2024-11-07 PROCEDURE — G2211 COMPLEX E/M VISIT ADD ON: HCPCS | Performed by: INTERNAL MEDICINE

## 2024-11-07 RX ORDER — HYDROCORTISONE 25 MG/G
CREAM TOPICAL 2 TIMES DAILY PRN
Qty: 30 G | Refills: 3 | Status: SHIPPED | OUTPATIENT
Start: 2024-11-07

## 2024-11-07 RX ORDER — HYDROCORTISONE 25 MG/G
CREAM TOPICAL 2 TIMES DAILY PRN
Qty: 30 G | Refills: 3 | Status: SHIPPED | OUTPATIENT
Start: 2024-11-07 | End: 2024-11-07

## 2024-11-08 ENCOUNTER — APPOINTMENT (OUTPATIENT)
Dept: LAB | Facility: CLINIC | Age: 79
End: 2024-11-08
Payer: MEDICARE

## 2024-11-08 DIAGNOSIS — R39.11 BENIGN PROSTATIC HYPERPLASIA WITH URINARY HESITANCY: ICD-10-CM

## 2024-11-08 DIAGNOSIS — N40.1 BENIGN PROSTATIC HYPERPLASIA WITH URINARY HESITANCY: ICD-10-CM

## 2024-11-08 DIAGNOSIS — I10 ESSENTIAL HYPERTENSION: ICD-10-CM

## 2024-11-08 DIAGNOSIS — C22.0 HEPATOCELLULAR CARCINOMA (HCC): ICD-10-CM

## 2024-11-08 DIAGNOSIS — Z79.899 ENCOUNTER FOR LONG-TERM CURRENT USE OF HIGH RISK MEDICATION: ICD-10-CM

## 2024-11-08 DIAGNOSIS — D50.9 IRON DEFICIENCY ANEMIA, UNSPECIFIED IRON DEFICIENCY ANEMIA TYPE: ICD-10-CM

## 2024-11-08 DIAGNOSIS — E11.51 TYPE 2 DIABETES MELLITUS WITH DIABETIC PERIPHERAL ANGIOPATHY WITHOUT GANGRENE, WITH LONG-TERM CURRENT USE OF INSULIN (HCC): ICD-10-CM

## 2024-11-08 DIAGNOSIS — Z79.4 TYPE 2 DIABETES MELLITUS WITH DIABETIC PERIPHERAL ANGIOPATHY WITHOUT GANGRENE, WITH LONG-TERM CURRENT USE OF INSULIN (HCC): ICD-10-CM

## 2024-11-08 LAB
ALBUMIN SERPL BCG-MCNC: 4.3 G/DL (ref 3.5–5)
ALP SERPL-CCNC: 78 U/L (ref 34–104)
ALT SERPL W P-5'-P-CCNC: 32 U/L (ref 7–52)
AMYLASE SERPL-CCNC: 148 IU/L (ref 29–103)
ANION GAP SERPL CALCULATED.3IONS-SCNC: 11 MMOL/L (ref 4–13)
AST SERPL W P-5'-P-CCNC: 22 U/L (ref 13–39)
BASOPHILS # BLD AUTO: 0.04 THOUSANDS/ÂΜL (ref 0–0.1)
BASOPHILS NFR BLD AUTO: 1 % (ref 0–1)
BILIRUB SERPL-MCNC: 0.79 MG/DL (ref 0.2–1)
BUN SERPL-MCNC: 25 MG/DL (ref 5–25)
CALCIUM SERPL-MCNC: 9.7 MG/DL (ref 8.4–10.2)
CHLORIDE SERPL-SCNC: 100 MMOL/L (ref 96–108)
CO2 SERPL-SCNC: 24 MMOL/L (ref 21–32)
CREAT SERPL-MCNC: 1.05 MG/DL (ref 0.6–1.3)
EOSINOPHIL # BLD AUTO: 0.45 THOUSAND/ÂΜL (ref 0–0.61)
EOSINOPHIL NFR BLD AUTO: 6 % (ref 0–6)
ERYTHROCYTE [DISTWIDTH] IN BLOOD BY AUTOMATED COUNT: 16.6 % (ref 11.6–15.1)
GFR SERPL CREATININE-BSD FRML MDRD: 66 ML/MIN/1.73SQ M
GLUCOSE P FAST SERPL-MCNC: 139 MG/DL (ref 65–99)
HCT VFR BLD AUTO: 41.6 % (ref 36.5–49.3)
HGB BLD-MCNC: 13.5 G/DL (ref 12–17)
IMM GRANULOCYTES # BLD AUTO: 0.02 THOUSAND/UL (ref 0–0.2)
IMM GRANULOCYTES NFR BLD AUTO: 0 % (ref 0–2)
LIPASE SERPL-CCNC: 380 U/L (ref 11–82)
LYMPHOCYTES # BLD AUTO: 1.14 THOUSANDS/ÂΜL (ref 0.6–4.47)
LYMPHOCYTES NFR BLD AUTO: 16 % (ref 14–44)
MCH RBC QN AUTO: 29.9 PG (ref 26.8–34.3)
MCHC RBC AUTO-ENTMCNC: 32.5 G/DL (ref 31.4–37.4)
MCV RBC AUTO: 92 FL (ref 82–98)
MONOCYTES # BLD AUTO: 0.58 THOUSAND/ÂΜL (ref 0.17–1.22)
MONOCYTES NFR BLD AUTO: 8 % (ref 4–12)
NEUTROPHILS # BLD AUTO: 4.8 THOUSANDS/ÂΜL (ref 1.85–7.62)
NEUTS SEG NFR BLD AUTO: 69 % (ref 43–75)
NRBC BLD AUTO-RTO: 0 /100 WBCS
PLATELET # BLD AUTO: 173 THOUSANDS/UL (ref 149–390)
PMV BLD AUTO: 10 FL (ref 8.9–12.7)
POTASSIUM SERPL-SCNC: 4.2 MMOL/L (ref 3.5–5.3)
PROT SERPL-MCNC: 7.8 G/DL (ref 6.4–8.4)
RBC # BLD AUTO: 4.51 MILLION/UL (ref 3.88–5.62)
SODIUM SERPL-SCNC: 135 MMOL/L (ref 135–147)
TSH SERPL DL<=0.05 MIU/L-ACNC: 4.13 UIU/ML (ref 0.45–4.5)
WBC # BLD AUTO: 7.03 THOUSAND/UL (ref 4.31–10.16)

## 2024-11-08 PROCEDURE — 82150 ASSAY OF AMYLASE: CPT

## 2024-11-08 PROCEDURE — 80053 COMPREHEN METABOLIC PANEL: CPT

## 2024-11-08 PROCEDURE — 83690 ASSAY OF LIPASE: CPT

## 2024-11-08 PROCEDURE — 85025 COMPLETE CBC W/AUTO DIFF WBC: CPT

## 2024-11-08 PROCEDURE — 36415 COLL VENOUS BLD VENIPUNCTURE: CPT

## 2024-11-08 PROCEDURE — 84443 ASSAY THYROID STIM HORMONE: CPT

## 2024-11-08 RX ORDER — TAMSULOSIN HYDROCHLORIDE 0.4 MG/1
0.4 CAPSULE ORAL
Qty: 90 CAPSULE | Refills: 1 | Status: SHIPPED | OUTPATIENT
Start: 2024-11-08

## 2024-11-08 RX ORDER — LISINOPRIL AND HYDROCHLOROTHIAZIDE 12.5; 2 MG/1; MG/1
1 TABLET ORAL DAILY
Qty: 90 TABLET | Refills: 1 | Status: SHIPPED | OUTPATIENT
Start: 2024-11-08

## 2024-11-08 NOTE — TELEPHONE ENCOUNTER
Patient states he spoke with someone at the pharmacy about his additional refill of the lisinopril-HCTZ and was told that there are no refills remaining and a new script is needed.     Reason for call:   [x] Refill   [] Prior Auth  [] Other:     Office:   [x] PCP/Provider -   [] Specialty/Provider -     Medication:   lisinopril-hydrochlorothiazide (PRINZIDE,ZESTORETIC) 20-12.5 MG/TAKE 1 TABLET BY MOUTH DAILY     tamsulosin (FLOMAX) 0.4 mg/TAKE 1 CAPSULE BY MOUTH EVERY DAY WITH DINNER     Quantity: 90    Pharmacy: [id number]  KATE PADILLA [address]     Does the patient have enough for 3 days?   [] Yes   [x] No - Send as HP to POD

## 2024-11-11 ENCOUNTER — TELEPHONE (OUTPATIENT)
Dept: HEMATOLOGY ONCOLOGY | Facility: CLINIC | Age: 79
End: 2024-11-11

## 2024-11-11 DIAGNOSIS — C22.0 HEPATOCELLULAR CARCINOMA (HCC): Primary | ICD-10-CM

## 2024-11-11 NOTE — TELEPHONE ENCOUNTER
Call out to patient in regard to recommendations from Dr. Angeles to assess if patient has any symptoms. Patient stated he has a rash that is being taken care of.    Patient declines any changes in appetite, nausea/vomiting, diarrhea, abdominal pain or bloating.  Patient concerned about recent lab levels and asking if he is to be treated tomorrow.     Patient stated that his blood sugar has been around 170-180. Recommended to call pcp in regard to concern.

## 2024-11-11 NOTE — TELEPHONE ENCOUNTER
Call out to patient, patient still declined any symptoms at this time. Patient able to be treated per Dr. Angeles. Encouraged patient to call if any symptoms arise. Patient agreeable. Reviewed to have labs repeated next week. Communication and okay to treat order placed in treatment plan for 11/12/24.

## 2024-11-12 ENCOUNTER — PATIENT OUTREACH (OUTPATIENT)
Dept: HEMATOLOGY ONCOLOGY | Facility: CLINIC | Age: 79
End: 2024-11-12

## 2024-11-12 ENCOUNTER — HOSPITAL ENCOUNTER (OUTPATIENT)
Dept: INFUSION CENTER | Facility: CLINIC | Age: 79
Discharge: HOME | End: 2024-11-12
Payer: MEDICARE

## 2024-11-12 ENCOUNTER — TELEPHONE (OUTPATIENT)
Age: 79
End: 2024-11-12

## 2024-11-12 VITALS
DIASTOLIC BLOOD PRESSURE: 72 MMHG | RESPIRATION RATE: 16 BRPM | TEMPERATURE: 97.7 F | WEIGHT: 150.79 LBS | HEIGHT: 65 IN | BODY MASS INDEX: 25.12 KG/M2 | SYSTOLIC BLOOD PRESSURE: 145 MMHG | HEART RATE: 62 BPM

## 2024-11-12 DIAGNOSIS — C22.0 HEPATOCELLULAR CARCINOMA (HCC): Primary | ICD-10-CM

## 2024-11-12 PROCEDURE — 96413 CHEMO IV INFUSION 1 HR: CPT

## 2024-11-12 RX ORDER — SODIUM CHLORIDE 9 MG/ML
20 INJECTION, SOLUTION INTRAVENOUS ONCE
Status: COMPLETED | OUTPATIENT
Start: 2024-11-12 | End: 2024-11-12

## 2024-11-12 RX ADMIN — SODIUM CHLORIDE 20 ML/HR: 0.9 INJECTION, SOLUTION INTRAVENOUS at 10:49

## 2024-11-12 RX ADMIN — DURVALUMAB 1500 MG: 500 INJECTION, SOLUTION INTRAVENOUS at 11:21

## 2024-11-12 NOTE — TELEPHONE ENCOUNTER
Received a phone call from patient.  Patient had also called GI inquiring about Y90.  Patient expressed frustration in that he is not aware of this treatment and this has not been discussed with him.  Patient is scheduled for virtual visit with IR on 11/25.  Patient is requesting a call from Dr. Angeles to discuss.  Patient stated that he will come in for an appointment if need be but would like a call back today.

## 2024-11-12 NOTE — TELEPHONE ENCOUNTER
Patients GI provider: KATE Brown     Number to return call: [phone]    Reason for call: Pt called in regard to a phone call pt received to schedule an appointment from a referral. Pt is asking for a call from Amada for some clarification about the referral.     Scheduled procedure/appointment date if applicable:  1/15/2025

## 2024-11-12 NOTE — TELEPHONE ENCOUNTER
Call out to patient in regard to questions and concerns. Reviewed that Dr. Angeles is out of office for the rest of the day and offered patient a virtual appointment for tomorrow 11/13/24 at 0930 am. Patient would like in person visit. Patient agreeable and appreciative of call.

## 2024-11-12 NOTE — PROGRESS NOTES
Patient tolerated treatment without incident. AVS printed and given to patient. Patient is aware of appointment on 12/10/24 at 10:30pm.

## 2024-11-12 NOTE — TELEPHONE ENCOUNTER
Patient contacted our office to clarify why he was contacted by IR to schedule appointment.   Reviewed 11/4 office visit note with patient and patient's wife. Patient discussed at liver tumor board and referral to IR to discuss Y90 was recommended.   Patient is agreeable to schedule IR appointment to discuss but is requesting appointment when his daughter is in town 1122-12/2/24. He is aware I will follow up with IR regarding his request.    Spoke to [facility] . Message sent to IR clinic scheduler to contact patient to discuss/ schedule.

## 2024-11-13 ENCOUNTER — OFFICE VISIT (OUTPATIENT)
Dept: HEMATOLOGY ONCOLOGY | Facility: CLINIC | Age: 79
End: 2024-11-13
Payer: MEDICARE

## 2024-11-13 VITALS
OXYGEN SATURATION: 98 % | HEART RATE: 81 BPM | TEMPERATURE: 96.8 F | BODY MASS INDEX: 25.33 KG/M2 | SYSTOLIC BLOOD PRESSURE: 128 MMHG | WEIGHT: 152 LBS | RESPIRATION RATE: 16 BRPM | HEIGHT: 65 IN | DIASTOLIC BLOOD PRESSURE: 70 MMHG

## 2024-11-13 DIAGNOSIS — C22.0 HEPATOCELLULAR CARCINOMA (HCC): ICD-10-CM

## 2024-11-13 DIAGNOSIS — R74.8 ELEVATED LIPASE: Primary | ICD-10-CM

## 2024-11-13 PROCEDURE — G2211 COMPLEX E/M VISIT ADD ON: HCPCS | Performed by: INTERNAL MEDICINE

## 2024-11-13 PROCEDURE — 99215 OFFICE O/P EST HI 40 MIN: CPT | Performed by: INTERNAL MEDICINE

## 2024-11-13 NOTE — PROGRESS NOTES
Hematology Outpatient Office Note    Date of Service: 11/13/2024    [facility] HEMATOLOGY SPECIALISTS [facility]  [address]  [facility] [address]  [phone]    Reason for Consultation:   Chief Complaint   Patient presents with    Follow-up       Referral Physician: No ref. provider found    Primary Care Physician:  DO Maddi Quinonez: Daughter     Taylor: wife    Sallie: daughter in law    ASSESSMENT & PLAN     No diagnosis found.        This is a 79 y.o. c PMHx notable for cecal AVM/GAVE, PE, DM, DVT, HTN, HLP, CAD, being seen in consultation for chronic ANG and locally advanced HCC     Iron Deficiency anemia: improved s/p IV iron  Low iron levels can cause anemia (low red blood cells). Low iron levels can also make people feel poorly, even before anemia starts. Iron is used to make red blood cells. If blood is lost from the body, if iron can't be absorbed from food, or if something removes iron (pregnancy) the iron can be low and then anemia happens.    Hx Cecal AVM. GI thought maybe GAVE  8/29/2022: Hgb 9.5, WBC 6.35k, MCV 91, plt 235k, retic 3.65%, FOBT + (8/31), ferritin 7, iron 30, Fe Sat 9%, TIBC 346  9/7 EGD/C-scope: internal hemorrhoids    Capsule endoscopy: no active bleeding  6/27/2023 EGD with single balloon enteroscopy revealed no bleeding    IrAE drug induced rash: on hydrocortisone 2.5% to good effect    Iron deficiency is very common. Low iron can cause fatigue or tiredness, the desire to eat ice or non-food items like corn starch or dry pasta noodles, restless legs, weak fingernails, cracks at the corner of the mouth.     Common causes of iron deficiency include inadequate diet (uncommon, usually vegetarians), gastric bypass surgery (very common), pregnancy (very common), periods (most common cause in younger women), blood loss (usually from the stomach or intestines), and decreased iron absorption from the intestines from gastritis, H pylori, acid blocking  medicines or celiac disease. Even a normal menstrual period can cause iron deficiency. In 10% of patients a clear cause of low iron is not found. 14% of women will have iron deficiency just from their menstrual period.       Iron deficiency is treated with pills as a first step. For some people the iron pills do not work, cause severe side effects, or take too long to work: for these people IV iron can be given. You may only require IV iron very rarely, for example only when pregnant, or from 1-2 times a year based on your rate of need. It takes 4 weeks for IV iron to improve the anemia to maximum potential. If your fatigue is not from the low iron, getting iron treatment would not help the fatigue.    9/24/2024 CT Abd/pelv w/c: Nodular liver contour, indicated of cirrhosis. Portal vein thrombosis involving posterior branch of the right hepatic lobe  MRI Abd w/wo c: Approximately 5.4 x 4.8 x 5.5 cm (length X depth X width) solitary segment 6/7 mass  9/25/2024: AFP 21,374  9/25/2024: CT Chest w/c: Emphysema with areas of fibrosis/scarring which appears stable compared to study of 11/15/2019  10/11/2024 Liver tumor board interventional and radiology do think he would be a good candidate for Y90 treatment concurrently with immunotherapy          Discussion of decision making    I personally reviewed the following lab results, the image studies, pathology, other specialty/physicians consult notes and recommendations, and outside medical records from [facility]/other institutions. I had a lengthy discussion with the patient and shared the work-up findings. We discussed the diagnosis and management plan as below. I spent 41 minutes reviewing the records (labs, clinician notes, outside records, medical history, ordering medicine/tests/procedures, interpreting the imaging/labs previously done) and coordination of care as well as direct time with the patient today, of which greater than 50% of the time was spent in counseling and  coordination of care with the patient/family.    Plan/Labs  He is back on PO iron MWF   Cont to f/u GI  Refilling Hydrocortisone 2.5% cream for his drug rash  Recheck lipase in a week for his asymptomatic elevation  Restaging CT CAP w/c scheduled 12/30/2024  Infusion chairs ordered for Durvalumab + Tremelimumab for advanced HCC, C2 coming up with preceding labs  Eventual goal is liver directed therapy if we get a ID          Follow Up: q4 weeks scheduled thru 1/2/2025, more*    All questions were answered to the patient's satisfaction during this encounter. The patient knows the contact information for our office and knows to reach out for any relevant concerns related to this encounter. They are to call for any temperature 100.4 or higher, new symptoms including but not restricted to shaking chills, decreased appetite, nausea, vomiting, diarrhea, increased fatigue, shortness of breath or chest pain, confusion, and not feeling the strength to come to the clinic. For all other listed problems and medical diagnosis in their chart - they are managed by PCP and/or other specialists, which the patient acknowledges. Thank you very much for your consultation and making us a part of this patient's care. We are continuing to follow closely with you. Please do not hesitate to reach out to me with any additional questions or concerns.    Mal Angeles MD  Hematology & Medical Oncology Staff Physician             Disclaimer: This document was prepared using [employer] technology. If a word or phrase is confusing, or does not make sense, this is likely due to recognition error which was not discovered during this clinician's review. If you believe an error has occurred, please contact me through HemOn service line for dequan?cation.      HEMATOLOGICAL HISTORY OF PRESENT ILLNESS      Clotting History None   Bleeding History GAVE related bleeding   Cancer History None   Family Cancer History Father (cancer)   H/O  Blood/Plt Transfusion PRBC years ago   Tobacco/etoh/drug abuse 2 PPDx 44 years, quit 2004, no etoh abuse or rec drug use       Cancer Screening history n/a   Occupation Own restaurants, pizza places      8/29/2022: Hgb 9.5, WBC 6.35k, MCV 91, plt 235k, retic 3.65%, FOBT + (8/31), ferritin 7, iron 30, Fe Sat 9%, TIBC 346  9/7 EGD/C-scope: internal hemorrhoids  10/3/2022-10/9/2022: 5 iV Venofer 200mg administered   11/28/2022: ferritin 26, iron 67, Fe Sat 21%, TIBC 322, Hgb 12.7 (now normal)  5/2/2023: Discussed with the patient the results of his iron studies which show worsening iron deficiency as well as recurrent anemia.  I ordered 600 mg of IV Venofer  5/18/2023: ferritin 7, iron 36, Fe Sat 10%, TIBC 364, Hgb 11.6  6/29/2023: ferritin 37, iron 52, Fe Sat 17%, TIBC 307, Hgb 12.7, MCV 92  9/20/2023:  ferritin 58, iron 65, Fe Sat 33%, TIBC 230, Hgb 13.5, MCV 95  2/14/2024: iron 42, ferritin 22, Fe Sat 14%, TIBC 304: Vit B12 632, Hgb 13.4, plt 161k, WBC 6.21k  4/18/2024: iron 59, Fe Sat 18%, TIBC 329, ferritin 21, Hgb 13.4 (2/2024)  4/29/2024: planned IV Venofer 200mg x2  10/2/2024: Ferritin: 224, Hgb 13.6, Fe Sat 13%, TIBC 208, iron 26, ferritin 224 AOCI  11/8/2024: Lipase: 380 (4x ULN)  SUBJECTIVE  (INTERVAL HISTORY)      Interval events: He has minimal fatigue from the treatment, has mild rash over R anterior leg and posterior lower back. Is using a moisturizer.  No abd pain.    I have reviewed the relevant past medical, surgical, social and family history. I have also reviewed allergies and medications for this patient.    Review of Systems    Baseline weight: 152-154 lbs    Denies weight loss, F/C, N/V, SOB, CP, LH, HA.    He has had fatigue and low energy since 6/2022. He was chewing on ice all day since 3/2022.     A 10-point review of system was performed, pertinent positive and negative were detailed as above. Otherwise, the 10-point review of system was negative.      Past Medical History:   Diagnosis Date     Coronary artery disease without angina pectoris 02/25/2020    Diabetes mellitus (HCC)     DVT (deep venous thrombosis) (HCC)     GERD (gastroesophageal reflux disease)     Hypertension     Iron deficiency anemia        Past Surgical History:   Procedure Laterality Date    ANGIOPLASTY  10/19/2021    bilateral    CARDIAC CATHETERIZATION  2013    calif    CABG  x4    COLONOSCOPY      COLONOSCOPY      CORONARY ANGIOPLASTY WITH STENT PLACEMENT  01/01/2010    CORONARY ARTERY BYPASS GRAFT      FEMORAL ARTERY STENT      FEMORAL BYPASS  10/20/2021    IR LOWER EXTREMITY ANGIOGRAM  2/14/2023    IR PELVIC ANGIOGRAM  5/6/2022    [place] SLCTV CATHJ 3RD+ ORD SLCTV ABDL PEL/LXTR BRNCH  5/6/2022    Procedure: ULTRASOUND-GUIDED LEFT BRACHIAL ARTERY PUNCTURE, AORTOGRAM, RIGHT COMMON ILIAC ARTERY ANGIOPLASTY WITH 8 X 40 MM BALLOON, ANGIOPLASTY OF RIGHT EXTERNAL AND COMMON ILIAC ARTERY STENOSIS WITH 6 X 150 MM DRUG-ELUTING BALLOON, STENTING OF RIGHT DISTAL EXTERNAL ILIAC ARTERY STENOSIS WITH 7 X 40 MM SELF EXPANDING STENT POST DILATED WITH A 6 MM BALLOON.;  Surgeon: Henry Pena MD;  [place]    KS SLCTV CATHJ 3RD+ ORD SLCTV ABDL PEL/LXTR BRNCH Bilateral 2/14/2023    Procedure: CUTDOWN FEM-FEM BYPASS WITH PRIMARY CLOSURE ACCESS SITE, BILATERAL RUN-OFF, LEFT FEM-POP 5X220 QUYEN AND 6X150 RODRI, ATTEMPT TO CROSS RIGHT SFA OCCLUSION;  Surgeon: Henry Pena MD;  Location: [facility] OR;  Service: Vascular       Family History   Problem Relation Age of Onset    No Known Problems Mother     No Known Problems Father        Social History     Socioeconomic History    Marital status: /Civil Union     Spouse name: Not on file    Number of children: Not on file    Years of education: Not on file    Highest education level: Not on file   Occupational History    Not on file   Tobacco Use    Smoking status: Former     Current packs/day: 0.00     Average packs/day: 1.5 packs/day for 40.0 years (60.0 ttl pk-yrs)     Types: Cigarettes      Start date:      Quit date: 2004     Years since quittin.8     Passive exposure: Past    Smokeless tobacco: Never    Tobacco comments:     quit 2004--    Vaping Use    Vaping status: Never Used   Substance and Sexual Activity    Alcohol use: Not Currently     Alcohol/week: 0.0 standard drinks of alcohol    Drug use: No    Sexual activity: Not on file   Other Topics Concern    Not on file   Social History Narrative    · Most recent tobacco use screenin2020      Social Drivers of Health     Financial Resource Strain: Low Risk  (2023)    Overall Financial Resource Strain (CARDIA)     Difficulty of Paying Living Expenses: Not hard at all   Food Insecurity: Not on file   Transportation Needs: No Transportation Needs (2023)    PRAPARE - Transportation     Lack of Transportation (Medical): No     Lack of Transportation (Non-Medical): No   Physical Activity: Not on file   Stress: Not on file   Social Connections: Not on file   Intimate Partner Violence: Not on file   Housing Stability: Not on file       No Known Allergies    Current Outpatient Medications   Medication Sig Dispense Refill    acetaminophen (TYLENOL) 500 mg tablet Take 1,000 mg by mouth every 8 (eight) hours as needed      atorvastatin (LIPITOR) 40 mg tablet TAKE 1 TABLET DAILY 90 tablet 1    chlorhexidine (PERIDEX) 0.12 % solution Apply 15 mL to the mouth or throat 2 (two) times a day 473 mL 3    clopidogrel (PLAVIX) 75 mg tablet Take 1 tablet (75 mg total) by mouth daily 90 tablet 1    DULoxetine (CYMBALTA) 30 mg delayed release capsule TAKE ONE CAPSULE BY MOUTH TWICE DAILY 60 capsule 0    Empagliflozin (Jardiance) 25 MG TABS Take 1 tablet (25 mg total) by mouth every morning Also: take with LOTS of water 90 tablet 3    fenofibrate (TRICOR) 145 mg tablet Take 1 tablet (145 mg total) by mouth daily 90 tablet 3    ferrous sulfate 325 (65 Fe) mg tablet Take 1 tablet (325 mg total) by mouth 2 (two) times a day with meals 60 tablet 6     gabapentin (Neurontin) 300 mg capsule Take 1 capsule (300 mg total) by mouth 3 (three) times a day 90 capsule 6    glipiZIDE (GLUCOTROL) 10 mg tablet TAKE 1 TABLET TWICE A DAY  BEFORE MEALS 180 tablet 1    hydrocortisone 2.5 % cream Apply topically 2 (two) times a day as needed for rash 30 g 3    ketorolac (ACULAR) 0.5 % ophthalmic solution       lisinopril-hydrochlorothiazide (PRINZIDE,ZESTORETIC) 20-12.5 MG per tablet Take 1 tablet by mouth daily 90 tablet 1    metFORMIN (GLUCOPHAGE) 1000 MG tablet TAKE 1 TABLET TWICE DAILY  WITH MEALS 180 tablet 1    moxifloxacin (VIGAMOX) 0.5 % ophthalmic solution  (Patient not taking: Reported on 10/14/2024)      omeprazole (PriLOSEC) 20 mg delayed release capsule Take 1 capsule (20 mg total) by mouth daily before breakfast (Patient taking differently: Take 20 mg by mouth if needed) 30 capsule 5    prednisoLONE acetate (PRED FORTE) 1 % ophthalmic suspension       tamsulosin (FLOMAX) 0.4 mg Take 1 capsule (0.4 mg total) by mouth daily with dinner 90 capsule 1    tirzepatide (Mounjaro) 2.5 MG/0.5ML Inject 0.5 mL (2.5 mg total) under the skin every 7 days 2 mL 3     No current facility-administered medications for this visit.     Facility-Administered Medications Ordered in Other Visits   Medication Dose Route Frequency Provider Last Rate Last Admin    alteplase (CATHFLO) injection 2 mg  2 mg Intracatheter Q1MIN PRN Mal Angeles MD           (Not in a hospital admission)        Objective:     24 Hour Vitals Assessment:     Vitals:    11/13/24 0929   BP: 128/70   Pulse: 81   Resp: 16   Temp: (!) 96.8 °F (36 °C)   SpO2: 98%     Weight last visit 152 lbs  Weight today: 152 lbs      PHYSICIAN EXAM:    General: Appearance: alert, cooperative, no distress.  HEENT: Normocephalic, atraumatic. No scleral icterus. conjunctivae clear. EOMI.  Chest: No tenderness to palpation. No open wound noted.  Lungs: Clear to auscultation bilaterally, Respirations unlabored.  Cardiac: Regular  rate and rhythm, +S1and S2  Abdomen: Soft, non-tender, non-distended. Bowel sounds are normal.  Extremities:  No edema, cyanosis, clubbing.  Skin: Skin color, turgor are normal. Small R anterior leg rashes  Neurologic: Awake, Alert, and oriented, no gross focal deficits noted b/l.       DATA REVIEW:    Pathology Result:    Final Diagnosis   Date Value Ref Range Status   06/27/2023   Final    A. Duodenum, :  - Unremarkable duodenal mucosa  - No villous atrophy or increased intraepithelial lymphocytes seen     B. Stomach, :  - Gastric oxyntic and antral type mucosa with minimal or mild chronic inflammation  - No sharad shaped bacteria seen on H&E stained tissue section  - Negative for intestinal metaplasia and dysplasia          09/07/2022   Final    A. Duodenum,  biopsy:  - Duodenal mucosa with focal acute inflammation and reactive changes  - No intraepithelial lymphocytosis and no villous blunting.  - No epithelial dysplasia and no evidence of malignancy.    B.  Stomach, biopsy:  - Gastric antral and oxyntic mucosa with no significant pathologic alteration.  - Negative for intestinal metaplasia, dysplasia or carcinoma.  - No Helicobacter pylori is identified on H&E stained slide.              Image Results:   Image result are reviewed and documented in Hematology/Oncology history. I personally reviewed these images.    CT chest w contrast  Narrative: CT CHEST WITH IV CONTRAST    INDICATION: Liver mass - staging.    COMPARISON: Multiple priors most recently 6/1/2023    TECHNIQUE: CT examination of the chest was performed. Multiplanar 2D reformatted images were created from the source data.    This examination, like all CT scans performed in the [facility], was performed utilizing techniques to minimize radiation dose exposure, including the use of iterative reconstruction and automated exposure control. Radiation dose length   product (DLP) for this visit: 302.41 mGy-cm    IV Contrast: 55 mL of iohexol  (OMNIPAQUE)    FINDINGS:    LUNGS: Fibrotic changes at the lung apices with calcifications. Emphysema. Multiple areas of scarring are similar in appearance to CT of 11/15/2019. No suspicious nodules or masses.    PLEURA: Unremarkable.    HEART/GREAT VESSELS: Thoracic aortic and coronary artery atherosclerosis no thoracic aortic aneurysm.    MEDIASTINUM AND RICHARD: Unremarkable.    CHEST WALL AND LOWER NECK: Sternotomy wires.    VISUALIZED STRUCTURES IN THE UPPER ABDOMEN: See recent CT and MRI.    OSSEOUS STRUCTURES: Spinal degenerative changes are noted. No acute fracture or destructive osseous lesion.  Impression: Emphysema with areas of fibrosis/scarring which appears stable compared to study of 11/15/2019. No definite evidence of metastatic disease.    Workstation performed: [employer]      LABS:  Lab data are reviewed and documented in HemOnc history.       Lab Results   Component Value Date    HGB 13.5 11/08/2024    HCT 41.6 11/08/2024    MCV 92 11/08/2024     11/08/2024    WBC 7.03 11/08/2024    NRBC 0 11/08/2024     Lab Results   Component Value Date    K 4.2 11/08/2024     11/08/2024    CO2 24 11/08/2024    BUN 25 11/08/2024    CREATININE 1.05 11/08/2024    GLUF 139 (H) 11/08/2024    CALCIUM 9.7 11/08/2024    CORRECTEDCA 10.2 (H) 11/15/2021    AST 22 11/08/2024    ALT 32 11/08/2024    ALKPHOS 78 11/08/2024    EGFR 66 11/08/2024       Lab Results   Component Value Date    IRON 26 (L) 10/02/2024    TIBC 208 (L) 10/02/2024    FERRITIN 224 10/02/2024    FERRITIN 21 (L) 04/18/2024    FERRITIN 22 (L) 02/14/2024    FERRITIN 58 09/20/2023    FERRITIN 37 06/29/2023    FERRITIN 7 (L) 05/18/2023    FERRITIN 26 11/28/2022    FERRITIN 7 (L) 08/29/2022    FERRITIN 37 04/20/2022    FERRITIN 17 02/07/2022    FERRITIN 13 01/05/2022    FERRITIN 10 11/15/2021    FERRITIN 28 03/11/2021    FERRITIN 12 11/03/2020       Lab Results   Component Value Date    ICFSOXEV50 632 02/14/2024    GJPHLOJN53 223 09/20/2023     "ZBQXZFIH19 389 11/15/2021    LXLXBMZC14 681 05/29/2020       No results for input(s): \"WBC\", \"CREAT\", \"PLT\" in the last 72 hours.       By:  Mal Angeles, 11/13/2024, 10:05 AM                                    "

## 2024-11-13 NOTE — Clinical Note
Hi Dr. Martel,  The immunotherapy we have Max on is elevating his blood sugars. Just looking to make you aware in case you needed to modify any of his meds

## 2024-11-13 NOTE — PROGRESS NOTES
I reached out and spoke with Max now that consults have been completed with the oncology teams to review for any barriers to care and offer supportive services as needed. Distress Thermometer completed at this time. Patient scored 2/10. Based on responses to DT, no indication for referral to SW needed at this time. . I reviewed and updated the members assigned to the care team in [facility].   He knows the members of the care team as well as how and when to contact them with any needs.   He verbalizes managing the schedules well.   He is currently able to drive and denies any transportation needs.    He denies any uncontrolled symptoms. Discussed role of Palliative Care in symptom and side effect management. Declined referral at this time.  He states that he is eating and drinking as per usual with no unintentional weight loss.     Patient does not smoke.   He states he is well supported by family and friends.  Community support groups discussed including the Cancer Support Community of the [facility]. Patient declined information at this time.   He feels he has adequate insurance coverage and denies any financial concerns at this time. His only concern is with regard to his Trulicity for his diabetes. He states they want $400/mo for this medication. Message sent to his PCP regarding this    Based on individual needs I will follow up in about 4 weeks. I have provided my direct contact information and welcome them to contact me if needs as discussed above change. He was appreciative for the call.

## 2024-11-18 ENCOUNTER — APPOINTMENT (OUTPATIENT)
Dept: LAB | Facility: CLINIC | Age: 79
End: 2024-11-18
Payer: MEDICARE

## 2024-11-18 DIAGNOSIS — E08.22 DIABETES MELLITUS DUE TO UNDERLYING CONDITION WITH STAGE 3A CHRONIC KIDNEY DISEASE, WITHOUT LONG-TERM CURRENT USE OF INSULIN (HCC): ICD-10-CM

## 2024-11-18 DIAGNOSIS — Z79.899 MEDICATION MANAGEMENT: ICD-10-CM

## 2024-11-18 DIAGNOSIS — Z79.4 TYPE 2 DIABETES MELLITUS WITH DIABETIC PERIPHERAL ANGIOPATHY WITHOUT GANGRENE, WITH LONG-TERM CURRENT USE OF INSULIN (HCC): ICD-10-CM

## 2024-11-18 DIAGNOSIS — E11.51 TYPE 2 DIABETES MELLITUS WITH DIABETIC PERIPHERAL ANGIOPATHY WITHOUT GANGRENE, WITH LONG-TERM CURRENT USE OF INSULIN (HCC): ICD-10-CM

## 2024-11-18 DIAGNOSIS — N18.31 DIABETES MELLITUS DUE TO UNDERLYING CONDITION WITH STAGE 3A CHRONIC KIDNEY DISEASE, WITHOUT LONG-TERM CURRENT USE OF INSULIN (HCC): ICD-10-CM

## 2024-11-18 DIAGNOSIS — I74.3 EMBOLISM AND THROMBOSIS OF ARTERIES OF THE LOWER EXTREMITIES (HCC): ICD-10-CM

## 2024-11-18 DIAGNOSIS — C22.0 HEPATOCELLULAR CARCINOMA (HCC): ICD-10-CM

## 2024-11-18 DIAGNOSIS — N18.31 STAGE 3A CHRONIC KIDNEY DISEASE (HCC): ICD-10-CM

## 2024-11-18 LAB
ALBUMIN SERPL BCG-MCNC: 4.3 G/DL (ref 3.5–5)
ALP SERPL-CCNC: 72 U/L (ref 34–104)
ALT SERPL W P-5'-P-CCNC: 26 U/L (ref 7–52)
AMYLASE SERPL-CCNC: 140 IU/L (ref 29–103)
ANION GAP SERPL CALCULATED.3IONS-SCNC: 10 MMOL/L (ref 4–13)
AST SERPL W P-5'-P-CCNC: 20 U/L (ref 13–39)
BASOPHILS # BLD AUTO: 0.04 THOUSANDS/ÂΜL (ref 0–0.1)
BASOPHILS NFR BLD AUTO: 1 % (ref 0–1)
BILIRUB SERPL-MCNC: 0.82 MG/DL (ref 0.2–1)
BUN SERPL-MCNC: 25 MG/DL (ref 5–25)
CALCIUM SERPL-MCNC: 9.9 MG/DL (ref 8.4–10.2)
CHLORIDE SERPL-SCNC: 102 MMOL/L (ref 96–108)
CO2 SERPL-SCNC: 25 MMOL/L (ref 21–32)
CREAT SERPL-MCNC: 1.11 MG/DL (ref 0.6–1.3)
EOSINOPHIL # BLD AUTO: 0.58 THOUSAND/ÂΜL (ref 0–0.61)
EOSINOPHIL NFR BLD AUTO: 10 % (ref 0–6)
ERYTHROCYTE [DISTWIDTH] IN BLOOD BY AUTOMATED COUNT: 16.3 % (ref 11.6–15.1)
EST. AVERAGE GLUCOSE BLD GHB EST-MCNC: 157 MG/DL
GFR SERPL CREATININE-BSD FRML MDRD: 61 ML/MIN/1.73SQ M
GLUCOSE P FAST SERPL-MCNC: 156 MG/DL (ref 65–99)
HBA1C MFR BLD: 7.1 %
HCT VFR BLD AUTO: 42.6 % (ref 36.5–49.3)
HGB BLD-MCNC: 13.8 G/DL (ref 12–17)
IMM GRANULOCYTES # BLD AUTO: 0.01 THOUSAND/UL (ref 0–0.2)
IMM GRANULOCYTES NFR BLD AUTO: 0 % (ref 0–2)
LIPASE SERPL-CCNC: 302 U/L (ref 11–82)
LYMPHOCYTES # BLD AUTO: 1.23 THOUSANDS/ÂΜL (ref 0.6–4.47)
LYMPHOCYTES NFR BLD AUTO: 21 % (ref 14–44)
MCH RBC QN AUTO: 30.5 PG (ref 26.8–34.3)
MCHC RBC AUTO-ENTMCNC: 32.4 G/DL (ref 31.4–37.4)
MCV RBC AUTO: 94 FL (ref 82–98)
MONOCYTES # BLD AUTO: 0.47 THOUSAND/ÂΜL (ref 0.17–1.22)
MONOCYTES NFR BLD AUTO: 8 % (ref 4–12)
NEUTROPHILS # BLD AUTO: 3.44 THOUSANDS/ÂΜL (ref 1.85–7.62)
NEUTS SEG NFR BLD AUTO: 60 % (ref 43–75)
NRBC BLD AUTO-RTO: 0 /100 WBCS
PLATELET # BLD AUTO: 219 THOUSANDS/UL (ref 149–390)
PMV BLD AUTO: 10.1 FL (ref 8.9–12.7)
POTASSIUM SERPL-SCNC: 4 MMOL/L (ref 3.5–5.3)
PROT SERPL-MCNC: 7.3 G/DL (ref 6.4–8.4)
RBC # BLD AUTO: 4.53 MILLION/UL (ref 3.88–5.62)
SODIUM SERPL-SCNC: 137 MMOL/L (ref 135–147)
TSH SERPL DL<=0.05 MIU/L-ACNC: 3.54 UIU/ML (ref 0.45–4.5)
WBC # BLD AUTO: 5.77 THOUSAND/UL (ref 4.31–10.16)

## 2024-11-18 PROCEDURE — 83036 HEMOGLOBIN GLYCOSYLATED A1C: CPT

## 2024-11-18 PROCEDURE — 80053 COMPREHEN METABOLIC PANEL: CPT

## 2024-11-18 PROCEDURE — 85025 COMPLETE CBC W/AUTO DIFF WBC: CPT

## 2024-11-18 PROCEDURE — 36415 COLL VENOUS BLD VENIPUNCTURE: CPT

## 2024-11-18 PROCEDURE — 84443 ASSAY THYROID STIM HORMONE: CPT

## 2024-11-18 PROCEDURE — 82150 ASSAY OF AMYLASE: CPT

## 2024-11-18 PROCEDURE — 83690 ASSAY OF LIPASE: CPT

## 2024-11-19 ENCOUNTER — TELEPHONE (OUTPATIENT)
Age: 79
End: 2024-11-19

## 2024-11-19 NOTE — TELEPHONE ENCOUNTER
"Reviewed recommendations from Dr. Angeles  Patient is asking if there is any medication or anything that he can do to help the \"numbers.\"   He was unsure if he was told to take any new medications.    Reviewed with patient that his question and concern would be reviewed with Dr. Angeles. Patient asked for call back  "

## 2024-11-19 NOTE — TELEPHONE ENCOUNTER
"Provider:  Dr. Angeles    Patient calling in inquiring about his lab results he had completed yesterday. He states that the provider expressed concern about his labs and him needing to have them done and he would like to know what the results are as they \"are looking very bad to me\".      Max states he wishes to speak to Dr. Angeles directly, if possible, secondary to him telling him, \"he can call and request to speak to him at any time\".    I confirmed with that patient that we would have the provider take a look at his lab work and someone would return his call back. I confirmed with patient that he does have a follow up with the provider on 12/6. He has no additional questions or concerns at this moment.       "

## 2024-11-20 DIAGNOSIS — I73.9 PERIPHERAL VASCULAR DISEASE (HCC): ICD-10-CM

## 2024-11-20 RX ORDER — CLOPIDOGREL BISULFATE 75 MG/1
75 TABLET ORAL DAILY
Qty: 90 TABLET | Refills: 1 | Status: SHIPPED | OUTPATIENT
Start: 2024-11-20

## 2024-11-20 NOTE — TELEPHONE ENCOUNTER
Pt states he doesn't have any of this medication left despite what pharmacy says  Reason for call:   [x] Refill   [] Prior Auth  [] Other:     Office:   [x] PCP/Provider -   [] Specialty/Provider -     clopidogrel (PLAVIX) 75 mg tablet 75 mg, Oral, Daily       Quantity: 90    [facility]    Does the patient have enough for 3 days?   [] Yes   [x] No - Send as HP to POD

## 2024-11-20 NOTE — TELEPHONE ENCOUNTER
"Call out to patient and left vm with recommendations from Dr. Angeles, \"No new medications recommended, it is improving and I feel it will normalize soon\"  "

## 2024-11-22 ENCOUNTER — RESULTS FOLLOW-UP (OUTPATIENT)
Dept: FAMILY MEDICINE CLINIC | Facility: CLINIC | Age: 79
End: 2024-11-22

## 2024-11-25 ENCOUNTER — TELEMEDICINE (OUTPATIENT)
Dept: INTERVENTIONAL RADIOLOGY/VASCULAR | Facility: CLINIC | Age: 79
End: 2024-11-25
Payer: MEDICARE

## 2024-11-25 DIAGNOSIS — K74.60 CIRRHOSIS OF LIVER WITHOUT ASCITES, UNSPECIFIED HEPATIC CIRRHOSIS TYPE (HCC): ICD-10-CM

## 2024-11-25 DIAGNOSIS — C22.0 HEPATOCELLULAR CARCINOMA (HCC): ICD-10-CM

## 2024-11-25 PROCEDURE — 99203 OFFICE O/P NEW LOW 30 MIN: CPT | Performed by: RADIOLOGY

## 2024-11-25 NOTE — PROGRESS NOTES
Virtual Regular Visit  Name: Max Ozuna      : 1945      MRN: [id number]  Encounter Provider: Ziggy Umaña MD  Encounter Date: 2024   Encounter department: [facility] INTERVENTIONAL RADIOLOGY [place]      Verification of patient location:  Patient is located at Home in the following state in which I hold an active license [place] :  Assessment & Plan  Cirrhosis of liver without ascites, unspecified hepatic cirrhosis type (HCC)    Orders:    Ambulatory Referral to Interventional Radiology    Hepatocellular carcinoma (HCC)    Orders:    Ambulatory Referral to Interventional Radiology    79 year old male with history of cecal AVM/GAVE, PE, DM, DVT, HTN, HLD, CAD, and segment 6/7 HCC currently on immunotherapy.  MRI abdomen 2024 showed the HCC to measure 5.4 x 4.8 x 5.5 cm with invasion into an adjacent branch of right portal vein.  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  Patient also has bifemoral bypass.    Patient and daughter was advised that the celiac artery occlusion will make the procedure more challenging as tortuous collateral GDA vessels will need to be navigated to reach the right hepatic artery.  Complete cure of the HCC may not be possible if anything less than radiation segmentectomy doses are delivered to the tumor.  Plan will be to perform radiation segmentectomy to segments 6/7 of liver.  Procedure will be scheduled for Y90 mapping and Y90 therapy sessions in the near future.        Encounter provider Ziggy Umaña MD    The patient was identified by name and date of birth. Max Ozuna was informed that this is a telemedicine visit and that the visit is being conducted through the Epic Embedded platform. He agrees to proceed..  My office door was closed. No one else was in the room.  He acknowledged consent and understanding of privacy and security of the video platform. The patient has agreed to participate and  understands they can discontinue the visit at any time.    Patient is aware this is a billable service.     History of Present Illness     79 year old male with HCC involving segments 6 and 7 of liver is referred for Y90 therapy.    HPI  Review of Systems    Objective   There were no vitals taken for this visit.    Physical Exam  Constitutional:       Appearance: Normal appearance.   Neurological:      Mental Status: He is alert.       Visit Time  Total Visit Duration: 30 min

## 2024-11-26 NOTE — TELEPHONE ENCOUNTER
Max called stating he received a phone call from us. Confirmed with office, no call went out to this patient.     Max states he got he his lab results already.   No further action required.

## 2024-11-26 NOTE — PROGRESS NOTES
Hematology Outpatient Office Note    Date of Service: 12/6/2024    [facility]  [address]  [facility] [address]  [phone]    Reason for Consultation:   Chief Complaint   Patient presents with    Follow-up       Referral Physician: No ref. provider found    Primary Care Physician:  DO Maddi Quinonez: Daughter     Taylor: wife    Sallie: daughter in law    ASSESSMENT & PLAN      Diagnosis ICD-10-CM Associated Orders   1. Hepatocellular carcinoma (HCC)  C22.0               This is a 79 y.o. c PMHx notable for cecal AVM/GAVE, PE, DM, DVT, HTN, HLP, CAD, being seen in consultation for chronic ANG and locally advanced HCC     Iron Deficiency anemia: improved s/p IV iron  Low iron levels can cause anemia (low red blood cells). Low iron levels can also make people feel poorly, even before anemia starts. Iron is used to make red blood cells. If blood is lost from the body, if iron can't be absorbed from food, or if something removes iron (pregnancy) the iron can be low and then anemia happens.    Hx Cecal AVM. GI thought maybe GAVE  8/29/2022: Hgb 9.5, WBC 6.35k, MCV 91, plt 235k, retic 3.65%, FOBT + (8/31), ferritin 7, iron 30, Fe Sat 9%, TIBC 346  9/7 EGD/C-scope: internal hemorrhoids    Capsule endoscopy: no active bleeding  6/27/2023 EGD with single balloon enteroscopy revealed no bleeding    IrAE drug induced rash: on hydrocortisone 2.5% to good effect    Iron deficiency is very common. Low iron can cause fatigue or tiredness, the desire to eat ice or non-food items like corn starch or dry pasta noodles, restless legs, weak fingernails, cracks at the corner of the mouth.     Common causes of iron deficiency include inadequate diet (uncommon, usually vegetarians), gastric bypass surgery (very common), pregnancy (very common), periods (most common cause in younger women), blood loss (usually from the stomach or intestines), and decreased iron  absorption from the intestines from gastritis, H pylori, acid blocking medicines or celiac disease. Even a normal menstrual period can cause iron deficiency. In 10% of patients a clear cause of low iron is not found. 14% of women will have iron deficiency just from their menstrual period.       Iron deficiency is treated with pills as a first step. For some people the iron pills do not work, cause severe side effects, or take too long to work: for these people IV iron can be given. You may only require IV iron very rarely, for example only when pregnant, or from 1-2 times a year based on your rate of need. It takes 4 weeks for IV iron to improve the anemia to maximum potential. If your fatigue is not from the low iron, getting iron treatment would not help the fatigue.    9/24/2024 CT Abd/pelv w/c: Nodular liver contour, indicated of cirrhosis. Portal vein thrombosis involving posterior branch of the right hepatic lobe  MRI Abd w/wo c: Approximately 5.4 x 4.8 x 5.5 cm (length X depth X width) solitary segment 6/7 mass  9/25/2024: AFP 21,374  9/25/2024: CT Chest w/c: Emphysema with areas of fibrosis/scarring which appears stable compared to study of 11/15/2019  10/11/2024 Liver tumor board interventional and radiology do think he would be a good candidate for Y90 treatment concurrently with immunotherapy          Discussion of decision making    I personally reviewed the following lab results, the image studies, pathology, other specialty/physicians consult notes and recommendations, and outside medical records from [facility]/other institutions. I had a lengthy discussion with the patient and shared the work-up findings. We discussed the diagnosis and management plan as below. I spent 42 minutes reviewing the records (labs, clinician notes, outside records, medical history, ordering medicine/tests/procedures, interpreting the imaging/labs previously done) and coordination of care as well as direct time with the patient  today, of which greater than 50% of the time was spent in counseling and coordination of care with the patient/family.    Plan/Labs  He is back on PO iron MWF   Cont to f/u GI  Refilling Hydrocortisone 2.5% cream for his drug rash previously  Recheck lipase in a week for his asymptomatic elevation  Restaging CT CAP w/c scheduled 12/30/2024  Infusion chairs ordered for Durvalumab + Tremelimumab for advanced HCC, C2 coming up with preceding labs  IR referral in process for liver directed therapy          Follow Up: q4 weeks scheduled thru 2/11/2025, more*    All questions were answered to the patient's satisfaction during this encounter. The patient knows the contact information for our office and knows to reach out for any relevant concerns related to this encounter. They are to call for any temperature 100.4 or higher, new symptoms including but not restricted to shaking chills, decreased appetite, nausea, vomiting, diarrhea, increased fatigue, shortness of breath or chest pain, confusion, and not feeling the strength to come to the clinic. For all other listed problems and medical diagnosis in their chart - they are managed by PCP and/or other specialists, which the patient acknowledges. Thank you very much for your consultation and making us a part of this patient's care. We are continuing to follow closely with you. Please do not hesitate to reach out to me with any additional questions or concerns.    Mal Angeles MD  Hematology & Medical Oncology Staff Physician             Disclaimer: This document was prepared using [employer] Direct technology. If a word or phrase is confusing, or does not make sense, this is likely due to recognition error which was not discovered during this clinician's review. If you believe an error has occurred, please contact me through HemOnc service line for dequan?cation.      HEMATOLOGICAL HISTORY OF PRESENT ILLNESS      Clotting History None   Bleeding History GAVE  related bleeding   Cancer History None   Family Cancer History Father (cancer)   H/O Blood/Plt Transfusion PRBC years ago   Tobacco/etoh/drug abuse 2 PPDx 44 years, quit 2004, no etoh abuse or rec drug use       Cancer Screening history n/a   Occupation [employer]      8/29/2022: Hgb 9.5, WBC 6.35k, MCV 91, plt 235k, retic 3.65%, FOBT + (8/31), ferritin 7, iron 30, Fe Sat 9%, TIBC 346  9/7 EGD/C-scope: internal hemorrhoids  10/3/2022-10/9/2022: 5 iV Venofer 200mg administered   11/28/2022: ferritin 26, iron 67, Fe Sat 21%, TIBC 322, Hgb 12.7 (now normal)  5/2/2023: Discussed with the patient the results of his iron studies which show worsening iron deficiency as well as recurrent anemia.  I ordered 600 mg of IV Venofer  5/18/2023: ferritin 7, iron 36, Fe Sat 10%, TIBC 364, Hgb 11.6  6/29/2023: ferritin 37, iron 52, Fe Sat 17%, TIBC 307, Hgb 12.7, MCV 92  9/20/2023:  ferritin 58, iron 65, Fe Sat 33%, TIBC 230, Hgb 13.5, MCV 95  2/14/2024: iron 42, ferritin 22, Fe Sat 14%, TIBC 304: Vit B12 632, Hgb 13.4, plt 161k, WBC 6.21k  4/18/2024: iron 59, Fe Sat 18%, TIBC 329, ferritin 21, Hgb 13.4 (2/2024)  4/29/2024: planned IV Venofer 200mg x2  10/2/2024: Ferritin: 224, Hgb 13.6, Fe Sat 13%, TIBC 208, iron 26, ferritin 224 AOCI  11/8/2024: Lipase: 380 (4x ULN)  SUBJECTIVE  (INTERVAL HISTORY)      Interval events: He has minimal fatigue from the treatment, has mild rash over R anterior leg and posterior lower back. Has itching with dry skin from the weather. Is using a moisturizer.  No abd pain. Doing quite well    I have reviewed the relevant past medical, surgical, social and family history. I have also reviewed allergies and medications for this patient.    Review of Systems    Baseline weight: 152-154 lbs    Denies weight loss, F/C, N/V, SOB, CP, LH, HA.    He has had fatigue and low energy since 6/2022. He was chewing on ice all day since 3/2022.     A 10-point review of system was performed, pertinent  positive and negative were detailed as above. Otherwise, the 10-point review of system was negative.      Past Medical History:   Diagnosis Date    Coronary artery disease without angina pectoris 02/25/2020    Diabetes mellitus (HCC)     DVT (deep venous thrombosis) (HCC)     GERD (gastroesophageal reflux disease)     Hypertension     Iron deficiency anemia        Past Surgical History:   Procedure Laterality Date    ANGIOPLASTY  10/19/2021    bilateral    CARDIAC CATHETERIZATION  2013    calif    CABG  x4    COLONOSCOPY      COLONOSCOPY      CORONARY ANGIOPLASTY WITH STENT PLACEMENT  01/01/2010    CORONARY ARTERY BYPASS GRAFT      FEMORAL ARTERY STENT      FEMORAL BYPASS  10/20/2021    IR LOWER EXTREMITY ANGIOGRAM  2/14/2023    IR PELVIC ANGIOGRAM  5/6/2022    WI SLCTV CATHJ 3RD+ ORD SLCTV ABDL PEL/LXTR BRNCH  5/6/2022    Procedure: ULTRASOUND-GUIDED LEFT BRACHIAL ARTERY PUNCTURE, AORTOGRAM, RIGHT COMMON ILIAC ARTERY ANGIOPLASTY WITH 8 X 40 MM BALLOON, ANGIOPLASTY OF RIGHT EXTERNAL AND COMMON ILIAC ARTERY STENOSIS WITH 6 X 150 MM DRUG-ELUTING BALLOON, STENTING OF RIGHT DISTAL EXTERNAL ILIAC ARTERY STENOSIS WITH 7 X 40 MM SELF EXPANDING STENT POST DILATED WITH A 6 MM BALLOON.;  Surgeon: Henry Pena MD;  [place]    WI SLCTV CATHJ 3RD+ ORD SLCTV ABDL PEL/LXTR BRNCH Bilateral 2/14/2023    Procedure: CUTDOWN FEM-FEM BYPASS WITH PRIMARY CLOSURE ACCESS SITE, BILATERAL RUN-OFF, LEFT FEM-POP 5X220 QUYEN AND 6X150 RODRI, ATTEMPT TO CROSS RIGHT SFA OCCLUSION;  Surgeon: Henry Pena MD;  Location: [facility] OR;  Service: Vascular       Family History   Problem Relation Age of Onset    No Known Problems Mother     No Known Problems Father        Social History     Socioeconomic History    Marital status: /Civil Union     Spouse name: Not on file    Number of children: Not on file    Years of education: Not on file    Highest education level: Not on file   Occupational History    Not on file   Tobacco Use     Smoking status: Former     Current packs/day: 0.00     Average packs/day: 1.5 packs/day for 40.0 years (60.0 ttl pk-yrs)     Types: Cigarettes     Start date:      Quit date:      Years since quittin.9     Passive exposure: Past    Smokeless tobacco: Never    Tobacco comments:     quit 2004--    Vaping Use    Vaping status: Never Used   Substance and Sexual Activity    Alcohol use: Not Currently     Alcohol/week: 0.0 standard drinks of alcohol    Drug use: No    Sexual activity: Not on file   Other Topics Concern    Not on file   Social History Narrative    · Most recent tobacco use screenin2020      Social Drivers of Health     Financial Resource Strain: Low Risk  (2023)    Overall Financial Resource Strain (CARDIA)     Difficulty of Paying Living Expenses: Not hard at all   Food Insecurity: Not on file   Transportation Needs: No Transportation Needs (2023)    PRAPARE - Transportation     Lack of Transportation (Medical): No     Lack of Transportation (Non-Medical): No   Physical Activity: Not on file   Stress: Not on file   Social Connections: Not on file   Intimate Partner Violence: Not on file   Housing Stability: Not on file       No Known Allergies    Current Outpatient Medications   Medication Sig Dispense Refill    acetaminophen (TYLENOL) 500 mg tablet Take 1,000 mg by mouth every 8 (eight) hours as needed      atorvastatin (LIPITOR) 40 mg tablet TAKE 1 TABLET DAILY 90 tablet 1    chlorhexidine (PERIDEX) 0.12 % solution Apply 15 mL to the mouth or throat 2 (two) times a day 473 mL 3    clopidogrel (PLAVIX) 75 mg tablet Take 1 tablet (75 mg total) by mouth daily 90 tablet 1    DULoxetine (CYMBALTA) 30 mg delayed release capsule TAKE ONE CAPSULE BY MOUTH TWICE DAILY 60 capsule 0    Empagliflozin (Jardiance) 25 MG TABS Take 1 tablet (25 mg total) by mouth every morning Also: take with LOTS of water 90 tablet 3    fenofibrate (TRICOR) 145 mg tablet Take 1 tablet (145 mg total)  by mouth daily 90 tablet 3    ferrous sulfate 325 (65 Fe) mg tablet Take 1 tablet (325 mg total) by mouth 2 (two) times a day with meals 60 tablet 6    gabapentin (Neurontin) 300 mg capsule Take 1 capsule (300 mg total) by mouth 3 (three) times a day 90 capsule 6    glipiZIDE (GLUCOTROL) 10 mg tablet TAKE 1 TABLET TWICE A DAY  BEFORE MEALS 180 tablet 1    hydrocortisone 2.5 % cream Apply topically 2 (two) times a day as needed for rash 30 g 3    ketorolac (ACULAR) 0.5 % ophthalmic solution       lisinopril-hydrochlorothiazide (PRINZIDE,ZESTORETIC) 20-12.5 MG per tablet Take 1 tablet by mouth daily 90 tablet 1    metFORMIN (GLUCOPHAGE) 1000 MG tablet TAKE 1 TABLET TWICE DAILY  WITH MEALS 180 tablet 1    omeprazole (PriLOSEC) 20 mg delayed release capsule Take 1 capsule (20 mg total) by mouth daily before breakfast 30 capsule 5    prednisoLONE acetate (PRED FORTE) 1 % ophthalmic suspension       tamsulosin (FLOMAX) 0.4 mg Take 1 capsule (0.4 mg total) by mouth daily with dinner 90 capsule 1    tirzepatide (Mounjaro) 2.5 MG/0.5ML Inject 0.5 mL (2.5 mg total) under the skin every 7 days 2 mL 3    moxifloxacin (VIGAMOX) 0.5 % ophthalmic solution  (Patient not taking: Reported on 10/14/2024)       No current facility-administered medications for this visit.       (Not in a hospital admission)        Objective:     24 Hour Vitals Assessment:     Vitals:    12/06/24 0828   BP: 148/50   Pulse: 70   Resp: 16   Temp: (!) 97.3 °F (36.3 °C)   SpO2: 96%       Weight last visit 152 lbs  Weight today: 149 lbs      PHYSICIAN EXAM:    General: Appearance: alert, cooperative, no distress.  HEENT: Normocephalic, atraumatic. No scleral icterus. conjunctivae clear. EOMI.  Chest: No tenderness to palpation. No open wound noted.  Lungs: Clear to auscultation bilaterally, Respirations unlabored.  Cardiac: Regular rate and rhythm, +S1and S2  Abdomen: Soft, non-tender, non-distended. Bowel sounds are normal.  Extremities:  No edema, cyanosis,  clubbing.  Skin: Skin color, turgor are normal. Small R anterior leg rashes  Neurologic: Awake, Alert, and oriented, no gross focal deficits noted b/l.       DATA REVIEW:    Pathology Result:    Final Diagnosis   Date Value Ref Range Status   06/27/2023   Final    A. Duodenum, :  - Unremarkable duodenal mucosa  - No villous atrophy or increased intraepithelial lymphocytes seen     B. Stomach, :  - Gastric oxyntic and antral type mucosa with minimal or mild chronic inflammation  - No sharad shaped bacteria seen on H&E stained tissue section  - Negative for intestinal metaplasia and dysplasia          09/07/2022   Final    A. Duodenum,  biopsy:  - Duodenal mucosa with focal acute inflammation and reactive changes  - No intraepithelial lymphocytosis and no villous blunting.  - No epithelial dysplasia and no evidence of malignancy.    B.  Stomach, biopsy:  - Gastric antral and oxyntic mucosa with no significant pathologic alteration.  - Negative for intestinal metaplasia, dysplasia or carcinoma.  - No Helicobacter pylori is identified on H&E stained slide.              Image Results:   Image result are reviewed and documented in Hematology/Oncology history. I personally reviewed these images.    CT chest w contrast  Narrative: CT CHEST WITH IV CONTRAST    INDICATION: Liver mass - staging.    COMPARISON: Multiple priors most recently 6/1/2023    TECHNIQUE: CT examination of the chest was performed. Multiplanar 2D reformatted images were created from the source data.    This examination, like all CT scans performed in the [facility], was performed utilizing techniques to minimize radiation dose exposure, including the use of iterative reconstruction and automated exposure control. Radiation dose length   product (DLP) for this visit: 302.41 mGy-cm    IV Contrast: 55 mL of iohexol (OMNIPAQUE)    FINDINGS:    LUNGS: Fibrotic changes at the lung apices with calcifications. Emphysema. Multiple areas of scarring  are similar in appearance to CT of 11/15/2019. No suspicious nodules or masses.    PLEURA: Unremarkable.    HEART/GREAT VESSELS: Thoracic aortic and coronary artery atherosclerosis no thoracic aortic aneurysm.    MEDIASTINUM AND RICHARD: Unremarkable.    CHEST WALL AND LOWER NECK: Sternotomy wires.    VISUALIZED STRUCTURES IN THE UPPER ABDOMEN: See recent CT and MRI.    OSSEOUS STRUCTURES: Spinal degenerative changes are noted. No acute fracture or destructive osseous lesion.  Impression: Emphysema with areas of fibrosis/scarring which appears stable compared to study of 11/15/2019. No definite evidence of metastatic disease.    Workstation performed: [id number]      LABS:  Lab data are reviewed and documented in HemOn history.       Lab Results   Component Value Date    HGB 13.8 12/03/2024    HCT 43.0 12/03/2024    MCV 97 12/03/2024     12/03/2024    WBC 5.10 12/03/2024    NRBC 0 12/03/2024     Lab Results   Component Value Date    K 4.0 12/03/2024     12/03/2024    CO2 23 12/03/2024    BUN 27 (H) 12/03/2024    CREATININE 1.05 12/03/2024    GLUF 156 (H) 11/18/2024    CALCIUM 9.4 12/03/2024    CORRECTEDCA 10.2 (H) 11/15/2021    AST 16 12/03/2024    ALT 21 12/03/2024    ALKPHOS 62 12/03/2024    EGFR 66 12/03/2024       Lab Results   Component Value Date    IRON 26 (L) 10/02/2024    TIBC 208 (L) 10/02/2024    FERRITIN 224 10/02/2024    FERRITIN 21 (L) 04/18/2024    FERRITIN 22 (L) 02/14/2024    FERRITIN 58 09/20/2023    FERRITIN 37 06/29/2023    FERRITIN 7 (L) 05/18/2023    FERRITIN 26 11/28/2022    FERRITIN 7 (L) 08/29/2022    FERRITIN 37 04/20/2022    FERRITIN 17 02/07/2022    FERRITIN 13 01/05/2022    FERRITIN 10 11/15/2021    FERRITIN 28 03/11/2021    FERRITIN 12 11/03/2020       Lab Results   Component Value Date    VMNTIHJF92 632 02/14/2024    [id number] 223 09/20/2023    [id number] 389 11/15/2021    [id number] 681 05/29/2020       Recent Labs     12/03/24  0927   WBC 5.10             By:   Mal Angeles, 12/6/2024, 8:36 AM

## 2024-11-27 ENCOUNTER — OFFICE VISIT (OUTPATIENT)
Dept: CARDIOLOGY CLINIC | Facility: CLINIC | Age: 79
End: 2024-11-27
Payer: MEDICARE

## 2024-11-27 VITALS
DIASTOLIC BLOOD PRESSURE: 72 MMHG | WEIGHT: 147.8 LBS | SYSTOLIC BLOOD PRESSURE: 124 MMHG | HEIGHT: 65 IN | BODY MASS INDEX: 24.62 KG/M2 | HEART RATE: 59 BPM

## 2024-11-27 DIAGNOSIS — E78.49 OTHER HYPERLIPIDEMIA: ICD-10-CM

## 2024-11-27 DIAGNOSIS — I10 ESSENTIAL HYPERTENSION: ICD-10-CM

## 2024-11-27 DIAGNOSIS — I25.10 CORONARY ARTERY DISEASE INVOLVING NATIVE CORONARY ARTERY OF NATIVE HEART WITHOUT ANGINA PECTORIS: Primary | ICD-10-CM

## 2024-11-27 DIAGNOSIS — E11.51 TYPE 2 DIABETES MELLITUS WITH DIABETIC PERIPHERAL ANGIOPATHY WITHOUT GANGRENE, WITH LONG-TERM CURRENT USE OF INSULIN (HCC): ICD-10-CM

## 2024-11-27 DIAGNOSIS — Z79.4 TYPE 2 DIABETES MELLITUS WITH DIABETIC PERIPHERAL ANGIOPATHY WITHOUT GANGRENE, WITH LONG-TERM CURRENT USE OF INSULIN (HCC): ICD-10-CM

## 2024-11-27 DIAGNOSIS — I73.9 PERIPHERAL VASCULAR DISEASE WITH CLAUDICATION (HCC): ICD-10-CM

## 2024-11-27 PROCEDURE — 99214 OFFICE O/P EST MOD 30 MIN: CPT | Performed by: INTERNAL MEDICINE

## 2024-11-27 PROCEDURE — 93000 ELECTROCARDIOGRAM COMPLETE: CPT | Performed by: INTERNAL MEDICINE

## 2024-11-27 NOTE — PROGRESS NOTES
Cardiology Office Note  MD Jorge Gonzalez MD Jason Kaplan, DO, [facility]  MD Yennifer Mena DO, [facility]  Godwin Benoit DO, [facility]  [place] [address]    Max Ozuna 79 y.o. male MRN: [id number]  Unit/Bed#:  Encounter: [id number]      History of Present Illness:  It was a pleasure to see Max Ozuna in the office today for follow-up CV evaluation.  He has a past medical history CAD s/p CABG in March 2020 at [facility], PVD, hypertension, dyslipidemia, type 2 diabetes mellitus, CKD, anemia, left subclavian stenosis and carotid artery stenosis.  He established care with us in November 2022. Patient has a known history vascular disease and coronary artery disease.  He had stent in 2010 and subsequently CABG in March 2020. CABG was 4 vessel bypass.  He had previously been following with [facility] for his cardiovascular management.  Since his bypass, he had been feeling well overall.  He underwent left lower extremity fem-fem bypass in 2021. The patient had been experiencing exertional claudication and underwent a left lower extremity arterial bypass in October 2023.  In September 2024, the patient had experienced right sided flank pain and abdominal discomfort.  CT of the abdomen pelvis demonstrated portal vein thrombus with 5.4 x 4.8 x 5.5 cm mass concerning for hepatocellular carcinoma.  He had evidence of liver cirrhosis on imaging.  He was not found to be a transplant or surgical candidate.  The patient was referred to oncology.  He was started on chemotherapeutic agents including durvalumab and tremelimumab.  Patient underwent lipid panel in October 2024 which demonstrated elevation of LDL cholesterol to 100 mg/dL, up from 23 mg/dL in February 2024.    He denies any chest pain, pressure, tightness or squeezing. Denies lightheadedness, dizziness  or palpitations.  Denies lower extremity swelling, orthopnea or paroxysmal nocturnal dyspnea.  Admits to some claudication.  He is here today for routine CV follow-up.    Review of Systems:  Review of Systems   Constitutional: Negative for decreased appetite, fever, weight gain and weight loss.   HENT:  Negative for congestion and sore throat.    Eyes:  Negative for visual disturbance.   Cardiovascular:  Negative for chest pain, dyspnea on exertion, leg swelling, near-syncope and palpitations.   Respiratory:  Negative for cough and shortness of breath.    Hematologic/Lymphatic: Negative for bleeding problem.   Skin:  Negative for rash.   Musculoskeletal:  Negative for myalgias and neck pain.   Gastrointestinal:  Negative for abdominal pain and nausea.   Neurological:  Negative for light-headedness and weakness.   Psychiatric/Behavioral:  Negative for depression.        Past Medical History:   Diagnosis Date    Coronary artery disease without angina pectoris 02/25/2020    Diabetes mellitus (HCC)     DVT (deep venous thrombosis) (HCC)     GERD (gastroesophageal reflux disease)     Hypertension     Iron deficiency anemia        Past Surgical History:   Procedure Laterality Date    ANGIOPLASTY  10/19/2021    bilateral    CARDIAC CATHETERIZATION  2013    calif    CABG  x4    COLONOSCOPY      COLONOSCOPY      CORONARY ANGIOPLASTY WITH STENT PLACEMENT  01/01/2010    CORONARY ARTERY BYPASS GRAFT      FEMORAL ARTERY STENT      FEMORAL BYPASS  10/20/2021    IR LOWER EXTREMITY ANGIOGRAM  2/14/2023    IR PELVIC ANGIOGRAM  5/6/2022    [facility] SLCTV CATHJ 3RD+ ORD SLCTV ABDL PEL/LXTR BRNCH  5/6/2022    Procedure: ULTRASOUND-GUIDED LEFT BRACHIAL ARTERY PUNCTURE, AORTOGRAM, RIGHT COMMON ILIAC ARTERY ANGIOPLASTY WITH 8 X 40 MM BALLOON, ANGIOPLASTY OF RIGHT EXTERNAL AND COMMON ILIAC ARTERY STENOSIS WITH 6 X 150 MM DRUG-ELUTING BALLOON, STENTING OF RIGHT DISTAL EXTERNAL ILIAC ARTERY STENOSIS WITH 7 X 40 MM SELF EXPANDING STENT POST DILATED  WITH A 6 MM BALLOON.;  Surgeon: Henry Pena MD;  [place]    MS SLCTV CATHJ 3RD+ ORD SLCTV ABDL PEL/LXTR BRNCH Bilateral 2023    Procedure: CUTDOWN FEM-FEM BYPASS WITH PRIMARY CLOSURE ACCESS SITE, BILATERAL RUN-OFF, LEFT FEM-POP 5X220 QUYEN AND 6X150 RODRI, ATTEMPT TO CROSS RIGHT SFA OCCLUSION;  Surgeon: Henry Pena MD;  Location: [facility];  Service: Vascular       Social History     Socioeconomic History    Marital status: /Civil Union     Spouse name: None    Number of children: None    Years of education: None    Highest education level: None   Occupational History    None   Tobacco Use    Smoking status: Former     Current packs/day: 0.00     Average packs/day: 1.5 packs/day for 40.0 years (60.0 ttl pk-yrs)     Types: Cigarettes     Start date:      Quit date:      Years since quittin.9     Passive exposure: Past    Smokeless tobacco: Never    Tobacco comments:     quit --    Vaping Use    Vaping status: Never Used   Substance and Sexual Activity    Alcohol use: Not Currently     Alcohol/week: 0.0 standard drinks of alcohol    Drug use: No    Sexual activity: None   Other Topics Concern    None   Social History Narrative    · Most recent tobacco use screenin2020      Social Drivers of Health     Financial Resource Strain: Low Risk  (2023)    Overall Financial Resource Strain (CARDIA)     Difficulty of Paying Living Expenses: Not hard at all   Food Insecurity: Not on file   Transportation Needs: No Transportation Needs (2023)    PRAPARE - Transportation     Lack of Transportation (Medical): No     Lack of Transportation (Non-Medical): No   Physical Activity: Not on file   Stress: Not on file   Social Connections: Not on file   Intimate Partner Violence: Not on file   Housing Stability: Not on file       Family History   Problem Relation Age of Onset    No Known Problems Mother     No Known Problems Father        No Known Allergies      Current  Outpatient Medications:     acetaminophen (TYLENOL) 500 mg tablet, Take 1,000 mg by mouth every 8 (eight) hours as needed, Disp: , Rfl:     atorvastatin (LIPITOR) 40 mg tablet, TAKE 1 TABLET DAILY, Disp: 90 tablet, Rfl: 1    chlorhexidine (PERIDEX) 0.12 % solution, Apply 15 mL to the mouth or throat 2 (two) times a day, Disp: 473 mL, Rfl: 3    clopidogrel (PLAVIX) 75 mg tablet, Take 1 tablet (75 mg total) by mouth daily, Disp: 90 tablet, Rfl: 1    DULoxetine (CYMBALTA) 30 mg delayed release capsule, TAKE ONE CAPSULE BY MOUTH TWICE DAILY, Disp: 60 capsule, Rfl: 0    Empagliflozin (Jardiance) 25 MG TABS, Take 1 tablet (25 mg total) by mouth every morning Also: take with LOTS of water, Disp: 90 tablet, Rfl: 3    fenofibrate (TRICOR) 145 mg tablet, Take 1 tablet (145 mg total) by mouth daily, Disp: 90 tablet, Rfl: 3    ferrous sulfate 325 (65 Fe) mg tablet, Take 1 tablet (325 mg total) by mouth 2 (two) times a day with meals, Disp: 60 tablet, Rfl: 6    gabapentin (Neurontin) 300 mg capsule, Take 1 capsule (300 mg total) by mouth 3 (three) times a day, Disp: 90 capsule, Rfl: 6    glipiZIDE (GLUCOTROL) 10 mg tablet, TAKE 1 TABLET TWICE A DAY  BEFORE MEALS, Disp: 180 tablet, Rfl: 1    hydrocortisone 2.5 % cream, Apply topically 2 (two) times a day as needed for rash, Disp: 30 g, Rfl: 3    ketorolac (ACULAR) 0.5 % ophthalmic solution, , Disp: , Rfl:     lisinopril-hydrochlorothiazide (PRINZIDE,ZESTORETIC) 20-12.5 MG per tablet, Take 1 tablet by mouth daily, Disp: 90 tablet, Rfl: 1    metFORMIN (GLUCOPHAGE) 1000 MG tablet, TAKE 1 TABLET TWICE DAILY  WITH MEALS, Disp: 180 tablet, Rfl: 1    omeprazole (PriLOSEC) 20 mg delayed release capsule, Take 1 capsule (20 mg total) by mouth daily before breakfast, Disp: 30 capsule, Rfl: 5    prednisoLONE acetate (PRED FORTE) 1 % ophthalmic suspension, , Disp: , Rfl:     tamsulosin (FLOMAX) 0.4 mg, Take 1 capsule (0.4 mg total) by mouth daily with dinner, Disp: 90 capsule, Rfl: 1     "tirzepatide (Mounjaro) 2.5 MG/0.5ML, Inject 0.5 mL (2.5 mg total) under the skin every 7 days, Disp: 2 mL, Rfl: 3    moxifloxacin (VIGAMOX) 0.5 % ophthalmic solution, , Disp: , Rfl:     Vitals:    11/27/24 1140   BP: 124/72   Pulse: 59   Weight: 67 kg (147 lb 12.8 oz)   Height: 5' 5\" (1.651 m)       Body mass index is 24.6 kg/m².    PHYSICAL EXAMINATION:  Gen: Awake, Alert, NAD   Head/eyes: AT/NC, pupils equal and round, Anicteric  ENT: mmm  Neck: Supple, No elevated JVP, trachea midline  Resp: CTA bilaterally no w/r/r  CV: RRR +S1, S2, No m/r/g  Abd: Soft, NT/ND + BS  Ext: no LE edema bilaterally  Neuro: Follows commands, moves all extermities  Psych: Appropriate affect, pleasant mood, pleasant attitude, non-combative  Skin: warm; no rash, erythema or venous stasis changes on exposed skin    --------------------------------------------------------------------------------  TREADMILL STRESS  No results found for this or any previous visit.     --------------------------------------------------------------------------------  NUCLEAR STRESS TEST: No results found for this or any previous visit.    No results found for this or any previous visit.      --------------------------------------------------------------------------------  CATH:    Typical CP s/p cath w/ 99% p-mLAD, 50% LM, 60% OM1, 99% dRCA, 99% rPAV, March 2020  --------------------------------------------------------------------------------  ECHO:   No results found for this or any previous visit.    No results found for this or any previous visit.    --------------------------------------------------------------------------------  HOLTER  No results found for this or any previous visit.    No results found for this or any previous visit.    --------------------------------------------------------------------------------  CAROTIDS  Results for orders placed during the hospital encounter of 11/02/21    VAS carotid complete study    Narrative  THE VASCULAR " CENTER REPORT  CLINICAL:  Indications:  Patient c/o hearing in his pulse in his right ear following his  revascularization of his left leg 2 weeks ago.  Patient reports a failed left axillary-femoral bypass graft and is s/p a cross  over femoral bypass graft.  Operative History:  femoral stent  Risk Factors: HTN, Hyperlipidemia and previous smoking (quit >10yrs ago).  Clinical  Right Pressure:  116/50 mm Hg, Left Pressure:  130/50 mm Hg.    FINDINGS:    Right        Impression  PSV  EDV (cm/s)  Direction of Flow  Ratio  Dist. ICA                 61          16                      0.54  Mid. ICA                 100          24                      0.88  Prox. ICA    1 - 49%     157          28                      1.38  Dist CCA                  85           7  Mid CCA                  114          16                      1.49  Prox CCA                  77          11                      1.06  Ext Carotid              104           0                      0.91  Prox Vert                 68          19  Antegrade  Subclavian               242           0  Innominate                72           0    Left         Impression  PSV  EDV (cm/s)  Direction of Flow  Ratio  Dist. ICA                 94          19                      0.87  Mid. ICA                 117          25                      1.07  Prox. ICA    1 - 49%     123          36                      1.12  Dist CCA                  93          21  Mid CCA                  109          23                      1.75  Prox CCA                  62          15  Ext Carotid              238           0                      2.18  Prox Vert                 62           0  Antegrade  Subclavian               269           0        CONCLUSION:    Impression  RIGHT:  There is <50% stenosis noted in the internal carotid artery. Plaque is  heterogenous and irregular.  Vertebral artery flow is antegrade. There is a minimal stenosis visualized in  the proximal subclavian  "artery.  LEFT:  There is <50% stenosis noted in the internal carotid artery. Plaque is  heterogenous and irregular.  Vertebral artery flow is antegrade. There is no significant subclavian artery  disease.    Previous study performed at outside facility.    Internal carotid artery stenosis determination by consensus criteria from:  NOHEMY Rucker, et.al. Carotid Artery Stenosis: Gray-Scale and Doppler US Diagnosis  - Society of Radiologists in Ultrasound Consensus Conference, Radiology 2003;  [id number].    SIGNATURE:  Electronically Signed by: GRACE MON MD,RVT on 2021-11-02 01:09:20 PM     --------------------------------------------------------------------------------  ECGs:  Results for orders placed or performed in visit on 11/27/24   POCT ECG    Impression    Sinus bradycardia with prolonged first-degree AV block 59 bpm, left posterior fascicular block, poor R wave progression with nonspecific ST-T wave abnormalities; similar to prior        Lab Results   Component Value Date    WBC 5.77 11/18/2024    HGB 13.8 11/18/2024    HCT 42.6 11/18/2024    MCV 94 11/18/2024     11/18/2024      Lab Results   Component Value Date    SODIUM 137 11/18/2024    K 4.0 11/18/2024     11/18/2024    CO2 25 11/18/2024    BUN 25 11/18/2024    CREATININE 1.11 11/18/2024    GLUC 239 (H) 10/11/2024    CALCIUM 9.9 11/18/2024      Lab Results   Component Value Date    HGBA1C 7.1 (H) 11/18/2024      No results found for: \"CHOL\"  Lab Results   Component Value Date    HDL 30 (L) 10/02/2024    HDL 32 (L) 02/14/2024    HDL 33 (L) 09/20/2023     Lab Results   Component Value Date    LDLCALC 100 10/02/2024    LDLCALC 23 02/14/2024    LDLCALC 47 09/20/2023     Lab Results   Component Value Date    TRIG 137 10/02/2024    TRIG 276 (H) 02/14/2024    TRIG 173 (H) 09/20/2023     No results found for: \"CHOLHDL\"   Lab Results   Component Value Date    INR 1.08 09/26/2024    INR 1.02 02/06/2023    INR 1.12 04/20/2022    PROTIME 14.2 " 09/26/2024    PROTIME 13.6 02/06/2023    PROTIME 14.0 04/20/2022        1. Coronary artery disease involving native coronary artery of native heart without angina pectoris  -     POCT ECG  2. Essential hypertension  -     POCT ECG  3. Other hyperlipidemia  4. Type 2 diabetes mellitus with diabetic peripheral angiopathy without gangrene, with long-term current use of insulin (HCC)  5. Peripheral vascular disease with claudication ([facility])        IMPRESSION:  CAD  s/p CABG x4 w/ LIMA-LAD, sequential SVG-PDA and PL and SVG-OM1, March 2020  remote stent to unknown vessel, 2010  Hypertension - management as per 1' care  LVEF 70%, grade 2 diastolic dysfunction, AV sclerosis, mild MAC, mild MR, trace TR/SD, March 2022  Dyslipidemia w/ LDL 28 mg/dL, August 2022  Type 2 diabetes mellitus  PVD  s/p left fem-fem bypass, 2021  High-grade stenosis right SFA by CT with runoff, April 2022  s/p LLE RODRI, February 2023  Carotid stenosis < 50% bilaterally, March 2020  Left-sided proximal subclavian stenosis, November 2021  Hepatocellular carcinoma with 5.4 x 4.8 x 5.5 cm mass noted on CT of the abdomen pelvis, September 2024  CKD stage 3  Iron deficiency anemia  History of tobacco use    PLAN:  It was a pleasure to see Max in the office today for follow-up CV evaluation.  Unfortunately, the patient was diagnosed with hepatocellular cancer in September 2024.  He has no chest pain concerning for angina and no signs or symptoms of heart failure.  Clinically he examines to be euvolemic.  Blood pressure and heart rate are currently stable.  He is tolerating his current medications without any reported adverse effects.  He can perform greater than 4 METS on daily basis without significant exertional symptoms.  ECG is similar to prior.  Based on his clinical presentation, I have the following recommendations:    1.  Given the patient's interval functional capacity, lack of ischemic symptoms, mild exertional dyspnea, but multiple risk factors  "including PVD, CKD, type 2 diabetes mellitus and obstructive CAD, the patient is at high CV risk for open vascular procedure.  Would be lower risk for percutaneous/endovascular procedure.  No further CV testing prior to the OR as it would not change our management.  2.  Would encourage 30 minutes a day, 5 days a week of moderate intensity activity to build cardiovascular endurance.  3.  Recommend a heart healthy diet low in sodium and carbohydrate.  4.  Lifelong Plavix.  Platelet count currently is within normal limits.  5.  Continue high intensity statin unless otherwise contraindicated given his hepatocellular carcinoma.  Goal LDL is less than 70 mg/dL.  Repeat lipid panel in 3 to 6 months.  Patient is currently above goal, but previously was to goal.  We will see how his numbers are on repeat.  Would not make any changes until repeat study.  6.  Continue Jardiance and and metformin.  Blood glucose management as per primary care.  7.  Continue current antihypertensive regimen including lisinopril and hydrochlorothiazide.  8.  Reviewing adverse reactions of current chemotherapeutic agents, does not appear to frequently cause myocarditis or significant cardiac toxicity, but if oncology would like to order echocardiogram, I can review the results of this and follow-up with the patient in the office for routine evaluation.  9.  We will follow-up with him in 3 months to reassess his progress.    As always, please do not hesitate to call with any questions.      Portions of the record may have been created with voice recognition software. Occasional wrong word or \"sound a like\" substitutions may have occurred due to the inherent limitations of voice recognition software. Read the chart carefully and recognize, using context, where substitutions have occurred.      Signed: Vignesh Zamarripa DO, FACC, FASE, FASNC, FACP  "

## 2024-12-03 ENCOUNTER — APPOINTMENT (OUTPATIENT)
Dept: LAB | Facility: CLINIC | Age: 79
End: 2024-12-03
Payer: MEDICARE

## 2024-12-03 ENCOUNTER — TELEPHONE (OUTPATIENT)
Age: 79
End: 2024-12-03

## 2024-12-03 DIAGNOSIS — D64.9 ANEMIA, UNSPECIFIED TYPE: ICD-10-CM

## 2024-12-03 DIAGNOSIS — C22.0 HEPATOCELLULAR CARCINOMA (HCC): ICD-10-CM

## 2024-12-03 DIAGNOSIS — R74.8 ELEVATED LIPASE: ICD-10-CM

## 2024-12-03 DIAGNOSIS — C22.0 HEPATOCELLULAR CARCINOMA (HCC): Primary | ICD-10-CM

## 2024-12-03 LAB
ALBUMIN SERPL BCG-MCNC: 4.2 G/DL (ref 3.5–5)
ALP SERPL-CCNC: 62 U/L (ref 34–104)
ALT SERPL W P-5'-P-CCNC: 21 U/L (ref 7–52)
AMYLASE SERPL-CCNC: 81 IU/L (ref 29–103)
ANION GAP SERPL CALCULATED.3IONS-SCNC: 9 MMOL/L (ref 4–13)
AST SERPL W P-5'-P-CCNC: 16 U/L (ref 13–39)
BASOPHILS # BLD AUTO: 0.03 THOUSANDS/ΜL (ref 0–0.1)
BASOPHILS NFR BLD AUTO: 1 % (ref 0–1)
BILIRUB SERPL-MCNC: 0.83 MG/DL (ref 0.2–1)
BUN SERPL-MCNC: 27 MG/DL (ref 5–25)
CALCIUM SERPL-MCNC: 9.4 MG/DL (ref 8.4–10.2)
CHLORIDE SERPL-SCNC: 105 MMOL/L (ref 96–108)
CO2 SERPL-SCNC: 23 MMOL/L (ref 21–32)
CREAT SERPL-MCNC: 1.05 MG/DL (ref 0.6–1.3)
EOSINOPHIL # BLD AUTO: 0.31 THOUSAND/ΜL (ref 0–0.61)
EOSINOPHIL NFR BLD AUTO: 6 % (ref 0–6)
ERYTHROCYTE [DISTWIDTH] IN BLOOD BY AUTOMATED COUNT: 16.6 % (ref 11.6–15.1)
GFR SERPL CREATININE-BSD FRML MDRD: 66 ML/MIN/1.73SQ M
GLUCOSE SERPL-MCNC: 160 MG/DL (ref 65–140)
HCT VFR BLD AUTO: 43 % (ref 36.5–49.3)
HGB BLD-MCNC: 13.8 G/DL (ref 12–17)
IMM GRANULOCYTES # BLD AUTO: 0.01 THOUSAND/UL (ref 0–0.2)
IMM GRANULOCYTES NFR BLD AUTO: 0 % (ref 0–2)
LIPASE SERPL-CCNC: 171 U/L (ref 11–82)
LYMPHOCYTES # BLD AUTO: 1.04 THOUSANDS/ΜL (ref 0.6–4.47)
LYMPHOCYTES NFR BLD AUTO: 20 % (ref 14–44)
MCH RBC QN AUTO: 31 PG (ref 26.8–34.3)
MCHC RBC AUTO-ENTMCNC: 32.1 G/DL (ref 31.4–37.4)
MCV RBC AUTO: 97 FL (ref 82–98)
MONOCYTES # BLD AUTO: 0.4 THOUSAND/ΜL (ref 0.17–1.22)
MONOCYTES NFR BLD AUTO: 8 % (ref 4–12)
NEUTROPHILS # BLD AUTO: 3.31 THOUSANDS/ΜL (ref 1.85–7.62)
NEUTS SEG NFR BLD AUTO: 65 % (ref 43–75)
NRBC BLD AUTO-RTO: 0 /100 WBCS
PLATELET # BLD AUTO: 154 THOUSANDS/UL (ref 149–390)
PMV BLD AUTO: 10.8 FL (ref 8.9–12.7)
POTASSIUM SERPL-SCNC: 4 MMOL/L (ref 3.5–5.3)
PROT SERPL-MCNC: 6.9 G/DL (ref 6.4–8.4)
RBC # BLD AUTO: 4.45 MILLION/UL (ref 3.88–5.62)
SODIUM SERPL-SCNC: 137 MMOL/L (ref 135–147)
TSH SERPL DL<=0.05 MIU/L-ACNC: 3.23 UIU/ML (ref 0.45–4.5)
WBC # BLD AUTO: 5.1 THOUSAND/UL (ref 4.31–10.16)

## 2024-12-03 PROCEDURE — 83690 ASSAY OF LIPASE: CPT

## 2024-12-03 PROCEDURE — 80053 COMPREHEN METABOLIC PANEL: CPT

## 2024-12-03 PROCEDURE — 82150 ASSAY OF AMYLASE: CPT

## 2024-12-03 PROCEDURE — 84443 ASSAY THYROID STIM HORMONE: CPT

## 2024-12-03 PROCEDURE — 36415 COLL VENOUS BLD VENIPUNCTURE: CPT

## 2024-12-03 PROCEDURE — 85025 COMPLETE CBC W/AUTO DIFF WBC: CPT

## 2024-12-03 RX ORDER — SODIUM CHLORIDE 9 MG/ML
20 INJECTION, SOLUTION INTRAVENOUS ONCE
Status: CANCELLED | OUTPATIENT
Start: 2024-12-10

## 2024-12-03 NOTE — TELEPHONE ENCOUNTER
Received a phone call from patient.  Patient is at the lab and lab orders have an expected date of 1/7/25.  Lab orders placed so patient can get labs drawn today prior to f/u with Dr. Angeles on 12/6 and next treatment on 12/10.

## 2024-12-06 ENCOUNTER — TELEPHONE (OUTPATIENT)
Dept: HEMATOLOGY ONCOLOGY | Facility: CLINIC | Age: 79
End: 2024-12-06

## 2024-12-06 ENCOUNTER — OFFICE VISIT (OUTPATIENT)
Dept: HEMATOLOGY ONCOLOGY | Facility: CLINIC | Age: 79
End: 2024-12-06
Payer: MEDICARE

## 2024-12-06 VITALS
TEMPERATURE: 97.3 F | WEIGHT: 149.5 LBS | RESPIRATION RATE: 16 BRPM | BODY MASS INDEX: 24.91 KG/M2 | HEIGHT: 65 IN | OXYGEN SATURATION: 96 % | HEART RATE: 70 BPM | DIASTOLIC BLOOD PRESSURE: 50 MMHG | SYSTOLIC BLOOD PRESSURE: 148 MMHG

## 2024-12-06 DIAGNOSIS — C22.0 HEPATOCELLULAR CARCINOMA (HCC): Primary | ICD-10-CM

## 2024-12-06 PROCEDURE — G2211 COMPLEX E/M VISIT ADD ON: HCPCS | Performed by: INTERNAL MEDICINE

## 2024-12-06 PROCEDURE — 99215 OFFICE O/P EST HI 40 MIN: CPT | Performed by: INTERNAL MEDICINE

## 2024-12-09 ENCOUNTER — CONSULT (OUTPATIENT)
Dept: RADIATION ONCOLOGY | Facility: HOSPITAL | Age: 79
End: 2024-12-09
Attending: STUDENT IN AN ORGANIZED HEALTH CARE EDUCATION/TRAINING PROGRAM
Payer: MEDICARE

## 2024-12-09 VITALS
HEART RATE: 86 BPM | SYSTOLIC BLOOD PRESSURE: 140 MMHG | TEMPERATURE: 98.7 F | DIASTOLIC BLOOD PRESSURE: 76 MMHG | RESPIRATION RATE: 18 BRPM | BODY MASS INDEX: 24.79 KG/M2 | WEIGHT: 149 LBS

## 2024-12-09 DIAGNOSIS — C22.0 HEPATOCELLULAR CARCINOMA (HCC): Primary | ICD-10-CM

## 2024-12-09 PROCEDURE — 99204 OFFICE O/P NEW MOD 45 MIN: CPT | Performed by: STUDENT IN AN ORGANIZED HEALTH CARE EDUCATION/TRAINING PROGRAM

## 2024-12-09 PROCEDURE — 99211 OFF/OP EST MAY X REQ PHY/QHP: CPT | Performed by: STUDENT IN AN ORGANIZED HEALTH CARE EDUCATION/TRAINING PROGRAM

## 2024-12-09 NOTE — PROGRESS NOTES
Max Ozuna 1945 is a 79 y.o. male referred to discuss Y90 Sirsphere treatment for HCC.    Patient has a history of DM, CAD, AVM, DVT, PE, anemia, cirrhosis and hepatocellular carcinoma. Patient presented to the ED in Sept 2024 with right flank pain, CT revealed abnormality at right hepatic lobe, recommended MRI. MRI abdomen on 9/24/24 revealed liver mass highly suspicious for malignancy and AFP tumor marker was elevated at 21,374. He was started on immunotherapy with Imfinizi on 10/15/24. He is referred to discuss Y90 Sirsphere treatment.       9/24/24 CT abdomen pelvis (right sided flank pain)  1. Nodular liver contour, indicated of cirrhosis.  2. 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 Heterogeneous decreased attenuation noted involving the posterior right hepatic lobe parenchyma, which may be due to perfusion abnormality related to the thrombus, but given the patient's history of cirrhosis, an underlying mass or lesion in this region cannot be excluded. Recommend further evaluation with liver protocol MRI without and with contrast.  3. No bowel wall thickening or bowel obstruction. No intraperitoneal free air or ascites.  4. Biatrial enlargement.  5. Interlobular septal line thickening/reticular opacities involving the periphery of the visualized lung bases, indicative of a developing interstitial fibrosis.      9/24/24 MRI abdomen   Liver mass described in detail above is highly suspicious for malignancy. It is unclear if the patient has pathologically documented, nonvascular cirrhosis. If the patient does have known cirrhosis, then the mass is compatible with LR-TIV.       9/25/24 CT chest w contrast  Emphysema with areas of fibrosis/scarring which appears stable compared to study of 11/15/2019. No definite evidence of metastatic disease        AFP-TUMOR MARKER   Latest Ref Rng 0.00 - 9.00 ng/mL   9/25/2024 21,374.27 (H)         9/26/24 Medical Oncology, Briana  CRNP  PLAN:  AFP tumor marker elevated 21,374.27 and diagnostic algorithm scored LR-TIV, indicating HCC  CT chest negative for metastatic disease  No indication for biopsy due to the elevated AFP, LR-TIV score, no evidence for metastatic disease  Patient has been added to Liver Tumor Board discussion, which he will be updated on during his office visit outpatient with his primary oncologist.       10/3/24 Dr. Angeles  Restaging CT CAP w/c ordered in 12 weeks  Infusion chairs ordered for Durvalumab + Tremelimumab for advanced HCC, C1 coming up with preceding labs  Eventual goal is liver directed therapy if we get a TN      10/11/24 LIVER MULTIDISCIPLINARY CANCER CONFERENCE  PHYSICIAN RECOMMENDED PLAN:  Initiated immunotherapy recommending referral to Interventional Radiology (IR) to discuss Y90 therapy      10/15/24 Imfinzi infusion      11/25/24 IR, Dr. 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 Complete cure of the HCC may not be possible if anything less than radiation segmentectomy doses are delivered to the tumor.   Plan will be to perform radiation segmentectomy to segments 6/7 of liver. Procedure will be scheduled for Y90 mapping and Y90 therapy sessions in the near future.        ALK PHOS   Latest Ref Rng 34 - 104 U/L   9/25/2024 52    10/2/2024 98    10/11/2024 89    11/8/2024 78    11/18/2024 72    12/3/2024 62       Total Bilirubin AST ALT   Latest Ref Rng 0.20 - 1.00 mg/dL 13 - 39 U/L 7 - 52 U/L   9/25/2024 0.88  14  13    10/2/2024 0.54  18  21    10/11/2024 0.59  25  24    11/8/2024 0.79  22  32    11/18/2024 0.82  20  26    12/3/2024 0.83  16  21         12/6/24 Dr. Angeles  He is back on PO iron MWF. Cont to f/u GI  Refilling Hydrocortisone 2.5% cream for his drug rash previously  Recheck lipase in a week for his asymptomatic elevation  Restaging CT  CAP w/c scheduled 12/30/2024  Infusion chairs ordered for Durvalumab + Tremelimumab for advanced HCC, C2 coming up with preceding labs  IR referral in process for liver directed therapy  Follow-up q 4 weeks      Upcoming appts:  12/10/24 Infusion   12/30/24 CT chest abd pelvis  1/2/25 Dr. Angeles  1/7/25 Infusion  1/15/25 GI            Oncology History   Hepatocellular carcinoma (HCC)   9/24/2024 -  Cancer Staged    Staging form: Liver (Excluding Intrahepatic Bile Ducts), AJCC 8th Edition  - Clinical stage from 9/24/2024: Stage IIIB (cT4, cN0, cM0) - Signed by Mal Angeles MD on 9/26/2024  Stage prefix: Initial diagnosis       9/26/2024 Initial Diagnosis    Hepatocellular carcinoma (HCC)     10/15/2024 -  Chemotherapy    alteplase (CATHFLO), 2 mg, Intracatheter, Every 1 Minute as needed, 2 of 7 cycles  durvalumab (IMFINZI) IVPB, 1,500 mg, Intravenous, Once, 2 of 7 cycles  Administration: 1,500 mg (10/15/2024), 1,500 mg (11/12/2024)  tremelimumab-actl (IMJUDO) IVPB, 300 mg, Intravenous, Once, 1 of 1 cycle  Administration: 300 mg (10/15/2024)         Review of Systems:  Review of Systems   Constitutional:  Negative for appetite change and unexpected weight change.   HENT: Negative.     Eyes: Negative.    Respiratory: Negative.     Cardiovascular: Negative.    Gastrointestinal:  Positive for diarrhea. Negative for abdominal pain, nausea and vomiting.   Endocrine: Negative.    Genitourinary: Negative.    Musculoskeletal:  Positive for arthralgias.   Skin:  Positive for rash (dry skin, using hydrocortisone).   Allergic/Immunologic: Negative.    Neurological: Negative.    Hematological: Negative.    Psychiatric/Behavioral: Negative.         Clinical Trial: no    Pain assessment: 0    PSA: n/a    PFT: n/a    Prior Radiation: no    Teaching: Y90 brochure    MST completed     Implantable Devices (Port, pacemaker, pain stimulator): no    Hip Replacement: no    Health Maintenance   Topic Date Due    Hepatitis A Vaccine  (1 of 2 - Risk 2-dose series) Never done    Hepatitis B Vaccine (1 of 3 - Risk 3-dose series) Never done    RSV Vaccine Age 60+ Years (1 - 1-dose 75+ series) Never done    Zoster Vaccine (2 of 2) 06/14/2023    PT PLAN OF CARE  03/03/2024    Influenza Vaccine (1) 09/01/2024    COVID-19 Vaccine (3 - 2024-25 season) 09/01/2024    Medicare Annual Wellness Visit (AWV)  11/01/2024    Fall Risk  01/05/2025    HEMOGLOBIN A1C  05/18/2025    Diabetic Foot Exam  05/23/2025    Kidney Health Evaluation: Albumin/Creatinine Ratio  10/02/2025    Kidney Health Evaluation: GFR  12/03/2025    BMI: Adult  12/06/2025    Depression Screening  12/09/2025    DM Eye Exam  06/18/2026    Pneumococcal Vaccine: 65+ Years  Completed    RSV Vaccine age 0-20 Months  Aged Out    HIB Vaccine  Aged Out    IPV Vaccine  Aged Out    Meningococcal ACWY Vaccine  Aged Out    HPV Vaccine  Aged Out    Lung Cancer Screening  Discontinued       Past Medical History:   Diagnosis Date    Coronary artery disease without angina pectoris 02/25/2020    Diabetes mellitus (HCC)     DVT (deep venous thrombosis) (HCC)     GERD (gastroesophageal reflux disease)     Hypertension     Iron deficiency anemia        Past Surgical History:   Procedure Laterality Date    ANGIOPLASTY  10/19/2021    bilateral    CARDIAC CATHETERIZATION  2013    calif    CABG  x4    COLONOSCOPY      COLONOSCOPY      CORONARY ANGIOPLASTY WITH STENT PLACEMENT  01/01/2010    CORONARY ARTERY BYPASS GRAFT      FEMORAL ARTERY STENT      FEMORAL BYPASS  10/20/2021    IR LOWER EXTREMITY ANGIOGRAM  2/14/2023    IR PELVIC ANGIOGRAM  5/6/2022    ID SLCTV CATHJ 3RD+ ORD SLCTV ABDL PEL/LXTR BRNCH  5/6/2022    Procedure: ULTRASOUND-GUIDED LEFT BRACHIAL ARTERY PUNCTURE, AORTOGRAM, RIGHT COMMON ILIAC ARTERY ANGIOPLASTY WITH 8 X 40 MM BALLOON, ANGIOPLASTY OF RIGHT EXTERNAL AND COMMON ILIAC ARTERY STENOSIS WITH 6 X 150 MM DRUG-ELUTING BALLOON, STENTING OF RIGHT DISTAL EXTERNAL ILIAC ARTERY STENOSIS WITH 7 X 40 MM  SELF EXPANDING STENT POST DILATED WITH A 6 MM BALLOON.;  Surgeon: Henry Pena MD;  [place]    NH SLCTV CATHJ 3RD+ ORD SLCTV ABDL PEL/LXTR BRNCH Bilateral 2023    Procedure: CUTDOWN FEM-FEM BYPASS WITH PRIMARY CLOSURE ACCESS SITE, BILATERAL RUN-OFF, LEFT FEM-POP 5X220 QUYEN AND 6X150 RODRI, ATTEMPT TO CROSS RIGHT SFA OCCLUSION;  Surgeon: Henry Pena MD;  Location: [facility];  Service: Vascular       Family History   Problem Relation Age of Onset    No Known Problems Mother     Lung cancer Father     Liver cancer Brother        Social History     Tobacco Use    Smoking status: Former     Current packs/day: 0.00     Average packs/day: 1.5 packs/day for 40.0 years (60.0 ttl pk-yrs)     Types: Cigarettes     Start date:      Quit date:      Years since quittin.9     Passive exposure: Past    Smokeless tobacco: Never    Tobacco comments:     quit --    Vaping Use    Vaping status: Never Used   Substance Use Topics    Alcohol use: Not Currently     Alcohol/week: 0.0 standard drinks of alcohol    Drug use: No          Current Outpatient Medications:     acetaminophen (TYLENOL) 500 mg tablet, Take 1,000 mg by mouth every 8 (eight) hours as needed, Disp: , Rfl:     atorvastatin (LIPITOR) 40 mg tablet, TAKE 1 TABLET DAILY, Disp: 90 tablet, Rfl: 1    chlorhexidine (PERIDEX) 0.12 % solution, Apply 15 mL to the mouth or throat 2 (two) times a day, Disp: 473 mL, Rfl: 3    clopidogrel (PLAVIX) 75 mg tablet, Take 1 tablet (75 mg total) by mouth daily, Disp: 90 tablet, Rfl: 1    DULoxetine (CYMBALTA) 30 mg delayed release capsule, TAKE ONE CAPSULE BY MOUTH TWICE DAILY, Disp: 60 capsule, Rfl: 0    Empagliflozin (Jardiance) 25 MG TABS, Take 1 tablet (25 mg total) by mouth every morning Also: take with LOTS of water, Disp: 90 tablet, Rfl: 3    fenofibrate (TRICOR) 145 mg tablet, Take 1 tablet (145 mg total) by mouth daily, Disp: 90 tablet, Rfl: 3    ferrous sulfate 325 (65 Fe) mg tablet, Take 1 tablet  (325 mg total) by mouth 2 (two) times a day with meals, Disp: 60 tablet, Rfl: 6    gabapentin (Neurontin) 300 mg capsule, Take 1 capsule (300 mg total) by mouth 3 (three) times a day, Disp: 90 capsule, Rfl: 6    glipiZIDE (GLUCOTROL) 10 mg tablet, TAKE 1 TABLET TWICE A DAY  BEFORE MEALS, Disp: 180 tablet, Rfl: 1    hydrocortisone 2.5 % cream, Apply topically 2 (two) times a day as needed for rash, Disp: 30 g, Rfl: 3    ketorolac (ACULAR) 0.5 % ophthalmic solution, , Disp: , Rfl:     lisinopril-hydrochlorothiazide (PRINZIDE,ZESTORETIC) 20-12.5 MG per tablet, Take 1 tablet by mouth daily, Disp: 90 tablet, Rfl: 1    metFORMIN (GLUCOPHAGE) 1000 MG tablet, TAKE 1 TABLET TWICE DAILY  WITH MEALS, Disp: 180 tablet, Rfl: 1    omeprazole (PriLOSEC) 20 mg delayed release capsule, Take 1 capsule (20 mg total) by mouth daily before breakfast, Disp: 30 capsule, Rfl: 5    prednisoLONE acetate (PRED FORTE) 1 % ophthalmic suspension, , Disp: , Rfl:     tamsulosin (FLOMAX) 0.4 mg, Take 1 capsule (0.4 mg total) by mouth daily with dinner, Disp: 90 capsule, Rfl: 1    tirzepatide (Mounjaro) 2.5 MG/0.5ML, Inject 0.5 mL (2.5 mg total) under the skin every 7 days, Disp: 2 mL, Rfl: 3    No Known Allergies     Vitals:    12/09/24 1104   BP: 140/76   BP Location: Left arm   Pulse: 86   Resp: 18   Temp: 98.7 °F (37.1 °C)   TempSrc: Temporal   Weight: 67.6 kg (149 lb)

## 2024-12-10 ENCOUNTER — HOSPITAL ENCOUNTER (OUTPATIENT)
Dept: INFUSION CENTER | Facility: CLINIC | Age: 79
Discharge: HOME | End: 2024-12-10
Payer: MEDICARE

## 2024-12-10 VITALS
WEIGHT: 150 LBS | RESPIRATION RATE: 16 BRPM | BODY MASS INDEX: 24.99 KG/M2 | TEMPERATURE: 96.6 F | SYSTOLIC BLOOD PRESSURE: 146 MMHG | DIASTOLIC BLOOD PRESSURE: 54 MMHG | HEART RATE: 54 BPM | HEIGHT: 65 IN

## 2024-12-10 DIAGNOSIS — C22.0 HEPATOCELLULAR CARCINOMA (HCC): Primary | ICD-10-CM

## 2024-12-10 PROCEDURE — 96413 CHEMO IV INFUSION 1 HR: CPT

## 2024-12-10 RX ORDER — SODIUM CHLORIDE 9 MG/ML
20 INJECTION, SOLUTION INTRAVENOUS ONCE
Status: COMPLETED | OUTPATIENT
Start: 2024-12-10 | End: 2024-12-10

## 2024-12-10 RX ADMIN — SODIUM CHLORIDE 20 ML/HR: 9 INJECTION, SOLUTION INTRAVENOUS at 11:00

## 2024-12-10 RX ADMIN — DURVALUMAB 1500 MG: 500 INJECTION, SOLUTION INTRAVENOUS at 11:37

## 2024-12-10 NOTE — PROGRESS NOTES
Consultation Visit   Name: Max Ozuna      : 1945      MRN: [id number]  Encounter Provider: Juaquin Landaverde MD  Encounter Date: 2024   Encounter department: [facility] RADIATION ONCOLOGY  :  Assessment & Plan  Hepatocellular carcinoma (HCC)  Pt is an 79 year old man with Stage IIIB ([id number]) HCC of segment 6/7 with extensive portal vein invasion undergoing combination systemic therapy with Tremelimumab/Durvalumab. He has been evaluated by IR and has been recommended for Y90 radioembolization. He is seen today in consultation for assistance with Y90 planning and delivery.     We reviewed the patient's recent clinical history and imaging studies. On my review, he has adequate liver reserve for safe delivery of Y90 radioembolization. We multiple options for LRT. Specifically, we discussed that he does have evidence of right sided portal vein tumor thrombus and that such HCCs typically are not as well treated with Y90 as compared to alternatives. Nonetheless, it would be reasonable to proceed with Y90 to the right liver with subsequent SBRT to the tumor thrombus pending response evaluation.     Side effects of Y90 were reviewed in detail to include fatigue, abdominal pain, nausea, cough, gastritis, pancreatitis, pneumonitis, and radiation induced liver disease. The patient and his family were given the opportunity to ask multiple questions all of which were answered to their satisfaction. An informed consent was signed at today's visit. I will coordinate scheduling, dose calculations, and delivery with interventional radiology.     Summary:   - Plan to proceed with Y90 TARE             History of Present Illness   Chief Complaint   Patient presents with    Consult     Radiation Oncology   Pertinent Medical History   Mr. Max Ozuna is an 79 year old man with recently diagnosed Stage IIIB ([id number]) HCC of the segment 6/7 discovered after presenting to the ED with right flank  pain. CT Abd/Pelvis 2024) demonstrated portal vein thrombosis involving the posterior branch of the right hepatic lobe with heterogeneous decreased attenuation in the posterior right hepatic lobe parenchyma.  MRI abdomen (2024) demonstrated a liver mass highly suspicious for HCC, LR-TIV.  CT chest (2024) demonstrated no evidence of metastatic disease.  AFP was elevated to 21,374.27 (2024).  The patient was seen by Dr. Angeles and started on treatment with combination Durvalumab and tremelimumab.  He was thereafter seen by Dr. Umaña and recommended for Y90 radioembolization.  He was referred to our office for coordination of treatment.      Oncology History   Oncology History   Hepatocellular carcinoma (HCC)   2024 -  Cancer Staged    Staging form: Liver (Excluding Intrahepatic Bile Ducts), AJCC 8th Edition  - Clinical stage from 2024: Stage IIIB (cT4, cN0, cM0) - Signed by Mal Angeles MD on 2024  Stage prefix: Initial diagnosis       2024 Initial Diagnosis    Hepatocellular carcinoma (HCC)     10/15/2024 -  Chemotherapy    alteplase (CATHFLO), 2 mg, Intracatheter, Every 1 Minute as needed, 2 of 7 cycles  durvalumab (IMFINZI) IVPB, 1,500 mg, Intravenous, Once, 2 of 7 cycles  Administration: 1,500 mg (10/15/2024), 1,500 mg (2024)  tremelimumab-actl (IMJUDO) IVPB, 300 mg, Intravenous, Once, 1 of 1 cycle  Administration: 300 mg (10/15/2024)        Review of Systems Refer to nursing note.         Objective   /76 (BP Location: Left arm)   Pulse 86   Temp 98.7 °F (37.1 °C) (Temporal)   Resp 18   Wt 67.6 kg (149 lb)   BMI 24.79 kg/m²     Pain Screenin  ECOG ECOG Performance Status: 1 - Restricted in physically strenuous activity but ambulatory and able to carry out work of a light or sedentary nature, e.g., light house work, office work  Physical Exam   Well appearing. NAD  No increased work of breathing.   Extremities warm and well perfused.   Abdomen  non-distended.   Alert and well oriented. No encephalopathy. No jaundice.     Radiology Results: I have reviewed radiology images/reports described above. Radiology Results Review: I personally reviewed the following image studies in PACS and associated radiology reports: MRI abdomen/MRCP. My interpretation of the radiology images/reports is: Approximately 6 cm right posterolateral liver parenchymal mass with arterial enhancement with right hepatic vein tumor thrombus. .      Administrative Statements   I have spent a total time of 55 minutes in caring for this patient on the day of the visit/encounter including Diagnostic results, Risks and benefits of tx options, Counseling / Coordination of care, Documenting in the medical record, Reviewing / ordering tests, medicine, procedures  , and Obtaining or reviewing history  .

## 2024-12-10 NOTE — PROGRESS NOTES
Pt arrived accompanied by wife for his treatment. Offers no new complaints and states rash on back is resolving with ordered cream.    Imfinzi fully infused via patent IV site with no adverse reactions or infiltrates.   Pt tolerated same well.  AVS printed and handed to pt. Is aware to return on Tuesday 1/7/25 at 10:30.  Was discharged in stable condition to care of wife.

## 2024-12-10 NOTE — ASSESSMENT & PLAN NOTE
Pt is an 79 year old man with Stage IIIB ([id number]) HCC of segment 6/7 with extensive portal vein invasion undergoing combination systemic therapy with Tremelimumab/Durvalumab. He has been evaluated by IR and has been recommended for Y90 radioembolization. He is seen today in consultation for assistance with Y90 planning and delivery.     We reviewed the patient's recent clinical history and imaging studies. On my review, he has adequate liver reserve for safe delivery of Y90 radioembolization. We multiple options for LRT. Specifically, we discussed that he does have evidence of right sided portal vein tumor thrombus and that such HCCs typically are not as well treated with Y90 as compared to alternatives. Nonetheless, it would be reasonable to proceed with Y90 to the right liver with subsequent SBRT to the tumor thrombus pending response evaluation.     Side effects of Y90 were reviewed in detail to include fatigue, abdominal pain, nausea, cough, gastritis, pancreatitis, pneumonitis, and radiation induced liver disease. The patient and his family were given the opportunity to ask multiple questions all of which were answered to their satisfaction. An informed consent was signed at today's visit. I will coordinate scheduling, dose calculations, and delivery with interventional radiology.     Summary:   - Plan to proceed with Y90 TARE

## 2024-12-10 NOTE — PLAN OF CARE
Problem: Potential for Falls  Goal: Patient will remain free of falls  Description: INTERVENTIONS:  - Educate patient/family on patient safety including physical limitations  - Instruct patient to call for assistance with activity   - Consult OT/PT to assist with strengthening/mobility   - Keep Call bell within reach  - Keep bed low and locked with side rails adjusted as appropriate  - Keep care items and personal belongings within reach  - Initiate and maintain comfort rounds  - Make Fall Risk Sign visible to staff  - Offer Toileting every    Hours, in advance of need  - Initiate/Maintain   alarm  - Obtain necessary fall risk management equipment:     - Apply yellow socks and bracelet for high fall risk patients  - Consider moving patient to room near nurses station  Outcome: Adequate for Discharge

## 2024-12-16 ENCOUNTER — TELEPHONE (OUTPATIENT)
Age: 79
End: 2024-12-16

## 2024-12-16 NOTE — TELEPHONE ENCOUNTER
Patients GI provider:  KATE Brown    Number to return call: (  [phone]   Reason for call: Pt calling to go over egd prep instructions     Scheduled procedure/appointment date if applicable: procedure 1/15/25

## 2024-12-24 ENCOUNTER — TELEPHONE (OUTPATIENT)
Age: 79
End: 2024-12-24

## 2024-12-24 NOTE — PROGRESS NOTES
Hematology Outpatient Office Note    Date of Service: 1/2/2025    [facility]  [address]  [facility] [address]  [phone]    Reason for Consultation:   Chief Complaint   Patient presents with    Follow-up       Referral Physician: No ref. provider found    Primary Care Physician:  DO Maddi Quinonez: Daughter     Taylor: wife    Sallie: daughter in law    ASSESSMENT & PLAN      Diagnosis ICD-10-CM Associated Orders   1. Hepatocellular carcinoma (HCC)  C22.0 CT chest abdomen pelvis w contrast              This is a 79 y.o. c PMHx notable for cecal AVM/GAVE, PE, DM, DVT, HTN, HLP, CAD, being seen in consultation for chronic ANG and locally advanced HCC     Iron Deficiency anemia: improved s/p IV iron  Low iron levels can cause anemia (low red blood cells). Low iron levels can also make people feel poorly, even before anemia starts. Iron is used to make red blood cells. If blood is lost from the body, if iron can't be absorbed from food, or if something removes iron (pregnancy) the iron can be low and then anemia happens.    Hx Cecal AVM. GI thought maybe GAVE  8/29/2022: Hgb 9.5, WBC 6.35k, MCV 91, plt 235k, retic 3.65%, FOBT + (8/31), ferritin 7, iron 30, Fe Sat 9%, TIBC 346  9/7 EGD/C-scope: internal hemorrhoids    Capsule endoscopy: no active bleeding  6/27/2023 EGD with single balloon enteroscopy revealed no bleeding    IrAE drug induced rash: on hydrocortisone 2.5% to good effect    Iron deficiency is very common. Low iron can cause fatigue or tiredness, the desire to eat ice or non-food items like corn starch or dry pasta noodles, restless legs, weak fingernails, cracks at the corner of the mouth.     Common causes of iron deficiency include inadequate diet (uncommon, usually vegetarians), gastric bypass surgery (very common), pregnancy (very common), periods (most common cause in younger women), blood loss (usually from the stomach or  intestines), and decreased iron absorption from the intestines from gastritis, H pylori, acid blocking medicines or celiac disease. Even a normal menstrual period can cause iron deficiency. In 10% of patients a clear cause of low iron is not found. 14% of women will have iron deficiency just from their menstrual period.       Iron deficiency is treated with pills as a first step. For some people the iron pills do not work, cause severe side effects, or take too long to work: for these people IV iron can be given. You may only require IV iron very rarely, for example only when pregnant, or from 1-2 times a year based on your rate of need. It takes 4 weeks for IV iron to improve the anemia to maximum potential. If your fatigue is not from the low iron, getting iron treatment would not help the fatigue.    9/24/2024 CT Abd/pelv w/c: Nodular liver contour, indicated of cirrhosis. Portal vein thrombosis involving posterior branch of the right hepatic lobe  MRI Abd w/wo c: Approximately 5.4 x 4.8 x 5.5 cm (length X depth X width) solitary segment 6/7 mass  9/25/2024: AFP 21,374  9/25/2024: CT Chest w/c: Emphysema with areas of fibrosis/scarring which appears stable compared to study of 11/15/2019  10/11/2024 Liver tumor board interventional and radiology do think he would be a good candidate for Y90 treatment concurrently with immunotherapy  12/30/2024 CT CAP w/c:  excellent DC  Decrease in size segment VI/VII right hepatic lobe mass since 9/24/2024, currently measuring 3.1 x 2.6 x 4.5 cm (61/105, 4/108), previously 5.4 x 4.8 x 5.5 cm. Again seen is tumor thrombus in the posterior branch of right hepatic lobe, decreased from prior exam. No metastatic disease in the chest, abdomen or pelvis        Discussion of decision making    I personally reviewed the following lab results, the image studies, pathology, other specialty/physicians consult notes and recommendations, and outside medical records from [facility]/other  institutions. I had a lengthy discussion with the patient and shared the work-up findings. We discussed the diagnosis and management plan as below. I spent 43 minutes reviewing the records (labs, clinician notes, outside records, medical history, ordering medicine/tests/procedures, interpreting the imaging/labs previously done) and coordination of care as well as direct time with the patient today, of which greater than 50% of the time was spent in counseling and coordination of care with the patient/family.    Plan/Labs  Cont PO iron MWF   Cont to f/u GI  Refilled Hydrocortisone 2.5% cream for his drug rash previously  Restaging CT CAP w/c ordered 6/26/2025  Liver directed therapy planned 1/20/2025  Infusion chairs ordered for Durvalumab + Tremelimumab for advanced HCC, C4 coming up with preceding labs  IR referral in process for liver directed therapy  AFP ordered for C5/C7 labs          Follow Up: q4 weeks scheduled thru 5/14/2025, more*    All questions were answered to the patient's satisfaction during this encounter. The patient knows the contact information for our office and knows to reach out for any relevant concerns related to this encounter. They are to call for any temperature 100.4 or higher, new symptoms including but not restricted to shaking chills, decreased appetite, nausea, vomiting, diarrhea, increased fatigue, shortness of breath or chest pain, confusion, and not feeling the strength to come to the clinic. For all other listed problems and medical diagnosis in their chart - they are managed by PCP and/or other specialists, which the patient acknowledges. Thank you very much for your consultation and making us a part of this patient's care. We are continuing to follow closely with you. Please do not hesitate to reach out to me with any additional questions or concerns.    Mal Angeles MD  Hematology & Medical Oncology Staff Physician             Disclaimer: This document was prepared  using M Modal Fluency Direct technology. If a word or phrase is confusing, or does not make sense, this is likely due to recognition error which was not discovered during this clinician's review. If you believe an error has occurred, please contact me through HemOnc service line for dequan?cation.      HEMATOLOGICAL HISTORY OF PRESENT ILLNESS      Clotting History None   Bleeding History GAVE related bleeding   Cancer History None   Family Cancer History Father (cancer)   H/O Blood/Plt Transfusion PRBC years ago   Tobacco/etoh/drug abuse 2 PPDx 44 years, quit 2004, no etoh abuse or rec drug use       Cancer Screening history n/a   Occupation [employer]      8/29/2022: Hgb 9.5, WBC 6.35k, MCV 91, plt 235k, retic 3.65%, FOBT + (8/31), ferritin 7, iron 30, Fe Sat 9%, TIBC 346  9/7 EGD/C-scope: internal hemorrhoids  10/3/2022-10/9/2022: 5 iV Venofer 200mg administered   11/28/2022: ferritin 26, iron 67, Fe Sat 21%, TIBC 322, Hgb 12.7 (now normal)  5/2/2023: Discussed with the patient the results of his iron studies which show worsening iron deficiency as well as recurrent anemia.  I ordered 600 mg of IV Venofer  5/18/2023: ferritin 7, iron 36, Fe Sat 10%, TIBC 364, Hgb 11.6  6/29/2023: ferritin 37, iron 52, Fe Sat 17%, TIBC 307, Hgb 12.7, MCV 92  9/20/2023:  ferritin 58, iron 65, Fe Sat 33%, TIBC 230, Hgb 13.5, MCV 95  2/14/2024: iron 42, ferritin 22, Fe Sat 14%, TIBC 304: Vit B12 632, Hgb 13.4, plt 161k, WBC 6.21k  4/18/2024: iron 59, Fe Sat 18%, TIBC 329, ferritin 21, Hgb 13.4 (2/2024)  4/29/2024: planned IV Venofer 200mg x2  10/2/2024: Ferritin: 224, Hgb 13.6, Fe Sat 13%, TIBC 208, iron 26, ferritin 224 AOCI  11/8/2024: Lipase: 380 (4x ULN)  12/27/2024: AFP 0.62  SUBJECTIVE  (INTERVAL HISTORY)      Interval events: He has minimal, stable fatigue from the treatment, has stable mild rash over R anterior leg and posterior lower back. Has itching with dry skin from the weather. Is using a  moisturizer.      I have reviewed the relevant past medical, surgical, social and family history. I have also reviewed allergies and medications for this patient.    Review of Systems    Baseline weight: 152-154 lbs    Denies weight loss, F/C, N/V, SOB, CP, LH, HA.    He has had fatigue and low energy since 6/2022. He was chewing on ice all day since 3/2022.     A 10-point review of system was performed, pertinent positive and negative were detailed as above. Otherwise, the 10-point review of system was negative.      Past Medical History:   Diagnosis Date    Coronary artery disease without angina pectoris 02/25/2020    Diabetes mellitus (HCC)     DVT (deep venous thrombosis) (HCC)     GERD (gastroesophageal reflux disease)     Hypertension     Iron deficiency anemia        Past Surgical History:   Procedure Laterality Date    ANGIOPLASTY  10/19/2021    bilateral    CARDIAC CATHETERIZATION  2013    calif    CABG  x4    COLONOSCOPY      COLONOSCOPY      CORONARY ANGIOPLASTY WITH STENT PLACEMENT  01/01/2010    CORONARY ARTERY BYPASS GRAFT      FEMORAL ARTERY STENT      FEMORAL BYPASS  10/20/2021    IR LOWER EXTREMITY ANGIOGRAM  2/14/2023    IR PELVIC ANGIOGRAM  5/6/2022    MO SLCTV CATHJ 3RD+ ORD SLCTV ABDL PEL/LXTR BRNCH  5/6/2022    Procedure: ULTRASOUND-GUIDED LEFT BRACHIAL ARTERY PUNCTURE, AORTOGRAM, RIGHT COMMON ILIAC ARTERY ANGIOPLASTY WITH 8 X 40 MM BALLOON, ANGIOPLASTY OF RIGHT EXTERNAL AND COMMON ILIAC ARTERY STENOSIS WITH 6 X 150 MM DRUG-ELUTING BALLOON, STENTING OF RIGHT DISTAL EXTERNAL ILIAC ARTERY STENOSIS WITH 7 X 40 MM SELF EXPANDING STENT POST DILATED WITH A 6 MM BALLOON.;  Surgeon: Henry Pena MD;  [place]    MO SLCTV CATHJ 3RD+ ORD SLCTV ABDL PEL/LXTR BRNCH Bilateral 2/14/2023    Procedure: CUTDOWN FEM-FEM BYPASS WITH PRIMARY CLOSURE ACCESS SITE, BILATERAL RUN-OFF, LEFT FEM-POP 5X220 QUYEN AND 6X150 RODRI, ATTEMPT TO CROSS RIGHT SFA OCCLUSION;  Surgeon: Henry Pena MD;  Location: [facility]  OR;  Service: Vascular       Family History   Problem Relation Age of Onset    No Known Problems Mother     Lung cancer Father     Liver cancer Brother        Social History     Socioeconomic History    Marital status: /Civil Union     Spouse name: Not on file    Number of children: Not on file    Years of education: Not on file    Highest education level: Not on file   Occupational History    Not on file   Tobacco Use    Smoking status: Former     Current packs/day: 0.00     Average packs/day: 1.5 packs/day for 40.0 years (60.0 ttl pk-yrs)     Types: Cigarettes     Start date:      Quit date:      Years since quittin.0     Passive exposure: Past    Smokeless tobacco: Never    Tobacco comments:     quit --    Vaping Use    Vaping status: Never Used   Substance and Sexual Activity    Alcohol use: Not Currently     Alcohol/week: 0.0 standard drinks of alcohol    Drug use: No    Sexual activity: Not on file   Other Topics Concern    Not on file   Social History Narrative    · Most recent tobacco use screenin2020      Social Drivers of Health     Financial Resource Strain: Low Risk  (2023)    Overall Financial Resource Strain (CARDIA)     Difficulty of Paying Living Expenses: Not hard at all   Food Insecurity: Not on file   Transportation Needs: No Transportation Needs (2023)    PRAPARE - Transportation     Lack of Transportation (Medical): No     Lack of Transportation (Non-Medical): No   Physical Activity: Not on file   Stress: Not on file   Social Connections: Not on file   Intimate Partner Violence: Not on file   Housing Stability: Not on file       No Known Allergies    Current Outpatient Medications   Medication Sig Dispense Refill    acetaminophen (TYLENOL) 500 mg tablet Take 1,000 mg by mouth every 8 (eight) hours as needed      atorvastatin (LIPITOR) 40 mg tablet TAKE 1 TABLET DAILY 90 tablet 1    chlorhexidine (PERIDEX) 0.12 % solution Apply 15 mL to the mouth or  throat 2 (two) times a day 473 mL 3    clopidogrel (PLAVIX) 75 mg tablet Take 1 tablet (75 mg total) by mouth daily 90 tablet 1    DULoxetine (CYMBALTA) 30 mg delayed release capsule TAKE ONE CAPSULE BY MOUTH TWICE DAILY 60 capsule 0    Empagliflozin (Jardiance) 25 MG TABS Take 1 tablet (25 mg total) by mouth every morning Also: take with LOTS of water 90 tablet 3    fenofibrate (TRICOR) 145 mg tablet Take 1 tablet (145 mg total) by mouth daily 90 tablet 3    ferrous sulfate 325 (65 Fe) mg tablet Take 1 tablet (325 mg total) by mouth 2 (two) times a day with meals 60 tablet 6    gabapentin (Neurontin) 300 mg capsule Take 1 capsule (300 mg total) by mouth 3 (three) times a day 90 capsule 6    glipiZIDE (GLUCOTROL) 10 mg tablet TAKE 1 TABLET TWICE A DAY  BEFORE MEALS 180 tablet 1    hydrocortisone 2.5 % cream Apply topically 2 (two) times a day as needed for rash 30 g 3    ketorolac (ACULAR) 0.5 % ophthalmic solution       lisinopril-hydrochlorothiazide (PRINZIDE,ZESTORETIC) 20-12.5 MG per tablet Take 1 tablet by mouth daily 90 tablet 1    metFORMIN (GLUCOPHAGE) 1000 MG tablet TAKE 1 TABLET TWICE DAILY  WITH MEALS 180 tablet 1    omeprazole (PriLOSEC) 20 mg delayed release capsule Take 1 capsule (20 mg total) by mouth daily before breakfast 30 capsule 5    prednisoLONE acetate (PRED FORTE) 1 % ophthalmic suspension       tamsulosin (FLOMAX) 0.4 mg Take 1 capsule (0.4 mg total) by mouth daily with dinner 90 capsule 1    tirzepatide (Mounjaro) 2.5 MG/0.5ML Inject 0.5 mL (2.5 mg total) under the skin every 7 days 2 mL 3     No current facility-administered medications for this visit.       (Not in a hospital admission)        Objective:     24 Hour Vitals Assessment:     Vitals:    01/02/25 1124   BP: 158/60   Pulse: 65   Resp: 18   Temp: (!) 97 °F (36.1 °C)   SpO2: 99%         Weight last visit 149 lbs  Weight today: 152 lbs      PHYSICIAN EXAM:    General: Appearance: alert, cooperative, no distress.  HEENT:  Normocephalic, atraumatic. No scleral icterus. conjunctivae clear. EOMI.  Chest: No tenderness to palpation. No open wound noted.  Lungs: Clear to auscultation bilaterally, Respirations unlabored.  Cardiac: Regular rate and rhythm, +S1and S2  Abdomen: Soft, non-tender, non-distended. Bowel sounds are normal.  Extremities:  No edema, cyanosis, clubbing.  Skin: Skin color, turgor are normal. Small R anterior leg rashes  Neurologic: Awake, Alert, and oriented, no gross focal deficits noted b/l.       DATA REVIEW:    Pathology Result:    Final Diagnosis   Date Value Ref Range Status   06/27/2023   Final    A. Duodenum, :  - Unremarkable duodenal mucosa  - No villous atrophy or increased intraepithelial lymphocytes seen     B. Stomach, :  - Gastric oxyntic and antral type mucosa with minimal or mild chronic inflammation  - No sharad shaped bacteria seen on H&E stained tissue section  - Negative for intestinal metaplasia and dysplasia          09/07/2022   Final    A. Duodenum,  biopsy:  - Duodenal mucosa with focal acute inflammation and reactive changes  - No intraepithelial lymphocytosis and no villous blunting.  - No epithelial dysplasia and no evidence of malignancy.    B.  Stomach, biopsy:  - Gastric antral and oxyntic mucosa with no significant pathologic alteration.  - Negative for intestinal metaplasia, dysplasia or carcinoma.  - No Helicobacter pylori is identified on H&E stained slide.              Image Results:   Image result are reviewed and documented in Hematology/Oncology history. I personally reviewed these images.    CT chest abdomen pelvis w contrast  Narrative: CT CHEST, ABDOMEN AND PELVIS WITH IV CONTRAST    INDICATION: C22.0: Liver cell carcinoma. History of liver cirrhosis and HCC, currently on immunotherapy and under evaluation for Y90 treatment. Restaging.    COMPARISON: CT chest dated 9/25/2024, MRI abdomen dated 9/24/2024    TECHNIQUE: CT examination of the chest, abdomen and pelvis was performed.  Multiplanar 2D reformatted images were created from the source data.    This examination, like all CT scans performed in the [facility], was performed utilizing techniques to minimize radiation dose exposure, including the use of iterative reconstruction and automated exposure control. Radiation dose length   product (DLP) for this visit: 1184 mGy-cm    IV Contrast: 100 mL of iohexol (OMNIPAQUE)  Enteric Contrast: Not administered.    FINDINGS:    CHEST    LUNGS: Patent central airways. No consolidation. Moderate upper lobe predominant centrilobular emphysema. Similar appearance of subpleural interstitial thickening bilaterally, suggestive of superimposed fibrosis. Stable scattered calcified granulomas.   Stable 1.2 cm right upper lobe irregular density (5/49), likely a scar.    PLEURA: Unremarkable.    HEART/GREAT VESSELS: Normal cardiac size.  No pericardial effusion. CABG. Extensive coronary artery calcifications. Normal caliber and course of the great vessels with atherosclerotic calcifications.  No thoracic aortic aneurysm.    MEDIASTINUM AND RICHARD: Unremarkable.    CHEST WALL AND LOWER NECK: Median sternotomy.    ABDOMEN    LIVER/BILIARY TREE:  - Liver cirrhosis with diffuse surface nodularity.    - Decrease in size segment VI/VII right hepatic lobe mass since 9/24/2024, currently measuring 3.1 x 2.6 x 4.5 cm (61/105, 4/108), previously 5.4 x 4.8 x 5.5 cm. Again seen is tumor thrombus in the posterior branch of right hepatic lobe, decreased from   prior exam.    GALLBLADDER: Cholelithiasis without findings of acute cholecystitis.    SPLEEN: Top normal size measuring 13 cm.    PANCREAS: Unremarkable.    ADRENAL GLANDS: Unremarkable.    KIDNEYS/URETERS: Stable small left renal cyst. No hydronephrosis.    STOMACH AND BOWEL: Unremarkable.    APPENDIX: No findings to suggest appendicitis.    ABDOMINOPELVIC CAVITY: Trace free fluid in the pelvis. No pneumoperitoneum. No lymphadenopathy.    VESSELS:  Atherosclerosis without abdominal aortic aneurysm. Femoral-femoral bypass graft.    PELVIS    REPRODUCTIVE ORGANS: Unremarkable for patient's age.    URINARY BLADDER: Unremarkable.    ABDOMINAL WALL/INGUINAL REGIONS: Small fat-containing hernia at the epigastrium below the xiphoid process.    BONES: No acute fracture or suspicious osseous lesion. Spinal degenerative changes with dextroconvex lumbar scoliosis. 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  Impression: 1. Decrease in size of right hepatic lobe HCC and portal vein thrombus since 9/24/2024.    2. No metastatic disease in the chest, abdomen or pelvis.    3. Liver cirrhosis.    4. Moderate emphysema with probable superimposed pulmonary fibrosis (CPFE), similar to prior chest CT.    Workstation performed: [id number]      LABS:  Lab data are reviewed and documented in HemOnc history.       Lab Results   Component Value Date    HGB 13.2 12/27/2024    HCT 41.0 12/27/2024    MCV 95 12/27/2024     (L) 12/27/2024    WBC 5.88 12/27/2024    NRBC 0 12/27/2024     Lab Results   Component Value Date    K 4.0 12/27/2024     12/27/2024    CO2 22 12/27/2024    BUN 21 12/27/2024    CREATININE 0.90 12/27/2024    GLUF 156 (H) 11/18/2024    CALCIUM 9.3 12/27/2024    CORRECTEDCA 10.2 (H) 11/15/2021    AST 29 12/27/2024    ALT 39 12/27/2024    ALKPHOS 80 12/27/2024    EGFR 79 12/27/2024       Lab Results   Component Value Date    IRON 26 (L) 10/02/2024    TIBC 208 (L) 10/02/2024    FERRITIN 224 10/02/2024    FERRITIN 21 (L) 04/18/2024    FERRITIN 22 (L) 02/14/2024    FERRITIN 58 09/20/2023    FERRITIN 37 06/29/2023    FERRITIN 7 (L) 05/18/2023    FERRITIN 26 11/28/2022    FERRITIN 7 (L) 08/29/2022    FERRITIN 37 04/20/2022    FERRITIN 17 02/07/2022    FERRITIN 13 01/05/2022    FERRITIN 10 11/15/2021    FERRITIN 28 03/11/2021    FERRITIN 12 11/03/2020       Lab Results   Component Value Date    UNRWEBHF13 632 02/14/2024     "ISZECLXF03 223 09/20/2023    FRBCBZWU26 389 11/15/2021    [id number] 681 05/29/2020       No results for input(s): \"WBC\", \"CREAT\", \"PLT\" in the last 72 hours.         By:  Mal Angeles, 1/2/2025, 11:36 AM                                    "

## 2024-12-24 NOTE — TELEPHONE ENCOUNTER
Our mutual patient is scheduled for procedure: ____EGD___________     On: _1/15/25___________    With: ___Dr. Isac Gaytan___________    He/She is taking the following blood thinner:  __Plavix 75 mg__________     Can this be stopped __5_ days prior to the procedure?       Physician Approving clearance: ________________________

## 2024-12-26 ENCOUNTER — TELEPHONE (OUTPATIENT)
Age: 79
End: 2024-12-26

## 2024-12-26 NOTE — TELEPHONE ENCOUNTER
Our mutual patient is scheduled for procedure: ____EGD___________     On: _1/15/25___________    With: ___Dr. Isac Gaytan___________    He/She is taking the following blood thinner:  __Plavix 75 mg__________     Can this be stopped __5_ days prior to the procedure?       Physician Approving clearance: ___Dr. HUMZA Martel D.O._____________________

## 2024-12-26 NOTE — TELEPHONE ENCOUNTER
Our mutual patient is scheduled for procedure: ___EGD____________     On: ___1/15/25_________    With: __Dr. Isac Gaytan____________    He/She is taking the following blood thinner:  _Plavix 75 mg_(Clopidrogrel).__________     Can this be stopped _5__ days prior to the procedure?       Physician Approving clearance: ________________________

## 2024-12-27 ENCOUNTER — APPOINTMENT (OUTPATIENT)
Dept: LAB | Facility: CLINIC | Age: 79
End: 2024-12-27
Payer: MEDICARE

## 2024-12-27 DIAGNOSIS — C22.0 HEPATOCELLULAR CARCINOMA (HCC): ICD-10-CM

## 2024-12-27 LAB
AMYLASE SERPL-CCNC: 42 IU/L (ref 29–103)
LIPASE SERPL-CCNC: 45 U/L (ref 11–82)
TSH SERPL DL<=0.05 MIU/L-ACNC: 0.97 UIU/ML (ref 0.45–4.5)

## 2024-12-27 PROCEDURE — 83690 ASSAY OF LIPASE: CPT

## 2024-12-27 PROCEDURE — 82150 ASSAY OF AMYLASE: CPT

## 2024-12-27 PROCEDURE — 84443 ASSAY THYROID STIM HORMONE: CPT

## 2024-12-30 ENCOUNTER — RESULTS FOLLOW-UP (OUTPATIENT)
Dept: FAMILY MEDICINE CLINIC | Facility: CLINIC | Age: 79
End: 2024-12-30

## 2024-12-30 ENCOUNTER — HOSPITAL ENCOUNTER (OUTPATIENT)
Dept: CT IMAGING | Facility: HOSPITAL | Age: 79
Discharge: HOME | End: 2024-12-30
Attending: INTERNAL MEDICINE
Payer: MEDICARE

## 2024-12-30 DIAGNOSIS — C22.0 HEPATOCELLULAR CARCINOMA (HCC): ICD-10-CM

## 2024-12-30 PROCEDURE — 74177 CT ABD & PELVIS W/CONTRAST: CPT

## 2024-12-30 PROCEDURE — 71260 CT THORAX DX C+: CPT

## 2024-12-30 RX ADMIN — IOHEXOL 100 ML: 350 INJECTION, SOLUTION INTRAVENOUS at 12:03

## 2024-12-31 RX ORDER — SODIUM CHLORIDE 9 MG/ML
20 INJECTION, SOLUTION INTRAVENOUS ONCE
OUTPATIENT
Start: 2025-02-04

## 2024-12-31 RX ORDER — SODIUM CHLORIDE 9 MG/ML
20 INJECTION, SOLUTION INTRAVENOUS ONCE
Status: CANCELLED | OUTPATIENT
Start: 2025-01-07

## 2025-01-02 ENCOUNTER — OFFICE VISIT (OUTPATIENT)
Dept: HEMATOLOGY ONCOLOGY | Facility: CLINIC | Age: 80
End: 2025-01-02
Payer: MEDICARE

## 2025-01-02 VITALS
SYSTOLIC BLOOD PRESSURE: 158 MMHG | OXYGEN SATURATION: 99 % | HEART RATE: 65 BPM | TEMPERATURE: 97 F | HEIGHT: 65 IN | WEIGHT: 152 LBS | DIASTOLIC BLOOD PRESSURE: 60 MMHG | BODY MASS INDEX: 25.33 KG/M2 | RESPIRATION RATE: 18 BRPM

## 2025-01-02 DIAGNOSIS — C22.0 HEPATOCELLULAR CARCINOMA (HCC): Primary | ICD-10-CM

## 2025-01-02 DIAGNOSIS — R53.0 NEOPLASTIC MALIGNANT RELATED FATIGUE: ICD-10-CM

## 2025-01-02 PROCEDURE — 99215 OFFICE O/P EST HI 40 MIN: CPT | Performed by: INTERNAL MEDICINE

## 2025-01-02 PROCEDURE — G2211 COMPLEX E/M VISIT ADD ON: HCPCS | Performed by: INTERNAL MEDICINE

## 2025-01-02 NOTE — Clinical Note
Stephen Oliveira! Happy New Years, Max is having an excellent WI per RECIST 1.1 criteria on the immunotherapy, we will continue these monthly infusions and I have a repeat CT planned in June.  Hope all is well,   Heidar

## 2025-01-02 NOTE — Clinical Note
Dr. Gaytan,  Please call Mr. Santana to explain the purpose once more of the EGD plan on January 15.  Hope all is well. He is requesting this.  Mal Angeles MD

## 2025-01-06 ENCOUNTER — TELEPHONE (OUTPATIENT)
Dept: RADIOLOGY | Facility: HOSPITAL | Age: 80
End: 2025-01-06

## 2025-01-06 RX ORDER — SODIUM CHLORIDE 9 MG/ML
75 INJECTION, SOLUTION INTRAVENOUS CONTINUOUS
Status: CANCELLED | OUTPATIENT
Start: 2025-01-06

## 2025-01-06 NOTE — PRE-PROCEDURE INSTRUCTIONS
Pre-procedure Instructions for Interventional Radiology  [address] [address]  INTERVENTIONAL RADIOLOGY [phone]    You are scheduled for a/an Y-90 mapping.    On Monday 1-13-25.    Your tentative arrival time is 10am.  Short stay will notify you the day before your procedure with the exact arrival time and the location to arrive.    To prepare for your procedure:  Please arrange for someone to drive you home after the procedure and stay with you until the next morning if you are instructed to do so.  This is typically for patients receiving some type of sedative or anesthetic for the procedure.  DO NOT EAT OR DRINK ANYTHING after midnight on the evening before your procedure including candy & gum.  ONLY SIPS OF WATER with your medications are allowed on the morning of your procedure.  TAKE ALL OF YOUR REGULAR MEDICATIONS THE MORNING OF YOUR PROCEDURE with sips of water!  We may call you to stop some of your blood sugar, blood pressure and blood thinning medications depending on the procedure.  Please take all of these medications unless we instruct you to stop them.  If you have an allergy to x-ray dye, please contact Interventional Radiology for an x-ray dye preparation which usually consists of an oral steroid and Benadryl.    The day of your procedure:  Bring a list of the medications you take at home.  Bring medications you take for breathing problems (such as inhalers), medications for chest pain, or both.  Bring a case for your glasses or contacts.  Bring your insurance card and a form of photo ID.  Please leave all valuables such as credit cards and jewelry at home.  Report to the admitting office to the left of the registration desk in the main lobby at the [facility], Entrance B.  You will then be directed to the Short Stay Center.  While your procedure is being performed, your family may wait in the Radiology Waiting Room on the 1st floor in  Radiology.  if they need to leave, they may provide a number to be called following the procedure.   Be prepared to stay overnight just in case. Sometimes procedures will indicate the need for further observation or treatment.   If you are scheduled for a follow-up visit with the Interventional Radiologist after your procedure, you will be called with a date and time.    Special Instructions (Medications to stop taking before your procedure etc.):  Hold Glipizide the morning of the procedure.  Hold Metformin 1-13-25 and 1-14-25.  Hold Jardiance for 4 days before the procedure.  Do not start Mounjaro before the procedure.  (Reports not taking.)

## 2025-01-07 ENCOUNTER — HOSPITAL ENCOUNTER (OUTPATIENT)
Dept: INFUSION CENTER | Facility: CLINIC | Age: 80
Discharge: HOME | End: 2025-01-07
Payer: MEDICARE

## 2025-01-07 VITALS
RESPIRATION RATE: 16 BRPM | SYSTOLIC BLOOD PRESSURE: 150 MMHG | DIASTOLIC BLOOD PRESSURE: 63 MMHG | TEMPERATURE: 98.1 F | HEART RATE: 70 BPM

## 2025-01-07 DIAGNOSIS — C22.0 HEPATOCELLULAR CARCINOMA (HCC): Primary | ICD-10-CM

## 2025-01-07 PROCEDURE — 96413 CHEMO IV INFUSION 1 HR: CPT

## 2025-01-07 RX ORDER — SODIUM CHLORIDE 9 MG/ML
20 INJECTION, SOLUTION INTRAVENOUS ONCE
Status: COMPLETED | OUTPATIENT
Start: 2025-01-07 | End: 2025-01-07

## 2025-01-07 RX ADMIN — SODIUM CHLORIDE 20 ML/HR: 0.9 INJECTION, SOLUTION INTRAVENOUS at 11:53

## 2025-01-07 RX ADMIN — DURVALUMAB 1500 MG: 500 INJECTION, SOLUTION INTRAVENOUS at 11:54

## 2025-01-07 NOTE — PROGRESS NOTES
Patient arrived to unit without incident. Recent Hepatitis A panel drawn on 12/27/24 came back reactive. Spoke with Dr. Angeles via [employer] Chat and GI had ordered test and is aware of results. Patient is ok to continue treatment. Patient tolerated Imfinzi infusion without incident. AVS provided and patient aware of next appointment on 2/4/25 at 10:30. Patient left in stable condition.

## 2025-01-12 ENCOUNTER — RESULTS FOLLOW-UP (OUTPATIENT)
Age: 80
End: 2025-01-12

## 2025-01-13 ENCOUNTER — HOSPITAL ENCOUNTER (OUTPATIENT)
Dept: RADIOLOGY | Facility: HOSPITAL | Age: 80
Discharge: HOME | End: 2025-01-13
Attending: RADIOLOGY
Payer: MEDICARE

## 2025-01-13 ENCOUNTER — TELEPHONE (OUTPATIENT)
Dept: INTERVENTIONAL RADIOLOGY/VASCULAR | Facility: HOSPITAL | Age: 80
End: 2025-01-13

## 2025-01-13 VITALS
BODY MASS INDEX: 24.11 KG/M2 | OXYGEN SATURATION: 96 % | WEIGHT: 150 LBS | DIASTOLIC BLOOD PRESSURE: 50 MMHG | HEIGHT: 66 IN | SYSTOLIC BLOOD PRESSURE: 130 MMHG | TEMPERATURE: 97.7 F | HEART RATE: 60 BPM | RESPIRATION RATE: 18 BRPM

## 2025-01-13 DIAGNOSIS — C22.0 HCC (HEPATOCELLULAR CARCINOMA) (HCC): ICD-10-CM

## 2025-01-13 LAB
ALBUMIN SERPL BCG-MCNC: 4.3 G/DL (ref 3.5–5)
ALP SERPL-CCNC: 78 U/L (ref 34–104)
ALT SERPL W P-5'-P-CCNC: 17 U/L (ref 7–52)
ANION GAP SERPL CALCULATED.3IONS-SCNC: 9 MMOL/L (ref 4–13)
AST SERPL W P-5'-P-CCNC: 12 U/L (ref 13–39)
BILIRUB SERPL-MCNC: 0.96 MG/DL (ref 0.2–1)
BUN SERPL-MCNC: 26 MG/DL (ref 5–25)
CALCIUM SERPL-MCNC: 9.7 MG/DL (ref 8.4–10.2)
CHLORIDE SERPL-SCNC: 104 MMOL/L (ref 96–108)
CO2 SERPL-SCNC: 23 MMOL/L (ref 21–32)
CREAT SERPL-MCNC: 1.02 MG/DL (ref 0.6–1.3)
ERYTHROCYTE [DISTWIDTH] IN BLOOD BY AUTOMATED COUNT: 15.4 % (ref 11.6–15.1)
GFR SERPL CREATININE-BSD FRML MDRD: 68 ML/MIN/1.73SQ M
GLUCOSE P FAST SERPL-MCNC: 164 MG/DL (ref 65–99)
GLUCOSE SERPL-MCNC: 164 MG/DL (ref 65–140)
HCT VFR BLD AUTO: 41.2 % (ref 36.5–49.3)
HGB BLD-MCNC: 13.3 G/DL (ref 12–17)
INR PPP: 1.06 (ref 0.85–1.19)
MCH RBC QN AUTO: 30.6 PG (ref 26.8–34.3)
MCHC RBC AUTO-ENTMCNC: 32.3 G/DL (ref 31.4–37.4)
MCV RBC AUTO: 95 FL (ref 82–98)
PLATELET # BLD AUTO: 203 THOUSANDS/UL (ref 149–390)
PMV BLD AUTO: 9.5 FL (ref 8.9–12.7)
POTASSIUM SERPL-SCNC: 4.2 MMOL/L (ref 3.5–5.3)
PROT SERPL-MCNC: 7.5 G/DL (ref 6.4–8.4)
PROTHROMBIN TIME: 14.1 SECONDS (ref 12.3–15)
RBC # BLD AUTO: 4.35 MILLION/UL (ref 3.88–5.62)
SODIUM SERPL-SCNC: 136 MMOL/L (ref 135–147)
WBC # BLD AUTO: 6.76 THOUSAND/UL (ref 4.31–10.16)

## 2025-01-13 PROCEDURE — 99152 MOD SED SAME PHYS/QHP 5/>YRS: CPT

## 2025-01-13 PROCEDURE — 76937 US GUIDE VASCULAR ACCESS: CPT

## 2025-01-13 PROCEDURE — 80053 COMPREHEN METABOLIC PANEL: CPT | Performed by: RADIOLOGY

## 2025-01-13 PROCEDURE — C1769 GUIDE WIRE: HCPCS

## 2025-01-13 PROCEDURE — 75774 ARTERY X-RAY EACH VESSEL: CPT | Performed by: RADIOLOGY

## 2025-01-13 PROCEDURE — 85027 COMPLETE CBC AUTOMATED: CPT | Performed by: RADIOLOGY

## 2025-01-13 PROCEDURE — C1894 INTRO/SHEATH, NON-LASER: HCPCS

## 2025-01-13 PROCEDURE — 99153 MOD SED SAME PHYS/QHP EA: CPT

## 2025-01-13 PROCEDURE — 36247 INS CATH ABD/L-EXT ART 3RD: CPT | Performed by: RADIOLOGY

## 2025-01-13 PROCEDURE — 36247 INS CATH ABD/L-EXT ART 3RD: CPT

## 2025-01-13 PROCEDURE — 78830 RP LOCLZJ TUM SPECT W/CT 1: CPT

## 2025-01-13 PROCEDURE — 75726 ARTERY X-RAYS ABDOMEN: CPT | Performed by: RADIOLOGY

## 2025-01-13 PROCEDURE — 85610 PROTHROMBIN TIME: CPT | Performed by: RADIOLOGY

## 2025-01-13 PROCEDURE — C1887 CATHETER, GUIDING: HCPCS

## 2025-01-13 PROCEDURE — C1760 CLOSURE DEV, VASC: HCPCS

## 2025-01-13 PROCEDURE — 36248 INS CATH ABD/L-EXT ART ADDL: CPT

## 2025-01-13 PROCEDURE — 99152 MOD SED SAME PHYS/QHP 5/>YRS: CPT | Performed by: RADIOLOGY

## 2025-01-13 PROCEDURE — 76937 US GUIDE VASCULAR ACCESS: CPT | Performed by: RADIOLOGY

## 2025-01-13 PROCEDURE — A9540 TC99M MAA: HCPCS

## 2025-01-13 PROCEDURE — 76376 3D RENDER W/INTRP POSTPROCES: CPT | Performed by: RADIOLOGY

## 2025-01-13 RX ORDER — ACETAMINOPHEN 325 MG/1
650 TABLET ORAL EVERY 4 HOURS PRN
Status: DISCONTINUED | OUTPATIENT
Start: 2025-01-13 | End: 2025-01-14 | Stop reason: HOSPADM

## 2025-01-13 RX ORDER — FENTANYL CITRATE 50 UG/ML
INJECTION, SOLUTION INTRAMUSCULAR; INTRAVENOUS AS NEEDED
Status: COMPLETED | OUTPATIENT
Start: 2025-01-13 | End: 2025-01-13

## 2025-01-13 RX ORDER — LIDOCAINE WITH 8.4% SOD BICARB 0.9%(10ML)
SYRINGE (ML) INJECTION AS NEEDED
Status: COMPLETED | OUTPATIENT
Start: 2025-01-13 | End: 2025-01-13

## 2025-01-13 RX ORDER — MIDAZOLAM HYDROCHLORIDE 2 MG/2ML
INJECTION, SOLUTION INTRAMUSCULAR; INTRAVENOUS AS NEEDED
Status: COMPLETED | OUTPATIENT
Start: 2025-01-13 | End: 2025-01-13

## 2025-01-13 RX ORDER — SODIUM CHLORIDE 9 MG/ML
75 INJECTION, SOLUTION INTRAVENOUS CONTINUOUS
Status: DISCONTINUED | OUTPATIENT
Start: 2025-01-13 | End: 2025-01-14 | Stop reason: HOSPADM

## 2025-01-13 RX ORDER — IODIXANOL 320 MG/ML
200 INJECTION, SOLUTION INTRAVASCULAR
Status: COMPLETED | OUTPATIENT
Start: 2025-01-13 | End: 2025-01-13

## 2025-01-13 RX ADMIN — Medication 10 ML: at 14:26

## 2025-01-13 RX ADMIN — FENTANYL CITRATE 50 MCG: 50 INJECTION INTRAMUSCULAR; INTRAVENOUS at 14:28

## 2025-01-13 RX ADMIN — FENTANYL CITRATE 50 MCG: 50 INJECTION INTRAMUSCULAR; INTRAVENOUS at 16:06

## 2025-01-13 RX ADMIN — MIDAZOLAM 1 MG: 1 INJECTION INTRAMUSCULAR; INTRAVENOUS at 14:16

## 2025-01-13 RX ADMIN — MIDAZOLAM 1 MG: 1 INJECTION INTRAMUSCULAR; INTRAVENOUS at 16:06

## 2025-01-13 RX ADMIN — SODIUM CHLORIDE 75 ML/HR: 0.9 INJECTION, SOLUTION INTRAVENOUS at 10:18

## 2025-01-13 RX ADMIN — IODIXANOL 90 ML: 320 INJECTION, SOLUTION INTRAVASCULAR at 17:16

## 2025-01-13 RX ADMIN — MIDAZOLAM 1 MG: 1 INJECTION INTRAMUSCULAR; INTRAVENOUS at 14:27

## 2025-01-13 RX ADMIN — FENTANYL CITRATE 50 MCG: 50 INJECTION INTRAMUSCULAR; INTRAVENOUS at 14:17

## 2025-01-13 RX ADMIN — MIDAZOLAM 1 MG: 1 INJECTION INTRAMUSCULAR; INTRAVENOUS at 15:16

## 2025-01-13 RX ADMIN — FENTANYL CITRATE 50 MCG: 50 INJECTION INTRAMUSCULAR; INTRAVENOUS at 15:20

## 2025-01-13 NOTE — BRIEF OP NOTE (RAD/CATH)
INTERVENTIONAL RADIOLOGY PROCEDURE NOTE    Date: 1/13/2025    Procedure:   Procedure Summary       Date: 01/13/25 Room / Location: [facility] Interventional Radiology    Anesthesia Start:  Anesthesia Stop:     Procedure: IR Y-90 PRE-ANGIO/EMBO W/ LUNG SCAN Diagnosis:       HCC (hepatocellular carcinoma) (HCC)      (seg 6/7 HCC)    Scheduled Providers: Ziggy Umaña MD Responsible Provider:     Anesthesia Type: Not recorded ASA Status: Not recorded            Preoperative diagnosis:   1. HCC (hepatocellular carcinoma) (HCC)         Postoperative diagnosis: Same.    Surgeon: Ziggy Umaña MD     Assistant: None. No qualified resident was available.    Blood loss: 10 mL    Specimens: None     Findings: Tc-99m MAA injection into the posterior branch of right hepatic artery supplying segments 6/7.    Complications: None immediate.    Anesthesia: conscious sedation and local

## 2025-01-13 NOTE — H&P
Interventional Radiology  History and Physical 1/13/2025     Max Ozuna   1945   [id number]    Assessment/Plan:  79 year old male with HCC in segments 6/7 of liver presents for Y90 mapping.    Problem List Items Addressed This Visit    None  Visit Diagnoses         HCC (hepatocellular carcinoma) (HCC)        Relevant Orders    IR Y-90 PRE-ANGIO/EMBO W/ LUNG SCAN               Subjective:     Patient ID: Max Ozuna is a 79 y.o. male.    History of Present Illness  Patient with HCC in segments 6/7 of liver presents for Y90 mapping.  Please refer to my clinic note dated 11/25/2024 for further details.    Review of Systems      Past Medical History:   Diagnosis Date    Coronary artery disease without angina pectoris 02/25/2020    Diabetes mellitus (HCC)     DVT (deep venous thrombosis) (HCC)     GERD (gastroesophageal reflux disease)     Hypertension     Iron deficiency anemia         Past Surgical History:   Procedure Laterality Date    ANGIOPLASTY  10/19/2021    bilateral    CARDIAC CATHETERIZATION  2013    calif    CABG  x4    COLONOSCOPY      COLONOSCOPY      CORONARY ANGIOPLASTY WITH STENT PLACEMENT  01/01/2010    CORONARY ARTERY BYPASS GRAFT      FEMORAL ARTERY STENT      FEMORAL BYPASS  10/20/2021    IR LOWER EXTREMITY ANGIOGRAM  2/14/2023    IR PELVIC ANGIOGRAM  5/6/2022    AK SLCTV CATHJ 3RD+ ORD SLCTV ABDL PEL/LXTR BRNCH  5/6/2022    Procedure: ULTRASOUND-GUIDED LEFT BRACHIAL ARTERY PUNCTURE, AORTOGRAM, RIGHT COMMON ILIAC ARTERY ANGIOPLASTY WITH 8 X 40 MM BALLOON, ANGIOPLASTY OF RIGHT EXTERNAL AND COMMON ILIAC ARTERY STENOSIS WITH 6 X 150 MM DRUG-ELUTING BALLOON, STENTING OF RIGHT DISTAL EXTERNAL ILIAC ARTERY STENOSIS WITH 7 X 40 MM SELF EXPANDING STENT POST DILATED WITH A 6 MM BALLOON.;  Surgeon: Henry Pena MD;  [place]    AK SLCTV CATHJ 3RD+ ORD SLCTV ABDL PEL/LXTR BRNCH Bilateral 2/14/2023    Procedure: CUTDOWN FEM-FEM BYPASS WITH PRIMARY CLOSURE ACCESS SITE, BILATERAL RUN-OFF, LEFT  FEM-POP 5X220 QUYEN AND 6X150 RODRI, ATTEMPT TO CROSS RIGHT SFA OCCLUSION;  Surgeon: Henry Pena MD;  Location: [facility];  Service: Vascular        Social History     Tobacco Use   Smoking Status Former    Current packs/day: 0.00    Average packs/day: 1.5 packs/day for 40.0 years (60.0 ttl pk-yrs)    Types: Cigarettes    Start date:     Quit date:     Years since quittin.0    Passive exposure: Past   Smokeless Tobacco Never   Tobacco Comments    quit --         Social History     Substance and Sexual Activity   Alcohol Use Not Currently    Alcohol/week: 0.0 standard drinks of alcohol        Social History     Substance and Sexual Activity   Drug Use No        No Known Allergies    Current Outpatient Medications   Medication Sig Dispense Refill    atorvastatin (LIPITOR) 40 mg tablet TAKE 1 TABLET DAILY 90 tablet 1    clopidogrel (PLAVIX) 75 mg tablet Take 1 tablet (75 mg total) by mouth daily 90 tablet 1    Empagliflozin (Jardiance) 25 MG TABS Take 1 tablet (25 mg total) by mouth every morning Also: take with LOTS of water 90 tablet 3    ferrous sulfate 325 (65 Fe) mg tablet Take 1 tablet (325 mg total) by mouth 2 (two) times a day with meals 60 tablet 6    glipiZIDE (GLUCOTROL) 10 mg tablet TAKE 1 TABLET TWICE A DAY  BEFORE MEALS 180 tablet 1    lisinopril-hydrochlorothiazide (PRINZIDE,ZESTORETIC) 20-12.5 MG per tablet Take 1 tablet by mouth daily 90 tablet 1    metFORMIN (GLUCOPHAGE) 1000 MG tablet TAKE 1 TABLET TWICE DAILY  WITH MEALS 180 tablet 1    omeprazole (PriLOSEC) 20 mg delayed release capsule Take 1 capsule (20 mg total) by mouth daily before breakfast 30 capsule 5    tamsulosin (FLOMAX) 0.4 mg Take 1 capsule (0.4 mg total) by mouth daily with dinner 90 capsule 1    acetaminophen (TYLENOL) 500 mg tablet Take 1,000 mg by mouth every 8 (eight) hours as needed      chlorhexidine (PERIDEX) 0.12 % solution Apply 15 mL to the mouth or throat 2 (two) times a day 473 mL 3    DULoxetine  "(CYMBALTA) 30 mg delayed release capsule TAKE ONE CAPSULE BY MOUTH TWICE DAILY 60 capsule 0    fenofibrate (TRICOR) 145 mg tablet Take 1 tablet (145 mg total) by mouth daily 90 tablet 3    gabapentin (Neurontin) 300 mg capsule Take 1 capsule (300 mg total) by mouth 3 (three) times a day 90 capsule 6    hydrocortisone 2.5 % cream Apply topically 2 (two) times a day as needed for rash 30 g 3    tirzepatide (Mounjaro) 2.5 MG/0.5ML Inject 0.5 mL (2.5 mg total) under the skin every 7 days (Patient not taking: Reported on 1/6/2025) 2 mL 3     Current Facility-Administered Medications   Medication Dose Route Frequency Provider Last Rate Last Admin    sodium chloride 0.9 % infusion  75 mL/hr Intravenous Continuous Ziggy Umaña MD 75 mL/hr at 01/13/25 1018 75 mL/hr at 01/13/25 1018          Objective:    Vitals:    01/13/25 1015   BP: (!) 175/74   BP Location: Left arm   Pulse: 57   Resp: 16   Temp: 97.7 °F (36.5 °C)   TempSrc: Temporal   SpO2: 98%   Weight: 68 kg (150 lb)   Height: 5' 6\" (1.676 m)        Physical Exam  Constitutional:       Appearance: Normal appearance.   Cardiovascular:      Rate and Rhythm: Bradycardia present.   Pulmonary:      Effort: Pulmonary effort is normal.           No results found for: \"BNP\"   Lab Results   Component Value Date    WBC 6.76 01/13/2025    HGB 13.3 01/13/2025    HCT 41.2 01/13/2025    MCV 95 01/13/2025     01/13/2025     Lab Results   Component Value Date    INR 1.14 12/27/2024    INR 1.08 09/26/2024    INR 1.02 02/06/2023    PROTIME 14.9 12/27/2024    PROTIME 14.2 09/26/2024    PROTIME 13.6 02/06/2023     No results found for: \"PTT\"      I have personally reviewed pertinent imaging and laboratory results.     Code Status: Prior  Advance Directive and Living Will:      Power of :    POLST:      This text is generated with voice recognition software. There may be translation, syntax,  or grammatical errors. If you have any questions, please contact the dictating " provider.

## 2025-01-13 NOTE — SEDATION DOCUMENTATION
IR Y-90 mapping performed by Dr. Umaña. Patient tolerated procedure well. Report given to [facility] RN. Bedrest start time 1615.

## 2025-01-13 NOTE — DISCHARGE INSTRUCTIONS
Discharge Instructions for SIRS Mapping Procedure                                    A Sirs mapping procedure is an arteriogram  done to prepare your                                    liver for a SIRS Implantation. During the mapping your doctor will                                      embolize (block) some of your blood vessels to keep the SIRS                                           Spheres from traveling to areas outside your liver.  .     AFTER YOU LEAVE:     Self-care:   Limit activity: Rest for the remainder of the day of your procedure.Have some one with you until the next morning. Keep your arm or leg straight as much as possible .Rest as much as possible, sitting lying or reclining. Walk only to go to the bathroom, to bed or to eat. If the angiogram catheter was put in your leg, use the stairs as little as possible. No driving.     Keep your wound clean and dry. Remove band aid/ dressing tomorrow. You may shower 24 hours after your procedure. Shower and wash groin area or wrist area gently with soap and water: beginning tomorrow. Rinse and pat Dry. Apply new water seal band aid. Repeat this process for 5 days.  If there is any drainage from the puncture site, you should put on a clean bandage. No Powders, creams, lotions or antibiotic ointments for 5 days.  No tub baths, hot tubs or swimming for 5 days.   Watch for bleeding and bruising: It is normal to have a bruise and soreness where the angiogram catheter went in.    Diet:   You may resume your regular diet. Small sips of flat soda will help with mild nausea.  Drink more liquids than usual for the next 24 hours                      Medications              Resume your normal medications              Start taking Prilosec as prescribed daily for the next 30 days               Please get the Medrol prescription filled prior to your implantation.(you will start taking it after the implantation)             WHAT YOU  SHOULD PREPARE FOR AT HOME AFTER THE IMPLANT    After your second procedure, the SIRS implant. For  72 hours  NO pregnant visitors or family. No physical contact with others for more than two hours. Sleep alone in bed. No children or pets sitting on your lap. Keep a distance of three feet between yourself and others.       IMMEDIATELY Contact Interventional Radiology at [phone] ([facility] PATIENTS: Contact Interventional Radiology at [phone]) ([place] PATIENTS: Contact Interventional Radiology at [phone]) if any of the following occur:  If your bruise gets larger or if you notice any active bleeding. APPLY DIRECT PRESSURE TO THE BLEEDING SITE.   If you notice increased swelling or have increased pain at the puncture site   If you have any numbness or pain in the extremity of the puncture site   If that extremity seems cold or pale.    You have fever greater than 101  Persistent nausea or vomiting    Follow up with your primary healthcare provider  as directed: Write down your questions so you remember to ask them during your visits.                    Moderate Sedation   WHAT YOU NEED TO KNOW:   Moderate sedation, or conscious sedation, is medicine used during procedures to help you feel relaxed and calm. You will be awake and able to follow directions without anxiety or pain. You will remember little to none of the procedure. You may feel tired, weak, or unsteady on your feet after you get sedation. You may also have trouble concentrating or short-term memory loss. These symptoms should go away in 24 hours or less.   DISCHARGE INSTRUCTIONS:   Call 911 or have someone else call for any of the following:   You have sudden trouble breathing.     You cannot be woken.  Seek care immediately if:   You have a severe headache or dizziness.     Your heart is beating faster than usual.  Contact your healthcare provider if:   You have a fever.     You have nausea or are vomiting for more than 8 hours after the  procedure.      Your skin is itchy, swollen, or you have a rash.     You have questions or concerns about your condition or care.  Self-care:   Have someone stay with you for 24 hours. This person can drive you to errands and help you do things around the house. This person can also watch for problems.      Rest and do quiet activities for 24 hours. Do not exercise, ride a bike, or play sports. Stand up slowly to prevent dizziness and falls. Take short walks around the house with another person. Slowly return to your usual activities the next day.      Do not drive or use dangerous machines or tools for 24 hours. You may injure yourself or others. Examples include a lawnmower, saw, or drill. Do not return to work for 24 hours if you use dangerous machines or tools for work.      Do not make important decisions for 24 hours. For example, do not sign important papers or invest money.      Drink liquids as directed. Liquids help flush the sedation medicine out of your body. Ask how much liquid to drink each day and which liquids are best for you.      Eat small, frequent meals to prevent nausea and vomiting. Start with clear liquids such as juice or broth. If you do not vomit after clear liquids, you can eat your usual foods.      Do not drink alcohol or take medicines that make you drowsy. This includes medicines that help you sleep and anxiety medicines. Ask your healthcare provider if it is safe for you to take pain medicine.  Follow up with your healthcare provider as directed: Write down your questions so you remember to ask them during your visits.   © 2017 [employer] Information is for End User's use only and may not be sold, redistributed or otherwise used for commercial purposes. All illustrations and images included in CareNotes® are the copyrighted property of [employer] or [employer].  The above information is an  only. It is not intended as medical advice  for individual conditions or treatments. Talk to your doctor, nurse or pharmacist before following any medical regimen to see if it is safe and effective for you.

## 2025-01-14 ENCOUNTER — TELEPHONE (OUTPATIENT)
Age: 80
End: 2025-01-14

## 2025-01-14 ENCOUNTER — TELEPHONE (OUTPATIENT)
Dept: INTERVENTIONAL RADIOLOGY/VASCULAR | Facility: HOSPITAL | Age: 80
End: 2025-01-14

## 2025-01-14 NOTE — TELEPHONE ENCOUNTER
Pt calling stating he is getting reminders for his EGD tomorrow that he cancelled, confirmed the EGD was r/s.

## 2025-01-14 NOTE — TELEPHONE ENCOUNTER
Rescheduled from 1-    Scheduled date of EGD(as of today): 2-  Physician performing EGD: Dr. Gaytan  Location of EGD:  [facility]   Instructions reviewed with patient by: 12-  Clearances: plavix

## 2025-01-22 ENCOUNTER — APPOINTMENT (OUTPATIENT)
Facility: HOSPITAL | Age: 80
End: 2025-01-22
Payer: MEDICARE

## 2025-01-22 ENCOUNTER — HOSPITAL ENCOUNTER (OUTPATIENT)
Dept: NUCLEAR MEDICINE | Facility: HOSPITAL | Age: 80
Discharge: HOME | End: 2025-01-22
Attending: RADIOLOGY
Payer: MEDICARE

## 2025-01-22 ENCOUNTER — HOSPITAL ENCOUNTER (OUTPATIENT)
Dept: INTERVENTIONAL RADIOLOGY/VASCULAR | Facility: HOSPITAL | Age: 80
Discharge: HOME | End: 2025-01-22
Attending: RADIOLOGY
Payer: MEDICARE

## 2025-01-22 VITALS
TEMPERATURE: 97.6 F | OXYGEN SATURATION: 95 % | SYSTOLIC BLOOD PRESSURE: 120 MMHG | RESPIRATION RATE: 17 BRPM | HEART RATE: 54 BPM | DIASTOLIC BLOOD PRESSURE: 50 MMHG

## 2025-01-22 DIAGNOSIS — C22.0 HCC (HEPATOCELLULAR CARCINOMA) (HCC): ICD-10-CM

## 2025-01-22 DIAGNOSIS — C22.0 HEPATOCELLULAR CARCINOMA (HCC): ICD-10-CM

## 2025-01-22 LAB
ALBUMIN SERPL BCG-MCNC: 4.3 G/DL (ref 3.5–5)
ALP SERPL-CCNC: 72 U/L (ref 34–104)
ALT SERPL W P-5'-P-CCNC: 16 U/L (ref 7–52)
ANION GAP SERPL CALCULATED.3IONS-SCNC: 8 MMOL/L (ref 4–13)
AST SERPL W P-5'-P-CCNC: 13 U/L (ref 13–39)
BILIRUB SERPL-MCNC: 1.09 MG/DL (ref 0.2–1)
BUN SERPL-MCNC: 24 MG/DL (ref 5–25)
CALCIUM SERPL-MCNC: 9.5 MG/DL (ref 8.4–10.2)
CHLORIDE SERPL-SCNC: 105 MMOL/L (ref 96–108)
CO2 SERPL-SCNC: 25 MMOL/L (ref 21–32)
CREAT SERPL-MCNC: 0.86 MG/DL (ref 0.6–1.3)
ERYTHROCYTE [DISTWIDTH] IN BLOOD BY AUTOMATED COUNT: 15.1 % (ref 11.6–15.1)
GFR SERPL CREATININE-BSD FRML MDRD: 81 ML/MIN/1.73SQ M
GLUCOSE P FAST SERPL-MCNC: 133 MG/DL (ref 65–99)
GLUCOSE SERPL-MCNC: 118 MG/DL (ref 65–140)
GLUCOSE SERPL-MCNC: 133 MG/DL (ref 65–140)
HCT VFR BLD AUTO: 41.2 % (ref 36.5–49.3)
HGB BLD-MCNC: 13.2 G/DL (ref 12–17)
INR PPP: 1.06 (ref 0.85–1.19)
MCH RBC QN AUTO: 30.8 PG (ref 26.8–34.3)
MCHC RBC AUTO-ENTMCNC: 32 G/DL (ref 31.4–37.4)
MCV RBC AUTO: 96 FL (ref 82–98)
PLATELET # BLD AUTO: 153 THOUSANDS/UL (ref 149–390)
PMV BLD AUTO: 9.6 FL (ref 8.9–12.7)
POTASSIUM SERPL-SCNC: 4.2 MMOL/L (ref 3.5–5.3)
PROT SERPL-MCNC: 7.5 G/DL (ref 6.4–8.4)
PROTHROMBIN TIME: 14.3 SECONDS (ref 12.3–15)
RBC # BLD AUTO: 4.28 MILLION/UL (ref 3.88–5.62)
SODIUM SERPL-SCNC: 138 MMOL/L (ref 135–147)
WBC # BLD AUTO: 5.6 THOUSAND/UL (ref 4.31–10.16)

## 2025-01-22 PROCEDURE — 37243 VASC EMBOLIZE/OCCLUDE ORGAN: CPT

## 2025-01-22 PROCEDURE — 37243 VASC EMBOLIZE/OCCLUDE ORGAN: CPT | Performed by: RADIOLOGY

## 2025-01-22 PROCEDURE — 99152 MOD SED SAME PHYS/QHP 5/>YRS: CPT | Performed by: RADIOLOGY

## 2025-01-22 PROCEDURE — 36247 INS CATH ABD/L-EXT ART 3RD: CPT | Performed by: RADIOLOGY

## 2025-01-22 PROCEDURE — 85027 COMPLETE CBC AUTOMATED: CPT | Performed by: RADIOLOGY

## 2025-01-22 PROCEDURE — C2616 BRACHYTX, NON-STR,YTTRIUM-90: HCPCS | Performed by: RADIOLOGY

## 2025-01-22 PROCEDURE — 76937 US GUIDE VASCULAR ACCESS: CPT | Performed by: RADIOLOGY

## 2025-01-22 PROCEDURE — C1769 GUIDE WIRE: HCPCS

## 2025-01-22 PROCEDURE — 76937 US GUIDE VASCULAR ACCESS: CPT

## 2025-01-22 PROCEDURE — 80053 COMPREHEN METABOLIC PANEL: CPT | Performed by: RADIOLOGY

## 2025-01-22 PROCEDURE — C1887 CATHETER, GUIDING: HCPCS

## 2025-01-22 PROCEDURE — C1894 INTRO/SHEATH, NON-LASER: HCPCS

## 2025-01-22 PROCEDURE — 85610 PROTHROMBIN TIME: CPT | Performed by: RADIOLOGY

## 2025-01-22 PROCEDURE — 79445 NUCLEAR RX INTRA-ARTERIAL: CPT | Performed by: RADIOLOGY

## 2025-01-22 PROCEDURE — 82948 REAGENT STRIP/BLOOD GLUCOSE: CPT

## 2025-01-22 PROCEDURE — 36247 INS CATH ABD/L-EXT ART 3RD: CPT

## 2025-01-22 PROCEDURE — C1760 CLOSURE DEV, VASC: HCPCS

## 2025-01-22 PROCEDURE — 78830 RP LOCLZJ TUM SPECT W/CT 1: CPT

## 2025-01-22 PROCEDURE — 77300 RADIATION THERAPY DOSE PLAN: CPT | Performed by: RADIOLOGY

## 2025-01-22 RX ORDER — LIDOCAINE HYDROCHLORIDE 10 MG/ML
INJECTION, SOLUTION EPIDURAL; INFILTRATION; INTRACAUDAL; PERINEURAL AS NEEDED
Status: COMPLETED | OUTPATIENT
Start: 2025-01-22 | End: 2025-01-22

## 2025-01-22 RX ORDER — FENTANYL CITRATE 50 UG/ML
INJECTION, SOLUTION INTRAMUSCULAR; INTRAVENOUS AS NEEDED
Status: COMPLETED | OUTPATIENT
Start: 2025-01-22 | End: 2025-01-22

## 2025-01-22 RX ORDER — IODIXANOL 320 MG/ML
200 INJECTION, SOLUTION INTRAVASCULAR
Status: COMPLETED | OUTPATIENT
Start: 2025-01-22 | End: 2025-01-22

## 2025-01-22 RX ORDER — ACETAMINOPHEN 325 MG/1
650 TABLET ORAL EVERY 4 HOURS PRN
Status: DISCONTINUED | OUTPATIENT
Start: 2025-01-22 | End: 2025-01-23 | Stop reason: HOSPADM

## 2025-01-22 RX ORDER — CEFAZOLIN SODIUM 1 G/50ML
1000 SOLUTION INTRAVENOUS ONCE
Status: DISCONTINUED | OUTPATIENT
Start: 2025-01-22 | End: 2025-01-23 | Stop reason: HOSPADM

## 2025-01-22 RX ORDER — METRONIDAZOLE 500 MG/100ML
500 INJECTION, SOLUTION INTRAVENOUS ONCE
Status: DISCONTINUED | OUTPATIENT
Start: 2025-01-22 | End: 2025-01-23 | Stop reason: HOSPADM

## 2025-01-22 RX ORDER — MIDAZOLAM HYDROCHLORIDE 2 MG/2ML
INJECTION, SOLUTION INTRAMUSCULAR; INTRAVENOUS AS NEEDED
Status: COMPLETED | OUTPATIENT
Start: 2025-01-22 | End: 2025-01-22

## 2025-01-22 RX ADMIN — MIDAZOLAM 1 MG: 1 INJECTION INTRAMUSCULAR; INTRAVENOUS at 10:53

## 2025-01-22 RX ADMIN — LIDOCAINE HYDROCHLORIDE 7 ML: 10 INJECTION, SOLUTION EPIDURAL; INFILTRATION; INTRACAUDAL; PERINEURAL at 10:19

## 2025-01-22 RX ADMIN — FENTANYL CITRATE 100 MCG: 50 INJECTION, SOLUTION INTRAMUSCULAR; INTRAVENOUS at 11:59

## 2025-01-22 RX ADMIN — MIDAZOLAM 1 MG: 1 INJECTION INTRAMUSCULAR; INTRAVENOUS at 11:36

## 2025-01-22 RX ADMIN — IODIXANOL 75 ML: 320 INJECTION, SOLUTION INTRAVASCULAR at 12:45

## 2025-01-22 RX ADMIN — FENTANYL CITRATE 100 MCG: 50 INJECTION, SOLUTION INTRAMUSCULAR; INTRAVENOUS at 10:21

## 2025-01-22 RX ADMIN — MIDAZOLAM 2 MG: 1 INJECTION INTRAMUSCULAR; INTRAVENOUS at 10:18

## 2025-01-22 NOTE — H&P
Interventional Radiology  History and Physical 1/22/2025     Max Ozuna   1945   [id number]    Assessment/Plan:  79 year old male with segment 6/7 HCC returns for Y90 therapy.    Problem List Items Addressed This Visit          Digestive    Hepatocellular carcinoma (HCC)    Relevant Orders    IR Y-90 RADIOEMBOLIZATION          Subjective:     Patient ID: Max Ozuna is a 79 y.o. male.    History of Present Illness  Patient with segment 6/7 HCC returns for Y90 therapy.    Review of Systems      Past Medical History:   Diagnosis Date    Coronary artery disease without angina pectoris 02/25/2020    Diabetes mellitus (HCC)     DVT (deep venous thrombosis) (HCC)     GERD (gastroesophageal reflux disease)     Hypertension     Iron deficiency anemia         Past Surgical History:   Procedure Laterality Date    ANGIOPLASTY  10/19/2021    bilateral    CARDIAC CATHETERIZATION  2013    calif    CABG  x4    COLONOSCOPY      COLONOSCOPY      CORONARY ANGIOPLASTY WITH STENT PLACEMENT  01/01/2010    CORONARY ARTERY BYPASS GRAFT      FEMORAL ARTERY STENT      FEMORAL BYPASS  10/20/2021    IR LOWER EXTREMITY ANGIOGRAM  2/14/2023    IR PELVIC ANGIOGRAM  5/6/2022    IR Y-90 PRE-ANGIO/EMBO W/ LUNG SCAN  1/13/2025    AR SLCTV CATHJ 3RD+ ORD SLCTV ABDL PEL/LXTR BRNCH  5/6/2022    Procedure: ULTRASOUND-GUIDED LEFT BRACHIAL ARTERY PUNCTURE, AORTOGRAM, RIGHT COMMON ILIAC ARTERY ANGIOPLASTY WITH 8 X 40 MM BALLOON, ANGIOPLASTY OF RIGHT EXTERNAL AND COMMON ILIAC ARTERY STENOSIS WITH 6 X 150 MM DRUG-ELUTING BALLOON, STENTING OF RIGHT DISTAL EXTERNAL ILIAC ARTERY STENOSIS WITH 7 X 40 MM SELF EXPANDING STENT POST DILATED WITH A 6 MM BALLOON.;  Surgeon: Henry Pena MD;  [place]    AR SLCTV CATHJ 3RD+ ORD SLCTV ABDL PEL/LXTR BRNCH Bilateral 2/14/2023    Procedure: CUTDOWN FEM-FEM BYPASS WITH PRIMARY CLOSURE ACCESS SITE, BILATERAL RUN-OFF, LEFT FEM-POP 5X220 QUYEN AND 6X150 RODRI, ATTEMPT TO CROSS RIGHT SFA OCCLUSION;  Surgeon: Henry  Jose Rafael Pena MD;  Location: [facility] OR;  Service: Vascular        Social History     Tobacco Use   Smoking Status Former    Current packs/day: 0.00    Average packs/day: 1.5 packs/day for 40.0 years (60.0 ttl pk-yrs)    Types: Cigarettes    Start date:     Quit date:     Years since quittin.0    Passive exposure: Past   Smokeless Tobacco Never   Tobacco Comments    quit --         Social History     Substance and Sexual Activity   Alcohol Use Not Currently    Alcohol/week: 0.0 standard drinks of alcohol        Social History     Substance and Sexual Activity   Drug Use No        No Known Allergies    Current Outpatient Medications   Medication Sig Dispense Refill    acetaminophen (TYLENOL) 500 mg tablet Take 1,000 mg by mouth every 8 (eight) hours as needed      atorvastatin (LIPITOR) 40 mg tablet TAKE 1 TABLET DAILY 90 tablet 1    clopidogrel (PLAVIX) 75 mg tablet Take 1 tablet (75 mg total) by mouth daily 90 tablet 1    fenofibrate (TRICOR) 145 mg tablet Take 1 tablet (145 mg total) by mouth daily 90 tablet 3    glipiZIDE (GLUCOTROL) 10 mg tablet TAKE 1 TABLET TWICE A DAY  BEFORE MEALS 180 tablet 1    lisinopril-hydrochlorothiazide (PRINZIDE,ZESTORETIC) 20-12.5 MG per tablet Take 1 tablet by mouth daily 90 tablet 1    metFORMIN (GLUCOPHAGE) 1000 MG tablet TAKE 1 TABLET TWICE DAILY  WITH MEALS 180 tablet 1    omeprazole (PriLOSEC) 20 mg delayed release capsule Take 1 capsule (20 mg total) by mouth daily before breakfast 30 capsule 5    chlorhexidine (PERIDEX) 0.12 % solution Apply 15 mL to the mouth or throat 2 (two) times a day 473 mL 3    DULoxetine (CYMBALTA) 30 mg delayed release capsule TAKE ONE CAPSULE BY MOUTH TWICE DAILY 60 capsule 0    Empagliflozin (Jardiance) 25 MG TABS Take 1 tablet (25 mg total) by mouth every morning Also: take with LOTS of water 90 tablet 3    ferrous sulfate 325 (65 Fe) mg tablet Take 1 tablet (325 mg total) by mouth 2 (two) times a day with meals 60 tablet 6     "gabapentin (Neurontin) 300 mg capsule Take 1 capsule (300 mg total) by mouth 3 (three) times a day 90 capsule 6    hydrocortisone 2.5 % cream Apply topically 2 (two) times a day as needed for rash 30 g 3    tamsulosin (FLOMAX) 0.4 mg Take 1 capsule (0.4 mg total) by mouth daily with dinner 90 capsule 1    tirzepatide (Mounjaro) 2.5 MG/0.5ML Inject 0.5 mL (2.5 mg total) under the skin every 7 days (Patient not taking: Reported on 1/6/2025) 2 mL 3     Current Facility-Administered Medications   Medication Dose Route Frequency Provider Last Rate Last Admin    ceFAZolin (ANCEF) IVPB (premix in dextrose) 1,000 mg 50 mL  1,000 mg Intravenous Once Ziggy Umaña MD        metroNIDAZOLE (FLAGYL) IVPB (premix) 500 mg 100 mL  500 mg Intravenous Once Ziggy Umaña MD              Objective:    Vitals:    01/22/25 0914   BP: (!) 171/69   Pulse: 63   Resp: 15   Temp: 97.6 °F (36.4 °C)   TempSrc: Temporal   SpO2: 97%        Physical Exam  Constitutional:       Appearance: Normal appearance.   Cardiovascular:      Rate and Rhythm: Normal rate.   Pulmonary:      Effort: Pulmonary effort is normal.           No results found for: \"BNP\"   Lab Results   Component Value Date    WBC 6.76 01/13/2025    HGB 13.3 01/13/2025    HCT 41.2 01/13/2025    MCV 95 01/13/2025     01/13/2025     Lab Results   Component Value Date    INR 1.06 01/13/2025    INR 1.14 12/27/2024    INR 1.08 09/26/2024    PROTIME 14.1 01/13/2025    PROTIME 14.9 12/27/2024    PROTIME 14.2 09/26/2024     No results found for: \"PTT\"      I have personally reviewed pertinent imaging and laboratory results.     Code Status: Prior  Advance Directive and Living Will:      Power of :    POLST:      This text is generated with voice recognition software. There may be translation, syntax,  or grammatical errors. If you have any questions, please contact the dictating provider.   "

## 2025-01-22 NOTE — DISCHARGE INSTRUCTIONS
SIRS IMPLANT DISCHARGE INSTRUCTIONS    WHAT YOU SHOULD KNOW:  For the next 72 hours after your SIRS implant NO pregnant visitors or family. No physical contact with others for more than two hours. Sleep alone in bed. No children or pets sitting on your lap. Keep a distance of three feet between yourself and others.    AFTER YOU LEAVE:     Self-care:   Limit activity: Rest for the remainder of the day of your procedure.Have some one with you until the next morning. Keep your arm or leg straight as much as possible.Rest as much as possible, sitting lying or reclining. Walk only to go to the bathroom, to bed or to eat. If the angiogram catheter was put in your leg, use the stairs as little as possible. No driving.     Keep your wound clean and dry. Remove band aid/ dressing tomorrow. You may shower 24 hours after your procedure. Shower and wash groin area or wrist area gently with soap and water: beginning tomorrow. Rinse and pat Dry. Apply new water seal band aid. Repeat this process for 5 days.  If there is any drainage from the puncture site, you should put on a clean bandage. No Powders, creams, lotions or antibiotic ointments for 5 days.  No tub baths, hot tubs or swimming for 5 days.     Watch for bleeding and bruising: It is normal to have a bruise and soreness where the angiogram catheter went in.  Medication Continue  to take Prilosec 20 mg daily for a total of  30 days after the initial mapping procedure. Start taking the  Medrol as directed.  Diet: You may resume your regular diet. Small sips of flat soda will help with mild nausea.      Drink more liquids than usual for the next 24 hours      IMMEDIATELY Contact Interventional Radiology at [phone] your bruise gets larger or if you notice any active bleeding. APPLY DIRECT PRESSURE TO THE BLEEDING SITE.   If you notice increased swelling or have increased pain at the puncture site   If you have any numbness or pain in the extremity of the puncture  site   If that extremity seems cold or pale.    You have fever greater than 101  Persistent nausea or vomiting.    Follow up with your primary healthcare provider  as directed: Write down your questions so you remember to ask them during your visits.

## 2025-01-22 NOTE — SEDATION DOCUMENTATION
Y-90 completed. Right groin sheath removed and hemostasis achieved with Perclose closure device. Patient tolerated well and offers no complaints at this time. Hemodynamically stable for transfer to PACU.

## 2025-01-22 NOTE — BRIEF OP NOTE (RAD/CATH)
INTERVENTIONAL RADIOLOGY PROCEDURE NOTE    Date: 1/22/2025    Procedure:   Procedure Summary       Date: 01/22/25 Room / Location: [facility] Interventional Radiology    Anesthesia Start:  Anesthesia Stop:     Procedure: IR Y-90 RADIOEMBOLIZATION Diagnosis:       Hepatocellular carcinoma (HCC)      (seg 6/7 HCC)    Scheduled Providers: Ziggy Umaña MD Responsible Provider:     Anesthesia Type: Not recorded ASA Status: Not recorded            Preoperative diagnosis:   1. Hepatocellular carcinoma (HCC)         Postoperative diagnosis: Same.    Surgeon: Ziggy Umaña MD     Assistant: None. No qualified resident was available.    Blood loss: 10 mL    Specimens: None     Findings: Successful Y90 Theraspheres administration into posterior branch of right hepatic artery supplying segments 6/7.    Complications: None immediate.    Anesthesia: conscious sedation and local

## 2025-01-24 DIAGNOSIS — K21.9 GASTROESOPHAGEAL REFLUX DISEASE, UNSPECIFIED WHETHER ESOPHAGITIS PRESENT: ICD-10-CM

## 2025-01-30 ENCOUNTER — APPOINTMENT (OUTPATIENT)
Dept: LAB | Facility: CLINIC | Age: 80
End: 2025-01-30
Payer: MEDICARE

## 2025-01-30 DIAGNOSIS — R53.0 NEOPLASTIC MALIGNANT RELATED FATIGUE: ICD-10-CM

## 2025-01-30 DIAGNOSIS — C22.0 HEPATOCELLULAR CARCINOMA (HCC): ICD-10-CM

## 2025-01-30 LAB
BASOPHILS # BLD AUTO: 0.03 THOUSANDS/ΜL (ref 0–0.1)
BASOPHILS NFR BLD AUTO: 1 % (ref 0–1)
EOSINOPHIL # BLD AUTO: 0.16 THOUSAND/ΜL (ref 0–0.61)
EOSINOPHIL NFR BLD AUTO: 3 % (ref 0–6)
ERYTHROCYTE [DISTWIDTH] IN BLOOD BY AUTOMATED COUNT: 15 % (ref 11.6–15.1)
HCT VFR BLD AUTO: 41.3 % (ref 36.5–49.3)
HGB BLD-MCNC: 13.1 G/DL (ref 12–17)
IMM GRANULOCYTES # BLD AUTO: 0.03 THOUSAND/UL (ref 0–0.2)
IMM GRANULOCYTES NFR BLD AUTO: 1 % (ref 0–2)
LYMPHOCYTES # BLD AUTO: 0.37 THOUSANDS/ΜL (ref 0.6–4.47)
LYMPHOCYTES NFR BLD AUTO: 7 % (ref 14–44)
MCH RBC QN AUTO: 30.7 PG (ref 26.8–34.3)
MCHC RBC AUTO-ENTMCNC: 31.7 G/DL (ref 31.4–37.4)
MCV RBC AUTO: 97 FL (ref 82–98)
MONOCYTES # BLD AUTO: 0.47 THOUSAND/ΜL (ref 0.17–1.22)
MONOCYTES NFR BLD AUTO: 9 % (ref 4–12)
NEUTROPHILS # BLD AUTO: 4 THOUSANDS/ΜL (ref 1.85–7.62)
NEUTS SEG NFR BLD AUTO: 79 % (ref 43–75)
NRBC BLD AUTO-RTO: 0 /100 WBCS
PLATELET # BLD AUTO: 160 THOUSANDS/UL (ref 149–390)
PMV BLD AUTO: 10 FL (ref 8.9–12.7)
RBC # BLD AUTO: 4.27 MILLION/UL (ref 3.88–5.62)
TSH SERPL DL<=0.05 MIU/L-ACNC: 0.49 UIU/ML (ref 0.45–4.5)
WBC # BLD AUTO: 5.06 THOUSAND/UL (ref 4.31–10.16)

## 2025-01-30 PROCEDURE — 80053 COMPREHEN METABOLIC PANEL: CPT

## 2025-01-30 PROCEDURE — 85025 COMPLETE CBC W/AUTO DIFF WBC: CPT

## 2025-01-30 PROCEDURE — 83690 ASSAY OF LIPASE: CPT

## 2025-01-30 PROCEDURE — 84443 ASSAY THYROID STIM HORMONE: CPT

## 2025-01-30 PROCEDURE — 36415 COLL VENOUS BLD VENIPUNCTURE: CPT

## 2025-01-30 PROCEDURE — 82150 ASSAY OF AMYLASE: CPT

## 2025-01-31 ENCOUNTER — TELEPHONE (OUTPATIENT)
Age: 80
End: 2025-01-31

## 2025-01-31 LAB
ALBUMIN SERPL BCG-MCNC: 4.1 G/DL (ref 3.5–5)
ALP SERPL-CCNC: 84 U/L (ref 34–104)
ALT SERPL W P-5'-P-CCNC: 22 U/L (ref 7–52)
AMYLASE SERPL-CCNC: 40 IU/L (ref 29–103)
ANION GAP SERPL CALCULATED.3IONS-SCNC: 10 MMOL/L (ref 4–13)
AST SERPL W P-5'-P-CCNC: 17 U/L (ref 13–39)
BILIRUB SERPL-MCNC: 0.64 MG/DL (ref 0.2–1)
BUN SERPL-MCNC: 25 MG/DL (ref 5–25)
CALCIUM SERPL-MCNC: 10 MG/DL (ref 8.4–10.2)
CHLORIDE SERPL-SCNC: 100 MMOL/L (ref 96–108)
CO2 SERPL-SCNC: 26 MMOL/L (ref 21–32)
CREAT SERPL-MCNC: 0.9 MG/DL (ref 0.6–1.3)
GFR SERPL CREATININE-BSD FRML MDRD: 79 ML/MIN/1.73SQ M
GLUCOSE P FAST SERPL-MCNC: 196 MG/DL (ref 65–99)
LIPASE SERPL-CCNC: 43 U/L (ref 11–82)
POTASSIUM SERPL-SCNC: 3.9 MMOL/L (ref 3.5–5.3)
PROT SERPL-MCNC: 7 G/DL (ref 6.4–8.4)
SODIUM SERPL-SCNC: 136 MMOL/L (ref 135–147)

## 2025-01-31 NOTE — TELEPHONE ENCOUNTER
Opal from [email or web address] called in stated faxed over authorization forms for medical records on 11/20. Not in media Opal will re fax forms over. Please advise. Thank you

## 2025-02-04 ENCOUNTER — HOSPITAL ENCOUNTER (OUTPATIENT)
Dept: INFUSION CENTER | Facility: CLINIC | Age: 80
Discharge: HOME | End: 2025-02-04
Payer: MEDICARE

## 2025-02-04 VITALS
DIASTOLIC BLOOD PRESSURE: 60 MMHG | RESPIRATION RATE: 18 BRPM | TEMPERATURE: 96.9 F | BODY MASS INDEX: 24.22 KG/M2 | HEART RATE: 57 BPM | HEIGHT: 66 IN | SYSTOLIC BLOOD PRESSURE: 147 MMHG

## 2025-02-04 DIAGNOSIS — C22.0 HEPATOCELLULAR CARCINOMA (HCC): Primary | ICD-10-CM

## 2025-02-04 PROCEDURE — 96413 CHEMO IV INFUSION 1 HR: CPT

## 2025-02-04 RX ORDER — SODIUM CHLORIDE 9 MG/ML
20 INJECTION, SOLUTION INTRAVENOUS ONCE
Status: COMPLETED | OUTPATIENT
Start: 2025-02-04 | End: 2025-02-04

## 2025-02-04 RX ADMIN — SODIUM CHLORIDE 20 ML/HR: 9 INJECTION, SOLUTION INTRAVENOUS at 10:39

## 2025-02-04 RX ADMIN — DURVALUMAB 1500 MG: 500 INJECTION, SOLUTION INTRAVENOUS at 10:54

## 2025-02-04 NOTE — PROGRESS NOTES
Pt presents for imfinzi. No new meds or concerns. Pt tolerated treatment without adverse reaction. Future appointments discussed, confirmed with patient for 3/4/25 1100. AVS declined.

## 2025-02-10 ENCOUNTER — TELEPHONE (OUTPATIENT)
Age: 80
End: 2025-02-10

## 2025-02-10 NOTE — TELEPHONE ENCOUNTER
Called and spoke to patient regarding the need to reschedule patient due to change in provider schedule. Patient moved to be done by another provider, so procedure can be done same day.

## 2025-02-10 NOTE — PROGRESS NOTES
Hematology Outpatient Office Note    Date of Service: 2/11/2025    [facility] [address]  [address]  [facility] [address]  [phone]    Reason for Consultation:   Chief Complaint   Patient presents with    Follow-up       Referral Physician: No ref. provider found    Primary Care Physician:  DO Maddi Quinonez: Daughter     Taylor: wife    Sallie: daughter in law    ASSESSMENT & PLAN      Diagnosis ICD-10-CM Associated Orders   1. Hepatocellular carcinoma (HCC)  C22.0               This is a 80 y.o. c PMHx notable for cecal AVM/GAVE, PE, DM, DVT, HTN, HLP, CAD, being seen in consultation for chronic ANG and locally advanced HCC     Iron Deficiency anemia: improved s/p IV iron  Low iron levels can cause anemia (low red blood cells). Low iron levels can also make people feel poorly, even before anemia starts. Iron is used to make red blood cells. If blood is lost from the body, if iron can't be absorbed from food, or if something removes iron (pregnancy) the iron can be low and then anemia happens.    Hx Cecal AVM. GI thought maybe GAVE  8/29/2022: Hgb 9.5, WBC 6.35k, MCV 91, plt 235k, retic 3.65%, FOBT + (8/31), ferritin 7, iron 30, Fe Sat 9%, TIBC 346  9/7 EGD/C-scope: internal hemorrhoids    Capsule endoscopy: no active bleeding  6/27/2023 EGD with single balloon enteroscopy revealed no bleeding    IrAE drug induced rash: on hydrocortisone 2.5% to good effect    Iron deficiency is very common. Low iron can cause fatigue or tiredness, the desire to eat ice or non-food items like corn starch or dry pasta noodles, restless legs, weak fingernails, cracks at the corner of the mouth.     Common causes of iron deficiency include inadequate diet (uncommon, usually vegetarians), gastric bypass surgery (very common), pregnancy (very common), periods (most common cause in younger women), blood loss (usually from the stomach or intestines), and decreased iron  absorption from the intestines from gastritis, H pylori, acid blocking medicines or celiac disease. Even a normal menstrual period can cause iron deficiency. In 10% of patients a clear cause of low iron is not found. 14% of women will have iron deficiency just from their menstrual period.       Iron deficiency is treated with pills as a first step. For some people the iron pills do not work, cause severe side effects, or take too long to work: for these people IV iron can be given. You may only require IV iron very rarely, for example only when pregnant, or from 1-2 times a year based on your rate of need. It takes 4 weeks for IV iron to improve the anemia to maximum potential. If your fatigue is not from the low iron, getting iron treatment would not help the fatigue.    9/24/2024 CT Abd/pelv w/c: Nodular liver contour, indicated of cirrhosis. Portal vein thrombosis involving posterior branch of the right hepatic lobe  MRI Abd w/wo c: Approximately 5.4 x 4.8 x 5.5 cm (length X depth X width) solitary segment 6/7 mass  9/25/2024: AFP 21,374  9/25/2024: CT Chest w/c: Emphysema with areas of fibrosis/scarring which appears stable compared to study of 11/15/2019  10/11/2024 Liver tumor board interventional and radiology do think he would be a good candidate for Y90 treatment concurrently with immunotherapy  12/30/2024 CT CAP w/c:  excellent [place]  Decrease in size segment VI/VII right hepatic lobe mass since 9/24/2024, currently measuring 3.1 x 2.6 x 4.5 cm (61/105, 4/108), previously 5.4 x 4.8 x 5.5 cm. Again seen is tumor thrombus in the posterior branch of right hepatic lobe, decreased from prior exam. No metastatic disease in the chest, abdomen or pelvis  1/22/2025: IR Y-90 radio embolization              Discussion of decision making    I personally reviewed the following lab results, the image studies, pathology, other specialty/physicians consult notes and recommendations, and outside medical records from  [facility]/other institutions. I had a lengthy discussion with the patient and shared the work-up findings. We discussed the diagnosis and management plan as below. I spent 43 minutes reviewing the records (labs, clinician notes, outside records, medical history, ordering medicine/tests/procedures, interpreting the imaging/labs previously done) and coordination of care as well as direct time with the patient today, of which greater than 50% of the time was spent in counseling and coordination of care with the patient/family.    Plan/Labs  Cont PO iron MWF   Cont to f/u GI  Refilled Hydrocortisone 2.5% cream for his drug rash previously  Restaging CT CAP w/c ordered 6/26/2025  Liver directed therapy done on 1/22/2025  Infusion chairs ordered for Durvalumab for advanced HCC, C6 coming up with preceding labs  AFP ordered for C5/C7 labs          Follow Up: q4 weeks scheduled thru 5/14/2025, more*    All questions were answered to the patient's satisfaction during this encounter. The patient knows the contact information for our office and knows to reach out for any relevant concerns related to this encounter. They are to call for any temperature 100.4 or higher, new symptoms including but not restricted to shaking chills, decreased appetite, nausea, vomiting, diarrhea, increased fatigue, shortness of breath or chest pain, confusion, and not feeling the strength to come to the clinic. For all other listed problems and medical diagnosis in their chart - they are managed by PCP and/or other specialists, which the patient acknowledges. Thank you very much for your consultation and making us a part of this patient's care. We are continuing to follow closely with you. Please do not hesitate to reach out to me with any additional questions or concerns.    Mal Angeles MD  Hematology & Medical Oncology Staff Physician             Disclaimer: This document was prepared using Lingu[employer] Direct technology. If a word or  phrase is confusing, or does not make sense, this is likely due to recognition error which was not discovered during this clinician's review. If you believe an error has occurred, please contact me through HemOnc service line for dequan?cation.      HEMATOLOGICAL HISTORY OF PRESENT ILLNESS      Clotting History None   Bleeding History GAVE related bleeding   Cancer History None   Family Cancer History Father (cancer)   H/O Blood/Plt Transfusion PRBC years ago   Tobacco/etoh/drug abuse 2 PPDx 44 years, quit 2004, no etoh abuse or rec drug use       Cancer Screening history n/a   Occupation [employer]      8/29/2022: Hgb 9.5, WBC 6.35k, MCV 91, plt 235k, retic 3.65%, FOBT + (8/31), ferritin 7, iron 30, Fe Sat 9%, TIBC 346  9/7 EGD/C-scope: internal hemorrhoids  10/3/2022-10/9/2022: 5 iV Venofer 200mg administered   11/28/2022: ferritin 26, iron 67, Fe Sat 21%, TIBC 322, Hgb 12.7 (now normal)  5/2/2023: Discussed with the patient the results of his iron studies which show worsening iron deficiency as well as recurrent anemia.  I ordered 600 mg of IV Venofer  5/18/2023: ferritin 7, iron 36, Fe Sat 10%, TIBC 364, Hgb 11.6  6/29/2023: ferritin 37, iron 52, Fe Sat 17%, TIBC 307, Hgb 12.7, MCV 92  9/20/2023:  ferritin 58, iron 65, Fe Sat 33%, TIBC 230, Hgb 13.5, MCV 95  2/14/2024: iron 42, ferritin 22, Fe Sat 14%, TIBC 304: Vit B12 632, Hgb 13.4, plt 161k, WBC 6.21k  4/18/2024: iron 59, Fe Sat 18%, TIBC 329, ferritin 21, Hgb 13.4 (2/2024)  4/29/2024: planned IV Venofer 200mg x2  10/2/2024: Ferritin: 224, Hgb 13.6, Fe Sat 13%, TIBC 208, iron 26, ferritin 224 AOCI  11/8/2024: Lipase: 380 (4x ULN)  12/27/2024: AFP 0.62      SUBJECTIVE  (INTERVAL HISTORY)      Interval events:   Fatigue has improved. No issues or concerns. Appetite is good.     I have reviewed the relevant past medical, surgical, social and family history. I have also reviewed allergies and medications for this patient.    Review of  Systems    Baseline weight: 152-154 lbs    Denies weight loss, F/C, N/V, SOB, CP, LH, HA.    He has had fatigue and low energy since 6/2022. He was chewing on ice all day since 3/2022.     A 10-point review of system was performed, pertinent positive and negative were detailed as above. Otherwise, the 10-point review of system was negative.      Past Medical History:   Diagnosis Date    Coronary artery disease without angina pectoris 02/25/2020    Diabetes mellitus (HCC)     DVT (deep venous thrombosis) (HCC)     GERD (gastroesophageal reflux disease)     Hypertension     Iron deficiency anemia        Past Surgical History:   Procedure Laterality Date    ANGIOPLASTY  10/19/2021    bilateral    CARDIAC CATHETERIZATION  2013    calif    CABG  x4    COLONOSCOPY      COLONOSCOPY      CORONARY ANGIOPLASTY WITH STENT PLACEMENT  01/01/2010    CORONARY ARTERY BYPASS GRAFT      FEMORAL ARTERY STENT      FEMORAL BYPASS  10/20/2021    IR LOWER EXTREMITY ANGIOGRAM  2/14/2023    IR PELVIC ANGIOGRAM  5/6/2022    IR Y-90 PRE-ANGIO/EMBO W/ LUNG SCAN  1/13/2025    IR Y-90 RADIOEMBOLIZATION  1/22/2025    TX SLCTV CATHJ 3RD+ ORD SLCTV ABDL PEL/LXTR BRNCH  5/6/2022    Procedure: ULTRASOUND-GUIDED LEFT BRACHIAL ARTERY PUNCTURE, AORTOGRAM, RIGHT COMMON ILIAC ARTERY ANGIOPLASTY WITH 8 X 40 MM BALLOON, ANGIOPLASTY OF RIGHT EXTERNAL AND COMMON ILIAC ARTERY STENOSIS WITH 6 X 150 MM DRUG-ELUTING BALLOON, STENTING OF RIGHT DISTAL EXTERNAL ILIAC ARTERY STENOSIS WITH 7 X 40 MM SELF EXPANDING STENT POST DILATED WITH A 6 MM BALLOON.;  Surgeon: Henry Pena MD;  [place]    TX SLCTV CATHJ 3RD+ ORD SLCTV ABDL PEL/LXTR BRNCH Bilateral 2/14/2023    Procedure: CUTDOWN FEM-FEM BYPASS WITH PRIMARY CLOSURE ACCESS SITE, BILATERAL RUN-OFF, LEFT FEM-POP 5X220 QUYEN AND 6X150 RODRI, ATTEMPT TO CROSS RIGHT SFA OCCLUSION;  Surgeon: Henry Pena MD;  Location: [facility];  Service: Vascular       Family History   Problem Relation Age of Onset    No Known  Problems Mother     Lung cancer Father     Liver cancer Brother        Social History     Socioeconomic History    Marital status: /Civil Union     Spouse name: Not on file    Number of children: Not on file    Years of education: Not on file    Highest education level: Not on file   Occupational History    Not on file   Tobacco Use    Smoking status: Former     Current packs/day: 0.00     Average packs/day: 1.5 packs/day for 40.0 years (60.0 ttl pk-yrs)     Types: Cigarettes     Start date:      Quit date:      Years since quittin.1     Passive exposure: Past    Smokeless tobacco: Never    Tobacco comments:     quit --    Vaping Use    Vaping status: Never Used   Substance and Sexual Activity    Alcohol use: Not Currently     Alcohol/week: 0.0 standard drinks of alcohol    Drug use: No    Sexual activity: Not on file   Other Topics Concern    Not on file   Social History Narrative    · Most recent tobacco use screenin2020      Social Drivers of Health     Financial Resource Strain: Low Risk  (2023)    Overall Financial Resource Strain (CARDIA)     Difficulty of Paying Living Expenses: Not hard at all   Food Insecurity: Not on file   Transportation Needs: No Transportation Needs (2023)    PRAPARE - Transportation     Lack of Transportation (Medical): No     Lack of Transportation (Non-Medical): No   Physical Activity: Not on file   Stress: Not on file   Social Connections: Not on file   Intimate Partner Violence: Not on file   Housing Stability: Not on file       No Known Allergies    Current Outpatient Medications   Medication Sig Dispense Refill    acetaminophen (TYLENOL) 500 mg tablet Take 1,000 mg by mouth every 8 (eight) hours as needed      atorvastatin (LIPITOR) 40 mg tablet TAKE 1 TABLET DAILY 90 tablet 1    chlorhexidine (PERIDEX) 0.12 % solution Apply 15 mL to the mouth or throat 2 (two) times a day 473 mL 3    clopidogrel (PLAVIX) 75 mg tablet Take 1 tablet (75  mg total) by mouth daily 90 tablet 1    DULoxetine (CYMBALTA) 30 mg delayed release capsule TAKE ONE CAPSULE BY MOUTH TWICE DAILY 60 capsule 0    Empagliflozin (Jardiance) 25 MG TABS Take 1 tablet (25 mg total) by mouth every morning Also: take with LOTS of water 90 tablet 3    fenofibrate (TRICOR) 145 mg tablet Take 1 tablet (145 mg total) by mouth daily 90 tablet 3    gabapentin (Neurontin) 300 mg capsule Take 1 capsule (300 mg total) by mouth 3 (three) times a day 90 capsule 6    glipiZIDE (GLUCOTROL) 10 mg tablet TAKE 1 TABLET TWICE A DAY  BEFORE MEALS 180 tablet 1    hydrocortisone 2.5 % cream Apply topically 2 (two) times a day as needed for rash 30 g 3    lisinopril-hydrochlorothiazide (PRINZIDE,ZESTORETIC) 20-12.5 MG per tablet Take 1 tablet by mouth daily 90 tablet 1    metFORMIN (GLUCOPHAGE) 1000 MG tablet TAKE 1 TABLET TWICE DAILY  WITH MEALS 180 tablet 1    omeprazole (PriLOSEC) 20 mg delayed release capsule Take 1 capsule (20 mg total) by mouth daily before breakfast 30 capsule 5    tamsulosin (FLOMAX) 0.4 mg Take 1 capsule (0.4 mg total) by mouth daily with dinner 90 capsule 1    ferrous sulfate 325 (65 Fe) mg tablet Take 1 tablet (325 mg total) by mouth 2 (two) times a day with meals (Patient not taking: Reported on 1/31/2025) 60 tablet 6     No current facility-administered medications for this visit.       (Not in a hospital admission)        Objective:     24 Hour Vitals Assessment:     Vitals:    02/11/25 0854   BP: 148/60   Pulse: 73   Resp: 18   Temp: 97.8 °F (36.6 °C)   SpO2: 98%           Weight last visit 152 lbs  Weight today: 151 lbs      PHYSICIAN EXAM:    General: Appearance: alert, cooperative, no distress.  HEENT: Normocephalic, atraumatic. No scleral icterus. conjunctivae clear. EOMI.  Chest: No tenderness to palpation. No open wound noted.  Lungs: Clear to auscultation bilaterally, Respirations unlabored.  Cardiac: Regular rate and rhythm, +S1and S2  Abdomen: Soft, non-tender,  non-distended. Bowel sounds are normal.  Extremities:  No edema, cyanosis, clubbing.  Skin: Skin color, turgor are normal. Small R anterior leg rashes  Neurologic: Awake, Alert, and oriented, no gross focal deficits noted b/l.       DATA REVIEW:    Pathology Result:    Final Diagnosis   Date Value Ref Range Status   06/27/2023   Final    A. Duodenum, :  - Unremarkable duodenal mucosa  - No villous atrophy or increased intraepithelial lymphocytes seen     B. Stomach, :  - Gastric oxyntic and antral type mucosa with minimal or mild chronic inflammation  - No sharad shaped bacteria seen on H&E stained tissue section  - Negative for intestinal metaplasia and dysplasia          09/07/2022   Final    A. Duodenum,  biopsy:  - Duodenal mucosa with focal acute inflammation and reactive changes  - No intraepithelial lymphocytosis and no villous blunting.  - No epithelial dysplasia and no evidence of malignancy.    B.  Stomach, biopsy:  - Gastric antral and oxyntic mucosa with no significant pathologic alteration.  - Negative for intestinal metaplasia, dysplasia or carcinoma.  - No Helicobacter pylori is identified on H&E stained slide.              Image Results:   Image result are reviewed and documented in Hematology/Oncology history. I personally reviewed these images.    NM LIVER SPECT POST MICROSPHERE IMPLANT  Narrative: THERASPHERE - RADIOPHARMACEUTICAL LOCALIZATION WITH SPECT    INDICATION: C22.0: Liver cell carcinoma    COMPARISON: Nuclear medicine liver imaging static only pre-SIRS mapping 1/13/2025, CT chest abdomen pelvis 12/30/2024    TECHNIQUE:   The study was performed utilizing 23.40 mCi Y90 THERASPHERE.  The radiopharmaceutical dose was delivered to the Interventional Radiology Department where it was instilled into the appropriate sections of the liver via selective visceral   arteriography.  The patient was then transferred to the Nuclear Medicine Department for SPECT imaging of the liver.    Images demonstrate  "localization of the radiopharmaceutical within the right posterior lobe of the liver. This corresponds to the lesion evident on the recent imaging studies.  Impression: 1.  Successful intra-arterial placement of Y90 THERASPHERE.    Resident: MIC STORY I, the attending radiologist, have reviewed the images and agree with the final report above.    Workstation performed: [id number]      LABS:  Lab data are reviewed and documented in HemOn history.       Lab Results   Component Value Date    HGB 13.1 01/30/2025    HCT 41.3 01/30/2025    MCV 97 01/30/2025     01/30/2025    WBC 5.06 01/30/2025    NRBC 0 01/30/2025     Lab Results   Component Value Date    K 3.9 01/30/2025     01/30/2025    CO2 26 01/30/2025    BUN 25 01/30/2025    CREATININE 0.90 01/30/2025    GLUF 196 (H) 01/30/2025    CALCIUM 10.0 01/30/2025    CORRECTEDCA 10.2 (H) 11/15/2021    AST 17 01/30/2025    ALT 22 01/30/2025    ALKPHOS 84 01/30/2025    EGFR 79 01/30/2025       Lab Results   Component Value Date    IRON 26 (L) 10/02/2024    TIBC 208 (L) 10/02/2024    FERRITIN 224 10/02/2024    FERRITIN 21 (L) 04/18/2024    FERRITIN 22 (L) 02/14/2024    FERRITIN 58 09/20/2023    FERRITIN 37 06/29/2023    FERRITIN 7 (L) 05/18/2023    FERRITIN 26 11/28/2022    FERRITIN 7 (L) 08/29/2022    FERRITIN 37 04/20/2022    FERRITIN 17 02/07/2022    FERRITIN 13 01/05/2022    FERRITIN 10 11/15/2021    FERRITIN 28 03/11/2021    FERRITIN 12 11/03/2020       Lab Results   Component Value Date    JNZGNYGV00 632 02/14/2024    QXUHOIFZ07 223 09/20/2023    QETSFTFC50 389 11/15/2021    RNSXAELN16 681 05/29/2020       No results for input(s): \"WBC\", \"CREAT\", \"PLT\" in the last 72 hours.         By:  Abbi Espinosa 2/11/2025, 9:12 AM                                    "

## 2025-02-11 ENCOUNTER — OFFICE VISIT (OUTPATIENT)
Dept: HEMATOLOGY ONCOLOGY | Facility: CLINIC | Age: 80
End: 2025-02-11
Payer: MEDICARE

## 2025-02-11 VITALS
DIASTOLIC BLOOD PRESSURE: 60 MMHG | RESPIRATION RATE: 18 BRPM | OXYGEN SATURATION: 98 % | HEART RATE: 73 BPM | HEIGHT: 66 IN | TEMPERATURE: 97.8 F | SYSTOLIC BLOOD PRESSURE: 148 MMHG | BODY MASS INDEX: 24.27 KG/M2 | WEIGHT: 151 LBS

## 2025-02-11 DIAGNOSIS — C22.0 HEPATOCELLULAR CARCINOMA (HCC): Primary | ICD-10-CM

## 2025-02-11 PROCEDURE — 99215 OFFICE O/P EST HI 40 MIN: CPT | Performed by: INTERNAL MEDICINE

## 2025-02-11 PROCEDURE — G2211 COMPLEX E/M VISIT ADD ON: HCPCS | Performed by: INTERNAL MEDICINE

## 2025-02-13 RX ORDER — SODIUM CHLORIDE, SODIUM LACTATE, POTASSIUM CHLORIDE, CALCIUM CHLORIDE 600; 310; 30; 20 MG/100ML; MG/100ML; MG/100ML; MG/100ML
50 INJECTION, SOLUTION INTRAVENOUS CONTINUOUS
Status: CANCELLED | OUTPATIENT
Start: 2025-02-13

## 2025-02-14 ENCOUNTER — ANESTHESIA (OUTPATIENT)
Dept: GASTROENTEROLOGY | Facility: HOSPITAL | Age: 80
End: 2025-02-14
Payer: MEDICARE

## 2025-02-14 ENCOUNTER — ANESTHESIA EVENT (OUTPATIENT)
Dept: GASTROENTEROLOGY | Facility: HOSPITAL | Age: 80
End: 2025-02-14
Payer: MEDICARE

## 2025-02-14 ENCOUNTER — HOSPITAL ENCOUNTER (OUTPATIENT)
Dept: GASTROENTEROLOGY | Facility: HOSPITAL | Age: 80
Setting detail: OUTPATIENT SURGERY
End: 2025-02-14
Payer: MEDICARE

## 2025-02-14 VITALS
DIASTOLIC BLOOD PRESSURE: 61 MMHG | TEMPERATURE: 97.6 F | HEART RATE: 57 BPM | SYSTOLIC BLOOD PRESSURE: 149 MMHG | OXYGEN SATURATION: 98 % | RESPIRATION RATE: 16 BRPM

## 2025-02-14 DIAGNOSIS — K74.60 CIRRHOSIS OF LIVER WITHOUT ASCITES, UNSPECIFIED HEPATIC CIRRHOSIS TYPE (HCC): ICD-10-CM

## 2025-02-14 LAB — GLUCOSE SERPL-MCNC: 126 MG/DL (ref 65–140)

## 2025-02-14 PROCEDURE — 82948 REAGENT STRIP/BLOOD GLUCOSE: CPT

## 2025-02-14 RX ORDER — SODIUM CHLORIDE, SODIUM LACTATE, POTASSIUM CHLORIDE, CALCIUM CHLORIDE 600; 310; 30; 20 MG/100ML; MG/100ML; MG/100ML; MG/100ML
INJECTION, SOLUTION INTRAVENOUS CONTINUOUS PRN
Status: DISCONTINUED | OUTPATIENT
Start: 2025-02-14 | End: 2025-02-14

## 2025-02-14 RX ORDER — PROPOFOL 10 MG/ML
INJECTION, EMULSION INTRAVENOUS AS NEEDED
Status: DISCONTINUED | OUTPATIENT
Start: 2025-02-14 | End: 2025-02-14

## 2025-02-14 RX ORDER — SODIUM CHLORIDE, SODIUM LACTATE, POTASSIUM CHLORIDE, CALCIUM CHLORIDE 600; 310; 30; 20 MG/100ML; MG/100ML; MG/100ML; MG/100ML
50 INJECTION, SOLUTION INTRAVENOUS CONTINUOUS
Status: DISCONTINUED | OUTPATIENT
Start: 2025-02-14 | End: 2025-02-18 | Stop reason: HOSPADM

## 2025-02-14 RX ORDER — LIDOCAINE HYDROCHLORIDE 10 MG/ML
INJECTION, SOLUTION EPIDURAL; INFILTRATION; INTRACAUDAL; PERINEURAL AS NEEDED
Status: DISCONTINUED | OUTPATIENT
Start: 2025-02-14 | End: 2025-02-14

## 2025-02-14 RX ADMIN — PROPOFOL 20 MG: 10 INJECTION, EMULSION INTRAVENOUS at 08:12

## 2025-02-14 RX ADMIN — PROPOFOL 20 MG: 10 INJECTION, EMULSION INTRAVENOUS at 08:14

## 2025-02-14 RX ADMIN — SODIUM CHLORIDE, SODIUM LACTATE, POTASSIUM CHLORIDE, AND CALCIUM CHLORIDE: .6; .31; .03; .02 INJECTION, SOLUTION INTRAVENOUS at 08:04

## 2025-02-14 RX ADMIN — PROPOFOL 30 MG: 10 INJECTION, EMULSION INTRAVENOUS at 08:11

## 2025-02-14 RX ADMIN — SODIUM CHLORIDE, SODIUM LACTATE, POTASSIUM CHLORIDE, AND CALCIUM CHLORIDE 50 ML/HR: .6; .31; .03; .02 INJECTION, SOLUTION INTRAVENOUS at 07:19

## 2025-02-14 RX ADMIN — LIDOCAINE HYDROCHLORIDE 50 MG: 10 SOLUTION INTRAVENOUS at 08:09

## 2025-02-14 RX ADMIN — PROPOFOL 20 MG: 10 INJECTION, EMULSION INTRAVENOUS at 08:13

## 2025-02-14 RX ADMIN — PROPOFOL 50 MG: 10 INJECTION, EMULSION INTRAVENOUS at 08:09

## 2025-02-14 NOTE — H&P
History and Physical - [facility]  Max Ozuna 80 y.o. male MRN: [id number]                  HPI: Max Ozuna is a 80 y.o. year old male who presents for cirrhosis with HCC c/b tumor thrombosis      REVIEW OF SYSTEMS: Per the HPI, and otherwise unremarkable.    Historical Information   Past Medical History:   Diagnosis Date    Coronary artery disease without angina pectoris 02/25/2020    Diabetes mellitus (HCC)     DVT (deep venous thrombosis) (HCC)     GERD (gastroesophageal reflux disease)     Hypertension     Iron deficiency anemia     Liver cancer (HCC)      Past Surgical History:   Procedure Laterality Date    ANGIOPLASTY  10/19/2021    bilateral    CARDIAC CATHETERIZATION  2013    calif    CABG  x4    COLONOSCOPY      COLONOSCOPY      CORONARY ANGIOPLASTY WITH STENT PLACEMENT  01/01/2010    CORONARY ARTERY BYPASS GRAFT      FEMORAL ARTERY STENT      FEMORAL BYPASS  10/20/2021    IR LOWER EXTREMITY ANGIOGRAM  2/14/2023    IR PELVIC ANGIOGRAM  5/6/2022    IR Y-90 PRE-ANGIO/EMBO W/ LUNG SCAN  1/13/2025    IR Y-90 RADIOEMBOLIZATION  1/22/2025    NM SLCTV CATHJ 3RD+ ORD SLCTV ABDL PEL/LXTR BRNCH  5/6/2022    Procedure: ULTRASOUND-GUIDED LEFT BRACHIAL ARTERY PUNCTURE, AORTOGRAM, RIGHT COMMON ILIAC ARTERY ANGIOPLASTY WITH 8 X 40 MM BALLOON, ANGIOPLASTY OF RIGHT EXTERNAL AND COMMON ILIAC ARTERY STENOSIS WITH 6 X 150 MM DRUG-ELUTING BALLOON, STENTING OF RIGHT DISTAL EXTERNAL ILIAC ARTERY STENOSIS WITH 7 X 40 MM SELF EXPANDING STENT POST DILATED WITH A 6 MM BALLOON.;  Surgeon: Henry Pena MD;  [place]    NM SLCTV CATHJ 3RD+ ORD SLCTV ABDL PEL/LXTR BRNCH Bilateral 2/14/2023    Procedure: CUTDOWN FEM-FEM BYPASS WITH PRIMARY CLOSURE ACCESS SITE, BILATERAL RUN-OFF, LEFT FEM-POP 5X220 QUYEN AND 6X150 RODRI, ATTEMPT TO CROSS RIGHT SFA OCCLUSION;  Surgeon: Henry Pena MD;  Location: [facility];  Service: Vascular     Social History   Social History     Substance and Sexual Activity    Alcohol Use Not Currently    Alcohol/week: 0.0 standard drinks of alcohol     Social History     Substance and Sexual Activity   Drug Use No     Social History     Tobacco Use   Smoking Status Former    Current packs/day: 0.00    Average packs/day: 1.5 packs/day for 40.0 years (60.0 ttl pk-yrs)    Types: Cigarettes    Start date:     Quit date:     Years since quittin.1    Passive exposure: Past   Smokeless Tobacco Never   Tobacco Comments    quit --      Family History   Problem Relation Age of Onset    No Known Problems Mother     Lung cancer Father     Liver cancer Brother        Meds/Allergies       Current Outpatient Medications:     acetaminophen (TYLENOL) 500 mg tablet    atorvastatin (LIPITOR) 40 mg tablet    chlorhexidine (PERIDEX) 0.12 % solution    DULoxetine (CYMBALTA) 30 mg delayed release capsule    fenofibrate (TRICOR) 145 mg tablet    gabapentin (Neurontin) 300 mg capsule    glipiZIDE (GLUCOTROL) 10 mg tablet    hydrocortisone 2.5 % cream    lisinopril-hydrochlorothiazide (PRINZIDE,ZESTORETIC) 20-12.5 MG per tablet    metFORMIN (GLUCOPHAGE) 1000 MG tablet    omeprazole (PriLOSEC) 20 mg delayed release capsule    tamsulosin (FLOMAX) 0.4 mg    clopidogrel (PLAVIX) 75 mg tablet    Empagliflozin (Jardiance) 25 MG TABS    ferrous sulfate 325 (65 Fe) mg tablet    Current Facility-Administered Medications:     lactated ringers infusion, 50 mL/hr, Intravenous, Continuous, 50 mL/hr at 25 0719    No Known Allergies    Objective     /68   Pulse 67   Temp 97.6 °F (36.4 °C) (Temporal)   Resp 16   SpO2 98%       PHYSICAL EXAM    Gen: NAD  Head: NCAT  CV: RRR  CHEST: not in respiratory distress  ABD: soft, NT/ND  EXT: no edema      ASSESSMENT/PLAN:  This is a 80 y.o. year old male here for EGD w/ possible banding, and he is stable and optimized for his procedure.

## 2025-02-14 NOTE — ANESTHESIA PREPROCEDURE EVALUATION
Procedure:  EGD    Relevant Problems   CARDIO   (+) Aortoiliac occlusive disease (HCC)   (+) Carotid stenosis, asymptomatic, bilateral   (+) Coronary artery disease without angina pectoris   (+) DVT (deep venous thrombosis) (HCC)   (+) Embolism and thrombosis of arteries of the lower extremities (HCC)   (+) Essential hypertension   (+) Other hyperlipidemia   (+) Portal vein thrombosis      ENDO   (+) Type 2 diabetes mellitus, with long-term current use of insulin (HCC)      GI/HEPATIC   (+) Cirrhosis of liver without ascites (HCC)   (+) GERD (gastroesophageal reflux disease)   (+) Hepatocellular carcinoma (HCC)      /RENAL   (+) Stage 3a chronic kidney disease (HCC)      HEMATOLOGY   (+) Iron deficiency anemia      NEURO/PSYCH   (+) Atherosclerosis of native artery of both lower extremities with intermittent claudication (HCC)   (+) Claudication in peripheral vascular disease (HCC)      PULMONARY   (+) Panlobular emphysema (HCC)      Surgery/Wound/Pain   (+) S/P CABG x 4        Physical Exam    Airway    Mallampati score: II  TM Distance: >3 FB  Neck ROM: full     Dental       Cardiovascular      Pulmonary      Other Findings        Anesthesia Plan  ASA Score- 3     Anesthesia Type- IV sedation with anesthesia with ASA Monitors.         Additional Monitors:     Airway Plan:            Plan Factors-Exercise tolerance (METS): >4 METS.    Chart reviewed. EKG reviewed.  Existing labs reviewed. Patient summary reviewed.    Patient is not a current smoker. Patient not instructed to abstain from smoking on day of procedure. Patient did not smoke on day of surgery.            Induction-     Postoperative Plan-     Perioperative Resuscitation Plan - Level 1 - Full Code.       Informed Consent- Anesthetic plan and risks discussed with patient.  I personally reviewed this patient with the CRNA. Discussed and agreed on the Anesthesia Plan with the CRNA..      NPO Status:  Vitals Value Taken Time   Date of last liquid 02/13/25  02/14/25 0709   Time of last liquid 2000 02/14/25 0709   Date of last solid 02/13/25 02/14/25 0709   Time of last solid 1800 02/14/25 0709         Lab Results   Component Value Date    HGBA1C 7.1 (H) 11/18/2024       Lab Results   Component Value Date    K 3.9 01/30/2025     01/30/2025    CO2 26 01/30/2025    BUN 25 01/30/2025    CREATININE 0.90 01/30/2025    GLUF 196 (H) 01/30/2025    CALCIUM 10.0 01/30/2025    CORRECTEDCA 10.2 (H) 11/15/2021    AST 17 01/30/2025    ALT 22 01/30/2025    ALKPHOS 84 01/30/2025    EGFR 79 01/30/2025       Lab Results   Component Value Date    WBC 5.06 01/30/2025    HGB 13.1 01/30/2025    HCT 41.3 01/30/2025    MCV 97 01/30/2025     01/30/2025   EKG Sinus tony with 1 degree AV block  lefty fascicular block     Echo 2020   Normal left ventricular chamber size. Normal left ventricular wall thickness.     Sigmoid septum. Normal regional wall motion. Normal left ventricular     systolic function. Estimated left ventricular ejection fraction is 70%.     No hemodynamically significant valve disease    Grade II diastolic dysfunction    Normal RA pressure    Ascending aorta appears heavily calcified    Visually Estimated LV Ejection Fraction is:70%

## 2025-02-14 NOTE — ANESTHESIA POSTPROCEDURE EVALUATION
Post-Op Assessment Note    CV Status:  Stable  Pain Score: 0    Pain management: adequate       Mental Status:  Alert and awake   Hydration Status:  Euvolemic   PONV Controlled:  Controlled   Airway Patency:  Patent     Post Op Vitals Reviewed: Yes    No anethesia notable event occurred.    Staff: Anesthesiologist, CRNA       Last Filed PACU Vitals:  Vitals Value Taken Time   Temp 97.6 °F (36.4 °C) 02/14/25 0822   Pulse 60 02/14/25 0822   /58 02/14/25 0822   Resp 16 02/14/25 0822   SpO2 96 % 02/14/25 0822       Modified Ryland:     Vitals Value Taken Time   Activity 1 02/14/25 0822   Respiration 2 02/14/25 0822   Circulation 2 02/14/25 0822   Consciousness 1 02/14/25 0822   Oxygen Saturation 2 02/14/25 0822     Modified Ryland Score: 8

## 2025-02-16 DIAGNOSIS — K21.9 GASTROESOPHAGEAL REFLUX DISEASE, UNSPECIFIED WHETHER ESOPHAGITIS PRESENT: ICD-10-CM

## 2025-02-20 ENCOUNTER — TELEPHONE (OUTPATIENT)
Dept: FAMILY MEDICINE CLINIC | Facility: CLINIC | Age: 80
End: 2025-02-20

## 2025-02-20 ENCOUNTER — OFFICE VISIT (OUTPATIENT)
Dept: FAMILY MEDICINE CLINIC | Facility: CLINIC | Age: 80
End: 2025-02-20
Payer: MEDICARE

## 2025-02-20 VITALS
SYSTOLIC BLOOD PRESSURE: 130 MMHG | RESPIRATION RATE: 16 BRPM | HEIGHT: 65 IN | OXYGEN SATURATION: 96 % | DIASTOLIC BLOOD PRESSURE: 50 MMHG | HEART RATE: 58 BPM | BODY MASS INDEX: 25.12 KG/M2 | TEMPERATURE: 97.2 F | WEIGHT: 150.8 LBS

## 2025-02-20 DIAGNOSIS — I74.09 AORTOILIAC OCCLUSIVE DISEASE (HCC): ICD-10-CM

## 2025-02-20 DIAGNOSIS — K74.60 CIRRHOSIS OF LIVER WITHOUT ASCITES, UNSPECIFIED HEPATIC CIRRHOSIS TYPE (HCC): ICD-10-CM

## 2025-02-20 DIAGNOSIS — C22.0 HEPATOCELLULAR CARCINOMA (HCC): ICD-10-CM

## 2025-02-20 DIAGNOSIS — E78.49 OTHER HYPERLIPIDEMIA: ICD-10-CM

## 2025-02-20 DIAGNOSIS — C30.0: ICD-10-CM

## 2025-02-20 DIAGNOSIS — D50.9 IRON DEFICIENCY ANEMIA, UNSPECIFIED IRON DEFICIENCY ANEMIA TYPE: ICD-10-CM

## 2025-02-20 DIAGNOSIS — J84.10 PULMONARY INTERSTITIAL FIBROSIS (HCC): ICD-10-CM

## 2025-02-20 DIAGNOSIS — I10 ESSENTIAL HYPERTENSION: ICD-10-CM

## 2025-02-20 DIAGNOSIS — Z23 ENCOUNTER FOR IMMUNIZATION: ICD-10-CM

## 2025-02-20 DIAGNOSIS — E11.51 TYPE 2 DIABETES MELLITUS WITH DIABETIC PERIPHERAL ANGIOPATHY WITHOUT GANGRENE, WITHOUT LONG-TERM CURRENT USE OF INSULIN (HCC): Primary | ICD-10-CM

## 2025-02-20 DIAGNOSIS — Z00.00 MEDICARE ANNUAL WELLNESS VISIT, SUBSEQUENT: ICD-10-CM

## 2025-02-20 DIAGNOSIS — Z79.899 ENCOUNTER FOR LONG-TERM CURRENT USE OF HIGH RISK MEDICATION: ICD-10-CM

## 2025-02-20 DIAGNOSIS — I70.213 ATHEROSCLEROSIS OF NATIVE ARTERY OF BOTH LOWER EXTREMITIES WITH INTERMITTENT CLAUDICATION (HCC): ICD-10-CM

## 2025-02-20 DIAGNOSIS — I73.9 CLAUDICATION IN PERIPHERAL VASCULAR DISEASE (HCC): ICD-10-CM

## 2025-02-20 DIAGNOSIS — N18.31 STAGE 3A CHRONIC KIDNEY DISEASE (HCC): ICD-10-CM

## 2025-02-20 DIAGNOSIS — Z95.1 S/P CABG X 4: ICD-10-CM

## 2025-02-20 DIAGNOSIS — I81 PORTAL VEIN THROMBOSIS: ICD-10-CM

## 2025-02-20 LAB — SL AMB POCT HEMOGLOBIN AIC: 6.2 (ref ?–6.5)

## 2025-02-20 PROCEDURE — 90662 IIV NO PRSV INCREASED AG IM: CPT | Performed by: FAMILY MEDICINE

## 2025-02-20 PROCEDURE — 99215 OFFICE O/P EST HI 40 MIN: CPT | Performed by: FAMILY MEDICINE

## 2025-02-20 PROCEDURE — G2211 COMPLEX E/M VISIT ADD ON: HCPCS | Performed by: FAMILY MEDICINE

## 2025-02-20 PROCEDURE — 83036 HEMOGLOBIN GLYCOSYLATED A1C: CPT | Performed by: FAMILY MEDICINE

## 2025-02-20 PROCEDURE — G0439 PPPS, SUBSEQ VISIT: HCPCS | Performed by: FAMILY MEDICINE

## 2025-02-20 PROCEDURE — G0008 ADMIN INFLUENZA VIRUS VAC: HCPCS | Performed by: FAMILY MEDICINE

## 2025-02-20 RX ORDER — MUPIROCIN 20 MG/G
OINTMENT TOPICAL 2 TIMES DAILY
Qty: 22 G | Refills: 3 | Status: SHIPPED | OUTPATIENT
Start: 2025-02-20

## 2025-02-20 NOTE — ASSESSMENT & PLAN NOTE
Lab Results   Component Value Date    EGFR 79 01/30/2025    EGFR 81 01/22/2025    EGFR 68 01/13/2025    CREATININE 0.90 01/30/2025    CREATININE 0.86 01/22/2025    CREATININE 1.02 01/13/2025

## 2025-02-20 NOTE — PROGRESS NOTES
"Name: Max Ozuna      : 1945      MRN: [id number]  Encounter Provider: KADE Martel DO  Encounter Date: 2025   Encounter department: [facility] [place]    Assessment & Plan  Type 2 diabetes mellitus with diabetic peripheral angiopathy without gangrene, without long-term current use of insulin (HCC)    Lab Results   Component Value Date    HGBA1C 7.1 (H) 2024            Hepatocellular carcinoma (HCC)  Is following with infusions every month and does see hepatologist frequently yeah       Essential hypertension         Other hyperlipidemia         Portal vein thrombosis         Atherosclerosis of native artery of both lower extremities with intermittent claudication (HCC)         S/P CABG x 2019 at [facility]       Stage 3a chronic kidney disease (HCC)  Lab Results   Component Value Date    EGFR 79 2025    EGFR 81 2025    EGFR 68 2025    CREATININE 0.90 2025    CREATININE 0.86 2025    CREATININE 1.02 2025            Cirrhosis of liver without ascites, unspecified hepatic cirrhosis type (HCC)         Medicare annual wellness visit, subsequent         Claudication in peripheral vascular disease (HCC)         Encounter for long-term current use of high risk medication         Iron deficiency anemia, unspecified iron deficiency anemia type         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  Tells me he has 10-15 yrs of intermediate bleeding and crusting. Someone \"in past\" wanted to operate?? Anyway, this appears malig to me and I'll send for bx.        Aortoiliac occlusive disease (HCC)         Pulmonary interstitial fibrosis (HCC)         Encounter for immunization    Orders:    influenza vaccine, high-dose, PF 0.5 mL (Fluzone High Dose)      Assessment & Plan  1. Type 2 Diabetes Mellitus: Stable. A1c 6.2.  - Continue current medication regimen: Jardiance 25 mg (half tablet twice a day), glyburide or glipizide (twice a " day), and metformin.  - Conduct A1c test today to monitor progress.    2. Nasal Neoplasm.  - Prescribe mupirocin ointment, apply with Q-tip twice daily.  - Referral to otolaryngologist for biopsy.  - Consider treatment options such as radiation or surgical removal if biopsy confirms skin cancer.    3. Health Maintenance.  - Confirm dates of second shingles vaccine and influenza vaccine with [facility] and [facility] respectively.    Follow-up  - Follow up with otolaryngologist for biopsy results and further management.    PROCEDURE  The patient underwent open heart surgery in 2019 at [facility].    Depression Screening and Follow-up Plan: Patient was screened for depression during today's encounter. They screened negative with a PHQ-2 score of 1.          History of Present Illness     History of Present Illness  The patient is an 80-year-old male who presents for evaluation of type 2 diabetes mellitus, dry nose, and health maintenance.    Type 2 Diabetes Mellitus  - He has been managing his diabetes with Jardiance 25 mg, which he divides into two doses taken in the morning and at night.  - His regimen also includes glyburide or glipizide, administered twice daily, and metformin.  - He reports a recent episode of hypoglycemia, with a blood glucose level dropping to 71.  - He has never been prescribed insulin.  - He recalls a period of one month when his diabetes was well-controlled with Trulicity 0.75 mg.  - He was diagnosed with diabetes approximately 25 years ago while residing in [place].    Dry Nose  - He has been experiencing persistent nasal dryness for the past 10 to 15 years.  - He reports occasional nosebleeds, which he manages by inserting a piece of paper into his nostril.  - He also notes that his nose produces a scab-like substance overnight, which he attempts to clear by blowing his nose during showers.    Health Maintenance  - He had a blood test done on 01/30/2025.  - He gets blood tests  "done every month before he goes for the infusion.  - He gets the infusion every month.  - He had an open-heart surgery in 2019 at [facility].  - He had a stent placed in [place].  - He received 2 shingles vaccines at [facility].    Supplemental Information  He has liver cancer diagnosed in 2024 and is following with infusions every month and sees hepatologist frequently.    MEDICATIONS  Jardiance, glyburide, metformin, Plavix, clopidogrel, duloxetine, lisinopril, omeprazole.    IMMUNIZATIONS  He received 2 shingles vaccines at [facility]. His Tdap and pneumonia vaccines are up to date. He received influenza vaccine in 2024.     Review of Systems   Constitutional:  Negative for activity change, appetite change, chills, diaphoresis, fatigue, fever and unexpected weight change.   HENT:  Positive for nosebleeds. Negative for congestion, dental problem, drooling, ear discharge, ear pain, facial swelling, mouth sores, postnasal drip, rhinorrhea, trouble swallowing and voice change.         For first time ever, patient reports bleeding from left side of nose.  Reports this has been going on for 10 maybe 15 years.  Reports that at 1 point years ago he was going to have surgery but there were too many delays so he just \"bagged it\" and forgot about it.  Lately it has been bleeding more.   Eyes:  Negative for photophobia, pain, discharge, redness, itching and visual disturbance.   Respiratory:  Negative for apnea, cough, choking, chest tightness and shortness of breath.    Cardiovascular:  Negative for chest pain and leg swelling.   Gastrointestinal:  Negative for abdominal distention, abdominal pain, constipation, diarrhea and nausea.        Diagnosed with hepatocellular carcinoma 7-9 months ago and is currently under treatment getting infusions every month etc.   Endocrine: Negative for polydipsia, polyphagia and polyuria.   Genitourinary:  Negative for decreased urine volume, difficulty urinating, " "dysuria, enuresis and hematuria.   Musculoskeletal:  Negative for arthralgias, back pain, gait problem and joint swelling.   Skin:  Negative for color change, pallor, rash and wound.   Allergic/Immunologic: Negative for immunocompromised state.   Neurological:  Negative for dizziness, seizures, syncope, facial asymmetry, speech difficulty, light-headedness and headaches.   Hematological:  Negative for adenopathy.   Psychiatric/Behavioral:  Negative for agitation, behavioral problems, confusion and decreased concentration.      Objective   /50 (BP Location: Left arm, Patient Position: Sitting, Cuff Size: Standard)   Pulse 58   Temp (!) 97.2 °F (36.2 °C) (Temporal)   Resp 16   Ht 5' 5\" (1.651 m)   Wt 68.4 kg (150 lb 12.8 oz)   SpO2 96%   BMI 25.09 kg/m²     Physical Exam  There is no cerumen in the ears.  Physical Exam  Vitals and nursing note reviewed.   Constitutional:       General: He is not in acute distress.     Appearance: Normal appearance. He is well-developed. He is not ill-appearing, toxic-appearing or diaphoretic.   HENT:      Head: Normocephalic and atraumatic.      Right Ear: Tympanic membrane, ear canal and external ear normal.      Left Ear: Tympanic membrane, ear canal and external ear normal.      Nose: Nose normal. No congestion or rhinorrhea.      Comments: There is bright red ulcer with macerated borders on the nasal septum left side.  Appears about the size of a fingernail.  Appearance is that of carcinoma--will send for biopsy to ENT     Mouth/Throat:      Mouth: Mucous membranes are moist.   Eyes:      Extraocular Movements: Extraocular movements intact.      Conjunctiva/sclera: Conjunctivae normal.      Pupils: Pupils are equal, round, and reactive to light.   Cardiovascular:      Rate and Rhythm: Normal rate and regular rhythm.      Pulses: Normal pulses.      Heart sounds: Normal heart sounds. No murmur heard.     No friction rub.   Pulmonary:      Effort: Pulmonary effort is " normal. No respiratory distress.      Breath sounds: Normal breath sounds. No stridor. No wheezing or rhonchi.   Abdominal:      Palpations: Abdomen is soft.      Tenderness: There is no abdominal tenderness.   Musculoskeletal:         General: No swelling.      Cervical back: Neck supple.   Skin:     General: Skin is warm and dry.      Coloration: Skin is not jaundiced or pale.      Findings: No erythema or rash.   Neurological:      General: No focal deficit present.      Mental Status: He is alert and oriented to person, place, and time. Mental status is at baseline.   Psychiatric:         Mood and Affect: Mood normal.         Behavior: Behavior normal.         Thought Content: Thought content normal.         Judgment: Judgment normal.       Administrative Statements   I have spent a total time of 45 minutes in caring for this patient on the day of the visit/encounter including Diagnostic results, Prognosis, Risks and benefits of tx options, Instructions for management, Patient and family education, Importance of tx compliance, Risk factor reductions, Impressions, Counseling / Coordination of care, Documenting in the medical record, Reviewing/placing orders in the medical record (including tests, medications, and/or procedures), and Obtaining or reviewing history  .

## 2025-02-20 NOTE — PATIENT INSTRUCTIONS
Medicare Preventive Visit Patient Instructions  Thank you for completing your Welcome to Medicare Visit or Medicare Annual Wellness Visit today. Your next wellness visit will be due in one year (2/21/2026).  The screening/preventive services that you may require over the next 5-10 years are detailed below. Some tests may not apply to you based off risk factors and/or age. Screening tests ordered at today's visit but not completed yet may show as past due. Also, please note that scanned in results may not display below.  Preventive Screenings:  Service Recommendations Previous Testing/Comments   Colorectal Cancer Screening  Colonoscopy    Fecal Occult Blood Test (FOBT)/Fecal Immunochemical Test (FIT)  Fecal DNA/Cologuard Test  Flexible Sigmoidoscopy Age: 45-75 years old   Colonoscopy: every 10 years (May be performed more frequently if at higher risk)  OR  FOBT/FIT: every 1 year  OR  Cologuard: every 3 years  OR  Sigmoidoscopy: every 5 years  Screening may be recommended earlier than age 45 if at higher risk for colorectal cancer. Also, an individualized decision between you and your healthcare provider will decide whether screening between the ages of 76-85 would be appropriate. Colonoscopy: 09/07/2022  FOBT/FIT: 08/31/2022  Cologuard: Not on file  Sigmoidoscopy: Not on file          Prostate Cancer Screening Individualized decision between patient and health care provider in men between ages of 55-69   Medicare will cover every 12 months beginning on the day after your 50th birthday PSA: 0.25 ng/mL     Screening Not Indicated     Hepatitis C Screening Once for adults born between 1945 and 1965  More frequently in patients at high risk for Hepatitis C Hep C Antibody: Not on file        Diabetes Screening 1-2 times per year if you're at risk for diabetes or have pre-diabetes Fasting glucose: 196 mg/dL (1/30/2025)  A1C: 7.1 % (11/18/2024)  Screening Not Indicated  History Diabetes   Cholesterol Screening Once every 5  years if you don't have a lipid disorder. May order more often based on risk factors. Lipid panel: 10/02/2024  Screening Not Indicated  History Lipid Disorder      Other Preventive Screenings Covered by Medicare:  Abdominal Aortic Aneurysm (AAA) Screening: covered once if your at risk. You're considered to be at risk if you have a family history of AAA or a male between the age of 65-75 who smoking at least 100 cigarettes in your lifetime.  Lung Cancer Screening: covers low dose CT scan once per year if you meet all of the following conditions: (1) Age 55-77; (2) No signs or symptoms of lung cancer; (3) Current smoker or have quit smoking within the last 15 years; (4) You have a tobacco smoking history of at least 20 pack years (packs per day x number of years you smoked); (5) You get a written order from a healthcare provider.  Glaucoma Screening: covered annually if you're considered high risk: (1) You have diabetes OR (2) Family history of glaucoma OR (3)  aged 50 and older OR (4)  American aged 65 and older  Osteoporosis Screening: covered every 2 years if you meet one of the following conditions: (1) Have a vertebral abnormality; (2) On glucocorticoid therapy for more than 3 months; (3) Have primary hyperparathyroidism; (4) On osteoporosis medications and need to assess response to drug therapy.  HIV Screening: covered annually if you're between the age of 15-65. Also covered annually if you are younger than 15 and older than 65 with risk factors for HIV infection. For pregnant patients, it is covered up to 3 times per pregnancy.    Immunizations:  Immunization Recommendations   Influenza Vaccine Annual influenza vaccination during flu season is recommended for all persons aged >= 6 months who do not have contraindications   Pneumococcal Vaccine   * Pneumococcal conjugate vaccine = PCV13 (Prevnar 13), PCV15 (Vaxneuvance), PCV20 (Prevnar 20)  * Pneumococcal polysaccharide vaccine = PPSV23  (Pneumovax) Adults 19-65 yo with certain risk factors or if 65+ yo  If never received any pneumonia vaccine: recommend Prevnar 20 (PCV20)  Give PCV20 if previously received 1 dose of PCV13 or PPSV23   Hepatitis B Vaccine 3 dose series if at intermediate or high risk (ex: diabetes, end stage renal disease, liver disease)   Respiratory syncytial virus (RSV) Vaccine - COVERED BY MEDICARE PART D  * RSVPreF3 (Arexvy) CDC recommends that adults 60 years of age and older may receive a single dose of RSV vaccine using shared clinical decision-making (SCDM)   Tetanus (Td) Vaccine - COST NOT COVERED BY MEDICARE PART B Following completion of primary series, a booster dose should be given every 10 years to maintain immunity against tetanus. Td may also be given as tetanus wound prophylaxis.   Tdap Vaccine - COST NOT COVERED BY MEDICARE PART B Recommended at least once for all adults. For pregnant patients, recommended with each pregnancy.   Shingles Vaccine (Shingrix) - COST NOT COVERED BY MEDICARE PART B  2 shot series recommended in those 19 years and older who have or will have weakened immune systems or those 50 years and older     Health Maintenance Due:      Topic Date Due   • Lung Cancer Screening  Discontinued     Immunizations Due:      Topic Date Due   • Hepatitis A Vaccine (1 of 2 - Risk 2-dose series) Never done   • Hepatitis B Vaccine (1 of 3 - Risk 3-dose series) Never done   • Influenza Vaccine (1) 09/01/2024   • COVID-19 Vaccine (3 - 2024-25 season) 09/01/2024     Advance Directives   What are advance directives?  Advance directives are legal documents that state your wishes and plans for medical care. These plans are made ahead of time in case you lose your ability to make decisions for yourself. Advance directives can apply to any medical decision, such as the treatments you want, and if you want to donate organs.   What are the types of advance directives?  There are many types of advance directives, and  each state has rules about how to use them. You may choose a combination of any of the following:  Living will:  This is a written record of the treatment you want. You can also choose which treatments you do not want, which to limit, and which to stop at a certain time. This includes surgery, medicine, IV fluid, and tube feedings.   Durable power of  for healthcare (DPAHC):  This is a written record that states who you want to make healthcare choices for you when you are unable to make them for yourself. This person, called a proxy, is usually a family member or a friend. You may choose more than 1 proxy.  Do not resuscitate (DNR) order:  A DNR order is used in case your heart stops beating or you stop breathing. It is a request not to have certain forms of treatment, such as CPR. A DNR order may be included in other types of advance directives.  Medical directive:  This covers the care that you want if you are in a coma, near death, or unable to make decisions for yourself. You can list the treatments you want for each condition. Treatment may include pain medicine, surgery, blood transfusions, dialysis, IV or tube feedings, and a ventilator (breathing machine).  Values history:  This document has questions about your views, beliefs, and how you feel and think about life. This information can help others choose the care that you would choose.  Why are advance directives important?  An advance directive helps you control your care. Although spoken wishes may be used, it is better to have your wishes written down. Spoken wishes can be misunderstood, or not followed. Treatments may be given even if you do not want them. An advance directive may make it easier for your family to make difficult choices about your care.   Weight Management   Why it is important to manage your weight:  Being overweight increases your risk of health conditions such as heart disease, high blood pressure, type 2 diabetes, and  certain types of cancer. It can also increase your risk for osteoarthritis, sleep apnea, and other respiratory problems. Aim for a slow, steady weight loss. Even a small amount of weight loss can lower your risk of health problems.  How to lose weight safely:  A safe and healthy way to lose weight is to eat fewer calories and get regular exercise. You can lose up about 1 pound a week by decreasing the number of calories you eat by 500 calories each day.   Healthy meal plan for weight management:  A healthy meal plan includes a variety of foods, contains fewer calories, and helps you stay healthy. A healthy meal plan includes the following:  Eat whole-grain foods more often.  A healthy meal plan should contain fiber. Fiber is the part of grains, fruits, and vegetables that is not broken down by your body. Whole-grain foods are healthy and provide extra fiber in your diet. Some examples of whole-grain foods are whole-wheat breads and pastas, oatmeal, brown rice, and bulgur.  Eat a variety of vegetables every day.  Include dark, leafy greens such as spinach, kale, simeon greens, and mustard greens. Eat yellow and orange vegetables such as carrots, sweet potatoes, and winter squash.   Eat a variety of fruits every day.  Choose fresh or canned fruit (canned in its own juice or light syrup) instead of juice. Fruit juice has very little or no fiber.  Eat low-fat dairy foods.  Drink fat-free (skim) milk or 1% milk. Eat fat-free yogurt and low-fat cottage cheese. Try low-fat cheeses such as mozzarella and other reduced-fat cheeses.  Choose meat and other protein foods that are low in fat.  Choose beans or other legumes such as split peas or lentils. Choose fish, skinless poultry (chicken or turkey), or lean cuts of red meat (beef or pork). Before you cook meat or poultry, cut off any visible fat.   Use less fat and oil.  Try baking foods instead of frying them. Add less fat, such as margarine, sour cream, regular salad  dressing and mayonnaise to foods. Eat fewer high-fat foods. Some examples of high-fat foods include french fries, doughnuts, ice cream, and cakes.  Eat fewer sweets.  Limit foods and drinks that are high in sugar. This includes candy, cookies, regular soda, and sweetened drinks.  Exercise:  Exercise at least 30 minutes per day on most days of the week. Some examples of exercise include walking, biking, dancing, and swimming. You can also fit in more physical activity by taking the stairs instead of the elevator or parking farther away from stores. Ask your healthcare provider about the best exercise plan for you.      © Copyright [employer] 2018 Information is for End User's use only and may not be sold, redistributed or otherwise used for commercial purposes. All illustrations and images included in CareNotes® are the copyrighted property of [employer] or [employer]

## 2025-02-20 NOTE — ASSESSMENT & PLAN NOTE
Lab Results   Component Value Date    HGBA1C 6.2 02/20/2025       Orders:    POCT hemoglobin A1c

## 2025-02-20 NOTE — ASSESSMENT & PLAN NOTE
Orders:    Ambulatory Referral to Otolaryngology; Future    mupirocin (BACTROBAN) 2 % ointment; Apply topically 2 (two) times a day In the LEFT side of nose

## 2025-02-20 NOTE — ASSESSMENT & PLAN NOTE
"This is LEFT side of nose.  Tells me he has 10-15 yrs of intermediate bleeding and crusting. Someone \"in past\" wanted to operate?? Anyway, this appears malig to me and I'll send for bx.      "

## 2025-02-20 NOTE — TELEPHONE ENCOUNTER
We are awaiting a vaccine record for the singles vaccine received by this patient. I have asked Nancy vu to fax to [phone]

## 2025-02-20 NOTE — PROGRESS NOTES
Name: Max Ozuna      : 1945      MRN: [id number]  Encounter Provider: KADE Martel DO  Encounter Date: 2025   Encounter department: [facility]      NOTE:  Pls see the above note which is to compliment this note, so as to avoid duplication and repetition.    Assessment & Plan  Type 2 diabetes mellitus with diabetic peripheral angiopathy without gangrene, without long-term current use of insulin (HCC)    Lab Results   Component Value Date    HGBA1C 6.2 2025       Orders:    POCT hemoglobin A1c    Hepatocellular carcinoma (HCC)         Essential hypertension         Other hyperlipidemia         Portal vein thrombosis         Atherosclerosis of native artery of both lower extremities with intermittent claudication (HCC)         S/P CABG x 4         Stage 3a chronic kidney disease (HCC)  Lab Results   Component Value Date    EGFR 79 2025    EGFR 81 2025    EGFR 68 2025    CREATININE 0.90 2025    CREATININE 0.86 2025    CREATININE 1.02 2025            Cirrhosis of liver without ascites, unspecified hepatic cirrhosis type (HCC)         Medicare annual wellness visit, subsequent         Claudication in peripheral vascular disease (HCC)         Encounter for long-term current use of high risk medication         Iron deficiency anemia, unspecified iron deficiency anemia type         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 Personalized health  advice and appropriate referrals for health education or preventive services given if needed, as noted in patient's After Visit Summary.    History of Present Illness     HPI   Patient Care Team:  KADE Martel DO as PCP - General (Family Medicine)  Olga Estrada MA as Care Coordinator (Oncology)  Mal Angeles MD (Hematology and Oncology)  Juaquin Landaverde MD (Radiation Oncology)    Review of Systems  Medical History Reviewed by provider this encounter:       Annual Wellness Visit Questionnaire   Max is here for his Subsequent Wellness visit. Last Medicare Wellness visit information reviewed, patient interviewed, no change since last AWV.     Health Risk Assessment:   Patient rates overall health as fair. Patient feels that their physical health rating is same. Patient is satisfied with their life. Eyesight was rated as much better. Hearing was rated as same. Patients states they are sometimes angry. Patient states they are sometimes unusually tired/fatigued. Pain experienced in the last 7 days has been none. Patient states that he has experienced no weight loss or gain in last 6 months.     Depression Screening:   PHQ-2 Score: 1      Fall Risk Screening:   In the past year, patient has experienced: no history of falling in past year      Home Safety:  Patient does not have trouble with stairs inside or outside of their home. Patient has working smoke alarms and has working carbon monoxide detector. Home safety hazards include: none.     Nutrition:   Current diet is Regular.     Medications:   Patient is not currently taking any over-the-counter supplements. Patient is able to manage medications.     Activities of Daily Living (ADLs)/Instrumental Activities of Daily Living (IADLs):   Walk and transfer into and out of bed and chair?: Yes  Dress and groom yourself?: Yes    Bathe or shower yourself?: Yes    Feed yourself? Yes  Do your laundry/housekeeping?: Yes  Manage your money, pay your bills and  track your expenses?: Yes  Make your own meals?: Yes    Do your own shopping?: Yes    Advance Care Planning:   Living will: No    Durable POA for healthcare: Yes    Advanced directive: Yes    Advanced directive counseling given: Yes    ACP document given: Yes    Patient declined ACP directive: Yes    End of Life Decisions reviewed with patient: Yes    Provider agrees with end of life decisions: Yes      Comments: Renelitomichael morris [phone]  Maddi Fieldsarlene [phone]    Cognitive Screening:   Provider or family/friend/caregiver concerned regarding cognition?: No    PREVENTIVE SCREENINGS      Cardiovascular Screening:    General: Screening Not Indicated, History Lipid Disorder and Risks and Benefits Discussed      Diabetes Screening:     General: Screening Not Indicated, History Diabetes and Risks and Benefits Discussed      Colorectal Cancer Screening:     General: Risks and Benefits Discussed      Prostate Cancer Screening:    General: Screening Not Indicated and Risks and Benefits Discussed      Abdominal Aortic Aneurysm (AAA) Screening:    Risk factors include: tobacco use        General: Risks and Benefits Discussed      Lung Cancer Screening:     General: Screening Not Indicated and Risks and Benefits Discussed      Hepatitis C Screening:    General: Risks and Benefits Discussed, Screening Current and History Hepatitis C    Hep C Screening Accepted: Yes      Screening, Brief Intervention, and Referral to Treatment (SBIRT)     Screening  Typical number of drinks in a day: 0  Typical number of drinks in a week: 0  Interpretation: Low risk drinking behavior.    AUDIT-C Screenin) How often did you have a drink containing alcohol in the past year? never  2) How many drinks did you have on a typical day when you were drinking in the past year? 0  3) How often did you have 6 or more drinks on one occasion in the past year? never    AUDIT-C Score: 0  Interpretation: Score 0-3 (male): Negative screen for alcohol  "misuse    Single Item Drug Screening:  How often have you used an illegal drug (including marijuana) or a prescription medication for non-medical reasons in the past year? never    Single Item Drug Screen Score: 0  Interpretation: Negative screen for possible drug use disorder    Brief Intervention  Alcohol & drug use screenings were reviewed. No concerns regarding substance use disorder identified. Healthy alcohol use/limits discussed.     Patient does not drink any alcohol    Other Counseling Topics:   Car/seat belt/driving safety, skin self-exam, sunscreen and calcium and vitamin D intake and regular weightbearing exercise.     Social Drivers of Health     Financial Resource Strain: Low Risk  (11/1/2023)    Overall Financial Resource Strain (CARDIA)     Difficulty of Paying Living Expenses: Not hard at all   Food Insecurity: No Food Insecurity (2/20/2025)    Hunger Vital Sign     Worried About Running Out of Food in the Last Year: Never true     Ran Out of Food in the Last Year: Never true   Recent Concern: Food Insecurity - Food Insecurity Present (2/20/2025)    Nursing - Inadequate Food Risk Classification     Worried About Running Out of Food in the Last Year: Sometimes true     Ran Out of Food in the Last Year: Sometimes true   Transportation Needs: No Transportation Needs (2/20/2025)    PRAPARE - Transportation     Lack of Transportation (Medical): No     Lack of Transportation (Non-Medical): No   Housing Stability: Low Risk  (2/20/2025)    Housing Stability Vital Sign     Unable to Pay for Housing in the Last Year: No     Number of Times Moved in the Last Year: 0     Homeless in the Last Year: No   Utilities: Not At Risk (2/20/2025)    [facility] Utilities     Threatened with loss of utilities: No     No results found.    Objective   /50 (BP Location: Left arm, Patient Position: Sitting, Cuff Size: Standard)   Pulse 58   Temp (!) 97.2 °F (36.2 °C) (Temporal)   Resp 16   Ht 5' 5\" (1.651 m)   Wt 68.4 kg " (150 lb 12.8 oz)   SpO2 96%   BMI 25.09 kg/m²     Physical Exam  Administrative Statements   I have spent a total time of 40 minutes in caring for this patient on the day of the visit/encounter including Diagnostic results, Prognosis, Risks and benefits of tx options, Instructions for management, Patient and family education, Importance of tx compliance, Risk factor reductions, Impressions, Counseling / Coordination of care, Documenting in the medical record, Reviewing/placing orders in the medical record (including tests, medications, and/or procedures), and Obtaining or reviewing history  .

## 2025-02-25 RX ORDER — SODIUM CHLORIDE 9 MG/ML
20 INJECTION, SOLUTION INTRAVENOUS ONCE
Status: CANCELLED | OUTPATIENT
Start: 2025-03-04

## 2025-02-28 ENCOUNTER — APPOINTMENT (OUTPATIENT)
Dept: LAB | Facility: CLINIC | Age: 80
End: 2025-02-28
Payer: MEDICARE

## 2025-02-28 ENCOUNTER — TELEPHONE (OUTPATIENT)
Age: 80
End: 2025-02-28

## 2025-02-28 DIAGNOSIS — E03.2 DRUG-INDUCED HYPOTHYROIDISM: ICD-10-CM

## 2025-02-28 DIAGNOSIS — C22.0 HEPATOCELLULAR CARCINOMA (HCC): Primary | ICD-10-CM

## 2025-02-28 DIAGNOSIS — C22.0 HEPATOCELLULAR CARCINOMA (HCC): ICD-10-CM

## 2025-02-28 LAB
ALBUMIN SERPL BCG-MCNC: 4.1 G/DL (ref 3.5–5)
ALP SERPL-CCNC: 93 U/L (ref 34–104)
ALT SERPL W P-5'-P-CCNC: 22 U/L (ref 7–52)
AMYLASE SERPL-CCNC: 53 IU/L (ref 29–103)
ANION GAP SERPL CALCULATED.3IONS-SCNC: 10 MMOL/L (ref 4–13)
AST SERPL W P-5'-P-CCNC: 17 U/L (ref 13–39)
BASOPHILS # BLD AUTO: 0.02 THOUSANDS/ÂΜL (ref 0–0.1)
BASOPHILS NFR BLD AUTO: 0 % (ref 0–1)
BILIRUB SERPL-MCNC: 0.75 MG/DL (ref 0.2–1)
BUN SERPL-MCNC: 33 MG/DL (ref 5–25)
CALCIUM SERPL-MCNC: 9.5 MG/DL (ref 8.4–10.2)
CHLORIDE SERPL-SCNC: 102 MMOL/L (ref 96–108)
CO2 SERPL-SCNC: 28 MMOL/L (ref 21–32)
CREAT SERPL-MCNC: 1.1 MG/DL (ref 0.6–1.3)
EOSINOPHIL # BLD AUTO: 0.16 THOUSAND/ÂΜL (ref 0–0.61)
EOSINOPHIL NFR BLD AUTO: 4 % (ref 0–6)
ERYTHROCYTE [DISTWIDTH] IN BLOOD BY AUTOMATED COUNT: 15 % (ref 11.6–15.1)
GFR SERPL CREATININE-BSD FRML MDRD: 62 ML/MIN/1.73SQ M
GLUCOSE P FAST SERPL-MCNC: 118 MG/DL (ref 65–99)
HCT VFR BLD AUTO: 41.8 % (ref 36.5–49.3)
HGB BLD-MCNC: 13.6 G/DL (ref 12–17)
IMM GRANULOCYTES # BLD AUTO: 0.02 THOUSAND/UL (ref 0–0.2)
IMM GRANULOCYTES NFR BLD AUTO: 0 % (ref 0–2)
LIPASE SERPL-CCNC: 41 U/L (ref 11–82)
LYMPHOCYTES # BLD AUTO: 0.56 THOUSANDS/ÂΜL (ref 0.6–4.47)
LYMPHOCYTES NFR BLD AUTO: 12 % (ref 14–44)
MCH RBC QN AUTO: 30.8 PG (ref 26.8–34.3)
MCHC RBC AUTO-ENTMCNC: 32.5 G/DL (ref 31.4–37.4)
MCV RBC AUTO: 95 FL (ref 82–98)
MONOCYTES # BLD AUTO: 0.4 THOUSAND/ÂΜL (ref 0.17–1.22)
MONOCYTES NFR BLD AUTO: 9 % (ref 4–12)
NEUTROPHILS # BLD AUTO: 3.35 THOUSANDS/ÂΜL (ref 1.85–7.62)
NEUTS SEG NFR BLD AUTO: 75 % (ref 43–75)
NRBC BLD AUTO-RTO: 0 /100 WBCS
PLATELET # BLD AUTO: 156 THOUSANDS/UL (ref 149–390)
PMV BLD AUTO: 10.5 FL (ref 8.9–12.7)
POTASSIUM SERPL-SCNC: 4.1 MMOL/L (ref 3.5–5.3)
PROT SERPL-MCNC: 7.2 G/DL (ref 6.4–8.4)
RBC # BLD AUTO: 4.41 MILLION/UL (ref 3.88–5.62)
SODIUM SERPL-SCNC: 140 MMOL/L (ref 135–147)
T4 FREE SERPL-MCNC: 0.52 NG/DL (ref 0.61–1.12)
TSH SERPL DL<=0.05 MIU/L-ACNC: 21.59 UIU/ML (ref 0.45–4.5)
WBC # BLD AUTO: 4.51 THOUSAND/UL (ref 4.31–10.16)

## 2025-02-28 PROCEDURE — 85025 COMPLETE CBC W/AUTO DIFF WBC: CPT

## 2025-02-28 PROCEDURE — 36415 COLL VENOUS BLD VENIPUNCTURE: CPT

## 2025-02-28 PROCEDURE — 84443 ASSAY THYROID STIM HORMONE: CPT

## 2025-02-28 PROCEDURE — 82150 ASSAY OF AMYLASE: CPT

## 2025-02-28 PROCEDURE — 84439 ASSAY OF FREE THYROXINE: CPT

## 2025-02-28 PROCEDURE — 83690 ASSAY OF LIPASE: CPT

## 2025-02-28 PROCEDURE — 80053 COMPREHEN METABOLIC PANEL: CPT

## 2025-02-28 RX ORDER — LEVOTHYROXINE SODIUM 25 UG/1
25 TABLET ORAL DAILY
Qty: 30 TABLET | Refills: 2 | Status: SHIPPED | OUTPATIENT
Start: 2025-02-28

## 2025-02-28 NOTE — TELEPHONE ENCOUNTER
Call received from patient regarding his TSH level being high. Advised patient that Levothyroxine was sent to his pharmacy and for him to start taking this daily every morning, an hour before he eats meals. Pt verbalized understanding.

## 2025-02-28 NOTE — TELEPHONE ENCOUNTER
"Per Dr. Angeles recommended below    \"He needs to be on levothyroxine or have his dose increased one level \"  "

## 2025-03-03 NOTE — PROGRESS NOTES
"Name: Max Ozuna      : 1945      MRN: [id number]  Encounter Provider: Mal Angeles MD  Encounter Date: 3/12/2025   Encounter department: [facility]  :  Assessment & Plan  Hepatocellular carcinoma (HCC)    This is a 80 y.o. c PMHx notable for cecal AVM/GAVE, PE, DM, DVT, HTN, HLP, CAD, being seen in consultation for chronic ANG and locally advanced HCC; on systemic therapy              No follow-ups on file.    History of Present Illness   Chief Complaint   Patient presents with    Follow-up         Objective   /70 (BP Location: Left arm, Patient Position: Sitting, Cuff Size: Adult)   Pulse 60   Temp (!) 96.8 °F (36 °C) (Temporal)   Resp 16   Ht 5' 5\" (1.651 m)   Wt 68 kg (150 lb)   SpO2 97%   BMI 24.96 kg/m²         Referral Physician: No ref. provider found    Primary Care Physician:  DO Maddi Quinonez: Daughter     Taylor: wife    Sallie: daughter in law      This is a 80 y.o. c PMHx notable for cecal AVM/GAVE, PE, DM, DVT, HTN, HLP, CAD, being seen in consultation for chronic ANG and locally advanced HCC; on systemic therapy     Iron Deficiency anemia: improved s/p IV iron  Low iron levels can cause anemia (low red blood cells). Low iron levels can also make people feel poorly, even before anemia starts. Iron is used to make red blood cells. If blood is lost from the body, if iron can't be absorbed from food, or if something removes iron (pregnancy) the iron can be low and then anemia happens.    Hx Cecal AVM. GI thought maybe GAVE  2022: Hgb 9.5, WBC 6.35k, MCV 91, plt 235k, retic 3.65%, FOBT + (), ferritin 7, iron 30, Fe Sat 9%, TIBC 346   EGD/C-scope: internal hemorrhoids    Capsule endoscopy: no active bleeding  2023 EGD with single balloon enteroscopy revealed no bleeding    IrAE drug induced rash: on hydrocortisone 2.5% to good effect    Iron deficiency is very common. Low iron can cause fatigue or tiredness, " the desire to eat ice or non-food items like corn starch or dry pasta noodles, restless legs, weak fingernails, cracks at the corner of the mouth.     Common causes of iron deficiency include inadequate diet (uncommon, usually vegetarians), gastric bypass surgery (very common), pregnancy (very common), periods (most common cause in younger women), blood loss (usually from the stomach or intestines), and decreased iron absorption from the intestines from gastritis, H pylori, acid blocking medicines or celiac disease. Even a normal menstrual period can cause iron deficiency. In 10% of patients a clear cause of low iron is not found. 14% of women will have iron deficiency just from their menstrual period.       Iron deficiency is treated with pills as a first step. For some people the iron pills do not work, cause severe side effects, or take too long to work: for these people IV iron can be given. You may only require IV iron very rarely, for example only when pregnant, or from 1-2 times a year based on your rate of need. It takes 4 weeks for IV iron to improve the anemia to maximum potential. If your fatigue is not from the low iron, getting iron treatment would not help the fatigue.    9/24/2024 CT Abd/pelv w/c: Nodular liver contour, indicated of cirrhosis. Portal vein thrombosis involving posterior branch of the right hepatic lobe  MRI Abd w/wo c: Approximately 5.4 x 4.8 x 5.5 cm (length X depth X width) solitary segment 6/7 mass  9/25/2024: AFP 21,374  9/25/2024: CT Chest w/c: Emphysema with areas of fibrosis/scarring which appears stable compared to study of 11/15/2019  10/11/2024 Liver tumor board interventional and radiology do think he would be a good candidate for Y90 treatment concurrently with immunotherapy  12/30/2024 CT CAP w/c:  excellent WI  Decrease in size segment VI/VII right hepatic lobe mass since 9/24/2024, currently measuring 3.1 x 2.6 x 4.5 cm (61/105, 4/108), previously 5.4 x 4.8 x 5.5 cm.  Again seen is tumor thrombus in the posterior branch of right hepatic lobe, decreased from prior exam. No metastatic disease in the chest, abdomen or pelvis  1/22/2025: IR Y-90 radio embolization              Discussion of decision making    I personally reviewed the following lab results, the image studies, pathology, other specialty/physicians consult notes and recommendations, and outside medical records from [facility]/other institutions. I had a lengthy discussion with the patient and shared the work-up findings. We discussed the diagnosis and management plan as below. I spent 41 minutes reviewing the records (labs, clinician notes, outside records, medical history, ordering medicine/tests/procedures, interpreting the imaging/labs previously done) and coordination of care as well as direct time with the patient today, of which greater than 50% of the time was spent in counseling and coordination of care with the patient/family.    Plan/Labs  Cont PO iron MWF   Cont to f/u GI  Refilled Hydrocortisone 2.5% cream for his drug rash previously  Restaging CT CAP w/c scheduled 6/26/2025  Cont Levothyroxine 25 mcg for irAE   Liver directed therapy done on 1/22/2025  Infusion chairs ordered for Durvalumab for advanced HCC, C7 coming up with preceding labs  AFP ordered for C7 labs: continuing until the CT and then reassess          Follow Up: q4 weeks scheduled thru 7/3/2025, change May 14*    All questions were answered to the patient's satisfaction during this encounter. The patient knows the contact information for our office and knows to reach out for any relevant concerns related to this encounter. They are to call for any temperature 100.4 or higher, new symptoms including but not restricted to shaking chills, decreased appetite, nausea, vomiting, diarrhea, increased fatigue, shortness of breath or chest pain, confusion, and not feeling the strength to come to the clinic. For all other listed problems and medical  diagnosis in their chart - they are managed by PCP and/or other specialists, which the patient acknowledges. Thank you very much for your consultation and making us a part of this patient's care. We are continuing to follow closely with you. Please do not hesitate to reach out to me with any additional questions or concerns.    Mal Angeles MD  Hematology & Medical Oncology Staff Physician             Disclaimer: This document was prepared using [employer] Direct technology. If a word or phrase is confusing, or does not make sense, this is likely due to recognition error which was not discovered during this clinician's review. If you believe an error has occurred, please contact me through [employer] service line for dequan?cation.      HEMATOLOGICAL HISTORY OF PRESENT ILLNESS      Clotting History None   Bleeding History GAVE related bleeding   Cancer History None   Family Cancer History Father (cancer)   H/O Blood/Plt Transfusion PRBC years ago   Tobacco/etoh/drug abuse 2 PPDx 44 years, quit 2004, no etoh abuse or rec drug use       Cancer Screening history n/a   Occupation [employer]      8/29/2022: Hgb 9.5, WBC 6.35k, MCV 91, plt 235k, retic 3.65%, FOBT + (8/31), ferritin 7, iron 30, Fe Sat 9%, TIBC 346  9/7 EGD/C-scope: internal hemorrhoids  10/3/2022-10/9/2022: 5 iV Venofer 200mg administered   11/28/2022: ferritin 26, iron 67, Fe Sat 21%, TIBC 322, Hgb 12.7 (now normal)  5/2/2023: Discussed with the patient the results of his iron studies which show worsening iron deficiency as well as recurrent anemia.  I ordered 600 mg of IV Venofer  5/18/2023: ferritin 7, iron 36, Fe Sat 10%, TIBC 364, Hgb 11.6  6/29/2023: ferritin 37, iron 52, Fe Sat 17%, TIBC 307, Hgb 12.7, MCV 92  9/20/2023:  ferritin 58, iron 65, Fe Sat 33%, TIBC 230, Hgb 13.5, MCV 95  2/14/2024: iron 42, ferritin 22, Fe Sat 14%, TIBC 304: Vit B12 632, Hgb 13.4, plt 161k, WBC 6.21k  4/18/2024: iron 59, Fe Sat 18%, TIBC 329,  ferritin 21, Hgb 13.4 (2/2024)  4/29/2024: planned IV Venofer 200mg x2  10/2/2024: Ferritin: 224, Hgb 13.6, Fe Sat 13%, TIBC 208, iron 26, ferritin 224 AOCI  11/8/2024: Lipase: 380 (4x ULN)  12/27/2024: AFP 0.62      SUBJECTIVE  (INTERVAL HISTORY)      Interval events:   Resolved fatigue. No issues or concerns. Appetite is good.     I have reviewed the relevant past medical, surgical, social and family history. I have also reviewed allergies and medications for this patient.    Review of Systems    Baseline weight: 152-154 lbs    Denies weight loss, F/C, N/V, SOB, CP, LH, HA.    He has had fatigue and low energy since 6/2022. He was chewing on ice all day since 3/2022.     A 10-point review of system was performed, pertinent positive and negative were detailed as above. Otherwise, the 10-point review of system was negative.      Past Medical History:   Diagnosis Date    Coronary artery disease without angina pectoris 02/25/2020    Diabetes mellitus (HCC)     DVT (deep venous thrombosis) (HCC)     GERD (gastroesophageal reflux disease)     Hypertension     Iron deficiency anemia     Liver cancer (HCC)        Past Surgical History:   Procedure Laterality Date    ANGIOPLASTY  10/19/2021    bilateral    CARDIAC CATHETERIZATION  2013    calif    CABG  x4    COLONOSCOPY      COLONOSCOPY      CORONARY ANGIOPLASTY WITH STENT PLACEMENT  01/01/2010    CORONARY ARTERY BYPASS GRAFT      FEMORAL ARTERY STENT      FEMORAL BYPASS  10/20/2021    IR LOWER EXTREMITY ANGIOGRAM  2/14/2023    IR PELVIC ANGIOGRAM  5/6/2022    IR Y-90 PRE-ANGIO/EMBO W/ LUNG SCAN  1/13/2025    IR Y-90 RADIOEMBOLIZATION  1/22/2025    NM SLCTV CATHJ 3RD+ ORD SLCTV ABDL PEL/LXTR BRNCH  5/6/2022    Procedure: ULTRASOUND-GUIDED LEFT BRACHIAL ARTERY PUNCTURE, AORTOGRAM, RIGHT COMMON ILIAC ARTERY ANGIOPLASTY WITH 8 X 40 MM BALLOON, ANGIOPLASTY OF RIGHT EXTERNAL AND COMMON ILIAC ARTERY STENOSIS WITH 6 X 150 MM DRUG-ELUTING BALLOON, STENTING OF RIGHT DISTAL  EXTERNAL ILIAC ARTERY STENOSIS WITH 7 X 40 MM SELF EXPANDING STENT POST DILATED WITH A 6 MM BALLOON.;  Surgeon: Henry Pena MD;  [place]    LA SLCTV CATHJ 3RD+ ORD SLCTV ABDL PEL/LXTR BRNCH Bilateral 2023    Procedure: CUTDOWN FEM-FEM BYPASS WITH PRIMARY CLOSURE ACCESS SITE, BILATERAL RUN-OFF, LEFT FEM-POP 5X220 QUYEN AND 6X150 RODRI, ATTEMPT TO CROSS RIGHT SFA OCCLUSION;  Surgeon: Henry Pena MD;  Location: [facility];  Service: Vascular       Family History   Problem Relation Age of Onset    No Known Problems Mother     Lung cancer Father     Liver cancer Brother        Social History     Socioeconomic History    Marital status: /Civil Union     Spouse name: Not on file    Number of children: Not on file    Years of education: Not on file    Highest education level: Not on file   Occupational History    Not on file   Tobacco Use    Smoking status: Former     Current packs/day: 0.00     Average packs/day: 1.5 packs/day for 40.0 years (60.0 ttl pk-yrs)     Types: Cigarettes     Start date:      Quit date:      Years since quittin.2     Passive exposure: Past    Smokeless tobacco: Never    Tobacco comments:     quit --    Vaping Use    Vaping status: Never Used   Substance and Sexual Activity    Alcohol use: Not Currently     Alcohol/week: 0.0 standard drinks of alcohol    Drug use: No    Sexual activity: Not on file   Other Topics Concern    Not on file   Social History Narrative    · Most recent tobacco use screenin2020      Social Drivers of Health     Financial Resource Strain: Low Risk  (2023)    Overall Financial Resource Strain (CARDIA)     Difficulty of Paying Living Expenses: Not hard at all   Food Insecurity: No Food Insecurity (2025)    Hunger Vital Sign     Worried About Running Out of Food in the Last Year: Never true     Ran Out of Food in the Last Year: Never true   Recent Concern: Food Insecurity - Food Insecurity Present (2025)     Nursing - Inadequate Food Risk Classification     Worried About Running Out of Food in the Last Year: Sometimes true     Ran Out of Food in the Last Year: Sometimes true     Ran Out of Food in the Last Year: Not on file   Transportation Needs: No Transportation Needs (2/20/2025)    PRAPARE - Transportation     Lack of Transportation (Medical): No     Lack of Transportation (Non-Medical): No   Physical Activity: Not on file   Stress: Not on file   Social Connections: Not on file   Intimate Partner Violence: Not on file   Housing Stability: Low Risk  (2/20/2025)    Housing Stability Vital Sign     Unable to Pay for Housing in the Last Year: No     Number of Times Moved in the Last Year: 0     Homeless in the Last Year: No       No Known Allergies    Current Outpatient Medications   Medication Sig Dispense Refill    acetaminophen (TYLENOL) 500 mg tablet Take 1,000 mg by mouth every 8 (eight) hours as needed      atorvastatin (LIPITOR) 40 mg tablet TAKE 1 TABLET DAILY 90 tablet 1    chlorhexidine (PERIDEX) 0.12 % solution Apply 15 mL to the mouth or throat 2 (two) times a day 473 mL 3    clopidogrel (PLAVIX) 75 mg tablet Take 1 tablet (75 mg total) by mouth daily 90 tablet 1    Empagliflozin (Jardiance) 25 MG TABS Take 1 tablet (25 mg total) by mouth every morning Also: take with LOTS of water 90 tablet 3    ferrous sulfate 325 (65 Fe) mg tablet Take 1 tablet (325 mg total) by mouth 2 (two) times a day with meals (Patient taking differently: Take 325 mg by mouth as needed) 60 tablet 6    glipiZIDE (GLUCOTROL) 10 mg tablet TAKE 1 TABLET TWICE A DAY  BEFORE MEALS 180 tablet 1    hydrocortisone 2.5 % cream Apply topically 2 (two) times a day as needed for rash 30 g 3    levothyroxine 25 mcg tablet Take 1 tablet (25 mcg total) by mouth daily 30 tablet 2    lisinopril-hydrochlorothiazide (PRINZIDE,ZESTORETIC) 20-12.5 MG per tablet Take 1 tablet by mouth daily 90 tablet 1    metFORMIN (GLUCOPHAGE) 1000 MG tablet TAKE 1 TABLET  TWICE DAILY  WITH MEALS 180 tablet 1    mupirocin (BACTROBAN) 2 % ointment Apply topically 2 (two) times a day In the LEFT side of nose 22 g 3    omeprazole (PriLOSEC) 20 mg delayed release capsule TAKE 1 CAPSULE BY MOUTH DAILY BEFORE BREAKFAST. 90 capsule 2    tamsulosin (FLOMAX) 0.4 mg Take 1 capsule (0.4 mg total) by mouth daily with dinner 90 capsule 1     No current facility-administered medications for this visit.       (Not in a hospital admission)        Objective:     24 Hour Vitals Assessment:     Vitals:    03/12/25 1343   BP: 136/70   Pulse: 60   Resp: 16   Temp: (!) 96.8 °F (36 °C)   SpO2: 97%             Weight last visit 151 lbs  Weight today: 150 lbs      PHYSICIAN EXAM:    General: Appearance: alert, cooperative, no distress.  HEENT: Normocephalic, atraumatic. No scleral icterus. conjunctivae clear. EOMI.  Chest: No tenderness to palpation. No open wound noted.  Lungs: Clear to auscultation bilaterally, Respirations unlabored.  Cardiac: Regular rate and rhythm, +S1and S2  Abdomen: Soft, non-tender, non-distended. Bowel sounds are normal.  Extremities:  No edema, cyanosis, clubbing.  Skin: Skin color, turgor are normal. Small R anterior leg rashes  Neurologic: Awake, Alert, and oriented, no gross focal deficits noted b/l.       DATA REVIEW:    Pathology Result:    Final Diagnosis   Date Value Ref Range Status   06/27/2023   Final    A. Duodenum, :  - Unremarkable duodenal mucosa  - No villous atrophy or increased intraepithelial lymphocytes seen     B. Stomach, :  - Gastric oxyntic and antral type mucosa with minimal or mild chronic inflammation  - No sharad shaped bacteria seen on H&E stained tissue section  - Negative for intestinal metaplasia and dysplasia          09/07/2022   Final    A. Duodenum,  biopsy:  - Duodenal mucosa with focal acute inflammation and reactive changes  - No intraepithelial lymphocytosis and no villous blunting.  - No epithelial dysplasia and no evidence of malignancy.    B.   Stomach, biopsy:  - Gastric antral and oxyntic mucosa with no significant pathologic alteration.  - Negative for intestinal metaplasia, dysplasia or carcinoma.  - No Helicobacter pylori is identified on H&E stained slide.              Image Results:   Image result are reviewed and documented in Hematology/Oncology history. I personally reviewed these images.    EGD  Narrative: Table formatting from the original result was not included.  [facility]  [address] [place] [address] [phone]    DATE OF SERVICE:  2/14/25    PHYSICIAN(S):  Attending:   Amanda Gustafson MD     Fellow:   No Staff Documented     INDICATION:  Cirrhosis of liver without ascites, unspecified hepatic cirrhosis type   (HCC)    POST-OP DIAGNOSIS:  See the impression below.    PREPROCEDURE:  Informed consent was obtained for the procedure, including sedation.    Risks of perforation, hemorrhage, adverse drug reaction and aspiration   were discussed. The patient was placed in the left lateral decubitus   position.    Patient was explained about the risks and benefits of the procedure. Risks   including but not limited to bleeding, infection, and perforation were   explained in detail. Also explained about less than 100% sensitivity with   the exam and other alternatives.    PROCEDURE: EGD    DETAILS OF PROCEDURE:   Patient was taken to the procedure room where a time out was performed to   confirm correct patient and correct procedure. The patient underwent   monitored anesthesia care, which was administered by an anesthesia   professional. The patient's blood pressure, heart rate, level of   consciousness, respirations, oxygen, ECG and ETCO2 were monitored   throughout the procedure. The scope was introduced through the mouth and   advanced to the second part of the duodenum. Retroflexion was performed in   the fundus. The patient experienced no blood loss. The procedure was not   difficult.  The patient tolerated the procedure well. There were no   apparent adverse events.     ANESTHESIA INFORMATION:  ASA: III  Anesthesia Type: IV Sedation with Anesthesia    MEDICATIONS:  No administrations occurring from 0805 to 0817 on 02/14/25     FINDINGS:  One small varix in the middle third of the esophagus and lower third of   the esophagus; no bleeding was observed  Irregular Z-line 40 cm from the incisors  Localized salmon-colored mucosa in the lower third of the esophagus.   Meridian-colored islands present in the lower third of the esophagus between   39 cm and 40 cm from the incisors.  Given the presence of small varices   and the short segment of this salmon-colored mucosa, biopsies were not   taken from the esophagus due to the risk of bleeding and the low benefit   of biopsies of these small islands.  The stomach and duodenum appeared normal.    SPECIMENS:  * No specimens in log *  Impression: One small varix in the middle third of the esophagus and lower third of   the esophagus  Irregular Z-line  Meridian-colored mucosa in the lower third of the esophagus  The stomach and duodenum appeared normal.    RECOMMENDATION:  Follow up with GI Clinic   Okay to restart Plavix today.               Amanda Gustafson MD       LABS:  Lab data are reviewed and documented in [facility] history.       Lab Results   Component Value Date    HGB 13.6 02/28/2025    HCT 41.8 02/28/2025    MCV 95 02/28/2025     02/28/2025    WBC 4.51 02/28/2025    NRBC 0 02/28/2025     Lab Results   Component Value Date    K 4.1 02/28/2025     02/28/2025    CO2 28 02/28/2025    BUN 33 (H) 02/28/2025    CREATININE 1.10 02/28/2025    GLUF 118 (H) 02/28/2025    CALCIUM 9.5 02/28/2025    CORRECTEDCA 10.2 (H) 11/15/2021    AST 17 02/28/2025    ALT 22 02/28/2025    ALKPHOS 93 02/28/2025    EGFR 62 02/28/2025       Lab Results   Component Value Date    IRON 26 (L) 10/02/2024    TIBC 208 (L) 10/02/2024    FERRITIN 224 10/02/2024    FERRITIN 21 (L)  "04/18/2024    FERRITIN 22 (L) 02/14/2024    FERRITIN 58 09/20/2023    FERRITIN 37 06/29/2023    FERRITIN 7 (L) 05/18/2023    FERRITIN 26 11/28/2022    FERRITIN 7 (L) 08/29/2022    FERRITIN 37 04/20/2022    FERRITIN 17 02/07/2022    FERRITIN 13 01/05/2022    FERRITIN 10 11/15/2021    FERRITIN 28 03/11/2021    FERRITIN 12 11/03/2020       Lab Results   Component Value Date    GOXYZCHG43 632 02/14/2024    FJFCSZMQ28 223 09/20/2023    IMGVNFCQ17 389 11/15/2021    HGPDRBBA46 681 05/29/2020       No results for input(s): \"WBC\", \"CREAT\", \"PLT\" in the last 72 hours.         By:  Mal Angeles, 3/12/2025, 2:04 PM                                    "

## 2025-03-03 NOTE — ASSESSMENT & PLAN NOTE
This is a 80 y.o. c PMHx notable for cecal AVM/GAVE, PE, DM, DVT, HTN, HLP, CAD, being seen in consultation for chronic ANG and locally advanced HCC; on systemic therapy

## 2025-03-04 ENCOUNTER — HOSPITAL ENCOUNTER (OUTPATIENT)
Dept: INFUSION CENTER | Facility: CLINIC | Age: 80
Discharge: HOME | End: 2025-03-04
Payer: MEDICARE

## 2025-03-04 VITALS
SYSTOLIC BLOOD PRESSURE: 158 MMHG | HEART RATE: 60 BPM | RESPIRATION RATE: 18 BRPM | TEMPERATURE: 97.9 F | DIASTOLIC BLOOD PRESSURE: 78 MMHG

## 2025-03-04 DIAGNOSIS — C22.0 HEPATOCELLULAR CARCINOMA (HCC): Primary | ICD-10-CM

## 2025-03-04 PROCEDURE — 96413 CHEMO IV INFUSION 1 HR: CPT

## 2025-03-04 RX ORDER — SODIUM CHLORIDE 9 MG/ML
20 INJECTION, SOLUTION INTRAVENOUS ONCE
Status: COMPLETED | OUTPATIENT
Start: 2025-03-04 | End: 2025-03-04

## 2025-03-04 RX ADMIN — SODIUM CHLORIDE 20 ML/HR: 0.9 INJECTION, SOLUTION INTRAVENOUS at 10:54

## 2025-03-04 RX ADMIN — DURVALUMAB 1500 MG: 500 INJECTION, SOLUTION INTRAVENOUS at 11:19

## 2025-03-04 NOTE — PROGRESS NOTES
Max Ozuna  tolerated treatment well with no complications.      Max Ozuna is aware of future appt on Tuesday Apr 1, 2025 10:30 AM.     AVS declined by Max Ozuna.

## 2025-03-05 ENCOUNTER — TELEPHONE (OUTPATIENT)
Age: 80
End: 2025-03-05

## 2025-03-05 NOTE — TELEPHONE ENCOUNTER
Rachna- I tried to fax his office note 3 times, and it kept coming back unsent. Would you mind maybe reaching out to [facility] (phone number for direct person there) and check that we have the correct fax number please? Thanks!
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
## 2025-03-10 ENCOUNTER — TELEPHONE (OUTPATIENT)
Age: 80
End: 2025-03-10

## 2025-03-10 NOTE — TELEPHONE ENCOUNTER
Spoke with PCP in office today and said it is ok for patient to receive RSV vaccine tomorrow. LM on patients voicemail with PCP message and advised to call with any further questions.

## 2025-03-10 NOTE — TELEPHONE ENCOUNTER
Patient called [facility] was just prescribed Levothyroxine and would like to know if it is ok for him to get the RSV vaccine still. [facility] is scheduled to go tomorrow (3-) and needs to know ASAP

## 2025-03-11 ENCOUNTER — TELEPHONE (OUTPATIENT)
Age: 80
End: 2025-03-11

## 2025-03-11 NOTE — TELEPHONE ENCOUNTER
Spoke with patient to reschedule his appointment with Dr. Angeles on 5/14/25. He states he would like to do this in office at his appointment tomorrow (3/12/25).

## 2025-03-12 ENCOUNTER — TELEPHONE (OUTPATIENT)
Dept: HEMATOLOGY ONCOLOGY | Facility: CLINIC | Age: 80
End: 2025-03-12

## 2025-03-12 ENCOUNTER — OFFICE VISIT (OUTPATIENT)
Dept: HEMATOLOGY ONCOLOGY | Facility: CLINIC | Age: 80
End: 2025-03-12
Payer: MEDICARE

## 2025-03-12 VITALS
HEIGHT: 65 IN | TEMPERATURE: 96.8 F | HEART RATE: 60 BPM | WEIGHT: 150 LBS | DIASTOLIC BLOOD PRESSURE: 70 MMHG | BODY MASS INDEX: 24.99 KG/M2 | SYSTOLIC BLOOD PRESSURE: 136 MMHG | OXYGEN SATURATION: 97 % | RESPIRATION RATE: 16 BRPM

## 2025-03-12 DIAGNOSIS — C22.0 HEPATOCELLULAR CARCINOMA (HCC): Primary | ICD-10-CM

## 2025-03-12 PROCEDURE — G2211 COMPLEX E/M VISIT ADD ON: HCPCS | Performed by: INTERNAL MEDICINE

## 2025-03-12 PROCEDURE — 99215 OFFICE O/P EST HI 40 MIN: CPT | Performed by: INTERNAL MEDICINE

## 2025-03-12 RX ORDER — SODIUM CHLORIDE 9 MG/ML
20 INJECTION, SOLUTION INTRAVENOUS ONCE
OUTPATIENT
Start: 2025-04-29

## 2025-03-12 RX ORDER — SODIUM CHLORIDE 9 MG/ML
20 INJECTION, SOLUTION INTRAVENOUS ONCE
OUTPATIENT
Start: 2025-05-27

## 2025-03-12 RX ORDER — SODIUM CHLORIDE 9 MG/ML
20 INJECTION, SOLUTION INTRAVENOUS ONCE
OUTPATIENT
Start: 2025-06-24

## 2025-03-12 RX ORDER — SODIUM CHLORIDE 9 MG/ML
20 INJECTION, SOLUTION INTRAVENOUS ONCE
OUTPATIENT
Start: 2025-04-01

## 2025-03-13 NOTE — TELEPHONE ENCOUNTER
Patient called stating he keeps receiving messages to see  in March. Per patient he see  on 3/12. Advised patient he was place on the wait-list and explained the process. Patient requested to be taken off as he already seen  in March.     Patient will like a call back from [facility] infusion to go over infusion schedule, per patient he needs to reschedule 4/29 as he has another appointment scheduled same day and time.     Please call patient.       Thanks!

## 2025-03-14 DIAGNOSIS — E78.49 OTHER HYPERLIPIDEMIA: ICD-10-CM

## 2025-03-15 RX ORDER — ATORVASTATIN CALCIUM 40 MG/1
40 TABLET, FILM COATED ORAL DAILY
Qty: 100 TABLET | Refills: 3 | Status: SHIPPED | OUTPATIENT
Start: 2025-03-15 | End: 2025-03-18 | Stop reason: SDUPTHER

## 2025-03-17 ENCOUNTER — HOSPITAL ENCOUNTER (OUTPATIENT)
Dept: NON INVASIVE DIAGNOSTICS | Facility: HOSPITAL | Age: 80
Discharge: HOME | End: 2025-03-17
Payer: MEDICARE

## 2025-03-17 DIAGNOSIS — I73.9 PAD (PERIPHERAL ARTERY DISEASE) (HCC): ICD-10-CM

## 2025-03-17 DIAGNOSIS — I74.09 AORTOILIAC OCCLUSIVE DISEASE (HCC): ICD-10-CM

## 2025-03-17 PROCEDURE — 93978 VASCULAR STUDY: CPT | Performed by: SURGERY

## 2025-03-17 PROCEDURE — 93925 LOWER EXTREMITY STUDY: CPT

## 2025-03-17 PROCEDURE — 93978 VASCULAR STUDY: CPT

## 2025-03-17 PROCEDURE — 93923 UPR/LXTR ART STDY 3+ LVLS: CPT

## 2025-03-17 PROCEDURE — 93922 UPR/L XTREMITY ART 2 LEVELS: CPT | Performed by: SURGERY

## 2025-03-17 PROCEDURE — 93925 LOWER EXTREMITY STUDY: CPT | Performed by: SURGERY

## 2025-03-18 DIAGNOSIS — E78.49 OTHER HYPERLIPIDEMIA: ICD-10-CM

## 2025-03-18 RX ORDER — ATORVASTATIN CALCIUM 40 MG/1
40 TABLET, FILM COATED ORAL DAILY
Qty: 100 TABLET | Refills: 3 | Status: SHIPPED | OUTPATIENT
Start: 2025-03-18

## 2025-03-18 NOTE — TELEPHONE ENCOUNTER
PT only has 5 days left of his atorvastin, and is requesting a short supply of it order sent to [facility] [address]. He stated that he now has [employer] ID # [id number] prescription plan and all scripts now go to [facility] and not [employer] [facility]. Request is put in to [facility] to [facility] until he receives it in the mail, he will be out of the med.    Please advise    Thankyou

## 2025-03-19 ENCOUNTER — OFFICE VISIT (OUTPATIENT)
Dept: CARDIOLOGY CLINIC | Facility: CLINIC | Age: 80
End: 2025-03-19
Payer: MEDICARE

## 2025-03-19 VITALS
HEART RATE: 55 BPM | OXYGEN SATURATION: 98 % | BODY MASS INDEX: 25.09 KG/M2 | DIASTOLIC BLOOD PRESSURE: 52 MMHG | HEIGHT: 65 IN | SYSTOLIC BLOOD PRESSURE: 118 MMHG | WEIGHT: 150.6 LBS

## 2025-03-19 DIAGNOSIS — E11.51 TYPE 2 DIABETES MELLITUS WITH DIABETIC PERIPHERAL ANGIOPATHY WITHOUT GANGRENE, WITH LONG-TERM CURRENT USE OF INSULIN (HCC): ICD-10-CM

## 2025-03-19 DIAGNOSIS — I73.9 PERIPHERAL VASCULAR DISEASE WITH CLAUDICATION (HCC): ICD-10-CM

## 2025-03-19 DIAGNOSIS — Z79.4 TYPE 2 DIABETES MELLITUS WITH DIABETIC PERIPHERAL ANGIOPATHY WITHOUT GANGRENE, WITH LONG-TERM CURRENT USE OF INSULIN (HCC): ICD-10-CM

## 2025-03-19 DIAGNOSIS — I25.10 CORONARY ARTERY DISEASE INVOLVING NATIVE CORONARY ARTERY OF NATIVE HEART WITHOUT ANGINA PECTORIS: Primary | ICD-10-CM

## 2025-03-19 DIAGNOSIS — E78.5 DYSLIPIDEMIA: ICD-10-CM

## 2025-03-19 DIAGNOSIS — I10 ESSENTIAL HYPERTENSION: ICD-10-CM

## 2025-03-19 DIAGNOSIS — I34.0 NONRHEUMATIC MITRAL VALVE REGURGITATION: ICD-10-CM

## 2025-03-19 PROCEDURE — 99214 OFFICE O/P EST MOD 30 MIN: CPT | Performed by: INTERNAL MEDICINE

## 2025-03-19 NOTE — PROGRESS NOTES
Cardiology Office Note  MD Jorge Gonzalez MD Jason Kaplan, DO, [facility]  MD Yennifer Mena DO, [facility]  Godwin Benoit DO, [facility]  [place]  [place] [address]    Max Ozuna 80 y.o. male MRN: [id number]  Unit/Bed#:  Encounter: [id number]      History of Present Illness:  It was a pleasure to see Max Ozuna in the office today for follow-up CV evaluation.  He has a past medical history CAD s/p CABG in March 2020 at [facility], PVD, hypertension, dyslipidemia, type 2 diabetes mellitus, CKD, anemia, left subclavian stenosis and carotid artery stenosis.  He established care with us in November 2022. Patient has a known history vascular disease and coronary artery disease.  He had stent in 2010 and subsequently CABG in March 2020. CABG was 4 vessel bypass.  He had previously been following with [facility] for his cardiovascular management.  Since his bypass, he had been feeling well overall.  He underwent left lower extremity fem-fem bypass in 2021. The patient had been experiencing exertional claudication and underwent a left lower extremity arterial bypass in October 2023.  In September 2024, the patient had experienced right sided flank pain and abdominal discomfort.  CT of the abdomen pelvis demonstrated portal vein thrombus with 5.4 x 4.8 x 5.5 cm mass concerning for hepatocellular carcinoma.  He had evidence of liver cirrhosis on imaging.  He was not found to be a transplant or surgical candidate.  The patient was referred to oncology.  He was started on chemotherapeutic agents including durvalumab and tremelimumab.  Patient underwent lipid panel in October 2024 which demonstrated elevation of LDL cholesterol to 100 mg/dL, up from 23 mg/dL in February 2024.  He is here today for routine CV follow-up.  Denies any significant changes aside from his  treatment of his hepatocellular carcinoma.    He denies any chest pain, pressure, tightness or squeezing. Denies lightheadedness, dizziness or palpitations.  Denies lower extremity swelling, orthopnea or paroxysmal nocturnal dyspnea.  Admits to some claudication.      Review of Systems:  Review of Systems   Constitutional: Negative for decreased appetite, fever, weight gain and weight loss.   HENT:  Negative for congestion and sore throat.    Eyes:  Negative for visual disturbance.   Cardiovascular:  Negative for chest pain, dyspnea on exertion, leg swelling, near-syncope and palpitations.   Respiratory:  Negative for cough and shortness of breath.    Hematologic/Lymphatic: Negative for bleeding problem.   Skin:  Negative for rash.   Musculoskeletal:  Negative for myalgias and neck pain.   Gastrointestinal:  Negative for abdominal pain and nausea.   Neurological:  Negative for light-headedness and weakness.   Psychiatric/Behavioral:  Negative for depression.        Past Medical History:   Diagnosis Date    Coronary artery disease without angina pectoris 02/25/2020    Diabetes mellitus (HCC)     DVT (deep venous thrombosis) (HCC)     GERD (gastroesophageal reflux disease)     Hypertension     Iron deficiency anemia     Liver cancer (HCC)        Past Surgical History:   Procedure Laterality Date    ANGIOPLASTY  10/19/2021    bilateral    CARDIAC CATHETERIZATION  2013    calif    CABG  x4    COLONOSCOPY      COLONOSCOPY      CORONARY ANGIOPLASTY WITH STENT PLACEMENT  01/01/2010    CORONARY ARTERY BYPASS GRAFT      FEMORAL ARTERY STENT      FEMORAL BYPASS  10/20/2021    IR LOWER EXTREMITY ANGIOGRAM  2/14/2023    IR PELVIC ANGIOGRAM  5/6/2022    IR Y-90 PRE-ANGIO/EMBO W/ LUNG SCAN  1/13/2025    IR Y-90 RADIOEMBOLIZATION  1/22/2025    NM SLCTV CATHJ 3RD+ ORD SLCTV ABDL PEL/LXTR BRNCH  5/6/2022    Procedure: ULTRASOUND-GUIDED LEFT BRACHIAL ARTERY PUNCTURE, AORTOGRAM, RIGHT COMMON ILIAC ARTERY ANGIOPLASTY WITH 8 X 40 MM  BALLOON, ANGIOPLASTY OF RIGHT EXTERNAL AND COMMON ILIAC ARTERY STENOSIS WITH 6 X 150 MM DRUG-ELUTING BALLOON, STENTING OF RIGHT DISTAL EXTERNAL ILIAC ARTERY STENOSIS WITH 7 X 40 MM SELF EXPANDING STENT POST DILATED WITH A 6 MM BALLOON.;  Surgeon: Henry Pena MD;  [place]    ID SLCTV CATHJ 3RD+ ORD SLCTV ABDL PEL/LXTR BRNCH Bilateral 2023    Procedure: CUTDOWN FEM-FEM BYPASS WITH PRIMARY CLOSURE ACCESS SITE, BILATERAL RUN-OFF, LEFT FEM-POP 5X220 QUYEN AND 6X150 RODRI, ATTEMPT TO CROSS RIGHT SFA OCCLUSION;  Surgeon: Henry Pena MD;  Location: [facility];  Service: Vascular       Social History     Socioeconomic History    Marital status: /Civil Union     Spouse name: None    Number of children: None    Years of education: None    Highest education level: None   Occupational History    None   Tobacco Use    Smoking status: Former     Current packs/day: 0.00     Average packs/day: 1.5 packs/day for 40.0 years (60.0 ttl pk-yrs)     Types: Cigarettes     Start date:      Quit date:      Years since quittin.2     Passive exposure: Past    Smokeless tobacco: Never    Tobacco comments:     quit --    Vaping Use    Vaping status: Never Used   Substance and Sexual Activity    Alcohol use: Not Currently     Alcohol/week: 0.0 standard drinks of alcohol    Drug use: No    Sexual activity: None   Other Topics Concern    None   Social History Narrative    · Most recent tobacco use screenin2020      Social Drivers of Health     Financial Resource Strain: Low Risk  (2023)    Overall Financial Resource Strain (CARDIA)     Difficulty of Paying Living Expenses: Not hard at all   Food Insecurity: No Food Insecurity (2025)    Hunger Vital Sign     Worried About Running Out of Food in the Last Year: Never true     Ran Out of Food in the Last Year: Never true   Recent Concern: Food Insecurity - Food Insecurity Present (2025)    Nursing - Inadequate Food Risk Classification      Worried About Running Out of Food in the Last Year: Sometimes true     Ran Out of Food in the Last Year: Sometimes true     Ran Out of Food in the Last Year: Not on file   Transportation Needs: No Transportation Needs (2/20/2025)    PRAPARE - Transportation     Lack of Transportation (Medical): No     Lack of Transportation (Non-Medical): No   Physical Activity: Not on file   Stress: Not on file   Social Connections: Not on file   Intimate Partner Violence: Not on file   Housing Stability: Low Risk  (2/20/2025)    Housing Stability Vital Sign     Unable to Pay for Housing in the Last Year: No     Number of Times Moved in the Last Year: 0     Homeless in the Last Year: No       Family History   Problem Relation Age of Onset    No Known Problems Mother     Lung cancer Father     Liver cancer Brother        No Known Allergies      Current Outpatient Medications:     acetaminophen (TYLENOL) 500 mg tablet, Take 1,000 mg by mouth every 8 (eight) hours as needed, Disp: , Rfl:     atorvastatin (LIPITOR) 40 mg tablet, Take 1 tablet (40 mg total) by mouth daily, Disp: 100 tablet, Rfl: 3    chlorhexidine (PERIDEX) 0.12 % solution, Apply 15 mL to the mouth or throat 2 (two) times a day, Disp: 473 mL, Rfl: 3    clopidogrel (PLAVIX) 75 mg tablet, Take 1 tablet (75 mg total) by mouth daily, Disp: 90 tablet, Rfl: 1    Empagliflozin (Jardiance) 25 MG TABS, Take 1 tablet (25 mg total) by mouth every morning Also: take with LOTS of water, Disp: 90 tablet, Rfl: 3    ferrous sulfate 325 (65 Fe) mg tablet, Take 1 tablet (325 mg total) by mouth 2 (two) times a day with meals (Patient taking differently: Take 325 mg by mouth as needed), Disp: 60 tablet, Rfl: 6    glipiZIDE (GLUCOTROL) 10 mg tablet, TAKE 1 TABLET TWICE A DAY  BEFORE MEALS, Disp: 180 tablet, Rfl: 1    hydrocortisone 2.5 % cream, Apply topically 2 (two) times a day as needed for rash, Disp: 30 g, Rfl: 3    levothyroxine 25 mcg tablet, Take 1 tablet (25 mcg total) by mouth  "daily, Disp: 30 tablet, Rfl: 2    lisinopril-hydrochlorothiazide (PRINZIDE,ZESTORETIC) 20-12.5 MG per tablet, Take 1 tablet by mouth daily, Disp: 90 tablet, Rfl: 1    metFORMIN (GLUCOPHAGE) 1000 MG tablet, TAKE 1 TABLET TWICE DAILY  WITH MEALS, Disp: 180 tablet, Rfl: 1    mupirocin (BACTROBAN) 2 % ointment, Apply topically 2 (two) times a day In the LEFT side of nose, Disp: 22 g, Rfl: 3    omeprazole (PriLOSEC) 20 mg delayed release capsule, TAKE 1 CAPSULE BY MOUTH DAILY BEFORE BREAKFAST., Disp: 90 capsule, Rfl: 2    tamsulosin (FLOMAX) 0.4 mg, Take 1 capsule (0.4 mg total) by mouth daily with dinner, Disp: 90 capsule, Rfl: 1    Vitals:    03/19/25 1530   BP: 118/52   Pulse: 55   SpO2: 98%   Weight: 68.3 kg (150 lb 9.6 oz)   Height: 5' 5\" (1.651 m)         Body mass index is 25.06 kg/m².    PHYSICAL EXAMINATION:  Gen: Awake, Alert, NAD   Head/eyes: AT/NC, pupils equal and round, Anicteric  ENT: mmm  Neck: Supple, No elevated JVP, trachea midline  Resp: CTA bilaterally no w/r/r  CV: RRR +S1, S2, No m/r/g  Abd: Soft, NT/ND + BS  Ext: no LE edema bilaterally  Neuro: Follows commands, moves all extermities  Psych: Appropriate affect, normal mood, cooperative attitude, non-combative  Skin: warm; no rash, erythema or venous stasis changes on exposed skin    --------------------------------------------------------------------------------  TREADMILL STRESS  No results found for this or any previous visit.     --------------------------------------------------------------------------------  NUCLEAR STRESS TEST: No results found for this or any previous visit.    No results found for this or any previous visit.      --------------------------------------------------------------------------------  CATH:    Typical CP s/p cath w/ 99% p-mLAD, 50% LM, 60% OM1, 99% dRCA, 99% rPAV, March 2020  --------------------------------------------------------------------------------  ECHO:   No results found for this or any previous " visit.    No results found for this or any previous visit.    --------------------------------------------------------------------------------  HOLTER  No results found for this or any previous visit.    No results found for this or any previous visit.    --------------------------------------------------------------------------------  CAROTIDS  Results for orders placed during the hospital encounter of 11/02/21    VAS carotid complete study    Narrative  [facility] REPORT  CLINICAL:  Indications:  Patient c/o hearing in his pulse in his right ear following his  revascularization of his left leg 2 weeks ago.  Patient reports a failed left axillary-femoral bypass graft and is s/p a cross  over femoral bypass graft.  Operative History:  femoral stent  Risk Factors: HTN, Hyperlipidemia and previous smoking (quit >10yrs ago).  Clinical  Right Pressure:  116/50 mm Hg, Left Pressure:  130/50 mm Hg.    FINDINGS:    Right        Impression  PSV  EDV (cm/s)  Direction of Flow  Ratio  Dist. ICA                 61          16                      0.54  Mid. ICA                 100          24                      0.88  Prox. ICA    1 - 49%     157          28                      1.38  Dist CCA                  85           7  Mid CCA                  114          16                      1.49  Prox CCA                  77          11                      1.06  Ext Carotid              104           0                      0.91  Prox Vert                 68          19  Antegrade  Subclavian               242           0  Innominate                72           0    Left         Impression  PSV  EDV (cm/s)  Direction of Flow  Ratio  Dist. ICA                 94          19                      0.87  Mid. ICA                 117          25                      1.07  Prox. ICA    1 - 49%     123          36                      1.12  Dist CCA                  93          21  Mid CCA                  109          23         "              1.75  Prox CCA                  62          15  Ext Carotid              238           0                      2.18  Prox Vert                 62           0  Antegrade  Subclavian               269           0        CONCLUSION:    Impression  RIGHT:  There is <50% stenosis noted in the internal carotid artery. Plaque is  heterogenous and irregular.  Vertebral artery flow is antegrade. There is a minimal stenosis visualized in  the proximal subclavian artery.  LEFT:  There is <50% stenosis noted in the internal carotid artery. Plaque is  heterogenous and irregular.  Vertebral artery flow is antegrade. There is no significant subclavian artery  disease.    Previous study performed at outside facility.    Internal carotid artery stenosis determination by consensus criteria from:  NOHEMY Rucker, et.al. Carotid Artery Stenosis: Gray-Scale and Doppler US Diagnosis  - Society of Radiologists in Ultrasound Consensus Conference, Radiology 2003;  [id number].    SIGNATURE:  Electronically Signed by: GRACE MON MD,RVT on 2021-11-02 01:09:20 PM     --------------------------------------------------------------------------------  ECGs:  No results found for this visit on 03/19/25.       Lab Results   Component Value Date    WBC 4.51 02/28/2025    HGB 13.6 02/28/2025    HCT 41.8 02/28/2025    MCV 95 02/28/2025     02/28/2025      Lab Results   Component Value Date    SODIUM 140 02/28/2025    K 4.1 02/28/2025     02/28/2025    CO2 28 02/28/2025    BUN 33 (H) 02/28/2025    CREATININE 1.10 02/28/2025    GLUC 133 01/22/2025    CALCIUM 9.5 02/28/2025      Lab Results   Component Value Date    HGBA1C 6.2 02/20/2025      No results found for: \"CHOL\"  Lab Results   Component Value Date    HDL 30 (L) 10/02/2024    HDL 32 (L) 02/14/2024    HDL 33 (L) 09/20/2023     Lab Results   Component Value Date    LDLCALC 100 10/02/2024    LDLCALC 23 02/14/2024    LDLCALC 47 09/20/2023     Lab Results   Component Value " "Date    TRIG 137 10/02/2024    TRIG 276 (H) 02/14/2024    TRIG 173 (H) 09/20/2023     No results found for: \"CHOLHDL\"   Lab Results   Component Value Date    INR 1.06 01/22/2025    INR 1.06 01/13/2025    INR 1.14 12/27/2024    PROTIME 14.3 01/22/2025    PROTIME 14.1 01/13/2025    PROTIME 14.9 12/27/2024        1. Coronary artery disease involving native coronary artery of native heart without angina pectoris  2. Type 2 diabetes mellitus with diabetic peripheral angiopathy without gangrene, with long-term current use of insulin (HCC)  3. Essential hypertension  4. Peripheral vascular disease with claudication (HCC)  5. Dyslipidemia  6. Nonrheumatic mitral valve regurgitation  -     Echo complete w/ contrast if indicated; Future; Expected date: 09/01/2025    IMPRESSION:  CAD  s/p CABG x4 w/ LIMA-LAD, sequential SVG-PDA and PL and SVG-OM1, March 2020  remote stent to unknown vessel, 2010  Hypertension - management as per 1' care  LVEF 70%, grade 2 diastolic dysfunction, AV sclerosis, mild MAC, mild MR, trace TR/DE, March 2022  Dyslipidemia w/  mg/dL, HDL 30 mg/dL,  mg/dL, October 2024  Type 2 diabetes mellitus  PVD  s/p left fem-fem bypass, 2021  High-grade stenosis right SFA by CT with runoff, April 2022  s/p LLE RODRI, February 2023  Carotid stenosis < 50% bilaterally, March 2020  Left-sided proximal subclavian stenosis, November 2021  Hepatocellular carcinoma with 5.4 x 4.8 x 5.5 cm mass noted on CT of the abdomen pelvis, September 2024  CKD stage 3  Iron deficiency anemia  History of tobacco use    PLAN:  It was a pleasure to see Max in the office today for follow-up CV evaluation.  The patient had been continuing to follow-up for treatment of his hepatocellular carcinoma.  Pharmacy data reflects the patient had been taking his statin less than 70% of the time.  He has no chest pain concerning for angina and no signs or symptoms of heart failure.  Clinically he examines to be euvolemic.  Blood pressure " "and heart rate are currently stable.  He is tolerating his current medications without any reported adverse effects.  He can perform greater than 4 METS on daily basis without significant exertional symptoms.  Based on his clinical presentation, I have the following recommendations:    1.  Recommend compliance with high intensity statin due to his history of CAD and PVD.  2.  Would encourage 30 minutes a day, 5 days a week of moderate intensity activity to build cardiovascular endurance per  3.  Recommend a heart healthy diet low in sodium and carbohydrate.  4.  Continue current antihypertensive regimen including lisinopril and hydrochlorothiazide.  5.  Continue high intensity statin.  Goal LDL is less than 70 mg/dL.  Repeat lipid panel in 3 to 6 months.  Patient is not to goal on repeat, would consider increasing to 80 mg daily dosing.  6.  Continue Jardiance.  Blood glucose management as per primary care.  7.  Lifelong Plavix.  Management of vascular disease as per vascular surgery.  Appointment is scheduled in the coming days.  8.  We will check 2D echocardiogram to reassess his known mild mitral valvular regurgitation in 6 months prior to her follow-up encounter.  9.  We will follow-up with him after echocardiogram to review the results.    As always, please do not hesitate to call with any questions.      Portions of the record may have been created with voice recognition software. Occasional wrong word or \"sound a like\" substitutions may have occurred due to the inherent limitations of voice recognition software. Read the chart carefully and recognize, using context, where substitutions have occurred.      Signed: Vignesh Zamarripa DO, FACC, FASE, FASNC, FACP  "

## 2025-03-21 ENCOUNTER — OFFICE VISIT (OUTPATIENT)
Dept: VASCULAR SURGERY | Facility: CLINIC | Age: 80
End: 2025-03-21
Payer: MEDICARE

## 2025-03-21 VITALS
OXYGEN SATURATION: 98 % | DIASTOLIC BLOOD PRESSURE: 57 MMHG | SYSTOLIC BLOOD PRESSURE: 155 MMHG | BODY MASS INDEX: 25.33 KG/M2 | HEART RATE: 45 BPM | HEIGHT: 65 IN | WEIGHT: 152 LBS

## 2025-03-21 DIAGNOSIS — I73.9 CLAUDICATION IN PERIPHERAL VASCULAR DISEASE (HCC): ICD-10-CM

## 2025-03-21 DIAGNOSIS — C22.0 HEPATOCELLULAR CARCINOMA (HCC): ICD-10-CM

## 2025-03-21 DIAGNOSIS — I73.9 PAD (PERIPHERAL ARTERY DISEASE) (HCC): Primary | ICD-10-CM

## 2025-03-21 DIAGNOSIS — I74.09 AORTOILIAC OCCLUSIVE DISEASE (HCC): Chronic | ICD-10-CM

## 2025-03-21 DIAGNOSIS — Z95.1 S/P CABG X 4: ICD-10-CM

## 2025-03-21 PROCEDURE — 99214 OFFICE O/P EST MOD 30 MIN: CPT | Performed by: SURGERY

## 2025-03-21 NOTE — ASSESSMENT & PLAN NOTE
He returns to the office to review surveillance noninvasive arterial imaging.  He has history of bilateral lower extremity claudication.  His ABIs are essentially unchanged with an LIONEL of 0.56 on the right and 0.58 on the left.  He has history of multiple vascular procedures including history of bilateral aortoiliac stenting with subsequent occlusion of the left iliac system.  He subsequently underwent a right to left femorofemoral bypass by Dr. Suarez at [facility].    He transferred his care to [facility].  He was a prior patient of Dr. Pena who performed an open exposure of the femorofemoral bypass with bilateral lower extremity arteriogram with recanalization of chronic left SFA occlusion with angioplasty and Vickie stenting.  Has a flush long segment right SFA occlusion.  At Last office visit and again today that he has extensive disease and should he have lifestyle disabling claudication and or tissue loss/rest pain then consideration would be given to proceeding with open revascularization.  It was then brought to my attention that patient is currently being treated for hepatocellular carcinoma.  He previously underwent Y90 procedure and now receives monthly infusions.  He has cirrhosis of the liver.  In light of the above information I have recommended a more conservative approach.  Should the symptoms worsen he is always free to call the office and I be happy to see him.  Otherwise recommend yearly AOIL/lower extremity arterial duplex and sooner if indicated.  He understands to call the office should he develop any tissue loss to his feet.  Continue Plavix and atorvastatin.

## 2025-03-21 NOTE — PROGRESS NOTES
Name: Max Ozuna      : 1945      MRN: [id number]  Encounter Provider: Chilo Arevalo DO  Encounter Date: 3/21/2025   Encounter department: [facility] [place]  :  Assessment & Plan  PAD (peripheral artery disease) (HCC)    Orders:    VAS abdominal aorta/iliac duplex; Future    VAS ARTERIAL DUPLEX- LOWER LIMB BILATERAL; Future    Aortoiliac occlusive disease (HCC)         S/P CABG x 4         Claudication in peripheral vascular disease (HCC)  He returns to the office to review surveillance noninvasive arterial imaging.  He has history of bilateral lower extremity claudication.  His ABIs are essentially unchanged with an LIONEL of 0.56 on the right and 0.58 on the left.  He has history of multiple vascular procedures including history of bilateral aortoiliac stenting with subsequent occlusion of the left iliac system.  He subsequently underwent a right to left femorofemoral bypass by Dr. Suarez at [facility].    He transferred his care to [facility].  He was a prior patient of Dr. Pena who performed an open exposure of the femorofemoral bypass with bilateral lower extremity arteriogram with recanalization of chronic left SFA occlusion with angioplasty and Vickie stenting.  Has a flush long segment right SFA occlusion.  At Last office visit and again today that he has extensive disease and should he have lifestyle disabling claudication and or tissue loss/rest pain then consideration would be given to proceeding with open revascularization.  It was then brought to my attention that patient is currently being treated for hepatocellular carcinoma.  He previously underwent Y90 procedure and now receives monthly infusions.  He has cirrhosis of the liver.  In light of the above information I have recommended a more conservative approach.  Should the symptoms worsen he is always free to call the office and I be happy to see him.  Otherwise recommend yearly AOIL/lower extremity arterial  duplex and sooner if indicated.  He understands to call the office should he develop any tissue loss to his feet.  Continue Plavix and atorvastatin.         Hepatocellular carcinoma (HCC)             History of Present Illness   HPI  Max Ozuna is a 80 y.o. male who presents to the office to review surveillance aortoiliac duplex/lower extremity arterial duplex.    Pt here for RR of AOIL and JAMEL 3/17/25. Pt c/o numbness, tingling, nighttime and cramping and burning. Pt denies swelling, discoloration ,coldness, non healing and open wounds. Pt is s/p revascularization from 2023 by Dr Brown.   History obtained from: patient    Review of Systems  Past Medical History   Past Medical History:   Diagnosis Date    Coronary artery disease without angina pectoris 02/25/2020    Diabetes mellitus (HCC)     DVT (deep venous thrombosis) (HCC)     GERD (gastroesophageal reflux disease)     Hypertension     Iron deficiency anemia     Liver cancer (HCC)      Past Surgical History:   Procedure Laterality Date    ANGIOPLASTY  10/19/2021    bilateral    CARDIAC CATHETERIZATION  2013    calif    CABG  x4    COLONOSCOPY      COLONOSCOPY      CORONARY ANGIOPLASTY WITH STENT PLACEMENT  01/01/2010    CORONARY ARTERY BYPASS GRAFT      FEMORAL ARTERY STENT      FEMORAL BYPASS  10/20/2021    IR LOWER EXTREMITY ANGIOGRAM  2/14/2023    IR PELVIC ANGIOGRAM  5/6/2022    IR Y-90 PRE-ANGIO/EMBO W/ LUNG SCAN  1/13/2025    IR Y-90 RADIOEMBOLIZATION  1/22/2025    IN SLCTV CATHJ 3RD+ ORD SLCTV ABDL PEL/LXTR BRNCH  5/6/2022    Procedure: ULTRASOUND-GUIDED LEFT BRACHIAL ARTERY PUNCTURE, AORTOGRAM, RIGHT COMMON ILIAC ARTERY ANGIOPLASTY WITH 8 X 40 MM BALLOON, ANGIOPLASTY OF RIGHT EXTERNAL AND COMMON ILIAC ARTERY STENOSIS WITH 6 X 150 MM DRUG-ELUTING BALLOON, STENTING OF RIGHT DISTAL EXTERNAL ILIAC ARTERY STENOSIS WITH 7 X 40 MM SELF EXPANDING STENT POST DILATED WITH A 6 MM BALLOON.;  Surgeon: Henry Pena MD;  [place]    IN SLCTV CATHJ 3RD+ ORD  SLCTV ABDL PEL/LXTR BRNCH Bilateral 2/14/2023    Procedure: CUTDOWN FEM-FEM BYPASS WITH PRIMARY CLOSURE ACCESS SITE, BILATERAL RUN-OFF, LEFT FEM-POP 5X220 QUYEN AND 6X150 RODRI, ATTEMPT TO CROSS RIGHT SFA OCCLUSION;  Surgeon: Henry Pena MD;  Location: [facility] OR;  Service: Vascular     Family History   Problem Relation Age of Onset    No Known Problems Mother     Lung cancer Father     Liver cancer Brother       reports that he quit smoking about 21 years ago. His smoking use included cigarettes. He started smoking about 61 years ago. He has a 60 pack-year smoking history. He has been exposed to tobacco smoke. He has never used smokeless tobacco. He reports that he does not currently use alcohol. He reports that he does not use drugs.  Current Outpatient Medications   Medication Instructions    acetaminophen (TYLENOL) 1,000 mg, Every 8 hours PRN    atorvastatin (LIPITOR) 40 mg, Oral, Daily    chlorhexidine (PERIDEX) 0.12 % solution 15 mL, Mouth/Throat, 2 times daily    clopidogrel (PLAVIX) 75 mg, Oral, Daily    Empagliflozin (JARDIANCE) 25 mg, Oral, Every morning, Also: take with LOTS of water    ferrous sulfate 325 mg, Oral, 2 times daily with meals    glipiZIDE (GLUCOTROL) 10 mg, Oral, 2 times daily before meals    hydrocortisone 2.5 % cream Topical, 2 times daily PRN    levothyroxine 25 mcg, Oral, Daily    lisinopril-hydrochlorothiazide (PRINZIDE,ZESTORETIC) 20-12.5 MG per tablet 1 tablet, Oral, Daily    metFORMIN (GLUCOPHAGE) 1,000 mg, Oral, 2 times daily with meals    mupirocin (BACTROBAN) 2 % ointment Topical, 2 times daily, In the LEFT side of nose    omeprazole (PRILOSEC) 20 mg, Oral, Daily before breakfast    tamsulosin (FLOMAX) 0.4 mg, Oral, Daily with dinner   No Known Allergies   Current Outpatient Medications on File Prior to Visit   Medication Sig Dispense Refill    acetaminophen (TYLENOL) 500 mg tablet Take 1,000 mg by mouth every 8 (eight) hours as needed      atorvastatin (LIPITOR) 40 mg  tablet Take 1 tablet (40 mg total) by mouth daily 100 tablet 3    chlorhexidine (PERIDEX) 0.12 % solution Apply 15 mL to the mouth or throat 2 (two) times a day 473 mL 3    clopidogrel (PLAVIX) 75 mg tablet Take 1 tablet (75 mg total) by mouth daily 90 tablet 1    Empagliflozin (Jardiance) 25 MG TABS Take 1 tablet (25 mg total) by mouth every morning Also: take with LOTS of water 90 tablet 3    ferrous sulfate 325 (65 Fe) mg tablet Take 1 tablet (325 mg total) by mouth 2 (two) times a day with meals 60 tablet 6    glipiZIDE (GLUCOTROL) 10 mg tablet TAKE 1 TABLET TWICE A DAY  BEFORE MEALS 180 tablet 1    hydrocortisone 2.5 % cream Apply topically 2 (two) times a day as needed for rash 30 g 3    levothyroxine 25 mcg tablet Take 1 tablet (25 mcg total) by mouth daily 30 tablet 2    lisinopril-hydrochlorothiazide (PRINZIDE,ZESTORETIC) 20-12.5 MG per tablet Take 1 tablet by mouth daily 90 tablet 1    metFORMIN (GLUCOPHAGE) 1000 MG tablet TAKE 1 TABLET TWICE DAILY  WITH MEALS 180 tablet 1    mupirocin (BACTROBAN) 2 % ointment Apply topically 2 (two) times a day In the LEFT side of nose 22 g 3    omeprazole (PriLOSEC) 20 mg delayed release capsule TAKE 1 CAPSULE BY MOUTH DAILY BEFORE BREAKFAST. 90 capsule 2    tamsulosin (FLOMAX) 0.4 mg Take 1 capsule (0.4 mg total) by mouth daily with dinner 90 capsule 1     No current facility-administered medications on file prior to visit.      Social History     Tobacco Use    Smoking status: Former     Current packs/day: 0.00     Average packs/day: 1.5 packs/day for 40.0 years (60.0 ttl pk-yrs)     Types: Cigarettes     Start date:      Quit date:      Years since quittin.2     Passive exposure: Past    Smokeless tobacco: Never    Tobacco comments:     quit --    Vaping Use    Vaping status: Never Used   Substance and Sexual Activity    Alcohol use: Not Currently     Alcohol/week: 0.0 standard drinks of alcohol    Drug use: No    Sexual activity: Not on file       "  Objective   /57 (BP Location: Right arm, Patient Position: Sitting)   Pulse (!) 45   Ht 5' 5\" (1.651 m)   Wt 68.9 kg (152 lb)   SpO2 98%   BMI 25.29 kg/m²      Physical Exam  Constitutional:       General: He is not in acute distress.     Appearance: He is normal weight. He is not ill-appearing, toxic-appearing or diaphoretic.   HENT:      Right Ear: External ear normal.      Left Ear: External ear normal.   Eyes:      General: No scleral icterus.        Right eye: No discharge.         Left eye: No discharge.   Cardiovascular:      Rate and Rhythm: Normal rate.   Pulmonary:      Effort: Pulmonary effort is normal.   Abdominal:      General: There is no distension.      Tenderness: There is no abdominal tenderness.   Skin:     Capillary Refill: Capillary refill takes less than 2 seconds.   Neurological:      General: No focal deficit present.      Mental Status: He is alert.   Psychiatric:         Mood and Affect: Mood normal.         Behavior: Behavior normal.         Thought Content: Thought content normal.         Judgment: Judgment normal.     Aortoiliac duplex:  CONCLUSION:     Impression  The abdominal aorta demonstrates diffuse arterial occlusive disease without  focal stenosis.  The aorta appears normal in caliber measuring 2.1 cm in its greatest diameter.  Prior: 2.2 cm  The distal aorta stent appears patent .  Elevated velocities in the right common iliac and external iliac arterial  stents suggesting 50-75% stenoses  The left common iliac artery stent appears is poorly visualized..  The left external iliac artery was not appreciated in this exam can not rule  out occlusion.  The celiac and superior mesenteric artery appear patent. However, low resistive  flow is noted in the superior mesenteric artery.  The right ostial renal artery appears patent.  Elevated velocities noted in the left ostial renal artery suggests > 60%  stenosis.     RIGHT LOWER LIMB:  Ankle/Brachial index: 0.56 severe " range, previous study 0.46  PVR/ PPG tracings are dampened.  Metatarsal pressure of 72mm Hg,  Prior:  46mmHg  Great toe pressure of 34mm Hg, below healing threshold for a diabetic, previous  study 13 mm Hg.     LEFT LOWER LIMB:  Ankle/Brachial index:  0.58  within the severe  range, previous  study 0.41.  PVR/ PPG tracings are dampened.  Metatarsal pressure of  99mm Hg Prior: 44 mmHg  Great toe pressure of 48 mm Hg, below healing threshold for a diabetic,  previous study 12 mm Hg.    Lower extremity arterial duplex:  CONCLUSION:  Impression:     Patent right femoral to left femoral artery bypass graft.     RIGHT LOWER LIMB:  There is a 50-75% stenosis in the ostial superficial femoral artery with and  occlusion of the proximal/mid superficial femoral artery with distal  reconstitution. Diffuse disease noted throughout the remaining portions of the  femoral-popliteal arteries.  Findings suggests tibioperoneal disease.  Ankle/Brachial index: 0.56 severe range, previous study 0.46  PVR/ PPG tracings are dampened.  Metatarsal pressure of 72mm Hg,  Prior:  46mmHg  Great toe pressure of 34mm Hg, below healing threshold for a diabetic, previous  study 13 mm Hg.     LEFT LOWER LIMB:  A >75% stenosis of the proximal superficial femoral artery. The mid to distal  superficial femoral artery stent appears patent without hemodynamic changes.  Diffuse disease noted throughout the remaining portions of the femoral-popliteal  arteries.  Findings suggests tibioperoneal disease.  Ankle/Brachial index:  0.58  within the severe  range, previous  study 0.41.  PVR/ PPG tracings are dampened.  Metatarsal pressure of  99mm Hg Prior: 44 mmHg  Great toe pressure of 48 mm Hg, below healing threshold for a diabetic,  previous study 12 mm Hg.     Compared to previous study on 6/20/24, There are no significant changes.    Administrative Statements   I have spent a total time of 30 minutes in caring for this patient on the day of the  visit/encounter including Diagnostic results, Prognosis, Risks and benefits of tx options, Instructions for management, Patient and family education, Importance of tx compliance, Risk factor reductions, Impressions, Counseling / Coordination of care, Documenting in the medical record, Reviewing/placing orders in the medical record (including tests, medications, and/or procedures), and Obtaining or reviewing history  .

## 2025-03-22 DIAGNOSIS — E03.2 DRUG-INDUCED HYPOTHYROIDISM: ICD-10-CM

## 2025-03-22 DIAGNOSIS — C22.0 HEPATOCELLULAR CARCINOMA (HCC): ICD-10-CM

## 2025-03-22 PROBLEM — Z00.00 MEDICARE ANNUAL WELLNESS VISIT, SUBSEQUENT: Status: RESOLVED | Noted: 2025-02-20 | Resolved: 2025-03-22

## 2025-03-24 RX ORDER — LEVOTHYROXINE SODIUM 25 UG/1
25 TABLET ORAL DAILY
Qty: 90 TABLET | Refills: 1 | Status: SHIPPED | OUTPATIENT
Start: 2025-03-24

## 2025-03-28 ENCOUNTER — APPOINTMENT (OUTPATIENT)
Dept: LAB | Facility: CLINIC | Age: 80
End: 2025-03-28
Payer: MEDICARE

## 2025-03-28 DIAGNOSIS — C22.0 HEPATOCELLULAR CARCINOMA (HCC): ICD-10-CM

## 2025-03-28 LAB
AFP-TM SERPL-MCNC: 1.72 NG/ML (ref 0–9)
ALBUMIN SERPL BCG-MCNC: 4.6 G/DL (ref 3.5–5)
ALP SERPL-CCNC: 95 U/L (ref 34–104)
ALT SERPL W P-5'-P-CCNC: 20 U/L (ref 7–52)
AMYLASE SERPL-CCNC: 41 IU/L (ref 29–103)
ANION GAP SERPL CALCULATED.3IONS-SCNC: 9 MMOL/L (ref 4–13)
AST SERPL W P-5'-P-CCNC: 16 U/L (ref 13–39)
BASOPHILS # BLD AUTO: 0.03 THOUSANDS/ÂΜL (ref 0–0.1)
BASOPHILS NFR BLD AUTO: 1 % (ref 0–1)
BILIRUB SERPL-MCNC: 1.08 MG/DL (ref 0.2–1)
BUN SERPL-MCNC: 33 MG/DL (ref 5–25)
CALCIUM SERPL-MCNC: 10.1 MG/DL (ref 8.4–10.2)
CHLORIDE SERPL-SCNC: 102 MMOL/L (ref 96–108)
CO2 SERPL-SCNC: 27 MMOL/L (ref 21–32)
CREAT SERPL-MCNC: 1.19 MG/DL (ref 0.6–1.3)
EOSINOPHIL # BLD AUTO: 0.2 THOUSAND/ÂΜL (ref 0–0.61)
EOSINOPHIL NFR BLD AUTO: 4 % (ref 0–6)
ERYTHROCYTE [DISTWIDTH] IN BLOOD BY AUTOMATED COUNT: 15.8 % (ref 11.6–15.1)
GFR SERPL CREATININE-BSD FRML MDRD: 56 ML/MIN/1.73SQ M
GLUCOSE P FAST SERPL-MCNC: 141 MG/DL (ref 65–99)
HCT VFR BLD AUTO: 44.8 % (ref 36.5–49.3)
HGB BLD-MCNC: 13.9 G/DL (ref 12–17)
IMM GRANULOCYTES # BLD AUTO: 0.02 THOUSAND/UL (ref 0–0.2)
IMM GRANULOCYTES NFR BLD AUTO: 0 % (ref 0–2)
LIPASE SERPL-CCNC: 30 U/L (ref 11–82)
LYMPHOCYTES # BLD AUTO: 0.65 THOUSANDS/ÂΜL (ref 0.6–4.47)
LYMPHOCYTES NFR BLD AUTO: 13 % (ref 14–44)
MCH RBC QN AUTO: 30.1 PG (ref 26.8–34.3)
MCHC RBC AUTO-ENTMCNC: 31 G/DL (ref 31.4–37.4)
MCV RBC AUTO: 97 FL (ref 82–98)
MONOCYTES # BLD AUTO: 0.42 THOUSAND/ÂΜL (ref 0.17–1.22)
MONOCYTES NFR BLD AUTO: 9 % (ref 4–12)
NEUTROPHILS # BLD AUTO: 3.62 THOUSANDS/ÂΜL (ref 1.85–7.62)
NEUTS SEG NFR BLD AUTO: 73 % (ref 43–75)
NRBC BLD AUTO-RTO: 0 /100 WBCS
PLATELET # BLD AUTO: 145 THOUSANDS/UL (ref 149–390)
PMV BLD AUTO: 10.5 FL (ref 8.9–12.7)
POTASSIUM SERPL-SCNC: 4.3 MMOL/L (ref 3.5–5.3)
PROT SERPL-MCNC: 7.6 G/DL (ref 6.4–8.4)
RBC # BLD AUTO: 4.62 MILLION/UL (ref 3.88–5.62)
SODIUM SERPL-SCNC: 138 MMOL/L (ref 135–147)
T4 FREE SERPL-MCNC: 0.5 NG/DL (ref 0.61–1.12)
TSH SERPL DL<=0.05 MIU/L-ACNC: 23.76 UIU/ML (ref 0.45–4.5)
WBC # BLD AUTO: 4.94 THOUSAND/UL (ref 4.31–10.16)

## 2025-03-28 PROCEDURE — 84439 ASSAY OF FREE THYROXINE: CPT

## 2025-03-28 PROCEDURE — 83690 ASSAY OF LIPASE: CPT

## 2025-03-28 PROCEDURE — 36415 COLL VENOUS BLD VENIPUNCTURE: CPT

## 2025-03-28 PROCEDURE — 82105 ALPHA-FETOPROTEIN SERUM: CPT

## 2025-03-28 PROCEDURE — 84443 ASSAY THYROID STIM HORMONE: CPT

## 2025-03-28 PROCEDURE — 80053 COMPREHEN METABOLIC PANEL: CPT

## 2025-03-28 PROCEDURE — 85025 COMPLETE CBC W/AUTO DIFF WBC: CPT

## 2025-03-28 PROCEDURE — 82150 ASSAY OF AMYLASE: CPT

## 2025-03-31 DIAGNOSIS — E03.2 DRUG-INDUCED HYPOTHYROIDISM: Primary | ICD-10-CM

## 2025-03-31 RX ORDER — LEVOTHYROXINE SODIUM 50 UG/1
50 TABLET ORAL DAILY
Qty: 30 TABLET | Refills: 2 | Status: SHIPPED | OUTPATIENT
Start: 2025-03-31

## 2025-04-01 ENCOUNTER — HOSPITAL ENCOUNTER (OUTPATIENT)
Dept: INFUSION CENTER | Facility: CLINIC | Age: 80
Discharge: HOME | End: 2025-04-01
Payer: MEDICARE

## 2025-04-01 VITALS
TEMPERATURE: 97.2 F | DIASTOLIC BLOOD PRESSURE: 62 MMHG | SYSTOLIC BLOOD PRESSURE: 138 MMHG | HEART RATE: 62 BPM | RESPIRATION RATE: 18 BRPM

## 2025-04-01 DIAGNOSIS — C22.0 HEPATOCELLULAR CARCINOMA (HCC): Primary | ICD-10-CM

## 2025-04-01 PROCEDURE — 96413 CHEMO IV INFUSION 1 HR: CPT

## 2025-04-01 RX ORDER — SODIUM CHLORIDE 9 MG/ML
20 INJECTION, SOLUTION INTRAVENOUS ONCE
Status: COMPLETED | OUTPATIENT
Start: 2025-04-01 | End: 2025-04-01

## 2025-04-01 RX ADMIN — SODIUM CHLORIDE 20 ML/HR: 0.9 INJECTION, SOLUTION INTRAVENOUS at 11:06

## 2025-04-01 RX ADMIN — DURVALUMAB 1500 MG: 500 INJECTION, SOLUTION INTRAVENOUS at 11:22

## 2025-04-01 NOTE — PROGRESS NOTES
Patient presents for [facility] today and is without complaints at this time. Patient tolerated infusion without incident. AVS printed and reviewed with patient. Aware of next appointment on 4/29 @ 1030am.

## 2025-04-05 NOTE — PROGRESS NOTES
"Name: Max Ozuna      : 1945      MRN: [id number]  Encounter Provider: Mal Angeles MD  Encounter Date: 2025   Encounter department: [facility]  :  Assessment & Plan  Hepatocellular carcinoma (HCC)    This is a 80 y.o. c PMHx notable for cecal AVM/GAVE, PE, DM, DVT, HTN, HLP, CAD, being seen in consultation for chronic ANG and locally advanced HCC; on systemic therapy           No follow-ups on file.    History of Present Illness         Objective   /70 (BP Location: Left arm, Patient Position: Sitting, Cuff Size: Adult)   Pulse 55   Temp (!) 96.9 °F (36.1 °C) (Temporal)   Resp 16   Ht 5' 5\" (1.651 m)   Wt 71.7 kg (158 lb)   SpO2 96%   BMI 26.29 kg/m²         Referral Physician: No ref. provider found    Primary Care Physician:  DO Maddi Quinonez: Daughter     Taylor: wife    Sallie: daughter in law      This is a 80 y.o. c PMHx notable for cecal AVM/GAVE, PE, DM, DVT, HTN, HLP, CAD, being seen in consultation for chronic ANG and locally advanced HCC; on systemic therapy     Iron Deficiency anemia: improved s/p IV iron  Low iron levels can cause anemia (low red blood cells). Low iron levels can also make people feel poorly, even before anemia starts. Iron is used to make red blood cells. If blood is lost from the body, if iron can't be absorbed from food, or if something removes iron (pregnancy) the iron can be low and then anemia happens.    Hx Cecal AVM. GI thought maybe GAVE  2022: Hgb 9.5, WBC 6.35k, MCV 91, plt 235k, retic 3.65%, FOBT + (), ferritin 7, iron 30, Fe Sat 9%, TIBC 346   EGD/C-scope: internal hemorrhoids    Capsule endoscopy: no active bleeding  2023 EGD with single balloon enteroscopy revealed no bleeding    IrAE drug induced rash: on hydrocortisone 2.5% to good effect    Iron deficiency is very common. Low iron can cause fatigue or tiredness, the desire to eat ice or non-food items like corn " starch or dry pasta noodles, restless legs, weak fingernails, cracks at the corner of the mouth.     Common causes of iron deficiency include inadequate diet (uncommon, usually vegetarians), gastric bypass surgery (very common), pregnancy (very common), periods (most common cause in younger women), blood loss (usually from the stomach or intestines), and decreased iron absorption from the intestines from gastritis, H pylori, acid blocking medicines or celiac disease. Even a normal menstrual period can cause iron deficiency. In 10% of patients a clear cause of low iron is not found. 14% of women will have iron deficiency just from their menstrual period.       Iron deficiency is treated with pills as a first step. For some people the iron pills do not work, cause severe side effects, or take too long to work: for these people IV iron can be given. You may only require IV iron very rarely, for example only when pregnant, or from 1-2 times a year based on your rate of need. It takes 4 weeks for IV iron to improve the anemia to maximum potential. If your fatigue is not from the low iron, getting iron treatment would not help the fatigue.    9/24/2024 CT Abd/pelv w/c: Nodular liver contour, indicated of cirrhosis. Portal vein thrombosis involving posterior branch of the right hepatic lobe  MRI Abd w/wo c: Approximately 5.4 x 4.8 x 5.5 cm (length X depth X width) solitary segment 6/7 mass  9/25/2024: AFP 21,374  9/25/2024: CT Chest w/c: Emphysema with areas of fibrosis/scarring which appears stable compared to study of 11/15/2019  10/11/2024 Liver tumor board interventional and radiology do think he would be a good candidate for Y90 treatment concurrently with immunotherapy  12/30/2024 CT CAP w/c:  excellent IA  Decrease in size segment VI/VII right hepatic lobe mass since 9/24/2024, currently measuring 3.1 x 2.6 x 4.5 cm (61/105, 4/108), previously 5.4 x 4.8 x 5.5 cm. Again seen is tumor thrombus in the posterior branch  of right hepatic lobe, decreased from prior exam. No metastatic disease in the chest, abdomen or pelvis  1/22/2025: IR Y-90 radio embolization              Discussion of decision making    I personally reviewed the following lab results, the image studies, pathology, other specialty/physicians consult notes and recommendations, and outside medical records from [facility]/other institutions. I had a lengthy discussion with the patient and shared the work-up findings. We discussed the diagnosis and management plan as below. I spent 42 minutes reviewing the records (labs, clinician notes, outside records, medical history, ordering medicine/tests/procedures, interpreting the imaging/labs previously done) and coordination of care as well as direct time with the patient today, of which greater than 50% of the time was spent in counseling and coordination of care with the patient/family.    Plan/Labs  Cont PO iron MWF   Cont to f/u GI  Refilled Hydrocortisone 2.5% cream for his drug rash previously  Restaging CT CAP w/c scheduled 6/26/2025  Cont Levothyroxine 50 mcg for irAE   Liver directed therapy done on 1/22/2025  Infusion chairs due for C12 labs: continuing until the CT and then reassess          Follow Up: q4 weeks scheduled thru 7/3/2025    All questions were answered to the patient's satisfaction during this encounter. The patient knows the contact information for our office and knows to reach out for any relevant concerns related to this encounter. They are to call for any temperature 100.4 or higher, new symptoms including but not restricted to shaking chills, decreased appetite, nausea, vomiting, diarrhea, increased fatigue, shortness of breath or chest pain, confusion, and not feeling the strength to come to the clinic. For all other listed problems and medical diagnosis in their chart - they are managed by PCP and/or other specialists, which the patient acknowledges. Thank you very much for your consultation and  making us a part of this patient's care. We are continuing to follow closely with you. Please do not hesitate to reach out to me with any additional questions or concerns.    Mal Angeles MD  Hematology & Medical Oncology Staff Physician             Disclaimer: This document was prepared using [employer] technology. If a word or phrase is confusing, or does not make sense, this is likely due to recognition error which was not discovered during this clinician's review. If you believe an error has occurred, please contact me through [facility] service line for dequan?cation.      HEMATOLOGICAL HISTORY OF PRESENT ILLNESS      Clotting History None   Bleeding History GAVE related bleeding   Cancer History None   Family Cancer History Father (cancer)   H/O Blood/Plt Transfusion PRBC years ago   Tobacco/etoh/drug abuse 2 PPDx 44 years, quit 2004, no etoh abuse or rec drug use       Cancer Screening history n/a   Occupation [employer], [employer] places      8/29/2022: Hgb 9.5, WBC 6.35k, MCV 91, plt 235k, retic 3.65%, FOBT + (8/31), ferritin 7, iron 30, Fe Sat 9%, TIBC 346  9/7 EGD/C-scope: internal hemorrhoids  10/3/2022-10/9/2022: 5 iV Venofer 200mg administered   11/28/2022: ferritin 26, iron 67, Fe Sat 21%, TIBC 322, Hgb 12.7 (now normal)  5/2/2023: Discussed with the patient the results of his iron studies which show worsening iron deficiency as well as recurrent anemia.  I ordered 600 mg of IV Venofer  5/18/2023: ferritin 7, iron 36, Fe Sat 10%, TIBC 364, Hgb 11.6  6/29/2023: ferritin 37, iron 52, Fe Sat 17%, TIBC 307, Hgb 12.7, MCV 92  9/20/2023:  ferritin 58, iron 65, Fe Sat 33%, TIBC 230, Hgb 13.5, MCV 95  2/14/2024: iron 42, ferritin 22, Fe Sat 14%, TIBC 304: Vit B12 632, Hgb 13.4, plt 161k, WBC 6.21k  4/18/2024: iron 59, Fe Sat 18%, TIBC 329, ferritin 21, Hgb 13.4 (2/2024)  4/29/2024: planned IV Venofer 200mg x2  10/2/2024: Ferritin: 224, Hgb 13.6, Fe Sat 13%, TIBC 208, iron 26, ferritin 224  AOCI  11/8/2024: Lipase: 380 (4x ULN)  12/27/2024: AFP 0.62  3/31/2025: increased Levo to 50 mcg  3/28/2025: AFP 1.72    SUBJECTIVE  (INTERVAL HISTORY)      Interval events:   Resolved fatigue. No issues or concerns. Appetite is good. No new rash.    I have reviewed the relevant past medical, surgical, social and family history. I have also reviewed allergies and medications for this patient.    Review of Systems    Baseline weight: 152-154 lbs    Denies weight loss, F/C, N/V, SOB, CP, LH, HA.    He has had fatigue and low energy since 6/2022. He was chewing on ice all day since 3/2022.     A 10-point review of system was performed, pertinent positive and negative were detailed as above. Otherwise, the 10-point review of system was negative.      Past Medical History:   Diagnosis Date    Coronary artery disease without angina pectoris 02/25/2020    Diabetes mellitus (HCC)     DVT (deep venous thrombosis) (HCC)     GERD (gastroesophageal reflux disease)     Hypertension     Iron deficiency anemia     Liver cancer (HCC)        Past Surgical History:   Procedure Laterality Date    ANGIOPLASTY  10/19/2021    bilateral    CARDIAC CATHETERIZATION  2013    calif    CABG  x4    COLONOSCOPY      COLONOSCOPY      CORONARY ANGIOPLASTY WITH STENT PLACEMENT  01/01/2010    CORONARY ARTERY BYPASS GRAFT      FEMORAL ARTERY STENT      FEMORAL BYPASS  10/20/2021    IR LOWER EXTREMITY ANGIOGRAM  2/14/2023    IR PELVIC ANGIOGRAM  5/6/2022    IR Y-90 PRE-ANGIO/EMBO W/ LUNG SCAN  1/13/2025    IR Y-90 RADIOEMBOLIZATION  1/22/2025    IN SLCTV CATHJ 3RD+ ORD SLCTV ABDL PEL/LXTR BRNCH  5/6/2022    Procedure: ULTRASOUND-GUIDED LEFT BRACHIAL ARTERY PUNCTURE, AORTOGRAM, RIGHT COMMON ILIAC ARTERY ANGIOPLASTY WITH 8 X 40 MM BALLOON, ANGIOPLASTY OF RIGHT EXTERNAL AND COMMON ILIAC ARTERY STENOSIS WITH 6 X 150 MM DRUG-ELUTING BALLOON, STENTING OF RIGHT DISTAL EXTERNAL ILIAC ARTERY STENOSIS WITH 7 X 40 MM SELF EXPANDING STENT POST DILATED WITH A 6 MM  BALLOON.;  Surgeon: Henry Pena MD;  [place]    AR SLCTV CATHJ 3RD+ ORD SLCTV ABDL PEL/LXTR BRNCH Bilateral 2023    Procedure: CUTDOWN FEM-FEM BYPASS WITH PRIMARY CLOSURE ACCESS SITE, BILATERAL RUN-OFF, LEFT FEM-POP 5X220 QUYEN AND 6X150 RODRI, ATTEMPT TO CROSS RIGHT SFA OCCLUSION;  Surgeon: Henry Pena MD;  Location: [facility];  Service: Vascular       Family History   Problem Relation Age of Onset    No Known Problems Mother     Lung cancer Father     Liver cancer Brother        Social History     Socioeconomic History    Marital status: /Civil Union     Spouse name: Not on file    Number of children: Not on file    Years of education: Not on file    Highest education level: Not on file   Occupational History    Not on file   Tobacco Use    Smoking status: Former     Current packs/day: 0.00     Average packs/day: 1.5 packs/day for 40.0 years (60.0 ttl pk-yrs)     Types: Cigarettes     Start date:      Quit date:      Years since quittin.2     Passive exposure: Past    Smokeless tobacco: Never    Tobacco comments:     quit --    Vaping Use    Vaping status: Never Used   Substance and Sexual Activity    Alcohol use: Not Currently     Alcohol/week: 0.0 standard drinks of alcohol    Drug use: No    Sexual activity: Not on file   Other Topics Concern    Not on file   Social History Narrative    · Most recent tobacco use screenin2020      Social Drivers of Health     Financial Resource Strain: Low Risk  (2023)    Overall Financial Resource Strain (CARDIA)     Difficulty of Paying Living Expenses: Not hard at all   Food Insecurity: No Food Insecurity (2025)    Hunger Vital Sign     Worried About Running Out of Food in the Last Year: Never true     Ran Out of Food in the Last Year: Never true   Recent Concern: Food Insecurity - Food Insecurity Present (2025)    Nursing - Inadequate Food Risk Classification     Worried About Running Out of Food in the Last  Year: Sometimes true     Ran Out of Food in the Last Year: Sometimes true     Ran Out of Food in the Last Year: Not on file   Transportation Needs: No Transportation Needs (2/20/2025)    PRAPARE - Transportation     Lack of Transportation (Medical): No     Lack of Transportation (Non-Medical): No   Physical Activity: Not on file   Stress: Not on file   Social Connections: Not on file   Intimate Partner Violence: Not on file   Housing Stability: Low Risk  (2/20/2025)    Housing Stability Vital Sign     Unable to Pay for Housing in the Last Year: No     Number of Times Moved in the Last Year: 0     Homeless in the Last Year: No       No Known Allergies    Current Outpatient Medications   Medication Sig Dispense Refill    acetaminophen (TYLENOL) 500 mg tablet Take 1,000 mg by mouth every 8 (eight) hours as needed      atorvastatin (LIPITOR) 40 mg tablet Take 1 tablet (40 mg total) by mouth daily 100 tablet 3    chlorhexidine (PERIDEX) 0.12 % solution Apply 15 mL to the mouth or throat 2 (two) times a day 473 mL 3    clopidogrel (PLAVIX) 75 mg tablet Take 1 tablet (75 mg total) by mouth daily 90 tablet 1    Empagliflozin (Jardiance) 25 MG TABS Take 1 tablet (25 mg total) by mouth every morning Also: take with LOTS of water 90 tablet 3    ferrous sulfate 325 (65 Fe) mg tablet Take 1 tablet (325 mg total) by mouth 2 (two) times a day with meals 60 tablet 6    glipiZIDE (GLUCOTROL) 10 mg tablet TAKE 1 TABLET TWICE A DAY  BEFORE MEALS 180 tablet 1    hydrocortisone 2.5 % cream Apply topically 2 (two) times a day as needed for rash 30 g 3    levothyroxine 50 mcg tablet Take 1 tablet (50 mcg total) by mouth daily 30 tablet 2    lisinopril-hydrochlorothiazide (PRINZIDE,ZESTORETIC) 20-12.5 MG per tablet Take 1 tablet by mouth daily 90 tablet 1    metFORMIN (GLUCOPHAGE) 1000 MG tablet TAKE 1 TABLET TWICE DAILY  WITH MEALS 180 tablet 1    mupirocin (BACTROBAN) 2 % ointment Apply topically 2 (two) times a day In the LEFT side of  nose 22 g 3    omeprazole (PriLOSEC) 20 mg delayed release capsule TAKE 1 CAPSULE BY MOUTH DAILY BEFORE BREAKFAST. 90 capsule 2    tamsulosin (FLOMAX) 0.4 mg Take 1 capsule (0.4 mg total) by mouth daily with dinner 90 capsule 1     No current facility-administered medications for this visit.       (Not in a hospital admission)        Objective:     24 Hour Vitals Assessment:     Vitals:    04/14/25 1326   BP: 138/70   Pulse: 55   Resp: 16   Temp: (!) 96.9 °F (36.1 °C)   SpO2: 96%       Weight last visit 150 lbs  Weight today: 158 lbs      PHYSICIAN EXAM:    General: Appearance: alert, cooperative, no distress.  HEENT: Normocephalic, atraumatic. No scleral icterus. conjunctivae clear. EOMI.  Chest: No tenderness to palpation. No open wound noted.  Lungs: Clear to auscultation bilaterally, Respirations unlabored.  Cardiac: Bradycardia, +S1and S2  Abdomen: Soft, non-tender, non-distended. Bowel sounds are normal.  Extremities:  No edema, cyanosis, clubbing.  Skin: Skin color, turgor are normal. Small R anterior leg rashes  Neurologic: Awake, Alert, and oriented, no gross focal deficits noted b/l.       DATA REVIEW:    Pathology Result:    Final Diagnosis   Date Value Ref Range Status   06/27/2023   Final    A. Duodenum, :  - Unremarkable duodenal mucosa  - No villous atrophy or increased intraepithelial lymphocytes seen     B. Stomach, :  - Gastric oxyntic and antral type mucosa with minimal or mild chronic inflammation  - No sharad shaped bacteria seen on H&E stained tissue section  - Negative for intestinal metaplasia and dysplasia          09/07/2022   Final    A. Duodenum,  biopsy:  - Duodenal mucosa with focal acute inflammation and reactive changes  - No intraepithelial lymphocytosis and no villous blunting.  - No epithelial dysplasia and no evidence of malignancy.    B.  Stomach, biopsy:  - Gastric antral and oxyntic mucosa with no significant pathologic alteration.  - Negative for intestinal metaplasia, dysplasia  or carcinoma.  - No Helicobacter pylori is identified on H&E stained slide.              Image Results:   Image result are reviewed and documented in Hematology/Oncology history. I personally reviewed these images.    VAS ARTERIAL DUPLEX- LOWER LIMB BILATERAL     [facility] REPORT  CLINICAL:  Indications:  Surveillance for progression of disease.  The patient reports  pain in bilateral lower extremities.  However, he can determine how far he can  ambulate before pain begins.  Operative History:  2023-02-14 Left Transluminal placement of stent, open, sfa  2022-05-06 Right Ir pelvic angiogram with right ROSA ELENA and EIA stent placement  2022-05-06 Right Transluminal placement of iliac stent, percutaneous  2021-10-20 RT to LT fem fem bypass graft  2021-10-19 Angioplasty  2013-01-01 CABG x4  2010-01-01 Coronary angioplasty with stent placement  CABG  Risk Factors  The patient has history of HTN, Diabetes (IDDM), Hyperlipidemia, CKD, PAD, CAD  and previous smoking (quit >10yrs ago).  Clinical  Right Pressure:  149/ mm Hg, Left Pressure:  148/ mm Hg.     FINDINGS:     Unilateral                PSV (cm/s)    R Third, Fem-Fem Graft            54    Mid Third, Fem-Fem Graft          40    L Third, Fem-Fem Graft            52       Right                       Impression      PSV (cm/s)  EDV    Anastomosis, Fem-Fem Graft                         104         Common Femoral Artery                               56    0    Prox Profunda                                       44         Prox SFA                    50-75%                 182         Mid SFA                     Occluded                 0         Dist SFA                                            32         Proximal Pop                                        27         Distal Pop                                          28         Tibioperoneal                                       43         Dist Post Tibial                                    26         Prox. Ant.  Tibial           Not visualized                     Dist. Ant. Tibial                                   16         Dist Peroneal               Not visualized                        Left                        Impression  PSV (cm/s)    Anastomosis, Fem-Fem Graft                      67    Common Femoral Artery                           46    Prox Profunda                                   96    Prox SFA                    >75%               409    Mid SFA - Stent                                 81    Dist SFA - Stent                               158    Proximal Pop                                    54    Distal Pop                                      65    Tibioperoneal                                   52    Dist Post Tibial                                11    Prox. Ant. Tibial                               30    Dist. Ant. Tibial                               39    Dist Peroneal                                   32             CONCLUSION:  Impression:     Patent right femoral to left femoral artery bypass graft.     RIGHT LOWER LIMB:  There is a 50-75% stenosis in the ostial superficial femoral artery with and  occlusion of the proximal/mid superficial femoral artery with distal  reconstitution. Diffuse disease noted throughout the remaining portions of the  femoral-popliteal arteries.  Findings suggests tibioperoneal disease.  Ankle/Brachial index: 0.56 severe range, previous study 0.46  PVR/ PPG tracings are dampened.  Metatarsal pressure of 72mm Hg,  Prior:  46mmHg  Great toe pressure of 34mm Hg, below healing threshold for a diabetic, previous  study 13 mm Hg.     LEFT LOWER LIMB:  A >75% stenosis of the proximal superficial femoral artery. The mid to distal  superficial femoral artery stent appears patent without hemodynamic changes.  Diffuse disease noted throughout the remaining portions of the femoral-popliteal  arteries.  Findings suggests tibioperoneal disease.  Ankle/Brachial index:  0.58  within the  severe  range, previous  study 0.41.  PVR/ PPG tracings are dampened.  Metatarsal pressure of  99mm Hg Prior: 44 mmHg  Great toe pressure of 48 mm Hg, below healing threshold for a diabetic,  previous study 12 mm Hg.     Compared to previous study on 6/20/24, There are no significant changes.     SIGNATURE:  Electronically Signed by: STONEY ACUNA DO, RPVI on 2025-03-17 04:23:02 PM  VAS abdominal aorta/iliac duplex     [facility] REPORT  CLINICAL:  Indications:  Evaluate for progression of Aorto-Iliac occlusive disease s/p  revascularization.  Patient reports bilateral lower extremity pain.  Operative History:  2023-02-14 Left Transluminal placement of stent, open, sfa  2022-05-06 Right Ir pelvic angiogram with right ROSA ELENA and EIA stent placement  2022-05-06 Right Transluminal placement of iliac stent, percutaneous  2021-10-20 RT to LT fem fem bypass graft  2021-10-19 Angioplasty  2013-01-01 CABG x4  2010-01-01 Coronary angioplasty with stent placement  CABG  Risk Factors  The patient has history of HTN, Diabetes (IDDM), Hyperlipidemia, CKD, PAD, CAD  and previous smoking (quit >10yrs ago).     FINDINGS:     Unilateral             PSV (cm/s)  EDV (cm/s)  AP (cm)  TRV (cm)    Sup-Merle Ao                     51          10      1.9       1.9    Px Inf-Carson Ao                  47          20      2.1       1.8    Ds Inf-Carson Ao - Stent          44           7                       Celiac                        183          39                       Prox. SMA                     268          63                          Right             Impression  PSV (cm/s)  EDV (cm/s)   RAR    Prox ROSA ELENA - Stent  50-75%             203           0          Dist ROSA ELENA - Stent                     188          23          Prox. EIA         50-75%             415           0          Dist EIA - Stent                     188           0          Prox Renal                           186          23  3.64       Left               Impression      PSV (cm/s)  EDV (cm/s)   RAR    Prox ROSA ELENA - Stent                          46           3          Dist ROSA ELENA                                  75                      Prox. EIA         Not visualized                                  Dist EIA          Not visualized                                  Prox Renal        60 - 99%               260          42  5.08             CONCLUSION:     Impression  The abdominal aorta demonstrates diffuse arterial occlusive disease without  focal stenosis.  The aorta appears normal in caliber measuring 2.1 cm in its greatest diameter.  Prior: 2.2 cm  The distal aorta stent appears patent .  Elevated velocities in the right common iliac and external iliac arterial  stents suggesting 50-75% stenoses  The left common iliac artery stent appears is poorly visualized..  The left external iliac artery was not appreciated in this exam can not rule  out occlusion.  The celiac and superior mesenteric artery appear patent. However, low resistive  flow is noted in the superior mesenteric artery.  The right ostial renal artery appears patent.  Elevated velocities noted in the left ostial renal artery suggests > 60%  stenosis.     RIGHT LOWER LIMB:  Ankle/Brachial index: 0.56 severe range, previous study 0.46  PVR/ PPG tracings are dampened.  Metatarsal pressure of 72mm Hg,  Prior:  46mmHg  Great toe pressure of 34mm Hg, below healing threshold for a diabetic, previous  study 13 mm Hg.     LEFT LOWER LIMB:  Ankle/Brachial index:  0.58  within the severe  range, previous  study 0.41.  PVR/ PPG tracings are dampened.  Metatarsal pressure of  99mm Hg Prior: 44 mmHg  Great toe pressure of 48 mm Hg, below healing threshold for a diabetic,  previous study 12 mm Hg.              In comparison to the study of 6/12/24,  there  are no  significant changes.     SIGNATURE:  Electronically Signed by: STONEY ACUNA DO, RPVI on 2025-03-17 04:18:25 PM      LABS:  Lab data are reviewed and  "documented in HemOnc history.       Lab Results   Component Value Date    HGB 13.9 03/28/2025    HCT 44.8 03/28/2025    MCV 97 03/28/2025     (L) 03/28/2025    WBC 4.94 03/28/2025    NRBC 0 03/28/2025     Lab Results   Component Value Date    K 4.3 03/28/2025     03/28/2025    CO2 27 03/28/2025    BUN 33 (H) 03/28/2025    CREATININE 1.19 03/28/2025    GLUF 141 (H) 03/28/2025    CALCIUM 10.1 03/28/2025    CORRECTEDCA 10.2 (H) 11/15/2021    AST 16 03/28/2025    ALT 20 03/28/2025    ALKPHOS 95 03/28/2025    EGFR 56 03/28/2025       Lab Results   Component Value Date    IRON 26 (L) 10/02/2024    TIBC 208 (L) 10/02/2024    FERRITIN 224 10/02/2024    FERRITIN 21 (L) 04/18/2024    FERRITIN 22 (L) 02/14/2024    FERRITIN 58 09/20/2023    FERRITIN 37 06/29/2023    FERRITIN 7 (L) 05/18/2023    FERRITIN 26 11/28/2022    FERRITIN 7 (L) 08/29/2022    FERRITIN 37 04/20/2022    FERRITIN 17 02/07/2022    FERRITIN 13 01/05/2022    FERRITIN 10 11/15/2021    FERRITIN 28 03/11/2021    FERRITIN 12 11/03/2020       Lab Results   Component Value Date    GFPRTAOO81 632 02/14/2024    SBFBSBTH30 223 09/20/2023    DOMVLAOB55 389 11/15/2021    HZLZGTDZ78 681 05/29/2020       No results for input(s): \"WBC\", \"CREAT\", \"PLT\" in the last 72 hours.         By:  Mal Angeles, 4/14/2025, 1:44 PM                                    "

## 2025-04-14 ENCOUNTER — OFFICE VISIT (OUTPATIENT)
Dept: HEMATOLOGY ONCOLOGY | Facility: CLINIC | Age: 80
End: 2025-04-14
Payer: MEDICARE

## 2025-04-14 VITALS
DIASTOLIC BLOOD PRESSURE: 70 MMHG | RESPIRATION RATE: 16 BRPM | SYSTOLIC BLOOD PRESSURE: 138 MMHG | HEIGHT: 65 IN | HEART RATE: 55 BPM | TEMPERATURE: 96.9 F | OXYGEN SATURATION: 96 % | BODY MASS INDEX: 26.33 KG/M2 | WEIGHT: 158 LBS

## 2025-04-14 DIAGNOSIS — C22.0 HEPATOCELLULAR CARCINOMA (HCC): Primary | ICD-10-CM

## 2025-04-14 PROCEDURE — G2211 COMPLEX E/M VISIT ADD ON: HCPCS | Performed by: INTERNAL MEDICINE

## 2025-04-14 PROCEDURE — 99215 OFFICE O/P EST HI 40 MIN: CPT | Performed by: INTERNAL MEDICINE

## 2025-04-18 DIAGNOSIS — Z79.4 TYPE 2 DIABETES MELLITUS WITH DIABETIC PERIPHERAL ANGIOPATHY WITHOUT GANGRENE, WITH LONG-TERM CURRENT USE OF INSULIN (HCC): ICD-10-CM

## 2025-04-18 DIAGNOSIS — E11.51 TYPE 2 DIABETES MELLITUS WITH DIABETIC PERIPHERAL ANGIOPATHY WITHOUT GANGRENE, WITH LONG-TERM CURRENT USE OF INSULIN (HCC): ICD-10-CM

## 2025-04-22 ENCOUNTER — TELEPHONE (OUTPATIENT)
Age: 80
End: 2025-04-22

## 2025-04-22 NOTE — TELEPHONE ENCOUNTER
Please have doctor sign off and refax document to [employer]. Document found in Media on 3/6/25 was not signed by provider which they require.

## 2025-04-23 ENCOUNTER — DOCUMENTATION (OUTPATIENT)
Dept: ADMINISTRATIVE | Facility: OTHER | Age: 80
End: 2025-04-23

## 2025-04-23 NOTE — PROGRESS NOTES
04/23/25 10:59 AM    Annual Wellness Visit outreach is not required, an AWV was completed at the PCP office.    Thank you.  Kylah Upton MA  [facility]

## 2025-04-24 DIAGNOSIS — N40.1 BENIGN PROSTATIC HYPERPLASIA WITH URINARY HESITANCY: ICD-10-CM

## 2025-04-24 DIAGNOSIS — R39.11 BENIGN PROSTATIC HYPERPLASIA WITH URINARY HESITANCY: ICD-10-CM

## 2025-04-24 RX ORDER — TAMSULOSIN HYDROCHLORIDE 0.4 MG/1
0.4 CAPSULE ORAL
Qty: 90 CAPSULE | Refills: 1 | Status: SHIPPED | OUTPATIENT
Start: 2025-04-24

## 2025-04-25 ENCOUNTER — APPOINTMENT (OUTPATIENT)
Dept: LAB | Facility: CLINIC | Age: 80
End: 2025-04-25
Payer: MEDICARE

## 2025-04-25 DIAGNOSIS — C22.0 HEPATOCELLULAR CARCINOMA (HCC): ICD-10-CM

## 2025-04-25 LAB
ALBUMIN SERPL BCG-MCNC: 4.5 G/DL (ref 3.5–5)
ALP SERPL-CCNC: 96 U/L (ref 34–104)
ALT SERPL W P-5'-P-CCNC: 27 U/L (ref 7–52)
AMYLASE SERPL-CCNC: 44 IU/L (ref 29–103)
ANION GAP SERPL CALCULATED.3IONS-SCNC: 12 MMOL/L (ref 4–13)
AST SERPL W P-5'-P-CCNC: 20 U/L (ref 13–39)
BASOPHILS # BLD AUTO: 0.03 THOUSANDS/ÂΜL (ref 0–0.1)
BASOPHILS NFR BLD AUTO: 1 % (ref 0–1)
BILIRUB SERPL-MCNC: 1.16 MG/DL (ref 0.2–1)
BUN SERPL-MCNC: 28 MG/DL (ref 5–25)
CALCIUM SERPL-MCNC: 9.8 MG/DL (ref 8.4–10.2)
CHLORIDE SERPL-SCNC: 103 MMOL/L (ref 96–108)
CO2 SERPL-SCNC: 23 MMOL/L (ref 21–32)
CREAT SERPL-MCNC: 1.06 MG/DL (ref 0.6–1.3)
EOSINOPHIL # BLD AUTO: 0.26 THOUSAND/ÂΜL (ref 0–0.61)
EOSINOPHIL NFR BLD AUTO: 5 % (ref 0–6)
ERYTHROCYTE [DISTWIDTH] IN BLOOD BY AUTOMATED COUNT: 16.1 % (ref 11.6–15.1)
GFR SERPL CREATININE-BSD FRML MDRD: 65 ML/MIN/1.73SQ M
GLUCOSE SERPL-MCNC: 146 MG/DL (ref 65–140)
HCT VFR BLD AUTO: 42.5 % (ref 36.5–49.3)
HGB BLD-MCNC: 13.8 G/DL (ref 12–17)
IMM GRANULOCYTES # BLD AUTO: 0.02 THOUSAND/UL (ref 0–0.2)
IMM GRANULOCYTES NFR BLD AUTO: 0 % (ref 0–2)
LIPASE SERPL-CCNC: 29 U/L (ref 11–82)
LYMPHOCYTES # BLD AUTO: 0.64 THOUSANDS/ÂΜL (ref 0.6–4.47)
LYMPHOCYTES NFR BLD AUTO: 13 % (ref 14–44)
MCH RBC QN AUTO: 30.7 PG (ref 26.8–34.3)
MCHC RBC AUTO-ENTMCNC: 32.5 G/DL (ref 31.4–37.4)
MCV RBC AUTO: 95 FL (ref 82–98)
MONOCYTES # BLD AUTO: 0.47 THOUSAND/ÂΜL (ref 0.17–1.22)
MONOCYTES NFR BLD AUTO: 9 % (ref 4–12)
NEUTROPHILS # BLD AUTO: 3.61 THOUSANDS/ÂΜL (ref 1.85–7.62)
NEUTS SEG NFR BLD AUTO: 72 % (ref 43–75)
NRBC BLD AUTO-RTO: 0 /100 WBCS
PLATELET # BLD AUTO: 170 THOUSANDS/UL (ref 149–390)
PMV BLD AUTO: 10.3 FL (ref 8.9–12.7)
POTASSIUM SERPL-SCNC: 4 MMOL/L (ref 3.5–5.3)
PROT SERPL-MCNC: 7.5 G/DL (ref 6.4–8.4)
RBC # BLD AUTO: 4.49 MILLION/UL (ref 3.88–5.62)
SODIUM SERPL-SCNC: 138 MMOL/L (ref 135–147)
T4 FREE SERPL-MCNC: 0.78 NG/DL (ref 0.61–1.12)
TSH SERPL DL<=0.05 MIU/L-ACNC: 22.41 UIU/ML (ref 0.45–4.5)
WBC # BLD AUTO: 5.03 THOUSAND/UL (ref 4.31–10.16)

## 2025-04-25 PROCEDURE — 84439 ASSAY OF FREE THYROXINE: CPT

## 2025-04-25 PROCEDURE — 82150 ASSAY OF AMYLASE: CPT

## 2025-04-25 PROCEDURE — 83690 ASSAY OF LIPASE: CPT

## 2025-04-25 PROCEDURE — 84443 ASSAY THYROID STIM HORMONE: CPT

## 2025-04-25 PROCEDURE — 80053 COMPREHEN METABOLIC PANEL: CPT

## 2025-04-25 PROCEDURE — 36415 COLL VENOUS BLD VENIPUNCTURE: CPT

## 2025-04-25 PROCEDURE — 85025 COMPLETE CBC W/AUTO DIFF WBC: CPT

## 2025-04-29 ENCOUNTER — HOSPITAL ENCOUNTER (OUTPATIENT)
Dept: INFUSION CENTER | Facility: CLINIC | Age: 80
Discharge: HOME | End: 2025-04-29
Payer: MEDICARE

## 2025-04-29 VITALS
RESPIRATION RATE: 18 BRPM | BODY MASS INDEX: 25.31 KG/M2 | TEMPERATURE: 97 F | SYSTOLIC BLOOD PRESSURE: 164 MMHG | DIASTOLIC BLOOD PRESSURE: 71 MMHG | HEART RATE: 56 BPM | WEIGHT: 152.12 LBS

## 2025-04-29 DIAGNOSIS — C22.0 HEPATOCELLULAR CARCINOMA (HCC): Primary | ICD-10-CM

## 2025-04-29 PROCEDURE — 96413 CHEMO IV INFUSION 1 HR: CPT

## 2025-04-29 RX ORDER — SODIUM CHLORIDE 9 MG/ML
20 INJECTION, SOLUTION INTRAVENOUS ONCE
Status: COMPLETED | OUTPATIENT
Start: 2025-04-29 | End: 2025-04-29

## 2025-04-29 RX ADMIN — DURVALUMAB 1500 MG: 500 INJECTION, SOLUTION INTRAVENOUS at 10:46

## 2025-04-29 RX ADMIN — SODIUM CHLORIDE 20 ML/HR: 0.9 INJECTION, SOLUTION INTRAVENOUS at 10:30

## 2025-04-29 NOTE — PROGRESS NOTES
Pt presents for imfinzi. No new meds or concerns. Pt tolerated treatment without adverse reaction. Future appointments discussed, confirmed with patient for 5/27/25 1030. AVS declined.

## 2025-05-05 ENCOUNTER — OFFICE VISIT (OUTPATIENT)
Dept: GASTROENTEROLOGY | Facility: AMBULARY SURGERY CENTER | Age: 80
End: 2025-05-05
Payer: MEDICARE

## 2025-05-05 VITALS
HEIGHT: 65 IN | SYSTOLIC BLOOD PRESSURE: 142 MMHG | DIASTOLIC BLOOD PRESSURE: 70 MMHG | BODY MASS INDEX: 25.39 KG/M2 | HEART RATE: 58 BPM | WEIGHT: 152.4 LBS | OXYGEN SATURATION: 98 %

## 2025-05-05 DIAGNOSIS — R76.8 HEPATITIS B CORE ANTIBODY POSITIVE: ICD-10-CM

## 2025-05-05 DIAGNOSIS — Z11.59 ENCOUNTER FOR SCREENING FOR OTHER VIRAL DISEASES: ICD-10-CM

## 2025-05-05 DIAGNOSIS — C22.0 HEPATOCELLULAR CARCINOMA (HCC): ICD-10-CM

## 2025-05-05 DIAGNOSIS — K74.60 CIRRHOSIS OF LIVER WITHOUT ASCITES, UNSPECIFIED HEPATIC CIRRHOSIS TYPE (HCC): Primary | ICD-10-CM

## 2025-05-05 PROCEDURE — 99214 OFFICE O/P EST MOD 30 MIN: CPT | Performed by: FAMILY MEDICINE

## 2025-05-05 NOTE — PROGRESS NOTES
Name: Max Ozuna      : 1945      MRN: [id number]  Encounter Provider: Amada Brown PA-C  Encounter Date: 2025   Encounter department: [facility]  :  Assessment & Plan  Cirrhosis of liver without ascites, unspecified hepatic cirrhosis type (HCC)    Summary: Patient is a 80 y.o. 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 Current MELD- 3.0 (7).     Patient was hospitalized for acute onset right flank/RUQ abdominal pain and incidentally found to have a 5.4 x 4.8 x 5.5 cm solitary segment 6/7 mass and likely tumor thrombus in the left portal vein on cross-sectional imaging (LR-TIV). He was also seen to have cirrhotic morphology which has been demonstrated on imaging since 2018. Interestingly, he had normal liver synthetic function and preserved platelet count. His AFP was >21,000 c/f locally invasive HCC. CT chest without evidence of metastatic disease.      He is following with medical oncology and started on immunotherapy. His case was reviewed at [facility]'s liver tumor board and also recommended he be referred to IR to discuss Y90. He had a repeat CT C/A/P (2024) showing decrease in size of right hepatic lobe HCC and portal vein thrombus since 2024. No metastatic disease. AFP normalized prior to Y-90. He is now s/p Y-90 (2025) and continues systemic therapy.   We reviewed his most recent LFTs ( and ) overall stable.     Fortunately, his liver disease appears to remain well compensated. He is up-to-date with routine healthcare maintenance of cirrhosis. Additional management as below.      Ascites   - No radiographic or clinical evidence of ascites on exam today.      Esophageal Varices   - EGD (2025) with 1 small esophageal varix.   - Hesitant to start NSBB given HR of 58 bpm; Reconsider in follow-up.   - Repeat EGD x1 year; Next due 2026.      Hepatic Encephalopathy   - No hx of  clinical evidence of HE on exam today.   - Patient aware of signs/sxs's of HE and advised to promptly inform provider if experiencing such.     HCC Screening   - 5.4 x 4.8 x 5.5 cm segment 6/7 mass and tumor thrombus (LR-TIV).   - Following with medical oncology and receiving immunotherapy.   - Repeat CT C/A/P with decrease in size of right hepatic lobe HCC and portal vein thrombus since 9/24/2024. No metastatic disease. AFP normalized.   - S/p Y-90 (1/22/2025) and continues systemic therapy.  - Scheduled for repeat imaging on 6/26/2025.   - Continue following with medical oncology and IR.   - Continue imaging and AFP levels q3 moths.      Nutritional Status  - No sarcopenia noted on exam today.   - Encouraged high-protein diet in addition to a high-protein snack qHS to prevent prolonged fasting.      Transplant Candidacy   - Defer given advanced age.      CRC Screening  - Colonoscopy (9/2022) with mild sigmoid diverticulosis and internal hemorrhoids.   - No further screening colonoscopies necessary due to advanced age.     Orders:  •  CBC and differential; Future  •  Comprehensive metabolic panel; Future  •  Protime-INR; Future    Hepatocellular carcinoma (HCC)  Refer to A/P above.        Hepatitis B core antibody positive  Patient with a positive HBV core total antibody and surface antibody but negative HBV surface antigen likely reflective of a past self resolved HBV infection. Will monitor HBV sAg while on systemic therapy for HCC.     Orders:  •  Hepatitis B surface antigen; Future    Encounter for screening for other viral diseases    Orders:  •  Hepatitis B surface antigen; Future    Follow-up in 4 months or sooner if necessary.       History of Present Illness   HPI    Max Ozuna is a 80 y.o. male with PMH significant for DM2, HTN, HLD, CAD s/p CABG x4, PAD w/ stenting on clopidogrel, CKD3, history of DVT and ANG w/ GAVE who presents today for a follow-up regarding cirrhosis and HCC.     Interval history    - Last seen 11/4/2024.   - CT C/A/P (12/30/2024) showing decrease in size of right hepatic lobe HCC and portal vein thrombus since 9/24/2024.   - HCC s/p Y-90 (1/22/2025) and on systemic therapy with tremelimumab and durvalumab.   - Most recent LFTs (1/22 and 4/25) overall stable and AFP now within normal limits.   - Scheduled for repeat CT C/A/P for restaging on 6/26/2025.   - EGD (2/14/2025) notable for 1 small esophageal varix.    MELD 3.0: 7 at 1/22/2025  9:23 AM  MELD-Na: 8 at 1/22/2025  9:23 AM  Calculated from:  Serum Creatinine: 0.86 mg/dL (Using min of 1 mg/dL) at 1/22/2025  9:23 AM  Serum Sodium: 138 mmol/L (Using max of 137 mmol/L) at 1/22/2025  9:23 AM  Total Bilirubin: 1.09 mg/dL at 1/22/2025  9:23 AM  Serum Albumin: 4.3 g/dL (Using max of 3.5 g/dL) at 1/22/2025  9:23 AM  INR(ratio): 1.06 at 1/22/2025  9:23 AM  Age at listing (hypothetical): 79 years  Sex: Male at 1/22/2025  9:23 AM    Extended liver history  Max was hospitalized from 9/24- 9/26/2024 for acute onset right flank and abdominal pain. His serologies and UA were unremarkable but he had a CT A/P which showed nodular hepatic contour and thrombus involving the posterior right portal vein branches. He had a subsequent MRI which demonstrated a 5.4 x 4.8 x 5.5 cm solitary segment 6/7 mass and likely tumor thrombus of the left portal vein (LR-TIV). His AFP was 21,374.27 also suggestive of HCC. CT chest without evidence of metastatic disease.     He denies prior knowledge of cirrhosis but, per chrat review, he was sseen to have hepatic ndularity on a CT chest from 2018. He reports a longstanding history of fatty liver disease. He has had a full serologic evaluation which has been unremarkable for competing causes of liver disease aside from a positive HBV core total antibody suggestive of a past, self cleared HBV infection. Denies EtOH use.      He has been seen by medical oncology and started on durvalumab + tremelimumab for locally advanced  HCC. His case was also reviewed at [facility] liver tumor board and was recommended he also be referred to IR to discuss Y90. He is scheduled for a surveillance CT chest abdomen and pelvis 12 weeks after the initiation of his immunotherapy to assess response.       History obtained from: patient    Review of Systems   All other systems reviewed and are negative.    Pertinent Medical History     Medical History Reviewed by provider this encounter:  Tobacco  Allergies  Meds  Problems  Med Hx  Surg Hx  Fam Hx     .  Past Medical History   Past Medical History:   Diagnosis Date   • Coronary artery disease without angina pectoris 02/25/2020   • Diabetes mellitus (HCC)    • DVT (deep venous thrombosis) (HCC)    • GERD (gastroesophageal reflux disease)    • Hypertension    • Iron deficiency anemia    • Liver cancer (HCC)      Past Surgical History:   Procedure Laterality Date   • ANGIOPLASTY  10/19/2021    bilateral   • CARDIAC CATHETERIZATION  2013    calif    CABG  x4   • COLONOSCOPY     • COLONOSCOPY     • CORONARY ANGIOPLASTY WITH STENT PLACEMENT  01/01/2010   • CORONARY ARTERY BYPASS GRAFT     • FEMORAL ARTERY STENT     • FEMORAL BYPASS  10/20/2021   • IR LOWER EXTREMITY ANGIOGRAM  2/14/2023   • IR PELVIC ANGIOGRAM  5/6/2022   • IR Y-90 PRE-ANGIO/EMBO W/ LUNG SCAN  1/13/2025   • IR Y-90 RADIOEMBOLIZATION  1/22/2025   • UT SLCTV CATHJ 3RD+ ORD SLCTV ABDL PEL/LXTR BRNCH  5/6/2022    Procedure: ULTRASOUND-GUIDED LEFT BRACHIAL ARTERY PUNCTURE, AORTOGRAM, RIGHT COMMON ILIAC ARTERY ANGIOPLASTY WITH 8 X 40 MM BALLOON, ANGIOPLASTY OF RIGHT EXTERNAL AND COMMON ILIAC ARTERY STENOSIS WITH 6 X 150 MM DRUG-ELUTING BALLOON, STENTING OF RIGHT DISTAL EXTERNAL ILIAC ARTERY STENOSIS WITH 7 X 40 MM SELF EXPANDING STENT POST DILATED WITH A 6 MM BALLOON.;  Surgeon: Henry Pena MD;  [place]   • UT SLCTV CATHJ 3RD+ ORD SLCTV ABDL PEL/LXTR BRNCH Bilateral 2/14/2023    Procedure: CUTDOWN FEM-FEM BYPASS WITH PRIMARY CLOSURE ACCESS  SITE, BILATERAL RUN-OFF, LEFT FEM-POP 5X220 QUYEN AND 6X150 RODRI, ATTEMPT TO CROSS RIGHT SFA OCCLUSION;  Surgeon: Henry Pena MD;  Location: [facility];  Service: Vascular     Family History   Problem Relation Age of Onset   • No Known Problems Mother    • Lung cancer Father    • Liver cancer Brother       reports that he quit smoking about 21 years ago. His smoking use included cigarettes. He started smoking about 61 years ago. He has a 60 pack-year smoking history. He has been exposed to tobacco smoke. He has never used smokeless tobacco. He reports that he does not currently use alcohol. He reports that he does not use drugs.  Current Outpatient Medications   Medication Instructions   • acetaminophen (TYLENOL) 1,000 mg, Every 8 hours PRN   • atorvastatin (LIPITOR) 40 mg, Oral, Daily   • chlorhexidine (PERIDEX) 0.12 % solution 15 mL, Mouth/Throat, 2 times daily   • clopidogrel (PLAVIX) 75 mg, Oral, Daily   • Empagliflozin (JARDIANCE) 25 mg, Oral, Every morning, Also: take with LOTS of water   • ferrous sulfate 325 mg, Oral, 2 times daily with meals   • glipiZIDE (GLUCOTROL) 10 mg, Oral, 2 times daily before meals   • hydrocortisone 2.5 % cream Topical, 2 times daily PRN   • levothyroxine 50 mcg, Oral, Daily   • lisinopril-hydrochlorothiazide (PRINZIDE,ZESTORETIC) 20-12.5 MG per tablet 1 tablet, Oral, Daily   • metFORMIN (GLUCOPHAGE) 1,000 mg, Oral, 2 times daily with meals   • mupirocin (BACTROBAN) 2 % ointment Topical, 2 times daily, In the LEFT side of nose   • omeprazole (PRILOSEC) 20 mg, Oral, Daily before breakfast   • tamsulosin (FLOMAX) 0.4 mg, Oral, Daily with dinner   No Known Allergies   Current Outpatient Medications on File Prior to Visit   Medication Sig Dispense Refill   • acetaminophen (TYLENOL) 500 mg tablet Take 1,000 mg by mouth every 8 (eight) hours as needed     • atorvastatin (LIPITOR) 40 mg tablet Take 1 tablet (40 mg total) by mouth daily 100 tablet 3   • chlorhexidine (PERIDEX) 0.12 %  solution Apply 15 mL to the mouth or throat 2 (two) times a day 473 mL 3   • clopidogrel (PLAVIX) 75 mg tablet Take 1 tablet (75 mg total) by mouth daily 90 tablet 1   • Empagliflozin (Jardiance) 25 MG TABS Take 1 tablet (25 mg total) by mouth every morning Also: take with LOTS of water 90 tablet 3   • ferrous sulfate 325 (65 Fe) mg tablet Take 1 tablet (325 mg total) by mouth 2 (two) times a day with meals 60 tablet 6   • glipiZIDE (GLUCOTROL) 10 mg tablet TAKE 1 TABLET TWICE A DAY  BEFORE MEALS 180 tablet 1   • hydrocortisone 2.5 % cream Apply topically 2 (two) times a day as needed for rash 30 g 3   • levothyroxine 50 mcg tablet Take 1 tablet (50 mcg total) by mouth daily 30 tablet 2   • lisinopril-hydrochlorothiazide (PRINZIDE,ZESTORETIC) 20-12.5 MG per tablet Take 1 tablet by mouth daily 90 tablet 1   • metFORMIN (GLUCOPHAGE) 1000 MG tablet Take 1 tablet (1,000 mg total) by mouth 2 (two) times a day with meals 180 tablet 1   • mupirocin (BACTROBAN) 2 % ointment Apply topically 2 (two) times a day In the LEFT side of nose 22 g 3   • omeprazole (PriLOSEC) 20 mg delayed release capsule TAKE 1 CAPSULE BY MOUTH DAILY BEFORE BREAKFAST. 90 capsule 2   • tamsulosin (FLOMAX) 0.4 mg Take 1 capsule (0.4 mg total) by mouth daily with dinner 90 capsule 1     No current facility-administered medications on file prior to visit.      Social History     Tobacco Use   • Smoking status: Former     Current packs/day: 0.00     Average packs/day: 1.5 packs/day for 40.0 years (60.0 ttl pk-yrs)     Types: Cigarettes     Start date:      Quit date:      Years since quittin.3     Passive exposure: Past   • Smokeless tobacco: Never   • Tobacco comments:     quit --    Vaping Use   • Vaping status: Never Used   Substance and Sexual Activity   • Alcohol use: Not Currently     Alcohol/week: 0.0 standard drinks of alcohol   • Drug use: No   • Sexual activity: Not on file        Objective   /70 (BP Location: Left arm,  "Patient Position: Sitting, Cuff Size: Standard)   Pulse 58   Ht 5' 5\" (1.651 m)   Wt 69.1 kg (152 lb 6.4 oz)   SpO2 98%   BMI 25.36 kg/m²      Physical Exam  Vitals and nursing note reviewed.   Constitutional:       General: He is not in acute distress.     Appearance: Normal appearance. He is well-developed. He is not ill-appearing or toxic-appearing.   HENT:      Head: Normocephalic and atraumatic.   Eyes:      General: No scleral icterus.     Conjunctiva/sclera: Conjunctivae normal.   Cardiovascular:      Heart sounds: Normal heart sounds.   Pulmonary:      Effort: Pulmonary effort is normal. No respiratory distress.      Breath sounds: Normal breath sounds.   Abdominal:      General: Bowel sounds are normal. There is no distension.      Palpations: Abdomen is soft. There is no mass.      Tenderness: There is no abdominal tenderness.   Musculoskeletal:      Right lower leg: No edema.      Left lower leg: No edema.   Skin:     General: Skin is warm and dry.      Coloration: Skin is not jaundiced.      Findings: No bruising or rash.   Neurological:      Mental Status: He is alert and oriented to person, place, and time. Mental status is at baseline.   Psychiatric:         Mood and Affect: Mood normal.         Behavior: Behavior normal.           "

## 2025-05-05 NOTE — ASSESSMENT & PLAN NOTE
Summary: Patient is a 80 y.o. male with compensated [facility] with tumor [facility]. Current MELD- 3.0 (7).     Patient was hospitalized for acute onset right flank/RUQ abdominal pain and incidentally found to have a 5.4 x 4.8 x 5.5 cm solitary segment 6/7 mass and likely tumor thrombus in the left portal vein on cross-sectional imaging (LR-TIV). He was also seen to have cirrhotic morphology which has been demonstrated on imaging since 2018. Interestingly, he had normal liver synthetic function and preserved platelet count. His AFP was >21,000 c/f locally invasive HCC. CT chest without evidence of metastatic disease.      He is following with medical oncology and started on immunotherapy. His case was reviewed at [facility]'s liver tumor board and also recommended he be referred to IR to discuss Y90. He had a repeat CT C/A/P (12/30/2024) showing decrease in size of right hepatic lobe HCC and portal vein thrombus since 9/24/2024. No metastatic disease. AFP normalized prior to Y-90. He is now s/p Y-90 (1/22/2025) and continues systemic therapy.   We reviewed his most recent LFTs (1/22 and 4/25) overall stable.     Fortunately, his liver disease appears to remain well compensated. He is up-to-date with routine healthcare maintenance of cirrhosis. Additional management as below.      Ascites   - No radiographic or clinical evidence of ascites on exam today.      Esophageal Varices   - EGD (2/14/2025) with 1 small esophageal varix.   - Hesitant to start NSBB given HR of 58 bpm; Reconsider in follow-up.   - Repeat EGD x1 year; Next due 2/2026.      Hepatic Encephalopathy   - No hx of clinical evidence of HE on exam today.   - Patient aware of signs/sxs's of HE and advised to promptly inform provider if experiencing such.     HCC Screening   - 5.4 x 4.8 x 5.5 cm segment 6/7 mass and tumor thrombus (LR-TIV).   - Following with medical oncology and receiving immunotherapy.   - Repeat CT C/A/P  with decrease in size of right hepatic lobe HCC and portal vein thrombus since 9/24/2024. No metastatic disease. AFP normalized.   - S/p Y-90 (1/22/2025) and continues systemic therapy.  - Scheduled for repeat imaging on 6/26/2025.   - Continue following with medical oncology and IR.   - Continue imaging and AFP levels q3 moths.      Nutritional Status  - No sarcopenia noted on exam today.   - Encouraged high-protein diet in addition to a high-protein snack qHS to prevent prolonged fasting.      Transplant Candidacy   - Defer given advanced age.      CRC Screening  - Colonoscopy (9/2022) with mild sigmoid diverticulosis and internal hemorrhoids.   - No further screening colonoscopies necessary due to advanced age.     Orders:  •  CBC and differential; Future  •  Comprehensive metabolic panel; Future  •  Protime-INR; Future

## 2025-05-12 NOTE — ASSESSMENT & PLAN NOTE
This is a 80 y.o. c PMHx notable for cecal AVM/GAVE, PE, DM, DVT, HTN, HLP, CAD, being seen in consultation for chronic ANG and locally advanced HCC; on systemic therapy  Orders:    Infusion Calculated Appointment Request; Future    CBC and differential; Future    Comprehensive metabolic panel; Future    TSH, 3rd generation with Free T4 reflex; Future    Amylase; Future    Lipase; Future    AFP tumor marker; Future

## 2025-05-12 NOTE — PROGRESS NOTES
"Name: Max Ozuna      : 1945      MRN: [id number]  Encounter Provider: Mal Angeles MD  Encounter Date: 2025   Encounter department: [facility]  :  Assessment & Plan  Hepatocellular carcinoma (HCC)      This is a 80 y.o. c PMHx notable for cecal AVM/GAVE, PE, DM, DVT, HTN, HLP, CAD, being seen in consultation for chronic ANG and locally advanced HCC; on systemic therapy  Orders:    Infusion Calculated Appointment Request; Future    CBC and differential; Future    Comprehensive metabolic panel; Future    TSH, 3rd generation with Free T4 reflex; Future    Amylase; Future    Lipase; Future    AFP tumor marker; Future        Return in about 11 weeks (around 2025) for Office Visit.    History of Present Illness         Objective   /68 (BP Location: Left arm, Patient Position: Sitting, Cuff Size: Adult)   Pulse 56   Temp 97.6 °F (36.4 °C) (Temporal)   Resp 16   Ht 5' 5\" (1.651 m)   Wt 69.2 kg (152 lb 8 oz)   SpO2 98%   BMI 25.38 kg/m²         Referral Physician: No ref. provider found    Primary Care Physician:  DO Maddi Quinonez: Daughter     Taylor: wife    Sallie: daughter in law      This is a 80 y.o. c PMHx notable for cecal AVM/GAVE, PE, DM, DVT, HTN, HLP, CAD, being seen in consultation for chronic ANG and locally advanced HCC; on systemic therapy     Iron Deficiency anemia: improved s/p IV iron  Low iron levels can cause anemia (low red blood cells). Low iron levels can also make people feel poorly, even before anemia starts. Iron is used to make red blood cells. If blood is lost from the body, if iron can't be absorbed from food, or if something removes iron (pregnancy) the iron can be low and then anemia happens.    Hx Cecal AVM. GI thought maybe GAVE  2022: Hgb 9.5, WBC 6.35k, MCV 91, plt 235k, retic 3.65%, FOBT + (), ferritin 7, iron 30, Fe Sat 9%, TIBC 346   EGD/C-scope: internal hemorrhoids    Capsule " endoscopy: no active bleeding  6/27/2023 EGD with single balloon enteroscopy revealed no bleeding    IrAE drug induced rash: on hydrocortisone 2.5% to good effect    Iron deficiency is very common. Low iron can cause fatigue or tiredness, the desire to eat ice or non-food items like corn starch or dry pasta noodles, restless legs, weak fingernails, cracks at the corner of the mouth.     Common causes of iron deficiency include inadequate diet (uncommon, usually vegetarians), gastric bypass surgery (very common), pregnancy (very common), periods (most common cause in younger women), blood loss (usually from the stomach or intestines), and decreased iron absorption from the intestines from gastritis, H pylori, acid blocking medicines or celiac disease. Even a normal menstrual period can cause iron deficiency. In 10% of patients a clear cause of low iron is not found. 14% of women will have iron deficiency just from their menstrual period.       Iron deficiency is treated with pills as a first step. For some people the iron pills do not work, cause severe side effects, or take too long to work: for these people IV iron can be given. You may only require IV iron very rarely, for example only when pregnant, or from 1-2 times a year based on your rate of need. It takes 4 weeks for IV iron to improve the anemia to maximum potential. If your fatigue is not from the low iron, getting iron treatment would not help the fatigue.    9/24/2024 CT Abd/pelv w/c: Nodular liver contour, indicated of cirrhosis. Portal vein thrombosis involving posterior branch of the right hepatic lobe  MRI Abd w/wo c: Approximately 5.4 x 4.8 x 5.5 cm (length X depth X width) solitary segment 6/7 mass  9/25/2024: AFP 21,374  9/25/2024: CT Chest w/c: Emphysema with areas of fibrosis/scarring which appears stable compared to study of 11/15/2019  10/11/2024 Liver tumor board interventional and radiology do think he would be a good candidate for Y90  treatment concurrently with immunotherapy  12/30/2024 CT CAP w/c:  excellent UT  Decrease in size segment VI/VII right hepatic lobe mass since 9/24/2024, currently measuring 3.1 x 2.6 x 4.5 cm (61/105, 4/108), previously 5.4 x 4.8 x 5.5 cm. Again seen is tumor thrombus in the posterior branch of right hepatic lobe, decreased from prior exam. No metastatic disease in the chest, abdomen or pelvis  1/22/2025: IR Y-90 radio embolization              Discussion of decision making    I personally reviewed the following lab results, the image studies, pathology, other specialty/physicians consult notes and recommendations, and outside medical records from [facility]/other institutions. I had a lengthy discussion with the patient and shared the work-up findings. We discussed the diagnosis and management plan as below. I spent 41 minutes reviewing the records (labs, clinician notes, outside records, medical history, ordering medicine/tests/procedures, interpreting the imaging/labs previously done) and coordination of care as well as direct time with the patient today, of which greater than 50% of the time was spent in counseling and coordination of care with the patient/family.    Plan/Labs  Cont PO iron MWF   Cont to f/u GI  Refilled Hydrocortisone 2.5% cream for his drug rash previously  Restaging CT CAP w/c scheduled 6/26/2025  Cont Levothyroxine 50 mcg for irAE   Liver directed therapy done on 1/22/2025  Infusion chairs ordered for Durvalumab 1500 mg q4 weeks and C9 is coming up: continuing until the CT and then reassess  AFP added to C10           Follow Up: q4 weeks scheduled thru 7/3/2025 more*    All questions were answered to the patient's satisfaction during this encounter. The patient knows the contact information for our office and knows to reach out for any relevant concerns related to this encounter. They are to call for any temperature 100.4 or higher, new symptoms including but not restricted to shaking chills,  decreased appetite, nausea, vomiting, diarrhea, increased fatigue, shortness of breath or chest pain, confusion, and not feeling the strength to come to the clinic. For all other listed problems and medical diagnosis in their chart - they are managed by PCP and/or other specialists, which the patient acknowledges. Thank you very much for your consultation and making us a part of this patient's care. We are continuing to follow closely with you. Please do not hesitate to reach out to me with any additional questions or concerns.    Mal Angeles MD  Hematology & Medical Oncology Staff Physician             Disclaimer: This document was prepared using [employer] technology. If a word or phrase is confusing, or does not make sense, this is likely due to recognition error which was not discovered during this clinician's review. If you believe an error has occurred, please contact me through [employer] service line for dequan?cation.      HEMATOLOGICAL HISTORY OF PRESENT ILLNESS      Clotting History None   Bleeding History GAVE related bleeding   Cancer History None   Family Cancer History Father (cancer)   H/O Blood/Plt Transfusion PRBC years ago   Tobacco/etoh/drug abuse 2 PPDx 44 years, quit 2004, no etoh abuse or rec drug use       Cancer Screening history n/a   Occupation [employer]      8/29/2022: Hgb 9.5, WBC 6.35k, MCV 91, plt 235k, retic 3.65%, FOBT + (8/31), ferritin 7, iron 30, Fe Sat 9%, TIBC 346  9/7 EGD/C-scope: internal hemorrhoids  10/3/2022-10/9/2022: 5 iV Venofer 200mg administered   11/28/2022: ferritin 26, iron 67, Fe Sat 21%, TIBC 322, Hgb 12.7 (now normal)  5/2/2023: Discussed with the patient the results of his iron studies which show worsening iron deficiency as well as recurrent anemia.  I ordered 600 mg of IV Venofer  5/18/2023: ferritin 7, iron 36, Fe Sat 10%, TIBC 364, Hgb 11.6  6/29/2023: ferritin 37, iron 52, Fe Sat 17%, TIBC 307, Hgb 12.7, MCV 92  9/20/2023:   ferritin 58, iron 65, Fe Sat 33%, TIBC 230, Hgb 13.5, MCV 95  2/14/2024: iron 42, ferritin 22, Fe Sat 14%, TIBC 304: Vit B12 632, Hgb 13.4, plt 161k, WBC 6.21k  4/18/2024: iron 59, Fe Sat 18%, TIBC 329, ferritin 21, Hgb 13.4 (2/2024)  4/29/2024: planned IV Venofer 200mg x2  10/2/2024: Ferritin: 224, Hgb 13.6, Fe Sat 13%, TIBC 208, iron 26, ferritin 224 AOCI  11/8/2024: Lipase: 380 (4x ULN)  12/27/2024: AFP 0.62  3/31/2025: increased Levo to 50 mcg  3/28/2025: AFP 1.72    SUBJECTIVE  (INTERVAL HISTORY)      Interval events:   Mild fatigue. No issues or concerns. Appetite is good. No new rash. Diarrhea every other day, one episode.    I have reviewed the relevant past medical, surgical, social and family history. I have also reviewed allergies and medications for this patient.    Review of Systems    Baseline weight: 152-154 lbs    Denies weight loss, F/C, N/V, SOB, CP, LH, HA.    He has had fatigue and low energy since 6/2022. He was chewing on ice all day since 3/2022.     A 10-point review of system was performed, pertinent positive and negative were detailed as above. Otherwise, the 10-point review of system was negative.      Past Medical History:   Diagnosis Date    Coronary artery disease without angina pectoris 02/25/2020    Diabetes mellitus (HCC)     DVT (deep venous thrombosis) (HCC)     GERD (gastroesophageal reflux disease)     Hypertension     Iron deficiency anemia     Liver cancer (HCC)        Past Surgical History:   Procedure Laterality Date    ANGIOPLASTY  10/19/2021    bilateral    CARDIAC CATHETERIZATION  2013    calif    CABG  x4    COLONOSCOPY      COLONOSCOPY      CORONARY ANGIOPLASTY WITH STENT PLACEMENT  01/01/2010    CORONARY ARTERY BYPASS GRAFT      FEMORAL ARTERY STENT      FEMORAL BYPASS  10/20/2021    IR LOWER EXTREMITY ANGIOGRAM  2/14/2023    IR PELVIC ANGIOGRAM  5/6/2022    IR Y-90 PRE-ANGIO/EMBO W/ LUNG SCAN  1/13/2025    IR Y-90 RADIOEMBOLIZATION  1/22/2025    TX SLCTV CATHJ 3RD+  ORD SLCTV ABDL PEL/LXTR BRNC  2022    Procedure: ULTRASOUND-GUIDED LEFT BRACHIAL ARTERY PUNCTURE, AORTOGRAM, RIGHT COMMON ILIAC ARTERY ANGIOPLASTY WITH 8 X 40 MM BALLOON, ANGIOPLASTY OF RIGHT EXTERNAL AND COMMON ILIAC ARTERY STENOSIS WITH 6 X 150 MM DRUG-ELUTING BALLOON, STENTING OF RIGHT DISTAL EXTERNAL ILIAC ARTERY STENOSIS WITH 7 X 40 MM SELF EXPANDING STENT POST DILATED WITH A 6 MM BALLOON.;  Surgeon: Henry Pena MD;  [place]    AL SLCTV CATHJ 3RD+ ORD SLCTV ABDL PEL/LXTR BRNC Bilateral 2023    Procedure: CUTDOWN FEM-FEM BYPASS WITH PRIMARY CLOSURE ACCESS SITE, BILATERAL RUN-OFF, LEFT FEM-POP 5X220 QUYEN AND 6X150 RODRI, ATTEMPT TO CROSS RIGHT SFA OCCLUSION;  Surgeon: Henry Pena MD;  Location: [facility] OR;  Service: Vascular       Family History   Problem Relation Age of Onset    No Known Problems Mother     Lung cancer Father     Liver cancer Brother        Social History     Socioeconomic History    Marital status: /Civil Union     Spouse name: Not on file    Number of children: Not on file    Years of education: Not on file    Highest education level: Not on file   Occupational History    Not on file   Tobacco Use    Smoking status: Former     Current packs/day: 0.00     Average packs/day: 1.5 packs/day for 40.0 years (60.0 ttl pk-yrs)     Types: Cigarettes     Start date:      Quit date:      Years since quittin.4     Passive exposure: Past    Smokeless tobacco: Never    Tobacco comments:     quit --    Vaping Use    Vaping status: Never Used   Substance and Sexual Activity    Alcohol use: Not Currently     Alcohol/week: 0.0 standard drinks of alcohol    Drug use: No    Sexual activity: Not on file   Other Topics Concern    Not on file   Social History Narrative    · Most recent tobacco use screenin2020      Social Drivers of Health     Financial Resource Strain: Low Risk  (2023)    Overall Financial Resource Strain (CARDIA)     Difficulty of  Paying Living Expenses: Not hard at all   Food Insecurity: Food Insecurity Present (2/20/2025)    Nursing - Inadequate Food Risk Classification     Worried About Running Out of Food in the Last Year: Sometimes true     Ran Out of Food in the Last Year: Sometimes true     Ran Out of Food in the Last Year: Not on file   Transportation Needs: No Transportation Needs (2/20/2025)    PRAPARE - Transportation     Lack of Transportation (Medical): No     Lack of Transportation (Non-Medical): No   Physical Activity: Not on file   Stress: Not on file   Social Connections: Not on file   Intimate Partner Violence: Not on file   Housing Stability: Low Risk  (2/20/2025)    Housing Stability Vital Sign     Unable to Pay for Housing in the Last Year: No     Number of Times Moved in the Last Year: 0     Homeless in the Last Year: No       No Known Allergies    Current Outpatient Medications   Medication Sig Dispense Refill    acetaminophen (TYLENOL) 500 mg tablet Take 1,000 mg by mouth every 8 (eight) hours as needed      atorvastatin (LIPITOR) 40 mg tablet Take 1 tablet (40 mg total) by mouth daily 100 tablet 3    chlorhexidine (PERIDEX) 0.12 % solution Apply 15 mL to the mouth or throat 2 (two) times a day 473 mL 3    clopidogrel (PLAVIX) 75 mg tablet Take 1 tablet (75 mg total) by mouth daily 90 tablet 1    Empagliflozin (Jardiance) 25 MG TABS Take 1 tablet (25 mg total) by mouth every morning Also: take with LOTS of water 90 tablet 3    ferrous sulfate 325 (65 Fe) mg tablet Take 1 tablet (325 mg total) by mouth 2 (two) times a day with meals 60 tablet 6    glipiZIDE (GLUCOTROL) 10 mg tablet TAKE 1 TABLET TWICE A DAY  BEFORE MEALS 180 tablet 1    hydrocortisone 2.5 % cream Apply topically 2 (two) times a day as needed for rash 30 g 3    levothyroxine 50 mcg tablet Take 1 tablet (50 mcg total) by mouth daily 30 tablet 2    lisinopril-hydrochlorothiazide (PRINZIDE,ZESTORETIC) 20-12.5 MG per tablet Take 1 tablet by mouth daily 90  tablet 1    metFORMIN (GLUCOPHAGE) 1000 MG tablet Take 1 tablet (1,000 mg total) by mouth 2 (two) times a day with meals 180 tablet 1    mupirocin (BACTROBAN) 2 % ointment Apply topically 2 (two) times a day In the LEFT side of nose 22 g 3    omeprazole (PriLOSEC) 20 mg delayed release capsule TAKE 1 CAPSULE BY MOUTH DAILY BEFORE BREAKFAST. 90 capsule 2    tamsulosin (FLOMAX) 0.4 mg Take 1 capsule (0.4 mg total) by mouth daily with dinner 90 capsule 1     No current facility-administered medications for this visit.       (Not in a hospital admission)        Objective:     24 Hour Vitals Assessment:     Vitals:    05/21/25 1229   BP: 130/68   Pulse: 56   Resp: 16   Temp: 97.6 °F (36.4 °C)   SpO2: 98%         Weight last visit 158 lbs  Weight today: 152 lbs      PHYSICIAN EXAM:    General: Appearance: alert, cooperative, no distress.  HEENT: Normocephalic, atraumatic. No scleral icterus. conjunctivae clear. EOMI.  Chest: No tenderness to palpation. No open wound noted.  Lungs: Clear to auscultation bilaterally, Respirations unlabored.  Cardiac: Bradycardia, +S1and S2  Abdomen: Soft, non-tender, non-distended. Bowel sounds are normal.  Extremities:  No edema, cyanosis, clubbing.  Skin: Skin color, turgor are normal. Small R anterior leg rashes  Neurologic: Awake, Alert, and oriented, no gross focal deficits noted b/l.       DATA REVIEW:    Pathology Result:    Final Diagnosis   Date Value Ref Range Status   06/27/2023   Final    A. Duodenum, :  - Unremarkable duodenal mucosa  - No villous atrophy or increased intraepithelial lymphocytes seen     B. Stomach, :  - Gastric oxyntic and antral type mucosa with minimal or mild chronic inflammation  - No sharad shaped bacteria seen on H&E stained tissue section  - Negative for intestinal metaplasia and dysplasia          09/07/2022   Final    A. Duodenum,  biopsy:  - Duodenal mucosa with focal acute inflammation and reactive changes  - No intraepithelial lymphocytosis and no  villous blunting.  - No epithelial dysplasia and no evidence of malignancy.    B.  Stomach, biopsy:  - Gastric antral and oxyntic mucosa with no significant pathologic alteration.  - Negative for intestinal metaplasia, dysplasia or carcinoma.  - No Helicobacter pylori is identified on H&E stained slide.              Image Results:   Image result are reviewed and documented in Hematology/Oncology history. I personally reviewed these images.    VAS ARTERIAL DUPLEX- LOWER LIMB BILATERAL     [facility] REPORT  CLINICAL:  Indications:  Surveillance for progression of disease.  The patient reports  pain in bilateral lower extremities.  However, he can determine how far he can  ambulate before pain begins.  Operative History:  2023-02-14 Left Transluminal placement of stent, open, sfa  2022-05-06 Right Ir pelvic angiogram with right ROSA ELENA and EIA stent placement  2022-05-06 Right Transluminal placement of iliac stent, percutaneous  2021-10-20 RT to LT fem fem bypass graft  2021-10-19 Angioplasty  2013-01-01 CABG x4  2010-01-01 Coronary angioplasty with stent placement  CABG  Risk Factors  The patient has history of HTN, Diabetes (IDDM), Hyperlipidemia, CKD, PAD, CAD  and previous smoking (quit >10yrs ago).  Clinical  Right Pressure:  149/ mm Hg, Left Pressure:  148/ mm Hg.     FINDINGS:     Unilateral                PSV (cm/s)    R Third, Fem-Fem Graft            54    Mid Third, Fem-Fem Graft          40    L Third, Fem-Fem Graft            52       Right                       Impression      PSV (cm/s)  EDV    Anastomosis, Fem-Fem Graft                         104         Common Femoral Artery                               56    0    Prox Profunda                                       44         Prox SFA                    50-75%                 182         Mid SFA                     Occluded                 0         Dist SFA                                            32         Proximal Pop                                         27         Distal Pop                                          28         Tibioperoneal                                       43         Dist Post Tibial                                    26         Prox. Ant. Tibial           Not visualized                     Dist. Ant. Tibial                                   16         Dist Peroneal               Not visualized                        Left                        Impression  PSV (cm/s)    Anastomosis, Fem-Fem Graft                      67    Common Femoral Artery                           46    Prox Profunda                                   96    Prox SFA                    >75%               409    Mid SFA - Stent                                 81    Dist SFA - Stent                               158    Proximal Pop                                    54    Distal Pop                                      65    Tibioperoneal                                   52    Dist Post Tibial                                11    Prox. Ant. Tibial                               30    Dist. Ant. Tibial                               39    Dist Peroneal                                   32             CONCLUSION:  Impression:     Patent right femoral to left femoral artery bypass graft.     RIGHT LOWER LIMB:  There is a 50-75% stenosis in the ostial superficial femoral artery with and  occlusion of the proximal/mid superficial femoral artery with distal  reconstitution. Diffuse disease noted throughout the remaining portions of the  femoral-popliteal arteries.  Findings suggests tibioperoneal disease.  Ankle/Brachial index: 0.56 severe range, previous study 0.46  PVR/ PPG tracings are dampened.  Metatarsal pressure of 72mm Hg,  Prior:  46mmHg  Great toe pressure of 34mm Hg, below healing threshold for a diabetic, previous  study 13 mm Hg.     LEFT LOWER LIMB:  A >75% stenosis of the proximal superficial femoral artery. The mid to distal  superficial femoral  artery stent appears patent without hemodynamic changes.  Diffuse disease noted throughout the remaining portions of the femoral-popliteal  arteries.  Findings suggests tibioperoneal disease.  Ankle/Brachial index:  0.58  within the severe  range, previous  study 0.41.  PVR/ PPG tracings are dampened.  Metatarsal pressure of  99mm Hg Prior: 44 mmHg  Great toe pressure of 48 mm Hg, below healing threshold for a diabetic,  previous study 12 mm Hg.     Compared to previous study on 6/20/24, There are no significant changes.     SIGNATURE:  Electronically Signed by: STONEY ACUNA DO, RPVI on 2025-03-17 04:23:02 PM  VAS abdominal aorta/iliac duplex     [facility] REPORT  CLINICAL:  Indications:  Evaluate for progression of Aorto-Iliac occlusive disease s/p  revascularization.  Patient reports bilateral lower extremity pain.  Operative History:  2023-02-14 Left Transluminal placement of stent, open, sfa  2022-05-06 Right Ir pelvic angiogram with right ROSA ELENA and EIA stent placement  2022-05-06 Right Transluminal placement of iliac stent, percutaneous  2021-10-20 RT to LT fem fem bypass graft  2021-10-19 Angioplasty  2013-01-01 CABG x4  2010-01-01 Coronary angioplasty with stent placement  CABG  Risk Factors  The patient has history of HTN, Diabetes (IDDM), Hyperlipidemia, CKD, PAD, CAD  and previous smoking (quit >10yrs ago).     FINDINGS:     Unilateral             PSV (cm/s)  EDV (cm/s)  AP (cm)  TRV (cm)    Sup-Merle Ao                     51          10      1.9       1.9    Px Inf-Carson Ao                  47          20      2.1       1.8    Ds Inf-Carson Ao - Stent          44           7                       Celiac                        183          39                       Prox. SMA                     268          63                          Right             Impression  PSV (cm/s)  EDV (cm/s)   RAR    Prox ROSA ELENA - Stent  50-75%             203           0          Dist ROSA ELENA - Stent                     188           23          Prox. EIA         50-75%             415           0          Dist EIA - Stent                     188           0          Prox Renal                           186          23  3.64       Left              Impression      PSV (cm/s)  EDV (cm/s)   RAR    Prox ROSA ELENA - Stent                          46           3          Dist ROSA ELENA                                  75                      Prox. EIA         Not visualized                                  Dist EIA          Not visualized                                  Prox Renal        60 - 99%               260          42  5.08             CONCLUSION:     Impression  The abdominal aorta demonstrates diffuse arterial occlusive disease without  focal stenosis.  The aorta appears normal in caliber measuring 2.1 cm in its greatest diameter.  Prior: 2.2 cm  The distal aorta stent appears patent .  Elevated velocities in the right common iliac and external iliac arterial  stents suggesting 50-75% stenoses  The left common iliac artery stent appears is poorly visualized..  The left external iliac artery was not appreciated in this exam can not rule  out occlusion.  The celiac and superior mesenteric artery appear patent. However, low resistive  flow is noted in the superior mesenteric artery.  The right ostial renal artery appears patent.  Elevated velocities noted in the left ostial renal artery suggests > 60%  stenosis.     RIGHT LOWER LIMB:  Ankle/Brachial index: 0.56 severe range, previous study 0.46  PVR/ PPG tracings are dampened.  Metatarsal pressure of 72mm Hg,  Prior:  46mmHg  Great toe pressure of 34mm Hg, below healing threshold for a diabetic, previous  study 13 mm Hg.     LEFT LOWER LIMB:  Ankle/Brachial index:  0.58  within the severe  range, previous  study 0.41.  PVR/ PPG tracings are dampened.  Metatarsal pressure of  99mm Hg Prior: 44 mmHg  Great toe pressure of 48 mm Hg, below healing threshold for a diabetic,  previous study 12 mm Hg.       "        In comparison to the study of 6/12/24,  there  are no  significant changes.     SIGNATURE:  Electronically Signed by: STONEY ACUNA DO, RPVI on 2025-03-17 04:18:25 PM      LABS:  Lab data [facility].       Lab Results   Component Value Date    HGB 13.8 04/25/2025    HCT 42.5 04/25/2025    MCV 95 04/25/2025     04/25/2025    WBC 5.03 04/25/2025    NRBC 0 04/25/2025     Lab Results   Component Value Date    K 4.0 04/25/2025     04/25/2025    CO2 23 04/25/2025    BUN 28 (H) 04/25/2025    CREATININE 1.06 04/25/2025    GLUF 141 (H) 03/28/2025    CALCIUM 9.8 04/25/2025    CORRECTEDCA 10.2 (H) 11/15/2021    AST 20 04/25/2025    ALT 27 04/25/2025    ALKPHOS 96 04/25/2025    EGFR 65 04/25/2025       Lab Results   Component Value Date    IRON 26 (L) 10/02/2024    TIBC 208 (L) 10/02/2024    FERRITIN 224 10/02/2024    FERRITIN 21 (L) 04/18/2024    FERRITIN 22 (L) 02/14/2024    FERRITIN 58 09/20/2023    FERRITIN 37 06/29/2023    FERRITIN 7 (L) 05/18/2023    FERRITIN 26 11/28/2022    FERRITIN 7 (L) 08/29/2022    FERRITIN 37 04/20/2022    FERRITIN 17 02/07/2022    FERRITIN 13 01/05/2022    FERRITIN 10 11/15/2021    FERRITIN 28 03/11/2021    FERRITIN 12 11/03/2020       Lab Results   Component Value Date    MCZOMIRQ29 632 02/14/2024    AKHAQLDR68 223 09/20/2023    MLMNHXMD56 389 11/15/2021    VOQEEDRQ12 681 05/29/2020       No results for input(s): \"WBC\", \"CREAT\", \"PLT\" in the last 72 hours.         By:  Mal Angeles, 5/21/2025, 12:40 PM                                    "

## 2025-05-21 ENCOUNTER — OFFICE VISIT (OUTPATIENT)
Dept: HEMATOLOGY ONCOLOGY | Facility: CLINIC | Age: 80
End: 2025-05-21
Payer: MEDICARE

## 2025-05-21 ENCOUNTER — TELEPHONE (OUTPATIENT)
Dept: HEMATOLOGY ONCOLOGY | Facility: CLINIC | Age: 80
End: 2025-05-21

## 2025-05-21 VITALS
OXYGEN SATURATION: 98 % | SYSTOLIC BLOOD PRESSURE: 130 MMHG | BODY MASS INDEX: 25.41 KG/M2 | RESPIRATION RATE: 16 BRPM | HEIGHT: 65 IN | DIASTOLIC BLOOD PRESSURE: 68 MMHG | HEART RATE: 56 BPM | WEIGHT: 152.5 LBS | TEMPERATURE: 97.6 F

## 2025-05-21 DIAGNOSIS — C22.0 HEPATOCELLULAR CARCINOMA (HCC): Primary | ICD-10-CM

## 2025-05-21 PROCEDURE — G2211 COMPLEX E/M VISIT ADD ON: HCPCS | Performed by: INTERNAL MEDICINE

## 2025-05-21 PROCEDURE — 99215 OFFICE O/P EST HI 40 MIN: CPT | Performed by: INTERNAL MEDICINE

## 2025-05-21 RX ORDER — SODIUM CHLORIDE 9 MG/ML
20 INJECTION, SOLUTION INTRAVENOUS ONCE
OUTPATIENT
Start: 2025-07-22

## 2025-05-23 ENCOUNTER — APPOINTMENT (OUTPATIENT)
Dept: LAB | Facility: CLINIC | Age: 80
End: 2025-05-23
Payer: MEDICARE

## 2025-05-23 DIAGNOSIS — C22.0 HEPATOCELLULAR CARCINOMA (HCC): ICD-10-CM

## 2025-05-23 LAB
ALBUMIN SERPL BCG-MCNC: 4.6 G/DL (ref 3.5–5)
ALP SERPL-CCNC: 82 U/L (ref 34–104)
ALT SERPL W P-5'-P-CCNC: 17 U/L (ref 7–52)
AMYLASE SERPL-CCNC: 44 IU/L (ref 29–103)
ANION GAP SERPL CALCULATED.3IONS-SCNC: 11 MMOL/L (ref 4–13)
AST SERPL W P-5'-P-CCNC: 15 U/L (ref 13–39)
BASOPHILS # BLD AUTO: 0.03 THOUSANDS/ÂΜL (ref 0–0.1)
BASOPHILS NFR BLD AUTO: 1 % (ref 0–1)
BILIRUB SERPL-MCNC: 0.93 MG/DL (ref 0.2–1)
BUN SERPL-MCNC: 33 MG/DL (ref 5–25)
CALCIUM SERPL-MCNC: 9.9 MG/DL (ref 8.4–10.2)
CHLORIDE SERPL-SCNC: 103 MMOL/L (ref 96–108)
CO2 SERPL-SCNC: 26 MMOL/L (ref 21–32)
CREAT SERPL-MCNC: 1.12 MG/DL (ref 0.6–1.3)
EOSINOPHIL # BLD AUTO: 0.32 THOUSAND/ÂΜL (ref 0–0.61)
EOSINOPHIL NFR BLD AUTO: 5 % (ref 0–6)
ERYTHROCYTE [DISTWIDTH] IN BLOOD BY AUTOMATED COUNT: 15.6 % (ref 11.6–15.1)
GFR SERPL CREATININE-BSD FRML MDRD: 60 ML/MIN/1.73SQ M
GLUCOSE P FAST SERPL-MCNC: 108 MG/DL (ref 65–99)
HCT VFR BLD AUTO: 45.6 % (ref 36.5–49.3)
HGB BLD-MCNC: 14.2 G/DL (ref 12–17)
IMM GRANULOCYTES # BLD AUTO: 0.04 THOUSAND/UL (ref 0–0.2)
IMM GRANULOCYTES NFR BLD AUTO: 1 % (ref 0–2)
LIPASE SERPL-CCNC: 30 U/L (ref 11–82)
LYMPHOCYTES # BLD AUTO: 0.74 THOUSANDS/ÂΜL (ref 0.6–4.47)
LYMPHOCYTES NFR BLD AUTO: 12 % (ref 14–44)
MCH RBC QN AUTO: 30.3 PG (ref 26.8–34.3)
MCHC RBC AUTO-ENTMCNC: 31.1 G/DL (ref 31.4–37.4)
MCV RBC AUTO: 97 FL (ref 82–98)
MONOCYTES # BLD AUTO: 0.5 THOUSAND/ÂΜL (ref 0.17–1.22)
MONOCYTES NFR BLD AUTO: 8 % (ref 4–12)
NEUTROPHILS # BLD AUTO: 4.48 THOUSANDS/ÂΜL (ref 1.85–7.62)
NEUTS SEG NFR BLD AUTO: 73 % (ref 43–75)
NRBC BLD AUTO-RTO: 0 /100 WBCS
PLATELET # BLD AUTO: 152 THOUSANDS/UL (ref 149–390)
PMV BLD AUTO: 10.2 FL (ref 8.9–12.7)
POTASSIUM SERPL-SCNC: 4 MMOL/L (ref 3.5–5.3)
PROT SERPL-MCNC: 7.8 G/DL (ref 6.4–8.4)
RBC # BLD AUTO: 4.68 MILLION/UL (ref 3.88–5.62)
SODIUM SERPL-SCNC: 140 MMOL/L (ref 135–147)
T4 FREE SERPL-MCNC: 0.63 NG/DL (ref 0.61–1.12)
TSH SERPL DL<=0.05 MIU/L-ACNC: 34.02 UIU/ML (ref 0.45–4.5)
WBC # BLD AUTO: 6.11 THOUSAND/UL (ref 4.31–10.16)

## 2025-05-23 PROCEDURE — 85025 COMPLETE CBC W/AUTO DIFF WBC: CPT

## 2025-05-23 PROCEDURE — 84443 ASSAY THYROID STIM HORMONE: CPT

## 2025-05-23 PROCEDURE — 36415 COLL VENOUS BLD VENIPUNCTURE: CPT

## 2025-05-23 PROCEDURE — 83690 ASSAY OF LIPASE: CPT

## 2025-05-23 PROCEDURE — 80053 COMPREHEN METABOLIC PANEL: CPT

## 2025-05-23 PROCEDURE — 84439 ASSAY OF FREE THYROXINE: CPT

## 2025-05-23 PROCEDURE — 82150 ASSAY OF AMYLASE: CPT

## 2025-05-27 ENCOUNTER — HOSPITAL ENCOUNTER (OUTPATIENT)
Dept: INFUSION CENTER | Facility: CLINIC | Age: 80
Discharge: HOME | End: 2025-05-27
Payer: MEDICARE

## 2025-05-27 VITALS
RESPIRATION RATE: 18 BRPM | TEMPERATURE: 97.7 F | HEART RATE: 53 BPM | DIASTOLIC BLOOD PRESSURE: 53 MMHG | SYSTOLIC BLOOD PRESSURE: 145 MMHG

## 2025-05-27 DIAGNOSIS — E03.2 DRUG-INDUCED HYPOTHYROIDISM: ICD-10-CM

## 2025-05-27 DIAGNOSIS — C22.0 HEPATOCELLULAR CARCINOMA (HCC): Primary | ICD-10-CM

## 2025-05-27 PROCEDURE — 96413 CHEMO IV INFUSION 1 HR: CPT

## 2025-05-27 RX ORDER — LEVOTHYROXINE SODIUM 75 UG/1
75 TABLET ORAL DAILY
Qty: 30 TABLET | Refills: 2 | Status: SHIPPED | OUTPATIENT
Start: 2025-05-27

## 2025-05-27 RX ORDER — SODIUM CHLORIDE 9 MG/ML
20 INJECTION, SOLUTION INTRAVENOUS ONCE
Status: COMPLETED | OUTPATIENT
Start: 2025-05-27 | End: 2025-05-27

## 2025-05-27 RX ADMIN — SODIUM CHLORIDE 20 ML/HR: 0.9 INJECTION, SOLUTION INTRAVENOUS at 11:10

## 2025-05-27 RX ADMIN — DURVALUMAB 1500 MG: 500 INJECTION, SOLUTION INTRAVENOUS at 11:28

## 2025-05-27 NOTE — PROGRESS NOTES
Max Ozuna  tolerated treatment well with no complications. Max did report some intermittent diarrhea, so education on medication, nutrition, & hydration reinforced to his verbalized understanding.    Max Sulma is aware of future appt on Tuesday Jun 24, 2025 10:30 AM.     AVS declined by Max Ozuna.

## 2025-05-29 DIAGNOSIS — I73.9 PERIPHERAL VASCULAR DISEASE (HCC): ICD-10-CM

## 2025-05-29 DIAGNOSIS — E11.51 TYPE 2 DIABETES MELLITUS WITH DIABETIC PERIPHERAL ANGIOPATHY WITHOUT GANGRENE, WITH LONG-TERM CURRENT USE OF INSULIN (HCC): ICD-10-CM

## 2025-05-29 DIAGNOSIS — Z79.4 TYPE 2 DIABETES MELLITUS WITH DIABETIC PERIPHERAL ANGIOPATHY WITHOUT GANGRENE, WITH LONG-TERM CURRENT USE OF INSULIN (HCC): ICD-10-CM

## 2025-05-29 NOTE — TELEPHONE ENCOUNTER
Reason for call:   [x] Refill   [] Prior Auth  [] Other:     Office:   [x] PCP/Provider -   [] Specialty/Provider -     Medication:   glipiZIDE (GLUCOTROL) 10 mg/TAKE 1 TABLET TWICE A DAY  BEFORE MEALS     clopidogrel (PLAVIX) 75 mg/Take 1 tablet (75 mg total) by mouth daily    Quantity:     Pharmacy: [facility]/pharmacy #[id number] - [place], [place] - [address] [facility]   Does the patient have enough for 3 days?   [x] Yes   [] No - Send as HP to POD    Mail Away Pharmacy   Does the patient have enough for 10 days?   [] Yes   [] No - Send as HP to POD

## 2025-05-30 RX ORDER — GLIPIZIDE 10 MG/1
10 TABLET ORAL
Qty: 180 TABLET | Refills: 1 | Status: SHIPPED | OUTPATIENT
Start: 2025-05-30

## 2025-05-30 RX ORDER — CLOPIDOGREL BISULFATE 75 MG/1
75 TABLET ORAL DAILY
Qty: 90 TABLET | Refills: 1 | Status: SHIPPED | OUTPATIENT
Start: 2025-05-30

## 2025-06-04 DIAGNOSIS — K14.0 GLOSSITIS: ICD-10-CM

## 2025-06-04 RX ORDER — CHLORHEXIDINE GLUCONATE ORAL RINSE 1.2 MG/ML
15 SOLUTION DENTAL 2 TIMES DAILY
Qty: 473 ML | Refills: 2 | Status: SHIPPED | OUTPATIENT
Start: 2025-06-04 | End: 2025-06-04 | Stop reason: SDUPTHER

## 2025-06-04 NOTE — TELEPHONE ENCOUNTER
Reason for call:   [x] Refill   [] Prior Auth  [] Other:     Office:   [x] PCP/Provider - KADE Martel   [] Specialty/Provider -     Medication: chlorhexidine (PERIDEX) 0.12 %     Dose/Frequency: Apply 15 mL to the mouth or throat 2 (two) times a day,     Quantity: 473ml    Pharmacy: [facility]   Does the patient have enough for 3 days?   [] Yes   [x] No - Send as HP to POD    Mail Away Pharmacy   Does the patient have enough for 10 days?   [] Yes   [] No - Send as HP to POD

## 2025-06-04 NOTE — TELEPHONE ENCOUNTER
[facility] pharmacy calling regarding directions on chlorhexadine script. Directions state apply to mouth or throat but should say rinse. Pharmacy wanting to confirm its okay to change. Please call them with confirmation or send updated script.

## 2025-06-05 RX ORDER — CHLORHEXIDINE GLUCONATE ORAL RINSE 1.2 MG/ML
SOLUTION DENTAL
Qty: 473 ML | Refills: 2 | Status: SHIPPED | OUTPATIENT
Start: 2025-06-05

## 2025-06-18 ENCOUNTER — APPOINTMENT (EMERGENCY)
Dept: CT IMAGING | Facility: HOSPITAL | Age: 80
End: 2025-06-18
Payer: MEDICARE

## 2025-06-18 ENCOUNTER — HOSPITAL ENCOUNTER (EMERGENCY)
Facility: HOSPITAL | Age: 80
Discharge: HOME | End: 2025-06-19
Attending: EMERGENCY MEDICINE
Payer: MEDICARE

## 2025-06-18 VITALS
TEMPERATURE: 98.3 F | OXYGEN SATURATION: 97 % | RESPIRATION RATE: 18 BRPM | HEART RATE: 60 BPM | DIASTOLIC BLOOD PRESSURE: 57 MMHG | SYSTOLIC BLOOD PRESSURE: 135 MMHG

## 2025-06-18 DIAGNOSIS — R10.9 RIGHT FLANK PAIN: Primary | ICD-10-CM

## 2025-06-18 DIAGNOSIS — K80.20 GALLSTONE: ICD-10-CM

## 2025-06-18 DIAGNOSIS — J90 SMALL PLEURAL EFFUSION: ICD-10-CM

## 2025-06-18 DIAGNOSIS — M54.9 MUSCULOSKELETAL BACK PAIN: ICD-10-CM

## 2025-06-18 DIAGNOSIS — C22.0 HEPATOCELLULAR CARCINOMA (HCC): ICD-10-CM

## 2025-06-18 LAB
ALBUMIN SERPL BCG-MCNC: 4.6 G/DL (ref 3.5–5)
ALP SERPL-CCNC: 76 U/L (ref 34–104)
ALT SERPL W P-5'-P-CCNC: 18 U/L (ref 7–52)
ANION GAP SERPL CALCULATED.3IONS-SCNC: 11 MMOL/L (ref 4–13)
AST SERPL W P-5'-P-CCNC: 19 U/L (ref 13–39)
BACTERIA UR QL AUTO: ABNORMAL /HPF
BASOPHILS # BLD AUTO: 0.03 THOUSANDS/ÂΜL (ref 0–0.1)
BASOPHILS NFR BLD AUTO: 0 % (ref 0–1)
BILIRUB SERPL-MCNC: 1.54 MG/DL (ref 0.2–1)
BILIRUB UR QL STRIP: NEGATIVE
BUN SERPL-MCNC: 31 MG/DL (ref 5–25)
CALCIUM SERPL-MCNC: 9.7 MG/DL (ref 8.4–10.2)
CHLORIDE SERPL-SCNC: 103 MMOL/L (ref 96–108)
CLARITY UR: CLEAR
CO2 SERPL-SCNC: 23 MMOL/L (ref 21–32)
COLOR UR: ABNORMAL
CREAT SERPL-MCNC: 1.29 MG/DL (ref 0.6–1.3)
EOSINOPHIL # BLD AUTO: 0.23 THOUSAND/ÂΜL (ref 0–0.61)
EOSINOPHIL NFR BLD AUTO: 3 % (ref 0–6)
ERYTHROCYTE [DISTWIDTH] IN BLOOD BY AUTOMATED COUNT: 16.1 % (ref 11.6–15.1)
GFR SERPL CREATININE-BSD FRML MDRD: 51 ML/MIN/1.73SQ M
GLUCOSE SERPL-MCNC: 177 MG/DL (ref 65–140)
GLUCOSE UR STRIP-MCNC: ABNORMAL MG/DL
HCT VFR BLD AUTO: 39.5 % (ref 36.5–49.3)
HGB BLD-MCNC: 12.9 G/DL (ref 12–17)
HGB UR QL STRIP.AUTO: NEGATIVE
HYALINE CASTS #/AREA URNS LPF: ABNORMAL /LPF
IMM GRANULOCYTES # BLD AUTO: 0.02 THOUSAND/UL (ref 0–0.2)
IMM GRANULOCYTES NFR BLD AUTO: 0 % (ref 0–2)
KETONES UR STRIP-MCNC: NEGATIVE MG/DL
LEUKOCYTE ESTERASE UR QL STRIP: ABNORMAL
LYMPHOCYTES # BLD AUTO: 0.77 THOUSANDS/ÂΜL (ref 0.6–4.47)
LYMPHOCYTES NFR BLD AUTO: 11 % (ref 14–44)
MCH RBC QN AUTO: 30.7 PG (ref 26.8–34.3)
MCHC RBC AUTO-ENTMCNC: 32.7 G/DL (ref 31.4–37.4)
MCV RBC AUTO: 94 FL (ref 82–98)
MONOCYTES # BLD AUTO: 0.56 THOUSAND/ÂΜL (ref 0.17–1.22)
MONOCYTES NFR BLD AUTO: 8 % (ref 4–12)
NEUTROPHILS # BLD AUTO: 5.18 THOUSANDS/ÂΜL (ref 1.85–7.62)
NEUTS SEG NFR BLD AUTO: 78 % (ref 43–75)
NITRITE UR QL STRIP: NEGATIVE
NON-SQ EPI CELLS URNS QL MICRO: ABNORMAL /HPF
NRBC BLD AUTO-RTO: 0 /100 WBCS
PH UR STRIP.AUTO: 5 [PH]
PLATELET # BLD AUTO: 187 THOUSANDS/UL (ref 149–390)
PMV BLD AUTO: 9.7 FL (ref 8.9–12.7)
POTASSIUM SERPL-SCNC: 4.3 MMOL/L (ref 3.5–5.3)
PROT SERPL-MCNC: 7.7 G/DL (ref 6.4–8.4)
PROT UR STRIP-MCNC: NEGATIVE MG/DL
RBC # BLD AUTO: 4.2 MILLION/UL (ref 3.88–5.62)
RBC #/AREA URNS AUTO: ABNORMAL /HPF
SODIUM SERPL-SCNC: 137 MMOL/L (ref 135–147)
SP GR UR STRIP.AUTO: 1.02 (ref 1–1.03)
UROBILINOGEN UR STRIP-ACNC: <2 MG/DL
WBC # BLD AUTO: 6.79 THOUSAND/UL (ref 4.31–10.16)
WBC #/AREA URNS AUTO: ABNORMAL /HPF

## 2025-06-18 PROCEDURE — 74177 CT ABD & PELVIS W/CONTRAST: CPT

## 2025-06-18 PROCEDURE — 85025 COMPLETE CBC W/AUTO DIFF WBC: CPT

## 2025-06-18 PROCEDURE — 81001 URINALYSIS AUTO W/SCOPE: CPT

## 2025-06-18 PROCEDURE — 80053 COMPREHEN METABOLIC PANEL: CPT

## 2025-06-18 PROCEDURE — 96365 THER/PROPH/DIAG IV INF INIT: CPT

## 2025-06-18 PROCEDURE — 99285 EMERGENCY DEPT VISIT HI MDM: CPT

## 2025-06-18 PROCEDURE — 36415 COLL VENOUS BLD VENIPUNCTURE: CPT

## 2025-06-18 PROCEDURE — 99284 EMERGENCY DEPT VISIT MOD MDM: CPT

## 2025-06-18 PROCEDURE — 96375 TX/PRO/DX INJ NEW DRUG ADDON: CPT

## 2025-06-18 PROCEDURE — 96361 HYDRATE IV INFUSION ADD-ON: CPT

## 2025-06-18 RX ORDER — ACETAMINOPHEN 500 MG
500 TABLET ORAL EVERY 6 HOURS PRN
Qty: 30 TABLET | Refills: 0 | Status: SHIPPED | OUTPATIENT
Start: 2025-06-18

## 2025-06-18 RX ORDER — LIDOCAINE 50 MG/G
1 PATCH TOPICAL EVERY 24 HOURS
Qty: 15 PATCH | Refills: 0 | Status: SHIPPED | OUTPATIENT
Start: 2025-06-18

## 2025-06-18 RX ORDER — IBUPROFEN 600 MG/1
600 TABLET, FILM COATED ORAL EVERY 6 HOURS PRN
Qty: 30 TABLET | Refills: 0 | Status: SHIPPED | OUTPATIENT
Start: 2025-06-18

## 2025-06-18 RX ORDER — KETOROLAC TROMETHAMINE 30 MG/ML
15 INJECTION, SOLUTION INTRAMUSCULAR; INTRAVENOUS ONCE
Status: COMPLETED | OUTPATIENT
Start: 2025-06-18 | End: 2025-06-18

## 2025-06-18 RX ORDER — LIDOCAINE 50 MG/G
1 PATCH TOPICAL ONCE
Status: DISCONTINUED | OUTPATIENT
Start: 2025-06-18 | End: 2025-06-19 | Stop reason: HOSPADM

## 2025-06-18 RX ORDER — METHOCARBAMOL 500 MG/1
500 TABLET, FILM COATED ORAL 2 TIMES DAILY
Qty: 20 TABLET | Refills: 0 | Status: SHIPPED | OUTPATIENT
Start: 2025-06-18

## 2025-06-18 RX ADMIN — IOHEXOL 100 ML: 350 INJECTION, SOLUTION INTRAVENOUS at 21:51

## 2025-06-18 RX ADMIN — SODIUM CHLORIDE 1000 ML: 0.9 INJECTION, SOLUTION INTRAVENOUS at 20:58

## 2025-06-18 RX ADMIN — Medication 500 MG: at 22:03

## 2025-06-18 RX ADMIN — KETOROLAC TROMETHAMINE 15 MG: 30 INJECTION, SOLUTION INTRAMUSCULAR; INTRAVENOUS at 20:59

## 2025-06-18 RX ADMIN — LIDOCAINE 1 PATCH: 50 PATCH CUTANEOUS at 21:01

## 2025-06-19 ENCOUNTER — APPOINTMENT (OUTPATIENT)
Dept: LAB | Facility: CLINIC | Age: 80
End: 2025-06-19
Payer: MEDICARE

## 2025-06-19 ENCOUNTER — TELEPHONE (OUTPATIENT)
Dept: PHYSICAL THERAPY | Facility: OTHER | Age: 80
End: 2025-06-19

## 2025-06-19 ENCOUNTER — TELEPHONE (OUTPATIENT)
Age: 80
End: 2025-06-19

## 2025-06-19 ENCOUNTER — TELEPHONE (OUTPATIENT)
Dept: HEMATOLOGY ONCOLOGY | Facility: CLINIC | Age: 80
End: 2025-06-19

## 2025-06-19 ENCOUNTER — TELEPHONE (OUTPATIENT)
Dept: FAMILY MEDICINE CLINIC | Facility: CLINIC | Age: 80
End: 2025-06-19

## 2025-06-19 DIAGNOSIS — C22.0 HEPATOCELLULAR CARCINOMA (HCC): ICD-10-CM

## 2025-06-19 DIAGNOSIS — C22.0 HEPATOCELLULAR CARCINOMA (HCC): Primary | ICD-10-CM

## 2025-06-19 LAB
AFP-TM SERPL-MCNC: 0.7 NG/ML (ref 0–9)
ALBUMIN SERPL BCG-MCNC: 4 G/DL (ref 3.5–5)
ALP SERPL-CCNC: 79 U/L (ref 34–104)
ALT SERPL W P-5'-P-CCNC: 29 U/L (ref 7–52)
AMYLASE SERPL-CCNC: 41 IU/L (ref 29–103)
ANION GAP SERPL CALCULATED.3IONS-SCNC: 12 MMOL/L (ref 4–13)
AST SERPL W P-5'-P-CCNC: 27 U/L (ref 13–39)
BASOPHILS # BLD AUTO: 0.02 THOUSANDS/ÂΜL (ref 0–0.1)
BASOPHILS NFR BLD AUTO: 1 % (ref 0–1)
BILIRUB SERPL-MCNC: 0.98 MG/DL (ref 0.2–1)
BUN SERPL-MCNC: 30 MG/DL (ref 5–25)
CALCIUM SERPL-MCNC: 9 MG/DL (ref 8.4–10.2)
CHLORIDE SERPL-SCNC: 103 MMOL/L (ref 96–108)
CO2 SERPL-SCNC: 21 MMOL/L (ref 21–32)
CREAT SERPL-MCNC: 1.25 MG/DL (ref 0.6–1.3)
EOSINOPHIL # BLD AUTO: 0.18 THOUSAND/ÂΜL (ref 0–0.61)
EOSINOPHIL NFR BLD AUTO: 4 % (ref 0–6)
ERYTHROCYTE [DISTWIDTH] IN BLOOD BY AUTOMATED COUNT: 16.1 % (ref 11.6–15.1)
GFR SERPL CREATININE-BSD FRML MDRD: 53 ML/MIN/1.73SQ M
GLUCOSE P FAST SERPL-MCNC: 154 MG/DL (ref 65–99)
HCT VFR BLD AUTO: 38.8 % (ref 36.5–49.3)
HGB BLD-MCNC: 12.3 G/DL (ref 12–17)
IMM GRANULOCYTES # BLD AUTO: 0.02 THOUSAND/UL (ref 0–0.2)
IMM GRANULOCYTES NFR BLD AUTO: 1 % (ref 0–2)
LIPASE SERPL-CCNC: 29 U/L (ref 11–82)
LYMPHOCYTES # BLD AUTO: 0.52 THOUSANDS/ÂΜL (ref 0.6–4.47)
LYMPHOCYTES NFR BLD AUTO: 12 % (ref 14–44)
MCH RBC QN AUTO: 30.7 PG (ref 26.8–34.3)
MCHC RBC AUTO-ENTMCNC: 31.7 G/DL (ref 31.4–37.4)
MCV RBC AUTO: 97 FL (ref 82–98)
MONOCYTES # BLD AUTO: 0.33 THOUSAND/ÂΜL (ref 0.17–1.22)
MONOCYTES NFR BLD AUTO: 8 % (ref 4–12)
NEUTROPHILS # BLD AUTO: 3.19 THOUSANDS/ÂΜL (ref 1.85–7.62)
NEUTS SEG NFR BLD AUTO: 74 % (ref 43–75)
NRBC BLD AUTO-RTO: 0 /100 WBCS
PLATELET # BLD AUTO: 148 THOUSANDS/UL (ref 149–390)
PMV BLD AUTO: 10.2 FL (ref 8.9–12.7)
POTASSIUM SERPL-SCNC: 4.4 MMOL/L (ref 3.5–5.3)
PROT SERPL-MCNC: 6.7 G/DL (ref 6.4–8.4)
RBC # BLD AUTO: 4.01 MILLION/UL (ref 3.88–5.62)
SODIUM SERPL-SCNC: 136 MMOL/L (ref 135–147)
T4 FREE SERPL-MCNC: 0.89 NG/DL (ref 0.61–1.12)
TSH SERPL DL<=0.05 MIU/L-ACNC: 14.46 UIU/ML (ref 0.45–4.5)
WBC # BLD AUTO: 4.26 THOUSAND/UL (ref 4.31–10.16)

## 2025-06-19 PROCEDURE — 82105 ALPHA-FETOPROTEIN SERUM: CPT

## 2025-06-19 PROCEDURE — 80053 COMPREHEN METABOLIC PANEL: CPT

## 2025-06-19 PROCEDURE — 84443 ASSAY THYROID STIM HORMONE: CPT

## 2025-06-19 PROCEDURE — 84439 ASSAY OF FREE THYROXINE: CPT

## 2025-06-19 PROCEDURE — 85025 COMPLETE CBC W/AUTO DIFF WBC: CPT

## 2025-06-19 PROCEDURE — 36415 COLL VENOUS BLD VENIPUNCTURE: CPT

## 2025-06-19 PROCEDURE — 82150 ASSAY OF AMYLASE: CPT

## 2025-06-19 PROCEDURE — 83690 ASSAY OF LIPASE: CPT

## 2025-06-19 RX ORDER — SODIUM CHLORIDE 9 MG/ML
20 INJECTION, SOLUTION INTRAVENOUS ONCE
OUTPATIENT
Start: 2025-08-19

## 2025-06-19 NOTE — TELEPHONE ENCOUNTER
6/19/2025: I discussed the CT Abd results and discussed changing the scan to a CT Chest w/o c only. Pt questions answered. Awaiting AFP    Mal Angeles MD

## 2025-06-19 NOTE — TELEPHONE ENCOUNTER
Patient called in and would like to speak with Dr Angeles regarding the CT that he had yesterday & what he should do next.

## 2025-06-19 NOTE — DISCHARGE INSTRUCTIONS
Take medication as prescribed  Follow-up with comprehensive pain program in the outpatient setting  Return Emergency Department for increasing fever, body aches, chills, worsening abdominal pain, intractable nausea and vomiting, blood in the vomit or stool, worsening urinary symptoms of blood in the urine, increased frequency or urgency, or pain with urination, worsening numbness or tingling of the bilateral lower extremities, or urinary or bowel incontinence.

## 2025-06-19 NOTE — ED PROVIDER NOTES
ED Disposition       None          Assessment & Plan       Medical Decision Making  80-year-old male presents the ED for evaluation of approximately 2 days of right flank pain.  Patient denies any other acute concerns or complaints at this time.  He denies any associated urinary symptoms, denies any loss of bowel or bladder function, denies fevers, denies weakness or loss of sensation in any extremity.  Discussed lab results with patient.  Care transferred to Jian Ch PA-C, final disposition pending CT abdomen and pelvis.    Amount and/or Complexity of Data Reviewed  Labs: ordered. Decision-making details documented in ED Course.  Radiology: ordered.    Risk  Prescription drug management.        ED Course as of 06/18/25 2159 Wed Jun 18, 2025 2158 Total Bilirubin(!): 1.54  Baseline 0.9-1.16 per chart review.       Medications   lidocaine (LIDODERM) 5 % patch 1 patch (1 patch Topical Medication Applied 6/18/25 2101)   acetaminophen (Ofirmev) IVPB 500 mg (has no administration in time range)   sodium chloride 0.9 % bolus 1,000 mL (1,000 mL Intravenous New Bag 6/18/25 2058)   ketorolac (TORADOL) injection 15 mg (15 mg Intravenous Given 6/18/25 2059)   iohexol (OMNIPAQUE) 350 MG/ML injection (MULTI-DOSE) 100 mL (100 mL Intravenous Given 6/18/25 2151)       ED Risk Strat Scores                    No data recorded                            History of Present Illness       Chief Complaint   Patient presents with    Flank Pain     Pt reports 10/10 R sided flank pain x2 days. Denies N/V, or urinary symptoms. PCP told him to come in for concerns of kidney stone       Past Medical History[1]   Past Surgical History[2]   Family History[3]   Social History[4]   E-Cigarette/Vaping    E-Cigarette Use Never User       E-Cigarette/Vaping Substances    Nicotine No     THC No     CBD No     Flavoring No     Other No     Unknown No       I have reviewed and agree with the history as documented.     This is an 80-year-old  male with medical history significant for DM2, CKD stage III, CAD, GERD, hepatocellular carcinoma currently receiving chemotherapy who presents to the ED for evaluation of right flank pain.  Patient reports symptoms have been present approximately 2 days, symptoms will wax and wane in severity, states symptoms have been worsening today.  Patient reports the pain is nonradiating, describes the pain as a sharp sensation.  Patient spoke with his PCP earlier today, PCP advised him to come to the ED for further evaluation and management due to possible kidney stone.  Patient denies any associated urinary symptoms, denies any groin or testicular pain or discomfort, denies any dysuria or hematuria.  Patient denies any recent fevers, chills, headaches, lightheadedness, chest pain, SOB, nausea, vomiting, or diarrhea.  He denies any IV drug use, loss of bowel or bladder function, weakness or loss of sensation in any extremity.        Flank Pain  Associated symptoms: no chest pain, no chills, no cough, no diarrhea, no dysuria, no fever, no hematuria, no nausea, no shortness of breath and no vomiting        Review of Systems   Constitutional:  Negative for chills and fever.   Eyes:  Negative for photophobia and visual disturbance.   Respiratory:  Negative for cough and shortness of breath.    Cardiovascular:  Negative for chest pain and palpitations.   Gastrointestinal:  Negative for abdominal pain, diarrhea, nausea and vomiting.   Genitourinary:  Positive for flank pain (Right flank pain). Negative for difficulty urinating, dysuria and hematuria.   Musculoskeletal:  Negative for gait problem, neck pain and neck stiffness.   Skin:  Negative for wound.   Neurological:  Negative for dizziness, syncope, light-headedness and headaches.           Objective       ED Triage Vitals [06/18/25 2021]   Temperature Pulse Blood Pressure Respirations SpO2 Patient Position - Orthostatic VS   98.3 °F (36.8 °C) 76 156/60 20 96 % Sitting       Temp Source Heart Rate Source BP Location FiO2 (%) Pain Score    Oral Monitor Right arm -- 10 - Worst Possible Pain      Vitals      Date and Time Temp Pulse SpO2 Resp BP Pain Score FACES Pain Rating User   06/18/25 2058 -- -- -- -- -- 10 - Worst Possible Pain -- TP   06/18/25 2021 98.3 °F (36.8 °C) 76 96 % 20 156/60 10 - Worst Possible Pain -- AS            Physical Exam  Vitals and nursing note reviewed.   Constitutional:       General: He is not in acute distress.     Appearance: Normal appearance. He is well-developed. He is not diaphoretic.   HENT:      Head: Normocephalic and atraumatic.     Eyes:      Conjunctiva/sclera: Conjunctivae normal.       Cardiovascular:      Rate and Rhythm: Normal rate and regular rhythm.      Heart sounds: No murmur heard.  Pulmonary:      Effort: Pulmonary effort is normal. No respiratory distress.      Breath sounds: Normal breath sounds.   Abdominal:      Palpations: Abdomen is soft.      Tenderness: There is no abdominal tenderness. There is right CVA tenderness. There is no left CVA tenderness or guarding.     Musculoskeletal:         General: No swelling.      Cervical back: Normal range of motion and neck supple. No rigidity.     Skin:     General: Skin is warm and dry.      Capillary Refill: Capillary refill takes less than 2 seconds.     Neurological:      General: No focal deficit present.      Mental Status: He is alert and oriented to person, place, and time.      GCS: GCS eye subscore is 4. GCS verbal subscore is 5. GCS motor subscore is 6.      Gait: Gait is intact. Gait normal.     Psychiatric:         Mood and Affect: Mood normal.         Results Reviewed       Procedure Component Value Units Date/Time    Urine Microscopic [[id number]]  (Abnormal) Collected: 06/18/25 2055    Lab Status: Final result Specimen: Urine, Clean Catch Updated: 06/18/25 2120     RBC, UA None Seen /hpf      WBC, UA 1-2 /hpf      Epithelial Cells Occasional /hpf      Bacteria, UA None Seen  /hpf      Hyaline Casts, UA 5-10 /lpf     Comprehensive metabolic panel [[id number]]  (Abnormal) Collected: 06/18/25 2055    Lab Status: Final result Specimen: Blood from Arm, Right Updated: 06/18/25 2119     Sodium 137 mmol/L      Potassium 4.3 mmol/L      Chloride 103 mmol/L      CO2 23 mmol/L      ANION GAP 11 mmol/L      BUN 31 mg/dL      Creatinine 1.29 mg/dL      Glucose 177 mg/dL      Calcium 9.7 mg/dL      AST 19 U/L      ALT 18 U/L      Alkaline Phosphatase 76 U/L      Total Protein 7.7 g/dL      Albumin 4.6 g/dL      Total Bilirubin 1.54 mg/dL      eGFR 51 ml/min/1.73sq m     Narrative:      National Kidney Disease Foundation guidelines for Chronic Kidney Disease (CKD):     Stage 1 with normal or high GFR (GFR > 90 mL/min/1.73 square meters)    Stage 2 Mild CKD (GFR = 60-89 mL/min/1.73 square meters)    Stage 3A Moderate CKD (GFR = 45-59 mL/min/1.73 square meters)    Stage 3B Moderate CKD (GFR = 30-44 mL/min/1.73 square meters)    Stage 4 Severe CKD (GFR = 15-29 mL/min/1.73 square meters)    Stage 5 End Stage CKD (GFR <15 mL/min/1.73 square meters)  Note: GFR calculation is accurate only with a steady state creatinine    UA w Reflex to Microscopic w Reflex to Culture [[id number]]  (Abnormal) Collected: 06/18/25 2055    Lab Status: Final result Specimen: Urine, Clean Catch Updated: 06/18/25 2116     Color, UA Light Yellow     Clarity, UA Clear     Specific Gravity, UA 1.020     pH, UA 5.0     Leukocytes, UA Elevated glucose may cause decreased leukocyte values. See urine microscopic for UWBC result     Nitrite, UA Negative     Protein, UA Negative mg/dl      Glucose, UA >=1000 (1%) mg/dl      Ketones, UA Negative mg/dl      Urobilinogen, UA <2.0 mg/dl      Bilirubin, UA Negative     Occult Blood, UA Negative    CBC and differential [[id number]]  (Abnormal) Collected: 06/18/25 2055    Lab Status: Final result Specimen: Blood from Arm, Right Updated: 06/18/25 2103     WBC 6.79 Thousand/uL      RBC 4.20  Million/uL      Hemoglobin 12.9 g/dL      Hematocrit 39.5 %      MCV 94 fL      MCH 30.7 pg      MCHC 32.7 g/dL      RDW 16.1 %      MPV 9.7 fL      Platelets 187 Thousands/uL      nRBC 0 /100 WBCs      Segmented % 78 %      Immature Grans % 0 %      Lymphocytes % 11 %      Monocytes % 8 %      Eosinophils Relative 3 %      Basophils Relative 0 %      Absolute Neutrophils 5.18 Thousands/µL      Absolute Immature Grans 0.02 Thousand/uL      Absolute Lymphocytes 0.77 Thousands/µL      Absolute Monocytes 0.56 Thousand/µL      Eosinophils Absolute 0.23 Thousand/µL      Basophils Absolute 0.03 Thousands/µL             CT abdomen pelvis with contrast    (Results Pending)       Procedures    ED Medication and Procedure Management   Prior to Admission Medications   Prescriptions Last Dose Informant Patient Reported? Taking?   Empagliflozin (Jardiance) 25 MG TABS  Self No No   Sig: Take 1 tablet (25 mg total) by mouth every morning Also: take with LOTS of water   acetaminophen (TYLENOL) 500 mg tablet  Self Yes No   Sig: Take 1,000 mg by mouth every 8 (eight) hours as needed   atorvastatin (LIPITOR) 40 mg tablet  Self No No   Sig: Take 1 tablet (40 mg total) by mouth daily   chlorhexidine (PERIDEX) 0.12 % solution   No No   Sig: Use 15 mL to rinse mouth or throat 2 (two) times daily   clopidogrel (PLAVIX) 75 mg tablet   No No   Sig: Take 1 tablet (75 mg total) by mouth daily   ferrous sulfate 325 (65 Fe) mg tablet  Self No No   Sig: Take 1 tablet (325 mg total) by mouth 2 (two) times a day with meals   glipiZIDE (GLUCOTROL) 10 mg tablet   No No   Sig: Take 1 tablet (10 mg total) by mouth 2 (two) times a day before meals   hydrocortisone 2.5 % cream  Self No No   Sig: Apply topically 2 (two) times a day as needed for rash   levothyroxine 75 mcg tablet   No No   Sig: Take 1 tablet (75 mcg total) by mouth daily   lisinopril-hydrochlorothiazide (PRINZIDE,ZESTORETIC) 20-12.5 MG per tablet  Self No No   Sig: Take 1 tablet by mouth  daily   metFORMIN (GLUCOPHAGE) 1000 MG tablet   No No   Sig: Take 1 tablet (1,000 mg total) by mouth 2 (two) times a day with meals   mupirocin (BACTROBAN) 2 % ointment  Self No No   Sig: Apply topically 2 (two) times a day In the LEFT side of nose   omeprazole (PriLOSEC) 20 mg delayed release capsule  Self No No   Sig: TAKE 1 CAPSULE BY MOUTH DAILY BEFORE BREAKFAST.   tamsulosin (FLOMAX) 0.4 mg   No No   Sig: Take 1 capsule (0.4 mg total) by mouth daily with dinner      Facility-Administered Medications: None     Patient's Medications   Discharge Prescriptions    No medications on file     No discharge procedures on file.  ED SEPSIS DOCUMENTATION              [1]   Past Medical History:  Diagnosis Date    Coronary artery disease without angina pectoris 02/25/2020    Diabetes mellitus (HCC)     DVT (deep venous thrombosis) (HCC)     GERD (gastroesophageal reflux disease)     Hypertension     Iron deficiency anemia     Liver cancer (HCC)    [2]   Past Surgical History:  Procedure Laterality Date    ANGIOPLASTY  10/19/2021    bilateral    CARDIAC CATHETERIZATION  2013    calif    CABG  x4    COLONOSCOPY      COLONOSCOPY      CORONARY ANGIOPLASTY WITH STENT PLACEMENT  01/01/2010    CORONARY ARTERY BYPASS GRAFT      FEMORAL ARTERY STENT      FEMORAL BYPASS  10/20/2021    IR LOWER EXTREMITY ANGIOGRAM  2/14/2023    IR PELVIC ANGIOGRAM  5/6/2022    IR Y-90 PRE-ANGIO/EMBO W/ LUNG SCAN  1/13/2025    IR Y-90 RADIOEMBOLIZATION  1/22/2025    SC SLCTV CATHJ 3RD+ ORD SLCTV ABDL PEL/LXTR BRNCH  5/6/2022    Procedure: ULTRASOUND-GUIDED LEFT BRACHIAL ARTERY PUNCTURE, AORTOGRAM, RIGHT COMMON ILIAC ARTERY ANGIOPLASTY WITH 8 X 40 MM BALLOON, ANGIOPLASTY OF RIGHT EXTERNAL AND COMMON ILIAC ARTERY STENOSIS WITH 6 X 150 MM DRUG-ELUTING BALLOON, STENTING OF RIGHT DISTAL EXTERNAL ILIAC ARTERY STENOSIS WITH 7 X 40 MM SELF EXPANDING STENT POST DILATED WITH A 6 MM BALLOON.;  Surgeon: Henry Pena MD;  [place]    SC SLCTV CATHJ 3RD+ ORD  SLCTV ABDL PEL/LXTR BRNCH Bilateral 2023    Procedure: CUTDOWN FEM-FEM BYPASS WITH PRIMARY CLOSURE ACCESS SITE, BILATERAL RUN-OFF, LEFT FEM-POP 5X220 QUYEN AND 6X150 RODRI, ATTEMPT TO CROSS RIGHT SFA OCCLUSION;  Surgeon: Henry Pena MD;  Location: [facility] OR;  Service: Vascular   [3]   Family History  Problem Relation Name Age of Onset    No Known Problems Mother      Lung cancer Father      Liver cancer Brother     [4]   Social History  Tobacco Use    Smoking status: Former     Current packs/day: 0.00     Average packs/day: 1.5 packs/day for 40.0 years (60.0 ttl pk-yrs)     Types: Cigarettes     Start date:      Quit date:      Years since quittin.4     Passive exposure: Past    Smokeless tobacco: Never    Tobacco comments:     quit --    Vaping Use    Vaping status: Never Used   Substance Use Topics    Alcohol use: Not Currently     Alcohol/week: 0.0 standard drinks of alcohol    Drug use: No        Abraham Mendoza PA-C  25 [id number]

## 2025-06-19 NOTE — TELEPHONE ENCOUNTER
Let us change the scan to a CT Chest w/o c only. Pt questions answered     New orders placed.    Please cancel CT scan already scheduled for next week and replace with CT Chest W/o contrast.     Patient notified of change. thanks

## 2025-06-19 NOTE — TELEPHONE ENCOUNTER
Called and spoke to pt as per dr lim as per dr lim pt texted him so he wanted me to call pt to see if he wanted to come and see dr lim today,, instead of appt with wife on Monday ,, pt stated that he was given medication and he is going to wait and come in on Monday with his wife to see dr raphael lim aware of pt's decision

## 2025-06-19 NOTE — ED CARE HANDOFF
"Emergency Department Sign Out Note        Sign out and transfer of care from Abraham Mendoza PA-C. See Separate Emergency Department note.     The patient, Max Ozuna, was evaluated by the previous provider for     \"80-year-old male with medical history significant for DM2, CKD stage III, CAD, GERD, hepatocellular carcinoma currently receiving chemotherapy who presents to the ED for evaluation of right flank pain. Patient reports symptoms have been present approximately 2 days, symptoms will wax and wane in severity, states symptoms have been worsening today. Patient reports the pain is nonradiating, describes the pain as a sharp sensation. Patient spoke with his PCP earlier today, PCP advised him to come to the ED for further evaluation and management due to possible kidney stone. Patient denies any associated urinary symptoms, denies any groin or testicular pain or discomfort, denies any dysuria or hematuria. Patient denies any recent fevers, chills, headaches, lightheadedness, chest pain, SOB, nausea, vomiting, or diarrhea. He denies any IV drug use, loss of bowel or bladder function, weakness or loss of sensation in any extremity.\"    Workup Completed:  Labs Reviewed   CBC AND DIFFERENTIAL - Abnormal       Result Value Ref Range Status    WBC 6.79  4.31 - 10.16 Thousand/uL Final    RBC 4.20  3.88 - 5.62 Million/uL Final    Hemoglobin 12.9  12.0 - 17.0 g/dL Final    Hematocrit 39.5  36.5 - 49.3 % Final    MCV 94  82 - 98 fL Final    MCH 30.7  26.8 - 34.3 pg Final    MCHC 32.7  31.4 - 37.4 g/dL Final    RDW 16.1 (*) 11.6 - 15.1 % Final    MPV 9.7  8.9 - 12.7 fL Final    Platelets 187  149 - 390 Thousands/uL Final    nRBC 0  /100 WBCs Final    Segmented % 78 (*) 43 - 75 % Final    Immature Grans % 0  0 - 2 % Final    Lymphocytes % 11 (*) 14 - 44 % Final    Monocytes % 8  4 - 12 % Final    Eosinophils Relative 3  0 - 6 % Final    Basophils Relative 0  0 - 1 % Final    Absolute Neutrophils 5.18  1.85 - 7.62 " Thousands/µL Final    Absolute Immature Grans 0.02  0.00 - 0.20 Thousand/uL Final    Absolute Lymphocytes 0.77  0.60 - 4.47 Thousands/µL Final    Absolute Monocytes 0.56  0.17 - 1.22 Thousand/µL Final    Eosinophils Absolute 0.23  0.00 - 0.61 Thousand/µL Final    Basophils Absolute 0.03  0.00 - 0.10 Thousands/µL Final   COMPREHENSIVE METABOLIC PANEL - Abnormal    Sodium 137  135 - 147 mmol/L Final    Potassium 4.3  3.5 - 5.3 mmol/L Final    Chloride 103  96 - 108 mmol/L Final    CO2 23  21 - 32 mmol/L Final    ANION GAP 11  4 - 13 mmol/L Final    BUN 31 (*) 5 - 25 mg/dL Final    Creatinine 1.29  0.60 - 1.30 mg/dL Final    Comment: Standardized to IDMS reference method    Glucose 177 (*) 65 - 140 mg/dL Final    Comment: If the patient is fasting, the ADA then defines impaired fasting glucose as > 100 mg/dL and diabetes as > or equal to 123 mg/dL.    Calcium 9.7  8.4 - 10.2 mg/dL Final    AST 19  13 - 39 U/L Final    ALT 18  7 - 52 U/L Final    Comment: Specimen collection should occur prior to Sulfasalazine administration due to the potential for falsely depressed results.     Alkaline Phosphatase 76  34 - 104 U/L Final    Total Protein 7.7  6.4 - 8.4 g/dL Final    Albumin 4.6  3.5 - 5.0 g/dL Final    Total Bilirubin 1.54 (*) 0.20 - 1.00 mg/dL Final    Comment: Use of this assay is not recommended for patients undergoing treatment with eltrombopag due to the potential for falsely elevated results.  N-acetyl-p-benzoquinone imine (metabolite of Acetaminophen) will generate erroneously low results in samples for patients that have taken an overdose of Acetaminophen.    eGFR 51  ml/min/1.73sq m Final    Narrative:     [employer] guidelines for Chronic Kidney Disease (CKD):     Stage 1 with normal or high GFR (GFR > 90 mL/min/1.73 square meters)    Stage 2 Mild CKD (GFR = 60-89 mL/min/1.73 square meters)    Stage 3A Moderate CKD (GFR = 45-59 mL/min/1.73 square meters)    Stage 3B Moderate CKD (GFR  = 30-44 mL/min/1.73 square meters)    Stage 4 Severe CKD (GFR = 15-29 mL/min/1.73 square meters)    Stage 5 End Stage CKD (GFR <15 mL/min/1.73 square meters)  Note: GFR calculation is accurate only with a steady state creatinine   UA W REFLEX TO MICROSCOPIC WITH REFLEX TO CULTURE - Abnormal    Color, UA Light Yellow   Final    Clarity, UA Clear   Final    Specific Gravity, UA 1.020  1.003 - 1.030 Final    Comment: High concentrations of glucose or protein may affect the specific gravity    pH, UA 5.0  4.5, 5.0, 5.5, 6.0, 6.5, 7.0, 7.5, 8.0 Final    Leukocytes, UA   (*) Negative Final    Value: Elevated glucose may cause decreased leukocyte values. See urine microscopic for UWBC result    Nitrite, UA Negative  Negative Final    Protein, UA Negative  Negative mg/dl Final    Glucose, UA >=1000 (1%) (*) Negative mg/dl Final    Comment: Elevated glucose may cause false negative leukocyte esterase    Ketones, UA Negative  Negative mg/dl Final    Urobilinogen, UA <2.0  <2.0 mg/dl mg/dl Final    Bilirubin, UA Negative  Negative Final    Occult Blood, UA Negative  Negative Final   URINE MICROSCOPIC - Abnormal    RBC, UA None Seen  None Seen, 1-2 /hpf Final    WBC, UA 1-2  None Seen, 1-2 /hpf Final    Epithelial Cells Occasional  None Seen, Occasional /hpf Final    Bacteria, UA None Seen  None Seen, Occasional /hpf Final    Hyaline Casts, UA 5-10 (*) None Seen /lpf Final        ED Course / Workup Pending (followup):  Signed out myself pending CT abdomen/pelvis and disposition    CT abdomen pelvis with contrast   Final Result      No acute intra-abdominal abnormality.      Reidentified ill-defined right hepatic lobe HCC and portal vein thrombus which appears to mildly progressed when compared to a CT abdomen/pelvis dated 12/30/2024. A dedicated enhanced three-phase CT or MRI of the liver could be performed for further    evaluation.      Partially visualized small right pleural effusion. Trace left pleural fluid.      No large  or small bowel obstruction.      Cholelithiasis with no pericholecystic inflammatory changes.      Workstation performed: [id number]                  No data recorded                             Procedures  Medical Decision Making  Patient is a 80-year-old male with medical history significant for DM2, CKD stage III, CAD, GERD, hepatocellular carcinoma currently receiving chemotherapy who presents to the ED for evaluation of right flank pain. Patient reports symptoms have been present approximately 2 days, symptoms will wax and wane in severity, states symptoms have been worsening today.  Patient sent here by PCP to be evaluated for kidney stone. DDx including but not limited to: renal colic, pyelonephritis, UTI, GI etiology, appendicitis, diverticulitis, pancreatitis, cholecystitis, biliary colic, AAA, rhabdomyolysis, tumor, zoster.  CBC, CMP, lipase, UA all  unchanged from patient's baseline.  CT abdomen pelvis does show gallstones, really identification of his hepatocellular carcinoma, and a small right pleural effusion and trace pleural effusion on the left.  Patient denies any shortness of breath symptoms at this time.  Too small to tap at this time.  Discussed with patient to follow-up with PCP in the outpatient setting within the week.  Patient given ambulatory referral to Follow-up with comprehensive pain program in the outpatient setting musculoskeletal back pain.  Patient prescribed multiple medications for musculoskeletal back pain consistent with multimodal pain approach.  Patient given strict return precautions to return Emergency Department for increasing fever, body aches, chills, shortness of breath, productive cough, increased work of breathing, exertional dyspnea, worsening abdominal pain, intractable nausea and vomiting, blood in the vomit or stool, worsening urinary symptoms of blood in the urine, increased frequency or urgency, or pain with urination, worsening numbness or tingling of the  bilateral lower extremities, or urinary or bowel incontinence.  Patient verbalized understanding and agrees to current plan.  Patient discharged home in stable condition at this time.      Amount and/or Complexity of Data Reviewed  Labs: ordered.  Radiology: ordered.    Risk  OTC drugs.  Prescription drug management.            Disposition  Final diagnoses:   Right flank pain   Musculoskeletal back pain   Small pleural effusion   Gallstone   Hepatocellular carcinoma (HCC)     Time reflects when diagnosis was documented in both MDM as applicable and the Disposition within this note       Time User Action Codes Description Comment    6/18/2025 11:36 PM Jian Ch [S39.012A] Lumbar strain, initial encounter     6/18/2025 11:36 PM Jian Ch [R10.9] Right flank pain     6/18/2025 11:36 PM Jian Ch Modify [R10.9] Right flank pain     6/18/2025 11:36 PM Jian Ch [S39.012A] Lumbar strain, initial encounter     6/18/2025 11:36 PM Jian Ch [M54.9] Musculoskeletal back pain     6/18/2025 11:36 PM Jian Ch [J90] Small pleural effusion     6/18/2025 11:36 PM Jian Ch [K80.20] Gallstone     6/18/2025 11:37 PM Jian Ch [C22.0] Hepatocellular carcinoma (HCC)           ED Disposition       ED Disposition   Discharge    Condition   Stable    Date/Time   Wed Jun 18, 2025 11:35 PM    Comment   Max Sulma discharge to home/self care.                   Follow-up Information    None       Discharge Medication List as of 6/18/2025 11:39 PM        START taking these medications    Details   !! acetaminophen (TYLENOL) 500 mg tablet Take 1 tablet (500 mg total) by mouth every 6 (six) hours as needed for moderate pain, Starting Wed 6/18/2025, Normal      ibuprofen (MOTRIN) 600 mg tablet Take 1 tablet (600 mg total) by mouth every 6 (six) hours as needed for mild pain, Starting Wed 6/18/2025, Normal      lidocaine (LIDODERM) 5 % Apply 1 patch topically every 24 hours  over 12 hours Remove & Discard patch within 12 hours or as directed by MD, Starting Wed 6/18/2025, Normal      methocarbamol (ROBAXIN) 500 mg tablet Take 1 tablet (500 mg total) by mouth 2 (two) times a day, Starting Wed 6/18/2025, Normal       !! - Potential duplicate medications found. Please discuss with provider.        CONTINUE these medications which have NOT CHANGED    Details   !! acetaminophen (TYLENOL) 500 mg tablet Take 1,000 mg by mouth every 8 (eight) hours as needed, Starting Wed 10/27/2021, Historical Med      atorvastatin (LIPITOR) 40 mg tablet Take 1 tablet (40 mg total) by mouth daily, Starting Tue 3/18/2025, Normal      chlorhexidine (PERIDEX) 0.12 % solution Use 15 mL to rinse mouth or throat 2 (two) times daily, Normal      clopidogrel (PLAVIX) 75 mg tablet Take 1 tablet (75 mg total) by mouth daily, Starting Fri 5/30/2025, Normal      Empagliflozin (Jardiance) 25 MG TABS Take 1 tablet (25 mg total) by mouth every morning Also: take with LOTS of water, Starting Tue 2/20/2024, Fill Later      ferrous sulfate 325 (65 Fe) mg tablet Take 1 tablet (325 mg total) by mouth 2 (two) times a day with meals, Starting Thu 11/18/2021, Normal      glipiZIDE (GLUCOTROL) 10 mg tablet Take 1 tablet (10 mg total) by mouth 2 (two) times a day before meals, Starting Fri 5/30/2025, Normal      hydrocortisone 2.5 % cream Apply topically 2 (two) times a day as needed for rash, Starting Thu 11/7/2024, Normal      levothyroxine 75 mcg tablet Take 1 tablet (75 mcg total) by mouth daily, Starting Tue 5/27/2025, Normal      lisinopril-hydrochlorothiazide (PRINZIDE,ZESTORETIC) 20-12.5 MG per tablet Take 1 tablet by mouth daily, Starting Fri 11/8/2024, Normal      metFORMIN (GLUCOPHAGE) 1000 MG tablet Take 1 tablet (1,000 mg total) by mouth 2 (two) times a day with meals, Starting Fri 4/18/2025, Normal      mupirocin (BACTROBAN) 2 % ointment Apply topically 2 (two) times a day In the LEFT side of nose, Starting Thu  2/20/2025, Normal      omeprazole (PriLOSEC) 20 mg delayed release capsule TAKE 1 CAPSULE BY MOUTH DAILY BEFORE BREAKFAST., Starting Sun 2/16/2025, Normal      tamsulosin (FLOMAX) 0.4 mg Take 1 capsule (0.4 mg total) by mouth daily with dinner, Starting Thu 4/24/2025, Normal       !! - Potential duplicate medications found. Please discuss with provider.               ED Provider  Electronically Signed by     Jian Ch PA-C  06/19/25 0236

## 2025-06-23 ENCOUNTER — OFFICE VISIT (OUTPATIENT)
Dept: FAMILY MEDICINE CLINIC | Facility: CLINIC | Age: 80
End: 2025-06-23
Payer: MEDICARE

## 2025-06-23 VITALS
OXYGEN SATURATION: 96 % | BODY MASS INDEX: 25.86 KG/M2 | HEART RATE: 54 BPM | HEIGHT: 65 IN | WEIGHT: 155.2 LBS | SYSTOLIC BLOOD PRESSURE: 139 MMHG | TEMPERATURE: 98.2 F | DIASTOLIC BLOOD PRESSURE: 68 MMHG

## 2025-06-23 DIAGNOSIS — Z79.4 TYPE 2 DIABETES MELLITUS WITH DIABETIC PERIPHERAL ANGIOPATHY WITHOUT GANGRENE, WITH LONG-TERM CURRENT USE OF INSULIN (HCC): ICD-10-CM

## 2025-06-23 DIAGNOSIS — E11.51 TYPE 2 DIABETES MELLITUS WITH DIABETIC PERIPHERAL ANGIOPATHY WITHOUT GANGRENE, WITH LONG-TERM CURRENT USE OF INSULIN (HCC): ICD-10-CM

## 2025-06-23 DIAGNOSIS — I10 ESSENTIAL HYPERTENSION: Primary | ICD-10-CM

## 2025-06-23 DIAGNOSIS — E55.9 VITAMIN D INSUFFICIENCY: ICD-10-CM

## 2025-06-23 DIAGNOSIS — R73.9 ELEVATED BLOOD SUGAR: ICD-10-CM

## 2025-06-23 DIAGNOSIS — Z51.81 ENCOUNTER FOR MEDICATION MONITORING: ICD-10-CM

## 2025-06-23 DIAGNOSIS — E78.2 MIXED HYPERLIPIDEMIA: ICD-10-CM

## 2025-06-23 DIAGNOSIS — I81 PORTAL VEIN THROMBOSIS: ICD-10-CM

## 2025-06-23 DIAGNOSIS — R80.9 ALBUMINURIA: ICD-10-CM

## 2025-06-23 DIAGNOSIS — R53.83 FATIGUE, UNSPECIFIED TYPE: ICD-10-CM

## 2025-06-23 DIAGNOSIS — R53.82 CHRONIC FATIGUE: ICD-10-CM

## 2025-06-23 DIAGNOSIS — R94.5 NONSPECIFIC ABNORMAL RESULTS OF LIVER FUNCTION STUDY: ICD-10-CM

## 2025-06-23 DIAGNOSIS — C22.0 HEPATOCELLULAR CARCINOMA (HCC): ICD-10-CM

## 2025-06-23 DIAGNOSIS — Z13.89 SCREENING FOR HEMATURIA OR PROTEINURIA: ICD-10-CM

## 2025-06-23 PROCEDURE — G2211 COMPLEX E/M VISIT ADD ON: HCPCS | Performed by: FAMILY MEDICINE

## 2025-06-23 PROCEDURE — 99214 OFFICE O/P EST MOD 30 MIN: CPT | Performed by: FAMILY MEDICINE

## 2025-06-23 NOTE — PROGRESS NOTES
"Name: Max Ozuna      : 1945      MRN: [id number]  Encounter Provider: KADE Martel DO  Encounter Date: 2025   Encounter department: [facility]  :  Assessment & Plan  Essential hypertension  Blood pressure is well-controlled       Portal vein thrombosis  Stable no new problem       Hepatocellular carcinoma (HCC)  Following with heme-onc.  MRI scheduled for later this week.  Marker alpha-fetoprotein is 0.7 down from 21,000.  Very excellent response       Type 2 diabetes mellitus with diabetic peripheral angiopathy without gangrene, with long-term current use of insulin (HCC)    Lab Results   Component Value Date    HGBA1C 6.2 2025   A1c 6.2 in Feb and will repeat with labs later         Fatigue, unspecified type    Orders:  •  CBC; Future    Screening for hematuria or proteinuria    Orders:  •  UA w Reflex to Microscopic w Reflex to Culture    Encounter for medication monitoring    Orders:  •  Comprehensive metabolic panel; Future    Nonspecific abnormal results of liver function study    Orders:  •  Comprehensive metabolic panel; Future    Mixed hyperlipidemia    Orders:  •  Lipid panel; Future    Chronic fatigue    Orders:  •  TSH, 3rd generation; Future    Vitamin D insufficiency    Orders:  •  Vitamin D 25 hydroxy; Future    Elevated blood sugar    Orders:  •  Hemoglobin A1C; Future    Albuminuria    Orders:  •  Albumin / creatinine urine ratio      Assessment & Plan           History of Present Illness   HPI  Review of Systems    Objective   /68 (BP Location: Left arm, Patient Position: Sitting, Cuff Size: Standard)   Pulse (!) 54   Temp 98.2 °F (36.8 °C) (Temporal)   Ht 5' 5\" (1.651 m)   Wt 70.4 kg (155 lb 3.2 oz)   SpO2 96%   BMI 25.83 kg/m²      Physical Exam  Administrative Statements   I have spent a total time of 34 minutes in caring for this patient on the day of the visit/encounter including Diagnostic results, Prognosis, Risks and benefits of tx " options, Instructions for management, Patient and family education, Importance of tx compliance, Risk factor reductions, Impressions, Counseling / Coordination of care, Documenting in the medical record, Reviewing/placing orders in the medical record (including tests, medications, and/or procedures), and Obtaining or reviewing history  .

## 2025-06-23 NOTE — ASSESSMENT & PLAN NOTE
Lab Results   Component Value Date    HGBA1C 6.2 02/20/2025   A1c 6.2 in Feb and will repeat with labs later

## 2025-06-23 NOTE — PROGRESS NOTES
Name: Max Ozuna      : 1945      MRN: [id number]  Encounter Provider: Mal Angeles MD  Encounter Date: 7/3/2025   Encounter department: [facility]  :  Assessment & Plan  Hepatocellular carcinoma (HCC)    This is a 80 y.o. c PMHx notable for cecal AVM/GAVE, PE, DM, DVT, HTN, HLP, CAD, being seen in consultation for chronic ANG and locally advanced HCC; on systemic therapy  Orders:    MRI abdomen w wo contrast; Future      Assessment & Plan  1. Liver cancer.  The patient's tumor marker (AFP) is stable at 0.7, indicating low tumor activity. The recent CAT scan showed no new lesions, but some inflammation around the tumor site was noted. The tumor size appears larger due to this inflammation. The tumor is located in the right liver, near blood vessels. The patient will continue with her current immunotherapy regimen. An MRI will be scheduled for mid to late 2025 to differentiate between tumor growth and inflammation. Blood work will be done on 2025 to coincide with the MRI and subsequent treatment on 2025. If the MRI shows resistance to immunotherapy, alternative treatments such as tyrosine kinase inhibitors will be considered. These pills work to interfere with the cancer's ability to take up blood supply and nutrients. They have side effects that are not as severe as chemotherapy but are more significant than those of immunotherapy.    2. Emphysema.  The patient reports a history of emphysema, confirmed by a scan showing changes consistent with emphysema and a small amount of fluid in the right lung. No new lung lesions or lymph node enlargement were noted. The patient is currently asymptomatic and does not require any changes to her inhaler regimen. If symptoms worsen, diuretics may be considered to manage fluid accumulation.    3. Thyroid disorder.  The patient's thyroid levels are improving with the current medication regimen. TSH  levels are decreasing, and free thyroid levels are within the normal range. The patient will continue on the current dose of 75 mcg of thyroid replacement medication. No changes will be made until further trends are observed.        This is a 80 y.o. c PMHx notable for cecal AVM/GAVE, PE, DM, DVT, HTN, HLP, CAD, being seen in consultation for chronic ANG and locally advanced HCC; on systemic therapy     Iron Deficiency anemia: improved s/p IV iron  Low iron levels can cause anemia (low red blood cells). Low iron levels can also make people feel poorly, even before anemia starts. Iron is used to make red blood cells. If blood is lost from the body, if iron can't be absorbed from food, or if something removes iron (pregnancy) the iron can be low and then anemia happens.    Hx Cecal AVM. GI thought maybe GAVE  8/29/2022: Hgb 9.5, WBC 6.35k, MCV 91, plt 235k, retic 3.65%, FOBT + (8/31), ferritin 7, iron 30, Fe Sat 9%, TIBC 346  9/7 EGD/C-scope: internal hemorrhoids    Capsule endoscopy: no active bleeding  6/27/2023 EGD with single balloon enteroscopy revealed no bleeding    IrAE drug induced rash: on hydrocortisone 2.5% to good effect    Iron deficiency is very common. Low iron can cause fatigue or tiredness, the desire to eat ice or non-food items like corn starch or dry pasta noodles, restless legs, weak fingernails, cracks at the corner of the mouth.     Common causes of iron deficiency include inadequate diet (uncommon, usually vegetarians), gastric bypass surgery (very common), pregnancy (very common), periods (most common cause in younger women), blood loss (usually from the stomach or intestines), and decreased iron absorption from the intestines from gastritis, H pylori, acid blocking medicines or celiac disease. Even a normal menstrual period can cause iron deficiency. In 10% of patients a clear cause of low iron is not found. 14% of women will have iron deficiency just from their menstrual period.       Iron  deficiency is treated with pills as a first step. For some people the iron pills do not work, cause severe side effects, or take too long to work: for these people IV iron can be given. You may only require IV iron very rarely, for example only when pregnant, or from 1-2 times a year based on your rate of need. It takes 4 weeks for IV iron to improve the anemia to maximum potential. If your fatigue is not from the low iron, getting iron treatment would not help the fatigue.    9/24/2024 CT Abd/pelv w/c: Nodular liver contour, indicated of cirrhosis. Portal vein thrombosis involving posterior branch of the right hepatic lobe  MRI Abd w/wo c: Approximately 5.4 x 4.8 x 5.5 cm (length X depth X width) solitary segment 6/7 mass  9/25/2024: AFP 21,374  9/25/2024: CT Chest w/c: Emphysema with areas of fibrosis/scarring which appears stable compared to study of 11/15/2019  10/11/2024 Liver tumor board interventional and radiology do think he would be a good candidate for Y90 treatment concurrently with immunotherapy  12/30/2024 CT CAP w/c:  excellent TN  Decrease in size segment VI/VII right hepatic lobe mass since 9/24/2024, currently measuring 3.1 x 2.6 x 4.5 cm (61/105, 4/108), previously 5.4 x 4.8 x 5.5 cm. Again seen is tumor thrombus in the posterior branch of right hepatic lobe, decreased from prior exam. No metastatic disease in the chest, abdomen or pelvis  1/22/2025: IR Y-90 radio embolization  6/26/2025 CT CAP w/c: Stable upper lung predominant nodules and scars, some calcified, since 2019.Slight progression of pulmonary fibrosis since 2019.Trace pleural effusions.            Discussion of decision making    I personally reviewed the following lab results, the image studies, pathology, other specialty/physicians consult notes and recommendations, and outside medical records from [facility]/other institutions. I had a lengthy discussion with the patient and shared the work-up findings. We discussed the diagnosis and  management plan as below. I spent43 minutes reviewing the records (labs, clinician notes, outside records, medical history, ordering medicine/tests/procedures, interpreting the imaging/labs previously done) and coordination of care as well as direct time with the patient today, of which greater than 50% of the time was spent in counseling and coordination of care with the patient/family.    Plan/Labs  Cont PO iron MWF   Cont to f/u GI  Refilled Hydrocortisone 2.5% cream for his drug rash previously  Restaging CT CAP w/c ordered 9/10/2025  Cont Levothyroxine 75 mcg for irAE   Liver directed therapy done on 1/22/2025  Infusion chairs ordered for Durvalumab 1500 mg q4 weeks C11 is coming up: continuing   AFP added to C14          Follow Up: q4 weeks scheduled thru 9/17/2025    All questions were answered to the patient's satisfaction during this encounter. The patient knows the contact information for our office and knows to reach out for any relevant concerns related to this encounter. They are to call for any temperature 100.4 or higher, new symptoms including but not restricted to shaking chills, decreased appetite, nausea, vomiting, diarrhea, increased fatigue, shortness of breath or chest pain, confusion, and not feeling the strength to come to the clinic. For all other listed problems and medical diagnosis in their chart - they are managed by PCP and/or other specialists, which the patient acknowledges. Thank you very much for your consultation and making us a part of this patient's care. We are continuing to follow closely with you. Please do not hesitate to reach out to me with any additional questions or concerns.    Mal Angeles MD  Hematology & Medical Oncology Staff Physician  No follow-ups on file.    History of Present Illness   Chief Complaint   Patient presents with    Follow-up     History of Present Illness  The patient presents for evaluation of emphysema, thyroid disorder, and tumor.    She  "reports a general feeling of fatigue but is not experiencing any new pain. Her appetite remains robust, and she is not experiencing excessive daytime sleepiness or new swelling in her extremities. She also reports no systemic symptoms such as fevers, night sweats, chills, respiratory distress, or chest pain. She has been diagnosed with mild emphysema, as evidenced by radiographic imaging, and has been informed of the presence of pleural effusion. However, she was advised that no further intervention is necessary at this time.    She has noticed a decrease in her thyroid levels, which she attributes to medication adjustments. She recalls a previous sensation of coldness in her legs but does not report any significant changes since her thyroid levels have improved.    She expresses curiosity about the location of her tumor and its proximity to vascular structures. She also questions the efficacy of her current treatment regimen, including Y90 therapy, in light of the observed inflammation around the tumor.        Objective   /60 (BP Location: Left arm, Patient Position: Sitting, Cuff Size: Adult)   Pulse 56   Temp 98.6 °F (37 °C) (Temporal)   Resp 18   Ht 5' 5\" (1.651 m)   Wt 68.5 kg (151 lb)   SpO2 96%   BMI 25.13 kg/m²     Physical Exam  Physical Exam  Respiratory: Clear breath sounds bilaterally.    See below  Results  The following results are independently reviewed and interpreted by myself.    Labs   - TSH: Below 10   - Free thyroid number: In the middle of the normal range   - AFP: 0.7    Imaging   - CAT scan of the lung: No new lung lesions, no lymph node enlargement, small amount of fluid on the right lung   - CAT scan of the abdomen and pelvis: Tumor measurement is 6.1 x 4.9 cm, including some inflammation around it      Referral Physician: No ref. provider found    Primary Care Physician:  DO Andre Quinoneza: Daughter     Taylor: wife    Sallie: daughter in law             Disclaimer: " This document was prepared using "[employer] Direct technology. If a word or phrase is confusing, or does not make sense, this is likely due to recognition error which was not discovered during this clinician's review. If you believe an error has occurred, please contact me through [facility] service line for dequan?cation.      HEMATOLOGICAL HISTORY OF PRESENT ILLNESS      Clotting History None   Bleeding History GAVE related bleeding   Cancer History None   Family Cancer History Father (cancer)   H/O Blood/Plt Transfusion PRBC years ago   Tobacco/etoh/drug abuse 2 PPDx 44 years, quit 2004, no etoh abuse or rec drug use       Cancer Screening history n/a   Occupation [employer]      8/29/2022: Hgb 9.5, WBC 6.35k, MCV 91, plt 235k, retic 3.65%, FOBT + (8/31), ferritin 7, iron 30, Fe Sat 9%, TIBC 346  9/7 EGD/C-scope: internal hemorrhoids  10/3/2022-10/9/2022: 5 iV Venofer 200mg administered   11/28/2022: ferritin 26, iron 67, Fe Sat 21%, TIBC 322, Hgb 12.7 (now normal)  5/2/2023: Discussed with the patient the results of his iron studies which show worsening iron deficiency as well as recurrent anemia.  I ordered 600 mg of IV Venofer  5/18/2023: ferritin 7, iron 36, Fe Sat 10%, TIBC 364, Hgb 11.6  6/29/2023: ferritin 37, iron 52, Fe Sat 17%, TIBC 307, Hgb 12.7, MCV 92  9/20/2023:  ferritin 58, iron 65, Fe Sat 33%, TIBC 230, Hgb 13.5, MCV 95  2/14/2024: iron 42, ferritin 22, Fe Sat 14%, TIBC 304: Vit B12 632, Hgb 13.4, plt 161k, WBC 6.21k  4/18/2024: iron 59, Fe Sat 18%, TIBC 329, ferritin 21, Hgb 13.4 (2/2024)  4/29/2024: planned IV Venofer 200mg x2  10/2/2024: Ferritin: 224, Hgb 13.6, Fe Sat 13%, TIBC 208, iron 26, ferritin 224 AOCI  11/8/2024: Lipase: 380 (4x ULN)  12/27/2024: AFP 0.62  3/31/2025: increased Levo to 50 mcg  3/28/2025: AFP 1.72   6/19/2025: AFP 0.70  6/19/2025: fT4 0.89    SUBJECTIVE  (INTERVAL HISTORY)      Interval events:   See above    I have reviewed the relevant past medical,  surgical, social and family history. I have also reviewed allergies and medications for this patient.    Review of Systems    Baseline weight: 152-154 lbs    Denies weight loss, F/C, N/V, SOB, CP, LH, HA.    He has had fatigue and low energy since 6/2022. He was chewing on ice all day since 3/2022.     A 10-point review of system was performed, pertinent positive and negative were detailed as above. Otherwise, the 10-point review of system was negative.      Past Medical History:   Diagnosis Date    Coronary artery disease without angina pectoris 02/25/2020    Diabetes mellitus (HCC)     DVT (deep venous thrombosis) (HCC)     GERD (gastroesophageal reflux disease)     Hypertension     Iron deficiency anemia     Liver cancer (HCC)        Past Surgical History:   Procedure Laterality Date    ANGIOPLASTY  10/19/2021    bilateral    CARDIAC CATHETERIZATION  2013    calif    CABG  x4    COLONOSCOPY      COLONOSCOPY      CORONARY ANGIOPLASTY WITH STENT PLACEMENT  01/01/2010    CORONARY ARTERY BYPASS GRAFT      FEMORAL ARTERY STENT      FEMORAL BYPASS  10/20/2021    IR LOWER EXTREMITY ANGIOGRAM  2/14/2023    IR PELVIC ANGIOGRAM  5/6/2022    IR Y-90 PRE-ANGIO/EMBO W/ LUNG SCAN  1/13/2025    IR Y-90 RADIOEMBOLIZATION  1/22/2025    MN SLCTV CATHJ 3RD+ ORD SLCTV ABDL PEL/LXTR BRNCH  5/6/2022    Procedure: ULTRASOUND-GUIDED LEFT BRACHIAL ARTERY PUNCTURE, AORTOGRAM, RIGHT COMMON ILIAC ARTERY ANGIOPLASTY WITH 8 X 40 MM BALLOON, ANGIOPLASTY OF RIGHT EXTERNAL AND COMMON ILIAC ARTERY STENOSIS WITH 6 X 150 MM DRUG-ELUTING BALLOON, STENTING OF RIGHT DISTAL EXTERNAL ILIAC ARTERY STENOSIS WITH 7 X 40 MM SELF EXPANDING STENT POST DILATED WITH A 6 MM BALLOON.;  Surgeon: Henry Pena MD;  [place]    MN SLCTV CATHJ 3RD+ ORD SLCTV ABDL PEL/LXTR BRNCH Bilateral 2/14/2023    Procedure: CUTDOWN FEM-FEM BYPASS WITH PRIMARY CLOSURE ACCESS SITE, BILATERAL RUN-OFF, LEFT FEM-POP 5X220 QUYEN AND 6X150 RODRI, ATTEMPT TO CROSS RIGHT SFA OCCLUSION;   Surgeon: Henry Pena MD;  Location: [facility] OR;  Service: Vascular       Family History   Problem Relation Name Age of Onset    No Known Problems Mother      Lung cancer Father      Liver cancer Brother         Social History     Socioeconomic History    Marital status: /Civil Union     Spouse name: Not on file    Number of children: Not on file    Years of education: Not on file    Highest education level: Not on file   Occupational History    Not on file   Tobacco Use    Smoking status: Former     Current packs/day: 0.00     Average packs/day: 1.5 packs/day for 40.0 years (60.0 ttl pk-yrs)     Types: Cigarettes     Start date:      Quit date:      Years since quittin.5     Passive exposure: Past    Smokeless tobacco: Never    Tobacco comments:     quit --    Vaping Use    Vaping status: Never Used   Substance and Sexual Activity    Alcohol use: Not Currently     Alcohol/week: 0.0 standard drinks of alcohol    Drug use: No    Sexual activity: Not on file   Other Topics Concern    Not on file   Social History Narrative    · Most recent tobacco use screenin2020      Social Drivers of Health     Financial Resource Strain: Low Risk  (2023)    Overall Financial Resource Strain (CARDIA)     Difficulty of Paying Living Expenses: Not hard at all   Food Insecurity: Food Insecurity Present (2025)    Nursing - Inadequate Food Risk Classification     Worried About Running Out of Food in the Last Year: Sometimes true     Ran Out of Food in the Last Year: Sometimes true     Ran Out of Food in the Last Year: Not on file   Transportation Needs: No Transportation Needs (2025)    PRAPARE - Transportation     Lack of Transportation (Medical): No     Lack of Transportation (Non-Medical): No   Physical Activity: Not on file   Stress: Not on file   Social Connections: Not on file   Intimate Partner Violence: Not on file   Housing Stability: Low Risk  (2025)    Housing  Stability Vital Sign     Unable to Pay for Housing in the Last Year: No     Number of Times Moved in the Last Year: 0     Homeless in the Last Year: No       No Known Allergies    Current Outpatient Medications   Medication Sig Dispense Refill    acetaminophen (TYLENOL) 500 mg tablet Take 1,000 mg by mouth every 8 (eight) hours as needed      acetaminophen (TYLENOL) 500 mg tablet Take 1 tablet (500 mg total) by mouth every 6 (six) hours as needed for moderate pain 30 tablet 0    atorvastatin (LIPITOR) 40 mg tablet Take 1 tablet (40 mg total) by mouth daily 100 tablet 3    chlorhexidine (PERIDEX) 0.12 % solution Use 15 mL to rinse mouth or throat 2 (two) times daily 473 mL 2    clopidogrel (PLAVIX) 75 mg tablet Take 1 tablet (75 mg total) by mouth daily 90 tablet 1    Empagliflozin (Jardiance) 25 MG TABS Take 1 tablet (25 mg total) by mouth every morning Also: take with LOTS of water 90 tablet 3    ferrous sulfate 325 (65 Fe) mg tablet Take 1 tablet (325 mg total) by mouth 2 (two) times a day with meals 60 tablet 6    glipiZIDE (GLUCOTROL) 10 mg tablet Take 1 tablet (10 mg total) by mouth 2 (two) times a day before meals 180 tablet 1    hydrocortisone 2.5 % cream Apply topically 2 (two) times a day as needed for rash 30 g 3    ibuprofen (MOTRIN) 600 mg tablet Take 1 tablet (600 mg total) by mouth every 6 (six) hours as needed for mild pain 30 tablet 0    levothyroxine 75 mcg tablet Take 1 tablet (75 mcg total) by mouth daily 30 tablet 2    lidocaine (LIDODERM) 5 % Apply 1 patch topically every 24 hours over 12 hours Remove & Discard patch within 12 hours or as directed by MD 15 patch 0    lisinopril-hydrochlorothiazide (PRINZIDE,ZESTORETIC) 20-12.5 MG per tablet Take 1 tablet by mouth daily 90 tablet 1    metFORMIN (GLUCOPHAGE) 1000 MG tablet Take 1 tablet (1,000 mg total) by mouth 2 (two) times a day with meals 180 tablet 1    methocarbamol (ROBAXIN) 500 mg tablet Take 1 tablet (500 mg total) by mouth 2 (two) times  a day 20 tablet 0    mupirocin (BACTROBAN) 2 % ointment Apply topically 2 (two) times a day In the LEFT side of nose 22 g 3    omeprazole (PriLOSEC) 20 mg delayed release capsule TAKE 1 CAPSULE BY MOUTH DAILY BEFORE BREAKFAST. 90 capsule 2    tamsulosin (FLOMAX) 0.4 mg Take 1 capsule (0.4 mg total) by mouth daily with dinner 90 capsule 1     No current facility-administered medications for this visit.       (Not in a hospital admission)        Objective:     24 Hour Vitals Assessment:     Vitals:    07/03/25 1026   BP: 142/60   Pulse: 56   Resp: 18   Temp: 98.6 °F (37 °C)   SpO2: 96%           Weight last visit 152 lbs  Weight today: 151 lbs      PHYSICIAN EXAM:    General: Appearance: alert, cooperative, no distress.  HEENT: Normocephalic, atraumatic. No scleral icterus. conjunctivae clear. EOMI.  Chest: No tenderness to palpation. No open wound noted.  Lungs: Clear to auscultation bilaterally, Respirations unlabored.  Cardiac: Bradycardia, +S1and S2  Abdomen: Soft, non-tender, non-distended. Bowel sounds are normal.  Extremities:  No edema, cyanosis, clubbing.  Skin: Skin color, turgor are normal. Small R anterior leg rashes  Neurologic: Awake, Alert, and oriented, no gross focal deficits noted b/l.       DATA REVIEW:    Pathology Result:    Final Diagnosis   Date Value Ref Range Status   06/27/2023   Final    A. Duodenum, :  - Unremarkable duodenal mucosa  - No villous atrophy or increased intraepithelial lymphocytes seen     B. Stomach, :  - Gastric oxyntic and antral type mucosa with minimal or mild chronic inflammation  - No sharad shaped bacteria seen on H&E stained tissue section  - Negative for intestinal metaplasia and dysplasia          09/07/2022   Final    A. Duodenum,  biopsy:  - Duodenal mucosa with focal acute inflammation and reactive changes  - No intraepithelial lymphocytosis and no villous blunting.  - No epithelial dysplasia and no evidence of malignancy.    B.  Stomach, biopsy:  - Gastric antral  and oxyntic mucosa with no significant pathologic alteration.  - Negative for intestinal metaplasia, dysplasia or carcinoma.  - No Helicobacter pylori is identified on H&E stained slide.              Image Results:   Image result are reviewed and documented in Hematology/Oncology history. I personally reviewed these images.    CT chest wo contrast  Narrative: CT CHEST WITHOUT IV CONTRAST    INDICATION:   C22.0: Liver cell carcinoma.    COMPARISON: CXR 6/13/2023, abdomen CT 6/18/2025, chest CT 12/30/2024, 9/25/2024, 11/15/2019.    TECHNIQUE: Chest CT without intravenous contrast.  Axial, sagittal, coronal 2D reformats and coronal MIPS from source data.    Radiation dose length product (DLP):  295 mGy-cm. . Radiation dose exposure minimized using iterative reconstruction and automated exposure control.    FINDINGS:    LUNGS: No definite metastases. Multiple bilateral upper lung predominant nodules and scars, some calcified, stable since 2019. Moderate emphysema. Moderate lower lobe juxtapleural reticulation and traction bronchiolectasis with mild diffuse pulmonary   ossification, slightly progressed from 2019.    AIRWAYS: No significant filling defects.    PLEURA: Trace pleural effusions.    HEART/GREAT VESSELS: Heart upper limit of normal in size. Severe coronary artery calcification status post CABG.    MEDIASTINUM AND RICHARD: Calcified subcarinal and right hilar nodes due to benign granulomatous disease.    CHEST WALL AND LOWER NECK: Unremarkable.    UPPER ABDOMEN: Tumor in the right hepatic lobe poorly demonstrated. Minimal ascites.    OSSEOUS STRUCTURES: Mild degenerative disease in the spine.  Impression: No definite lung metastases.    Stable upper lung predominant nodules and scars, some calcified, since 2019.    Slight progression of pulmonary fibrosis since 2019.    Trace pleural effusions.    Tumor in the right hepatic lobe poorly demonstrated with minimal ascites.    Computerized Assisted Algorithm (CAA) may  "have aided analysis of applicable images.    Workstation performed: [id number]      LABS:  Lab data are reviewed and documented in [facility] history.       Lab Results   Component Value Date    HGB 12.3 06/19/2025    HCT 38.8 06/19/2025    MCV 97 06/19/2025     (L) 06/19/2025    WBC 4.26 (L) 06/19/2025    NRBC 0 06/19/2025     Lab Results   Component Value Date    K 4.4 06/19/2025     06/19/2025    CO2 21 06/19/2025    BUN 30 (H) 06/19/2025    CREATININE 1.25 06/19/2025    GLUF 154 (H) 06/19/2025    CALCIUM 9.0 06/19/2025    CORRECTEDCA 10.2 (H) 11/15/2021    AST 27 06/19/2025    ALT 29 06/19/2025    ALKPHOS 79 06/19/2025    EGFR 53 06/19/2025       Lab Results   Component Value Date    IRON 26 (L) 10/02/2024    TIBC 208 (L) 10/02/2024    FERRITIN 224 10/02/2024    FERRITIN 21 (L) 04/18/2024    FERRITIN 22 (L) 02/14/2024    FERRITIN 58 09/20/2023    FERRITIN 37 06/29/2023    FERRITIN 7 (L) 05/18/2023    FERRITIN 26 11/28/2022    FERRITIN 7 (L) 08/29/2022    FERRITIN 37 04/20/2022    FERRITIN 17 02/07/2022    FERRITIN 13 01/05/2022    FERRITIN 10 11/15/2021    FERRITIN 28 03/11/2021    FERRITIN 12 11/03/2020       Lab Results   Component Value Date    YKLCDZTL96 632 02/14/2024    TGXJBOET01 223 09/20/2023    [id number] 389 11/15/2021    [id number] 681 05/29/2020       No results for input(s): \"WBC\", \"CREAT\", \"PLT\" in the last 72 hours.         By:  Mal Angeles, 7/3/2025, 10:38 AM                                    "

## 2025-06-23 NOTE — ASSESSMENT & PLAN NOTE
Following with heme-onc.  MRI scheduled for later this week.  Marker alpha-fetoprotein is 0.7 down from 21,000.  Very excellent response

## 2025-06-23 NOTE — ASSESSMENT & PLAN NOTE
This is a 80 y.o. c PMHx notable for cecal AVM/GAVE, PE, DM, DVT, HTN, HLP, CAD, being seen in consultation for chronic ANG and locally advanced HCC; on systemic therapy  Orders:    MRI abdomen w wo contrast; Future

## 2025-06-24 ENCOUNTER — HOSPITAL ENCOUNTER (OUTPATIENT)
Dept: INFUSION CENTER | Facility: CLINIC | Age: 80
Discharge: HOME | End: 2025-06-24
Payer: MEDICARE

## 2025-06-24 VITALS
DIASTOLIC BLOOD PRESSURE: 49 MMHG | RESPIRATION RATE: 18 BRPM | WEIGHT: 156.53 LBS | BODY MASS INDEX: 26.05 KG/M2 | HEART RATE: 54 BPM | SYSTOLIC BLOOD PRESSURE: 156 MMHG | TEMPERATURE: 97.5 F

## 2025-06-24 DIAGNOSIS — C22.0 HEPATOCELLULAR CARCINOMA (HCC): Primary | ICD-10-CM

## 2025-06-24 PROCEDURE — 96413 CHEMO IV INFUSION 1 HR: CPT

## 2025-06-24 RX ORDER — SODIUM CHLORIDE 9 MG/ML
20 INJECTION, SOLUTION INTRAVENOUS ONCE
Status: COMPLETED | OUTPATIENT
Start: 2025-06-24 | End: 2025-06-24

## 2025-06-24 RX ADMIN — SODIUM CHLORIDE 20 ML/HR: 0.9 INJECTION, SOLUTION INTRAVENOUS at 11:13

## 2025-06-24 RX ADMIN — DURVALUMAB 1500 MG: 500 INJECTION, SOLUTION INTRAVENOUS at 11:54

## 2025-06-24 NOTE — PROGRESS NOTES
Patient arrived to unit without complaint. Patient tolerated chemotherapy without incident. AVS declined and patient aware of next appointment 7/22/25 at 10:30. Patient left in stable condition.

## 2025-06-26 ENCOUNTER — HOSPITAL ENCOUNTER (OUTPATIENT)
Dept: CT IMAGING | Facility: HOSPITAL | Age: 80
Discharge: HOME | End: 2025-06-26
Attending: INTERNAL MEDICINE
Payer: MEDICARE

## 2025-06-26 DIAGNOSIS — C22.0 HEPATOCELLULAR CARCINOMA (HCC): ICD-10-CM

## 2025-06-26 PROCEDURE — 71250 CT THORAX DX C-: CPT

## 2025-06-27 ENCOUNTER — DOCUMENTATION (OUTPATIENT)
Dept: HEMATOLOGY ONCOLOGY | Facility: CLINIC | Age: 80
End: 2025-06-27

## 2025-06-27 NOTE — PROGRESS NOTES
Read requested for:    CT chest wo contrast (Order #[id number]) on 6/26/25     Verbal confirmation from Jorge.

## 2025-07-03 ENCOUNTER — OFFICE VISIT (OUTPATIENT)
Dept: HEMATOLOGY ONCOLOGY | Facility: CLINIC | Age: 80
End: 2025-07-03
Payer: MEDICARE

## 2025-07-03 ENCOUNTER — TELEPHONE (OUTPATIENT)
Dept: HEMATOLOGY ONCOLOGY | Facility: CLINIC | Age: 80
End: 2025-07-03

## 2025-07-03 VITALS
RESPIRATION RATE: 18 BRPM | BODY MASS INDEX: 25.16 KG/M2 | SYSTOLIC BLOOD PRESSURE: 142 MMHG | TEMPERATURE: 98.6 F | WEIGHT: 151 LBS | OXYGEN SATURATION: 96 % | HEIGHT: 65 IN | DIASTOLIC BLOOD PRESSURE: 60 MMHG | HEART RATE: 56 BPM

## 2025-07-03 DIAGNOSIS — C22.0 HEPATOCELLULAR CARCINOMA (HCC): Primary | ICD-10-CM

## 2025-07-03 PROCEDURE — 99215 OFFICE O/P EST HI 40 MIN: CPT | Performed by: INTERNAL MEDICINE

## 2025-07-03 PROCEDURE — G2211 COMPLEX E/M VISIT ADD ON: HCPCS | Performed by: INTERNAL MEDICINE

## 2025-07-03 NOTE — PATIENT INSTRUCTIONS
Get blood work August 12 and this will count towards your MRI blood work and your treatment blood work

## 2025-07-20 DIAGNOSIS — Z79.4 TYPE 2 DIABETES MELLITUS WITH DIABETIC PERIPHERAL ANGIOPATHY WITHOUT GANGRENE, WITH LONG-TERM CURRENT USE OF INSULIN (HCC): ICD-10-CM

## 2025-07-20 DIAGNOSIS — I10 ESSENTIAL HYPERTENSION: ICD-10-CM

## 2025-07-20 DIAGNOSIS — E11.51 TYPE 2 DIABETES MELLITUS WITH DIABETIC PERIPHERAL ANGIOPATHY WITHOUT GANGRENE, WITH LONG-TERM CURRENT USE OF INSULIN (HCC): ICD-10-CM

## 2025-07-20 DIAGNOSIS — Z79.899 ENCOUNTER FOR LONG-TERM CURRENT USE OF HIGH RISK MEDICATION: ICD-10-CM

## 2025-07-20 DIAGNOSIS — D50.9 IRON DEFICIENCY ANEMIA, UNSPECIFIED IRON DEFICIENCY ANEMIA TYPE: ICD-10-CM

## 2025-07-21 ENCOUNTER — TELEPHONE (OUTPATIENT)
Dept: INFUSION CENTER | Facility: CLINIC | Age: 80
End: 2025-07-21

## 2025-07-21 RX ORDER — LISINOPRIL AND HYDROCHLOROTHIAZIDE 12.5; 2 MG/1; MG/1
1 TABLET ORAL DAILY
Qty: 90 TABLET | Refills: 1 | Status: SHIPPED | OUTPATIENT
Start: 2025-07-21

## 2025-07-21 NOTE — TELEPHONE ENCOUNTER
Called pt to remind him that he needs labs drawn prior to receiving his treatment tomorrow. Pt was unaware and states he will go tomorrow morning to the [facility] lab at 0700.

## 2025-07-22 ENCOUNTER — APPOINTMENT (OUTPATIENT)
Dept: LAB | Facility: HOSPITAL | Age: 80
End: 2025-07-22
Payer: MEDICARE

## 2025-07-22 ENCOUNTER — HOSPITAL ENCOUNTER (OUTPATIENT)
Dept: INFUSION CENTER | Facility: CLINIC | Age: 80
Discharge: HOME | End: 2025-07-22
Attending: INTERNAL MEDICINE
Payer: MEDICARE

## 2025-07-22 VITALS
DIASTOLIC BLOOD PRESSURE: 56 MMHG | TEMPERATURE: 96.8 F | RESPIRATION RATE: 18 BRPM | HEART RATE: 56 BPM | SYSTOLIC BLOOD PRESSURE: 176 MMHG

## 2025-07-22 DIAGNOSIS — C22.0 HEPATOCELLULAR CARCINOMA (HCC): Primary | ICD-10-CM

## 2025-07-22 DIAGNOSIS — C22.0 HEPATOCELLULAR CARCINOMA (HCC): ICD-10-CM

## 2025-07-22 LAB
ALBUMIN SERPL BCG-MCNC: 4.2 G/DL (ref 3.5–5)
ALP SERPL-CCNC: 74 U/L (ref 34–104)
ALT SERPL W P-5'-P-CCNC: 16 U/L (ref 7–52)
AMYLASE SERPL-CCNC: 56 IU/L (ref 29–103)
ANION GAP SERPL CALCULATED.3IONS-SCNC: 9 MMOL/L (ref 4–13)
AST SERPL W P-5'-P-CCNC: 12 U/L (ref 13–39)
BASOPHILS # BLD AUTO: 0.03 THOUSANDS/ÂΜL (ref 0–0.1)
BASOPHILS NFR BLD AUTO: 1 % (ref 0–1)
BILIRUB SERPL-MCNC: 0.93 MG/DL (ref 0.2–1)
BUN SERPL-MCNC: 32 MG/DL (ref 5–25)
CALCIUM SERPL-MCNC: 9.9 MG/DL (ref 8.4–10.2)
CHLORIDE SERPL-SCNC: 105 MMOL/L (ref 96–108)
CO2 SERPL-SCNC: 23 MMOL/L (ref 21–32)
CREAT SERPL-MCNC: 1.1 MG/DL (ref 0.6–1.3)
EOSINOPHIL # BLD AUTO: 0.32 THOUSAND/ÂΜL (ref 0–0.61)
EOSINOPHIL NFR BLD AUTO: 6 % (ref 0–6)
ERYTHROCYTE [DISTWIDTH] IN BLOOD BY AUTOMATED COUNT: 15.4 % (ref 11.6–15.1)
GFR SERPL CREATININE-BSD FRML MDRD: 62 ML/MIN/1.73SQ M
GLUCOSE P FAST SERPL-MCNC: 79 MG/DL (ref 65–99)
HCT VFR BLD AUTO: 39.5 % (ref 36.5–49.3)
HGB BLD-MCNC: 12.9 G/DL (ref 12–17)
IMM GRANULOCYTES # BLD AUTO: 0.02 THOUSAND/UL (ref 0–0.2)
IMM GRANULOCYTES NFR BLD AUTO: 0 % (ref 0–2)
LIPASE SERPL-CCNC: 62 U/L (ref 11–82)
LYMPHOCYTES # BLD AUTO: 0.77 THOUSANDS/ÂΜL (ref 0.6–4.47)
LYMPHOCYTES NFR BLD AUTO: 15 % (ref 14–44)
MCH RBC QN AUTO: 30.4 PG (ref 26.8–34.3)
MCHC RBC AUTO-ENTMCNC: 32.7 G/DL (ref 31.4–37.4)
MCV RBC AUTO: 93 FL (ref 82–98)
MONOCYTES # BLD AUTO: 0.47 THOUSAND/ÂΜL (ref 0.17–1.22)
MONOCYTES NFR BLD AUTO: 9 % (ref 4–12)
NEUTROPHILS # BLD AUTO: 3.53 THOUSANDS/ÂΜL (ref 1.85–7.62)
NEUTS SEG NFR BLD AUTO: 69 % (ref 43–75)
NRBC BLD AUTO-RTO: 0 /100 WBCS
PLATELET # BLD AUTO: 132 THOUSANDS/UL (ref 149–390)
PMV BLD AUTO: 9.7 FL (ref 8.9–12.7)
POTASSIUM SERPL-SCNC: 4.3 MMOL/L (ref 3.5–5.3)
PROT SERPL-MCNC: 7.3 G/DL (ref 6.4–8.4)
RBC # BLD AUTO: 4.24 MILLION/UL (ref 3.88–5.62)
SODIUM SERPL-SCNC: 137 MMOL/L (ref 135–147)
T4 FREE SERPL-MCNC: 0.82 NG/DL (ref 0.61–1.12)
TSH SERPL DL<=0.05 MIU/L-ACNC: 10.44 UIU/ML (ref 0.45–4.5)
WBC # BLD AUTO: 5.14 THOUSAND/UL (ref 4.31–10.16)

## 2025-07-22 PROCEDURE — 84443 ASSAY THYROID STIM HORMONE: CPT

## 2025-07-22 PROCEDURE — 96413 CHEMO IV INFUSION 1 HR: CPT

## 2025-07-22 PROCEDURE — 84439 ASSAY OF FREE THYROXINE: CPT

## 2025-07-22 PROCEDURE — 80053 COMPREHEN METABOLIC PANEL: CPT

## 2025-07-22 PROCEDURE — 83690 ASSAY OF LIPASE: CPT

## 2025-07-22 PROCEDURE — 36415 COLL VENOUS BLD VENIPUNCTURE: CPT

## 2025-07-22 PROCEDURE — 82150 ASSAY OF AMYLASE: CPT

## 2025-07-22 PROCEDURE — 85025 COMPLETE CBC W/AUTO DIFF WBC: CPT

## 2025-07-22 RX ORDER — SODIUM CHLORIDE 9 MG/ML
20 INJECTION, SOLUTION INTRAVENOUS ONCE
Status: COMPLETED | OUTPATIENT
Start: 2025-07-22 | End: 2025-07-22

## 2025-07-22 RX ADMIN — SODIUM CHLORIDE 20 ML/HR: 0.9 INJECTION, SOLUTION INTRAVENOUS at 10:55

## 2025-07-22 RX ADMIN — DURVALUMAB 1500 MG: 500 INJECTION, SOLUTION INTRAVENOUS at 10:59

## 2025-07-22 NOTE — PROGRESS NOTES
Patient tolerated treatment without incident. Patient is aware of upcoming MRI appointment and follow up visit with Dr. Angeles prior to next infusion. AVS provided.

## 2025-08-12 ENCOUNTER — APPOINTMENT (OUTPATIENT)
Dept: LAB | Facility: CLINIC | Age: 80
End: 2025-08-12
Payer: MEDICARE

## 2025-08-14 ENCOUNTER — HOSPITAL ENCOUNTER (OUTPATIENT)
Dept: MRI IMAGING | Facility: HOSPITAL | Age: 80
Discharge: HOME | End: 2025-08-14
Attending: INTERNAL MEDICINE
Payer: MEDICARE

## 2025-08-19 ENCOUNTER — OFFICE VISIT (OUTPATIENT)
Dept: HEMATOLOGY ONCOLOGY | Facility: CLINIC | Age: 80
End: 2025-08-19
Payer: MEDICARE

## 2025-08-19 ENCOUNTER — HOSPITAL ENCOUNTER (OUTPATIENT)
Dept: INFUSION CENTER | Facility: CLINIC | Age: 80
Discharge: HOME | End: 2025-08-19
Attending: INTERNAL MEDICINE
Payer: MEDICARE

## 2025-08-19 VITALS
DIASTOLIC BLOOD PRESSURE: 80 MMHG | TEMPERATURE: 97.8 F | HEIGHT: 65 IN | HEART RATE: 51 BPM | BODY MASS INDEX: 25.16 KG/M2 | WEIGHT: 151 LBS | SYSTOLIC BLOOD PRESSURE: 156 MMHG | RESPIRATION RATE: 18 BRPM | OXYGEN SATURATION: 98 %

## 2025-08-19 VITALS
BODY MASS INDEX: 25.02 KG/M2 | RESPIRATION RATE: 18 BRPM | DIASTOLIC BLOOD PRESSURE: 55 MMHG | SYSTOLIC BLOOD PRESSURE: 155 MMHG | TEMPERATURE: 96.4 F | HEART RATE: 54 BPM | WEIGHT: 150.35 LBS

## 2025-08-19 DIAGNOSIS — C22.0 HEPATOCELLULAR CARCINOMA (HCC): Primary | ICD-10-CM

## 2025-08-19 PROCEDURE — 99215 OFFICE O/P EST HI 40 MIN: CPT | Performed by: INTERNAL MEDICINE

## 2025-08-19 PROCEDURE — G2211 COMPLEX E/M VISIT ADD ON: HCPCS | Performed by: INTERNAL MEDICINE

## 2025-08-19 PROCEDURE — 96413 CHEMO IV INFUSION 1 HR: CPT

## 2025-08-19 RX ORDER — SODIUM CHLORIDE 9 MG/ML
20 INJECTION, SOLUTION INTRAVENOUS ONCE
Status: COMPLETED | OUTPATIENT
Start: 2025-08-19 | End: 2025-08-19

## 2025-08-19 RX ADMIN — DURVALUMAB 1500 MG: 500 INJECTION, SOLUTION INTRAVENOUS at 15:04

## 2025-08-19 RX ADMIN — SODIUM CHLORIDE 20 ML/HR: 0.9 INJECTION, SOLUTION INTRAVENOUS at 14:53

## (undated) DEVICE — Y-ADAPTER: Brand: GATEWAY™ PLUS

## (undated) DEVICE — MICROPUNCTURE INTRODUCER SET SILHOUETTE TRANSITIONLESS PUSH-PLUS DESIGN - STIFFENED CANNULA WITH STAINLESS STEEL WIRE GUIDE: Brand: MICROPUNCTURE

## (undated) DEVICE — CATH SUPPORT RUBICON 4FR 0.018IN 150CM STR

## (undated) DEVICE — PINNACLE R/O II INTRODUCER SHEATH WITH RADIOPAQUE MARKER: Brand: PINNACLE

## (undated) DEVICE — SURGICEL 4 X 8

## (undated) DEVICE — STERILE ICS CARDIOVASCULAR PK: Brand: CARDINAL HEALTH

## (undated) DEVICE — 40529 DERMAPROX PAD 11'' X 15'' X 1'': Brand: 40529 DERMAPROX PAD 11'' X 15'' X 1''

## (undated) DEVICE — CATH DIAG 5FR .035 90CM PIG

## (undated) DEVICE — CATH DIAG 5FR .035 65CM 6S OMMI-FLUSH

## (undated) DEVICE — VESSEL LOOPS X-RAY DETECTABLE: Brand: DEROYAL

## (undated) DEVICE — SYRINGE KIT,PACKAGED,,150FT,MK 7(ANGIO-ARTERION, 150ML SYR KIT W/QFT,MC)(60729385): Brand: MEDRAD® MARK 7 ARTERION DISPOSABLE SYRINGE 150 ML WITH QUICK FILL TUBE

## (undated) DEVICE — LIGHT GLOVE GREEN

## (undated) DEVICE — Device

## (undated) DEVICE — CATH TEMPO AQUA 4FVER135Â°100CM: Brand: TEMPO AQUA

## (undated) DEVICE — ADHESIVE SKIN HIGH VISCOSITY EXOFIN 1ML

## (undated) DEVICE — ASTOUND STANDARD SURGICAL GOWN, XL: Brand: CONVERTORS

## (undated) DEVICE — RADIFOCUS TORQUE DEVICE MULTI-TORQUE VISE: Brand: RADIFOCUS TORQUE DEVICE

## (undated) DEVICE — SGW STAB XS .014 300CM ST SS: Brand: STABILIZER

## (undated) DEVICE — SUTURE BOOTS YELLOW

## (undated) DEVICE — FLUID MANAGEMENT KIT - IR

## (undated) DEVICE — X-RAY DETECTABLE SPONGES,16 PLY: Brand: VISTEC

## (undated) DEVICE — BAG DECANTER

## (undated) DEVICE — ADHESIVE SKIN CLSR DERMABOND NX

## (undated) DEVICE — PACLITAXEL-COATED PTA BALLOON CATHETER: Brand: RANGER™

## (undated) DEVICE — PROVE COVER: Brand: UNBRANDED

## (undated) DEVICE — IV SET 15 DROP STERILE 0/Y GRAVITY

## (undated) DEVICE — RADIFOCUS GLIDEWIRE: Brand: GLIDEWIRE

## (undated) DEVICE — TOWEL SURG XR DETECT GREEN STRL RFD

## (undated) DEVICE — CATH BAL STERLING OTW 5 X 20MM X 135CM

## (undated) DEVICE — SUT PROLENE 4-0 RB1 36 IN 8357H

## (undated) DEVICE — FLEXCIL HIGH PRESSURE CONTRAST INJECTION LINE: Brand: NAMIC

## (undated) DEVICE — PRESTO™ INFLATION DEVICE: Brand: PRESTO

## (undated) DEVICE — SGW STORQ .035 300CM ANGLE STD: Brand: STORQ

## (undated) DEVICE — RADIFOCUS GLIDEWIRE ADVANTAGE GUIDEWIRE: Brand: GLIDEWIRE ADVANTAGE

## (undated) DEVICE — SI BRITE TIP 6F 11CM STR: Brand: BRITE TIP

## (undated) DEVICE — GLOVE SRG BIOGEL ORTHOPEDIC 7.5

## (undated) DEVICE — DESTINATION CAROTID GUIDING SHEATH: Brand: DESTINATION

## (undated) DEVICE — CATH BAL CHARGER 8 X 40MM X 135CM

## (undated) DEVICE — SYRINGE 20ML LL

## (undated) DEVICE — INFUSER BAG 500ML

## (undated) DEVICE — DISPOSABLE OR TOWEL: Brand: CARDINAL HEALTH

## (undated) DEVICE — OUTBACK ELITE 120CM RE-ENTRY: Brand: OUTBACK

## (undated) DEVICE — CATH BAL STERLING OTW 4 X 220MM X 150CM

## (undated) DEVICE — 3M™ IOBAN™ 2 ANTIMICROBIAL INCISE DRAPE 6648EZ: Brand: IOBAN™ 2

## (undated) DEVICE — CATH BAL CHARGER 6 X 40MM X 135CM

## (undated) DEVICE — NON-DEHP HIGH FLOW RATE EXTENSION SET, MALE LUER LOCK ADAPTER